# Patient Record
Sex: FEMALE | Race: WHITE | Employment: FULL TIME | URBAN - NONMETROPOLITAN AREA
[De-identification: names, ages, dates, MRNs, and addresses within clinical notes are randomized per-mention and may not be internally consistent; named-entity substitution may affect disease eponyms.]

---

## 2017-01-03 RX ORDER — ESCITALOPRAM OXALATE 10 MG/1
TABLET ORAL
Qty: 90 TABLET | Refills: 3 | Status: SHIPPED | OUTPATIENT
Start: 2017-01-03 | End: 2017-09-29 | Stop reason: ALTCHOICE

## 2017-01-09 ENCOUNTER — TELEPHONE (OUTPATIENT)
Dept: PRIMARY CARE CLINIC | Age: 37
End: 2017-01-09

## 2017-01-09 RX ORDER — LOSARTAN POTASSIUM AND HYDROCHLOROTHIAZIDE 12.5; 5 MG/1; MG/1
TABLET ORAL
Qty: 30 TABLET | Refills: 3 | Status: SHIPPED | OUTPATIENT
Start: 2017-01-09 | End: 2017-01-09 | Stop reason: SDUPTHER

## 2017-01-09 RX ORDER — LOSARTAN POTASSIUM AND HYDROCHLOROTHIAZIDE 12.5; 5 MG/1; MG/1
TABLET ORAL
Qty: 30 TABLET | Refills: 3 | Status: SHIPPED | OUTPATIENT
Start: 2017-01-09 | End: 2017-01-16 | Stop reason: ALTCHOICE

## 2017-01-10 ENCOUNTER — TELEPHONE (OUTPATIENT)
Dept: PRIMARY CARE CLINIC | Age: 37
End: 2017-01-10

## 2017-01-16 ENCOUNTER — TELEPHONE (OUTPATIENT)
Dept: PRIMARY CARE CLINIC | Age: 37
End: 2017-01-16

## 2017-01-16 RX ORDER — BISOPROLOL FUMARATE AND HYDROCHLOROTHIAZIDE 5; 6.25 MG/1; MG/1
TABLET ORAL
Qty: 30 TABLET | Refills: 3 | Status: SHIPPED | OUTPATIENT
Start: 2017-01-16 | End: 2017-05-22 | Stop reason: SDUPTHER

## 2017-01-20 ENCOUNTER — TELEPHONE (OUTPATIENT)
Dept: PRIMARY CARE CLINIC | Age: 37
End: 2017-01-20

## 2017-01-23 ENCOUNTER — TELEPHONE (OUTPATIENT)
Dept: PRIMARY CARE CLINIC | Age: 37
End: 2017-01-23

## 2017-01-23 RX ORDER — CIMETIDINE 800 MG
TABLET ORAL
Qty: 60 TABLET | Refills: 5 | Status: SHIPPED | OUTPATIENT
Start: 2017-01-23 | End: 2017-03-08 | Stop reason: ALTCHOICE

## 2017-01-23 RX ORDER — PANTOPRAZOLE SODIUM 40 MG/1
TABLET, DELAYED RELEASE ORAL
Qty: 30 TABLET | Refills: 0 | Status: SHIPPED | OUTPATIENT
Start: 2017-01-23 | End: 2018-04-20 | Stop reason: SDUPTHER

## 2017-01-27 ENCOUNTER — TELEPHONE (OUTPATIENT)
Dept: PRIMARY CARE CLINIC | Age: 37
End: 2017-01-27

## 2017-01-27 RX ORDER — OMEPRAZOLE 40 MG/1
CAPSULE, DELAYED RELEASE ORAL
Qty: 30 CAPSULE | Refills: 3 | Status: SHIPPED | OUTPATIENT
Start: 2017-01-27 | End: 2017-09-29 | Stop reason: CLARIF

## 2017-01-30 ENCOUNTER — TELEPHONE (OUTPATIENT)
Dept: PRIMARY CARE CLINIC | Age: 37
End: 2017-01-30

## 2017-01-30 DIAGNOSIS — K21.9 GASTROESOPHAGEAL REFLUX DISEASE, ESOPHAGITIS PRESENCE NOT SPECIFIED: Primary | ICD-10-CM

## 2017-01-30 DIAGNOSIS — K22.89 ESOPHAGEAL PAIN: ICD-10-CM

## 2017-01-31 ENCOUNTER — TELEPHONE (OUTPATIENT)
Dept: PRIMARY CARE CLINIC | Age: 37
End: 2017-01-31

## 2017-03-08 ENCOUNTER — TELEPHONE (OUTPATIENT)
Dept: PRIMARY CARE CLINIC | Age: 37
End: 2017-03-08

## 2017-03-08 ENCOUNTER — OFFICE VISIT (OUTPATIENT)
Dept: PRIMARY CARE CLINIC | Age: 37
End: 2017-03-08
Payer: COMMERCIAL

## 2017-03-08 VITALS
TEMPERATURE: 98.3 F | RESPIRATION RATE: 18 BRPM | DIASTOLIC BLOOD PRESSURE: 78 MMHG | SYSTOLIC BLOOD PRESSURE: 110 MMHG | HEIGHT: 66 IN | OXYGEN SATURATION: 95 % | WEIGHT: 179 LBS | HEART RATE: 72 BPM | BODY MASS INDEX: 28.77 KG/M2

## 2017-03-08 DIAGNOSIS — J06.9 URI, ACUTE: ICD-10-CM

## 2017-03-08 DIAGNOSIS — R05.9 COUGH: Primary | ICD-10-CM

## 2017-03-08 DIAGNOSIS — K21.9 GASTROESOPHAGEAL REFLUX DISEASE WITHOUT ESOPHAGITIS: ICD-10-CM

## 2017-03-08 PROCEDURE — 99214 OFFICE O/P EST MOD 30 MIN: CPT | Performed by: FAMILY MEDICINE

## 2017-03-08 RX ORDER — PANTOPRAZOLE SODIUM 40 MG/1
40 TABLET, DELAYED RELEASE ORAL DAILY
Qty: 30 TABLET | Refills: 3 | Status: SHIPPED | OUTPATIENT
Start: 2017-03-08 | End: 2017-06-13 | Stop reason: SDUPTHER

## 2017-03-08 RX ORDER — METHYLPREDNISOLONE 4 MG/1
TABLET ORAL
Qty: 1 KIT | Refills: 0 | Status: SHIPPED | OUTPATIENT
Start: 2017-03-08 | End: 2017-03-14

## 2017-03-08 RX ORDER — DEXTROMETHORPHAN HYDROBROMIDE AND PROMETHAZINE HYDROCHLORIDE 15; 6.25 MG/5ML; MG/5ML
5 SYRUP ORAL 4 TIMES DAILY PRN
Qty: 240 ML | Refills: 0 | Status: SHIPPED | OUTPATIENT
Start: 2017-03-08 | End: 2017-03-27 | Stop reason: SDUPTHER

## 2017-03-09 ASSESSMENT — ENCOUNTER SYMPTOMS
NAUSEA: 0
WHEEZING: 0
SINUS PRESSURE: 1
ABDOMINAL PAIN: 0
EYE ITCHING: 0
RHINORRHEA: 1
CHEST TIGHTNESS: 0
SORE THROAT: 1
EYE REDNESS: 0
COLOR CHANGE: 0
VOMITING: 0
DIARRHEA: 0
COUGH: 1
EYE DISCHARGE: 0

## 2017-03-13 ENCOUNTER — TELEPHONE (OUTPATIENT)
Dept: PRIMARY CARE CLINIC | Age: 37
End: 2017-03-13

## 2017-03-14 ENCOUNTER — TELEPHONE (OUTPATIENT)
Dept: PRIMARY CARE CLINIC | Age: 37
End: 2017-03-14

## 2017-03-15 ENCOUNTER — TELEPHONE (OUTPATIENT)
Dept: PRIMARY CARE CLINIC | Age: 37
End: 2017-03-15

## 2017-03-15 RX ORDER — BENZONATATE 200 MG/1
200 CAPSULE ORAL 3 TIMES DAILY PRN
Qty: 30 CAPSULE | Refills: 2 | Status: SHIPPED | OUTPATIENT
Start: 2017-03-15 | End: 2017-03-25

## 2017-03-23 ENCOUNTER — TELEPHONE (OUTPATIENT)
Dept: PRIMARY CARE CLINIC | Age: 37
End: 2017-03-23

## 2017-03-27 ENCOUNTER — TELEPHONE (OUTPATIENT)
Dept: PRIMARY CARE CLINIC | Age: 37
End: 2017-03-27

## 2017-03-27 RX ORDER — DEXTROMETHORPHAN HYDROBROMIDE AND PROMETHAZINE HYDROCHLORIDE 15; 6.25 MG/5ML; MG/5ML
5 SYRUP ORAL 4 TIMES DAILY PRN
Qty: 240 ML | Refills: 0 | Status: SHIPPED | OUTPATIENT
Start: 2017-03-27 | End: 2017-04-03

## 2017-05-22 RX ORDER — NORGESTIMATE AND ETHINYL ESTRADIOL 7DAYSX3 28
KIT ORAL
Qty: 28 TABLET | Refills: 3 | Status: SHIPPED | OUTPATIENT
Start: 2017-05-22 | End: 2017-09-29 | Stop reason: CLARIF

## 2017-05-22 RX ORDER — BISOPROLOL FUMARATE AND HYDROCHLOROTHIAZIDE 5; 6.25 MG/1; MG/1
TABLET ORAL
Qty: 30 TABLET | Refills: 3 | Status: SHIPPED | OUTPATIENT
Start: 2017-05-22 | End: 2017-10-02 | Stop reason: SDUPTHER

## 2017-05-30 RX ORDER — AMITRIPTYLINE HYDROCHLORIDE 25 MG/1
TABLET, FILM COATED ORAL
Qty: 30 TABLET | Refills: 3 | Status: SHIPPED | OUTPATIENT
Start: 2017-05-30 | End: 2017-09-29 | Stop reason: ALTCHOICE

## 2017-06-13 DIAGNOSIS — R05.9 COUGH: ICD-10-CM

## 2017-06-13 DIAGNOSIS — K21.9 GASTROESOPHAGEAL REFLUX DISEASE WITHOUT ESOPHAGITIS: ICD-10-CM

## 2017-06-13 RX ORDER — PANTOPRAZOLE SODIUM 40 MG/1
40 TABLET, DELAYED RELEASE ORAL DAILY
Qty: 30 TABLET | Refills: 3 | Status: SHIPPED | OUTPATIENT
Start: 2017-06-13 | End: 2017-09-29 | Stop reason: CLARIF

## 2017-06-15 ENCOUNTER — TELEPHONE (OUTPATIENT)
Dept: PRIMARY CARE CLINIC | Age: 37
End: 2017-06-15

## 2017-09-27 RX ORDER — NORELGESTROMIN AND ETHINYL ESTRADIOL 150; 35 UG/D; UG/D
PATCH TRANSDERMAL
Qty: 3 PATCH | Refills: 3 | Status: SHIPPED | OUTPATIENT
Start: 2017-09-27 | End: 2017-09-29 | Stop reason: ALTCHOICE

## 2017-09-29 ENCOUNTER — OFFICE VISIT (OUTPATIENT)
Dept: PRIMARY CARE CLINIC | Age: 37
End: 2017-09-29
Payer: COMMERCIAL

## 2017-09-29 VITALS
HEART RATE: 64 BPM | SYSTOLIC BLOOD PRESSURE: 98 MMHG | RESPIRATION RATE: 20 BRPM | HEIGHT: 66 IN | DIASTOLIC BLOOD PRESSURE: 62 MMHG | WEIGHT: 186 LBS | TEMPERATURE: 97.5 F | BODY MASS INDEX: 29.89 KG/M2

## 2017-09-29 DIAGNOSIS — Z01.419 WELL FEMALE EXAM WITH ROUTINE GYNECOLOGICAL EXAM: Primary | ICD-10-CM

## 2017-09-29 DIAGNOSIS — R10.31 RIGHT LOWER QUADRANT ABDOMINAL PAIN: ICD-10-CM

## 2017-09-29 DIAGNOSIS — M25.50 ARTHRALGIA, UNSPECIFIED JOINT: ICD-10-CM

## 2017-09-29 DIAGNOSIS — E03.9 ACQUIRED HYPOTHYROIDISM: ICD-10-CM

## 2017-09-29 DIAGNOSIS — Z12.4 PAP SMEAR FOR CERVICAL CANCER SCREENING: ICD-10-CM

## 2017-09-29 PROCEDURE — 99395 PREV VISIT EST AGE 18-39: CPT | Performed by: FAMILY MEDICINE

## 2017-09-29 RX ORDER — ACETAMINOPHEN AND CODEINE PHOSPHATE 120; 12 MG/5ML; MG/5ML
SOLUTION ORAL
Qty: 84 TABLET | Refills: 5 | Status: SHIPPED | OUTPATIENT
Start: 2017-09-29 | End: 2018-10-24 | Stop reason: SDUPTHER

## 2017-09-29 RX ORDER — TRAZODONE HYDROCHLORIDE 50 MG/1
TABLET ORAL
Qty: 30 TABLET | Refills: 5 | Status: SHIPPED | OUTPATIENT
Start: 2017-09-29 | End: 2018-11-07

## 2017-09-29 RX ORDER — VENLAFAXINE HYDROCHLORIDE 75 MG/1
CAPSULE, EXTENDED RELEASE ORAL
Qty: 60 CAPSULE | Refills: 5 | Status: SHIPPED | OUTPATIENT
Start: 2017-09-29 | End: 2017-10-18 | Stop reason: ALTCHOICE

## 2017-10-04 ENCOUNTER — PATIENT MESSAGE (OUTPATIENT)
Dept: PRIMARY CARE CLINIC | Age: 37
End: 2017-10-04

## 2017-10-04 RX ORDER — METRONIDAZOLE 500 MG/1
TABLET ORAL
Qty: 20 TABLET | Refills: 0 | Status: SHIPPED | OUTPATIENT
Start: 2017-10-04 | End: 2017-12-13 | Stop reason: CLARIF

## 2017-10-16 ENCOUNTER — TELEPHONE (OUTPATIENT)
Dept: PRIMARY CARE CLINIC | Age: 37
End: 2017-10-16

## 2017-10-16 NOTE — TELEPHONE ENCOUNTER
Patient called, stated that she had a physical 2 weeks ago. She started bleeding dark red and it's not time for her cycle. Should she be concerned.   Please advise

## 2017-10-16 NOTE — TELEPHONE ENCOUNTER
Has she started the low-dose progesterone only birth control pill yet? It is so low dose that that could be part of the problem. If she wants to continue that she needs to continue taking the pack but definitely use a backup secondary birth control method such as condoms until we get her periods regular.

## 2017-10-17 ENCOUNTER — TELEPHONE (OUTPATIENT)
Dept: PRIMARY CARE CLINIC | Age: 37
End: 2017-10-17

## 2017-10-17 ENCOUNTER — HOSPITAL ENCOUNTER (EMERGENCY)
Age: 37
Discharge: HOME | End: 2017-10-17
Payer: COMMERCIAL

## 2017-10-17 VITALS
DIASTOLIC BLOOD PRESSURE: 77 MMHG | RESPIRATION RATE: 18 BRPM | TEMPERATURE: 98.5 F | OXYGEN SATURATION: 98 % | HEART RATE: 62 BPM | SYSTOLIC BLOOD PRESSURE: 127 MMHG

## 2017-10-17 DIAGNOSIS — R00.2 PALPITATIONS: Primary | ICD-10-CM

## 2017-10-17 LAB
ALBUMIN SERPL-MCNC: 4 G/DL (ref 3.5–5.2)
ALP BLD-CCNC: 74 U/L (ref 35–104)
ALT SERPL-CCNC: 36 U/L (ref 5–33)
ANION GAP SERPL CALCULATED.3IONS-SCNC: 11 MMOL/L (ref 7–19)
AST SERPL-CCNC: 28 U/L (ref 5–32)
BACTERIA: NORMAL /HPF
BASOPHILS ABSOLUTE: 0.1 K/UL (ref 0–0.2)
BASOPHILS RELATIVE PERCENT: 0.8 % (ref 0–1)
BILIRUB SERPL-MCNC: 0.3 MG/DL (ref 0.2–1.2)
BILIRUBIN URINE: NEGATIVE
BLOOD, URINE: ABNORMAL
BUN BLDV-MCNC: 9 MG/DL (ref 6–20)
CALCIUM SERPL-MCNC: 9.3 MG/DL (ref 8.6–10)
CHLORIDE BLD-SCNC: 100 MMOL/L (ref 98–111)
CLARITY: ABNORMAL
CO2: 30 MMOL/L (ref 22–29)
COLOR: YELLOW
CREAT SERPL-MCNC: 0.8 MG/DL (ref 0.5–0.9)
EOSINOPHILS ABSOLUTE: 0.7 K/UL (ref 0–0.6)
EOSINOPHILS RELATIVE PERCENT: 6.3 % (ref 0–5)
EPITHELIAL CELLS, UA: 8 /HPF (ref 0–5)
GFR NON-AFRICAN AMERICAN: >60
GLUCOSE BLD-MCNC: 102 MG/DL (ref 74–109)
GLUCOSE URINE: NEGATIVE MG/DL
HCG(URINE) PREGNANCY TEST: NEGATIVE
HCT VFR BLD CALC: 47.4 % (ref 37–47)
HEMOGLOBIN: 15.8 G/DL (ref 12–16)
HYALINE CASTS: 2 /HPF (ref 0–8)
KETONES, URINE: NEGATIVE MG/DL
LEUKOCYTE ESTERASE, URINE: NEGATIVE
LYMPHOCYTES ABSOLUTE: 3.1 K/UL (ref 1.1–4.5)
LYMPHOCYTES RELATIVE PERCENT: 28.1 % (ref 20–40)
MCH RBC QN AUTO: 30.5 PG (ref 27–31)
MCHC RBC AUTO-ENTMCNC: 33.3 G/DL (ref 33–37)
MCV RBC AUTO: 91.5 FL (ref 81–99)
MONOCYTES ABSOLUTE: 0.7 K/UL (ref 0–0.9)
MONOCYTES RELATIVE PERCENT: 6.3 % (ref 0–10)
NEUTROPHILS ABSOLUTE: 6.5 K/UL (ref 1.5–7.5)
NEUTROPHILS RELATIVE PERCENT: 58.3 % (ref 50–65)
NITRITE, URINE: NEGATIVE
PDW BLD-RTO: 13.2 % (ref 11.5–14.5)
PH UA: 6.5
PLATELET # BLD: 186 K/UL (ref 130–400)
PMV BLD AUTO: 12.8 FL (ref 9.4–12.3)
POTASSIUM SERPL-SCNC: 3.7 MMOL/L (ref 3.5–5)
PROTEIN UA: NEGATIVE MG/DL
RBC # BLD: 5.18 M/UL (ref 4.2–5.4)
RBC UA: 3 /HPF (ref 0–4)
SODIUM BLD-SCNC: 141 MMOL/L (ref 136–145)
SPECIFIC GRAVITY UA: 1.01
TOTAL PROTEIN: 7 G/DL (ref 6.6–8.7)
TROPONIN: <0.01 NG/ML (ref 0–0.03)
TSH SERPL DL<=0.05 MIU/L-ACNC: 0.74 UIU/ML (ref 0.27–4.2)
UROBILINOGEN, URINE: 0.2 E.U./DL
WBC # BLD: 11.2 K/UL (ref 4.8–10.8)
WBC UA: 1 /HPF (ref 0–5)

## 2017-10-17 PROCEDURE — 85025 COMPLETE CBC W/AUTO DIFF WBC: CPT

## 2017-10-17 PROCEDURE — 2580000003 HC RX 258: Performed by: NURSE PRACTITIONER

## 2017-10-17 PROCEDURE — 36415 COLL VENOUS BLD VENIPUNCTURE: CPT

## 2017-10-17 PROCEDURE — 93005 ELECTROCARDIOGRAM TRACING: CPT

## 2017-10-17 PROCEDURE — 99284 EMERGENCY DEPT VISIT MOD MDM: CPT

## 2017-10-17 PROCEDURE — 80053 COMPREHEN METABOLIC PANEL: CPT

## 2017-10-17 PROCEDURE — 99282 EMERGENCY DEPT VISIT SF MDM: CPT | Performed by: NURSE PRACTITIONER

## 2017-10-17 PROCEDURE — 81001 URINALYSIS AUTO W/SCOPE: CPT

## 2017-10-17 PROCEDURE — 84484 ASSAY OF TROPONIN QUANT: CPT

## 2017-10-17 PROCEDURE — 84443 ASSAY THYROID STIM HORMONE: CPT

## 2017-10-17 PROCEDURE — 81025 URINE PREGNANCY TEST: CPT

## 2017-10-17 RX ORDER — 0.9 % SODIUM CHLORIDE 0.9 %
1000 INTRAVENOUS SOLUTION INTRAVENOUS ONCE
Status: COMPLETED | OUTPATIENT
Start: 2017-10-17 | End: 2017-10-17

## 2017-10-17 RX ADMIN — SODIUM CHLORIDE 1000 ML: 9 INJECTION, SOLUTION INTRAVENOUS at 16:09

## 2017-10-17 NOTE — ED NOTES
Was waiting on holtor monitor informed they are currently all in use pt provided with number follow up     Red Pain, RN  10/17/17 [id number]

## 2017-10-17 NOTE — ED PROVIDER NOTES
[facility]  eMERGENCY dEPARTMENT eNCOUnter      Pt Name: Uche Dunn  MRN: [id number]  [place] 1980  Date of evaluation: 10/17/2017  Provider: ATILIO Ortega    CHIEF COMPLAINT       Chief Complaint   Patient presents with    Palpitations     reports recent med change effexor 75mg XR started last week, co of palpitations and neck issues \"feeling odd\"         HISTORY OF PRESENT ILLNESS  (Location/Symptom, Timing/Onset, Context/Setting, Quality, Duration, Modifying Factors, Severity.)   Uche Dunn is a 39 y.o. female who presents to the emergency department With chief complaint of palpitations that began about an hour prior to presenting to the emergency room. Patient states she was sitting at work and began experiencing a fluttering feeling in her chest and tightness in her throat. Patient states that she has arrived to the emergency room the palpitations has resolved. Patient states she is concerned that she may be having an allergic reaction or an adverse reaction to a recently changed medication as her Effexor was started about a month ago and her amitriptyline was discontinued. The patient denies any rash. The history is provided by the patient. Nursing Notes were reviewed and I agree. REVIEW OF SYSTEMS    (2-9 systems for level 4, 10 or more for level 5)     Review of Systems   Cardiovascular: Positive for palpitations. All other systems reviewed and are negative. Except as noted above the remainder of the review of systems was reviewed and negative.        PAST MEDICAL HISTORY     Past Medical History:   Diagnosis Date    Hypothyroidism     Irritable bowel syndrome          SURGICAL HISTORY       Past Surgical History:   Procedure Laterality Date    CHOLECYSTECTOMY  5-12    DILATION AND CURETTAGE OF UTERUS      GALLBLADDER SURGERY      May 2012    LAPAROSCOPY           CURRENT MEDICATIONS       Discharge Medication List as of 10/17/2017  4:57 PM CONTINUE these medications which have NOT CHANGED    Details   metroNIDAZOLE (FLAGYL) 500 MG tablet 1 tablet by mouth twice a day for bacterial vaginosis, Disp-20 tablet, R-0Normal      bisoprolol-hydrochlorothiazide (ZIAC) 5-6.25 MG per tablet 1 tablet by mouth once a day for blood pressure, Disp-90 tablet, R-3Normal      norethindrone (ORTHO MICRONOR) 0.35 MG tablet 1 tablet by mouth once a day for birth control, Disp-84 tablet, R-53 month supply stop other birth control pillsNormal      traZODone (DESYREL) 50 MG tablet 1 tablet by mouth at bedtime for sleep, Disp-30 tablet, R-5Stop ElavilNormal      venlafaxine (EFFEXOR XR) 75 MG extended release capsule 1 capsule by mouth once a day for one week then 2 capsules once a day for mood, Disp-60 capsule, R-5Stop LexaproNormal      pantoprazole (PROTONIX) 40 MG tablet 1 tablet by mouth once a day only if needed for severe flareup and Tagamet is not working, Disp-30 tablet, R-0Normal      !! levothyroxine (SYNTHROID) 112 MCG tablet 1 tablet by mouth once a day for low thyroid. Pt is alternating dose 112mcg with the 100mcg ., Disp-45 tablet, R-3Note change in dose      !! levothyroxine (LEVOTHROID) 100 MCG tablet Take 1 tablet by mouth daily Pt is alternating the 100mcg  and 112mcg during the month. Lab work on this dose was normal, Disp-45 tablet, R-3       !! - Potential duplicate medications found. Please discuss with provider.           ALLERGIES     Dilaudid [hydromorphone hcl]    FAMILY HISTORY       Family History   Problem Relation Age of Onset    Diabetes Father     Cataracts Father           SOCIAL HISTORY       Social History     Social History    Marital status:      Spouse name: N/A    Number of children: 1    Years of education: N/A     Occupational History          Social History Main Topics    Smoking status: Current Every Day Smoker     Packs/day: 0.50     Years: 17.00    Smokeless tobacco: Never Used      Comment: \"some\" desire to quit    Alcohol use No    Drug use: No    Sexual activity: Not Asked     Other Topics Concern    None     Social History Narrative    None       SCREENINGS    [place] Coma Scale  Eye Opening: Spontaneous  Best Verbal Response: Oriented  Best Motor Response: Obeys commands  [place] Coma Scale Score: 15      PHYSICAL EXAM    (up to 7 for level 4, 8 or more for level 5)     ED Triage Vitals [10/17/17 1433]   BP Temp Temp src Pulse Resp SpO2 Height Weight   (!) 140/88 98.5 °F (36.9 °C) -- 64 16 96 % -- --       Physical Exam   Constitutional: She is oriented to person, place, and time. She appears well-developed and well-nourished. No distress. HENT:   Head: Normocephalic. Mouth/Throat: No oropharyngeal exudate. Eyes: EOM are normal. Pupils are equal, round, and reactive to light. Right eye exhibits no discharge. Left eye exhibits no discharge. No scleral icterus. Neck: Normal range of motion. No tracheal deviation present. Cardiovascular: Normal rate, regular rhythm and normal heart sounds. No murmur heard. Pulmonary/Chest: Effort normal and breath sounds normal. No stridor. No respiratory distress. She has no wheezes. Abdominal: Soft. Bowel sounds are normal. She exhibits no distension. There is no tenderness. Musculoskeletal: Normal range of motion. Lymphadenopathy:     She has no cervical adenopathy. Neurological: She is alert and oriented to person, place, and time. Skin: Skin is warm and dry. No rash noted. She is not diaphoretic. No erythema. No pallor. Psychiatric: She has a normal mood and affect. Her behavior is normal. Judgment and thought content normal.   Nursing note and vitals reviewed.         DIAGNOSTIC RESULTS     RADIOLOGY:   Non-plain film images such as CT, Ultrasound and MRI are read by the radiologist. Plain radiographic images are visualized and preliminarily interpreted by No att. providers found with the below findings:        Interpretation per the Radiologist below, if available at the time of this note:    No orders to display       LABS:  Labs Reviewed   CBC WITH AUTO DIFFERENTIAL - Abnormal; Notable for the following:        Result Value    WBC 11.2 (*)     Hematocrit 47.4 (*)     MPV 12.8 (*)     Eosinophils % 6.3 (*)     Eosinophils # 0.70 (*)     All other components within normal limits   COMPREHENSIVE METABOLIC PANEL - Abnormal; Notable for the following:     CO2 30 (*)     ALT 36 (*)     All other components within normal limits   URINALYSIS - Abnormal; Notable for the following:     Clarity, UA CLOUDY (*)     Blood, Urine MODERATE (*)     All other components within normal limits   PREGNANCY, URINE   TSH WITHOUT REFLEX   TROPONIN   MICROSCOPIC URINALYSIS       All other labs were within normal range or not returned as of this dictation. RE-ASSESSMENT          EMERGENCY DEPARTMENT COURSE and DIFFERENTIAL DIAGNOSIS/MDM:   Vitals:    Vitals:    10/17/17 1603 10/17/17 1616 10/17/17 1634 10/17/17 1702   BP: 112/74  126/83 127/77   Pulse: 66  63 62   Resp: 17 16 16 18   Temp:       SpO2: 98% 97% 98% 98%           MDM  Number of Diagnoses or Management Options  Diagnosis management comments: EKG shows a normal sinus rhythm with heart rate of 61 with no ST elevation or depression or T-wave abnormality. Her workup today is unremarkable. We discussed other etiologies such as an arrhythmia, normal plan to send her home on a Holter monitor. She will follow-up with her primary care after she completes the Holter monitor or she will return back to the ED if she is a new or worsening symptoms. The patient does not have any further palpitations nor has she had any since this ED admission. She is hemodynamically stable. After we can safely discharge the patient home. PROCEDURES:    Procedures      FINAL IMPRESSION      1.  Palpitations          DISPOSITION/PLAN   DISPOSITION Decision to Discharge    PATIENT REFERRED TO:  Walker Sanchez MD  [address]

## 2017-10-18 ENCOUNTER — PATIENT MESSAGE (OUTPATIENT)
Dept: PRIMARY CARE CLINIC | Age: 37
End: 2017-10-18

## 2017-10-18 RX ORDER — FLUOXETINE HYDROCHLORIDE 20 MG/1
CAPSULE ORAL
Qty: 30 CAPSULE | Refills: 5 | Status: SHIPPED | OUTPATIENT
Start: 2017-10-18 | End: 2017-12-28 | Stop reason: SDUPTHER

## 2017-10-18 NOTE — TELEPHONE ENCOUNTER
From: Ulises Ariza  To: Latonia Lyons MD  Sent: 10/18/2017 10:07 AM CDT  Subject: Prescription Question    cont from first message sent today; I have been reading up on other meds compared/given for anxiety/depression. it seems Prozac may be the best fit for me. Could I try this out and see if this is better for my body. today, my neck is crawling again.

## 2017-10-19 ENCOUNTER — NURSE ONLY (OUTPATIENT)
Dept: PRIMARY CARE CLINIC | Age: 37
End: 2017-10-19
Payer: COMMERCIAL

## 2017-10-19 ENCOUNTER — HOSPITAL ENCOUNTER (OUTPATIENT)
Dept: ULTRASOUND IMAGING | Age: 37
Discharge: HOME | End: 2017-10-19
Payer: COMMERCIAL

## 2017-10-19 DIAGNOSIS — M25.50 ARTHRALGIA, UNSPECIFIED JOINT: ICD-10-CM

## 2017-10-19 DIAGNOSIS — R10.31 RIGHT LOWER QUADRANT ABDOMINAL PAIN: ICD-10-CM

## 2017-10-19 DIAGNOSIS — E03.9 ACQUIRED HYPOTHYROIDISM: ICD-10-CM

## 2017-10-19 DIAGNOSIS — Z00.00 LABORATORY EXAMINATION ORDERED AS PART OF A ROUTINE GENERAL MEDICAL EXAMINATION: Primary | ICD-10-CM

## 2017-10-19 LAB
ALBUMIN SERPL-MCNC: 3.9 G/DL (ref 3.5–5.2)
ALP BLD-CCNC: 69 U/L (ref 35–104)
ALT SERPL-CCNC: 36 U/L (ref 5–33)
ANION GAP SERPL CALCULATED.3IONS-SCNC: 10 MMOL/L (ref 7–19)
AST SERPL-CCNC: 32 U/L (ref 5–32)
BILIRUB SERPL-MCNC: 0.4 MG/DL (ref 0.2–1.2)
BUN BLDV-MCNC: 12 MG/DL (ref 6–20)
CALCIUM SERPL-MCNC: 9.3 MG/DL (ref 8.6–10)
CHLORIDE BLD-SCNC: 101 MMOL/L (ref 98–111)
CHOLESTEROL, TOTAL: 160 MG/DL (ref 160–199)
CO2: 29 MMOL/L (ref 22–29)
CREAT SERPL-MCNC: 0.8 MG/DL (ref 0.5–0.9)
GFR NON-AFRICAN AMERICAN: >60
GLUCOSE BLD-MCNC: 114 MG/DL (ref 74–109)
HCT VFR BLD CALC: 48.2 % (ref 37–47)
HDLC SERPL-MCNC: 39 MG/DL (ref 65–121)
HEMOGLOBIN: 15.3 G/DL (ref 12–16)
LDL CHOLESTEROL CALCULATED: 95 MG/DL
MCH RBC QN AUTO: 30.3 PG (ref 27–31)
MCHC RBC AUTO-ENTMCNC: 31.7 G/DL (ref 33–37)
MCV RBC AUTO: 95.4 FL (ref 81–99)
PDW BLD-RTO: 13.6 % (ref 11.5–14.5)
PLATELET # BLD: 178 K/UL (ref 130–400)
PMV BLD AUTO: 13.6 FL (ref 9.4–12.3)
POTASSIUM SERPL-SCNC: 4.2 MMOL/L (ref 3.5–5)
RBC # BLD: 5.05 M/UL (ref 4.2–5.4)
RHEUMATOID FACTOR: <10 IU/ML
SODIUM BLD-SCNC: 140 MMOL/L (ref 136–145)
T4 TOTAL: 11.8 UG/DL (ref 4.5–11.7)
TOTAL PROTEIN: 6.9 G/DL (ref 6.6–8.7)
TRIGL SERPL-MCNC: 128 MG/DL (ref 0–149)
TSH SERPL DL<=0.05 MIU/L-ACNC: 1.18 UIU/ML (ref 0.27–4.2)
WBC # BLD: 9.7 K/UL (ref 4.8–10.8)

## 2017-10-19 PROCEDURE — 76830 TRANSVAGINAL US NON-OB: CPT

## 2017-10-19 PROCEDURE — 36415 COLL VENOUS BLD VENIPUNCTURE: CPT | Performed by: FAMILY MEDICINE

## 2017-10-20 DIAGNOSIS — N83.202 CYST OF OVARY, LEFT: Primary | ICD-10-CM

## 2017-10-20 RX ORDER — AMOXICILLIN 500 MG/1
500 CAPSULE ORAL 3 TIMES DAILY
Qty: 30 CAPSULE | Refills: 0 | Status: SHIPPED | OUTPATIENT
Start: 2017-10-20 | End: 2017-10-30

## 2017-10-22 ENCOUNTER — PATIENT MESSAGE (OUTPATIENT)
Dept: PRIMARY CARE CLINIC | Age: 37
End: 2017-10-22

## 2017-10-26 ENCOUNTER — TELEPHONE (OUTPATIENT)
Dept: PRIMARY CARE CLINIC | Age: 37
End: 2017-10-26

## 2017-10-26 DIAGNOSIS — N83.202 CYST OF LEFT OVARY: Primary | ICD-10-CM

## 2017-10-26 NOTE — TELEPHONE ENCOUNTER
Patient called stating she had a US and it showed a large cyst on her left ovary and told her not to worry about it and she is now having pain when she strains to pee on that left side. She wants to know is there something she needs to do. Also there was an antibiotic sent in by [facility] on 10/20 that patient is unsure why this was sent in I have looked through the notes and do not see why either.

## 2017-11-02 RX ORDER — ESCITALOPRAM OXALATE 10 MG/1
10 TABLET ORAL DAILY
Qty: 30 TABLET | Refills: 3 | Status: SHIPPED | OUTPATIENT
Start: 2017-11-02 | End: 2017-12-13 | Stop reason: CLARIF

## 2017-11-02 NOTE — TELEPHONE ENCOUNTER
We can try some Lexapro to see if that will help. Please let her know that Dr. Shahram Cortez is out of the office today.

## 2017-11-02 NOTE — TELEPHONE ENCOUNTER
[id number] [address] Acharya so I have tried to keep with it but [employer] isn't [place] [employer] work.  I'm still getting itchy and everything I have read says it's a side effect. I love how it makes me feel energetic and not tired and also helps with curbing my appetite, anything more similar to this without the itchy side effect?

## 2017-11-15 ENCOUNTER — OFFICE VISIT (OUTPATIENT)
Dept: GASTROENTEROLOGY | Facility: CLINIC | Age: 37
End: 2017-11-15

## 2017-11-15 VITALS
WEIGHT: 168 LBS | HEIGHT: 66 IN | DIASTOLIC BLOOD PRESSURE: 72 MMHG | HEART RATE: 64 BPM | SYSTOLIC BLOOD PRESSURE: 122 MMHG | OXYGEN SATURATION: 99 % | BODY MASS INDEX: 27 KG/M2

## 2017-11-15 DIAGNOSIS — R19.4 CHANGE IN BOWEL HABITS: ICD-10-CM

## 2017-11-15 DIAGNOSIS — Z71.6 TOBACCO ABUSE COUNSELING: ICD-10-CM

## 2017-11-15 DIAGNOSIS — R14.0 ABDOMINAL BLOATING: ICD-10-CM

## 2017-11-15 DIAGNOSIS — R68.81 EARLY SATIETY: Primary | ICD-10-CM

## 2017-11-15 PROCEDURE — 99204 OFFICE O/P NEW MOD 45 MIN: CPT | Performed by: CLINICAL NURSE SPECIALIST

## 2017-11-15 RX ORDER — DICYCLOMINE HCL 20 MG
10 TABLET ORAL 3 TIMES DAILY PRN
Qty: 45 TABLET | Refills: 10 | Status: SHIPPED | OUTPATIENT
Start: 2017-11-15 | End: 2020-09-04

## 2017-11-15 RX ORDER — SODIUM, POTASSIUM,MAG SULFATES 17.5-3.13G
SOLUTION, RECONSTITUTED, ORAL ORAL
Qty: 2 BOTTLE | Refills: 0 | Status: SHIPPED | OUTPATIENT
Start: 2017-11-15 | End: 2020-09-04

## 2017-11-15 NOTE — PATIENT INSTRUCTIONS
Steps to Quit Smoking   Smoking tobacco can be harmful to your health and can affect almost every organ in your body. Smoking puts you, and those around you, at risk for developing many serious chronic diseases. Quitting smoking is difficult, but it is one of the best things that you can do for your health. It is never too late to quit.  WHAT ARE THE BENEFITS OF QUITTING SMOKING?  When you quit smoking, you lower your risk of developing serious diseases and conditions, such as:  · Lung cancer or lung disease, such as COPD.  · Heart disease.  · Stroke.  · Heart attack.  · Infertility.  · Osteoporosis and bone fractures.  Additionally, symptoms such as coughing, wheezing, and shortness of breath may get better when you quit. You may also find that you get sick less often because your body is stronger at fighting off colds and infections. If you are pregnant, quitting smoking can help to reduce your chances of having a baby of low birth weight.  HOW DO I GET READY TO QUIT?  When you decide to quit smoking, create a plan to make sure that you are successful. Before you quit:  · Pick a date to quit. Set a date within the next two weeks to give you time to prepare.  · Write down the reasons why you are quitting. Keep this list in places where you will see it often, such as on your bathroom mirror or in your car or wallet.  · Identify the people, places, things, and activities that make you want to smoke (triggers) and avoid them. Make sure to take these actions:    Throw away all cigarettes at home, at work, and in your car.    Throw away smoking accessories, such as ashtrays and lighters.    Clean your car and make sure to empty the ashtray.    Clean your home, including curtains and carpets.  · Tell your family, friends, and coworkers that you are quitting. Support from your loved ones can make quitting easier.  · Talk with your health care provider about your options for quitting smoking.  · Find out what treatment  "options are covered by your health insurance.  WHAT STRATEGIES CAN I USE TO QUIT SMOKING?   Talk with your healthcare provider about different strategies to quit smoking. Some strategies include:  · Quitting smoking altogether instead of gradually lessening how much you smoke over a period of time. Research shows that quitting \"cold turkey\" is more successful than gradually quitting.  · Attending in-person counseling to help you build problem-solving skills. You are more likely to have success in quitting if you attend several counseling sessions. Even short sessions of 10 minutes can be effective.  · Finding resources and support systems that can help you to quit smoking and remain smoke-free after you quit. These resources are most helpful when you use them often. They can include:    Online chats with a counselor.    Telephone quitlines.    Printed self-help materials.    Support groups or group counseling.    Text messaging programs.    Mobile phone applications.  · Taking medicines to help you quit smoking. (If you are pregnant or breastfeeding, talk with your health care provider first.) Some medicines contain nicotine and some do not. Both types of medicines help with cravings, but the medicines that include nicotine help to relieve withdrawal symptoms. Your health care provider may recommend:    Nicotine patches, gum, or lozenges.    Nicotine inhalers or sprays.    Non-nicotine medicine that is taken by mouth.  Talk with your health care provider about combining strategies, such as taking medicines while you are also receiving in-person counseling. Using these two strategies together makes you more likely to succeed in quitting than if you used either strategy on its own.  If you are pregnant or breastfeeding, talk with your health care provider about finding counseling or other support strategies to quit smoking. Do not take medicine to help you quit smoking unless told to do so by your health care " provider.  WHAT THINGS CAN I DO TO MAKE IT EASIER TO QUIT?  Quitting smoking might feel overwhelming at first, but there is a lot that you can do to make it easier. Take these important actions:  · Reach out to your family and friends and ask that they support and encourage you during this time. Call telephone quitlines, reach out to support groups, or work with a counselor for support.  · Ask people who smoke to avoid smoking around you.  · Avoid places that trigger you to smoke, such as bars, parties, or smoke-break areas at work.  · Spend time around people who do not smoke.  · Lessen stress in your life, because stress can be a smoking trigger for some people. To lessen stress, try:    Exercising regularly.    Deep-breathing exercises.    Yoga.    Meditating.    Performing a body scan. This involves closing your eyes, scanning your body from head to toe, and noticing which parts of your body are particularly tense. Purposefully relax the muscles in those areas.  · Download or purchase mobile phone or tablet apps (applications) that can help you stick to your quit plan by providing reminders, tips, and encouragement. There are many free apps, such as QuitGuide from the CDC (Centers for Disease Control and Prevention). You can find other support for quitting smoking (smoking cessation) through smokefree.gov and other websites.  HOW WILL I FEEL WHEN I QUIT SMOKING?  Within the first 24 hours of quitting smoking, you may start to feel some withdrawal symptoms. These symptoms are usually most noticeable 2-3 days after quitting, but they usually do not last beyond 2-3 weeks. Changes or symptoms that you might experience include:  · Mood swings.  · Restlessness, anxiety, or irritation.  · Difficulty concentrating.  · Dizziness.  · Strong cravings for sugary foods in addition to nicotine.  · Mild weight gain.  · Constipation.  · Nausea.  · Coughing or a sore throat.  · Changes in how your medicines work in your  body.  · A depressed mood.  · Difficulty sleeping (insomnia).  After the first 2-3 weeks of quitting, you may start to notice more positive results, such as:  · Improved sense of smell and taste.  · Decreased coughing and sore throat.  · Slower heart rate.  · Lower blood pressure.  · Clearer skin.  · The ability to breathe more easily.  · Fewer sick days.  Quitting smoking is very challenging for most people. Do not get discouraged if you are not successful the first time. Some people need to make many attempts to quit before they achieve long-term success. Do your best to stick to your quit plan, and talk with your health care provider if you have any questions or concerns.     This information is not intended to replace advice given to you by your health care provider. Make sure you discuss any questions you have with your health care provider.     Document Released: 12/12/2002 Document Revised: 05/03/2016 Document Reviewed: 05/03/2016  [employer] Interactive Patient Education ©2017 [employer]

## 2017-11-15 NOTE — PROGRESS NOTES
Uyen José  1980    11/15/2017  Chief Complaint   Patient presents with   • GI Problem     Abdominal bloating     Subjective   HPI  Uyen José is a 36 y.o. female who presents with a complaint of alternating bowels with associated abdominal bloating that comes and goes generalized throughout moderate, no triggers other than maybe red sauces spicy foods. She says that she has early satiety feeling very full even after a few bites. She says that this is progressive ongoing for years worsening over the past few months. Her bowels alternate from loose to more constipation ongoing for several years. She feels like it is predominant constipation. No BRBPR. No melena. No abdominal pain just discomfort from the bloating. She has never taken anything for IBS. She has had a colonoscopy in 5/1/2003 and this was normal. She was placed on Librax at that time.  She is up to date with her GYN evaluations. Recent ultrasound of ovaries were normal per patient.   Past Medical History:   Diagnosis Date   • Acid reflux    • Allergic    • Anxiety    • Bronchitis    • Disease of thyroid gland    • Gallbladder abscess    • Heart murmur      Past Surgical History:   Procedure Laterality Date   • CHOLECYSTECTOMY     • COLONOSCOPY  05/01/2003    Normal exam     Outpatient Prescriptions Marked as Taking for the 11/15/17 encounter (Office Visit) with BLAS Portillo   Medication Sig Dispense Refill   • amitriptyline (ELAVIL) 25 MG tablet Take 25 mg by mouth Every Night.     • escitalopram (LEXAPRO) 10 MG tablet Take 10 mg by mouth Daily.     • fluticasone (FLONASE) 50 MCG/ACT nasal spray 1 spray each nostril twice a day for three days, then daily 1 bottle 0   • levothyroxine (SYNTHROID, LEVOTHROID) 100 MCG tablet Take 100 mcg by mouth Daily.     • norgestimate-ethinyl estradiol (TRI-LINYAH) 0.18/0.215/0.25 MG-35 MCG per tablet Take 1 tablet by mouth Daily.     • pantoprazole (PROTONIX) 40 MG EC tablet Take 40  mg by mouth Daily.       No Known Allergies  Social History     Social History   • Marital status:      Spouse name: N/A   • Number of children: N/A   • Years of education: N/A     Occupational History   • Not on file.     Social History Main Topics   • Smoking status: Current Every Day Smoker     Packs/day: 0.50     Years: 20.00     Types: Cigarettes   • Smokeless tobacco: Never Used   • Alcohol use Yes      Comment: Occasional   • Drug use: Not on file   • Sexual activity: Not on file     Other Topics Concern   • Not on file     Social History Narrative     Family History   Problem Relation Age of Onset   • Diabetes Mother    • Diabetes Father    • Cancer Paternal Grandfather    • Colon cancer Neg Hx    • Colon polyps Neg Hx      Health Maintenance   Topic Date Due   • PNEUMOCOCCAL VACCINE (19-64 MEDIUM RISK) (1 of 1 - PPSV23) 12/10/1998   • TDAP/TD VACCINES (1 - Tdap) 12/10/1998   • INFLUENZA VACCINE  08/01/2017   • PAP SMEAR  11/13/2017     Review of Systems   Constitutional: Negative for activity change, appetite change, chills, diaphoresis, fatigue, fever and unexpected weight change.   HENT: Negative for ear pain, hearing loss, mouth sores, sore throat, trouble swallowing and voice change.    Eyes: Negative.    Respiratory: Negative for cough, choking, shortness of breath and wheezing.    Cardiovascular: Negative for chest pain and palpitations.   Gastrointestinal: Positive for abdominal distention, constipation and diarrhea. Negative for abdominal pain, blood in stool, nausea and vomiting.   Endocrine: Negative for cold intolerance and heat intolerance.   Genitourinary: Negative for decreased urine volume, dysuria, frequency, hematuria and urgency.   Musculoskeletal: Negative for back pain, gait problem and myalgias.   Skin: Negative for color change, pallor and rash.   Allergic/Immunologic: Negative for food allergies and immunocompromised state.   Neurological: Negative for dizziness, tremors,  "seizures, syncope, weakness, light-headedness, numbness and headaches.   Hematological: Negative for adenopathy. Does not bruise/bleed easily.   Psychiatric/Behavioral: Negative for agitation and confusion. The patient is not nervous/anxious.    All other systems reviewed and are negative.    Objective   Vitals:    11/15/17 0839   BP: 122/72   Pulse: 64   SpO2: 99%   Weight: 168 lb (76.2 kg)   Height: 66\" (167.6 cm)     Body mass index is 27.12 kg/(m^2).  Physical Exam   Constitutional: She is oriented to person, place, and time. She appears well-developed and well-nourished.   HENT:   Head: Normocephalic and atraumatic.   Eyes: Pupils are equal, round, and reactive to light.   Neck: Normal range of motion. Neck supple. No tracheal deviation present.   Cardiovascular: Normal rate, regular rhythm and normal heart sounds.  Exam reveals no gallop and no friction rub.    No murmur heard.  Pulmonary/Chest: Effort normal and breath sounds normal. No respiratory distress. She has no wheezes. She has no rales. She exhibits no tenderness.   Abdominal: Soft. Bowel sounds are normal. She exhibits no distension. There is no hepatosplenomegaly. There is no tenderness. There is no rigidity, no rebound and no guarding.   Musculoskeletal: Normal range of motion. She exhibits no edema, tenderness or deformity.   Neurological: She is alert and oriented to person, place, and time. She has normal reflexes.   Skin: Skin is warm and dry. No rash noted. No pallor.   Psychiatric: She has a normal mood and affect. Her behavior is normal. Judgment and thought content normal.     Assessment/Plan   Uyen was seen today for gi problem.    Diagnoses and all orders for this visit:    Early satiety    Abdominal bloating  -     dicyclomine (BENTYL) 20 MG tablet; Take 0.5 tablets by mouth 3 (Three) Times a Day As Needed (IBS predominant diarrhea).    Change in bowel habits  -     SUPREP BOWEL PREP KIT 17.5-3.13-1.6 GM/180ML solution oral solution; " Take as directed by office instructions provided  -     Case Request; Standing  -     Implement Anesthesia Orders Day of Procedure; Standing  -     Obtain Informed Consent; Standing  -     Verify bowel prep was successful; Standing  -     Case Request    Tobacco abuse counseling    4 minutes counseling/reading provided.I do feel that she needs to treat the constipation with Miralax 17 grams per day up to twice per day as needed. She agrees. This may help her with the overall feeling of fullness and early satiety as well as the bloating.  20 minutes face to face contact  COLONOSCOPY WITH ANESTHESIA (N/A)  EMR Dragon/transcription disclaimer: Much of this encounter note is electronic transcription/translation of spoken language to printed text. The electronic translation of spoken language may be erroneous, or at times, nonsensical words or phrases may be inadvertently transcribed. Although I have reviewed the note for such errors, some may still exist.  Body mass index is 27.12 kg/(m^2).  No Follow-up on file.      All risks, benefits, alternatives, and indications of colonoscopy and/or Endoscopy procedure have been discussed with the patient. Risks to include perforation of the colon requiring possible surgery or colostomy, risk of bleeding from biopsies or removal of colon tissue, possibility of missing a colon polyp or cancer, or adverse drug reaction.  Benefits to include the diagnosis and management of disease of the colon and rectum. Alternatives to include barium enema, radiographic evaluation, lab testing or no intervention. Pt verbalizes understanding and agrees.

## 2017-11-28 DIAGNOSIS — R10.84 GENERALIZED ABDOMINAL PAIN: ICD-10-CM

## 2017-11-28 DIAGNOSIS — R14.0 BLOATING: Primary | ICD-10-CM

## 2017-12-01 ENCOUNTER — TELEPHONE (OUTPATIENT)
Dept: PRIMARY CARE CLINIC | Age: 37
End: 2017-12-01

## 2017-12-01 DIAGNOSIS — R10.84 GENERALIZED ABDOMINAL PAIN: Primary | ICD-10-CM

## 2017-12-13 ENCOUNTER — OFFICE VISIT (OUTPATIENT)
Dept: PRIMARY CARE CLINIC | Age: 37
End: 2017-12-13
Payer: COMMERCIAL

## 2017-12-13 VITALS
BODY MASS INDEX: 29.41 KG/M2 | RESPIRATION RATE: 18 BRPM | HEIGHT: 66 IN | WEIGHT: 183 LBS | OXYGEN SATURATION: 98 % | TEMPERATURE: 97.8 F | SYSTOLIC BLOOD PRESSURE: 114 MMHG | HEART RATE: 66 BPM | DIASTOLIC BLOOD PRESSURE: 80 MMHG

## 2017-12-13 DIAGNOSIS — J06.9 URI WITH COUGH AND CONGESTION: Primary | ICD-10-CM

## 2017-12-13 DIAGNOSIS — R10.30 LOWER ABDOMINAL PAIN: ICD-10-CM

## 2017-12-13 LAB
BILIRUBIN, POC: NORMAL
BLOOD URINE, POC: NORMAL
CLARITY, POC: CLEAR
COLOR, POC: YELLOW
GLUCOSE URINE, POC: NORMAL
KETONES, POC: NORMAL
LEUKOCYTE EST, POC: NORMAL
NITRITE, POC: NORMAL
PH, POC: 5
PROTEIN, POC: NORMAL
SPECIFIC GRAVITY, POC: 1
UROBILINOGEN, POC: NORMAL

## 2017-12-13 PROCEDURE — 99213 OFFICE O/P EST LOW 20 MIN: CPT | Performed by: FAMILY MEDICINE

## 2017-12-13 PROCEDURE — 81002 URINALYSIS NONAUTO W/O SCOPE: CPT | Performed by: FAMILY MEDICINE

## 2017-12-13 RX ORDER — PREDNISONE 10 MG/1
10 TABLET ORAL DAILY
Qty: 10 TABLET | Refills: 0 | Status: SHIPPED | OUTPATIENT
Start: 2017-12-13 | End: 2017-12-23

## 2017-12-13 RX ORDER — AMOXICILLIN 875 MG/1
TABLET, COATED ORAL
Qty: 20 TABLET | Refills: 0 | Status: SHIPPED | OUTPATIENT
Start: 2017-12-13 | End: 2018-11-07

## 2017-12-13 ASSESSMENT — ENCOUNTER SYMPTOMS
RHINORRHEA: 1
ABDOMINAL DISTENTION: 1
COUGH: 1
ABDOMINAL PAIN: 1
SORE THROAT: 1
SHORTNESS OF BREATH: 1
VOMITING: 0
NAUSEA: 0
DIARRHEA: 0

## 2017-12-13 ASSESSMENT — PATIENT HEALTH QUESTIONNAIRE - PHQ9
SUM OF ALL RESPONSES TO PHQ9 QUESTIONS 1 & 2: 0
1. LITTLE INTEREST OR PLEASURE IN DOING THINGS: 0
2. FEELING DOWN, DEPRESSED OR HOPELESS: 0
SUM OF ALL RESPONSES TO PHQ QUESTIONS 1-9: 0

## 2017-12-13 NOTE — PROGRESS NOTES
Subjective:      Patient ID: Vero Franks is a 44 y.o. female comes in today saying she has been sick for 3-4 weeks. HPI. It started like a cold. She 1st had head congestion and then it moved down into her chest. She is complaining of a sore throat and clear runny nose. She has trouble falling asleep because of the cough but it does not awaken her from sleep. Initially she took [place] plus which did seem to help and she was getting better so she stopped it. She then relapsed and now things are much worse. She has no history of asthma. She does smoke. She still having some lower abdominal pain and cramping. Her periods are much lighter on the Micronor. She did see the GYN at [facility] who told her there wasn't any problems with her female parts. They could not get her in before the end of the year to do a tubal.    She did call Dr. Jesse Lee office but they did not call her back. She is going to try again. She's worried she might have H. pylori. Review of Systems   Constitutional: Positive for activity change and fatigue. Negative for chills, diaphoresis and fever. HENT: Positive for congestion, postnasal drip, rhinorrhea and sore throat. Respiratory: Positive for cough and shortness of breath. Gastrointestinal: Positive for abdominal distention (occasionally) and abdominal pain. Negative for diarrhea, nausea and vomiting. Neurological: Negative. Objective:   Physical Exam   Constitutional: She is oriented to person, place, and time. She appears well-developed and well-nourished. No distress. HENT:   Head: Normocephalic. Right Ear: Tympanic membrane, external ear and ear canal normal.   Left Ear: Tympanic membrane, external ear and ear canal normal.   Mouth/Throat: Oropharynx is clear and moist.   Eyes: Conjunctivae are normal. Pupils are equal, round, and reactive to light. Neck: Normal range of motion. Neck supple. Carotid bruit is not present.    Cardiovascular: Normal rate, fail to improve. PATIENT INSTRUCTIONS:  Patient Instructions   Please call Dr. Christopher Flynn office and see if they can get you in before the end of the year. I do think that a tubal would make the most sense for you since you continue to smoke. If the abdominal pain persists after you see the gastroenterologist, please call us. [employer]uit [employer] [place] is a FREE online service available to [place] residents 13years of age and over. When you become a member,it offers a free telephone service, so you can speak to a  in person, if you would prefer. Call the [employer] [facility] or [phone].  special tools, a support team of coaches, research-based information, and a community of others trying to become tobacco free. Our expert coaches can talk to you about overcoming common barriers, such as dealing with stress, fighting cravings, coping with irritability, and controlling weight gain. https://www.[employer]/    Https://www.[employer].org/    Nicotine is an addictive drug found in tobacco that causes changes in the brain - making people crave it more and more. When prolonged tobacco use is stopped, it can cause unpleasant withdrawal symptoms, including irritability and anxiety      BENEFITS OF STOPPING SMOKIN minutes after quitting  Your heart rate and blood pressure drop  12 hours after quitting  The carbon monoxide level in your blood drops to normal  2 weeks to 3 months after quitting  Your circulation improves and your lung function increases  1 to 9 months after quitting  Coughing and shortness of breath decrease; cilia (tiny hair-like structures that move mucus out of the lungs) start to regain normal function in the lungs, increasing the ability to handle mucus, clean the lungs, and reduce the risk of infection.   1 year after quitting  The excess risk of coronary heart disease is half that of a continuing smokers  5 years after quitting  Risk of cancer of the mouth, throat, esophagus, and bladder are cut in half. Cervical cancer risk falls to that of a non-smoker. Stroke risk can fall to that of a non-smoker after 2-5 years  10 years after quitting  The risk of dying from lung cancer is about half that of a person who is still smoking.  The risk of cancer of the larynx (voice box) and pancreas decreases

## 2017-12-13 NOTE — PATIENT INSTRUCTIONS
Please call Dr. Magdalena Thurman office and see if they can get you in before the end of the year. I do think that a tubal would make the most sense for you since you continue to smoke. If the abdominal pain persists after you see the gastroenterologist, please call us. Quit [employer] [place] is a FREE online service available to [place] residents 13years of age and over. When you become a member,it offers a free telephone service, so you can speak to a  in person, if you would prefer. Call the [facility] or [phone].  special tools, a support team of coaches, research-based information, and a community of others trying to become tobacco free. Our expert coaches can talk to you about overcoming common barriers, such as dealing with stress, fighting cravings, coping with irritability, and controlling weight gain. https://www.[employer]/    Https://www.[employer].org/    Nicotine is an addictive drug found in tobacco that causes changes in the brain - making people crave it more and more. When prolonged tobacco use is stopped, it can cause unpleasant withdrawal symptoms, including irritability and anxiety      BENEFITS OF STOPPING SMOKIN minutes after quitting  Your heart rate and blood pressure drop  12 hours after quitting  The carbon monoxide level in your blood drops to normal  2 weeks to 3 months after quitting  Your circulation improves and your lung function increases  1 to 9 months after quitting  Coughing and shortness of breath decrease; cilia (tiny hair-like structures that move mucus out of the lungs) start to regain normal function in the lungs, increasing the ability to handle mucus, clean the lungs, and reduce the risk of infection.   1 year after quitting  The excess risk of coronary heart disease is half that of a continuing smokers  5 years after quitting  Risk of cancer of the mouth, throat, esophagus, and bladder are cut in

## 2017-12-14 ENCOUNTER — OFFICE VISIT (OUTPATIENT)
Dept: GASTROENTEROLOGY | Age: 37
End: 2017-12-14
Payer: COMMERCIAL

## 2017-12-14 VITALS
WEIGHT: 184.2 LBS | OXYGEN SATURATION: 97 % | SYSTOLIC BLOOD PRESSURE: 110 MMHG | HEART RATE: 69 BPM | DIASTOLIC BLOOD PRESSURE: 68 MMHG | BODY MASS INDEX: 29.6 KG/M2 | HEIGHT: 66 IN

## 2017-12-14 DIAGNOSIS — R14.0 ABDOMINAL BLOATING: Primary | ICD-10-CM

## 2017-12-14 DIAGNOSIS — R12 HEARTBURN: ICD-10-CM

## 2017-12-14 DIAGNOSIS — R11.0 NAUSEA: ICD-10-CM

## 2017-12-14 PROCEDURE — 99204 OFFICE O/P NEW MOD 45 MIN: CPT | Performed by: NURSE PRACTITIONER

## 2017-12-14 ASSESSMENT — ENCOUNTER SYMPTOMS
ABDOMINAL PAIN: 1
CHEST TIGHTNESS: 0
COUGH: 1
BLOOD IN STOOL: 0
SORE THROAT: 0
SHORTNESS OF BREATH: 0
RECTAL PAIN: 0
VOMITING: 0
CONSTIPATION: 1
ABDOMINAL DISTENTION: 0
DIARRHEA: 0
VOICE CHANGE: 0
NAUSEA: 1
BACK PAIN: 1

## 2017-12-14 NOTE — PROGRESS NOTES
Payton Rodney is a 44 y.o. female  Primary Care Provider Alexandr Hagen MD  Referral Source Hitesh Lambert MD    Chief Complaint:  Chief Complaint   Patient presents with    Abdominal Pain     referred by Dr. Chary Franco           HPI     Abdominal bloating/heartburn/nausea  C/O abdominal bloating for approximately 2 years. States that it keeps \"creeping up\" and worsening over the past 3-4 months. Worsened by: Eating, Red sauces, and sweets in coffee. Associated symptoms include: nausea without vomiting and abdominal distention. Bloating improved with lying down. She states that she can try to eat an entire meal, but feels full after 2-3 bites. States that she belches constantly. Heartburn is well controlled with Protonix, however, states that she cannot go more than 2 days without it or she will have heartburn. Denies melena. Denies NSAID use on a regular basis. She does smoke 1/2 PPD of cigarettes. Pertinent hx and tests:  Cholecystectomy  Recently noted hematuria  Normal vaginal US   EGD/Colon per Dr. Laila Kuhn in the early 2000's  IBS-mixed  Hypothyroidism-well controlled on current dose.      Past Medical History:   Diagnosis Date    Hypothyroidism     Irritable bowel syndrome       Past Surgical History:   Procedure Laterality Date    CHOLECYSTECTOMY  5-12    DILATION AND CURETTAGE OF UTERUS      GALLBLADDER SURGERY      May 2012    LAPAROSCOPY        Family History   Problem Relation Age of Onset    Diabetes Father     Cataracts Father     Colon Cancer Neg Hx     Colon Polyps Neg Hx     Liver Cancer Neg Hx     Liver Disease Neg Hx     Esophageal Cancer Neg Hx     Rectal Cancer Neg Hx     Stomach Cancer Neg Hx       Current Outpatient Prescriptions   Medication Sig Dispense Refill    predniSONE (DELTASONE) 10 MG tablet Take 1 tablet by mouth daily for 10 days 10 tablet 0    amoxicillin (AMOXIL) 875 MG tablet 1 tablet by mouth twice a day for infection 20 tablet 0    FLUoxetine (PROZAC) 20 MG capsule 1 capsule by mouth once a day for mood 30 capsule 5    bisoprolol-hydrochlorothiazide (ZIAC) 5-6.25 MG per tablet 1 tablet by mouth once a day for blood pressure 90 tablet 3    norethindrone (ORTHO MICRONOR) 0.35 MG tablet 1 tablet by mouth once a day for birth control 84 tablet 5    traZODone (DESYREL) 50 MG tablet 1 tablet by mouth at bedtime for sleep 30 tablet 5    pantoprazole (PROTONIX) 40 MG tablet 1 tablet by mouth once a day only if needed for severe flareup and Tagamet is not working 30 tablet 0    levothyroxine (SYNTHROID) 112 MCG tablet 1 tablet by mouth once a day for low thyroid. Pt is alternating dose 112mcg with the 100mcg . 45 tablet 3    levothyroxine (LEVOTHROID) 100 MCG tablet Take 1 tablet by mouth daily Pt is alternating the 100mcg  and 112mcg during the month. Lab work on this dose was normal 45 tablet 3     No current facility-administered medications for this visit. Allergies   Allergen Reactions    Dilaudid [Hydromorphone Hcl]       Social History   Substance Use Topics    Smoking status: Current Every Day Smoker     Packs/day: 0.50     Years: 17.00     Types: Cigarettes    Smokeless tobacco: Never Used      Comment: \"some\" desire to quit    Alcohol use 0.0 oz/week      Comment: occ         Review of Systems   Constitutional: Positive for unexpected weight change (Weight gain-concentrated to central adominal area). Negative for appetite change and fever. HENT: Negative for sore throat and voice change. Respiratory: Positive for cough (Currently being treated for bronchitis). Negative for chest tightness and shortness of breath. Cardiovascular: Negative for chest pain, palpitations and leg swelling. Gastrointestinal: Positive for abdominal pain, constipation and nausea. Negative for abdominal distention, blood in stool, diarrhea, rectal pain and vomiting.         Heartburn-Well controlled on Protonix   Musculoskeletal: Positive for back pain (Lower back). Negative for arthralgias and gait problem. Skin: Negative for pallor, rash and wound. Neurological: Negative for dizziness, weakness and light-headedness. Hematological: Negative for adenopathy. Does not bruise/bleed easily. Psychiatric/Behavioral: The patient is nervous/anxious. All other systems reviewed and are negative. Physical Exam   Constitutional: She is oriented to person, place, and time. She appears well-developed and well-nourished. No distress. /68   Pulse 69   Ht 5' 6\" (1.676 m)   Wt 184 lb 3.2 oz (83.6 kg)   SpO2 97%   BMI 29.73 kg/m²      Eyes: Conjunctivae are normal. No scleral icterus. Neck: No tracheal deviation present. Cardiovascular: Normal rate and regular rhythm. Exam reveals no gallop and no friction rub. No murmur heard. Pulmonary/Chest: Effort normal and breath sounds normal. No respiratory distress. She has no wheezes. She has no rhonchi. She has no rales. Abdominal: Soft. Normal appearance and bowel sounds are normal. She exhibits distension. She exhibits no mass. There is no hepatomegaly. There is no tenderness. There is no rebound and no guarding. Genitourinary:   Genitourinary Comments: Deferred   Musculoskeletal: She exhibits no edema. Neurological: She is alert and oriented to person, place, and time. She has normal strength. Skin: Skin is warm, dry and intact. No cyanosis. No pallor. Psychiatric: She has a normal mood and affect. Her behavior is normal. Thought content normal. Cognition and memory are normal.       Assessment   1. Abdominal bloating  ESOPHAGOSCOPY / EGD   2. Nausea  ESOPHAGOSCOPY / EGD   3. Heartburn  ESOPHAGOSCOPY / EGD     DD  SIBO    Plan  Orders Placed This Encounter   Procedures    ESOPHAGOSCOPY / EGD     -Request previous records from Dr. Licia Kocher  -Risks and benefits of the procedure discussed with the patient.  She vebalizes understanding and wishes to proceed with an EGD.  -NPO after midnight the day of each procedure. May take am meds with a small sip of water  -May go to [place]  -Will need a  the day of the procedure. -FU based on physician's findings and recommendations.  -Continue Protonix daily.

## 2017-12-29 RX ORDER — LEVOTHYROXINE SODIUM 0.1 MG/1
TABLET ORAL
Qty: 15 TABLET | Refills: 9 | Status: SHIPPED | OUTPATIENT
Start: 2017-12-29 | End: 2018-11-28 | Stop reason: SDUPTHER

## 2017-12-29 RX ORDER — LEVOTHYROXINE SODIUM 112 UG/1
TABLET ORAL
Qty: 15 TABLET | Refills: 9 | Status: SHIPPED | OUTPATIENT
Start: 2017-12-29 | End: 2018-11-28 | Stop reason: SDUPTHER

## 2018-01-08 ENCOUNTER — TELEPHONE (OUTPATIENT)
Dept: GASTROENTEROLOGY | Age: 38
End: 2018-01-08

## 2018-04-20 RX ORDER — PANTOPRAZOLE SODIUM 40 MG/1
TABLET, DELAYED RELEASE ORAL
Qty: 30 TABLET | Refills: 0 | Status: SHIPPED | OUTPATIENT
Start: 2018-04-20 | End: 2018-08-20 | Stop reason: SDUPTHER

## 2018-08-14 RX ORDER — FLUOXETINE HYDROCHLORIDE 20 MG/1
CAPSULE ORAL
Qty: 60 CAPSULE | Refills: 5 | Status: SHIPPED | OUTPATIENT
Start: 2018-08-14 | End: 2019-04-06 | Stop reason: SDUPTHER

## 2018-08-20 RX ORDER — PANTOPRAZOLE SODIUM 40 MG/1
TABLET, DELAYED RELEASE ORAL
Qty: 30 TABLET | Refills: 3 | Status: SHIPPED | OUTPATIENT
Start: 2018-08-20 | End: 2019-02-28 | Stop reason: SDUPTHER

## 2018-10-03 RX ORDER — BISOPROLOL FUMARATE AND HYDROCHLOROTHIAZIDE 5; 6.25 MG/1; MG/1
TABLET ORAL
Qty: 90 TABLET | Refills: 3 | Status: SHIPPED | OUTPATIENT
Start: 2018-10-03 | End: 2019-09-12 | Stop reason: SDUPTHER

## 2018-10-24 RX ORDER — ACETAMINOPHEN AND CODEINE PHOSPHATE 120; 12 MG/5ML; MG/5ML
SOLUTION ORAL
Qty: 84 TABLET | Refills: 5 | Status: SHIPPED | OUTPATIENT
Start: 2018-10-24 | End: 2019-09-25 | Stop reason: SDUPTHER

## 2018-11-07 ENCOUNTER — OFFICE VISIT (OUTPATIENT)
Dept: PRIMARY CARE CLINIC | Age: 38
End: 2018-11-07
Payer: COMMERCIAL

## 2018-11-07 VITALS
HEART RATE: 68 BPM | SYSTOLIC BLOOD PRESSURE: 118 MMHG | RESPIRATION RATE: 18 BRPM | OXYGEN SATURATION: 98 % | TEMPERATURE: 97 F | WEIGHT: 163 LBS | BODY MASS INDEX: 26.2 KG/M2 | HEIGHT: 66 IN | DIASTOLIC BLOOD PRESSURE: 76 MMHG

## 2018-11-07 DIAGNOSIS — N93.0 POSTCOITAL BLEEDING: ICD-10-CM

## 2018-11-07 DIAGNOSIS — E03.9 ACQUIRED HYPOTHYROIDISM: ICD-10-CM

## 2018-11-07 DIAGNOSIS — F41.8 MIXED ANXIETY AND DEPRESSIVE DISORDER: ICD-10-CM

## 2018-11-07 DIAGNOSIS — Z01.419 WELL FEMALE EXAM WITH ROUTINE GYNECOLOGICAL EXAM: Primary | ICD-10-CM

## 2018-11-07 DIAGNOSIS — L98.9 SKIN LESION OF CHEEK: ICD-10-CM

## 2018-11-07 DIAGNOSIS — I10 ESSENTIAL HYPERTENSION: ICD-10-CM

## 2018-11-07 PROBLEM — F90.9 ATTENTION DEFICIT HYPERACTIVITY DISORDER: Status: ACTIVE | Noted: 2018-01-31

## 2018-11-07 LAB
ALBUMIN SERPL-MCNC: 4.4 G/DL (ref 3.5–5.2)
ALP BLD-CCNC: 87 U/L (ref 35–104)
ALT SERPL-CCNC: 15 U/L (ref 5–33)
ANION GAP SERPL CALCULATED.3IONS-SCNC: 16 MMOL/L (ref 7–19)
AST SERPL-CCNC: 19 U/L (ref 5–32)
BILIRUB SERPL-MCNC: 0.9 MG/DL (ref 0.2–1.2)
BUN BLDV-MCNC: 11 MG/DL (ref 6–20)
CALCIUM SERPL-MCNC: 9.7 MG/DL (ref 8.6–10)
CHLORIDE BLD-SCNC: 99 MMOL/L (ref 98–111)
CHOLESTEROL, TOTAL: 161 MG/DL (ref 160–199)
CO2: 27 MMOL/L (ref 22–29)
CREAT SERPL-MCNC: 0.9 MG/DL (ref 0.5–0.9)
GFR NON-AFRICAN AMERICAN: >60
GLUCOSE BLD-MCNC: 75 MG/DL (ref 74–109)
HCT VFR BLD CALC: 48.8 % (ref 37–47)
HDLC SERPL-MCNC: 43 MG/DL (ref 65–121)
HEMOGLOBIN: 15.9 G/DL (ref 12–16)
LDL CHOLESTEROL CALCULATED: 98 MG/DL
MCH RBC QN AUTO: 30.1 PG (ref 27–31)
MCHC RBC AUTO-ENTMCNC: 32.6 G/DL (ref 33–37)
MCV RBC AUTO: 92.2 FL (ref 81–99)
PDW BLD-RTO: 13.6 % (ref 11.5–14.5)
PLATELET # BLD: 204 K/UL (ref 130–400)
PMV BLD AUTO: 13.6 FL (ref 9.4–12.3)
POTASSIUM SERPL-SCNC: 3.9 MMOL/L (ref 3.5–5)
RBC # BLD: 5.29 M/UL (ref 4.2–5.4)
SODIUM BLD-SCNC: 142 MMOL/L (ref 136–145)
T4 TOTAL: 9.5 UG/DL (ref 4.5–11.7)
TOTAL PROTEIN: 7.6 G/DL (ref 6.6–8.7)
TRIGL SERPL-MCNC: 101 MG/DL (ref 0–149)
TSH SERPL DL<=0.05 MIU/L-ACNC: 1.84 UIU/ML (ref 0.27–4.2)
WBC # BLD: 12.5 K/UL (ref 4.8–10.8)

## 2018-11-07 PROCEDURE — 99395 PREV VISIT EST AGE 18-39: CPT | Performed by: FAMILY MEDICINE

## 2018-11-07 PROCEDURE — 36415 COLL VENOUS BLD VENIPUNCTURE: CPT | Performed by: FAMILY MEDICINE

## 2018-11-07 RX ORDER — CLONIDINE HYDROCHLORIDE 0.1 MG/1
TABLET ORAL
COMMUNITY
End: 2020-04-06 | Stop reason: SDUPTHER

## 2018-11-07 ASSESSMENT — PATIENT HEALTH QUESTIONNAIRE - PHQ9
SUM OF ALL RESPONSES TO PHQ9 QUESTIONS 1 & 2: 0
2. FEELING DOWN, DEPRESSED OR HOPELESS: 0
SUM OF ALL RESPONSES TO PHQ QUESTIONS 1-9: 0
1. LITTLE INTEREST OR PLEASURE IN DOING THINGS: 0
SUM OF ALL RESPONSES TO PHQ QUESTIONS 1-9: 0

## 2018-11-07 NOTE — PROGRESS NOTES
SUBJECTIVE:   44 y.o. female for annual routine Pap and checkup. She is complaining of a vaginal odor during intercourse and sometimes at other times. There may be a little bit of clear discharge. She's also concerned because sometimes she has bleeding after intercourse. She is currently on Micronor because she is over 27 and is a smoker and does not need to take estrogen. She doesn't have pain just bleeding. Her menstrual bleeding is irregular even though there is a week of placebo pills in each Micronor pack. Dr. Supriya Vides put her on a long-acting stimulant and clonidine at night for sleep and she has lost 20 pounds. She is also doing something called hydro-slim which is supposedly natural.    She decreased her fluoxetine to 20 mg a day. She is complaining of stress at work. 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 She refuses to tetanus and pneumonia shot. Patient Active Problem List    Diagnosis Date Noted    Attention deficit hyperactivity disorder 01/31/2018    Essential hypertension 01/31/2018    Mixed anxiety and depressive disorder 01/31/2018    Acquired hypothyroidism 02/08/2013    Irritable bowel syndrome      Current Outpatient Prescriptions   Medication Sig Dispense Refill    cloNIDine (CATAPRES) 0.1 MG tablet clonidine HCl 0.1 mg tablet   Take 1 tablet every day by oral route at bedtime.  Amphet-Dextroamphet 3-Bead ER (MYDAYIS) 12.5 MG CP24 Mydayis 12.5 mg capsule extended release 24 hr   Take 1 capsule every day by oral route for 30 days.       norethindrone (MICRONOR) 0.35 MG tablet TAKE 1 TABLET BY MOUTH ONCE A DAY FOR BIRTH CONTROL **STOP OTHER BIRTH CONTROLS** 84 tablet 5    bisoprolol-hydrochlorothiazide (ZIAC) 5-6.25 MG per tablet TAKE 1 TABLET BY MOUTH ONCE A DAY FOR BLOOD PRESSURE 90 tablet 3    pantoprazole (PROTONIX) 40 MG tablet 1 TABLET BY MOUTH ONCE A DAY ONLY IF NEEDED FOR SEVERE FLAREUP AND TAGAMET IS NOT WORKING 30 tablet 3  FLUoxetine (PROZAC) 20 MG capsule TAKE 2 CAPSULES DAILY FOR MOOD 60 capsule 5    levothyroxine (SYNTHROID) 112 MCG tablet TAKE 1 TABLET BY MOUTH ONCE DAILY; ALTERNATING WITH 100MCG 15 tablet 9    levothyroxine (SYNTHROID) 100 MCG tablet TAKE 1 TABLET BY MOUTH ONCE DAILY; ALTERNATING WITH 112 MCG 15 tablet 9     No current facility-administered medications for this visit. Past Medical History:   Diagnosis Date    Hypothyroidism     Irritable bowel syndrome      Past Surgical History:   Procedure Laterality Date    CHOLECYSTECTOMY  5-12    DILATION AND CURETTAGE OF UTERUS      GALLBLADDER SURGERY      May 2012    LAPAROSCOPY       Family History   Problem Relation Age of Onset    Diabetes Father     Cataracts Father     Colon Cancer Neg Hx     Colon Polyps Neg Hx     Liver Cancer Neg Hx     Liver Disease Neg Hx     Esophageal Cancer Neg Hx     Rectal Cancer Neg Hx     Stomach Cancer Neg Hx      Social History   Substance Use Topics    Smoking status: Current Every Day Smoker     Packs/day: 0.50     Years: 17.00     Types: Cigarettes    Smokeless tobacco: Never Used      Comment: \"some\" desire to quit    Alcohol use 0.0 oz/week      Comment: occ       Allergies: Latex; Dilaudid [hydromorphone hcl]; and Oxycodone-acetaminophen  No LMP recorded. ROS:  Feeling well. No dyspnea or chest pain on exertion. No abdominal pain, change in bowel habits, black or bloody stools. No urinary tract symptoms. GYN ROS: see HPI   No neurological complaints. OBJECTIVE:   The patient appears well, alert, oriented x 3, in no distress. /76 (Site: Left Upper Arm, Position: Sitting, Cuff Size: Medium Adult)   Pulse 68   Temp 97 °F (36.1 °C) (Temporal)   Resp 18   Ht 5' 6\" (1.676 m)   Wt 163 lb (73.9 kg)   SpO2 98%   BMI 26.31 kg/m²   Skin normal, no suspicious skin lesions. ENT normal.  Neck supple. No adenopathy or thyromegaly. GOPAL.  Lungs are clear, good air entry, no wheezes, rhonchi or rales. S1 and S2 normal, no murmurs, regular rate and rhythm. Abdomen soft without tenderness, guarding, mass or organomegaly. Extremities show no edema, normal peripheral pulses. Neurological is normal, no focal findings. Psychiatric exam, no signs of depression. BREAST EXAM: Breasts symmetrical. No skin lesions. Nipples appear normal without discharge. No masses or axillary lymphadenopathy. No tenderness. PELVIC EXAM: External genitalia appear normal. Vaginal vault appears normal. There really isn't a lot of vaginal discharge today. Cervical os is slightly friable. There was a scant amount of bleeding after I did the Pap smear. No cervical motion tenderness. Uterus was not enlarged. I could not palpate ovaries. ASSESSMENT:   1. Well female exam with routine gynecological exam    2. Acquired hypothyroidism    3. Essential hypertension    4. Mixed anxiety and depressive disorder    5. Postcoital bleeding        PLAN:   MEDICATIONS:  No orders of the defined types were placed in this encounter. Continue current medications. We will refill when needed. ORDERS:  Orders Placed This Encounter   Procedures    US Non OB Transvaginal    Comprehensive Metabolic Panel    Lipid Panel    CBC    TSH without Reflex    T4      we will check labs above. I'm going to get an ultrasound to make sure there is nothing going on in the cervix or ovaries because of her postcoital bleeding. We talked about the fact that she can't take estrogen because she is over 27 and she smokes. We also talked about the fact that her bleeding is somewhat irregular on Micronor might mean that the pill is not as effective for birth control as something else would be. I strongly encouraged her to quit smoking. We will contact her when we get results of her blood work. I will check for bacterial vaginosis in the Pap smear. Follow-up:  Return in about 1 year (around 11/7/2019) for PE and fastng labs.     PATIENT

## 2018-11-07 NOTE — PATIENT INSTRUCTIONS
Patient Education        Bacterial Vaginosis: Care Instructions  Your Care Instructions    Bacterial vaginosis is a type of vaginal infection. It is caused by excess growth of certain bacteria that are normally found in the vagina. Symptoms can include itching, swelling, pain when you urinate or have sex, and a gray or yellow discharge with a \"fishy\" odor. It is not considered an infection that is spread through sexual contact. Although symptoms can be annoying and uncomfortable, bacterial vaginosis does not usually cause other health problems. However, if you have it while you are pregnant, it can cause complications. While the infection may go away on its own, most doctors use antibiotics to treat it. You may have been prescribed pills or vaginal cream. With treatment, bacterial vaginosis usually clears up in 5 to 7 days. Follow-up care is a key part of your treatment and safety. Be sure to make and go to all appointments, and call your doctor if you are having problems. It's also a good idea to know your test results and keep a list of the medicines you take. How can you care for yourself at home? · Take your antibiotics as directed. Do not stop taking them just because you feel better. You need to take the full course of antibiotics. · Do not eat or drink anything that contains alcohol if you are taking metronidazole (Flagyl). · Keep using your medicine if you start your period. Use pads instead of tampons while using a vaginal cream or suppository. Tampons can absorb the medicine. · Wear loose cotton clothing. Do not wear nylon and other materials that hold body heat and moisture close to the skin. · Do not scratch. Relieve itching with a cold pack or a cool bath. · Do not wash your vaginal area more than once a day. Use plain water or a mild, unscented soap. Do not douche. When should you call for help?   Watch closely for changes in your health, and be sure to contact your doctor if:    · You have unexpected vaginal bleeding.     · You have a fever.     · You have new or increased pain in your vagina or pelvis.     · You are not getting better after 1 week.     · Your symptoms return after you finish the course of your medicine. Where can you learn more? Go to https://chdelmieb.[facility]the[facility] [employer]. org and sign in to your [employer] account. Enter H014 in the [facility] box to learn more about \"Bacterial Vaginosis: Care Instructions. \"     If you do not have an account, please click on the \"Sign Up Now\" link. Current as of: May 15, 2018  Content Version: 11.8  © 4305-6084 Healthwise, [employer]. Care instructions adapted under license by [facility] ([facility]). If you have questions about a medical condition or this instruction, always ask your healthcare professional. [address] any warranty or liability for your use of this information. Quit Now [place] is a FREE online service available to [place] residents 13years of age and over. When you become a member,it offers a free telephone service, so you can speak to a  in person, if you would prefer. Call the Javid at [facility] or [phone].  special tools, a support team of coaches, research-based information, and a community of others trying to become tobacco free. Our expert coaches can talk to you about overcoming common barriers, such as dealing with stress, fighting cravings, coping with irritability, and controlling weight gain. https://www.Prixtel.Artabase/    Https://www.quitnowZebra Digital Assetsy.org/    Nicotine is an addictive drug found in tobacco that causes changes in the brain - making people crave it more and more.  When prolonged tobacco use is stopped, it can cause unpleasant withdrawal symptoms, including irritability and anxiety      BENEFITS OF STOPPING SMOKIN minutes after quitting  Your heart rate and blood pressure drop  12 hours after quitting  The carbon monoxide

## 2018-11-28 RX ORDER — LEVOTHYROXINE SODIUM 112 UG/1
TABLET ORAL
Qty: 15 TABLET | Refills: 9 | Status: SHIPPED | OUTPATIENT
Start: 2018-11-28 | End: 2019-09-25 | Stop reason: SDUPTHER

## 2018-11-28 RX ORDER — LEVOTHYROXINE SODIUM 0.1 MG/1
TABLET ORAL
Qty: 15 TABLET | Refills: 9 | Status: SHIPPED | OUTPATIENT
Start: 2018-11-28 | End: 2019-09-25 | Stop reason: SDUPTHER

## 2019-02-28 RX ORDER — PANTOPRAZOLE SODIUM 40 MG/1
TABLET, DELAYED RELEASE ORAL
Qty: 30 TABLET | Refills: 3 | Status: SHIPPED | OUTPATIENT
Start: 2019-02-28 | End: 2019-08-21 | Stop reason: SDUPTHER

## 2019-03-22 ENCOUNTER — LAB (OUTPATIENT)
Dept: LAB | Facility: HOSPITAL | Age: 39
End: 2019-03-22

## 2019-03-22 ENCOUNTER — TRANSCRIBE ORDERS (OUTPATIENT)
Dept: ADMINISTRATIVE | Facility: HOSPITAL | Age: 39
End: 2019-03-22

## 2019-03-22 DIAGNOSIS — R50.9 FEVER, UNSPECIFIED: Primary | ICD-10-CM

## 2019-03-22 DIAGNOSIS — R50.9 FEVER, UNSPECIFIED: ICD-10-CM

## 2019-03-22 LAB
FLUAV AG NPH QL: NEGATIVE
FLUBV AG NPH QL IA: NEGATIVE

## 2019-03-22 PROCEDURE — 87804 INFLUENZA ASSAY W/OPTIC: CPT

## 2019-04-08 RX ORDER — FLUOXETINE HYDROCHLORIDE 20 MG/1
CAPSULE ORAL
Qty: 60 CAPSULE | Refills: 5 | Status: SHIPPED | OUTPATIENT
Start: 2019-04-08 | End: 2019-09-25 | Stop reason: SDUPTHER

## 2019-08-21 RX ORDER — PANTOPRAZOLE SODIUM 40 MG/1
TABLET, DELAYED RELEASE ORAL
Qty: 30 TABLET | Refills: 3 | Status: SHIPPED | OUTPATIENT
Start: 2019-08-21 | End: 2019-09-25 | Stop reason: SDUPTHER

## 2019-09-12 RX ORDER — BISOPROLOL FUMARATE AND HYDROCHLOROTHIAZIDE 5; 6.25 MG/1; MG/1
TABLET ORAL
Qty: 90 TABLET | Refills: 3 | Status: SHIPPED | OUTPATIENT
Start: 2019-09-12 | End: 2019-09-25 | Stop reason: SDUPTHER

## 2019-09-25 RX ORDER — PANTOPRAZOLE SODIUM 40 MG/1
40 TABLET, DELAYED RELEASE ORAL DAILY
Qty: 90 TABLET | Refills: 3 | Status: SHIPPED | OUTPATIENT
Start: 2019-09-25 | End: 2020-01-21

## 2019-09-25 RX ORDER — ACETAMINOPHEN AND CODEINE PHOSPHATE 120; 12 MG/5ML; MG/5ML
1 SOLUTION ORAL DAILY
Qty: 84 TABLET | Refills: 3 | Status: SHIPPED | OUTPATIENT
Start: 2019-09-25 | End: 2019-11-12 | Stop reason: SDUPTHER

## 2019-09-25 RX ORDER — FLUOXETINE HYDROCHLORIDE 20 MG/1
20 CAPSULE ORAL DAILY
Qty: 180 CAPSULE | Refills: 3 | Status: SHIPPED | OUTPATIENT
Start: 2019-09-25 | End: 2020-04-13

## 2019-09-25 RX ORDER — LEVOTHYROXINE SODIUM 112 UG/1
112 TABLET ORAL DAILY
Qty: 45 TABLET | Refills: 3 | Status: SHIPPED | OUTPATIENT
Start: 2019-09-25 | End: 2019-11-12 | Stop reason: SDUPTHER

## 2019-09-25 RX ORDER — BISOPROLOL FUMARATE AND HYDROCHLOROTHIAZIDE 5; 6.25 MG/1; MG/1
1 TABLET ORAL DAILY
Qty: 90 TABLET | Refills: 3 | Status: SHIPPED | OUTPATIENT
Start: 2019-09-25 | End: 2020-09-21

## 2019-09-25 RX ORDER — LEVOTHYROXINE SODIUM 0.1 MG/1
100 TABLET ORAL DAILY
Qty: 45 TABLET | Refills: 3 | Status: SHIPPED | OUTPATIENT
Start: 2019-09-25 | End: 2019-11-12 | Stop reason: SDUPTHER

## 2019-11-12 RX ORDER — ACETAMINOPHEN AND CODEINE PHOSPHATE 120; 12 MG/5ML; MG/5ML
SOLUTION ORAL
Qty: 84 TABLET | Refills: 5 | Status: SHIPPED | OUTPATIENT
Start: 2019-11-12 | End: 2021-01-07

## 2019-11-12 RX ORDER — LEVOTHYROXINE SODIUM 0.1 MG/1
TABLET ORAL
Qty: 15 TABLET | Refills: 9 | Status: SHIPPED | OUTPATIENT
Start: 2019-11-12 | End: 2020-10-16

## 2019-11-12 RX ORDER — LEVOTHYROXINE SODIUM 112 UG/1
TABLET ORAL
Qty: 15 TABLET | Refills: 9 | Status: SHIPPED | OUTPATIENT
Start: 2019-11-12 | End: 2020-10-16

## 2019-11-19 DIAGNOSIS — N93.8 DYSFUNCTIONAL UTERINE BLEEDING: ICD-10-CM

## 2019-11-19 DIAGNOSIS — M54.50 ACUTE BILATERAL LOW BACK PAIN WITHOUT SCIATICA: ICD-10-CM

## 2019-11-19 DIAGNOSIS — N93.0 POSTCOITAL BLEEDING: Primary | ICD-10-CM

## 2020-01-10 ENCOUNTER — OFFICE VISIT (OUTPATIENT)
Dept: PRIMARY CARE CLINIC | Age: 40
End: 2020-01-10
Payer: COMMERCIAL

## 2020-01-10 VITALS
HEIGHT: 66 IN | SYSTOLIC BLOOD PRESSURE: 118 MMHG | WEIGHT: 166 LBS | RESPIRATION RATE: 18 BRPM | TEMPERATURE: 97.7 F | BODY MASS INDEX: 26.68 KG/M2 | DIASTOLIC BLOOD PRESSURE: 78 MMHG | OXYGEN SATURATION: 98 % | HEART RATE: 74 BPM

## 2020-01-10 LAB
ALBUMIN SERPL-MCNC: 4.6 G/DL (ref 3.5–5.2)
ALP BLD-CCNC: 95 U/L (ref 35–104)
ALT SERPL-CCNC: 23 U/L (ref 5–33)
ANION GAP SERPL CALCULATED.3IONS-SCNC: 13 MMOL/L (ref 7–19)
AST SERPL-CCNC: 23 U/L (ref 5–32)
BILIRUB SERPL-MCNC: 0.3 MG/DL (ref 0.2–1.2)
BUN BLDV-MCNC: 10 MG/DL (ref 6–20)
CALCIUM SERPL-MCNC: 9.5 MG/DL (ref 8.6–10)
CHLORIDE BLD-SCNC: 99 MMOL/L (ref 98–111)
CHOLESTEROL, TOTAL: 155 MG/DL (ref 160–199)
CO2: 27 MMOL/L (ref 22–29)
CREAT SERPL-MCNC: 0.8 MG/DL (ref 0.5–0.9)
GFR NON-AFRICAN AMERICAN: >60
GLUCOSE BLD-MCNC: 106 MG/DL (ref 74–109)
HCT VFR BLD CALC: 47.8 % (ref 37–47)
HDLC SERPL-MCNC: 46 MG/DL (ref 65–121)
HEMOGLOBIN: 16 G/DL (ref 12–16)
LDL CHOLESTEROL CALCULATED: 92 MG/DL
MCH RBC QN AUTO: 30.2 PG (ref 27–31)
MCHC RBC AUTO-ENTMCNC: 33.5 G/DL (ref 33–37)
MCV RBC AUTO: 90.4 FL (ref 81–99)
PDW BLD-RTO: 13.1 % (ref 11.5–14.5)
PLATELET # BLD: 209 K/UL (ref 130–400)
PMV BLD AUTO: 13.6 FL (ref 9.4–12.3)
POTASSIUM SERPL-SCNC: 3.6 MMOL/L (ref 3.5–5)
RBC # BLD: 5.29 M/UL (ref 4.2–5.4)
SODIUM BLD-SCNC: 139 MMOL/L (ref 136–145)
TOTAL PROTEIN: 7.5 G/DL (ref 6.6–8.7)
TRIGL SERPL-MCNC: 87 MG/DL (ref 0–149)
TSH SERPL DL<=0.05 MIU/L-ACNC: 1.24 UIU/ML (ref 0.27–4.2)
WBC # BLD: 13.4 K/UL (ref 4.8–10.8)

## 2020-01-10 PROCEDURE — 36415 COLL VENOUS BLD VENIPUNCTURE: CPT | Performed by: FAMILY MEDICINE

## 2020-01-10 PROCEDURE — 99396 PREV VISIT EST AGE 40-64: CPT | Performed by: FAMILY MEDICINE

## 2020-01-10 NOTE — PATIENT INSTRUCTIONS
We are committed to providing you with the best care possible. In order to help us achieve these goals please remember to bring all medications, herbal products, and over the counter supplements with you to each visit. If your provider has ordered testing for you, please be sure to follow up with our office if you have not received results within 7 days after the testing took place. *If you receive a survey after visiting one of our offices, please take time to share your experience concerning your physician office visit. These surveys are confidential and no health information about you is shared. We are eager to improve for you and we are counting on your feedback to help make that happen. Quit Now [place] is a FREE online service available to [place] residents 13years of age and over. When you become a member,it offers a free telephone service, so you can speak to a  in person, if you would prefer. Call the Javid at [facility] or [phone].  special tools, a support team of coaches, research-based information, and a community of others trying to become tobacco free. Our expert coaches can talk to you about overcoming common barriers, such as dealing with stress, fighting cravings, coping with irritability, and controlling weight gain. https://[email or web address].com.[employer]/    Https://www.quitnowkentBioCeey.org/    Nicotine is an addictive drug found in tobacco that causes changes in the brain - making people crave it more and more.  When prolonged tobacco use is stopped, it can cause unpleasant withdrawal symptoms, including irritability and anxiety      BENEFITS OF STOPPING SMOKIN minutes after quitting  Your heart rate and blood pressure drop  12 hours after quitting  The carbon monoxide level in your blood drops to normal  2 weeks to 3 months after quitting  Your circulation improves and your lung function increases  1 to 9 months after quitting  Coughing and shortness of breath decrease; cilia (tiny hair-like structures that move mucus out of the lungs) start to regain normal function in the lungs, increasing the ability to handle mucus, clean the lungs, and reduce the risk of infection. 1 year after quitting  The excess risk of coronary heart disease is half that of a continuing smokers  5 years after quitting  Risk of cancer of the mouth, throat, esophagus, and bladder are cut in half. Cervical cancer risk falls to that of a non-smoker. Stroke risk can fall to that of a non-smoker after 2-5 years  10 years after quitting  The risk of dying from lung cancer is about half that of a person who is still smoking.  The risk of cancer of the larynx (voice box) and pancreas decreases

## 2020-01-11 NOTE — PROGRESS NOTES
Take 1 tablet every day by oral route at bedtime.  Amphet-Dextroamphet 3-Bead ER (MYDAYIS) 12.5 MG CP24 Mydayis 12.5 mg capsule extended release 24 hr   Take 1 capsule every day by oral route for 30 days. No current facility-administered medications for this visit. Past Medical History:   Diagnosis Date    Hypothyroidism     Irritable bowel syndrome      Past Surgical History:   Procedure Laterality Date    CHOLECYSTECTOMY  5-12    DILATION AND CURETTAGE OF UTERUS      GALLBLADDER SURGERY      May 2012    LAPAROSCOPY       Family History   Problem Relation Age of Onset    Diabetes Father     Cataracts Father     Colon Cancer Neg Hx     Colon Polyps Neg Hx     Liver Cancer Neg Hx     Liver Disease Neg Hx     Esophageal Cancer Neg Hx     Rectal Cancer Neg Hx     Stomach Cancer Neg Hx      Social History     Tobacco Use    Smoking status: Current Every Day Smoker     Packs/day: 0.50     Years: 17.00     Pack years: 8.50     Types: Cigarettes    Smokeless tobacco: Never Used    Tobacco comment: \"some\" desire to quit   Substance Use Topics    Alcohol use: Yes     Alcohol/week: 0.0 standard drinks     Comment: occ       Allergies: Latex; Dilaudid [hydromorphone hcl]; and Oxycodone-acetaminophen  No LMP recorded. ROS:  Feeling well. No dyspnea or chest pain on exertion. No abdominal pain, change in bowel habits, black or bloody stools. No urinary tract symptoms. GYN ROS: see HPI   No neurological complaints. OBJECTIVE:   The patient appears well, alert, oriented x 3, in no distress. /78 (Site: Left Upper Arm, Position: Sitting, Cuff Size: Medium Adult)   Pulse 74   Temp 97.7 °F (36.5 °C) (Temporal)   Resp 18   Ht 5' 6\" (1.676 m)   Wt 166 lb (75.3 kg)   SpO2 98%   BMI 26.79 kg/m²   Skin normal, no suspicious skin lesions. ENT normal.  Neck supple. No adenopathy or thyromegaly. GOPAL. Lungs are clear, good air entry, no wheezes, rhonchi or rales.  S1 and S2 normal, no and over the counter supplements with you to each visit. If your provider has ordered testing for you, please be sure to follow up with our office if you have not received results within 7 days after the testing took place. *If you receive a survey after visiting one of our offices, please take time to share your experience concerning your physician office visit. These surveys are confidential and no health information about you is shared. We are eager to improve for you and we are counting on your feedback to help make that happen. Quit Now [place] is a FREE online service available to [place] residents 13years of age and over. When you become a member,it offers a free telephone service, so you can speak to a  in person, if you would prefer. Call the Javid at [facility] or [phone].  special tools, a support team of coaches, research-based information, and a community of others trying to become tobacco free. Our expert coaches can talk to you about overcoming common barriers, such as dealing with stress, fighting cravings, coping with irritability, and controlling weight gain. https://www.Looking for Gamers/    Https://www.quitnowkentPrestoSportsy.org/    Nicotine is an addictive drug found in tobacco that causes changes in the brain - making people crave it more and more.  When prolonged tobacco use is stopped, it can cause unpleasant withdrawal symptoms, including irritability and anxiety      BENEFITS OF STOPPING SMOKIN minutes after quitting  Your heart rate and blood pressure drop  12 hours after quitting  The carbon monoxide level in your blood drops to normal  2 weeks to 3 months after quitting  Your circulation improves and your lung function increases  1 to 9 months after quitting  Coughing and shortness of breath decrease; cilia (tiny hair-like structures that move mucus out of the lungs) start to regain normal function in the lungs, increasing the ability

## 2020-01-21 RX ORDER — PANTOPRAZOLE SODIUM 40 MG/1
TABLET, DELAYED RELEASE ORAL
Qty: 30 TABLET | Refills: 3 | Status: SHIPPED | OUTPATIENT
Start: 2020-01-21 | End: 2020-05-18

## 2020-02-18 PROBLEM — N89.8 VAGINAL DISCHARGE: Status: ACTIVE | Noted: 2020-02-18

## 2020-03-30 RX ORDER — AMOXICILLIN 500 MG/1
500 CAPSULE ORAL 3 TIMES DAILY
Qty: 21 CAPSULE | Refills: 0 | Status: SHIPPED | OUTPATIENT
Start: 2020-03-30 | End: 2020-04-06

## 2020-04-06 ENCOUNTER — PATIENT MESSAGE (OUTPATIENT)
Dept: PRIMARY CARE CLINIC | Age: 40
End: 2020-04-06

## 2020-04-06 RX ORDER — CLONIDINE HYDROCHLORIDE 0.1 MG/1
TABLET ORAL
Qty: 60 TABLET | Refills: 0 | Status: SHIPPED | OUTPATIENT
Start: 2020-04-06 | End: 2022-04-06

## 2020-04-06 RX ORDER — CLONIDINE HYDROCHLORIDE 0.1 MG/1
TABLET ORAL
Qty: 60 TABLET | Refills: 3 | Status: SHIPPED | OUTPATIENT
Start: 2020-04-06 | End: 2020-04-06 | Stop reason: SDUPTHER

## 2020-04-13 RX ORDER — FLUOXETINE HYDROCHLORIDE 20 MG/1
CAPSULE ORAL
Qty: 60 CAPSULE | Refills: 5 | Status: SHIPPED | OUTPATIENT
Start: 2020-04-13 | End: 2021-01-07

## 2020-05-18 RX ORDER — PANTOPRAZOLE SODIUM 40 MG/1
TABLET, DELAYED RELEASE ORAL
Qty: 30 TABLET | Refills: 3 | Status: SHIPPED | OUTPATIENT
Start: 2020-05-18 | End: 2020-09-15

## 2020-07-16 ENCOUNTER — HOSPITAL ENCOUNTER (OUTPATIENT)
Dept: NEUROLOGY | Age: 40
Discharge: HOME | End: 2020-07-16
Payer: COMMERCIAL

## 2020-07-16 PROCEDURE — 95911 NRV CNDJ TEST 9-10 STUDIES: CPT

## 2020-07-16 PROCEDURE — 95911 NRV CNDJ TEST 9-10 STUDIES: CPT | Performed by: PSYCHIATRY & NEUROLOGY

## 2020-07-16 PROCEDURE — 95886 MUSC TEST DONE W/N TEST COMP: CPT

## 2020-07-16 PROCEDURE — 95886 MUSC TEST DONE W/N TEST COMP: CPT | Performed by: PSYCHIATRY & NEUROLOGY

## 2020-09-04 ENCOUNTER — OFFICE VISIT (OUTPATIENT)
Dept: OBSTETRICS AND GYNECOLOGY | Facility: CLINIC | Age: 40
End: 2020-09-04

## 2020-09-04 VITALS
BODY MASS INDEX: 26.68 KG/M2 | WEIGHT: 166 LBS | SYSTOLIC BLOOD PRESSURE: 110 MMHG | HEIGHT: 66 IN | DIASTOLIC BLOOD PRESSURE: 70 MMHG

## 2020-09-04 DIAGNOSIS — Z30.02 ENCOUNTER FOR COUNSELING AND INSTRUCTION IN NATURAL FAMILY PLANNING TO AVOID PREGNANCY: ICD-10-CM

## 2020-09-04 DIAGNOSIS — N94.10 DYSPAREUNIA, FEMALE: ICD-10-CM

## 2020-09-04 DIAGNOSIS — R10.2 PELVIC PAIN: ICD-10-CM

## 2020-09-04 DIAGNOSIS — F17.200 SMOKER: Primary | ICD-10-CM

## 2020-09-04 PROCEDURE — 99203 OFFICE O/P NEW LOW 30 MIN: CPT | Performed by: OBSTETRICS & GYNECOLOGY

## 2020-09-04 RX ORDER — LEVOTHYROXINE SODIUM 0.1 MG/1
TABLET ORAL
COMMUNITY
Start: 2019-11-12 | End: 2020-11-23

## 2020-09-04 RX ORDER — ACETAMINOPHEN AND CODEINE PHOSPHATE 120; 12 MG/5ML; MG/5ML
SOLUTION ORAL
COMMUNITY
Start: 2020-07-30 | End: 2020-10-16 | Stop reason: HOSPADM

## 2020-09-04 RX ORDER — CLONIDINE HYDROCHLORIDE 0.1 MG/1
TABLET ORAL
COMMUNITY
Start: 2020-04-06 | End: 2020-11-23

## 2020-09-04 RX ORDER — LEVOTHYROXINE SODIUM 112 UG/1
TABLET ORAL
COMMUNITY
Start: 2019-11-12 | End: 2020-11-23

## 2020-09-04 RX ORDER — BISOPROLOL FUMARATE AND HYDROCHLOROTHIAZIDE 5; 6.25 MG/1; MG/1
TABLET ORAL
COMMUNITY
Start: 2019-09-25 | End: 2020-11-23

## 2020-09-04 RX ORDER — METRONIDAZOLE 500 MG/1
500 TABLET ORAL 2 TIMES DAILY
Qty: 14 TABLET | Refills: 0 | Status: SHIPPED | OUTPATIENT
Start: 2020-09-04 | End: 2020-09-11

## 2020-09-04 RX ORDER — PANTOPRAZOLE SODIUM 40 MG/1
TABLET, DELAYED RELEASE ORAL
COMMUNITY
Start: 2020-05-18 | End: 2020-09-04 | Stop reason: SDUPTHER

## 2020-09-04 NOTE — PROGRESS NOTES
Subjective   Uyen José is a 39 y.o. female is here today as a self referral.    Patient presents as a new patient, self-referred    History of Present Illness     She presents for 2 issues  She has a vaginal discharge with odor.  She has had this before.  She has been diagnosed with bacterial vaginitis and has taken medicine for this before.    She also desires permanent sterilization.  She is a smoker.  We discussed IUD, Nexplanon, as well as permanent sterilization.  Pamphlets were given.  We also discussed vasectomy.  She is in a monogamous relationship.    The following portions of the patient's history were reviewed and updated as appropriate: allergies, current medications, past family history, past medical history, past social history, past surgical history and problem list.    Review of Systems   Genitourinary: Positive for vaginal discharge.   All other systems reviewed and are negative.      Objective   Physical Exam   Constitutional: She is oriented to person, place, and time. She appears well-developed and well-nourished.   HENT:   Head: Normocephalic and atraumatic.   Neck: Normal range of motion. Neck supple.   Cardiovascular: Normal rate and regular rhythm.   Pulmonary/Chest: Effort normal and breath sounds normal.   Abdominal: Soft. Bowel sounds are normal.   Musculoskeletal: Normal range of motion.   Neurological: She is alert and oriented to person, place, and time.   Skin: Skin is warm and dry.   Psychiatric: She has a normal mood and affect. Her behavior is normal. Judgment and thought content normal.   Nursing note and vitals reviewed.        Assessment/Plan   Uyen was seen today for contraception.    Diagnoses and all orders for this visit:    Smoker    Pelvic pain    Dyspareunia, female    Encounter for counseling and instruction in natural family planning to avoid pregnancy    Other orders  -     metroNIDAZOLE (FLAGYL) 500 MG tablet; Take 1 tablet by mouth 2 (Two) Times a Day for  7 days.      The pelvic pain she reports happened just 1 time.  The timing was midcycle.  That has resolved.    The dyspareunia she experiences is with penetration.  She has no pain with entry.  It is positional.    She showed me her [employer] car where her recent Pap smear was reported as normal.  Return office in 2 weeks for pelvic ultrasound.  If ultrasound unremarkable, consider laparoscopic bilateral salpingectomy.  Call for concerns or questions.    Stevan Espinal MD

## 2020-09-15 RX ORDER — PANTOPRAZOLE SODIUM 40 MG/1
TABLET, DELAYED RELEASE ORAL
Qty: 30 TABLET | Refills: 3 | Status: SHIPPED | OUTPATIENT
Start: 2020-09-15 | End: 2021-02-15

## 2020-09-18 ENCOUNTER — OFFICE VISIT (OUTPATIENT)
Dept: OBSTETRICS AND GYNECOLOGY | Facility: CLINIC | Age: 40
End: 2020-09-18

## 2020-09-18 VITALS
BODY MASS INDEX: 26.68 KG/M2 | SYSTOLIC BLOOD PRESSURE: 138 MMHG | WEIGHT: 166 LBS | DIASTOLIC BLOOD PRESSURE: 78 MMHG | HEIGHT: 66 IN

## 2020-09-18 DIAGNOSIS — F17.200 SMOKER: ICD-10-CM

## 2020-09-18 DIAGNOSIS — N94.10 DYSPAREUNIA, FEMALE: Primary | ICD-10-CM

## 2020-09-18 DIAGNOSIS — Z30.09 STERILIZATION CONSULT: ICD-10-CM

## 2020-09-18 PROCEDURE — 99214 OFFICE O/P EST MOD 30 MIN: CPT | Performed by: OBSTETRICS & GYNECOLOGY

## 2020-09-18 RX ORDER — SODIUM CHLORIDE 9 MG/ML
100 INJECTION, SOLUTION INTRAVENOUS CONTINUOUS
Status: CANCELLED | OUTPATIENT
Start: 2020-09-18

## 2020-09-18 NOTE — PROGRESS NOTES
Stevan Espinal MD  [facility]  [address]  [place] [address]  Office [phone]  Fax [phone]      [facility]  Uyen José  1980  [id number]  [id number]  2020    Subjective   Uyen José is a 39 y.o. year old female  who presents for surgery due to Desire for Permanent Sterilization.  Failed conservative measures include N/A.    COEMIG Procedure no  Rating of Pain 4  Effect on daily activities none    HPI    Risks, benefits, and alternatives of permanent sterilization were discussed with the patient in detail. Intraoperative risks of bleeding and damage to surrounding organs, including but not limited to intestine, bladder and ureter, were explained. Management of these were also explained. Postoperative complications such as infection, pneumonia, DVT, and bleeding were explained. The importance of compliance with postoperative restrictions was discussed. Laparoscopic versus hysteroscopic options were discussed. In regards to Essure, adequate contraception until hysterosalpingogram confirms tubal blockage was explained. Success and failure rates were discussed. Increased risk of ectopic pregnancy was explained. In addition, reversible forms of contraception were reviewed such as the pill, the patch, the shot, and the IUD.     Specifically, bilateral salpingectomy was discussed.  Reduction in fallopian tube cancer was discussed as well as potential decrease in ovarian cancer risk discussed.  Irreversible nature of this approach as well as the need for at least one additional laparoscopic incision was discussed.    Recent concerns regarding the Essure procedure were reviewed as well as the management of those issues, if they were to occur, were explained in detail.    All of the patient's questions were answered to her satisfaction. She was encouraged to return for an additional appointment if she had further questions. She verbalized  understanding of the above and wished to proceed with the outlined plan.    Patient desires bilateral salpingectomy.    Past Medical History:   Diagnosis Date   • Acid reflux    • Allergic    • Anxiety    • Bronchitis    • Disease of thyroid gland    • Gallbladder abscess    • Heart murmur        Past Surgical History:   Procedure Laterality Date   • COLONOSCOPY  05/01/2003    Normal exam   • DIAGNOSTIC LAPAROSCOPY  2000    [facility]; normal findings; [facility]   • ENDOSCOPY     • LAPAROSCOPIC CHOLECYSTECTOMY  2011    Dr Duncan; [place]   • WISDOM TOOTH EXTRACTION           Current Outpatient Medications:   •  amitriptyline (ELAVIL) 25 MG tablet, Take 25 mg by mouth Every Night., Disp: , Rfl:   •  Amphet-Dextroamphet 3-Bead ER (Mydayis) 37.5 MG capsule sustained-release 24 hr, Mydayis 37.5 mg capsule extended release 24 hr  TAKE ONE CAPSULE BY MOUTH IN THE MORNING., Disp: , Rfl:   •  bisoprolol-hydrochlorothiazide (ZIAC) 5-6.25 MG per tablet, bisoprolol 5 mg-hydrochlorothiazide 6.25 mg tablet  Take 1 tablet every day by oral route., Disp: , Rfl:   •  Cariprazine HCl (Vraylar) 1.5 MG capsule capsule, Vraylar 1.5 mg capsule, Disp: , Rfl:   •  cloNIDine (CATAPRES) 0.1 MG tablet, clonidine HCl 0.1 mg tablet  TAKE ONE TABLET BY MOUTH AT BEDTIME., Disp: , Rfl:   •  levothyroxine (SYNTHROID, LEVOTHROID) 100 MCG tablet, levothyroxine 100 mcg tablet  Take 1 tablet every day by oral route., Disp: , Rfl:   •  levothyroxine (SYNTHROID, LEVOTHROID) 112 MCG tablet, levothyroxine 112 mcg tablet  Take 1 tablet every day by oral route., Disp: , Rfl:   •  norethindrone (MICRONOR) 0.35 MG tablet, , Disp: , Rfl:   •  pantoprazole (PROTONIX) 40 MG EC tablet, Take 40 mg by mouth Daily., Disp: , Rfl:     Allergies   Allergen Reactions   • Oxycodone-Acetaminophen Hives   • Latex Rash       Family History   Problem Relation Age of Onset   • Diabetes Mother    • Diabetes Father    • Cancer Paternal Grandfather    • Colon cancer Neg Hx    • Colon  polyps Neg Hx        Social History     Socioeconomic History   • Marital status:      Spouse name: Not on file   • Number of children: Not on file   • Years of education: Not on file   • Highest education level: Not on file   Tobacco Use   • Smoking status: Current Every Day Smoker     Packs/day: 0.50     Years: 20.00     Pack years: 10.00     Types: Cigarettes   • Smokeless tobacco: Never Used   Substance and Sexual Activity   • Alcohol use: Yes     Comment: Occasional   • Drug use: Never   • Sexual activity: Yes     Partners: Male     Birth control/protection: OCP       OB History    Para Term  AB Living   1 1 1 0 0 1   SAB TAB Ectopic Molar Multiple Live Births   0 0 0 0 0 1      # Outcome Date GA Lbr Shon/2nd Weight Sex Delivery Anes PTL Lv   1 Term 07/15/07 38w0d  3374 g (7 lb 7 oz) M Vag-Spont EPI N RAGINI       Review of Systems   Genitourinary: Positive for dyspareunia.   All other systems reviewed and are negative.         Objective   Vitals:    20 1522   BP: 138/78       Physical Exam  Vitals signs and nursing note reviewed.   Constitutional:       Appearance: Normal appearance.   HENT:      Head: Normocephalic and atraumatic.   Neck:      Musculoskeletal: Normal range of motion and neck supple.   Cardiovascular:      Rate and Rhythm: Normal rate and regular rhythm.      Pulses: Normal pulses.      Heart sounds: Normal heart sounds.   Pulmonary:      Effort: Pulmonary effort is normal.      Breath sounds: Normal breath sounds.   Abdominal:      General: Abdomen is flat.      Palpations: Abdomen is soft.   Musculoskeletal: Normal range of motion.   Skin:     General: Skin is warm and dry.   Neurological:      General: No focal deficit present.      Mental Status: She is alert and oriented to person, place, and time.   Psychiatric:         Mood and Affect: Mood normal.         Behavior: Behavior normal.         Thought Content: Thought content normal.         Judgment: Judgment  normal.            Assessment/Plan   Assessment/Plan:  Uyen was seen today for dyspareunia.    Diagnoses and all orders for this visit:    Dyspareunia, female    Sterilization consult  -     Case Request    Smoker    Other orders  -     Follow Anesthesia Guidelines / Standing Orders; Future  -     Obtain informed consent  -     Provide NPO Instructions to Patient; Future  -     Chlorhexidine Skin Prep; Future      Plan SALPINGECTOMY LAPAROSCOPIC for sterilization  The risks, benefits, and alternatives of the procedure; along with the risks of anesthesia was discussed in full with the patient and all questions were answered.    Stevan Espinal MD

## 2020-09-18 NOTE — H&P
Stevan Espinal MD  [facility]  [address]  [place] [address]  Office [phone]  Fax [phone]      [facility]  Uyen José  1980  [id number]  [id number]  2020    Subjective   Uyen José is a 39 y.o. year old female  who presents for surgery due to Desire for Permanent Sterilization.  Failed conservative measures include N/A.    COEMIG Procedure no  Rating of Pain 4  Effect on daily activities none    HPI    Risks, benefits, and alternatives of permanent sterilization were discussed with the patient in detail. Intraoperative risks of bleeding and damage to surrounding organs, including but not limited to intestine, bladder and ureter, were explained. Management of these were also explained. Postoperative complications such as infection, pneumonia, DVT, and bleeding were explained. The importance of compliance with postoperative restrictions was discussed. Laparoscopic versus hysteroscopic options were discussed. In regards to Essure, adequate contraception until hysterosalpingogram confirms tubal blockage was explained. Success and failure rates were discussed. Increased risk of ectopic pregnancy was explained. In addition, reversible forms of contraception were reviewed such as the pill, the patch, the shot, and the IUD.     Specifically, bilateral salpingectomy was discussed.  Reduction in fallopian tube cancer was discussed as well as potential decrease in ovarian cancer risk discussed.  Irreversible nature of this approach as well as the need for at least one additional laparoscopic incision was discussed.    Recent concerns regarding the Essure procedure were reviewed as well as the management of those issues, if they were to occur, were explained in detail.    All of the patient's questions were answered to her satisfaction. She was encouraged to return for an additional appointment if she had further questions. She verbalized  understanding of the above and wished to proceed with the outlined plan.    Patient desires bilateral salpingectomy.    Past Medical History:   Diagnosis Date   • Acid reflux    • Allergic    • Anxiety    • Bronchitis    • Disease of thyroid gland    • Gallbladder abscess    • Heart murmur        Past Surgical History:   Procedure Laterality Date   • COLONOSCOPY  05/01/2003    Normal exam   • DIAGNOSTIC LAPAROSCOPY  2000    [facility]; normal findings; [facility]   • ENDOSCOPY     • LAPAROSCOPIC CHOLECYSTECTOMY  2011    Dr Duncan; [facility]   • WISDOM TOOTH EXTRACTION           Current Outpatient Medications:   •  amitriptyline (ELAVIL) 25 MG tablet, Take 25 mg by mouth Every Night., Disp: , Rfl:   •  Amphet-Dextroamphet 3-Bead ER (Mydayis) 37.5 MG capsule sustained-release 24 hr, Mydayis 37.5 mg capsule extended release 24 hr  TAKE ONE CAPSULE BY MOUTH IN THE MORNING., Disp: , Rfl:   •  bisoprolol-hydrochlorothiazide (ZIAC) 5-6.25 MG per tablet, bisoprolol 5 mg-hydrochlorothiazide 6.25 mg tablet  Take 1 tablet every day by oral route., Disp: , Rfl:   •  Cariprazine HCl (Vraylar) 1.5 MG capsule capsule, Vraylar 1.5 mg capsule, Disp: , Rfl:   •  cloNIDine (CATAPRES) 0.1 MG tablet, clonidine HCl 0.1 mg tablet  TAKE ONE TABLET BY MOUTH AT BEDTIME., Disp: , Rfl:   •  levothyroxine (SYNTHROID, LEVOTHROID) 100 MCG tablet, levothyroxine 100 mcg tablet  Take 1 tablet every day by oral route., Disp: , Rfl:   •  levothyroxine (SYNTHROID, LEVOTHROID) 112 MCG tablet, levothyroxine 112 mcg tablet  Take 1 tablet every day by oral route., Disp: , Rfl:   •  norethindrone (MICRONOR) 0.35 MG tablet, , Disp: , Rfl:   •  pantoprazole (PROTONIX) 40 MG EC tablet, Take 40 mg by mouth Daily., Disp: , Rfl:     Allergies   Allergen Reactions   • Oxycodone-Acetaminophen Hives   • Latex Rash       Family History   Problem Relation Age of Onset   • Diabetes Mother    • Diabetes Father    • Cancer Paternal Grandfather    • Colon cancer Neg Hx    • Colon  polyps Neg Hx        Social History     Socioeconomic History   • Marital status:      Spouse name: Not on file   • Number of children: Not on file   • Years of education: Not on file   • Highest education level: Not on file   Tobacco Use   • Smoking status: Current Every Day Smoker     Packs/day: 0.50     Years: 20.00     Pack years: 10.00     Types: Cigarettes   • Smokeless tobacco: Never Used   Substance and Sexual Activity   • Alcohol use: Yes     Comment: Occasional   • Drug use: Never   • Sexual activity: Yes     Partners: Male     Birth control/protection: OCP       OB History    Para Term  AB Living   1 1 1 0 0 1   SAB TAB Ectopic Molar Multiple Live Births   0 0 0 0 0 1      # Outcome Date GA Lbr Shon/2nd Weight Sex Delivery Anes PTL Lv   1 Term 07/15/07 38w0d  3374 g (7 lb 7 oz) M Vag-Spont EPI N RAGINI       Review of Systems   Genitourinary: Positive for dyspareunia.   All other systems reviewed and are negative.         Objective   Vitals:    20 1522   BP: 138/78       Physical Exam  Vitals signs and nursing note reviewed.   Constitutional:       Appearance: Normal appearance.   HENT:      Head: Normocephalic and atraumatic.   Neck:      Musculoskeletal: Normal range of motion and neck supple.   Cardiovascular:      Rate and Rhythm: Normal rate and regular rhythm.      Pulses: Normal pulses.      Heart sounds: Normal heart sounds.   Pulmonary:      Effort: Pulmonary effort is normal.      Breath sounds: Normal breath sounds.   Abdominal:      General: Abdomen is flat.      Palpations: Abdomen is soft.   Musculoskeletal: Normal range of motion.   Skin:     General: Skin is warm and dry.   Neurological:      General: No focal deficit present.      Mental Status: She is alert and oriented to person, place, and time.   Psychiatric:         Mood and Affect: Mood normal.         Behavior: Behavior normal.         Thought Content: Thought content normal.         Judgment: Judgment  normal.            Assessment/Plan   Assessment/Plan:  Uyen was seen today for dyspareunia.    Diagnoses and all orders for this visit:    Dyspareunia, female    Sterilization consult  -     Case Request    Smoker    Other orders  -     Follow Anesthesia Guidelines / Standing Orders; Future  -     Obtain informed consent  -     Provide NPO Instructions to Patient; Future  -     Chlorhexidine Skin Prep; Future      Plan SALPINGECTOMY LAPAROSCOPIC for sterilization  The risks, benefits, and alternatives of the procedure; along with the risks of anesthesia was discussed in full with the patient and all questions were answered.    Stevan Espinal MD

## 2020-09-21 ENCOUNTER — TELEPHONE (OUTPATIENT)
Dept: OBSTETRICS AND GYNECOLOGY | Facility: CLINIC | Age: 40
End: 2020-09-21

## 2020-09-21 RX ORDER — BISOPROLOL FUMARATE AND HYDROCHLOROTHIAZIDE 5; 6.25 MG/1; MG/1
TABLET ORAL
Qty: 90 TABLET | Refills: 3 | Status: SHIPPED | OUTPATIENT
Start: 2020-09-21 | End: 2022-04-04 | Stop reason: SDUPTHER

## 2020-09-21 NOTE — TELEPHONE ENCOUNTER
Called to inform pt to arrive at [facility] on 10/16/2020 at 0900. Will attempt to call at a later time.

## 2020-09-23 ENCOUNTER — TELEPHONE (OUTPATIENT)
Dept: OBSTETRICS AND GYNECOLOGY | Facility: CLINIC | Age: 40
End: 2020-09-23

## 2020-09-23 NOTE — TELEPHONE ENCOUNTER
Called and informed pt to arrive at [facility] on 10/16 at 1030 as her sx time had been pushed back, still NPO after midnight, and preop would be the same and she would receive a phone call to schedule COVID testing, pt voiced understanding.

## 2020-09-30 ENCOUNTER — TRANSCRIBE ORDERS (OUTPATIENT)
Dept: ADMINISTRATIVE | Facility: HOSPITAL | Age: 40
End: 2020-09-30

## 2020-09-30 DIAGNOSIS — Z01.818 PRE-OP TESTING: Primary | ICD-10-CM

## 2020-10-02 ENCOUNTER — APPOINTMENT (OUTPATIENT)
Dept: PREADMISSION TESTING | Facility: HOSPITAL | Age: 40
End: 2020-10-02

## 2020-10-02 VITALS
RESPIRATION RATE: 16 BRPM | WEIGHT: 169.97 LBS | SYSTOLIC BLOOD PRESSURE: 129 MMHG | OXYGEN SATURATION: 97 % | DIASTOLIC BLOOD PRESSURE: 79 MMHG | HEIGHT: 66 IN | BODY MASS INDEX: 27.32 KG/M2 | HEART RATE: 68 BPM

## 2020-10-02 LAB
ANION GAP SERPL CALCULATED.3IONS-SCNC: 9 MMOL/L (ref 5–15)
BUN SERPL-MCNC: 11 MG/DL (ref 6–20)
BUN/CREAT SERPL: 14.9 (ref 7–25)
CALCIUM SPEC-SCNC: 9.8 MG/DL (ref 8.6–10.5)
CHLORIDE SERPL-SCNC: 104 MMOL/L (ref 98–107)
CO2 SERPL-SCNC: 28 MMOL/L (ref 22–29)
CREAT SERPL-MCNC: 0.74 MG/DL (ref 0.57–1)
DEPRECATED RDW RBC AUTO: 44.4 FL (ref 37–54)
ERYTHROCYTE [DISTWIDTH] IN BLOOD BY AUTOMATED COUNT: 13.3 % (ref 12.3–15.4)
GFR SERPL CREATININE-BSD FRML MDRD: 87 ML/MIN/1.73
GLUCOSE SERPL-MCNC: 116 MG/DL (ref 65–99)
HCT VFR BLD AUTO: 46.4 % (ref 34–46.6)
HGB BLD-MCNC: 15.4 G/DL (ref 12–15.9)
MCH RBC QN AUTO: 30.1 PG (ref 26.6–33)
MCHC RBC AUTO-ENTMCNC: 33.2 G/DL (ref 31.5–35.7)
MCV RBC AUTO: 90.8 FL (ref 79–97)
PLATELET # BLD AUTO: 184 10*3/MM3 (ref 140–450)
PMV BLD AUTO: 13.1 FL (ref 6–12)
POTASSIUM SERPL-SCNC: 4.3 MMOL/L (ref 3.5–5.2)
RBC # BLD AUTO: 5.11 10*6/MM3 (ref 3.77–5.28)
SODIUM SERPL-SCNC: 141 MMOL/L (ref 136–145)
WBC # BLD AUTO: 11.61 10*3/MM3 (ref 3.4–10.8)

## 2020-10-02 PROCEDURE — 85027 COMPLETE CBC AUTOMATED: CPT | Performed by: OBSTETRICS & GYNECOLOGY

## 2020-10-02 PROCEDURE — 36415 COLL VENOUS BLD VENIPUNCTURE: CPT

## 2020-10-02 PROCEDURE — 93010 ELECTROCARDIOGRAM REPORT: CPT | Performed by: INTERNAL MEDICINE

## 2020-10-02 PROCEDURE — 80048 BASIC METABOLIC PNL TOTAL CA: CPT | Performed by: OBSTETRICS & GYNECOLOGY

## 2020-10-02 PROCEDURE — 93005 ELECTROCARDIOGRAM TRACING: CPT

## 2020-10-02 NOTE — DISCHARGE INSTRUCTIONS
DAY OF SURGERY INSTRUCTIONS        YOUR SURGEON: ***Dr. Espinal    PROCEDURE: ***laparoscopic salpingectomy    DATE OF SURGERY: ***10/16/20    ARRIVAL TIME: AS DIRECTED BY OFFICE    YOU MAY TAKE THE FOLLOWING MEDICATION(S) THE MORNING OF SURGERY WITH A SIP OF WATER: ***bisoprolol (ziac)      ALL OTHER HOME MEDICATION CHECK WITH YOUR PHYSICIAN      DO NOT TAKE ANY ERECTILE DYSFUNCTION MEDICATIONS (EX: CIALIS, VIAGRA) 24 HOURS PRIOR TO SURGERY                      MANAGING PAIN AFTER SURGERY    We know you are probably wondering what your pain will be like after surgery.  Following surgery it is unrealistic to expect you will not have pain.   Pain is how our bodies let us know that something is wrong or cautions us to be careful.  That said, our goal is to make your pain tolerable.    Methods we may use to treat your pain include (oral or IV medications, PCAs, epidurals, nerve blocks, etc.)   While some procedures require IV pain medications for a short time after surgery, transitioning to pain medications by mouth allows for better management of pain.   Your nurse will encourage you to take oral pain medications whenever possible.  IV medications work almost immediately, but only last a short while.  Taking medications by mouth allows for a more constant level of medication in your blood stream for a longer period of time.      Once your pain is out of control it is harder to get back under control.  It is important you are aware when your next dose of pain medication is due.  If you are admitted, your nurse may write the time of your next dose on the white board in your room to help you remember.      We are interested in your pain and encourage you to inform us about aggravating factors during your visit.   Many times a simple repositioning every few hours can make a big difference.    If your physician says it is okay, do not let your pain prevent you from getting out of bed. Be sure to call your nurse for  assistance prior to getting up so you do not fall.      Before surgery, please decide your tolerable pain goal.  These faces help describe the pain ratings we use on a 0-10 scale.   Be prepared to tell us your goal and whether or not you take pain or anxiety medications at home.          BEFORE YOU COME TO THE HOSPITAL  (Pre-op instructions)  • Do not eat, drink, smoke or chew gum after midnight the night before surgery.  This also includes no mints.  • Morning of surgery take only the medicines you have been instructed with a sip of water unless otherwise instructed  by your physician.  • Do not shave, wear makeup or dark nail polish.  • Remove all jewelry including rings.  • Leave anything you consider valuable at home.  • Leave your suitcase in the car until after your surgery.  • Bring the following with you if applicable:  o Picture ID and insurance, Medicare or Medicaid cards  o Co-pay/deductible required by insurance (cash, check, credit card)  o Copy of advance directive, living will or power-of- documents if not brought to PAT  o CPAP or BIPAP mask and tubing  o Relaxation aids ( book, magazine), etc.  o Hearing aids                        ON THE DAY OF SURGERY  · On the day of surgery check in at registration located at the main entrance of the hospital.   ? You will be registered and given a beeper with instructions where to wait in the main lobby.  ? When your beeper lights up and vibrates a member of the Outpatient Surgery staff will meet you at the double doors under the stair steps and escort you to your preoperative room.   · You may have cloth compression devices placed on your legs. These help to prevent blood clots and reduce swelling in your legs.  · An IV may be inserted into one of your veins.  · In the operating room, you may be given one or more of the following:  ? A medicine to help you relax (sedative).  ? A medicine to numb the area (local anesthetic).  ? A medicine to make you  "fall asleep (general anesthetic).  ? A medicine that is injected into an area of your body to numb everything below the injection site (regional anesthetic).  · Your surgical site will be marked or identified.  · You may be given an antibiotic through your IV to help prevent infection.  Contact a health care provider if you:  · Develop a fever of more than 100.4°F (38°C) or other feelings of illness during the 48 hours before your surgery.  · Have symptoms that get worse.  Have questions or concerns about your surgery    General Anesthesia/Surgery, Adult  General anesthesia is the use of medicines to make a person \"go to sleep\" (unconscious) for a medical procedure. General anesthesia must be used for certain procedures, and is often recommended for procedures that:  · Last a long time.  · Require you to be still or in an unusual position.  · Are major and can cause blood loss.  The medicines used for general anesthesia are called general anesthetics. As well as making you unconscious for a certain amount of time, these medicines:  · Prevent pain.  · Control your blood pressure.  · Relax your muscles.  Tell a health care provider about:  · Any allergies you have.  · All medicines you are taking, including vitamins, herbs, eye drops, creams, and over-the-counter medicines.  · Any problems you or family members have had with anesthetic medicines.  · Types of anesthetics you have had in the past.  · Any blood disorders you have.  · Any surgeries you have had.  · Any medical conditions you have.  · Any recent upper respiratory, chest, or ear infections.  · Any history of:  ? Heart or lung conditions, such as heart failure, sleep apnea, asthma, or chronic obstructive pulmonary disease (COPD).  ?  service.  ? Depression or anxiety.  · Any tobacco or drug use, including marijuana or alcohol use.  · Whether you are pregnant or may be pregnant.  What are the risks?  Generally, this is a safe procedure. However, " problems may occur, including:  · Allergic reaction.  · Lung and heart problems.  · Inhaling food or liquid from the stomach into the lungs (aspiration).  · Nerve injury.  · Air in the bloodstream, which can lead to stroke.  · Extreme agitation or confusion (delirium) when you wake up from the anesthetic.  · Waking up during your procedure and being unable to move. This is rare.  These problems are more likely to develop if you are having a major surgery or if you have an advanced or serious medical condition. You can prevent some of these complications by answering all of your health care provider's questions thoroughly and by following all instructions before your procedure.  General anesthesia can cause side effects, including:  · Nausea or vomiting.  · A sore throat from the breathing tube.  · Hoarseness.  · Wheezing or coughing.  · Shaking chills.  · Tiredness.  · Body aches.  · Anxiety.  · Sleepiness or drowsiness.  · Confusion or agitation.  RISKS AND COMPLICATIONS OF SURGERY  Your health care provider will discuss possible risks and complications with you before surgery. Common risks and complications include:    · Problems due to the use of anesthetics.  · Blood loss and replacement (does not apply to minor surgical procedures).  · Temporary increase in pain due to surgery.  · Uncorrected pain or problems that the surgery was meant to correct.  · Infection.  · New damage.    What happens before the procedure?    Medicines  Ask your health care provider about:  · Changing or stopping your regular medicines. This is especially important if you are taking diabetes medicines or blood thinners.  · Taking medicines such as aspirin and ibuprofen. These medicines can thin your blood. Do not take these medicines unless your health care provider tells you to take them.  · Taking over-the-counter medicines, vitamins, herbs, and supplements. Do not take these during the week before your procedure unless your health  care provider approves them.  General instructions  · Starting 3-6 weeks before the procedure, do not use any products that contain nicotine or tobacco, such as cigarettes and e-cigarettes. If you need help quitting, ask your health care provider.  · If you brush your teeth on the morning of the procedure, make sure to spit out all of the toothpaste.  · Tell your health care provider if you become ill or develop a cold, cough, or fever.  · If instructed by your health care provider, bring your sleep apnea device with you on the day of your surgery (if applicable).  · Ask your health care provider if you will be going home the same day, the following day, or after a longer hospital stay.  ? Plan to have someone take you home from the hospital or clinic.  ? Plan to have a responsible adult care for you for at least 24 hours after you leave the hospital or clinic. This is important.  What happens during the procedure?  · You will be given anesthetics through both of the following:  ? A mask placed over your nose and mouth.  ? An IV in one of your veins.  · You may receive a medicine to help you relax (sedative).  · After you are unconscious, a breathing tube may be inserted down your throat to help you breathe. This will be removed before you wake up.  · An anesthesia specialist will stay with you throughout your procedure. He or she will:  ? Keep you comfortable and safe by continuing to give you medicines and adjusting the amount of medicine that you get.  ? Monitor your blood pressure, pulse, and oxygen levels to make sure that the anesthetics do not cause any problems.  The procedure may vary among health care providers and hospitals.  What happens after the procedure?  · Your blood pressure, temperature, heart rate, breathing rate, and blood oxygen level will be monitored until the medicines you were given have worn off.  · You will wake up in a recovery area. You may wake up slowly.  · If you feel anxious or  agitated, you may be given medicine to help you calm down.  · If you will be going home the same day, your health care provider may check to make sure you can walk, drink, and urinate.  · Your health care provider will treat any pain or side effects you have before you go home.  · Do not drive for 24 hours if you were given a sedative.  Summary  · General anesthesia is used to keep you still and prevent pain during a procedure.  · It is important to tell your healthcare provider about your medical history and any surgeries you have had, and previous experience with anesthesia.  · Follow your healthcare provider’s instructions about when to stop eating, drinking, or taking certain medicines before your procedure.  · Plan to have someone take you home from the hospital or clinic.  This information is not intended to replace advice given to you by your health care provider. Make sure you discuss any questions you have with your health care provider.  Document Released: 03/26/2009 Document Revised: 08/03/2018 Document Reviewed: 08/03/2018  [employer] Interactive Patient Education © 2019 [employer]       Fall Prevention in Hospitals, Adult  As a hospital patient, your condition and the treatments you receive can increase your risk for falls. Some additional risk factors for falls in a hospital include:  · Being in an unfamiliar environment.  · Being on bed rest.  · Your surgery.  · Taking certain medicines.  · Your tubing requirements, such as intravenous (IV) therapy or catheters.  It is important that you learn how to decrease fall risks while at the hospital. Below are important tips that can help prevent falls.  SAFETY TIPS FOR PREVENTING FALLS  Talk about your risk of falling.  · Ask your health care provider why you are at risk for falling. Is it your medicine, illness, tubing placement, or something else?  · Make a plan with your health care provider to keep you safe from falls.  · Ask your health care provider  or pharmacist about side effects of your medicines. Some medicines can make you dizzy or affect your coordination.  Ask for help.  · Ask for help before getting out of bed. You may need to press your call button.  · Ask for assistance in getting safely to the toilet.  · Ask for a walker or cane to be put at your bedside. Ask that most of the side rails on your bed be placed up before your health care provider leaves the room.  · Ask family or friends to sit with you.  · Ask for things that are out of your reach, such as your glasses, hearing aids, telephone, bedside table, or call button.  Follow these tips to avoid falling:  · Stay lying or seated, rather than standing, while waiting for help.  · Wear rubber-soled slippers or shoes whenever you walk in the hospital.  · Avoid quick, sudden movements.  ¨ Change positions slowly.  ¨ Sit on the side of your bed before standing.  ¨ Stand up slowly and wait before you start to walk.  · Let your health care provider know if there is a spill on the floor.  · Pay careful attention to the medical equipment, electrical cords, and tubes around you.  · When you need help, use your call button by your bed or in the bathroom. Wait for one of your health care providers to help you.  · If you feel dizzy or unsure of your footing, return to bed and wait for assistance.  · Avoid being distracted by the TV, telephone, or another person in your room.  · Do not lean or support yourself on rolling objects, such as IV poles or bedside tables.     This information is not intended to replace advice given to you by your health care provider. Make sure you discuss any questions you have with your health care provider.     Document Released: 12/15/2001 Document Revised: 01/08/2016 Document Reviewed: 08/25/2013  [employer] Interactive Patient Education ©2016 [employer]       [facility]  CHG 4% Patient Instruction Sheet    Chlorhexidine Before Surgery  Chlorhexidine gluconate (CHG)  is a germ-killing (antiseptic) solution that is used to clean the skin. It gets rid of the bacteria that normally live on the skin. Cleaning your skin with CHG before surgery helps lower the risk for infection after surgery.    How to use CHG solution  · You will take 2 showers, one shower the night before surgery, the second shower the morning of surgery before coming to the hospital.  · Use CHG only as told by your health care provider, and follow the instructions on the label.  · Use CHG solution while taking a shower. Follow these steps when using CHG solution (unless your health care provider gives you different instructions):  1. Start the shower.  2. Use your normal soap and shampoo to wash your face and hair.  3. Turn off the shower or move out of the shower stream.  4. Pour the CHG onto a clean washcloth. Do not use any type of brush or rough-edged sponge.  5. Starting at your neck, lather your body down to your toes. Make sure you:  6. Pay special attention to the part of your body where you will be having surgery. Scrub this area for at least 1 minute.  7. Use the full amount of CHG as directed. Usually, this is one half bottle for each shower.  8. Do not use CHG on your head or face. If the solution gets into your ears or eyes, rinse them well with water.  9. Avoid your genital area.  10. Avoid any areas of skin that have broken skin, cuts, or scrapes.  11. Scrub your back and under your arms. Make sure to wash skin folds.  12. Let the lather sit on your skin for 1-2 minutes or as long as told by your health care  provider.  13. Thoroughly rinse your entire body in the shower. Make sure that all body creases and crevices are rinsed well.  14. Dry off with a clean towel. Do not put any substances on your body afterward, such as powder, lotion, or perfume.  15. Put on clean clothes or pajamas.  16. If it is the night before your surgery, sleep in clean sheets.    What are the risks?  Risks of using CHG  include:  · A skin reaction.  · Hearing loss, if CHG gets in your ears.  · Eye injury, if CHG gets in your eyes and is not rinsed out.  · The CHG product catching fire.  Make sure that you avoid smoking and flames after applying CHG to your skin.  Do not use CHG:  · If you have a chlorhexidine allergy or have previously reacted to chlorhexidine.  · On babies younger than 2 months of age.      On the day of surgery, when you are taken to your room in Outpatient Surgery you will be given a CHG prepackaged cloth to wipe the site for your surgery.  How to use CHG prepackaged cloths  · Follow the instructions on the label.  · Use the CHG cloth on clean, dry skin. Follow these steps when using a CHG cloth (unless your health care provider gives you different instructions):  1. Using the CHG cloth, vigorously scrub the part of your body where you will be having surgery. Scrub using a back-and-forth motion for 3 minutes. The area on your body should be completely wet with CHG when you are finished scrubbing.  2. Do not rinse. Discard the cloth and let the area air-dry for 1 minute. Do not put any substances on your body afterward, such as powder, lotion, or perfume.  Contact a health care provider if:  · Your skin gets irritated after scrubbing.  · You have questions about using your solution or cloth.  Get help right away if:  · Your eyes become very red or swollen.  · Your eyes itch badly.  · Your skin itches badly and is red or swollen.  · Your hearing changes.  · You have trouble seeing.  · You have swelling or tingling in your mouth or throat.  · You have trouble breathing.  · You swallow any chlorhexidine.  Summary  · Chlorhexidine gluconate (CHG) is a germ-killing (antiseptic) solution that is used to clean the skin. Cleaning your skin with CHG before surgery helps lower the risk for infection after surgery.  · You may be given CHG to use at home. It may be in a bottle or in a prepackaged cloth to use on your skin.  Carefully follow your health care provider's instructions and the instructions on the product label.  · Do not use CHG if you have a chlorhexidine allergy.  · Contact your health care provider if your skin gets irritated after scrubbing.  This information is not intended to replace advice given to you by your health care provider. Make sure you discuss any questions you have with your health care provider.  Document Released: 09/11/2013 Document Revised: 11/15/2018 Document Reviewed: 11/15/2018  [employer] Interactive Patient Education © 2019 [employer]

## 2020-10-13 ENCOUNTER — LAB (OUTPATIENT)
Dept: LAB | Facility: HOSPITAL | Age: 40
End: 2020-10-13

## 2020-10-13 PROCEDURE — U0003 INFECTIOUS AGENT DETECTION BY NUCLEIC ACID (DNA OR RNA); SEVERE ACUTE RESPIRATORY SYNDROME CORONAVIRUS 2 (SARS-COV-2) (CORONAVIRUS DISEASE [COVID-19]), AMPLIFIED PROBE TECHNIQUE, MAKING USE OF HIGH THROUGHPUT TECHNOLOGIES AS DESCRIBED BY CMS-2020-01-R: HCPCS | Performed by: OBSTETRICS & GYNECOLOGY

## 2020-10-13 PROCEDURE — C9803 HOPD COVID-19 SPEC COLLECT: HCPCS | Performed by: OBSTETRICS & GYNECOLOGY

## 2020-10-14 LAB
COVID LABCORP PRIORITY: NORMAL
SARS-COV-2 RNA RESP QL NAA+PROBE: NOT DETECTED

## 2020-10-15 ENCOUNTER — TELEPHONE (OUTPATIENT)
Dept: OBSTETRICS AND GYNECOLOGY | Facility: CLINIC | Age: 40
End: 2020-10-15

## 2020-10-15 NOTE — TELEPHONE ENCOUNTER
Called and informed pt to arrive at [facility] @ 2546 tomorrow, pt voiced understanding.

## 2020-10-16 ENCOUNTER — ANESTHESIA EVENT (OUTPATIENT)
Dept: PERIOP | Facility: HOSPITAL | Age: 40
End: 2020-10-16

## 2020-10-16 ENCOUNTER — HOSPITAL ENCOUNTER (OUTPATIENT)
Facility: HOSPITAL | Age: 40
Setting detail: HOSPITAL OUTPATIENT SURGERY
Discharge: HOME | End: 2020-10-16
Attending: OBSTETRICS & GYNECOLOGY | Admitting: OBSTETRICS & GYNECOLOGY

## 2020-10-16 ENCOUNTER — ANESTHESIA (OUTPATIENT)
Dept: PERIOP | Facility: HOSPITAL | Age: 40
End: 2020-10-16

## 2020-10-16 VITALS
HEART RATE: 66 BPM | RESPIRATION RATE: 18 BRPM | TEMPERATURE: 97.4 F | OXYGEN SATURATION: 98 % | SYSTOLIC BLOOD PRESSURE: 120 MMHG | DIASTOLIC BLOOD PRESSURE: 73 MMHG

## 2020-10-16 DIAGNOSIS — Z30.09 STERILIZATION CONSULT: ICD-10-CM

## 2020-10-16 LAB — B-HCG UR QL: NEGATIVE

## 2020-10-16 PROCEDURE — 25010000002 SUCCINYLCHOLINE PER 20 MG: Performed by: NURSE ANESTHETIST, CERTIFIED REGISTERED

## 2020-10-16 PROCEDURE — 25010000002 MIDAZOLAM PER 1 MG: Performed by: ANESTHESIOLOGY

## 2020-10-16 PROCEDURE — 88302 TISSUE EXAM BY PATHOLOGIST: CPT | Performed by: OBSTETRICS & GYNECOLOGY

## 2020-10-16 PROCEDURE — 81025 URINE PREGNANCY TEST: CPT | Performed by: OBSTETRICS & GYNECOLOGY

## 2020-10-16 PROCEDURE — 25010000002 FENTANYL CITRATE (PF) 100 MCG/2ML SOLUTION: Performed by: NURSE ANESTHETIST, CERTIFIED REGISTERED

## 2020-10-16 PROCEDURE — 25010000002 PROPOFOL 10 MG/ML EMULSION: Performed by: NURSE ANESTHETIST, CERTIFIED REGISTERED

## 2020-10-16 PROCEDURE — 25010000002 KETOROLAC TROMETHAMINE PER 15 MG: Performed by: NURSE ANESTHETIST, CERTIFIED REGISTERED

## 2020-10-16 PROCEDURE — 58661 LAPAROSCOPY REMOVE ADNEXA: CPT | Performed by: OBSTETRICS & GYNECOLOGY

## 2020-10-16 PROCEDURE — 25010000002 DEXAMETHASONE PER 1 MG: Performed by: ANESTHESIOLOGY

## 2020-10-16 RX ORDER — LEVOTHYROXINE SODIUM 0.1 MG/1
TABLET ORAL
Qty: 15 TABLET | Refills: 5 | Status: SHIPPED | OUTPATIENT
Start: 2020-10-16 | End: 2021-04-30

## 2020-10-16 RX ORDER — PROPOFOL 10 MG/ML
VIAL (ML) INTRAVENOUS AS NEEDED
Status: DISCONTINUED | OUTPATIENT
Start: 2020-10-16 | End: 2020-10-16 | Stop reason: SURG

## 2020-10-16 RX ORDER — FENTANYL CITRATE 50 UG/ML
25 INJECTION, SOLUTION INTRAMUSCULAR; INTRAVENOUS
Status: DISCONTINUED | OUTPATIENT
Start: 2020-10-16 | End: 2020-10-16 | Stop reason: HOSPADM

## 2020-10-16 RX ORDER — IBUPROFEN 600 MG/1
600 TABLET ORAL EVERY 6 HOURS PRN
Status: DISCONTINUED | OUTPATIENT
Start: 2020-10-16 | End: 2020-10-16 | Stop reason: HOSPADM

## 2020-10-16 RX ORDER — MIDAZOLAM HYDROCHLORIDE 1 MG/ML
2 INJECTION INTRAMUSCULAR; INTRAVENOUS
Status: DISCONTINUED | OUTPATIENT
Start: 2020-10-16 | End: 2020-10-16 | Stop reason: HOSPADM

## 2020-10-16 RX ORDER — FLUMAZENIL 0.1 MG/ML
0.2 INJECTION INTRAVENOUS AS NEEDED
Status: DISCONTINUED | OUTPATIENT
Start: 2020-10-16 | End: 2020-10-16 | Stop reason: HOSPADM

## 2020-10-16 RX ORDER — SODIUM CHLORIDE 0.9 % (FLUSH) 0.9 %
3-10 SYRINGE (ML) INJECTION AS NEEDED
Status: DISCONTINUED | OUTPATIENT
Start: 2020-10-16 | End: 2020-10-16 | Stop reason: HOSPADM

## 2020-10-16 RX ORDER — PROMETHAZINE HYDROCHLORIDE 25 MG/1
12.5 TABLET ORAL ONCE AS NEEDED
Status: DISCONTINUED | OUTPATIENT
Start: 2020-10-16 | End: 2020-10-16 | Stop reason: HOSPADM

## 2020-10-16 RX ORDER — ACETAMINOPHEN 500 MG
1000 TABLET ORAL ONCE
Status: COMPLETED | OUTPATIENT
Start: 2020-10-16 | End: 2020-10-16

## 2020-10-16 RX ORDER — EPHEDRINE SULFATE 50 MG/ML
INJECTION, SOLUTION INTRAVENOUS AS NEEDED
Status: DISCONTINUED | OUTPATIENT
Start: 2020-10-16 | End: 2020-10-16 | Stop reason: SURG

## 2020-10-16 RX ORDER — KETOROLAC TROMETHAMINE 30 MG/ML
INJECTION, SOLUTION INTRAMUSCULAR; INTRAVENOUS AS NEEDED
Status: DISCONTINUED | OUTPATIENT
Start: 2020-10-16 | End: 2020-10-16 | Stop reason: SURG

## 2020-10-16 RX ORDER — FENTANYL CITRATE 50 UG/ML
INJECTION, SOLUTION INTRAMUSCULAR; INTRAVENOUS AS NEEDED
Status: DISCONTINUED | OUTPATIENT
Start: 2020-10-16 | End: 2020-10-16 | Stop reason: SURG

## 2020-10-16 RX ORDER — SODIUM CHLORIDE, SODIUM LACTATE, POTASSIUM CHLORIDE, CALCIUM CHLORIDE 600; 310; 30; 20 MG/100ML; MG/100ML; MG/100ML; MG/100ML
1000 INJECTION, SOLUTION INTRAVENOUS CONTINUOUS
Status: DISCONTINUED | OUTPATIENT
Start: 2020-10-16 | End: 2020-10-16 | Stop reason: HOSPADM

## 2020-10-16 RX ORDER — SODIUM CHLORIDE 9 MG/ML
100 INJECTION, SOLUTION INTRAVENOUS CONTINUOUS
Status: DISCONTINUED | OUTPATIENT
Start: 2020-10-16 | End: 2020-10-16 | Stop reason: HOSPADM

## 2020-10-16 RX ORDER — LABETALOL HYDROCHLORIDE 5 MG/ML
5 INJECTION, SOLUTION INTRAVENOUS
Status: DISCONTINUED | OUTPATIENT
Start: 2020-10-16 | End: 2020-10-16 | Stop reason: HOSPADM

## 2020-10-16 RX ORDER — LEVOTHYROXINE SODIUM 112 UG/1
TABLET ORAL
Qty: 15 TABLET | Refills: 5 | Status: SHIPPED | OUTPATIENT
Start: 2020-10-16 | End: 2021-04-30

## 2020-10-16 RX ORDER — BUPIVACAINE HCL/0.9 % NACL/PF 0.1 %
2 PLASTIC BAG, INJECTION (ML) EPIDURAL ONCE
Status: COMPLETED | OUTPATIENT
Start: 2020-10-16 | End: 2020-10-16

## 2020-10-16 RX ORDER — IBUPROFEN 600 MG/1
600 TABLET ORAL ONCE AS NEEDED
Status: DISCONTINUED | OUTPATIENT
Start: 2020-10-16 | End: 2020-10-16 | Stop reason: HOSPADM

## 2020-10-16 RX ORDER — MAGNESIUM HYDROXIDE 1200 MG/15ML
LIQUID ORAL AS NEEDED
Status: DISCONTINUED | OUTPATIENT
Start: 2020-10-16 | End: 2020-10-16 | Stop reason: HOSPADM

## 2020-10-16 RX ORDER — ROCURONIUM BROMIDE 10 MG/ML
INJECTION, SOLUTION INTRAVENOUS AS NEEDED
Status: DISCONTINUED | OUTPATIENT
Start: 2020-10-16 | End: 2020-10-16 | Stop reason: SURG

## 2020-10-16 RX ORDER — LIDOCAINE HYDROCHLORIDE 10 MG/ML
0.5 INJECTION, SOLUTION EPIDURAL; INFILTRATION; INTRACAUDAL; PERINEURAL ONCE AS NEEDED
Status: DISCONTINUED | OUTPATIENT
Start: 2020-10-16 | End: 2020-10-16 | Stop reason: HOSPADM

## 2020-10-16 RX ORDER — NALOXONE HCL 0.4 MG/ML
0.4 VIAL (ML) INJECTION AS NEEDED
Status: DISCONTINUED | OUTPATIENT
Start: 2020-10-16 | End: 2020-10-16 | Stop reason: HOSPADM

## 2020-10-16 RX ORDER — SUCCINYLCHOLINE CHLORIDE 20 MG/ML
INJECTION INTRAMUSCULAR; INTRAVENOUS AS NEEDED
Status: DISCONTINUED | OUTPATIENT
Start: 2020-10-16 | End: 2020-10-16 | Stop reason: SURG

## 2020-10-16 RX ORDER — HYDROCODONE BITARTRATE AND ACETAMINOPHEN 7.5; 325 MG/1; MG/1
1 TABLET ORAL EVERY 4 HOURS PRN
Status: DISCONTINUED | OUTPATIENT
Start: 2020-10-16 | End: 2020-10-16 | Stop reason: HOSPADM

## 2020-10-16 RX ORDER — SODIUM CHLORIDE, SODIUM LACTATE, POTASSIUM CHLORIDE, CALCIUM CHLORIDE 600; 310; 30; 20 MG/100ML; MG/100ML; MG/100ML; MG/100ML
100 INJECTION, SOLUTION INTRAVENOUS CONTINUOUS
Status: DISCONTINUED | OUTPATIENT
Start: 2020-10-16 | End: 2020-10-16 | Stop reason: HOSPADM

## 2020-10-16 RX ORDER — SODIUM CHLORIDE 0.9 % (FLUSH) 0.9 %
3 SYRINGE (ML) INJECTION EVERY 12 HOURS SCHEDULED
Status: DISCONTINUED | OUTPATIENT
Start: 2020-10-16 | End: 2020-10-16 | Stop reason: HOSPADM

## 2020-10-16 RX ORDER — HYDROCODONE BITARTRATE AND ACETAMINOPHEN 5; 325 MG/1; MG/1
1-2 TABLET ORAL EVERY 4 HOURS PRN
Qty: 10 TABLET | Refills: 0 | Status: SHIPPED | OUTPATIENT
Start: 2020-10-16 | End: 2020-11-23

## 2020-10-16 RX ORDER — SCOLOPAMINE TRANSDERMAL SYSTEM 1 MG/1
1 PATCH, EXTENDED RELEASE TRANSDERMAL CONTINUOUS
Status: DISCONTINUED | OUTPATIENT
Start: 2020-10-16 | End: 2020-10-16 | Stop reason: HOSPADM

## 2020-10-16 RX ORDER — SODIUM CHLORIDE 0.9 % (FLUSH) 0.9 %
3 SYRINGE (ML) INJECTION AS NEEDED
Status: DISCONTINUED | OUTPATIENT
Start: 2020-10-16 | End: 2020-10-16 | Stop reason: HOSPADM

## 2020-10-16 RX ORDER — DEXAMETHASONE SODIUM PHOSPHATE 4 MG/ML
4 INJECTION, SOLUTION INTRA-ARTICULAR; INTRALESIONAL; INTRAMUSCULAR; INTRAVENOUS; SOFT TISSUE ONCE AS NEEDED
Status: COMPLETED | OUTPATIENT
Start: 2020-10-16 | End: 2020-10-16

## 2020-10-16 RX ADMIN — IBUPROFEN 600 MG: 600 TABLET, FILM COATED ORAL at 14:04

## 2020-10-16 RX ADMIN — KETOROLAC TROMETHAMINE 30 MG: 30 INJECTION, SOLUTION INTRAMUSCULAR at 12:47

## 2020-10-16 RX ADMIN — MIDAZOLAM HYDROCHLORIDE 2 MG: 2 INJECTION, SOLUTION INTRAMUSCULAR; INTRAVENOUS at 11:04

## 2020-10-16 RX ADMIN — FENTANYL CITRATE 100 MCG: 50 INJECTION, SOLUTION INTRAMUSCULAR; INTRAVENOUS at 12:00

## 2020-10-16 RX ADMIN — DEXAMETHASONE SODIUM PHOSPHATE 4 MG: 4 INJECTION, SOLUTION INTRAMUSCULAR; INTRAVENOUS at 11:03

## 2020-10-16 RX ADMIN — ROCURONIUM BROMIDE 5 MG: 10 INJECTION INTRAVENOUS at 12:04

## 2020-10-16 RX ADMIN — KETOROLAC TROMETHAMINE 30 MG: 30 INJECTION, SOLUTION INTRAMUSCULAR at 12:48

## 2020-10-16 RX ADMIN — Medication 2 G: at 12:00

## 2020-10-16 RX ADMIN — GLYCOPYRROLATE 0.2 MG: 0.2 INJECTION, SOLUTION INTRAMUSCULAR; INTRAVENOUS at 12:09

## 2020-10-16 RX ADMIN — SUCCINYLCHOLINE CHLORIDE 100 MG: 20 INJECTION, SOLUTION INTRAMUSCULAR; INTRAVENOUS at 12:04

## 2020-10-16 RX ADMIN — PROPOFOL 200 MG: 10 INJECTION, EMULSION INTRAVENOUS at 12:04

## 2020-10-16 RX ADMIN — EPHEDRINE SULFATE 15 MG: 50 INJECTION INTRAVENOUS at 12:09

## 2020-10-16 RX ADMIN — SODIUM CHLORIDE, POTASSIUM CHLORIDE, SODIUM LACTATE AND CALCIUM CHLORIDE: 600; 310; 30; 20 INJECTION, SOLUTION INTRAVENOUS at 12:35

## 2020-10-16 RX ADMIN — ACETAMINOPHEN 1000 MG: 500 TABLET, FILM COATED ORAL at 11:03

## 2020-10-16 RX ADMIN — SODIUM CHLORIDE, POTASSIUM CHLORIDE, SODIUM LACTATE AND CALCIUM CHLORIDE 1000 ML: 600; 310; 30; 20 INJECTION, SOLUTION INTRAVENOUS at 09:42

## 2020-10-16 RX ADMIN — SCOPALAMINE 1 PATCH: 1 PATCH, EXTENDED RELEASE TRANSDERMAL at 11:03

## 2020-10-16 NOTE — DISCHARGE INSTRUCTIONS

## 2020-10-16 NOTE — ANESTHESIA PROCEDURE NOTES
Airway  Urgency: elective    Date/Time: 10/16/2020 12:04 PM  Airway not difficult    General Information and Staff    Patient location during procedure: OR  CRNA: Yohannes Verma CRNA    Indications and Patient Condition  Indications for airway management: airway protection    Preoxygenated: yes  Mask difficulty assessment: 1 - vent by mask    Final Airway Details  Final airway type: endotracheal airway      Successful airway: ETT    Successful intubation technique: direct laryngoscopy  Facilitating devices/methods: intubating stylet  Endotracheal tube insertion site: oral  Blade: Ken  Blade size: 2  ETT size (mm): 7.0  Cormack-Lehane Classification: grade I - full view of glottis  Placement verified by: chest auscultation and capnometry   Measured from: lips  ETT/EBT  to lips (cm): 22  Number of attempts at approach: 1  Assessment: lips, teeth, and gum same as pre-op and atraumatic intubation

## 2020-10-16 NOTE — OP NOTE
[facility]  Uyen José  : 1980  MRN: [id number]  [facility]: [id number]  Date: 10/16/2020    Operative Note    SALPINGECTOMY LAPAROSCOPIC      Pre-op Diagnosis:  Sterilization consult [Z30.09]   Post-op Diagnosis:  Post-Op Diagnosis Codes:     * Sterilization consult [Z30.09]   Procedure: Procedure(s):  SALPINGECTOMY LAPAROSCOPIC for sterilization   Surgeon: Surgeon(s):  Stevan Espinal MD       Anesthesia: General by CHELSI Lr CRNA     Estimated Blood Loss: None   mls   Fluids: 500   mls   UOP: 100   mls   Drains: None   ABx: Ancef 2     Specimens:  Bilateral fallopian tubes   Findings: Normal pelvis   Complications: None     INDICATION: Uyen José is a 39 y.o. female with undesired fertility.  She is aware of reversible alternatives and desires surgical sterilization.  PROCEDURE IN DETAIL: After informed consent was obtained, the patient was taken to the operating room, where general anesthesia was administered. She was placed in the lithotomy position in [facility], and her abdomen perineum and vagina were prepped and draped in normal sterile fashion. An open sided speculum was placed in the vagina and her bladder drained with a red rubber catheter. The anterior lip of the cervix was visualized and grasped with a single toothed tenaculum. An acorn tipped uterine manipulator was attached to the cervix and tenaculum.  The speculum was removed.  After changing gloves attention was turned to the abdomen.    A horizontal incision was made in the umbilicus with a scalpel.  The subcutaneous tissues were divided bluntly.  The abdominal wall was elevated and a Veress needle placed into the peritoneal cavity on the first attempt.  Correct placement was confirmed by measurement of gas pressures and flow as well as water drop test.  Opening pressure was 4 mmHg.  The abdomen was insufflated with carbon dioxide gas and the Veress needle removed.  A 5 mm blunt tissue  trocar was  placed into the abdominal cavity while elevating the abdominal wall. Correct placement was confirmed by visualization with the laparoscope.  The abdomen and pelvis were inspected and there was no sign of injury.  Additional 5 mm and 8 mm trochars were placed in the suprapubic and left lower quadrants under direct visualization without difficulty complication.  The patient was placed in Trendelenburg position and the bowel swept out of the pelvis with a blunt probe.  The uterus was elevated and both tubes identified all the way to their fimbriated end.  Starting at the fimbriated end, the harmonic scalpel was used to excise the fallopian tubes by dissecting through the mesosalpinx.  At the cornual region, they were truncated and removed.  Hemostasis was noted.  Hemostasis was noted with desufflation.  Trochars were removed after releasing insufflation and removing the instruments.  Skin incisions were closed subcuticularly.  A sterile dressing was applied.  The instruments were then removed from the vagina and the tenaculum site was noted to be hemostatic.   The patient was then awakened and taken to the recovery room in stable condition.  She tolerated the procedure well and without complications.  All sponge needle and instrument counts were correct times 3 per the OR staff.    Stevan Espinal MD   10/16/2020  12:48 CDT

## 2020-10-16 NOTE — ANESTHESIA POSTPROCEDURE EVALUATION
Patient: Uyen José    Procedure Summary     Date: 10/16/20 Room / Location: [facility] OR  /  [facility] OR    Anesthesia Start: 1200 Anesthesia Stop: 1309    Procedure: SALPINGECTOMY LAPAROSCOPIC for sterilization (Bilateral Abdomen) Diagnosis:       Sterilization consult      (Sterilization consult [Z30.09])    Surgeon: Stevan Espinal MD Provider: Yaa Lr CRNA    Anesthesia Type: general ASA Status: 2          Anesthesia Type: general    Vitals  Vitals Value Taken Time   /77 10/16/20 1335   Temp 97.4 °F (36.3 °C) 10/16/20 1335   Pulse 69 10/16/20 1338   Resp 16 10/16/20 1335   SpO2 97 % 10/16/20 1338   Vitals shown include unvalidated device data.        Post Anesthesia Care and Evaluation    Patient location during evaluation: PACU  Patient participation: complete - patient participated  Level of consciousness: awake and alert  Pain management: adequate  Airway patency: patent  Anesthetic complications: No anesthetic complications    Cardiovascular status: acceptable  Respiratory status: acceptable  Hydration status: acceptable    Comments: Blood pressure 136/80, pulse 70, temperature 97.4 °F (36.3 °C), temperature source Temporal, resp. rate 16, last menstrual period 10/05/2020, SpO2 98 %, not currently breastfeeding.    Pt discharged from PACU based on charlotte score >8

## 2020-10-16 NOTE — ANESTHESIA PREPROCEDURE EVALUATION
Anesthesia Evaluation     Patient summary reviewed   no history of anesthetic complications:  NPO Solid Status: > 8 hours  NPO Liquid Status: > 8 hours           Airway   Mallampati: I  TM distance: >3 FB  Neck ROM: full  No difficulty expected  Dental - normal exam     Pulmonary    (+) a smoker Current Smoked day of surgery,   Cardiovascular   Exercise tolerance: good (4-7 METS)    ECG reviewed    (+) hypertension,       Neuro/Psych  (+) psychiatric history Anxiety,     (-) seizures, TIA, CVA  GI/Hepatic/Renal/Endo    (+)  GERD,  thyroid problem hypothyroidism  (-) no renal disease, diabetes    Musculoskeletal     Abdominal    Substance History      OB/GYN          Other                        Anesthesia Plan    ASA 2     general     intravenous induction     Anesthetic plan, all risks, benefits, and alternatives have been provided, discussed and informed consent has been obtained with: patient.

## 2020-10-20 LAB
LAB AP CASE REPORT: NORMAL
PATH REPORT.FINAL DX SPEC: NORMAL
PATH REPORT.GROSS SPEC: NORMAL

## 2020-11-16 RX ORDER — METRONIDAZOLE 500 MG/1
500 TABLET ORAL 2 TIMES DAILY
Qty: 14 TABLET | Refills: 0 | Status: SHIPPED | OUTPATIENT
Start: 2020-11-16 | End: 2020-11-23

## 2020-11-23 ENCOUNTER — OFFICE VISIT (OUTPATIENT)
Dept: OBSTETRICS AND GYNECOLOGY | Facility: CLINIC | Age: 40
End: 2020-11-23

## 2020-11-23 VITALS
BODY MASS INDEX: 26.84 KG/M2 | SYSTOLIC BLOOD PRESSURE: 122 MMHG | WEIGHT: 167 LBS | DIASTOLIC BLOOD PRESSURE: 84 MMHG | HEIGHT: 66 IN

## 2020-11-23 DIAGNOSIS — Z09 POSTOP CHECK: Primary | ICD-10-CM

## 2020-11-23 DIAGNOSIS — Z78.9 NON-SMOKER: ICD-10-CM

## 2020-11-23 PROCEDURE — 99024 POSTOP FOLLOW-UP VISIT: CPT | Performed by: OBSTETRICS & GYNECOLOGY

## 2020-11-23 RX ORDER — BISOPROLOL FUMARATE AND HYDROCHLOROTHIAZIDE 5; 6.25 MG/1; MG/1
TABLET ORAL
COMMUNITY
Start: 2020-09-21 | End: 2021-05-24 | Stop reason: SDUPTHER

## 2020-11-23 RX ORDER — LEVOTHYROXINE SODIUM 0.1 MG/1
TABLET ORAL
COMMUNITY
Start: 2020-10-16 | End: 2022-05-26

## 2020-11-23 RX ORDER — QUETIAPINE FUMARATE 25 MG/1
TABLET, FILM COATED ORAL
COMMUNITY
Start: 2020-11-10 | End: 2021-02-09

## 2020-11-23 RX ORDER — DEXTROAMPHETAMINE SULFATE, DEXTROAMPHETAMINE SACCHARATE, AMPHETAMINE ASPARTATE MONOHYDRATE, AND AMPHETAMINE SULFATE 9.375; 9.375; 9.375; 9.375 MG/1; MG/1; MG/1; MG/1
CAPSULE, EXTENDED RELEASE ORAL
COMMUNITY
End: 2021-01-25

## 2020-11-23 RX ORDER — LEVOTHYROXINE SODIUM 112 UG/1
TABLET ORAL
COMMUNITY
Start: 2020-10-16

## 2020-11-23 NOTE — PROGRESS NOTES
"Subjective   Chief Complaint   Patient presents with   • Post-op     pt here for post-op bilateral salpingectomy 10/16/2020, denies any problems     Uyen José is a 39 y.o. female is s/p SALPINGECTOMY LAPAROSCOPIC for sterilization - Bilateral on 10/16/2020.  Currently she reports no problems with eating, bowel movements, voiding, or wound drainage and pain is well controlled.    Pathology  Final Diagnosis   FALLOPIAN TUBES, BILATERAL SALPINGECTOMY:  FALLOPIAN TUBES WITH BENIGN PARATUBAL CYSTS.         No Additional Complaints Reported    The following portions of the patient's history were reviewed and updated as appropriate:problem list, current medications, allergies, past family history, past medical history, past social history and past surgical history    Review of Systems      Objective   /84 (BP Location: Right arm, Patient Position: Sitting)   Ht 167.6 cm (66\")   Wt 75.8 kg (167 lb)   LMP 11/02/2020   Breastfeeding No   BMI 26.95 kg/m²     General:  well developed; well nourished  no acute distress   Abdomen: LSC sites healing well  soft, non-tender; bowel sounds normal; no masses, no organomegaly   Pelvis: Not performed.          Assessment   1. Post op follow up s/p SALPINGECTOMY LAPAROSCOPIC for sterilization - Bilateral performed 5 weeks ago for Desire for Permanent Sterilization     Plan   1. Resume normal activites.  2. Call for concerns or questions  3. RTO 3 months for WWE    No orders of the defined types were placed in this encounter.         This note was electronically signed.    Stevan Espinal MD  November 23, 2020  "

## 2020-11-24 LAB
C TRACH RRNA SPEC QL NAA+PROBE: NORMAL
N GONORRHOEA RRNA SPEC QL NAA+PROBE: NORMAL
REQUEST PROBLEM: NORMAL

## 2020-11-30 ENCOUNTER — TELEPHONE (OUTPATIENT)
Dept: OBSTETRICS AND GYNECOLOGY | Facility: CLINIC | Age: 40
End: 2020-11-30

## 2020-11-30 DIAGNOSIS — Z20.2 POSSIBLE EXPOSURE TO STD: Primary | ICD-10-CM

## 2020-11-30 NOTE — TELEPHONE ENCOUNTER
Called and informed pt that [facility] submitted the wrong tube for her GC/Chlamydia screening and there was an order placed for her to come in and have it done at her earliest convenience, pt voiced understanding.   INR is finally back up in goal range of 3-4 and appears to have stabilized.     Please have him continue with...    -- 5 mg on Tuesday Thursday Saturday     and      -- 6 mg on Mondays Wednesdays Fridays and Sundays     ...and call us with an INR next Tuesday morning.  He should always check an INR sooner with symptoms, concerns, health medication and/or dietary changes.     --vita

## 2021-01-07 ENCOUNTER — OFFICE VISIT (OUTPATIENT)
Dept: PRIMARY CARE CLINIC | Age: 41
End: 2021-01-07
Payer: COMMERCIAL

## 2021-01-07 VITALS
WEIGHT: 167.2 LBS | OXYGEN SATURATION: 96 % | TEMPERATURE: 96.4 F | HEART RATE: 76 BPM | DIASTOLIC BLOOD PRESSURE: 72 MMHG | SYSTOLIC BLOOD PRESSURE: 110 MMHG | BODY MASS INDEX: 26.99 KG/M2 | RESPIRATION RATE: 16 BRPM

## 2021-01-07 DIAGNOSIS — Z76.89 ENCOUNTER FOR ASSESSMENT OF STD EXPOSURE: Primary | ICD-10-CM

## 2021-01-07 LAB
HIV-1 P24 AG: NORMAL
RAPID HIV 1&2: NORMAL

## 2021-01-07 PROCEDURE — 99212 OFFICE O/P EST SF 10 MIN: CPT | Performed by: FAMILY MEDICINE

## 2021-01-07 RX ORDER — FLUOXETINE 20 MG/1
1 TABLET ORAL
COMMUNITY
End: 2021-01-07 | Stop reason: ALTCHOICE

## 2021-01-07 RX ORDER — QUETIAPINE FUMARATE 25 MG/1
TABLET, FILM COATED ORAL
COMMUNITY
Start: 2020-11-10 | End: 2021-01-07

## 2021-01-07 RX ORDER — LANOLIN ALCOHOL/MO/W.PET/CERES
500 CREAM (GRAM) TOPICAL NIGHTLY
COMMUNITY
End: 2022-09-29

## 2021-01-07 ASSESSMENT — ENCOUNTER SYMPTOMS
GASTROINTESTINAL NEGATIVE: 1
RESPIRATORY NEGATIVE: 1

## 2021-01-07 NOTE — PROGRESS NOTES
SUBJECTIVE:    Michael Wallace is 38 y. o.female who comes in complaining of Exposure to STD (pt just wants for peace of mind, no abnormal vag discharge or itching)   . HPI: She had a possible STD exposure 3 weeks ago. She is status post BTL. Periods are normal.  She denies any symptoms. No fever or chills. No abnormal vaginal discharge or itching. Allergies   Allergen Reactions    Latex     Acetaminophen     Dilaudid [Hydromorphone Hcl]     Oxycodone-Acetaminophen     Adhesive Tape Rash     ADHESIVE ON BANDAIDS       Social History     Socioeconomic History    Marital status:      Spouse name: None    Number of children: 1    Years of education: None    Highest education level: None   Occupational History    Occupation:    Social Needs    Financial resource strain: None    Food insecurity     Worry: None     Inability: None    Transportation needs     Medical: None     Non-medical: None   Tobacco Use    Smoking status: Current Every Day Smoker     Packs/day: 0.50     Years: 17.00     Pack years: 8.50     Types: Cigarettes    Smokeless tobacco: Never Used    Tobacco comment: \"some\" desire to quit   Substance and Sexual Activity    Alcohol use:  Yes     Alcohol/week: 0.0 standard drinks     Comment: occ    Drug use: No    Sexual activity: Yes     Partners: Male   Lifestyle    Physical activity     Days per week: None     Minutes per session: None    Stress: None   Relationships    Social connections     Talks on phone: None     Gets together: None     Attends [employer] service: None     Active member of club or organization: None     Attends meetings of clubs or organizations: None     Relationship status: None    Intimate partner violence     Fear of current or ex partner: None     Emotionally abused: None     Physically abused: None     Forced sexual activity: None   Other Topics Concern    None   Social History Narrative    None       Review of Systems Constitutional: Negative. Respiratory: Negative. Cardiovascular: Negative. Gastrointestinal: Negative. Musculoskeletal: Negative. Neurological: Negative. Hematological: Negative. Psychiatric/Behavioral: Negative. OBJECTIVE:    Wt Readings from Last 3 Encounters:   01/07/21 167 lb 3.2 oz (75.8 kg)   01/10/20 166 lb (75.3 kg)   11/07/18 163 lb (73.9 kg)       /72 (Site: Left Upper Arm, Position: Sitting, Cuff Size: Medium Adult)   Pulse 76   Temp 96.4 °F (35.8 °C) (Temporal)   Resp 16   Wt 167 lb 3.2 oz (75.8 kg)   SpO2 96%   Breastfeeding No   BMI 26.99 kg/m²     Physical Exam  Vitals signs and nursing note reviewed. Constitutional:       General: She is not in acute distress. Appearance: She is well-developed. Cardiovascular:      Rate and Rhythm: Normal rate and regular rhythm. Heart sounds: Normal heart sounds. Pulmonary:      Effort: Pulmonary effort is normal.      Breath sounds: Normal breath sounds. Abdominal:      General: Bowel sounds are normal.      Palpations: Abdomen is soft. Skin:     General: Skin is warm and dry. Neurological:      Mental Status: She is alert and oriented to person, place, and time. Psychiatric:         Behavior: Behavior normal.         Thought Content: Thought content normal.         Judgment: Judgment normal.         ASSESSMENT:    1. Encounter for assessment of STD exposure          PLAN:    MEDICATIONS:  No orders of the defined types were placed in this encounter. ORDERS:  Orders Placed This Encounter   Procedures    Chlamydia Trachomatis & Neisseria gonorrhoeae (GC) by amplified detection    HIV Rapid 1&2    RPR Reflex to Titer and TPPA     We discussed the risks and benefits of checking these labs. She desires to proceed. If she develops other symptoms, she is to call. Follow-up:  Return if symptoms worsen or fail to improve.     PATIENT INSTRUCTIONS:  There are no Patient Instructions on file for this visit. EMR Dragon/transcription disclaimer:  Much of this encounter note is electronic transcription/translation of spoken language to printed texts. The electronic translation of spoken language may be erroneous, or at times, nonsensical words or phrases may beinadvertently transcribed.   Although I have reviewed the note for such errors, some may still exist.

## 2021-01-11 LAB
CHLAMYDIA TRACHOMATIS AMPLIFIED DET: NEGATIVE
N GONORRHOEAE AMPLIFIED DET: NEGATIVE
RPR: NORMAL
SPECIMEN SOURCE: NORMAL

## 2021-01-20 ENCOUNTER — TELEPHONE (OUTPATIENT)
Dept: FAMILY MEDICINE CLINIC | Facility: CLINIC | Age: 41
End: 2021-01-20

## 2021-01-23 DIAGNOSIS — F90.9 ATTENTION DEFICIT HYPERACTIVITY DISORDER (ADHD), UNSPECIFIED ADHD TYPE: Primary | ICD-10-CM

## 2021-01-25 RX ORDER — DEXTROAMPHETAMINE SULFATE, DEXTROAMPHETAMINE SACCHARATE, AMPHETAMINE ASPARTATE MONOHYDRATE, AND AMPHETAMINE SULFATE 9.375; 9.375; 9.375; 9.375 MG/1; MG/1; MG/1; MG/1
CAPSULE, EXTENDED RELEASE ORAL
Qty: 30 CAPSULE | Refills: 0 | Status: SHIPPED | OUTPATIENT
Start: 2021-01-25 | End: 2021-02-24

## 2021-02-09 ENCOUNTER — OFFICE VISIT (OUTPATIENT)
Dept: FAMILY MEDICINE CLINIC | Facility: CLINIC | Age: 41
End: 2021-02-09

## 2021-02-09 ENCOUNTER — LAB (OUTPATIENT)
Dept: LAB | Facility: HOSPITAL | Age: 41
End: 2021-02-09

## 2021-02-09 ENCOUNTER — TELEPHONE (OUTPATIENT)
Dept: FAMILY MEDICINE CLINIC | Facility: CLINIC | Age: 41
End: 2021-02-09

## 2021-02-09 VITALS
RESPIRATION RATE: 20 BRPM | TEMPERATURE: 96.8 F | DIASTOLIC BLOOD PRESSURE: 85 MMHG | HEART RATE: 70 BPM | BODY MASS INDEX: 27.8 KG/M2 | SYSTOLIC BLOOD PRESSURE: 124 MMHG | WEIGHT: 173 LBS | HEIGHT: 66 IN

## 2021-02-09 DIAGNOSIS — F90.2 ATTENTION DEFICIT HYPERACTIVITY DISORDER (ADHD), COMBINED TYPE: ICD-10-CM

## 2021-02-09 DIAGNOSIS — G47.09 OTHER INSOMNIA: ICD-10-CM

## 2021-02-09 DIAGNOSIS — F39 MOOD DISORDER (HCC): ICD-10-CM

## 2021-02-09 DIAGNOSIS — F90.2 ATTENTION DEFICIT HYPERACTIVITY DISORDER (ADHD), COMBINED TYPE: Primary | ICD-10-CM

## 2021-02-09 PROBLEM — Z30.09 STERILIZATION CONSULT: Status: RESOLVED | Noted: 2020-09-18 | Resolved: 2021-02-09

## 2021-02-09 PROBLEM — R68.81 EARLY SATIETY: Status: RESOLVED | Noted: 2017-11-15 | Resolved: 2021-02-09

## 2021-02-09 PROBLEM — R19.4 CHANGE IN BOWEL HABITS: Status: RESOLVED | Noted: 2017-11-15 | Resolved: 2021-02-09

## 2021-02-09 PROBLEM — I10 ESSENTIAL HYPERTENSION: Status: ACTIVE | Noted: 2018-01-31

## 2021-02-09 PROBLEM — K58.9 IRRITABLE BOWEL SYNDROME: Status: ACTIVE | Noted: 2021-02-09

## 2021-02-09 PROBLEM — F90.9 ATTENTION DEFICIT HYPERACTIVITY DISORDER: Status: ACTIVE | Noted: 2018-01-31

## 2021-02-09 PROBLEM — R14.0 ABDOMINAL BLOATING: Status: RESOLVED | Noted: 2017-11-15 | Resolved: 2021-02-09

## 2021-02-09 PROBLEM — F41.8 MIXED ANXIETY AND DEPRESSIVE DISORDER: Status: ACTIVE | Noted: 2018-01-31

## 2021-02-09 LAB
AMPHET+METHAMPHET UR QL: POSITIVE
AMPHETAMINES UR QL: NEGATIVE
BARBITURATES UR QL SCN: NEGATIVE
BENZODIAZ UR QL SCN: NEGATIVE
BUPRENORPHINE SERPL-MCNC: NEGATIVE NG/ML
CANNABINOIDS SERPL QL: NEGATIVE
COCAINE UR QL: NEGATIVE
METHADONE UR QL SCN: NEGATIVE
OPIATES UR QL: NEGATIVE
OXYCODONE UR QL SCN: NEGATIVE
PCP UR QL SCN: NEGATIVE
PROPOXYPH UR QL: NEGATIVE
TRICYCLICS UR QL SCN: NEGATIVE

## 2021-02-09 PROCEDURE — 80306 DRUG TEST PRSMV INSTRMNT: CPT

## 2021-02-09 PROCEDURE — 99213 OFFICE O/P EST LOW 20 MIN: CPT | Performed by: NURSE PRACTITIONER

## 2021-02-09 RX ORDER — DEXTROAMPHETAMINE SULFATE, DEXTROAMPHETAMINE SACCHARATE, AMPHETAMINE ASPARTATE MONOHYDRATE, AND AMPHETAMINE SULFATE 9.375; 9.375; 9.375; 9.375 MG/1; MG/1; MG/1; MG/1
1 CAPSULE, EXTENDED RELEASE ORAL EVERY MORNING
Qty: 30 CAPSULE | Refills: 0 | Status: SHIPPED | OUTPATIENT
Start: 2021-03-26 | End: 2021-04-25

## 2021-02-09 RX ORDER — DEXTROAMPHETAMINE SULFATE, DEXTROAMPHETAMINE SACCHARATE, AMPHETAMINE ASPARTATE MONOHYDRATE, AND AMPHETAMINE SULFATE 9.375; 9.375; 9.375; 9.375 MG/1; MG/1; MG/1; MG/1
1 CAPSULE, EXTENDED RELEASE ORAL EVERY MORNING
Qty: 30 CAPSULE | Refills: 0 | Status: SHIPPED | OUTPATIENT
Start: 2021-02-24 | End: 2021-03-26

## 2021-02-09 RX ORDER — CLONIDINE HYDROCHLORIDE 0.1 MG/1
0.1 TABLET ORAL NIGHTLY
COMMUNITY
Start: 2020-11-24 | End: 2021-02-09 | Stop reason: SDUPTHER

## 2021-02-09 RX ORDER — CLONIDINE HYDROCHLORIDE 0.1 MG/1
TABLET ORAL
Qty: 45 TABLET | Refills: 2 | Status: SHIPPED | OUTPATIENT
Start: 2021-02-09 | End: 2021-05-24

## 2021-02-09 NOTE — PROGRESS NOTES
"Chief Complaint  ADD (Pt is here for a 3 month flwp. Pt states that she is doing well on medication. ) and Insomnia (Pt states that she is no longer taking the Seroquel due to it making her feel like she was \"drugged\" Pt states that she began taking 1.5 tab of Clonidine and that works well for her. )    Subjective    History of Present Illness      Patient presents to [facility] for   ADHD/Mood HPI    Visit for:  follow-up. Most recent visit was 3 months ago.  Interim changes to follow up on today: no change in medication  Work/School Performance:  going well  Cognitive:  able to focus    Behavior  Hyperactivity: is not hyperactive  Impulsivity: no impulsivity  Tasking: able to initiate tasks and able to complete tasks    Social  ADHD social/impulsive symptoms:  not impatient    Behavioral health  Behavior: no concerns  Emotional coping: demonstrates feelings of no concerns        ADD  This is a chronic problem. The current episode started more than 1 month ago.   Insomnia  This is a chronic problem. The current episode started more than 1 month ago.        Review of Systems   Psychiatric/Behavioral: The patient has insomnia.    All other systems reviewed and are negative.      I have reviewed and agree with the HPI and ROS information as above.  Mariela Alvarez, APRN     Objective   Vital Signs:   /85   Pulse 70   Temp 96.8 °F (36 °C)   Resp 20   Ht 167.6 cm (66\")   Wt 78.5 kg (173 lb)   BMI 27.92 kg/m²       Physical Exam  Constitutional:       Appearance: Normal appearance. She is well-developed.   HENT:      Head: Normocephalic and atraumatic.      Right Ear: External ear normal.      Left Ear: External ear normal.      Nose: Nose normal. No nasal tenderness or congestion.      Mouth/Throat:      Lips: Pink. No lesions.      Mouth: Mucous membranes are moist. No oral lesions.      Dentition: Normal dentition.      Pharynx: Oropharynx is clear. No pharyngeal " swelling, oropharyngeal exudate or posterior oropharyngeal erythema.   Eyes:      General: Lids are normal. Vision grossly intact. No scleral icterus.        Right eye: No discharge.         Left eye: No discharge.      Extraocular Movements: Extraocular movements intact.      Conjunctiva/sclera: Conjunctivae normal.      Right eye: Right conjunctiva is not injected.      Left eye: Left conjunctiva is not injected.      Pupils: Pupils are equal, round, and reactive to light.   Neck:      Musculoskeletal: Full passive range of motion without pain, normal range of motion and neck supple.   Cardiovascular:      Rate and Rhythm: Normal rate and regular rhythm.      Heart sounds: Normal heart sounds. No murmur. No gallop.    Pulmonary:      Effort: Pulmonary effort is normal.      Breath sounds: Normal breath sounds and air entry. No wheezing, rhonchi or rales.   Musculoskeletal: Normal range of motion.         General: No tenderness or deformity.      Right lower leg: No edema.      Left lower leg: No edema.   Skin:     General: Skin is warm and dry.      Coloration: Skin is not jaundiced.      Findings: No rash.   Neurological:      Mental Status: She is alert and oriented to person, place, and time.      Cranial Nerves: Cranial nerves are intact.      Sensory: Sensation is intact.      Motor: Motor function is intact.      Coordination: Coordination is intact.      Gait: Gait is intact.   Psychiatric:         Attention and Perception: Attention normal.         Mood and Affect: Mood and affect normal.         Behavior: Behavior is not hyperactive. Behavior is cooperative.         Thought Content: Thought content normal.         Judgment: Judgment normal.          Result Review  Data Reviewed:                   Assessment and Plan        Problem List Items Addressed This Visit        Mental Health    Attention deficit hyperactivity disorder - Primary    Relevant Medications    Cariprazine HCl (Vraylar) 1.5 MG capsule  capsule    Other Relevant Orders    Urine Drug Screen - Urine, Clean Catch (Completed)      Other Visit Diagnoses     Mood disorder (CMS/HCC)        Relevant Medications    Cariprazine HCl (Vraylar) 1.5 MG capsule capsule    Other insomnia        Relevant Medications    cloNIDine (CATAPRES) 0.1 MG tablet      Patient doing well on medication.  She has been taking 1-1/2 of her clonidine.  She did not like the way the Seroquel made her feel hung over.  Chip is clean.  As long as drug screen is clean today we will send in Mydayis prescription.        Follow Up   No follow-ups on file.  Patient was given instructions and counseling regarding her condition or for health maintenance advice. Please see specific information pulled into the AVS if appropriate.

## 2021-02-09 NOTE — TELEPHONE ENCOUNTER
----- Message from BLAS Ayala sent at 2/9/2021  8:26 AM CST -----  Drug screen okay. I will send scripts to Dr. Chavez.

## 2021-02-15 RX ORDER — PANTOPRAZOLE SODIUM 40 MG/1
TABLET, DELAYED RELEASE ORAL
Qty: 30 TABLET | Refills: 3 | Status: SHIPPED | OUTPATIENT
Start: 2021-02-15 | End: 2021-11-14 | Stop reason: SDUPTHER

## 2021-02-16 ENCOUNTER — TELEPHONE (OUTPATIENT)
Dept: FAMILY MEDICINE CLINIC | Facility: CLINIC | Age: 41
End: 2021-02-16

## 2021-02-16 NOTE — TELEPHONE ENCOUNTER
PATIENT CALLED OVER A PAPER THAT WAS TO BE FILLED OUT AND FAXED BACK TO HER, SHE STATES SHE HAD FAXED THIS ON 02/10/21. PLEASE CALL HER BACK     GOOD CALL BACK   [phone]

## 2021-02-24 DIAGNOSIS — F90.9 ATTENTION DEFICIT HYPERACTIVITY DISORDER (ADHD), UNSPECIFIED ADHD TYPE: ICD-10-CM

## 2021-02-24 RX ORDER — DEXTROAMPHETAMINE SULFATE, DEXTROAMPHETAMINE SACCHARATE, AMPHETAMINE ASPARTATE MONOHYDRATE, AND AMPHETAMINE SULFATE 9.375; 9.375; 9.375; 9.375 MG/1; MG/1; MG/1; MG/1
CAPSULE, EXTENDED RELEASE ORAL
Qty: 30 CAPSULE | Refills: 0 | Status: SHIPPED | OUTPATIENT
Start: 2021-02-24 | End: 2021-03-25

## 2021-03-25 DIAGNOSIS — F90.9 ATTENTION DEFICIT HYPERACTIVITY DISORDER (ADHD), UNSPECIFIED ADHD TYPE: ICD-10-CM

## 2021-03-25 RX ORDER — DEXTROAMPHETAMINE SULFATE, DEXTROAMPHETAMINE SACCHARATE, AMPHETAMINE ASPARTATE MONOHYDRATE, AND AMPHETAMINE SULFATE 9.375; 9.375; 9.375; 9.375 MG/1; MG/1; MG/1; MG/1
CAPSULE, EXTENDED RELEASE ORAL
Qty: 30 CAPSULE | Refills: 0 | Status: SHIPPED | OUTPATIENT
Start: 2021-03-25 | End: 2021-03-30 | Stop reason: SDUPTHER

## 2021-03-29 DIAGNOSIS — F90.9 ATTENTION DEFICIT HYPERACTIVITY DISORDER (ADHD), UNSPECIFIED ADHD TYPE: ICD-10-CM

## 2021-03-29 RX ORDER — DEXTROAMPHETAMINE SULFATE, DEXTROAMPHETAMINE SACCHARATE, AMPHETAMINE ASPARTATE MONOHYDRATE, AND AMPHETAMINE SULFATE 9.375; 9.375; 9.375; 9.375 MG/1; MG/1; MG/1; MG/1
CAPSULE, EXTENDED RELEASE ORAL
Qty: 30 CAPSULE | Refills: 0 | OUTPATIENT
Start: 2021-03-29

## 2021-03-30 DIAGNOSIS — F90.9 ATTENTION DEFICIT HYPERACTIVITY DISORDER (ADHD), UNSPECIFIED ADHD TYPE: ICD-10-CM

## 2021-03-30 RX ORDER — DEXTROAMPHETAMINE SULFATE, DEXTROAMPHETAMINE SACCHARATE, AMPHETAMINE ASPARTATE MONOHYDRATE, AND AMPHETAMINE SULFATE 9.375; 9.375; 9.375; 9.375 MG/1; MG/1; MG/1; MG/1
1 CAPSULE, EXTENDED RELEASE ORAL EVERY MORNING
Qty: 30 CAPSULE | Refills: 0 | Status: SHIPPED | OUTPATIENT
Start: 2021-03-30 | End: 2021-04-30

## 2021-03-30 NOTE — TELEPHONE ENCOUNTER
Caller: Uyen José    Relationship: Self    Best call back number: [phone]    Medication needed:   Requested Prescriptions     Pending Prescriptions Disp Refills   • Amphet-Dextroamphet 3-Bead ER (Mydayis) 37.5 MG capsule sustained-release 24 hr 30 capsule 0     Sig: Take 1 capsule by mouth Every Morning.       When do you need the refill by: 03/30/21    What additional details did the patient provide when requesting the medication: PATIENT STATED SHE NEEDS IT ASAP.    Does the patient have less than a 3 day supply:  [x] Yes  [] No    What is the patient's preferred pharmacy: [facility] - [facility] [address] - [phone]  - [phone] FX

## 2021-04-14 ENCOUNTER — OFFICE VISIT (OUTPATIENT)
Dept: PRIMARY CARE CLINIC | Age: 41
End: 2021-04-14
Payer: COMMERCIAL

## 2021-04-14 VITALS
HEIGHT: 66 IN | HEART RATE: 78 BPM | BODY MASS INDEX: 27.06 KG/M2 | RESPIRATION RATE: 16 BRPM | WEIGHT: 168.4 LBS | TEMPERATURE: 97.2 F | DIASTOLIC BLOOD PRESSURE: 82 MMHG | OXYGEN SATURATION: 100 % | SYSTOLIC BLOOD PRESSURE: 120 MMHG

## 2021-04-14 DIAGNOSIS — R30.0 DYSURIA: ICD-10-CM

## 2021-04-14 DIAGNOSIS — N30.01 ACUTE CYSTITIS WITH HEMATURIA: ICD-10-CM

## 2021-04-14 DIAGNOSIS — R31.1 BENIGN ESSENTIAL MICROSCOPIC HEMATURIA: Primary | ICD-10-CM

## 2021-04-14 LAB
APPEARANCE FLUID: CLEAR
BILIRUBIN, POC: ABNORMAL
BLOOD URINE, POC: ABNORMAL
CLARITY, POC: CLEAR
COLOR, POC: YELLOW
GLUCOSE URINE, POC: ABNORMAL
KETONES, POC: ABNORMAL
LEUKOCYTE EST, POC: ABNORMAL
NITRITE, POC: ABNORMAL
PH, POC: 6.5
PROTEIN, POC: ABNORMAL
SPECIFIC GRAVITY, POC: 1.03
UROBILINOGEN, POC: 0.2

## 2021-04-14 PROCEDURE — 81002 URINALYSIS NONAUTO W/O SCOPE: CPT | Performed by: NURSE PRACTITIONER

## 2021-04-14 PROCEDURE — 99213 OFFICE O/P EST LOW 20 MIN: CPT | Performed by: NURSE PRACTITIONER

## 2021-04-14 RX ORDER — CIPROFLOXACIN 250 MG/1
250 TABLET, FILM COATED ORAL 2 TIMES DAILY
Qty: 14 TABLET | Refills: 0 | Status: SHIPPED | OUTPATIENT
Start: 2021-04-14 | End: 2021-04-21

## 2021-04-14 RX ORDER — PHENAZOPYRIDINE HYDROCHLORIDE 200 MG/1
200 TABLET, FILM COATED ORAL 3 TIMES DAILY PRN
Qty: 9 TABLET | Refills: 0 | Status: SHIPPED | OUTPATIENT
Start: 2021-04-14 | End: 2021-04-17

## 2021-04-14 ASSESSMENT — ENCOUNTER SYMPTOMS
GASTROINTESTINAL NEGATIVE: 1
EYES NEGATIVE: 1
RESPIRATORY NEGATIVE: 1

## 2021-04-14 NOTE — PROGRESS NOTES
[address] [facility]  [address] [address] [place] [address]  [address] [place] [address]  Dept: [phone]  Dept Fax: [phone]  Loc: [phone]    Deepthi Pascual is a 38 y.o. female who presents today for her medical conditions/complaints as noted below. Deepthi Pascual is c/o of Dysuria (Pt states it hurts to pee, she sometimes cannot make it to the restroom, and she has a trickle. SHe states she get the urge to push sometimes. She states she did notice blood in her urine yesterday. Pt states this has been going onsince thursday. )        HPI:     HPI   Chief Complaint   Patient presents with    Dysuria     Pt states it hurts to pee, she sometimes cannot make it to the restroom, and she has a trickle. SHe states she get the urge to push sometimes. She states she did notice blood in her urine yesterday. Pt states this has been going onsince thursday. she has never had a kidney stone. She is on her menses currently. She denies any back pain.   Past Medical History:   Diagnosis Date    Hypothyroidism     Irritable bowel syndrome       Past Surgical History:   Procedure Laterality Date    CHOLECYSTECTOMY  5-12    DILATION AND CURETTAGE OF UTERUS      GALLBLADDER SURGERY      May 2012    LAPAROSCOPY      SALPINGECTOMY Bilateral        Vitals 4/14/2021 1/7/2021 1/10/2020 11/7/2018 12/14/2017 78/24/9250   SYSTOLIC 124 [phone]   DIASTOLIC 82 72 78 76 68 80   Site Left Upper Arm Left Upper Arm Left Upper Arm Left Upper Arm - -   Position Sitting Sitting Sitting Sitting - -   Cuff Size Large Adult Medium Adult Medium Adult Medium Adult - -   Pulse 78 76 74 68 69 66   Temp 97.2 96.4 97.7 97 - 97.8   Resp 16 16 18 18 - 18   SpO2 100 96 98 98 97 98   Weight 168 lb 6.4 oz 167 lb 3.2 oz 166 lb 163 lb 184 lb 3.2 oz 183 lb   Height 5' 6\" - 5' 6\" 5' 6\" 5' 6\" 5' 6\"   Body mass index 27.18 kg/m2 - 26.79 kg/m2 26.31 kg/m2 29.73 kg/m2 29.53 kg/m2   Some recent data might be hidden       Family History Problem Relation Age of Onset    Diabetes Father     Cataracts Father     Colon Cancer Neg Hx     Colon Polyps Neg Hx     Liver Cancer Neg Hx     Liver Disease Neg Hx     Esophageal Cancer Neg Hx     Rectal Cancer Neg Hx     Stomach Cancer Neg Hx        Social History     Tobacco Use    Smoking status: Current Every Day Smoker     Packs/day: 0.50     Years: 17.00     Pack years: 8.50     Types: Cigarettes    Smokeless tobacco: Never Used    Tobacco comment: \"some\" desire to quit   Substance Use Topics    Alcohol use: Yes     Alcohol/week: 0.0 standard drinks     Comment: occ      Current Outpatient Medications   Medication Sig Dispense Refill    phenazopyridine (PYRIDIUM) 200 MG tablet Take 1 tablet by mouth 3 times daily as needed for Pain 9 tablet 0    ciprofloxacin (CIPRO) 250 MG tablet Take 1 tablet by mouth 2 times daily for 7 days 14 tablet 0    pantoprazole (PROTONIX) 40 MG tablet TAKE ONE TABLET EVERY DAY IF NEEDED FOR SEVERE FLAREUP AND TAGAMET IS NOT WORKING 30 tablet 3    cariprazine hcl (VRAYLAR) 1.5 MG capsule Vraylar 1.5 mg capsule      niacin 500 MG extended release capsule Take 500 mg by mouth nightly      levothyroxine (SYNTHROID) 112 MCG tablet TAKE 1 TABLET BY MOUTH ONCE DAILY; ALTERNATING WITH 100MCG 15 tablet 5    levothyroxine (SYNTHROID) 100 MCG tablet TAKE 1 TABLET BY MOUTH ONCE DAILY; ALTERNATING WITH 112 MCG 15 tablet 5    bisoprolol-hydrochlorothiazide (ZIAC) 5-6.25 MG per tablet TAKE 1 TABLET BY MOUTH ONCE A DAY FOR BLOOD PRESSURE 90 tablet 3    Amphet-Dextroamphet 3-Bead ER (MYDAYIS) 12.5 MG CP24 Mydayis 12.5 mg capsule extended release 24 hr   Take 1 capsule every day by oral route for 30 days.  cloNIDine (CATAPRES) 0.1 MG tablet clonidine HCl 0.1 mg tablet  Take 1 tablet every day by oral route at bedtime. (Patient not taking: Reported on 4/14/2021) 60 tablet 0     No current facility-administered medications for this visit.       Allergies   Allergen Reactions    Latex Rash    Acetaminophen     Dilaudid [Hydromorphone Hcl]     Oxycodone-Acetaminophen     Adhesive Tape Rash     ADHESIVE ON BANDAIDS       Health Maintenance   Topic Date Due    Hepatitis C screen  Never done    COVID-19 Vaccine (1) Never done    TSH testing  01/10/2021    Potassium monitoring  01/10/2021    Creatinine monitoring  01/10/2021    DTaP/Tdap/Td vaccine (2 - Tdap) 01/07/2022 (Originally 7/7/2014)    Flu vaccine (Season Ended) 01/07/2022 (Originally 9/1/2021)    Pneumococcal 0-64 years Vaccine (1 of 1 - PPSV23) 01/06/2025 (Originally 12/10/1985)    Cervical cancer screen  01/13/2023    Lipid screen  01/10/2025    HIV screen  Completed    Hepatitis A vaccine  Aged Out    Hepatitis B vaccine  Aged Out    Hib vaccine  Aged Out    Meningococcal (ACWY) vaccine  Aged Out       Subjective:      Review of Systems   Constitutional: Negative. HENT: Negative. Eyes: Negative. Respiratory: Negative. Cardiovascular: Negative. Gastrointestinal: Negative. Genitourinary: Positive for dysuria and hematuria. Musculoskeletal: Negative. Skin: Negative. Neurological: Negative. Psychiatric/Behavioral: Negative. Objective:     Physical Exam  Vitals signs and nursing note reviewed. Constitutional:       Appearance: She is well-developed. HENT:      Head: Normocephalic. Right Ear: External ear normal.      Left Ear: External ear normal.   Eyes:      Pupils: Pupils are equal, round, and reactive to light. Neck:      Musculoskeletal: Normal range of motion. Cardiovascular:      Rate and Rhythm: Normal rate and regular rhythm. Heart sounds: Normal heart sounds. Pulmonary:      Effort: Pulmonary effort is normal.      Breath sounds: Normal breath sounds. Abdominal:      General: Abdomen is flat. Bowel sounds are normal.      Palpations: Abdomen is soft. Tenderness: There is abdominal tenderness in the suprapubic area.  There is no right

## 2021-04-16 LAB
ORGANISM: ABNORMAL
URINE CULTURE, ROUTINE: ABNORMAL
URINE CULTURE, ROUTINE: ABNORMAL

## 2021-04-29 DIAGNOSIS — F90.9 ATTENTION DEFICIT HYPERACTIVITY DISORDER (ADHD), UNSPECIFIED ADHD TYPE: ICD-10-CM

## 2021-04-30 RX ORDER — DEXTROAMPHETAMINE SULFATE, DEXTROAMPHETAMINE SACCHARATE, AMPHETAMINE ASPARTATE MONOHYDRATE, AND AMPHETAMINE SULFATE 9.375; 9.375; 9.375; 9.375 MG/1; MG/1; MG/1; MG/1
1 CAPSULE, EXTENDED RELEASE ORAL EVERY MORNING
Qty: 30 CAPSULE | Refills: 0 | Status: SHIPPED | OUTPATIENT
Start: 2021-04-30 | End: 2021-05-24

## 2021-05-24 ENCOUNTER — OFFICE VISIT (OUTPATIENT)
Dept: FAMILY MEDICINE CLINIC | Facility: CLINIC | Age: 41
End: 2021-05-24

## 2021-05-24 VITALS
SYSTOLIC BLOOD PRESSURE: 111 MMHG | TEMPERATURE: 97.3 F | BODY MASS INDEX: 27.8 KG/M2 | WEIGHT: 173 LBS | HEART RATE: 79 BPM | DIASTOLIC BLOOD PRESSURE: 79 MMHG | HEIGHT: 66 IN | OXYGEN SATURATION: 99 %

## 2021-05-24 DIAGNOSIS — I10 ESSENTIAL HYPERTENSION: ICD-10-CM

## 2021-05-24 DIAGNOSIS — F17.200 TOBACCO DEPENDENCE: ICD-10-CM

## 2021-05-24 DIAGNOSIS — F90.9 ATTENTION DEFICIT HYPERACTIVITY DISORDER (ADHD), UNSPECIFIED ADHD TYPE: Primary | ICD-10-CM

## 2021-05-24 DIAGNOSIS — K21.9 GASTROESOPHAGEAL REFLUX DISEASE WITHOUT ESOPHAGITIS: ICD-10-CM

## 2021-05-24 DIAGNOSIS — R60.9 EDEMA, UNSPECIFIED TYPE: ICD-10-CM

## 2021-05-24 DIAGNOSIS — F39 MOOD DISORDER (HCC): ICD-10-CM

## 2021-05-24 PROCEDURE — 99214 OFFICE O/P EST MOD 30 MIN: CPT | Performed by: NURSE PRACTITIONER

## 2021-05-24 RX ORDER — BISOPROLOL FUMARATE AND HYDROCHLOROTHIAZIDE 5; 6.25 MG/1; MG/1
1 TABLET ORAL DAILY
Qty: 30 TABLET | Refills: 2 | Status: SHIPPED | OUTPATIENT
Start: 2021-05-24 | End: 2022-03-02

## 2021-05-24 RX ORDER — DEXTROAMPHETAMINE SULFATE, DEXTROAMPHETAMINE SACCHARATE, AMPHETAMINE ASPARTATE MONOHYDRATE, AND AMPHETAMINE SULFATE 9.375; 9.375; 9.375; 9.375 MG/1; MG/1; MG/1; MG/1
37.5 CAPSULE, EXTENDED RELEASE ORAL EVERY MORNING
Qty: 30 CAPSULE | Refills: 0 | Status: SHIPPED | OUTPATIENT
Start: 2021-06-23 | End: 2021-07-28

## 2021-05-24 RX ORDER — PANTOPRAZOLE SODIUM 40 MG/1
40 TABLET, DELAYED RELEASE ORAL DAILY
Qty: 30 TABLET | Refills: 2 | Status: SHIPPED | OUTPATIENT
Start: 2021-05-24 | End: 2021-09-17

## 2021-05-24 RX ORDER — HYDROCHLOROTHIAZIDE 12.5 MG/1
12.5 CAPSULE, GELATIN COATED ORAL EVERY MORNING
Qty: 30 CAPSULE | Refills: 2 | Status: SHIPPED | OUTPATIENT
Start: 2021-05-24 | End: 2022-10-19

## 2021-05-24 RX ORDER — DEXTROAMPHETAMINE SULFATE, DEXTROAMPHETAMINE SACCHARATE, AMPHETAMINE ASPARTATE MONOHYDRATE, AND AMPHETAMINE SULFATE 9.375; 9.375; 9.375; 9.375 MG/1; MG/1; MG/1; MG/1
1 CAPSULE, EXTENDED RELEASE ORAL EVERY MORNING
Qty: 30 CAPSULE | Refills: 0 | Status: SHIPPED | OUTPATIENT
Start: 2021-05-24 | End: 2021-06-23

## 2021-05-24 NOTE — PATIENT INSTRUCTIONS
Discharge Instructions - ADHD Follow Up    - You will need to return to the office as instructed for follow up, or sooner if you develop symptoms.  - Medication should be taken first thing in the morning.  - It is your responsibility to safe guard your medication. Any medication that is lost or stolen may not be renewed until your next scheduled appointment.  - If you develop any symptoms such as headache, weight loss, loss of appetite, chest pain, palpitations, or change in behavior, please contact our office immediately or go to your local emergency rooms for any emergent needs.  - Your next appointment will be a walk in visit. Dr Chavez is here Monday-Thursdays, and you will need to be signed in by 3 pm in order to guarantee your prescription is filled that day. You may be seen on Fridays or Saturdays for medication follow up as well, but your prescription will not be ready for  until the following Monday.    Discharge Instructions - Mood Disorder Follow Up     - You will need to return to the office as instructed for follow up, or sooner if you develop symptoms  - Medication should be taken first thing in the morning  - It is your responsibility to safe guard your medication  - If you develop any symptoms such as headache, changes in weight, loss of appetite, chest pain, palpitations, or change in behavior, please contact our office immediately or go to your local emergency rooms for any emergent needs.  - Your next appointment will be walk in visit.  Dr. Chavez is here Monday-Thursdays, and you will need to be signed in by 3 pm in order to guarantee your prescription is filled that day.  You may be seen on Fridays or Saturdays for medication follow up as well.

## 2021-05-24 NOTE — PROGRESS NOTES
Chief Complaint  ADHD (3 mo f/u med management)    Subjective    History of Present Illness      Patient presents to [facility] for   Mood HPI  Patient has been without mood medication for 2 months as her pharmacy quit covering it for her.  Seeking alternative medication.     ADHD/Mood HPI  Patient states all is good with her med and no complaints.    Visit for:  follow-up. Most recent visit was 3 months ago.  Interim changes to follow up on today: no change in medication  Work/School Performance:  going well  Cognitive:  able to focus    Behavior  Hyperactivity: is not hyperactive  Impulsivity: no impulsivity  Tasking: able to initiate tasks and able to complete tasks    Social  ADHD social/impulsive symptoms:  not impatient    Behavioral health  Behavior: no concerns  Emotional coping: demonstrates feelings of no concerns         Review of Systems   Constitutional: Negative for appetite change, diaphoresis, fatigue and fever.   HENT: Negative for ear pain, hearing loss, mouth sores, sinus pressure, sneezing, sore throat and voice change.    Eyes: Negative for discharge, itching and visual disturbance.   Respiratory: Negative for cough, shortness of breath and wheezing.    Cardiovascular: Negative for chest pain and palpitations.   Gastrointestinal: Negative for abdominal pain, diarrhea and vomiting.   Endocrine: Negative.    Genitourinary: Negative.    Musculoskeletal: Positive for joint swelling. Negative for arthralgias, back pain and myalgias.   Skin: Negative for rash.   Allergic/Immunologic: Negative.    Neurological: Negative for dizziness, seizures, weakness, numbness and headache.   Hematological: Negative for adenopathy. Does not bruise/bleed easily.   Psychiatric/Behavioral: Negative for agitation, dysphoric mood, sleep disturbance and depressed mood. The patient is not nervous/anxious.        I have reviewed and agree with the HPI and ROS information as above.  Rocio Manley  "Lock, APRN     Objective   Vital Signs:   /79   Pulse 79   Temp 97.3 °F (36.3 °C)   Ht 167.6 cm (66\")   Wt 78.5 kg (173 lb)   SpO2 99%   BMI 27.92 kg/m²       Physical Exam  Constitutional:       Appearance: She is well-developed and overweight.   HENT:      Head: Normocephalic and atraumatic.      Right Ear: Tympanic membrane, ear canal and external ear normal.      Left Ear: Tympanic membrane, ear canal and external ear normal.      Nose: Nose normal. No septal deviation, nasal tenderness or congestion.      Mouth/Throat:      Lips: Pink. No lesions.      Mouth: Mucous membranes are moist. No oral lesions.      Dentition: Normal dentition.      Pharynx: Oropharynx is clear. No pharyngeal swelling, oropharyngeal exudate or posterior oropharyngeal erythema.   Eyes:      General: Lids are normal. Vision grossly intact. No scleral icterus.        Right eye: No discharge.         Left eye: No discharge.      Extraocular Movements: Extraocular movements intact.      Conjunctiva/sclera: Conjunctivae normal.      Right eye: Right conjunctiva is not injected.      Left eye: Left conjunctiva is not injected.      Pupils: Pupils are equal, round, and reactive to light.   Neck:      Thyroid: No thyroid mass.      Trachea: Trachea normal.   Cardiovascular:      Rate and Rhythm: Normal rate and regular rhythm.      Heart sounds: Normal heart sounds. No murmur heard.   No gallop.    Pulmonary:      Effort: Pulmonary effort is normal.      Breath sounds: Normal breath sounds and air entry. No wheezing, rhonchi or rales.   Abdominal:      General: There is no distension.      Palpations: Abdomen is soft. There is no mass.      Tenderness: There is no abdominal tenderness. There is no right CVA tenderness, left CVA tenderness, guarding or rebound.   Musculoskeletal:         General: No tenderness or deformity. Normal range of motion.      Cervical back: Full passive range of motion without pain, normal range of motion " and neck supple.      Thoracic back: Normal.      Right lower leg: No edema.      Left lower leg: No edema.   Skin:     General: Skin is warm and dry.      Coloration: Skin is not jaundiced.      Findings: No rash.   Neurological:      Mental Status: She is alert and oriented to person, place, and time.      Cranial Nerves: Cranial nerves are intact.      Sensory: Sensation is intact.      Motor: Motor function is intact.      Coordination: Coordination is intact.      Gait: Gait is intact.      Deep Tendon Reflexes: Reflexes are normal and symmetric.   Psychiatric:         Mood and Affect: Mood and affect normal.         Judgment: Judgment normal.          Result Review  Data Reviewed:          Urine Drug Screen - Urine, Clean Catch (02/09/2021 07:46)           Assessment and Plan      Problem List Items Addressed This Visit        Cardiac and Vasculature    Essential hypertension    Relevant Medications    bisoprolol-hydrochlorothiazide (ZIAC) 5-6.25 MG per tablet    hydroCHLOROthiazide (MICROZIDE) 12.5 MG capsule       Mental Health    Attention deficit hyperactivity disorder - Primary    Relevant Medications    Cariprazine HCl (Vraylar) 1.5 MG capsule capsule    Mood disorder (CMS/HCC)    Relevant Medications    Cariprazine HCl (Vraylar) 1.5 MG capsule capsule      Other Visit Diagnoses     Edema, unspecified type        Relevant Medications    hydroCHLOROthiazide (MICROZIDE) 12.5 MG capsule    Gastroesophageal reflux disease without esophagitis        Relevant Medications    pantoprazole (PROTONIX) 40 MG EC tablet    Tobacco dependence          Patient presents for ADHD, hypertensive and mood disorder follow-up today.  She additionally complains of swelling to her lower extremities at the end of the day.  She feels her sleep has improved and she has discontinued clonidine.  She was doing well on Vraylar but is having issues with insurance coverage and wishes to discuss alternatives.  Plan:  1.  Continue Mydayis  at current dose, UDS is up-to-date and satisfactory.  2.  We will try discount card with new pharmacy for Vraylar coverage as she was doing well on this.  If continues to have issues we will change to Abilify 5 mg and she may call to request this.  Medication counseling was done.  3.  Blood pressure stable on Ziac but she admits she has been using a family members additional diuretic to help with dependent edema per her description to bilateral LE's.  Plan is to add another dose of HCTZ daily.  Will check BMP for potassium level at follow-up visit.  Encourage smoking cessation, increase water compression hose etc.  4.  Reflux is stable.  5.  She is not ready to quit smoking at this time.         Follow Up   Return in about 3 months (around 8/24/2021).  Patient was given instructions and counseling regarding her condition or for health maintenance advice. Please see specific information pulled into the AVS if appropriate.

## 2021-06-07 DIAGNOSIS — R10.2 PELVIC PAIN: Primary | ICD-10-CM

## 2021-06-07 DIAGNOSIS — N92.0 MENORRHAGIA WITH REGULAR CYCLE: ICD-10-CM

## 2021-06-10 DIAGNOSIS — R10.2 PELVIC PAIN: ICD-10-CM

## 2021-06-10 DIAGNOSIS — N92.0 MENORRHAGIA WITH REGULAR CYCLE: ICD-10-CM

## 2021-06-14 RX ORDER — ACETAMINOPHEN AND CODEINE PHOSPHATE 120; 12 MG/5ML; MG/5ML
1 SOLUTION ORAL DAILY
Qty: 21 TABLET | Refills: 5 | Status: SHIPPED | OUTPATIENT
Start: 2021-06-14 | End: 2022-04-06

## 2021-06-30 RX ORDER — SUCRALFATE 1 G/1
1 TABLET ORAL 4 TIMES DAILY
Qty: 120 TABLET | Refills: 3 | Status: SHIPPED | OUTPATIENT
Start: 2021-06-30 | End: 2022-04-06

## 2021-07-28 DIAGNOSIS — F90.9 ATTENTION DEFICIT HYPERACTIVITY DISORDER (ADHD), UNSPECIFIED ADHD TYPE: ICD-10-CM

## 2021-07-28 RX ORDER — DEXTROAMPHETAMINE SULFATE, DEXTROAMPHETAMINE SACCHARATE, AMPHETAMINE ASPARTATE MONOHYDRATE, AND AMPHETAMINE SULFATE 9.375; 9.375; 9.375; 9.375 MG/1; MG/1; MG/1; MG/1
37.5 CAPSULE, EXTENDED RELEASE ORAL EVERY MORNING
Qty: 30 CAPSULE | Refills: 0 | Status: SHIPPED | OUTPATIENT
Start: 2021-07-28 | End: 2021-08-23

## 2021-08-23 ENCOUNTER — OFFICE VISIT (OUTPATIENT)
Dept: FAMILY MEDICINE CLINIC | Facility: CLINIC | Age: 41
End: 2021-08-23

## 2021-08-23 DIAGNOSIS — F90.2 ATTENTION DEFICIT HYPERACTIVITY DISORDER (ADHD), COMBINED TYPE: Primary | ICD-10-CM

## 2021-08-23 DIAGNOSIS — F39 MOOD DISORDER (HCC): ICD-10-CM

## 2021-08-23 PROCEDURE — 99213 OFFICE O/P EST LOW 20 MIN: CPT | Performed by: NURSE PRACTITIONER

## 2021-08-23 RX ORDER — DEXTROAMPHETAMINE SULFATE, DEXTROAMPHETAMINE SACCHARATE, AMPHETAMINE ASPARTATE MONOHYDRATE, AND AMPHETAMINE SULFATE 9.375; 9.375; 9.375; 9.375 MG/1; MG/1; MG/1; MG/1
1 CAPSULE, EXTENDED RELEASE ORAL EVERY MORNING
Qty: 30 CAPSULE | Refills: 0 | Status: SHIPPED | OUTPATIENT
Start: 2021-09-22 | End: 2021-10-28

## 2021-08-23 RX ORDER — DEXTROAMPHETAMINE SULFATE, DEXTROAMPHETAMINE SACCHARATE, AMPHETAMINE ASPARTATE MONOHYDRATE, AND AMPHETAMINE SULFATE 9.375; 9.375; 9.375; 9.375 MG/1; MG/1; MG/1; MG/1
1 CAPSULE, EXTENDED RELEASE ORAL EVERY MORNING
Qty: 30 CAPSULE | Refills: 0 | Status: SHIPPED | OUTPATIENT
Start: 2021-08-23 | End: 2021-09-22

## 2021-08-23 NOTE — PROGRESS NOTES
Chief Complaint  ADD (Patient is here for follow up on adhd, she states she is doing well on her medcation. She states she sleeps well and eats well.)    Subjective    History of Present Illness      Patient presents to [facility] for   Patient is here for follow up on adhd, she states she is doing well on her medcation. She states she sleeps well and eats well.    ADD  This is a recurrent problem. The current episode started today. The problem has been unchanged.        Review of Systems    I have reviewed and agree with the HPI and ROS information as above.  BLAS Ayala     Objective   Vital Signs:   There were no vitals taken for this visit.      Physical Exam  Constitutional:       Appearance: Normal appearance. She is well-developed.   HENT:      Head: Normocephalic and atraumatic.      Right Ear: External ear normal.      Left Ear: External ear normal.      Nose: Nose normal. No nasal tenderness or congestion.      Mouth/Throat:      Lips: Pink. No lesions.      Mouth: Mucous membranes are moist. No oral lesions.      Dentition: Normal dentition.      Pharynx: Oropharynx is clear. No pharyngeal swelling, oropharyngeal exudate or posterior oropharyngeal erythema.   Eyes:      General: Lids are normal. Vision grossly intact. No scleral icterus.        Right eye: No discharge.         Left eye: No discharge.      Extraocular Movements: Extraocular movements intact.      Conjunctiva/sclera: Conjunctivae normal.      Right eye: Right conjunctiva is not injected.      Left eye: Left conjunctiva is not injected.      Pupils: Pupils are equal, round, and reactive to light.   Cardiovascular:      Rate and Rhythm: Normal rate and regular rhythm.      Heart sounds: Normal heart sounds. No murmur heard.   No gallop.    Pulmonary:      Effort: Pulmonary effort is normal.      Breath sounds: Normal breath sounds and air entry. No wheezing, rhonchi or rales.    Musculoskeletal:         General: No tenderness or deformity. Normal range of motion.      Cervical back: Full passive range of motion without pain, normal range of motion and neck supple.      Right lower leg: No edema.      Left lower leg: No edema.   Skin:     General: Skin is warm and dry.      Coloration: Skin is not jaundiced.      Findings: No rash.   Neurological:      Mental Status: She is alert and oriented to person, place, and time.      Cranial Nerves: Cranial nerves are intact.      Sensory: Sensation is intact.      Motor: Motor function is intact.      Coordination: Coordination is intact.      Gait: Gait is intact.   Psychiatric:         Attention and Perception: Attention normal.         Mood and Affect: Mood and affect normal.         Behavior: Behavior is not hyperactive. Behavior is cooperative.         Thought Content: Thought content normal.         Judgment: Judgment normal.          Result Review  Data Reviewed:                   Assessment and Plan      Problem List Items Addressed This Visit        Mental Health    Attention deficit hyperactivity disorder - Primary    Mood disorder (CMS/HCC)      Patient is here today to follow-up on ADHD and mood disorder.  She is no longer taking the Vraylar.  She states that even her  thought that she was more mark when taking it.  She feels like she is very stable without anything currently and denies wanting to get on anything at this time.  I did encourage her to watch symptoms very closely due to the fact that she is on a stimulant.  She voices understanding.  Chip is pending.  Drug screen is up-to-date.        Follow Up   Return in about 3 months (around 11/23/2021).  Patient was given instructions and counseling regarding her condition or for health maintenance advice. Please see specific information pulled into the AVS if appropriate.

## 2021-09-17 DIAGNOSIS — K21.9 GASTROESOPHAGEAL REFLUX DISEASE WITHOUT ESOPHAGITIS: ICD-10-CM

## 2021-09-17 RX ORDER — PANTOPRAZOLE SODIUM 40 MG/1
40 TABLET, DELAYED RELEASE ORAL DAILY
Qty: 30 TABLET | Refills: 1 | Status: SHIPPED | OUTPATIENT
Start: 2021-09-17

## 2021-10-27 DIAGNOSIS — F90.2 ATTENTION DEFICIT HYPERACTIVITY DISORDER (ADHD), COMBINED TYPE: ICD-10-CM

## 2021-10-28 RX ORDER — DEXTROAMPHETAMINE SULFATE, DEXTROAMPHETAMINE SACCHARATE, AMPHETAMINE ASPARTATE MONOHYDRATE, AND AMPHETAMINE SULFATE 9.375; 9.375; 9.375; 9.375 MG/1; MG/1; MG/1; MG/1
CAPSULE, EXTENDED RELEASE ORAL
Qty: 30 CAPSULE | Refills: 0 | Status: SHIPPED | OUTPATIENT
Start: 2021-10-28 | End: 2021-11-30 | Stop reason: SDUPTHER

## 2021-11-11 DIAGNOSIS — K21.9 GASTROESOPHAGEAL REFLUX DISEASE WITHOUT ESOPHAGITIS: ICD-10-CM

## 2021-11-11 RX ORDER — LEVOTHYROXINE SODIUM 0.1 MG/1
TABLET ORAL
Qty: 15 TABLET | Refills: 5 | Status: SHIPPED | OUTPATIENT
Start: 2021-11-11 | End: 2022-04-29

## 2021-11-11 RX ORDER — LEVOTHYROXINE SODIUM 112 UG/1
TABLET ORAL
Qty: 15 TABLET | Refills: 5 | Status: SHIPPED | OUTPATIENT
Start: 2021-11-11 | End: 2022-04-29 | Stop reason: SDUPTHER

## 2021-11-12 RX ORDER — PANTOPRAZOLE SODIUM 40 MG/1
40 TABLET, DELAYED RELEASE ORAL DAILY
Qty: 30 TABLET | Refills: 1 | OUTPATIENT
Start: 2021-11-12

## 2021-11-14 RX ORDER — PANTOPRAZOLE SODIUM 40 MG/1
TABLET, DELAYED RELEASE ORAL
Qty: 30 TABLET | Refills: 3 | Status: SHIPPED | OUTPATIENT
Start: 2021-11-14 | End: 2022-05-02 | Stop reason: SDUPTHER

## 2021-11-29 ENCOUNTER — TELEPHONE (OUTPATIENT)
Dept: FAMILY MEDICINE CLINIC | Facility: CLINIC | Age: 41
End: 2021-11-29

## 2021-11-29 NOTE — TELEPHONE ENCOUNTER
----- Message from Uyen José sent at 11/29/2021  9:34 AM CST -----  Regarding: REFILL  i cant remember if i have any refills for my Mydayis or when the last time i came in to get my refills.  could you please let me know if i have any refills left?  thanks

## 2021-11-30 ENCOUNTER — OFFICE VISIT (OUTPATIENT)
Dept: FAMILY MEDICINE CLINIC | Facility: CLINIC | Age: 41
End: 2021-11-30

## 2021-11-30 VITALS
SYSTOLIC BLOOD PRESSURE: 116 MMHG | DIASTOLIC BLOOD PRESSURE: 70 MMHG | WEIGHT: 167 LBS | BODY MASS INDEX: 26.84 KG/M2 | HEIGHT: 66 IN | HEART RATE: 80 BPM

## 2021-11-30 DIAGNOSIS — E66.3 OVERWEIGHT (BMI 25.0-29.9): ICD-10-CM

## 2021-11-30 DIAGNOSIS — F90.2 ATTENTION DEFICIT HYPERACTIVITY DISORDER (ADHD), COMBINED TYPE: Primary | ICD-10-CM

## 2021-11-30 PROCEDURE — 99214 OFFICE O/P EST MOD 30 MIN: CPT | Performed by: NURSE PRACTITIONER

## 2021-11-30 RX ORDER — DEXTROAMPHETAMINE SULFATE, DEXTROAMPHETAMINE SACCHARATE, AMPHETAMINE ASPARTATE MONOHYDRATE, AND AMPHETAMINE SULFATE 12.5; 12.5; 12.5; 12.5 MG/1; MG/1; MG/1; MG/1
1 CAPSULE, EXTENDED RELEASE ORAL EVERY MORNING
Qty: 30 CAPSULE | Refills: 0 | Status: SHIPPED | OUTPATIENT
Start: 2021-11-30 | End: 2021-12-30

## 2021-11-30 NOTE — PROGRESS NOTES
"Chief Complaint  ADHD (3 month follow up. Does not feel like the Mydayis is lasting long enough.)    Subjective    History of Present Illness      Patient presents to [facility] for   Pt here for a 3 month adhd follow up. She does not feel the medication is lasting long enough.        Review of Systems   Constitutional: Negative.    HENT: Negative.    Eyes: Negative.    Respiratory: Negative.    Cardiovascular: Negative.    Gastrointestinal: Negative.    Endocrine: Negative.    Genitourinary: Negative.    Musculoskeletal: Negative.    Skin: Negative.    Allergic/Immunologic: Negative.    Neurological: Negative.    Hematological: Negative.    Psychiatric/Behavioral: Negative.        I have reviewed and agree with the HPI and ROS information as above.  Josi Aguilera, APRN     Objective   Vital Signs:   /70   Pulse 80   Ht 167.6 cm (66\")   Wt 75.8 kg (167 lb)   BMI 26.95 kg/m²       Physical Exam  Constitutional:       Appearance: Normal appearance. She is well-developed.      Comments: overweight   HENT:      Head: Normocephalic and atraumatic.      Right Ear: External ear normal.      Left Ear: External ear normal.      Nose: Nose normal. No nasal tenderness or congestion.      Mouth/Throat:      Lips: Pink. No lesions.      Mouth: Mucous membranes are moist. No oral lesions.      Dentition: Normal dentition.      Pharynx: Oropharynx is clear. No pharyngeal swelling, oropharyngeal exudate or posterior oropharyngeal erythema.   Eyes:      General: Lids are normal. Vision grossly intact. No scleral icterus.        Right eye: No discharge.         Left eye: No discharge.      Extraocular Movements: Extraocular movements intact.      Conjunctiva/sclera: Conjunctivae normal.      Right eye: Right conjunctiva is not injected.      Left eye: Left conjunctiva is not injected.      Pupils: Pupils are equal, round, and reactive to light.   Cardiovascular:      Rate and Rhythm: " Normal rate and regular rhythm.      Heart sounds: Normal heart sounds. No murmur heard.  No gallop.    Pulmonary:      Effort: Pulmonary effort is normal.      Breath sounds: Normal breath sounds and air entry. No wheezing, rhonchi or rales.   Musculoskeletal:         General: No tenderness or deformity. Normal range of motion.      Cervical back: Full passive range of motion without pain, normal range of motion and neck supple.      Right lower leg: No edema.      Left lower leg: No edema.   Skin:     General: Skin is warm and dry.      Coloration: Skin is not jaundiced.      Findings: No rash.   Neurological:      Mental Status: She is alert and oriented to person, place, and time.      Cranial Nerves: Cranial nerves are intact.      Sensory: Sensation is intact.      Motor: Motor function is intact.      Coordination: Coordination is intact.      Gait: Gait is intact.   Psychiatric:         Attention and Perception: Attention normal.         Mood and Affect: Mood and affect normal.         Behavior: Behavior is not hyperactive. Behavior is cooperative.         Thought Content: Thought content normal.         Judgment: Judgment normal.          Result Review  Data Reviewed:   The following data was reviewed by: BLAS Ren on 11/30/2021:    Urine Drug Screen - Urine, Clean Catch (02/09/2021 07:46)           Assessment and Plan      Problem List Items Addressed This Visit        Endocrine and Metabolic    Overweight (BMI 25.0-29.9)       Mental Health    Attention deficit hyperactivity disorder - Primary        Patient here today for 3-month ADHD follow-up.  She feels as though the Mydayis is not lasting long enough throughout the day.  She feels that it works well, but wears off around noon.  She is requesting to increase the dose at this time.      Plan:    1.  Will increase Mydayis to 50 mg.  Chip is clean.  UDS is up-to-date and appropriate.  Follow-up 1 month.    Follow Up {Instructions Charge  Capture  Follow-up Communications :23}  Return in about 1 month (around 12/30/2021) for Recheck.  Patient was given instructions and counseling regarding her condition or for health maintenance advice. Please see specific information pulled into the AVS if appropriate.     ADHD Follow up:    Chip report initiated in office and is clean. ADRS (Adult ADHD Assessment Form) reviewed in detail, scanned in chart, and has been reviewed at time of appointment.  All questions, including medication and side effects, were discussed in detail at time of patient's visit. Patient will change medication dose which was discussed at today's visit. Patient is to return in 1 month(s).

## 2022-01-03 ENCOUNTER — OFFICE VISIT (OUTPATIENT)
Dept: FAMILY MEDICINE CLINIC | Facility: CLINIC | Age: 42
End: 2022-01-03

## 2022-01-03 VITALS
WEIGHT: 165 LBS | HEART RATE: 83 BPM | DIASTOLIC BLOOD PRESSURE: 86 MMHG | BODY MASS INDEX: 26.52 KG/M2 | SYSTOLIC BLOOD PRESSURE: 122 MMHG | HEIGHT: 66 IN

## 2022-01-03 DIAGNOSIS — F90.2 ATTENTION DEFICIT HYPERACTIVITY DISORDER (ADHD), COMBINED TYPE: Primary | ICD-10-CM

## 2022-01-03 PROCEDURE — 99213 OFFICE O/P EST LOW 20 MIN: CPT | Performed by: NURSE PRACTITIONER

## 2022-01-03 RX ORDER — DEXTROAMPHETAMINE SULFATE, DEXTROAMPHETAMINE SACCHARATE, AMPHETAMINE ASPARTATE MONOHYDRATE, AND AMPHETAMINE SULFATE 12.5; 12.5; 12.5; 12.5 MG/1; MG/1; MG/1; MG/1
1 CAPSULE, EXTENDED RELEASE ORAL EVERY MORNING
Qty: 30 CAPSULE | Refills: 0 | Status: SHIPPED | OUTPATIENT
Start: 2022-01-03 | End: 2022-02-02

## 2022-01-03 RX ORDER — DEXTROAMPHETAMINE SULFATE, DEXTROAMPHETAMINE SACCHARATE, AMPHETAMINE ASPARTATE MONOHYDRATE, AND AMPHETAMINE SULFATE 12.5; 12.5; 12.5; 12.5 MG/1; MG/1; MG/1; MG/1
1 CAPSULE, EXTENDED RELEASE ORAL EVERY MORNING
Qty: 30 CAPSULE | Refills: 0 | Status: SHIPPED | OUTPATIENT
Start: 2022-02-02 | End: 2022-03-10

## 2022-01-03 NOTE — PROGRESS NOTES
"Chief Complaint  ADHD (1 month follow up. Doing well on the higher dose of Mydayis.)    Subjective    History of Present Illness      Patient presents to [facility] for   Pt following up on ADD.        Review of Systems    I have reviewed and agree with the HPI and ROS information as above.  Mariela Muhammad Kim, BLAS     Objective   Vital Signs:   /86   Pulse 83   Ht 167.6 cm (66\")   Wt 74.8 kg (165 lb)   BMI 26.63 kg/m²       Physical Exam  Constitutional:       Appearance: Normal appearance. She is well-developed.   HENT:      Head: Normocephalic and atraumatic.      Right Ear: External ear normal.      Left Ear: External ear normal.      Nose: Nose normal. No nasal tenderness or congestion.      Mouth/Throat:      Lips: Pink. No lesions.      Mouth: Mucous membranes are moist. No oral lesions.      Dentition: Normal dentition.      Pharynx: Oropharynx is clear. No pharyngeal swelling, oropharyngeal exudate or posterior oropharyngeal erythema.   Eyes:      General: Lids are normal. Vision grossly intact. No scleral icterus.        Right eye: No discharge.         Left eye: No discharge.      Extraocular Movements: Extraocular movements intact.      Conjunctiva/sclera: Conjunctivae normal.      Right eye: Right conjunctiva is not injected.      Left eye: Left conjunctiva is not injected.      Pupils: Pupils are equal, round, and reactive to light.   Cardiovascular:      Rate and Rhythm: Normal rate and regular rhythm.      Heart sounds: Normal heart sounds. No murmur heard.  No gallop.    Pulmonary:      Effort: Pulmonary effort is normal.      Breath sounds: Normal breath sounds and air entry. No wheezing, rhonchi or rales.   Musculoskeletal:         General: No tenderness or deformity. Normal range of motion.      Cervical back: Full passive range of motion without pain, normal range of motion and neck supple.      Right lower leg: No edema.      Left lower leg: No " edema.   Skin:     General: Skin is warm and dry.      Coloration: Skin is not jaundiced.      Findings: No rash.   Neurological:      Mental Status: She is alert and oriented to person, place, and time.      Cranial Nerves: Cranial nerves are intact.      Sensory: Sensation is intact.      Motor: Motor function is intact.      Coordination: Coordination is intact.      Gait: Gait is intact.   Psychiatric:         Attention and Perception: Attention normal.         Mood and Affect: Mood and affect normal.         Behavior: Behavior is not hyperactive. Behavior is cooperative.         Thought Content: Thought content normal.         Judgment: Judgment normal.     Answers for HPI/ROS submitted by the patient on 12/30/2021  Please describe your symptoms.: Refill medication  Have you had these symptoms before?: No  How long have you been having these symptoms?: 1-4 days  Please list any medications you are currently taking for this condition.: All listed same  What is the primary reason for your visit?: Other         Result Review  Data Reviewed:                   Assessment and Plan      Problem List Items Addressed This Visit        Mental Health    Attention deficit hyperactivity disorder - Primary      Patient comes in today to follow-up on ADHD.  Mydayis was increased to 50 mg.  Doing well.  Wishes to continue same.  We will get an up-to-date drug screen at next visit.  Chip is clean.        Follow Up   No follow-ups on file.  Patient was given instructions and counseling regarding her condition or for health maintenance advice. Please see specific information pulled into the AVS if appropriate.

## 2022-02-09 ENCOUNTER — TELEPHONE (OUTPATIENT)
Dept: FAMILY MEDICINE CLINIC | Facility: CLINIC | Age: 42
End: 2022-02-09

## 2022-02-09 NOTE — TELEPHONE ENCOUNTER
Spoke with stephen and they state that patient Mydayis is approved and Vyvanse is not on her profile.

## 2022-02-09 NOTE — TELEPHONE ENCOUNTER
Caller: [facility] [employer]    Relationship to patient:     Best call back number: [phone]    They are needing clarification as to which medication patient is taking to receive assistance. They received mydayis and vyvanse checked on applications.     FAX: [phone] or [phone]

## 2022-02-09 NOTE — TELEPHONE ENCOUNTER
Caller: [employer]     Relationship:     Best call back number: [phone]    What medication are you requesting: VYVANSE/   Amphet-Dextroamphet 3-Bead ER (Mydayis) 50 MG capsule sustained-release 24 hr             Additional notes: [employer] [employer] CALLED STATING THEY CAN ONLY APPROVE ONE MEDICATION FROM THE ONES LISTED ABOVE BECAUSE THEY ARE BOTH A CONTROLLED SUBSTANCE. THEY STATE THEY NEED DR. CHAN TO WRITE A BRIEF LETTER OF NECESSITY IN ORDER FOR THESE MEDICATIONS TO BE APPROVED.    FAX #: [phone]  CASE ID#: [id number]

## 2022-03-01 DIAGNOSIS — I10 ESSENTIAL HYPERTENSION: ICD-10-CM

## 2022-03-02 RX ORDER — BISOPROLOL FUMARATE AND HYDROCHLOROTHIAZIDE 5; 6.25 MG/1; MG/1
TABLET ORAL
Qty: 30 TABLET | Refills: 0 | Status: SHIPPED | OUTPATIENT
Start: 2022-03-02

## 2022-03-10 DIAGNOSIS — F90.2 ATTENTION DEFICIT HYPERACTIVITY DISORDER (ADHD), COMBINED TYPE: ICD-10-CM

## 2022-03-10 RX ORDER — DEXTROAMPHETAMINE SULFATE, DEXTROAMPHETAMINE SACCHARATE, AMPHETAMINE ASPARTATE MONOHYDRATE, AND AMPHETAMINE SULFATE 12.5; 12.5; 12.5; 12.5 MG/1; MG/1; MG/1; MG/1
CAPSULE, EXTENDED RELEASE ORAL
Qty: 30 CAPSULE | Refills: 0 | Status: SHIPPED | OUTPATIENT
Start: 2022-03-10 | End: 2022-03-25

## 2022-03-14 ENCOUNTER — TELEPHONE (OUTPATIENT)
Dept: FAMILY MEDICINE CLINIC | Facility: CLINIC | Age: 42
End: 2022-03-14

## 2022-03-14 NOTE — TELEPHONE ENCOUNTER
Submitted pa via covermymeds for pt's mydayis. Received approval for 22-3/14/23. Contacted pt, verified name and . Informed of approval. Pt verna.

## 2022-03-25 DIAGNOSIS — F90.2 ATTENTION DEFICIT HYPERACTIVITY DISORDER (ADHD), COMBINED TYPE: Primary | ICD-10-CM

## 2022-03-25 NOTE — TELEPHONE ENCOUNTER
Patient called about her medication substitute. I informed her per provider we could switch her to either Adderall or Vyvanse. Patient is enrolled in [employer]. I informed her the Vyvanse is also through this and could get a new prescription and medical necessity faxed to [employer] today. She agrees and voiced understanding.

## 2022-03-25 NOTE — TELEPHONE ENCOUNTER
----- Message from Uyen José sent at 3/25/2022 11:44 AM CDT -----  Regarding: Med substitute   My pharmacy can not get Mydayis due to the national shortage from the .  I only have 1 pill left and need something called in for a substitution today please.

## 2022-04-03 DIAGNOSIS — I10 ESSENTIAL HYPERTENSION: ICD-10-CM

## 2022-04-04 RX ORDER — BISOPROLOL FUMARATE AND HYDROCHLOROTHIAZIDE 5; 6.25 MG/1; MG/1
TABLET ORAL
Qty: 30 TABLET | Refills: 0 | OUTPATIENT
Start: 2022-04-04

## 2022-04-04 RX ORDER — BISOPROLOL FUMARATE AND HYDROCHLOROTHIAZIDE 5; 6.25 MG/1; MG/1
1 TABLET ORAL DAILY
Qty: 30 TABLET | Refills: 0 | Status: SHIPPED | OUTPATIENT
Start: 2022-04-04 | End: 2022-04-05 | Stop reason: SDUPTHER

## 2022-04-05 RX ORDER — BISOPROLOL FUMARATE AND HYDROCHLOROTHIAZIDE 5; 6.25 MG/1; MG/1
1 TABLET ORAL DAILY
Qty: 30 TABLET | Refills: 0 | Status: SHIPPED | OUTPATIENT
Start: 2022-04-05 | End: 2022-04-07

## 2022-04-06 ENCOUNTER — OFFICE VISIT (OUTPATIENT)
Dept: PRIMARY CARE CLINIC | Age: 42
End: 2022-04-06
Payer: COMMERCIAL

## 2022-04-06 VITALS
HEIGHT: 66 IN | DIASTOLIC BLOOD PRESSURE: 75 MMHG | SYSTOLIC BLOOD PRESSURE: 122 MMHG | BODY MASS INDEX: 27.35 KG/M2 | RESPIRATION RATE: 18 BRPM | WEIGHT: 170.2 LBS | OXYGEN SATURATION: 99 % | HEART RATE: 103 BPM | TEMPERATURE: 96.9 F

## 2022-04-06 DIAGNOSIS — R20.2 NUMBNESS AND TINGLING: ICD-10-CM

## 2022-04-06 DIAGNOSIS — M25.541 ARTHRALGIA OF BOTH HANDS: ICD-10-CM

## 2022-04-06 DIAGNOSIS — R20.0 NUMBNESS AND TINGLING OF UPPER EXTREMITY: ICD-10-CM

## 2022-04-06 DIAGNOSIS — M25.542 ARTHRALGIA OF BOTH HANDS: ICD-10-CM

## 2022-04-06 DIAGNOSIS — R20.2 NUMBNESS AND TINGLING OF UPPER EXTREMITY: ICD-10-CM

## 2022-04-06 DIAGNOSIS — R41.89 BRAIN FOG: ICD-10-CM

## 2022-04-06 DIAGNOSIS — Z01.419 WELL FEMALE EXAM WITH ROUTINE GYNECOLOGICAL EXAM: Primary | ICD-10-CM

## 2022-04-06 DIAGNOSIS — R20.0 NUMBNESS AND TINGLING: ICD-10-CM

## 2022-04-06 DIAGNOSIS — Z12.4 ENCOUNTER FOR PAPANICOLAOU SMEAR FOR CERVICAL CANCER SCREENING: ICD-10-CM

## 2022-04-06 DIAGNOSIS — M79.10 MYALGIA: ICD-10-CM

## 2022-04-06 DIAGNOSIS — Z12.31 ENCOUNTER FOR SCREENING MAMMOGRAM FOR BREAST CANCER: ICD-10-CM

## 2022-04-06 DIAGNOSIS — E03.9 ACQUIRED HYPOTHYROIDISM: ICD-10-CM

## 2022-04-06 PROBLEM — K58.9 IRRITABLE BOWEL SYNDROME: Status: ACTIVE | Noted: 2021-02-09

## 2022-04-06 PROBLEM — F39 MOOD DISORDER (HCC): Status: ACTIVE | Noted: 2021-05-24

## 2022-04-06 PROBLEM — E66.3 OVERWEIGHT (BMI 25.0-29.9): Status: ACTIVE | Noted: 2021-11-30

## 2022-04-06 PROBLEM — G56.00 CARPAL TUNNEL SYNDROME: Status: ACTIVE | Noted: 2022-04-06

## 2022-04-06 LAB
ALBUMIN SERPL-MCNC: 4.3 G/DL (ref 3.5–5.2)
ALP BLD-CCNC: 100 U/L (ref 35–104)
ALT SERPL-CCNC: 22 U/L (ref 5–33)
ANION GAP SERPL CALCULATED.3IONS-SCNC: 11 MMOL/L (ref 7–19)
AST SERPL-CCNC: 20 U/L (ref 5–32)
BILIRUB SERPL-MCNC: 0.4 MG/DL (ref 0.2–1.2)
BUN BLDV-MCNC: 10 MG/DL (ref 6–20)
C-REACTIVE PROTEIN: 0.58 MG/DL (ref 0–0.5)
CALCIUM SERPL-MCNC: 9.2 MG/DL (ref 8.6–10)
CHLORIDE BLD-SCNC: 106 MMOL/L (ref 98–111)
CO2: 26 MMOL/L (ref 22–29)
CREAT SERPL-MCNC: 0.7 MG/DL (ref 0.5–0.9)
GFR AFRICAN AMERICAN: >59
GFR NON-AFRICAN AMERICAN: >60
GLUCOSE BLD-MCNC: 93 MG/DL (ref 74–109)
HCT VFR BLD CALC: 45.5 % (ref 37–47)
HEMOGLOBIN: 14.3 G/DL (ref 12–16)
MCH RBC QN AUTO: 28.9 PG (ref 27–31)
MCHC RBC AUTO-ENTMCNC: 31.4 G/DL (ref 33–37)
MCV RBC AUTO: 92.1 FL (ref 81–99)
PDW BLD-RTO: 13.8 % (ref 11.5–14.5)
PLATELET # BLD: 202 K/UL (ref 130–400)
PMV BLD AUTO: 13.4 FL (ref 9.4–12.3)
POTASSIUM SERPL-SCNC: 4.2 MMOL/L (ref 3.5–5)
RBC # BLD: 4.94 M/UL (ref 4.2–5.4)
RHEUMATOID FACTOR: 10 IU/ML
SEDIMENTATION RATE, ERYTHROCYTE: 8 MM/HR (ref 0–20)
SODIUM BLD-SCNC: 143 MMOL/L (ref 136–145)
TOTAL PROTEIN: 6.4 G/DL (ref 6.6–8.7)
TSH REFLEX FT4: 2.94 UIU/ML (ref 0.35–5.5)
WBC # BLD: 10.2 K/UL (ref 4.8–10.8)

## 2022-04-06 PROCEDURE — 99396 PREV VISIT EST AGE 40-64: CPT | Performed by: FAMILY MEDICINE

## 2022-04-06 PROCEDURE — 99213 OFFICE O/P EST LOW 20 MIN: CPT | Performed by: FAMILY MEDICINE

## 2022-04-06 RX ORDER — HYDROCHLOROTHIAZIDE 12.5 MG/1
12.5 CAPSULE, GELATIN COATED ORAL EVERY MORNING
COMMUNITY
Start: 2021-05-24 | End: 2022-09-29

## 2022-04-06 RX ORDER — DEXTROAMPHETAMINE SULFATE, DEXTROAMPHETAMINE SACCHARATE, AMPHETAMINE ASPARTATE MONOHYDRATE, AND AMPHETAMINE SULFATE 12.5; 12.5; 12.5; 12.5 MG/1; MG/1; MG/1; MG/1
CAPSULE, EXTENDED RELEASE ORAL
COMMUNITY
Start: 2022-03-12 | End: 2022-04-06 | Stop reason: ALTCHOICE

## 2022-04-06 RX ORDER — LISDEXAMFETAMINE DIMESYLATE 60 MG/1
CAPSULE ORAL
COMMUNITY
Start: 2022-03-25

## 2022-04-06 ASSESSMENT — PATIENT HEALTH QUESTIONNAIRE - PHQ9
5. POOR APPETITE OR OVEREATING: 0
SUM OF ALL RESPONSES TO PHQ QUESTIONS 1-9: 0
SUM OF ALL RESPONSES TO PHQ QUESTIONS 1-9: 0
3. TROUBLE FALLING OR STAYING ASLEEP: 0
9. THOUGHTS THAT YOU WOULD BE BETTER OFF DEAD, OR OF HURTING YOURSELF: 0
8. MOVING OR SPEAKING SO SLOWLY THAT OTHER PEOPLE COULD HAVE NOTICED. OR THE OPPOSITE, BEING SO FIGETY OR RESTLESS THAT YOU HAVE BEEN MOVING AROUND A LOT MORE THAN USUAL: 0
10. IF YOU CHECKED OFF ANY PROBLEMS, HOW DIFFICULT HAVE THESE PROBLEMS MADE IT FOR YOU TO DO YOUR WORK, TAKE CARE OF THINGS AT HOME, OR GET ALONG WITH OTHER PEOPLE: 0
7. TROUBLE CONCENTRATING ON THINGS, SUCH AS READING THE NEWSPAPER OR WATCHING TELEVISION: 0
SUM OF ALL RESPONSES TO PHQ QUESTIONS 1-9: 0
6. FEELING BAD ABOUT YOURSELF - OR THAT YOU ARE A FAILURE OR HAVE LET YOURSELF OR YOUR FAMILY DOWN: 0
SUM OF ALL RESPONSES TO PHQ QUESTIONS 1-9: 0
SUM OF ALL RESPONSES TO PHQ9 QUESTIONS 1 & 2: 0
1. LITTLE INTEREST OR PLEASURE IN DOING THINGS: 0
2. FEELING DOWN, DEPRESSED OR HOPELESS: 0
4. FEELING TIRED OR HAVING LITTLE ENERGY: 0

## 2022-04-06 NOTE — PROGRESS NOTES
SUBJECTIVE:   42 y.o. female for annual routine Pap and checkup. Jasmina Paula comes in today with multiple complaints. She is followed by Dr. Porsha Felder and he has her on Vyvanse 60 mg because they cannot get her regular ADD medication. She says in spite of this she feels like she has \"memory fog \"literally minutes after she says or does something she cannot remember what she did. She also says this affects her short-term and long-term memory. She says she is easily distracted and cannot stay on task. She is always tired even if she goes out shopping for any period of time she will feel very tired drained and will hurt all over. Her wrist hands and fingers will hurt and swell at times. She has some numbness in her thumb, first and second fingers of both hands. She will have cramps in her palms. She had a nerve conduction study in 2020 but they told her it was not bad enough for surgery. She feels like her symptoms have definitely worsened since then. Hands and feet are always cold. She says that she also has a dull burning like a tingle in her hands at times. Her feet will swell when she is sitting or standing for long periods of time and her legs and feet will go numb if she just sits on the toilet for 10 minutes or so. Lower back and neck are always hurting more in her back than her neck. Her legs \"jump \"at night when she is falling asleep and it feels like an electric shock. She feels like she never empties her bladder and she does dribble after wiping or when standing up. She also feels like she has dry mouth occasionally and the edges of her teeth are sensitive and she does not know why. Family history is positive for rheumatoid arthritis in both parents along with diabetic polyneuropathy and her mother also has fibromyalgia. She is a smoker half pack per day and does use alcohol occasionally. She denies any other drug use. She wonders if she could have MS.     LMP: Patient's last menstrual period was 03/22/2022. Patient Active Problem List    Diagnosis Date Noted    Carpal tunnel syndrome 04/06/2022    Overweight (BMI 25.0-29.9) 11/30/2021    Mood disorder ([address] 05/24/2021    Irritable bowel syndrome 02/09/2021    Numbness and tingling in both hands     Attention deficit hyperactivity disorder 01/31/2018    Essential hypertension 01/31/2018    Mixed anxiety and depressive disorder 01/31/2018    Enthesopathy of ankle and tarsus 05/01/2015    Acquired hypothyroidism 02/08/2013     Current Outpatient Medications   Medication Sig Dispense Refill    hydroCHLOROthiazide (MICROZIDE) 12.5 MG capsule Take 12.5 mg by mouth every morning      bisoprolol-hydroCHLOROthiazide (ZIAC) 5-6.25 MG per tablet Take 1 tablet by mouth daily 30 tablet 0    pantoprazole (PROTONIX) 40 MG tablet TAKE ONE TABLET EVERY DAY IF NEEDED FOR SEVERE FLAREUP AND TAGAMET IS NOT WORKING 30 tablet 3    levothyroxine (SYNTHROID) 100 MCG tablet TAKE 1 TABLET BY MOUTH ONCE DAILY; ALTERNATING WITH 112 MCG 15 tablet 5    levothyroxine (SYNTHROID) 112 MCG tablet TAKE 1 TABLET BY MOUTH ONCE DAILY; ALTERNATING WITH 100MCG 15 tablet 5    niacin 500 MG extended release capsule Take 500 mg by mouth nightly      VYVANSE 60 MG CAPS TAKE 1 CAPSULE BY MOUTH EVERY MORNING       No current facility-administered medications for this visit.      Past Medical History:   Diagnosis Date    Hypothyroidism     Irritable bowel syndrome      Past Surgical History:   Procedure Laterality Date    CHOLECYSTECTOMY  5-12    DILATION AND CURETTAGE OF UTERUS      GALLBLADDER SURGERY      May 2012    LAPAROSCOPY      SALPINGECTOMY Bilateral      Family History   Problem Relation Age of Onset    Diabetes Father     Cataracts Father     Colon Cancer Neg Hx     Colon Polyps Neg Hx     Liver Cancer Neg Hx     Liver Disease Neg Hx     Esophageal Cancer Neg Hx     Rectal Cancer Neg Hx     Stomach Cancer Neg Hx      Social History     Tobacco Use  Smoking status: Current Every Day Smoker     Packs/day: 0.50     Years: 17.00     Pack years: 8.50     Types: Cigarettes    Smokeless tobacco: Never Used    Tobacco comment: \"some\" desire to quit   Substance Use Topics    Alcohol use: Yes     Alcohol/week: 0.0 standard drinks     Comment: occ       Allergies: Oxycodone-acetaminophen    ROS:  Feeling well. No dyspnea or chest pain on exertion. No abdominal pain, change in bowel habits, black or bloody stools. No urinary tract symptoms. GYN ROS: none   No neurological complaints. All other systems negative. OBJECTIVE:   The patient appears well, alert, oriented x 3, in no distress. /75   Pulse 103   Temp 96.9 °F (36.1 °C)   Resp 18   Ht 5' 6\" (1.676 m)   Wt 170 lb 3.2 oz (77.2 kg)   LMP 03/22/2022   SpO2 99%   BMI 27.47 kg/m²   Skin normal, no suspicious skin lesions. ENT normal.  Neck supple. No adenopathy or thyromegaly. GOPAL. Lungs are clear, good air entry, no wheezes, rhonchi or rales. S1 and S2 normal, no murmurs, regular rate and rhythm. Abdomen soft without tenderness, guarding, mass or organomegaly. Extremities show no edema, normal peripheral pulses. Neurological is normal, no focal findings. Psychiatric exam, no signs of depression. BREAST EXAM: Breasts symmetrical. No skin lesions. Nipples appear normal without discharge. No masses or axillary lymphadenopathy. No tenderness. PELVIC EXAM: External genitalia appear normal.  Vaginal vault appears normal.  Pap smear was done. No cervical motion tenderness. I could not palpate ovaries. Uterus was not enlarged or tender. ASSESSMENT:  1. Well female exam with routine gynecological exam    2. Numbness and tingling    3. Brain fog    4. Myalgia    5. Arthralgia of both hands    6. Numbness and tingling of upper extremity    7. Acquired hypothyroidism    8.  Encounter for screening mammogram for breast cancer         PLAN:   Lifestyle advice: discussed diet and exercise    1. Well female exam with routine gynecological exam  -     Comprehensive Metabolic Panel  -     CBC  2. Numbness and tingling  -     Cyclic Citrul Peptide Antibody, IgG  -     Sedimentation Rate  -     C-Reactive Protein  -     MRI BRAIN WO CONTRAST; Future  3. Brain fog  -     MRI BRAIN WO CONTRAST; Future  4. Myalgia  -     Cyclic Citrul Peptide Antibody, IgG  -     Sedimentation Rate  -     C-Reactive Protein  -     MRI BRAIN WO CONTRAST; Future  5. Arthralgia of both hands  -     Cyclic Citrul Peptide Antibody, IgG  -     Sedimentation Rate  -     C-Reactive Protein  -     Rheumatoid Factor  -     HALEY Screen with Reflex  6. Numbness and tingling of upper extremity  -     EMG; Future  -     Nerve conduction test; Future  7. Acquired hypothyroidism  -     TSH with Reflex to FT4  8. Encounter for screening mammogram for breast cancer  -     DORA DIGITAL SCREEN W OR WO CAD BILATERAL; Future       We will do a CMP CBC with her well female exam.  She is not fasting so we will not do a lipid. Differential includes MS and we will do an MRI of her brain without contrast.  I am also going to repeat her nerve conduction study and EMG to see if her carpal tunnel syndrome has worsened. If so we will refer her to the orthopedist or neurosurgeon for further evaluation. I am going to check for lupus and rheumatoid arthritis with the labs above. We will also check a TSH since she has a history of acquired hypothyroidism. She is already on a high dose of stimulant and this is prescribed by Dr. Daria Che. She will follow up with him. Follow-up:  Return in about 1 year (around 4/6/2023) for Physical and fasting blood work. PATIENT INSTRUCTIONS:  Patient Instructions   We are committed to providing you with the best care possible. In order to help us achieve these goals please remember to bring all medications, herbal products, and over the counter supplements with you to each visit.      If your provider has ordered testing for you, please be sure to follow up with our office if you have not received results within 7 days after the testing took place. *If you receive a survey after visiting one of our offices, please take time to share your experience concerning your physician office visit. These surveys are confidential and no health information about you is shared. We are eager to improve for you and we are counting on your feedback to help make that happen. EMR Dragon/transcription disclaimer:  Much of this encounter note is electronic transcription/translation of spoken language to printed texts. The electronic translation of spoken language may be erroneous, or at times, nonsensical words or phrases may be inadvertently transcribed.   Although I have reviewed the note for such errors, some may still exist.

## 2022-04-07 ENCOUNTER — OFFICE VISIT (OUTPATIENT)
Dept: FAMILY MEDICINE CLINIC | Facility: CLINIC | Age: 42
End: 2022-04-07

## 2022-04-07 VITALS
BODY MASS INDEX: 27.45 KG/M2 | RESPIRATION RATE: 20 BRPM | HEIGHT: 66 IN | TEMPERATURE: 97.4 F | DIASTOLIC BLOOD PRESSURE: 72 MMHG | HEART RATE: 94 BPM | WEIGHT: 170.8 LBS | SYSTOLIC BLOOD PRESSURE: 102 MMHG

## 2022-04-07 DIAGNOSIS — Z51.81 MEDICATION MONITORING ENCOUNTER: ICD-10-CM

## 2022-04-07 DIAGNOSIS — F90.2 ATTENTION DEFICIT HYPERACTIVITY DISORDER (ADHD), COMBINED TYPE: Primary | ICD-10-CM

## 2022-04-07 PROCEDURE — 99213 OFFICE O/P EST LOW 20 MIN: CPT | Performed by: NURSE PRACTITIONER

## 2022-04-07 RX ORDER — BISOPROLOL FUMARATE AND HYDROCHLOROTHIAZIDE 5; 6.25 MG/1; MG/1
TABLET ORAL
Qty: 30 TABLET | Refills: 0 | Status: SHIPPED | OUTPATIENT
Start: 2022-04-07 | End: 2022-04-07 | Stop reason: SDUPTHER

## 2022-04-07 RX ORDER — BISOPROLOL FUMARATE AND HYDROCHLOROTHIAZIDE 5; 6.25 MG/1; MG/1
1 TABLET ORAL DAILY
Qty: 90 TABLET | Refills: 3 | Status: SHIPPED | OUTPATIENT
Start: 2022-04-07 | End: 2022-05-02 | Stop reason: SDUPTHER

## 2022-04-07 NOTE — PATIENT INSTRUCTIONS
Discharge Instructions - ADHD Follow Up    - You will need to return to the office as instructed for follow up, or sooner if you develop symptoms.  - Medication should be taken first thing in the morning.  - It is your responsibility to safe guard your medication. Any medication that is lost or stolen may not be renewed until your next scheduled appointment.  - If you develop any symptoms such as headache, weight loss, loss of appetite, chest pain, palpitations, or change in behavior, please contact our office immediately or go to your local emergency rooms for any emergent needs.  - Your next appointment will be a walk in visit. Dr Chavez is here Monday-Thursdays, and you will need to be signed in by 3 pm in order to guarantee your prescription is filled that day. You may be seen on Fridays or Saturdays for medication follow up as well, but your prescription will not be ready for  until the following Monday.

## 2022-04-07 NOTE — PROGRESS NOTES
"Chief Complaint  ADHD (Pt here for three month ADHD med check)    Subjective          Uyen José presents to [facility]  Pt here for three month ADHD follow up. Due to shortage on Mydayis pt is currently taking Vyvanse 60 mg daily and pt states its not working as well. Pt would like to get back on Mydayis if able.       Objective   Vital Signs:   /72   Pulse 94   Temp 97.4 °F (36.3 °C)   Resp 20   Ht 167.6 cm (66\")   Wt 77.5 kg (170 lb 12.8 oz)   BMI 27.57 kg/m²     Physical Exam  Constitutional:       Appearance: Normal appearance. She is well-developed.   HENT:      Head: Normocephalic and atraumatic.      Right Ear: Tympanic membrane, ear canal and external ear normal.      Left Ear: Tympanic membrane, ear canal and external ear normal.      Nose: Nose normal. No septal deviation, nasal tenderness or congestion.      Mouth/Throat:      Lips: Pink. No lesions.      Mouth: Mucous membranes are moist. No oral lesions.      Dentition: Normal dentition.      Pharynx: Oropharynx is clear. No pharyngeal swelling, oropharyngeal exudate or posterior oropharyngeal erythema.   Eyes:      General: Lids are normal. Vision grossly intact. No scleral icterus.        Right eye: No discharge.         Left eye: No discharge.      Extraocular Movements: Extraocular movements intact.      Conjunctiva/sclera: Conjunctivae normal.      Right eye: Right conjunctiva is not injected.      Left eye: Left conjunctiva is not injected.      Pupils: Pupils are equal, round, and reactive to light.   Neck:      Thyroid: No thyroid mass.      Trachea: Trachea normal.   Cardiovascular:      Rate and Rhythm: Normal rate and regular rhythm.      Heart sounds: Normal heart sounds. No murmur heard.    No gallop.   Pulmonary:      Effort: Pulmonary effort is normal.      Breath sounds: Normal breath sounds and air entry. No wheezing, rhonchi or rales.   Abdominal:      General: There is no " distension.      Palpations: Abdomen is soft. There is no mass.      Tenderness: There is no abdominal tenderness. There is no right CVA tenderness, left CVA tenderness, guarding or rebound.   Musculoskeletal:         General: No tenderness or deformity. Normal range of motion.      Cervical back: Full passive range of motion without pain, normal range of motion and neck supple.      Thoracic back: Normal.      Right lower leg: No edema.      Left lower leg: No edema.   Skin:     General: Skin is warm and dry.      Coloration: Skin is not jaundiced.      Findings: No rash.   Neurological:      Mental Status: She is alert and oriented to person, place, and time.      Cranial Nerves: Cranial nerves are intact.      Sensory: Sensation is intact.      Motor: Motor function is intact.      Coordination: Coordination is intact.      Gait: Gait is intact.      Deep Tendon Reflexes: Reflexes are normal and symmetric.   Psychiatric:         Mood and Affect: Mood and affect normal.         Judgment: Judgment normal.        Result Review :                Urine Drug Screen - Urine, Clean Catch (02/09/2021 07:46)    Assessment and Plan    Diagnoses and all orders for this visit:    1. Attention deficit hyperactivity disorder (ADHD), combined type (Primary)  -     Cancel: Urine Drug Screen - Urine, Clean Catch; Future  -     Urine Drug Screen - Urine, Clean Catch; Future    2. Medication monitoring encounter  -     Cancel: Urine Drug Screen - Urine, Clean Catch; Future  -     Urine Drug Screen - Urine, Clean Catch; Future    Here for adhd follow up. Reports she is not doing well since changing to vyvanse for ins purposes.   Plan:   1. Patient was temporarily changed to vyvanse 60mg with the mydayis shortage, we will change back to mydayis and f/u in 3 months when patient calls for refills the end of this month.   2. She is due for monitoring uds she may return for so that she is not late for her other apt this morning.     ADHD  Follow up:    Chip report initiated in office and pending. ADRS (Adult ADHD Assessment Form) reviewed in detail, scanned in chart, and has been reviewed at time of appointment.  All questions, including medication and side effects, were discussed in detail at time of patient's visit. Patient will restart previous medication which was discussed at today's visit. Patient is to return in 3 month(s).      Follow Up   Return in about 3 months (around 7/7/2022).  Patient was given instructions and counseling regarding her condition or for health maintenance advice. Please see specific information pulled into the AVS if appropriate.

## 2022-04-09 LAB
ANA IGG, ELISA: NORMAL
CCP IGG ANTIBODIES: 8 UNITS (ref 0–19)

## 2022-04-14 LAB
AGE GDLN ACOG TESTING: NORMAL
CLINICAL REPORT: ABNORMAL
CYTOLOGY REVIEW, GYN: ABNORMAL
DIAGNOSIS ICD CODE: ABNORMAL
DIAGNOSIS ICD CODE: ABNORMAL
HB: CYTOTECHNOLT: ABNORMAL
HB: PATHOLOGIST:: ABNORMAL
HPV 16+18+31+33+35+39+45+51+52+56+58+59+68 DNA,CERVIX,PROBE: POSITIVE
Lab: ABNORMAL
MICROSCOPIC OBSERVATION: ABNORMAL
RECOMMENDED FOLLOW UP: ABNORMAL
SPECIMEN ADEQUACY:: ABNORMAL

## 2022-04-15 ENCOUNTER — LAB (OUTPATIENT)
Dept: LAB | Facility: HOSPITAL | Age: 42
End: 2022-04-15

## 2022-04-15 DIAGNOSIS — Z51.81 MEDICATION MONITORING ENCOUNTER: ICD-10-CM

## 2022-04-15 DIAGNOSIS — F90.2 ATTENTION DEFICIT HYPERACTIVITY DISORDER (ADHD), COMBINED TYPE: ICD-10-CM

## 2022-04-15 PROCEDURE — 80306 DRUG TEST PRSMV INSTRMNT: CPT

## 2022-04-18 DIAGNOSIS — R87.612 LOW GRADE SQUAMOUS INTRAEPITHELIAL LESION ON CYTOLOGIC SMEAR OF CERVIX (LGSIL): Primary | ICD-10-CM

## 2022-04-18 DIAGNOSIS — B97.7 HIGH RISK HPV INFECTION: ICD-10-CM

## 2022-04-19 ENCOUNTER — HOSPITAL ENCOUNTER (OUTPATIENT)
Dept: MRI IMAGING | Age: 42
Discharge: HOME | End: 2022-04-19
Payer: COMMERCIAL

## 2022-04-19 ENCOUNTER — HOSPITAL ENCOUNTER (OUTPATIENT)
Dept: WOMENS IMAGING | Age: 42
Discharge: HOME | End: 2022-04-19
Payer: COMMERCIAL

## 2022-04-19 ENCOUNTER — TELEPHONE (OUTPATIENT)
Dept: FAMILY MEDICINE CLINIC | Facility: CLINIC | Age: 42
End: 2022-04-19

## 2022-04-19 ENCOUNTER — HOSPITAL ENCOUNTER (OUTPATIENT)
Dept: NEUROLOGY | Age: 42
End: 2022-04-19
Payer: COMMERCIAL

## 2022-04-19 DIAGNOSIS — R41.89 BRAIN FOG: ICD-10-CM

## 2022-04-19 DIAGNOSIS — Z12.31 ENCOUNTER FOR SCREENING MAMMOGRAM FOR BREAST CANCER: ICD-10-CM

## 2022-04-19 DIAGNOSIS — F90.9 ATTENTION DEFICIT HYPERACTIVITY DISORDER (ADHD), UNSPECIFIED ADHD TYPE: Primary | ICD-10-CM

## 2022-04-19 DIAGNOSIS — M79.10 MYALGIA: ICD-10-CM

## 2022-04-19 DIAGNOSIS — R87.612 LOW GRADE SQUAMOUS INTRAEPITHELIAL LESION ON CYTOLOGIC SMEAR OF CERVIX (LGSIL): Primary | ICD-10-CM

## 2022-04-19 DIAGNOSIS — R20.0 NUMBNESS AND TINGLING: ICD-10-CM

## 2022-04-19 DIAGNOSIS — R87.811 VAGINAL HIGH RISK HPV DNA TEST POSITIVE: ICD-10-CM

## 2022-04-19 DIAGNOSIS — F90.9 ATTENTION DEFICIT HYPERACTIVITY DISORDER (ADHD), UNSPECIFIED ADHD TYPE: ICD-10-CM

## 2022-04-19 DIAGNOSIS — R20.2 NUMBNESS AND TINGLING: ICD-10-CM

## 2022-04-19 PROCEDURE — 70551 MRI BRAIN STEM W/O DYE: CPT

## 2022-04-19 PROCEDURE — 77067 SCR MAMMO BI INCL CAD: CPT

## 2022-04-19 RX ORDER — DEXTROAMPHETAMINE SULFATE, DEXTROAMPHETAMINE SACCHARATE, AMPHETAMINE ASPARTATE MONOHYDRATE, AND AMPHETAMINE SULFATE 9.375; 9.375; 9.375; 9.375 MG/1; MG/1; MG/1; MG/1
37.5 CAPSULE, EXTENDED RELEASE ORAL DAILY
Qty: 30 CAPSULE | Refills: 0 | Status: CANCELLED | OUTPATIENT
Start: 2022-04-19

## 2022-04-19 RX ORDER — DEXTROAMPHETAMINE SULFATE, DEXTROAMPHETAMINE SACCHARATE, AMPHETAMINE ASPARTATE MONOHYDRATE, AND AMPHETAMINE SULFATE 9.375; 9.375; 9.375; 9.375 MG/1; MG/1; MG/1; MG/1
37.5 CAPSULE, EXTENDED RELEASE ORAL DAILY
Qty: 30 CAPSULE | Refills: 0 | Status: SHIPPED | OUTPATIENT
Start: 2022-05-19 | End: 2022-05-24 | Stop reason: SDUPTHER

## 2022-04-19 RX ORDER — AMOXICILLIN 875 MG/1
875 TABLET, COATED ORAL 2 TIMES DAILY
Qty: 20 TABLET | Refills: 0 | Status: SHIPPED | OUTPATIENT
Start: 2022-04-19 | End: 2022-04-29

## 2022-04-19 RX ORDER — DEXTROAMPHETAMINE SULFATE, DEXTROAMPHETAMINE SACCHARATE, AMPHETAMINE ASPARTATE MONOHYDRATE, AND AMPHETAMINE SULFATE 9.375; 9.375; 9.375; 9.375 MG/1; MG/1; MG/1; MG/1
37.5 CAPSULE, EXTENDED RELEASE ORAL DAILY
Qty: 30 CAPSULE | Refills: 0 | Status: SHIPPED | OUTPATIENT
Start: 2022-04-19 | End: 2022-04-20 | Stop reason: SDUPTHER

## 2022-04-19 NOTE — PROGRESS NOTES
Please let pt know results: UDS appropriate. Can route her mydayis refills to dr leong to approve. Please route 2 scripts for mydayis 37.5mg to dr leong, call for her 3rd.

## 2022-04-19 NOTE — TELEPHONE ENCOUNTER
Called pt, verified , Lab results reviewed. Informed pt that prescription sent to pharmacy. NATALIE

## 2022-04-19 NOTE — TELEPHONE ENCOUNTER
----- Message from BLAS Wise sent at 4/19/2022  9:47 AM CDT -----  Please let pt know results: UDS appropriate. Can route her mydayis refills to dr leong to approve. Please route 2 scripts for mydayis 37.5mg to dr leong, call for her 3rd.

## 2022-04-20 ENCOUNTER — TELEPHONE (OUTPATIENT)
Dept: FAMILY MEDICINE CLINIC | Facility: CLINIC | Age: 42
End: 2022-04-20

## 2022-04-20 DIAGNOSIS — M25.541 ARTHRALGIA OF BOTH HANDS: Primary | ICD-10-CM

## 2022-04-20 DIAGNOSIS — M25.542 ARTHRALGIA OF BOTH HANDS: Primary | ICD-10-CM

## 2022-04-20 DIAGNOSIS — F90.9 ATTENTION DEFICIT HYPERACTIVITY DISORDER (ADHD), UNSPECIFIED ADHD TYPE: ICD-10-CM

## 2022-04-20 DIAGNOSIS — R20.0 NUMBNESS AND TINGLING IN BOTH HANDS: ICD-10-CM

## 2022-04-20 DIAGNOSIS — M79.10 MYALGIA: ICD-10-CM

## 2022-04-20 DIAGNOSIS — R20.2 NUMBNESS AND TINGLING IN BOTH HANDS: ICD-10-CM

## 2022-04-20 RX ORDER — DEXTROAMPHETAMINE SULFATE, DEXTROAMPHETAMINE SACCHARATE, AMPHETAMINE ASPARTATE MONOHYDRATE, AND AMPHETAMINE SULFATE 9.375; 9.375; 9.375; 9.375 MG/1; MG/1; MG/1; MG/1
37.5 CAPSULE, EXTENDED RELEASE ORAL DAILY
Qty: 30 CAPSULE | Refills: 0 | Status: SHIPPED | OUTPATIENT
Start: 2022-04-20 | End: 2022-05-26

## 2022-04-20 NOTE — TELEPHONE ENCOUNTER
Seen 04/07/2022.  Patient requested pharmacy change.  I have called and canceled original prescription.

## 2022-04-20 NOTE — TELEPHONE ENCOUNTER
Pt. Requested that medication be sent to [facility]. Called [facility] to cancel prescriptions. Presciption sent to Dr. Chavez for approval.

## 2022-04-29 RX ORDER — LEVOTHYROXINE SODIUM 112 UG/1
TABLET ORAL
Qty: 30 TABLET | Refills: 5 | Status: SHIPPED | OUTPATIENT
Start: 2022-04-29 | End: 2022-05-02 | Stop reason: SDUPTHER

## 2022-04-29 NOTE — PROGRESS NOTES
[address] [facility]  [address] [place] [address]  [address] [place] [address]  Dept: [phone]  Dept Fax: [phone]  Loc: [phone]    Karlene De La Paz is a 42 y.o. female who presents today for her medical conditions/complaints as noted below. Karlene De La Paz is c/o of No chief complaint on file. HPI:     HPI No chief complaint on file. Past Medical History:   Diagnosis Date    Hypothyroidism     Irritable bowel syndrome       Past Surgical History:   Procedure Laterality Date    CHOLECYSTECTOMY  5-12    DILATION AND CURETTAGE OF UTERUS      GALLBLADDER SURGERY      May 2012    LAPAROSCOPY      SALPINGECTOMY Bilateral        Vitals 4/6/2022 4/14/2021 1/7/2021 1/10/2020 11/7/2018 60/82/3335   SYSTOLIC 137 436 321 [phone]   DIASTOLIC 75 82 72 78 76 68   Site - Left Upper Arm Left Upper Arm Left Upper Arm Left Upper Arm -   Position - Sitting Sitting Sitting Sitting -   Cuff Size - Large Adult Medium Adult Medium Adult Medium Adult -   Pulse 103 78 76 74 68 69   Temp 96.9 97.2 96.4 97.7 97 -   Resp 18 16 16 18 18 -   SpO2 99 100 96 98 98 97   Weight 170 lb 3.2 oz 168 lb 6.4 oz 167 lb 3.2 oz 166 lb 163 lb 184 lb 3.2 oz   Height 5' 6\" 5' 6\" - 5' 6\" 5' 6\" 5' 6\"   Body mass index 27.47 kg/m2 27.18 kg/m2 - 26.79 kg/m2 26.31 kg/m2 29.73 kg/m2   Some recent data might be hidden       Family History   Problem Relation Age of Onset    Diabetes Father     Cataracts Father     Colon Cancer Neg Hx     Colon Polyps Neg Hx     Liver Cancer Neg Hx     Liver Disease Neg Hx     Esophageal Cancer Neg Hx     Rectal Cancer Neg Hx     Stomach Cancer Neg Hx        Social History     Tobacco Use    Smoking status: Current Every Day Smoker     Packs/day: 0.50     Years: 17.00     Pack years: 8.50     Types: Cigarettes    Smokeless tobacco: Never Used    Tobacco comment: \"some\" desire to quit   Substance Use Topics    Alcohol use:  Yes     Alcohol/week: 0.0 standard drinks     Comment: occ Current Outpatient Medications on File Prior to Visit   Medication Sig Dispense Refill    amoxicillin (AMOXIL) 875 MG tablet Take 1 tablet by mouth 2 times daily for 10 days For acute on chronic sinus infection 20 tablet 0    bisoprolol-hydroCHLOROthiazide (ZIAC) 5-6.25 MG per tablet Take 1 tablet by mouth daily 90 tablet 3    VYVANSE 60 MG CAPS TAKE 1 CAPSULE BY MOUTH EVERY MORNING      hydroCHLOROthiazide (MICROZIDE) 12.5 MG capsule Take 12.5 mg by mouth every morning      pantoprazole (PROTONIX) 40 MG tablet TAKE ONE TABLET EVERY DAY IF NEEDED FOR SEVERE FLAREUP AND TAGAMET IS NOT WORKING 30 tablet 3    niacin 500 MG extended release capsule Take 500 mg by mouth nightly       No current facility-administered medications on file prior to visit. Allergies   Allergen Reactions    Oxycodone-Acetaminophen        Health Maintenance   Topic Date Due    COVID-19 Vaccine (1) 12/10/1984    Hepatitis C screen  Never done    DTaP/Tdap/Td vaccine (2 - Tdap) 07/07/2014    Pneumococcal 0-64 years Vaccine (1 - PCV) 01/06/2025 (Originally 12/10/1985)    Depression Monitoring  04/06/2023    TSH  04/06/2023    Potassium  04/06/2023    Creatinine  04/06/2023    Lipids  01/10/2025    Cervical cancer screen  04/14/2025    Flu vaccine  Completed    HIV screen  Completed    Hepatitis A vaccine  Aged Out    Hepatitis B vaccine  Aged Out    Hib vaccine  Aged Out    Meningococcal (ACWY) vaccine  Aged Out       Subjective:      Review of Systems    Objective:     Physical Exam  There were no vitals taken for this visit. Assessment:      {No diagnosis found. (Refresh or delete this SmartLink)}      Plan:   More than 50% of the time was spent counseling and coordinating care for a total time of ***min face to face. Patient given educational materials -see patient instructions. Discussed use, benefit, and side effects of prescribed medications. All patient questions answered. Pt voiced understanding. Reviewed health maintenance. Instructed to continue currentmedications, diet and exercise. Patient agreed with treatment plan. Follow up as directed. MEDICATIONS:  Orders Placed This Encounter   Medications    levothyroxine (SYNTHROID) 112 MCG tablet     Sig: TAKE 1 TABLET BY MOUTH ONCE DAILY first thing in the morning on empty stomach. Dispense:  30 tablet     Refill:  5         ORDERS:  No orders of the defined types were placed in this encounter. Follow-up:  No follow-ups on file. PATIENT INSTRUCTIONS:  There are no Patient Instructions on file for this visit. Electronically signed by Lazarus Lieu, APRN - NP on 4/29/2022 at 2:03 PM    EMR Dragon/transcription disclaimer:  Much of thisencounter note is electronic transcription/translation of spoken language to printed texts. The electronic translation of spoken language may be erroneous, or at times, nonsensical words or phrases may be inadvertentlytranscribed.   Although I have reviewed the note for such errors, some may still exist.

## 2022-05-02 RX ORDER — BISOPROLOL FUMARATE AND HYDROCHLOROTHIAZIDE 5; 6.25 MG/1; MG/1
1 TABLET ORAL DAILY
Qty: 90 TABLET | Refills: 3 | Status: SHIPPED | OUTPATIENT
Start: 2022-05-02

## 2022-05-02 RX ORDER — PANTOPRAZOLE SODIUM 40 MG/1
TABLET, DELAYED RELEASE ORAL
Qty: 90 TABLET | Refills: 3 | Status: SHIPPED | OUTPATIENT
Start: 2022-05-02

## 2022-05-02 RX ORDER — LEVOTHYROXINE SODIUM 112 UG/1
TABLET ORAL
Qty: 90 TABLET | Refills: 3 | Status: SHIPPED | OUTPATIENT
Start: 2022-05-02

## 2022-05-03 ENCOUNTER — HOSPITAL ENCOUNTER (OUTPATIENT)
Dept: NEUROLOGY | Age: 42
Discharge: HOME | End: 2022-05-03
Payer: COMMERCIAL

## 2022-05-03 DIAGNOSIS — R20.0 NUMBNESS AND TINGLING OF UPPER EXTREMITY: ICD-10-CM

## 2022-05-03 DIAGNOSIS — R20.2 NUMBNESS AND TINGLING OF UPPER EXTREMITY: ICD-10-CM

## 2022-05-03 PROCEDURE — 95911 NRV CNDJ TEST 9-10 STUDIES: CPT

## 2022-05-03 PROCEDURE — 95886 MUSC TEST DONE W/N TEST COMP: CPT

## 2022-05-03 PROCEDURE — 95911 NRV CNDJ TEST 9-10 STUDIES: CPT | Performed by: PSYCHIATRY & NEUROLOGY

## 2022-05-03 PROCEDURE — 95886 MUSC TEST DONE W/N TEST COMP: CPT | Performed by: PSYCHIATRY & NEUROLOGY

## 2022-05-03 NOTE — PROCEDURES
[facility] [address] [facility]                             ELECTROMYOGRAM REPORT    PATIENT NAME: Luiz Davis                     :        1980  MED REC NO:   [id number]                              ROOM:  ACCOUNT NO:   [de-identified]                           ADMIT DATE: 2022  PROVIDER:     Kilo Valdez MD    DATE OF EM2022    EMG/NERVE STUDY BILATERAL UPPER EXTREMITIES    INDICATION FOR TEST:  Bilateral hand numbness and pain. SUMMARY:  Nerve conduction studies of the bilateral upper extremities  showed a low-amplitude right median SNAP and slowing of the bilateral  median SNAPs, worse on the right. The remainder of the sensory and  motor studies are relatively unremarkable. Needle exam of the bilateral upper extremities is unremarkable. IMPRESSION:  Bilateral carpal tunnel syndrome, mild on the left,  moderate on the right. Correlate clinically.         Jeffery Huerta MD    D: 2022 11:18:15      T: 2022 11:22:52     [id number]  Job#: [id number]     Doc#: [id number]    CC:

## 2022-05-04 DIAGNOSIS — G56.03 BILATERAL CARPAL TUNNEL SYNDROME: Primary | ICD-10-CM

## 2022-05-11 ENCOUNTER — OFFICE VISIT (OUTPATIENT)
Dept: NEUROSURGERY | Age: 42
End: 2022-05-11
Payer: COMMERCIAL

## 2022-05-11 VITALS
OXYGEN SATURATION: 98 % | SYSTOLIC BLOOD PRESSURE: 135 MMHG | HEART RATE: 71 BPM | HEIGHT: 66 IN | WEIGHT: 171 LBS | BODY MASS INDEX: 27.48 KG/M2 | DIASTOLIC BLOOD PRESSURE: 91 MMHG

## 2022-05-11 DIAGNOSIS — G56.01 RIGHT CARPAL TUNNEL SYNDROME: Primary | ICD-10-CM

## 2022-05-11 DIAGNOSIS — G56.02 LEFT CARPAL TUNNEL SYNDROME: ICD-10-CM

## 2022-05-11 PROCEDURE — 99204 OFFICE O/P NEW MOD 45 MIN: CPT | Performed by: NEUROLOGICAL SURGERY

## 2022-05-11 RX ORDER — DEXTROAMPHETAMINE SULFATE, DEXTROAMPHETAMINE SACCHARATE, AMPHETAMINE ASPARTATE MONOHYDRATE, AND AMPHETAMINE SULFATE 9.375; 9.375; 9.375; 9.375 MG/1; MG/1; MG/1; MG/1
50 CAPSULE, EXTENDED RELEASE ORAL DAILY
COMMUNITY
Start: 2022-04-26

## 2022-05-11 NOTE — H&P
[facility] Neurosurgery  History and Physical        Chief Complaint   Patient presents with    New Patient     imaging [facility]     Carpal Tunnel       HISTORY OF PRESENT ILLNESS:      The patient is a 42 y.o. right-handed female who presents for neurosurgical evaluation of carpal tunnel syndrome. She describes pain and numbness, mostly in the right but also in the left. The numbness primarily involved the index finger and thumb but she notes that the middle and ring fingers are also involved. She states that she will frequently awaken at night with pain and numbness in her hands. Activities such as craft work and driving also worsen her symptoms. She notes these symptoms have been progressive over several years. She has tried wearing wrist splints for the last year and the symptoms have not improved. She recently underwent an EMG/NCS with Dr. Raul Andujar that revealed bilateral median nerve entrapment at the wrist, moderate severity on the right and mild on the left. MEDICAL HISTORY:             Past Medical History:   Diagnosis Date    Hypothyroidism     Irritable bowel syndrome        Past Surgical History:   Procedure Laterality Date    CHOLECYSTECTOMY  5-12    DILATION AND CURETTAGE OF UTERUS      GALLBLADDER SURGERY      May 2012    LAPAROSCOPY      SALPINGECTOMY Bilateral          Current Outpatient Medications:     MYDAYIS 37.5 MG CP24, Take 50 mg by mouth daily. , Disp: , Rfl:     pantoprazole (PROTONIX) 40 MG tablet, TAKE ONE TABLET EVERY DAY IF NEEDED FOR SEVERE FLAREUP AND TAGAMET IS NOT WORKING, Disp: 90 tablet, Rfl: 3    bisoprolol-hydroCHLOROthiazide (ZIAC) 5-6.25 MG per tablet, Take 1 tablet by mouth daily, Disp: 90 tablet, Rfl: 3    levothyroxine (SYNTHROID) 112 MCG tablet, TAKE 1 TABLET BY MOUTH ONCE DAILY first thing in the morning on empty stomach., Disp: 90 tablet, Rfl: 3    niacin 500 MG extended release capsule, Take 500 mg by mouth nightly, Disp: , Rfl:     VYVANSE 60 MG CAPS, TAKE 1 CAPSULE BY MOUTH EVERY MORNING (Patient not taking: Reported on 5/11/2022), Disp: , Rfl:     hydroCHLOROthiazide (MICROZIDE) 12.5 MG capsule, Take 12.5 mg by mouth every morning (Patient not taking: Reported on 5/11/2022), Disp: , Rfl:     Allergies:  Oxycodone-acetaminophen    Social History:   Social History     Tobacco Use   Smoking Status Current Every Day Smoker    Packs/day: 0.50    Years: 17.00    Pack years: 8.50    Types: Cigarettes   Smokeless Tobacco Never Used   Tobacco Comment    \"some\" desire to quit     Social History     Substance and Sexual Activity   Alcohol Use Yes    Alcohol/week: 0.0 standard drinks    Comment: occ         Family History:   Family History   Problem Relation Age of Onset    Diabetes Father     Cataracts Father     Colon Cancer Neg Hx     Colon Polyps Neg Hx     Liver Cancer Neg Hx     Liver Disease Neg Hx     Esophageal Cancer Neg Hx     Rectal Cancer Neg Hx     Stomach Cancer Neg Hx        REVIEW OF SYSTEMS:    Constitutional: Negative. HENT: Negative. Eyes: Negative. Respiratory: Negative. Cardiovascular: Negative. Gastrointestinal: Negative. Genitourinary: Negative. Musculoskeletal: Positive for back pain, joint pain and neck pain. Skin: Negative. Neurological: Positive for tingling. Endo/Heme/Allergies: Bruises/bleeds easily. Psychiatric/Behavioral: Negative. Review of Systems was obtained by the medical assistant and reviewed by myself. PHYSICAL EXAM:    Vitals:    05/11/22 1504   BP: (!) 135/91   Pulse: 71   SpO2: 98%       Constitutional: Appears well-developed and well-nourished.    Eyes  conjunctiva normal.  Pupils react to light  Ear, nose,throat -Normal pinna and tragus, No scars, or lesions over external nose or ears, no obvious atrophy of tongue  Neck- symmetric, trachea midline, no jugular vein distension  Respiration- chest wall symmetric, normal effort without use of accessory muscles  Musculoskeletal  no significant wasting of muscles noted, no bony deformities, symmetric bulk  Extremities- no clubbing, cyanosis or edema  Skin  warm, dry, and intact. No rash,erythema, or pallor. Psychiatric  mood, affect, and behavior appear normal.       NEUROLOGIC EXAM:    MENTAL STATUS: Alert, oriented, thought content appropriate    CRANIAL NERVES: PERRL, EOMI, symmetric facies, tongue midline    MOTOR:     Right Upper Extremity:    Deltoid: 5/5  Biceps: 5/5  Triceps: 5/5  Wrist Extension: 5/5  Finger Abduction: 5/5  APB: 5/5    Left Upper Extremity:    Deltoid: 5/5  Biceps: 5/5  Triceps: 5/5  Wrist Extension: 5/5  Finger Abduction: 5/5  APB: 5/5    Right Lower Extremity:    Hip Flexion: 5/5  Knee Extension: 5/5  Ankle Plantarflexion: 5/5  Ankle Dorsiflexion: 5/5  EHL: 5/5      Left Lower Extremity:    Hip Flexion: 5/5  Knee Extension: 5/5  Ankle Plantarflexion: 5/5  Ankle Dorsiflexion: 5/5  EHL: 5/5      SOMATOSENSORY:     Right Upper Extremity: normal light touch sensation, decreased to pinprick in the thumb, index, middle and radial 1/2 of the ring finger. Left Upper Extremity: normal pin prick sensation and normal light touch sensation  Right Lower Extremity: normal light touch sensation  Left Lower Extremity: normal light touch sensation      REFLEXES:    Biceps: 2+  Patella: 2+  Ankle Jerk: 2+    Camargo's: Negative      PROVOCATIVE TESTS:    Tinel's:  Positive at the wrist bilaterally, negative at the elbow bilaterally  Phalen's: Forced wrist flexion produces numbness in the right median distribution after ~30 seconds.       DATA:    Lab Results   Component Value Date    WBC 10.2 04/06/2022    HGB 14.3 04/06/2022    HCT 45.5 04/06/2022    MCV 92.1 04/06/2022     04/06/2022     Lab Results   Component Value Date     04/06/2022    K 4.2 04/06/2022     04/06/2022    CO2 26 04/06/2022    BUN 10 04/06/2022    CREATININE 0.7 04/06/2022    GLUCOSE 93 04/06/2022    CALCIUM 9.2 04/06/2022    PROT 6.4 (L) 04/06/2022    LABALBU 4.3 04/06/2022    BILITOT 0.4 04/06/2022    ALKPHOS 100 04/06/2022    AST 20 04/06/2022    ALT 22 04/06/2022    LABGLOM >60 04/06/2022    GFRAA >59 04/06/2022    AGRATIO 1.3 09/21/2016    GLOB 3.1 09/21/2016         ASSESSMENT AND PLAN:  This is a 42 y.o. female who presents for neurosurgical evaluation of bilateral carpal tunnel syndrome, R>L. Her symptoms are fairly classic with pain and numbness that is worsened at night and with activity. Her symptoms match the distribution of the median nerve. She has undergone an EMG/NCS that is consistent with moderate right and mild left carpal tunnel syndrome. Her symptoms are quite bothersome to her, she has failed conservative treatment and she is requesting surgery. The carpal tunnel release procedure was explained to the patient in detail, including alternatives to surgery and risks of nerve injury, bleeding, infection and poor wound healing. All questions were answered to the patient's stated satisfaction and they requested that we proceed with surgery.               Maylin Mcduffie MD

## 2022-05-11 NOTE — PROGRESS NOTES
[facility] Neurosurgery  Office Visit        Chief Complaint   Patient presents with    New Patient     imaging [facility]     Carpal Tunnel       HISTORY OF PRESENT ILLNESS:      The patient is a 42 y.o. right-handed female who presents for neurosurgical evaluation of carpal tunnel syndrome. She describes pain and numbness, mostly in the right but also in the left. The numbness primarily involved the index finger and thumb but she notes that the middle and ring fingers are also involved. She states that she will frequently awaken at night with pain and numbness in her hands. Activities such as craft work and driving also worsen her symptoms. She notes these symptoms have been progressive over several years. She has tried wearing wrist splints for the last year and the symptoms have not improved. She recently underwent an EMG/NCS with Dr. Patience Smith that revealed bilateral median nerve entrapment at the wrist, moderate severity on the right and mild on the left. MEDICAL HISTORY:             Past Medical History:   Diagnosis Date    Hypothyroidism     Irritable bowel syndrome        Past Surgical History:   Procedure Laterality Date    CHOLECYSTECTOMY  5-12    DILATION AND CURETTAGE OF UTERUS      GALLBLADDER SURGERY      May 2012    LAPAROSCOPY      SALPINGECTOMY Bilateral          Current Outpatient Medications:     MYDAYIS 37.5 MG CP24, Take 50 mg by mouth daily. , Disp: , Rfl:     pantoprazole (PROTONIX) 40 MG tablet, TAKE ONE TABLET EVERY DAY IF NEEDED FOR SEVERE FLAREUP AND TAGAMET IS NOT WORKING, Disp: 90 tablet, Rfl: 3    bisoprolol-hydroCHLOROthiazide (ZIAC) 5-6.25 MG per tablet, Take 1 tablet by mouth daily, Disp: 90 tablet, Rfl: 3    levothyroxine (SYNTHROID) 112 MCG tablet, TAKE 1 TABLET BY MOUTH ONCE DAILY first thing in the morning on empty stomach., Disp: 90 tablet, Rfl: 3    niacin 500 MG extended release capsule, Take 500 mg by mouth nightly, Disp: , Rfl:     VYVANSE 60 MG CAPS, TAKE 1 CAPSULE BY MOUTH EVERY MORNING (Patient not taking: Reported on 5/11/2022), Disp: , Rfl:     hydroCHLOROthiazide (MICROZIDE) 12.5 MG capsule, Take 12.5 mg by mouth every morning (Patient not taking: Reported on 5/11/2022), Disp: , Rfl:     Allergies:  Oxycodone-acetaminophen    Social History:   Social History     Tobacco Use   Smoking Status Current Every Day Smoker    Packs/day: 0.50    Years: 17.00    Pack years: 8.50    Types: Cigarettes   Smokeless Tobacco Never Used   Tobacco Comment    \"some\" desire to quit     Social History     Substance and Sexual Activity   Alcohol Use Yes    Alcohol/week: 0.0 standard drinks    Comment: occ         Family History:   Family History   Problem Relation Age of Onset    Diabetes Father     Cataracts Father     Colon Cancer Neg Hx     Colon Polyps Neg Hx     Liver Cancer Neg Hx     Liver Disease Neg Hx     Esophageal Cancer Neg Hx     Rectal Cancer Neg Hx     Stomach Cancer Neg Hx        REVIEW OF SYSTEMS:    Constitutional: Negative. HENT: Negative. Eyes: Negative. Respiratory: Negative. Cardiovascular: Negative. Gastrointestinal: Negative. Genitourinary: Negative. Musculoskeletal: Positive for back pain, joint pain and neck pain. Skin: Negative. Neurological: Positive for tingling. Endo/Heme/Allergies: Bruises/bleeds easily. Psychiatric/Behavioral: Negative. Review of Systems was obtained by the medical assistant and reviewed by myself. PHYSICAL EXAM:    Vitals:    05/11/22 1504   BP: (!) 135/91   Pulse: 71   SpO2: 98%       Constitutional: Appears well-developed and well-nourished.    Eyes  conjunctiva normal.  Pupils react to light  Ear, nose,throat -Normal pinna and tragus, No scars, or lesions over external nose or ears, no obvious atrophy of tongue  Neck- symmetric, trachea midline, no jugular vein distension  Respiration- chest wall symmetric, normal effort without use of accessory muscles  Musculoskeletal  no significant wasting of muscles noted, no bony deformities, symmetric bulk  Extremities- no clubbing, cyanosis or edema  Skin  warm, dry, and intact. No rash,erythema, or pallor. Psychiatric  mood, affect, and behavior appear normal.       NEUROLOGIC EXAM:    MENTAL STATUS: Alert, oriented, thought content appropriate    CRANIAL NERVES: PERRL, EOMI, symmetric facies, tongue midline    MOTOR:     Right Upper Extremity:    Deltoid: 5/5  Biceps: 5/5  Triceps: 5/5  Wrist Extension: 5/5  Finger Abduction: 5/5  APB: 5/5    Left Upper Extremity:    Deltoid: 5/5  Biceps: 5/5  Triceps: 5/5  Wrist Extension: 5/5  Finger Abduction: 5/5  APB: 5/5    Right Lower Extremity:    Hip Flexion: 5/5  Knee Extension: 5/5  Ankle Plantarflexion: 5/5  Ankle Dorsiflexion: 5/5  EHL: 5/5      Left Lower Extremity:    Hip Flexion: 5/5  Knee Extension: 5/5  Ankle Plantarflexion: 5/5  Ankle Dorsiflexion: 5/5  EHL: 5/5      SOMATOSENSORY:     Right Upper Extremity: normal light touch sensation, decreased to pinprick in the thumb, index, middle and radial 1/2 of the ring finger. Left Upper Extremity: normal pin prick sensation and normal light touch sensation  Right Lower Extremity: normal light touch sensation  Left Lower Extremity: normal light touch sensation      REFLEXES:    Biceps: 2+  Patella: 2+  Ankle Jerk: 2+    Camargo's: Negative      PROVOCATIVE TESTS:    Tinel's:  Positive at the wrist bilaterally, negative at the elbow bilaterally  Phalen's: Forced wrist flexion produces numbness in the right median distribution after ~30 seconds.       DATA:    Lab Results   Component Value Date    WBC 10.2 04/06/2022    HGB 14.3 04/06/2022    HCT 45.5 04/06/2022    MCV 92.1 04/06/2022     04/06/2022     Lab Results   Component Value Date     04/06/2022    K 4.2 04/06/2022     04/06/2022    CO2 26 04/06/2022    BUN 10 04/06/2022    CREATININE 0.7 04/06/2022    GLUCOSE 93 04/06/2022    CALCIUM 9.2 04/06/2022    PROT 6.4 (L) 04/06/2022    LABALBU 4.3 04/06/2022    BILITOT 0.4 04/06/2022    ALKPHOS 100 04/06/2022    AST 20 04/06/2022    ALT 22 04/06/2022    LABGLOM >60 04/06/2022    GFRAA >59 04/06/2022    AGRATIO 1.3 09/21/2016    GLOB 3.1 09/21/2016         ASSESSMENT AND PLAN:  This is a 42 y.o. female who presents for neurosurgical evaluation of bilateral carpal tunnel syndrome, R>L. Her symptoms are fairly classic with pain and numbness that is worsened at night and with activity. Her symptoms match the distribution of the median nerve. She has undergone an EMG/NCS that is consistent with moderate right and mild left carpal tunnel syndrome. Her symptoms are quite bothersome to her, she has failed conservative treatment and she is requesting surgery. The carpal tunnel release procedure was explained to the patient in detail, including alternatives to surgery and risks of nerve injury, bleeding, infection and poor wound healing. All questions were answered to the patient's stated satisfaction and they requested that we proceed with surgery.               Alyse Linn MD

## 2022-05-24 DIAGNOSIS — F90.2 ATTENTION DEFICIT HYPERACTIVITY DISORDER (ADHD), COMBINED TYPE: ICD-10-CM

## 2022-05-24 DIAGNOSIS — F90.9 ATTENTION DEFICIT HYPERACTIVITY DISORDER (ADHD), UNSPECIFIED ADHD TYPE: ICD-10-CM

## 2022-05-24 RX ORDER — DEXTROAMPHETAMINE SULFATE, DEXTROAMPHETAMINE SACCHARATE, AMPHETAMINE ASPARTATE MONOHYDRATE, AND AMPHETAMINE SULFATE 9.375; 9.375; 9.375; 9.375 MG/1; MG/1; MG/1; MG/1
37.5 CAPSULE, EXTENDED RELEASE ORAL DAILY
Qty: 30 CAPSULE | Refills: 0 | Status: SHIPPED | OUTPATIENT
Start: 2022-05-24 | End: 2022-06-01

## 2022-05-24 NOTE — TELEPHONE ENCOUNTER
Canceled Mydayis script at [facility] and resent to Dr. Chavez to approve to go to [facility] pharmacy as pt requested.

## 2022-05-24 NOTE — TELEPHONE ENCOUNTER
----- Message from Uyen José sent at 5/24/2022  9:16 AM CDT -----  Regarding: [facility] can get Mydayis 50mg in stock now. Can you send over my refill please

## 2022-05-26 ENCOUNTER — OFFICE VISIT (OUTPATIENT)
Dept: OBSTETRICS AND GYNECOLOGY | Facility: CLINIC | Age: 42
End: 2022-05-26

## 2022-05-26 VITALS
DIASTOLIC BLOOD PRESSURE: 78 MMHG | HEIGHT: 66 IN | WEIGHT: 167 LBS | BODY MASS INDEX: 26.84 KG/M2 | SYSTOLIC BLOOD PRESSURE: 104 MMHG

## 2022-05-26 DIAGNOSIS — R87.612 LGSIL ON PAP SMEAR OF CERVIX: Primary | ICD-10-CM

## 2022-05-26 LAB
B-HCG UR QL: NEGATIVE
EXPIRATION DATE: NORMAL
INTERNAL NEGATIVE CONTROL: NORMAL
INTERNAL POSITIVE CONTROL: NORMAL
Lab: NORMAL

## 2022-05-26 PROCEDURE — 81025 URINE PREGNANCY TEST: CPT | Performed by: OBSTETRICS & GYNECOLOGY

## 2022-05-26 PROCEDURE — 57456 ENDOCERV CURETTAGE W/SCOPE: CPT | Performed by: OBSTETRICS & GYNECOLOGY

## 2022-05-26 PROCEDURE — 88305 TISSUE EXAM BY PATHOLOGIST: CPT | Performed by: OBSTETRICS & GYNECOLOGY

## 2022-05-26 NOTE — PROGRESS NOTES
"Uyen José is a 41 y.o. female  here today for colposcopy.  Her most recent Pap smear was read as LGSIL + HR HPV.  She has had a tubal libation for contraception     /78   Ht 167.6 cm (66\")   Wt 75.8 kg (167 lb)   Breastfeeding No   BMI 26.95 kg/m²      A urine pregnancy test in the office today was negative.    Colposcopy was performed in the office today.  She was placed in the lithotomy position on the exam table.  A speculum was inserted and the cervix well visualized.  The cervix was cleansed with acetic acid swabs.  Inspection with the colposcope showed the transformation zone on the ectocervix.  It was seen in its entirety.  There were no acetowhite lesions noted.  Colposcopy was satisfactory. The cervix was anesthetized with Hurricaine spray. An endocervical curettage was performed.    Colposcopic impression: benign    We will notify her when the pathology report is available to discuss further care.  We have discussed post colposcopy instructions. She will return for a TVUS to discuss management for her periods     "

## 2022-05-27 LAB
CYTO UR: NORMAL
LAB AP CASE REPORT: NORMAL
LAB AP CLINICAL INFORMATION: NORMAL
Lab: NORMAL
PATH REPORT.FINAL DX SPEC: NORMAL
PATH REPORT.GROSS SPEC: NORMAL

## 2022-05-31 ENCOUNTER — PATIENT MESSAGE (OUTPATIENT)
Dept: NEUROSURGERY | Age: 42
End: 2022-05-31

## 2022-05-31 NOTE — TELEPHONE ENCOUNTER
From: Ede Rebolledo  To: Dr. 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 Well now I have ran into a snag that my employer, [employer] is requiring me to be cleared thru [employer] b4 returning. They are requiring an Operative Report, Follow up Visit notes and a Return to Work note. My problem is that I don't come to you for my follow up until 2 week after surgery. Would you be able to release me after 1 week even if it is restricted? And be able to provide those 3 things?

## 2022-06-01 ENCOUNTER — ANESTHESIA EVENT (OUTPATIENT)
Dept: OPERATING ROOM | Age: 42
End: 2022-06-01

## 2022-06-01 ENCOUNTER — TELEPHONE (OUTPATIENT)
Dept: FAMILY MEDICINE CLINIC | Facility: CLINIC | Age: 42
End: 2022-06-01

## 2022-06-01 NOTE — TELEPHONE ENCOUNTER
Mychart message received from patient stating that her Mydayis is 50 mg not 37.5 mg. Patient was seen 4/7/22. Corrected medication list per patient report. Mychart message replied to patient informing it has been corrected.

## 2022-06-02 NOTE — TELEPHONE ENCOUNTER
Called patient and scheduled her for 6/16/2022 @ 1pm to complete necessary paperwork. Patient thanked me and voiced understanding.

## 2022-06-10 ENCOUNTER — ANESTHESIA (OUTPATIENT)
Dept: OPERATING ROOM | Age: 42
End: 2022-06-10

## 2022-06-10 ENCOUNTER — TELEPHONE (OUTPATIENT)
Dept: NEUROSURGERY | Age: 42
End: 2022-06-10

## 2022-06-10 NOTE — TELEPHONE ENCOUNTER
Per Dr Fadia Portillo, this patient's surgery that was originally scheduled for 6/10/22 needs to be moved due to an unforeseen emergency. I called and s/w the patient, I advised that due to an emergency that has come up Dr Fadia Portillo would not be able to perform surgery on the originally scheduled date of 6/10/22. I stated that I would reach out to the [facility] and attempt to get her procedure rescheduled for next week but stated that is not his normal surgery day/time at the [facility] and I would have to see what they have available and then give a call back. Patient voiced understanding. I called the [facility] and s/w Lisandra Coffman, advised that I needed to see about getting this patient re-scheduled for surgery and asked if it would be possible for Dr Fadia Portillo to get an OR spot next week. Lavinia Ocasio stated she could add his case to next Thursday 6/16/22 @ 7am if Dr Fadia Portillo could be available. Dr Fadia Portillo agreed to that date and time and stated it was ok to go ahead and schedule the patient. I advised the same to Lavinia Ocasio and she voiced understanding. I notified Amy Leon in surgery scheduling that Lisandra Coffman ok'd for this patient to be added on for next Thursday 6/16/22 @ 7am with a 6am arrival time. Sandra Wick voiced understanding and scheduled the procedure. I then called and s/w the patient and gave her the details of being able to get her surgery scheduled for Thursday 6/16/22 @ 7am with a 6am arrival time if she was able to make that work with her own schedule. The patient voiced understanding of the new surgery date/time/location and is agreeable to this change. I stated she is welcome to call me back if she has any questions prior to scheduled procedure. Patient thanked me and voiced understanding.

## 2022-06-16 ENCOUNTER — HOSPITAL ENCOUNTER (OUTPATIENT)
Age: 42
Setting detail: OUTPATIENT SURGERY
Discharge: HOME | End: 2022-06-16
Attending: NEUROLOGICAL SURGERY | Admitting: NEUROLOGICAL SURGERY
Payer: COMMERCIAL

## 2022-06-16 VITALS
BODY MASS INDEX: 27.32 KG/M2 | SYSTOLIC BLOOD PRESSURE: 107 MMHG | WEIGHT: 170 LBS | HEART RATE: 62 BPM | OXYGEN SATURATION: 100 % | HEIGHT: 66 IN | RESPIRATION RATE: 18 BRPM | DIASTOLIC BLOOD PRESSURE: 74 MMHG | TEMPERATURE: 96.5 F

## 2022-06-16 DIAGNOSIS — G56.01 RIGHT CARPAL TUNNEL SYNDROME: Primary | ICD-10-CM

## 2022-06-16 DIAGNOSIS — G56.02 LEFT CARPAL TUNNEL SYNDROME: ICD-10-CM

## 2022-06-16 PROCEDURE — 64721 CARPAL TUNNEL SURGERY: CPT | Performed by: NEUROLOGICAL SURGERY

## 2022-06-16 PROCEDURE — 64721 CARPAL TUNNEL SURGERY: CPT

## 2022-06-16 RX ORDER — MIDAZOLAM HYDROCHLORIDE 1 MG/ML
INJECTION INTRAMUSCULAR; INTRAVENOUS PRN
Status: DISCONTINUED | OUTPATIENT
Start: 2022-06-16 | End: 2022-06-16 | Stop reason: SDUPTHER

## 2022-06-16 RX ORDER — SODIUM CHLORIDE 0.9 % (FLUSH) 0.9 %
5-40 SYRINGE (ML) INJECTION PRN
Status: CANCELLED | OUTPATIENT
Start: 2022-06-16

## 2022-06-16 RX ORDER — LIDOCAINE HYDROCHLORIDE 10 MG/ML
INJECTION, SOLUTION EPIDURAL; INFILTRATION; INTRACAUDAL; PERINEURAL PRN
Status: DISCONTINUED | OUTPATIENT
Start: 2022-06-16 | End: 2022-06-16 | Stop reason: SDUPTHER

## 2022-06-16 RX ORDER — MEPERIDINE HYDROCHLORIDE 25 MG/ML
12.5 INJECTION INTRAMUSCULAR; INTRAVENOUS; SUBCUTANEOUS EVERY 5 MIN PRN
Status: CANCELLED | OUTPATIENT
Start: 2022-06-16

## 2022-06-16 RX ORDER — HYDROCODONE BITARTRATE AND ACETAMINOPHEN 5; 325 MG/1; MG/1
1-2 TABLET ORAL EVERY 6 HOURS PRN
Qty: 24 TABLET | Refills: 0 | Status: SHIPPED | OUTPATIENT
Start: 2022-06-16 | End: 2022-06-16 | Stop reason: HOSPADM

## 2022-06-16 RX ORDER — DEXAMETHASONE SODIUM PHOSPHATE 4 MG/ML
INJECTION, SOLUTION INTRA-ARTICULAR; INTRALESIONAL; INTRAMUSCULAR; INTRAVENOUS; SOFT TISSUE PRN
Status: DISCONTINUED | OUTPATIENT
Start: 2022-06-16 | End: 2022-06-16 | Stop reason: SDUPTHER

## 2022-06-16 RX ORDER — BUPIVACAINE HYDROCHLORIDE AND EPINEPHRINE 2.5; 5 MG/ML; UG/ML
INJECTION, SOLUTION INFILTRATION; PERINEURAL PRN
Status: DISCONTINUED | OUTPATIENT
Start: 2022-06-16 | End: 2022-06-16 | Stop reason: ALTCHOICE

## 2022-06-16 RX ORDER — SODIUM CHLORIDE 0.9 % (FLUSH) 0.9 %
5-40 SYRINGE (ML) INJECTION EVERY 12 HOURS SCHEDULED
Status: CANCELLED | OUTPATIENT
Start: 2022-06-16

## 2022-06-16 RX ORDER — EPHEDRINE SULFATE 50 MG/ML
INJECTION, SOLUTION INTRAVENOUS PRN
Status: DISCONTINUED | OUTPATIENT
Start: 2022-06-16 | End: 2022-06-16 | Stop reason: SDUPTHER

## 2022-06-16 RX ORDER — SODIUM CHLORIDE, SODIUM LACTATE, POTASSIUM CHLORIDE, CALCIUM CHLORIDE 600; 310; 30; 20 MG/100ML; MG/100ML; MG/100ML; MG/100ML
INJECTION, SOLUTION INTRAVENOUS CONTINUOUS
Status: DISCONTINUED | OUTPATIENT
Start: 2022-06-16 | End: 2022-06-16 | Stop reason: HOSPADM

## 2022-06-16 RX ORDER — DIPHENHYDRAMINE HYDROCHLORIDE 50 MG/ML
12.5 INJECTION INTRAMUSCULAR; INTRAVENOUS
Status: CANCELLED | OUTPATIENT
Start: 2022-06-16 | End: 2022-06-16

## 2022-06-16 RX ORDER — ONDANSETRON 2 MG/ML
INJECTION INTRAMUSCULAR; INTRAVENOUS PRN
Status: DISCONTINUED | OUTPATIENT
Start: 2022-06-16 | End: 2022-06-16 | Stop reason: SDUPTHER

## 2022-06-16 RX ORDER — FENTANYL CITRATE 50 UG/ML
25 INJECTION, SOLUTION INTRAMUSCULAR; INTRAVENOUS EVERY 5 MIN PRN
Status: CANCELLED | OUTPATIENT
Start: 2022-06-16

## 2022-06-16 RX ORDER — PROPOFOL 10 MG/ML
INJECTION, EMULSION INTRAVENOUS PRN
Status: DISCONTINUED | OUTPATIENT
Start: 2022-06-16 | End: 2022-06-16 | Stop reason: SDUPTHER

## 2022-06-16 RX ORDER — FENTANYL CITRATE 50 UG/ML
INJECTION, SOLUTION INTRAMUSCULAR; INTRAVENOUS PRN
Status: DISCONTINUED | OUTPATIENT
Start: 2022-06-16 | End: 2022-06-16 | Stop reason: SDUPTHER

## 2022-06-16 RX ORDER — TRAMADOL HYDROCHLORIDE 50 MG/1
50 TABLET ORAL EVERY 6 HOURS PRN
Qty: 12 TABLET | Refills: 0 | Status: SHIPPED | OUTPATIENT
Start: 2022-06-16 | End: 2022-06-19

## 2022-06-16 RX ORDER — SODIUM CHLORIDE 9 MG/ML
INJECTION, SOLUTION INTRAVENOUS PRN
Status: CANCELLED | OUTPATIENT
Start: 2022-06-16

## 2022-06-16 RX ORDER — LIDOCAINE HYDROCHLORIDE 10 MG/ML
1 INJECTION, SOLUTION EPIDURAL; INFILTRATION; INTRACAUDAL; PERINEURAL
Status: DISCONTINUED | OUTPATIENT
Start: 2022-06-16 | End: 2022-06-16 | Stop reason: HOSPADM

## 2022-06-16 RX ORDER — ONDANSETRON 2 MG/ML
4 INJECTION INTRAMUSCULAR; INTRAVENOUS
Status: CANCELLED | OUTPATIENT
Start: 2022-06-16 | End: 2022-06-16

## 2022-06-16 RX ADMIN — ONDANSETRON 4 MG: 2 INJECTION INTRAMUSCULAR; INTRAVENOUS at 07:25

## 2022-06-16 RX ADMIN — EPHEDRINE SULFATE 10 MG: 50 INJECTION, SOLUTION INTRAVENOUS at 07:38

## 2022-06-16 RX ADMIN — FENTANYL CITRATE 50 MCG: 50 INJECTION, SOLUTION INTRAMUSCULAR; INTRAVENOUS at 07:32

## 2022-06-16 RX ADMIN — MIDAZOLAM HYDROCHLORIDE 2 MG: 1 INJECTION INTRAMUSCULAR; INTRAVENOUS at 07:14

## 2022-06-16 RX ADMIN — SODIUM CHLORIDE, SODIUM LACTATE, POTASSIUM CHLORIDE, CALCIUM CHLORIDE: 600; 310; 30; 20 INJECTION, SOLUTION INTRAVENOUS at 06:51

## 2022-06-16 RX ADMIN — PROPOFOL 200 MG: 10 INJECTION, EMULSION INTRAVENOUS at 07:16

## 2022-06-16 RX ADMIN — DEXAMETHASONE SODIUM PHOSPHATE 4 MG: 4 INJECTION, SOLUTION INTRA-ARTICULAR; INTRALESIONAL; INTRAMUSCULAR; INTRAVENOUS; SOFT TISSUE at 07:25

## 2022-06-16 RX ADMIN — LIDOCAINE HYDROCHLORIDE 30 MG: 10 INJECTION, SOLUTION EPIDURAL; INFILTRATION; INTRACAUDAL; PERINEURAL at 07:16

## 2022-06-16 ASSESSMENT — PAIN SCALES - WONG BAKER
WONGBAKER_NUMERICALRESPONSE: 0

## 2022-06-16 ASSESSMENT — PAIN DESCRIPTION - PAIN TYPE: TYPE: SURGICAL PAIN

## 2022-06-16 ASSESSMENT — PAIN - FUNCTIONAL ASSESSMENT: PAIN_FUNCTIONAL_ASSESSMENT: NONE - DENIES PAIN

## 2022-06-16 ASSESSMENT — LIFESTYLE VARIABLES: SMOKING_STATUS: 1

## 2022-06-16 ASSESSMENT — PAIN DESCRIPTION - DESCRIPTORS
DESCRIPTORS: NUMBNESS
DESCRIPTORS: NUMBNESS

## 2022-06-16 ASSESSMENT — PAIN DESCRIPTION - LOCATION
LOCATION: HAND;FINGER (COMMENT WHICH ONE)
LOCATION: FINGER (COMMENT WHICH ONE);HAND

## 2022-06-16 ASSESSMENT — PAIN DESCRIPTION - ORIENTATION: ORIENTATION: RIGHT

## 2022-06-16 NOTE — OP NOTE
NEUROSURGICAL OPERATIVE REPORT    DATE OF PROCEDURE:  6/16/2022    ATTENDING SURGEON:  Angel Abarca MD, PhD    PREOPERATIVE DIAGNOSIS:  Right carpal tunnel syndrome. POSTOPERATIVE DIAGNOSIS:  Right carpal tunnel syndrome. PROCEDURE PERFORMED:  Right open carpal tunnel release. INDICATIONS:  Symptomatic right carpal tunnel syndrome. ESTIMATED BLOOD LOSS:   <10 mL    PROCEDURE IN DETAIL:  The patient was identified in the preoperative area, consent for surgery was confirmed, and the site for surgery was marked. The patient was transferred to the  operating room by the Anesthesia team where general anesthesia was begun through an LMA. The right hand and arm was then prepped and draped in the usual aseptic fashion. A team pause was held according to the preformatted script, all were in agreement and the decision was made to proceed with surgery. The incision was planned from the distal palmar crease to the top of the thenar eminence in line with the median border of the ring finger on the right hand. This incision was infiltrated with local anesthetic. The incision was opened sharply with a skin knife and carried down into the palmar fat, self-retaining retractors were placed and the palmaris brevis was divided sharply until we were able to identify the transverse carpal ligament. A 15-blade was then used to divide the transverse carpal ligament in the distal portion. A Penfield dissector was then placed below the ligament to protect the median nerve and the remainder of the ligament was opened proximally with Metzenbaum scissors. Once the ligament had been completely divided, we inspected the nerve and noted that it was well-decompressed and there were no palpable compression proximally or distally. The nerve was noted to be intact. The  self-retaining retractors were removed and bleeding from the palmaris brevis was controlled with bipolar cautery.   Once we had hemostasis, we irrigated the wound, confirmed hemostasis, turned attention to closure. The palmaris was reapproximated with several inverted interrupted 3-0 Vicryl sutures and the skin was then closed with interrupted horizontal mattress 3-0 Nylon. The wound was then cleaned and dried. A xeroform was then used to cover the sutures and the had was dressed with fluffs, Kerlex and an Ace wrap. Once the site was dressed, the patient was allowed to emerge from anesthesia and was transferred to recovery. All sponge, needle and instrument counts were correct at the end of the procedure and there were no apparent complications.

## 2022-06-16 NOTE — PROGRESS NOTES
Outer Ace wrap loosened slightly, due to pt complaints of it being tight around her fingers and thumb. Inner dressings untouched. Pt states it helped with the numbness also.

## 2022-06-16 NOTE — ANESTHESIA POSTPROCEDURE EVALUATION
Department of Anesthesiology  Postprocedure Note    Patient: Vianney Galeano  MRN: [id number]  YOB: 1980  Date of evaluation: 6/16/2022  Time:  8:06 AM     Procedure Summary     Date: 06/16/22 Room / Location: [address]    Anesthesia Start: 7281 Anesthesia Stop: 0802    Procedure: RIGHT CARPAL TUNNEL RELEASE (Right ) Diagnosis:       Wrist joint inflamed      (RIGHT WRIST PAIN)    Surgeons: Gabriel Ocasio MD Responsible Provider: ATILIO Quinonez CRNA    Anesthesia Type: general ASA Status: 2          Anesthesia Type: No value filed. Roland Phase I: Roland Score: 7    Roland Phase II:      Last vitals: Reviewed and per EMR flowsheets.        Anesthesia Post Evaluation    Patient location during evaluation: PACU  Patient participation: complete - patient participated  Level of consciousness: awake  Pain score: 0  Airway patency: patent  Nausea & Vomiting: no nausea and no vomiting  Complications: no  Cardiovascular status: hemodynamically stable  Respiratory status: acceptable, room air and spontaneous ventilation  Hydration status: euvolemic  Comments: Temp 97.6F

## 2022-06-16 NOTE — H&P
[facility] Neurosurgery  History and Physical        Chief Complaint   Patient presents with    New Patient     [facility]     Carpal Tunnel       HISTORY OF PRESENT ILLNESS:      The patient is a 42 y.o. right-handed female who presents for neurosurgical evaluation of carpal tunnel syndrome. She describes pain and numbness, mostly in the right but also in the left. The numbness primarily involved the index finger and thumb but she notes that the middle and ring fingers are also involved. She states that she will frequently awaken at night with pain and numbness in her hands. Activities such as craft work and driving also worsen her symptoms. She notes these symptoms have been progressive over several years. She has tried wearing wrist splints for the last year and the symptoms have not improved. She recently underwent an EMG/NCS with Dr. Rj Rao that revealed bilateral median nerve entrapment at the wrist, moderate severity on the right and mild on the left. MEDICAL HISTORY:             Past Medical History:   Diagnosis Date    Hypothyroidism     Irritable bowel syndrome        Past Surgical History:   Procedure Laterality Date    CHOLECYSTECTOMY  5-12    DILATION AND CURETTAGE OF UTERUS      GALLBLADDER SURGERY      May 2012    LAPAROSCOPY      SALPINGECTOMY Bilateral          Current Outpatient Medications:     MYDAYIS 37.5 MG CP24, Take 50 mg by mouth daily. , Disp: , Rfl:     pantoprazole (PROTONIX) 40 MG tablet, TAKE ONE TABLET EVERY DAY IF NEEDED FOR SEVERE FLAREUP AND TAGAMET IS NOT WORKING, Disp: 90 tablet, Rfl: 3    bisoprolol-hydroCHLOROthiazide (ZIAC) 5-6.25 MG per tablet, Take 1 tablet by mouth daily, Disp: 90 tablet, Rfl: 3    levothyroxine (SYNTHROID) 112 MCG tablet, TAKE 1 TABLET BY MOUTH ONCE DAILY first thing in the morning on empty stomach., Disp: 90 tablet, Rfl: 3    niacin 500 MG extended release capsule, Take 500 mg by mouth nightly, Disp: , Rfl:     VYVANSE 60 MG CAPS, TAKE 1 CAPSULE BY MOUTH EVERY MORNING (Patient not taking: Reported on 5/11/2022), Disp: , Rfl:     hydroCHLOROthiazide (MICROZIDE) 12.5 MG capsule, Take 12.5 mg by mouth every morning (Patient not taking: Reported on 5/11/2022), Disp: , Rfl:     Allergies:  Oxycodone-acetaminophen    Social History:   Social History     Tobacco Use   Smoking Status Current Every Day Smoker    Packs/day: 0.50    Years: 17.00    Pack years: 8.50    Types: Cigarettes   Smokeless Tobacco Never Used   Tobacco Comment    \"some\" desire to quit     Social History     Substance and Sexual Activity   Alcohol Use Yes    Alcohol/week: 0.0 standard drinks    Comment: occ         Family History:   Family History   Problem Relation Age of Onset    Diabetes Father     Cataracts Father     Colon Cancer Neg Hx     Colon Polyps Neg Hx     Liver Cancer Neg Hx     Liver Disease Neg Hx     Esophageal Cancer Neg Hx     Rectal Cancer Neg Hx     Stomach Cancer Neg Hx        REVIEW OF SYSTEMS:    Constitutional: Negative. HENT: Negative. Eyes: Negative. Respiratory: Negative. Cardiovascular: Negative. Gastrointestinal: Negative. Genitourinary: Negative. Musculoskeletal: Positive for back pain, joint pain and neck pain. Skin: Negative. Neurological: Positive for tingling. Endo/Heme/Allergies: Bruises/bleeds easily. Psychiatric/Behavioral: Negative. Review of Systems was obtained by the medical assistant and reviewed by myself. PHYSICAL EXAM:    Vitals:    05/11/22 1504   BP: (!) 135/91   Pulse: 71   SpO2: 98%       Constitutional: Appears well-developed and well-nourished.    Eyes  conjunctiva normal.  Pupils react to light  Ear, nose,throat -Normal pinna and tragus, No scars, or lesions over external nose or ears, no obvious atrophy of tongue  Neck- symmetric, trachea midline, no jugular vein distension  Respiration- chest wall symmetric, normal effort without use of accessory muscles  Musculoskeletal  no significant wasting of muscles noted, no bony deformities, symmetric bulk  Extremities- no clubbing, cyanosis or edema  Skin  warm, dry, and intact. No rash,erythema, or pallor. Psychiatric  mood, affect, and behavior appear normal.       NEUROLOGIC EXAM:    MENTAL STATUS: Alert, oriented, thought content appropriate    CRANIAL NERVES: PERRL, EOMI, symmetric facies, tongue midline    MOTOR:     Right Upper Extremity:    Deltoid: 5/5  Biceps: 5/5  Triceps: 5/5  Wrist Extension: 5/5  Finger Abduction: 5/5  APB: 5/5    Left Upper Extremity:    Deltoid: 5/5  Biceps: 5/5  Triceps: 5/5  Wrist Extension: 5/5  Finger Abduction: 5/5  APB: 5/5    Right Lower Extremity:    Hip Flexion: 5/5  Knee Extension: 5/5  Ankle Plantarflexion: 5/5  Ankle Dorsiflexion: 5/5  EHL: 5/5      Left Lower Extremity:    Hip Flexion: 5/5  Knee Extension: 5/5  Ankle Plantarflexion: 5/5  Ankle Dorsiflexion: 5/5  EHL: 5/5      SOMATOSENSORY:     Right Upper Extremity: normal light touch sensation, decreased to pinprick in the thumb, index, middle and radial 1/2 of the ring finger. Left Upper Extremity: normal pin prick sensation and normal light touch sensation  Right Lower Extremity: normal light touch sensation  Left Lower Extremity: normal light touch sensation      REFLEXES:    Biceps: 2+  Patella: 2+  Ankle Jerk: 2+    Camargo's: Negative      PROVOCATIVE TESTS:    Tinel's:  Positive at the wrist bilaterally, negative at the elbow bilaterally  Phalen's: Forced wrist flexion produces numbness in the right median distribution after ~30 seconds.       DATA:    Lab Results   Component Value Date    WBC 10.2 04/06/2022    HGB 14.3 04/06/2022    HCT 45.5 04/06/2022    MCV 92.1 04/06/2022     04/06/2022     Lab Results   Component Value Date     04/06/2022    K 4.2 04/06/2022     04/06/2022    CO2 26 04/06/2022    BUN 10 04/06/2022    CREATININE 0.7 04/06/2022    GLUCOSE 93 04/06/2022    CALCIUM 9.2 04/06/2022    PROT 6.4 (L) 04/06/2022    LABALBU 4.3 04/06/2022    BILITOT 0.4 04/06/2022    ALKPHOS 100 04/06/2022    AST 20 04/06/2022    ALT 22 04/06/2022    LABGLOM >60 04/06/2022    GFRAA >59 04/06/2022    AGRATIO 1.3 09/21/2016    GLOB 3.1 09/21/2016         ASSESSMENT AND PLAN:  This is a 42 y.o. female who presents for neurosurgical evaluation of bilateral carpal tunnel syndrome, R>L. Her symptoms are fairly classic with pain and numbness that is worsened at night and with activity. Her symptoms match the distribution of the median nerve. She has undergone an EMG/NCS that is consistent with moderate right and mild left carpal tunnel syndrome. Her symptoms are quite bothersome to her, she has failed conservative treatment and she is requesting surgery. The carpal tunnel release procedure was explained to the patient in detail, including alternatives to surgery and risks of nerve injury, bleeding, infection and poor wound healing. All questions were answered to the patient's stated satisfaction and they requested that we proceed with surgery.               Kandi Marsh MD

## 2022-06-16 NOTE — ANESTHESIA PRE PROCEDURE
Department of Anesthesiology  Preprocedure Note       Name:  Tommie Cohen   Age:  42 y.o.  :  1980                                          MRN:  [id number]         Date:  2022      Surgeon: Kermit Joiner):  Nadira Hernadez MD    Procedure: Procedure(s):  RIGHT CARPAL TUNNEL RELEASE    Medications prior to admission:   Prior to Admission medications    Medication Sig Start Date End Date Taking? Authorizing Provider   MYDAYIS 37.5 MG CP24 Take 50 mg by mouth daily. 22   Historical Provider, MD   pantoprazole (PROTONIX) 40 MG tablet TAKE ONE TABLET EVERY DAY IF NEEDED FOR SEVERE FLAREUP AND TAGAMET IS NOT WORKING 22   Clemente Lam MD   bisoprolol-hydroCHLOROthiazide [facility]) 5-6.25 MG per tablet Take 1 tablet by mouth daily 22   Clemente Lam MD   levothyroxine (SYNTHROID) 112 MCG tablet TAKE 1 TABLET BY MOUTH ONCE DAILY first thing in the morning on empty stomach. 22   Clemente Lam MD   VYVANSE 60 MG CAPS TAKE 1 CAPSULE BY MOUTH EVERY MORNING  Patient not taking: Reported on 2022 3/25/22   Historical Provider, MD   hydroCHLOROthiazide (MICROZIDE) 12.5 MG capsule Take 12.5 mg by mouth every morning  Patient not taking: Reported on 2022   Historical Provider, MD   niacin 500 MG extended release capsule Take 500 mg by mouth nightly    Historical Provider, MD       Current medications:    No current facility-administered medications for this encounter. 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 9) E66.3    Left carpal tunnel syndrome G56.02       Past Medical History:        Diagnosis Date    Hypothyroidism     Irritable bowel syndrome        Past Surgical History:        Procedure Laterality Date    CHOLECYSTECTOMY  5-12    DILATION AND CURETTAGE OF UTERUS      GALLBLADDER SURGERY      May 2012    LAPAROSCOPY      SALPINGECTOMY Bilateral     denies       Social History:    Social History     Tobacco Use    Smoking status: Current Every Day Smoker     Packs/day: 0.50     Years: 17.00     Pack years: 8.50     Types: Cigarettes    Smokeless tobacco: Never Used    Tobacco comment: \"some\" desire to quit   Substance Use Topics    Alcohol use: Yes     Alcohol/week: 0.0 standard drinks     Comment: occ                                Ready to quit: Not Answered  Counseling given: Not Answered  Comment: \"some\" desire to quit      Vital Signs (Current):   Vitals:    06/06/22 1412   Weight: 170 lb (77.1 kg)   Height: 5' 6\" (1.676 m)                                              BP Readings from Last 3 Encounters:   05/11/22 (!) 135/91   04/06/22 122/75   04/14/21 120/82       NPO Status: Time of last liquid consumption: 0000                        Time of last solid consumption: 2330                        Date of last liquid consumption: 06/15/22                        Date of last solid food consumption: 06/15/22    BMI:   Wt Readings from Last 3 Encounters:   06/06/22 170 lb (77.1 kg)   05/11/22 171 lb (77.6 kg)   04/19/22 170 lb (77.1 kg)     Body mass index is 27.44 kg/m².     CBC:   Lab Results   Component Value Date    WBC 10.2 04/06/2022    RBC 4.94 04/06/2022    HGB 14.3 04/06/2022    HCT 45.5 04/06/2022    MCV 92.1 04/06/2022    RDW 13.8 04/06/2022     04/06/2022       CMP:   Lab Results   Component Value Date     04/06/2022    K 4.2 04/06/2022     04/06/2022    CO2 26 04/06/2022    BUN 10 04/06/2022    CREATININE 0.7 04/06/2022    GFRAA >59 04/06/2022    AGRATIO 1.3 09/21/2016    LABGLOM >60 04/06/2022    GLUCOSE 93 04/06/2022    PROT 6.4 04/06/2022    CALCIUM 9.2 04/06/2022    BILITOT 0.4 04/06/2022    ALKPHOS 100 04/06/2022    AST 20 04/06/2022    ALT 22 04/06/2022       POC Tests: No results for input(s): POCGLU, POCNA, POCK, POCCL, POCBUN, POCHEMO, POCHCT in the last 72 hours. Coags: No results found for: PROTIME, INR, APTT    HCG (If Applicable):   Lab Results   Component Value Date    PREGTESTUR Negative 10/17/2017        ABGs: No results found for: PHART, PO2ART, EBW4OCI, BOA0EYZ, BEART, R5QXRFBF     Type & Screen (If Applicable):  No results found for: LABABO, LABRH    Drug/Infectious Status (If Applicable):  No results found for: HIV, HEPCAB    COVID-19 Screening (If Applicable): No results found for: COVID19        Anesthesia Evaluation  Patient summary reviewed and Nursing notes reviewed  Airway: Mallampati: I  TM distance: >3 FB   Neck ROM: full  Mouth opening: > = 3 FB   Dental: normal exam         Pulmonary:Negative Pulmonary ROS and normal exam  breath sounds clear to auscultation  (+) current smoker          Patient smoked on day of surgery. Cardiovascular:Negative CV ROS  Exercise tolerance: good (>4 METS),         ECG reviewed  Rhythm: regular  Rate: normal           Beta Blocker:  Not on Beta Blocker         Neuro/Psych:   Negative Neuro/Psych ROS               ROS comment: Attention deficit disorder  Mixed depressive/anxiety disorder  Mood disorder GI/Hepatic/Renal: Neg GI/Hepatic/Renal ROS  (+) GERD: well controlled,           Endo/Other: Negative Endo/Other ROS                    Abdominal:             Vascular: negative vascular ROS. Other Findings:           Anesthesia Plan      general     ASA 2       Induction: intravenous. MIPS: Postoperative opioids intended and Prophylactic antiemetics administered. Anesthetic plan and risks discussed with patient.                         ATILIO Nova - CRNA 6/16/2022

## 2022-06-23 ENCOUNTER — OFFICE VISIT (OUTPATIENT)
Dept: NEUROSURGERY | Age: 42
End: 2022-06-23

## 2022-06-23 VITALS
HEIGHT: 66 IN | BODY MASS INDEX: 27.32 KG/M2 | SYSTOLIC BLOOD PRESSURE: 132 MMHG | WEIGHT: 170 LBS | HEART RATE: 78 BPM | DIASTOLIC BLOOD PRESSURE: 72 MMHG | RESPIRATION RATE: 18 BRPM

## 2022-06-23 DIAGNOSIS — G56.01 RIGHT CARPAL TUNNEL SYNDROME: Primary | ICD-10-CM

## 2022-06-23 PROCEDURE — 99024 POSTOP FOLLOW-UP VISIT: CPT | Performed by: NEUROLOGICAL SURGERY

## 2022-06-23 ASSESSMENT — ENCOUNTER SYMPTOMS
EYES NEGATIVE: 1
RESPIRATORY NEGATIVE: 1
GASTROINTESTINAL NEGATIVE: 1

## 2022-06-23 NOTE — PROGRESS NOTES
Review of Systems   Constitutional: Negative. HENT: Negative. Eyes: Negative. Respiratory: Negative. Cardiovascular: Negative. Gastrointestinal: Negative. Genitourinary: Negative. Musculoskeletal: Negative. Skin: Negative. Neurological: Positive for tingling (RT thumb) and weakness (). Endo/Heme/Allergies: Negative. Psychiatric/Behavioral: Negative.

## 2022-06-23 NOTE — PROGRESS NOTES
NEUROSURGERY POSTOPERATIVE FOLLOW UP NOTE      Chief Complaint:   Chief Complaint   Patient presents with    Wound Check     Patient states that there is no real pain, just after surgery soreness. She states that the incision has been bleeding a little but has not been bothering her. Date of Surgery: 6/16/2022    Procedure Performed: Right carpal tunnel release      Interval Update:  First postoperative visit since surgery. She has scheduled early follow-up because she needs paperwork allowing her to return to work. She reports that she is doing well. She has some incisional soreness and numbness in her palm surrounding her incision. The numbness in the fingers of her right hand has improved significantly since surgery. HPI: The patient is a 42 y.o. right-handed female who presents for neurosurgical evaluation of carpal tunnel syndrome. She describes pain and numbness, mostly in the right but also in the left. The numbness primarily involved the index finger and thumb but she notes that the middle and ring fingers are also involved. She states that she will frequently awaken at night with pain and numbness in her hands. Activities such as craft work and driving also worsen her symptoms. She notes these symptoms have been progressive over several years. She has tried wearing wrist splints for the last year and the symptoms have not improved. She recently underwent an EMG/NCS with Dr. Lisy Cohen that revealed bilateral median nerve entrapment at the wrist, moderate severity on the right and mild on the left. Objective:    /72   Pulse 78   Resp 18   Ht 5' 6\" (1.676 m)   Wt 170 lb (77.1 kg)   BMI 27.44 kg/m²         Physical Exam:    General: alert, cooperative, no distress  Cardiorespiratory: unlabored breathing  Wound: C/D/I with nylon sutures. No erythema or fluctuance.       Neurologic Exam:    Mental Status: Alert, oriented, thought content appropriate  Cranial Nerves: PERRL, EOMI, symmetric facies, tongue midline  Motor: Motor exam is symmetrical 5 out of 5 all extremities bilaterally  Somatosensory: normal light touch sensation            Assessment and Plan:  42 y.o. F returns for her first follow-up visit following right carpal tunnel release on 6/16/2022. She is doing well. Her preoperative symptoms have improved and her incision is healing appropriately. 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 I advised the patient to apply neosporin ointment to the incision twice daily and to cover the wound with a large BandAid dressing.   I will plan to follow up with her in two weeks for another wound check and suture removal.        Electronically signed by Faisal Ray MD on 6/23/2022 at 8:57 AM

## 2022-06-27 RX ORDER — DEXTROAMPHETAMINE SULFATE, DEXTROAMPHETAMINE SACCHARATE, AMPHETAMINE ASPARTATE MONOHYDRATE, AND AMPHETAMINE SULFATE 3.125; 3.125; 3.125; 3.125 MG/1; MG/1; MG/1; MG/1
CAPSULE, EXTENDED RELEASE ORAL
Qty: 30 CAPSULE | Refills: 0 | OUTPATIENT
Start: 2022-06-27

## 2022-06-28 ENCOUNTER — TELEPHONE (OUTPATIENT)
Dept: NEUROSURGERY | Age: 42
End: 2022-06-28

## 2022-07-06 ENCOUNTER — OFFICE VISIT (OUTPATIENT)
Dept: NEUROSURGERY | Age: 42
End: 2022-07-06

## 2022-07-06 VITALS
HEART RATE: 78 BPM | RESPIRATION RATE: 18 BRPM | WEIGHT: 170 LBS | DIASTOLIC BLOOD PRESSURE: 75 MMHG | BODY MASS INDEX: 27.32 KG/M2 | SYSTOLIC BLOOD PRESSURE: 110 MMHG | HEIGHT: 66 IN

## 2022-07-06 DIAGNOSIS — G56.01 RIGHT CARPAL TUNNEL SYNDROME: Primary | ICD-10-CM

## 2022-07-06 DIAGNOSIS — Z98.890 S/P CARPAL TUNNEL RELEASE: ICD-10-CM

## 2022-07-06 PROCEDURE — 99024 POSTOP FOLLOW-UP VISIT: CPT | Performed by: NEUROLOGICAL SURGERY

## 2022-07-06 RX ORDER — TRAMADOL HYDROCHLORIDE 50 MG/1
50 TABLET ORAL EVERY 6 HOURS PRN
Qty: 28 TABLET | Refills: 0 | Status: SHIPPED | OUTPATIENT
Start: 2022-07-06 | End: 2022-07-13

## 2022-07-06 NOTE — PROGRESS NOTES
NEUROSURGERY POSTOPERATIVE FOLLOW UP NOTE      Chief Complaint:   Chief Complaint   Patient presents with    Wound Check     Patient is here for 2nd wound check due to incision issues. Patient did remove suctures herself last week. She states that she is having pain at her wrist and at the incision, the pain is worse at night and would like something to help her sleep/pain at night. Date of Surgery: 6/16/2022    Procedure Performed: Right carpal tunnel release      Interval Update:  Second postoperative visit since surgery. She developed significant pain at her incision site since our last visit and she has removed her sutures. She endorses numbness in her palm around her incision and tenderness at the incision site. She denies any fevers, chills or wound redness/drainage. The numbness in her fingers and thumb have resolved since surgery. She reports that sleep is quite difficult due to incisional pain. HPI: The patient is a 42 y.o. right-handed female who presents for neurosurgical evaluation of carpal tunnel syndrome. She describes pain and numbness, mostly in the right but also in the left. The numbness primarily involved the index finger and thumb but she notes that the middle and ring fingers are also involved. She states that she will frequently awaken at night with pain and numbness in her hands. Activities such as craft work and driving also worsen her symptoms. She notes these symptoms have been progressive over several years. She has tried wearing wrist splints for the last year and the symptoms have not improved. She recently underwent an EMG/NCS with Dr. Melanie Austin that revealed bilateral median nerve entrapment at the wrist, moderate severity on the right and mild on the left.       Objective:    /75   Pulse 78   Resp 18   Ht 5' 6\" (1.676 m)   Wt 170 lb (77.1 kg)   BMI 27.44 kg/m²         Physical Exam:    General: alert, cooperative, no distress  Cardiorespiratory: unlabored breathing  Wound: Small area of superficial separation of the epidermis over the palm. No significant erythema or drainage. Neurologic Exam:    Mental Status: Alert, oriented, thought content appropriate  Cranial Nerves: PERRL, EOMI, symmetric facies, tongue midline  Motor: Motor exam is symmetrical 5 out of 5 all extremities bilaterally  Somatosensory: normal light touch sensation            Assessment and Plan:  42 y.o. F returns for her second follow-up visit following right carpal tunnel release on 6/16/2022. Her wound is healing somewhat slowly, partially because she has removed her own sutures and there has been a small separation of the superficial wound edge. There is no evidence of infection. She is applying a butter-fly band aid to the incision and applying neosporin ointment and I recommended that she continue this. I have provided her a short prescription for Tramadol to help her sleep. I will plan to see her again in 1 month for another wound check.       Electronically signed by Buster Infante MD on 7/6/2022 at 1:53 PM

## 2022-07-19 ENCOUNTER — OFFICE VISIT (OUTPATIENT)
Dept: FAMILY MEDICINE CLINIC | Facility: CLINIC | Age: 42
End: 2022-07-19

## 2022-07-19 VITALS
TEMPERATURE: 97.6 F | DIASTOLIC BLOOD PRESSURE: 81 MMHG | BODY MASS INDEX: 26.84 KG/M2 | OXYGEN SATURATION: 97 % | SYSTOLIC BLOOD PRESSURE: 123 MMHG | WEIGHT: 167 LBS | RESPIRATION RATE: 16 BRPM | HEART RATE: 67 BPM | HEIGHT: 66 IN

## 2022-07-19 DIAGNOSIS — F90.2 ATTENTION DEFICIT HYPERACTIVITY DISORDER (ADHD), COMBINED TYPE: Primary | ICD-10-CM

## 2022-07-19 DIAGNOSIS — E66.3 OVERWEIGHT (BMI 25.0-29.9): ICD-10-CM

## 2022-07-19 PROCEDURE — 99213 OFFICE O/P EST LOW 20 MIN: CPT | Performed by: NURSE PRACTITIONER

## 2022-07-19 RX ORDER — DEXTROAMPHETAMINE SULFATE, DEXTROAMPHETAMINE SACCHARATE, AMPHETAMINE ASPARTATE MONOHYDRATE, AND AMPHETAMINE SULFATE 12.5; 12.5; 12.5; 12.5 MG/1; MG/1; MG/1; MG/1
1 CAPSULE, EXTENDED RELEASE ORAL EVERY MORNING
Qty: 30 CAPSULE | Refills: 0 | Status: SHIPPED | OUTPATIENT
Start: 2022-07-19 | End: 2022-08-18

## 2022-07-19 RX ORDER — DEXTROAMPHETAMINE SULFATE, DEXTROAMPHETAMINE SACCHARATE, AMPHETAMINE ASPARTATE MONOHYDRATE, AND AMPHETAMINE SULFATE 12.5; 12.5; 12.5; 12.5 MG/1; MG/1; MG/1; MG/1
1 CAPSULE, EXTENDED RELEASE ORAL EVERY MORNING
Qty: 30 CAPSULE | Refills: 0 | Status: SHIPPED | OUTPATIENT
Start: 2022-08-18 | End: 2022-09-26 | Stop reason: SDUPTHER

## 2022-07-19 NOTE — PROGRESS NOTES
Chief Complaint  ADHD (3 month med check   No concerns or complaints)    Subjective    History of Present Illness      Patient presents to [facility] for   Pt presents to office today for 3 mo adhd follow up.  Pt was temporarily switched to Vvyanse due to Mydayis 50mg being out of stock. Pt was taking the mydayis 37.5mg. Pt was stiched back to mydayis 50mg due to now in stock as of 6/27/22. Completed PHQ-2 and JOAQUIN-7 with pt today, pt denies c/o either. Pt states since being able to switch back to Mydayis 50mg, medication regimen working well, denies any complaints or concerns at this time. Pt's last UDS 4/15/22 appropriate per BLAS [place].     ADHD/Mood HPI    Visit for:  follow-up. Most recent visit was 3 months ago.  Interim changes to follow up on today: new medication: Pt was temporarily switched to Vvyanse due to Mydayis 50mg being out of stock. Pt was taking the mydayis 37.5mg. Pt was stiched back to mydayis 50mg due to now in stock as of 6/27/22.   Work/School Performance:  going well  Cognitive:  able to focus    Behavior  Hyperactivity: is not hyperactive  Impulsivity: no impulsivity and no unsafe behavior  Tasking: able to initiate tasks, able to complete tasks and able to mult-task    Social  ADHD social/impulsive symptoms:  not impatient, does not blurt out inappropriate comments and no excessive talking    Behavioral health  Behavior: no concerns  Emotional coping: demonstrates feelings of no concerns       Review of Systems   Constitutional: Negative.    HENT: Negative.    Eyes: Negative.    Respiratory: Negative.    Cardiovascular: Negative.    Gastrointestinal: Negative.    Endocrine: Negative.    Genitourinary: Negative.    Musculoskeletal: Negative.    Skin: Negative.    Allergic/Immunologic: Negative.    Neurological: Negative.    Hematological: Negative.    Psychiatric/Behavioral: Negative.        I have reviewed and agree with the HPI and ROS information as  "above.  Josi GARCIA Willy, APRN     Objective   Vital Signs:   /81   Pulse 67   Temp 97.6 °F (36.4 °C)   Resp 16   Ht 167.6 cm (66\")   Wt 75.8 kg (167 lb)   SpO2 97%   BMI 26.95 kg/m²     BMI is >= 25 and <30. (Overweight) The following options were offered after discussion;: weight loss educational material (shared in after visit summary)      Physical Exam  Constitutional:       Appearance: Normal appearance. She is well-developed.      Comments: overweight   HENT:      Head: Normocephalic and atraumatic.      Right Ear: External ear normal.      Left Ear: External ear normal.      Nose: Nose normal. No nasal tenderness or congestion.      Mouth/Throat:      Lips: Pink. No lesions.      Mouth: Mucous membranes are moist. No oral lesions.      Dentition: Normal dentition.      Pharynx: Oropharynx is clear. No pharyngeal swelling, oropharyngeal exudate or posterior oropharyngeal erythema.   Eyes:      General: Lids are normal. Vision grossly intact. No scleral icterus.        Right eye: No discharge.         Left eye: No discharge.      Extraocular Movements: Extraocular movements intact.      Conjunctiva/sclera: Conjunctivae normal.      Right eye: Right conjunctiva is not injected.      Left eye: Left conjunctiva is not injected.      Pupils: Pupils are equal, round, and reactive to light.   Cardiovascular:      Rate and Rhythm: Normal rate and regular rhythm.      Heart sounds: Normal heart sounds. No murmur heard.    No gallop.   Pulmonary:      Effort: Pulmonary effort is normal.      Breath sounds: Normal breath sounds and air entry. No wheezing, rhonchi or rales.   Musculoskeletal:         General: No tenderness or deformity. Normal range of motion.      Cervical back: Full passive range of motion without pain, normal range of motion and neck supple.      Right lower leg: No edema.      Left lower leg: No edema.   Skin:     General: Skin is warm and dry.      Coloration: Skin is not jaundiced.    "   Findings: No rash.   Neurological:      Mental Status: She is alert and oriented to person, place, and time.      Cranial Nerves: Cranial nerves are intact.      Sensory: Sensation is intact.      Motor: Motor function is intact.      Coordination: Coordination is intact.      Gait: Gait is intact.   Psychiatric:         Attention and Perception: Attention normal.         Mood and Affect: Mood and affect normal.         Behavior: Behavior is not hyperactive. Behavior is cooperative.         Thought Content: Thought content normal.         Judgment: Judgment normal.          JOAQUIN-7: Over the last two weeks, how often have you been bothered by the following problems?  Feeling nervous, anxious or on edge: Not at all  Not being able to stop or control worrying: Not at all  Worrying too much about different things: Not at all  Trouble Relaxing: Not at all  Being so restless that it is hard to sit still: Not at all  Becoming easily annoyed or irritable: Not at all  Feeling afraid as if something awful might happen: Not at all  JOAQUIN 7 Total Score: 0  If you checked any problems, how difficult have these problems made it for you to do your work, take care of things at home, or get along with other people: Not difficult at all    PHQ-2 Depression Screening  Little interest or pleasure in doing things? 0-->not at all   Feeling down, depressed, or hopeless? 0-->not at all   PHQ-2 Total Score 0     PHQ-9 Depression Screening  Little interest or pleasure in doing things? 0-->not at all   Feeling down, depressed, or hopeless? 0-->not at all   Trouble falling or staying asleep, or sleeping too much?     Feeling tired or having little energy?     Poor appetite or overeating?     Feeling bad about yourself - or that you are a failure or have let yourself or your family down?     Trouble concentrating on things, such as reading the newspaper or watching television?     Moving or speaking so slowly that other people could have  noticed? Or the opposite - being so fidgety or restless that you have been moving around a lot more than usual?     Thoughts that you would be better off dead, or of hurting yourself in some way?     PHQ-9 Total Score 0   If you checked off any problems, how difficult have these problems made it for you to do your work, take care of things at home, or get along with other people?        Result Review  Data Reviewed:   The following data was reviewed by: BLAS Ren on 07/19/2022:  Urine Drug Screen - Urine, Clean Catch (04/15/2022 13:18)             Assessment and Plan      Problem List Items Addressed This Visit        Endocrine and Metabolic    Overweight (BMI 25.0-29.9)       Mental Health    Attention deficit hyperactivity disorder - Primary        Patient here today for 3-month ADHD follow-up.  She is doing well with her current dose of Mydayis 50 mg.  She feels her focus is stable and wishes to continue the same.  Chip is clean.  UDS is up-to-date and appropriate.  Continue Mydayis 50 mg.  Follow-up 3 months.      Follow Up   Return in about 3 months (around 10/19/2022) for Recheck.  Patient was given instructions and counseling regarding her condition or for health maintenance advice. Please see specific information pulled into the AVS if appropriate.     ADHD Follow up:    Chip report initiated in office and is clean. ADRS (Adult ADHD Assessment Form) reviewed in detail, scanned in chart, and has been reviewed at time of appointment.  All questions, including medication and side effects, were discussed in detail at time of patient's visit. Patient will continue same medication which was discussed at today's visit. Patient is to return in 3 month(s).

## 2022-07-19 NOTE — PROGRESS NOTES
"Chief Complaint  ADHD (3 month med check   No concerns or complaints)    Subjective    History of Present Illness {CC  Problem List  Visit Diagnosis   Encounters  Notes  Medications  Labs  Result Review Imaging  Media :23}     Patient presents to [facility] for   History of Present Illness     Review of Systems    I have reviewed and agree with the {HPI ROS Review:83858} information as above.  Rafaela Prieto MA     Objective   Vital Signs:   /81   Pulse 67   Temp 97.6 °F (36.4 °C)   Resp 16   Ht 167.6 cm (66\")   Wt 75.8 kg (167 lb)   SpO2 97%   BMI 26.95 kg/m²     {BMI is >= 25 and <30. (Overweight) The following options were offered after discussion;:[id number]}      Physical Exam     JOAQUIN-7:      PHQ-2 Depression Screening  Little interest or pleasure in doing things?     Feeling down, depressed, or hopeless?     PHQ-2 Total Score       PHQ-9 Depression Screening  Little interest or pleasure in doing things?     Feeling down, depressed, or hopeless?     Trouble falling or staying asleep, or sleeping too much?     Feeling tired or having little energy?     Poor appetite or overeating?     Feeling bad about yourself - or that you are a failure or have let yourself or your family down?     Trouble concentrating on things, such as reading the newspaper or watching television?     Moving or speaking so slowly that other people could have noticed? Or the opposite - being so fidgety or restless that you have been moving around a lot more than usual?     Thoughts that you would be better off dead, or of hurting yourself in some way?     PHQ-9 Total Score     If you checked off any problems, how difficult have these problems made it for you to do your work, take care of things at home, or get along with other people?        Result Review  Data Reviewed:{ Labs  Result Review  Imaging  Med Tab  Media :23}   {The following data was reviewed by " (Optional):93484}  {Ambulatory Labs (Optional):31518}  {Data reviewed (Optional):62957:::1}            Assessment and Plan {CC Problem List  Visit Diagnosis  ROS  Review (Popup)  Health Maintenance  Quality  BestPractice  Medications  SmartSets  SnapShot Encounters  Media :23}     Problem List Items Addressed This Visit    None         {Time Spent (Optional):58098}    Follow Up {Instructions Charge Capture  Follow-up Communications :23}  No follow-ups on file.  Patient was given instructions and counseling regarding her condition or for health maintenance advice. Please see specific information pulled into the AVS if appropriate.

## 2022-08-10 ENCOUNTER — OFFICE VISIT (OUTPATIENT)
Dept: NEUROSURGERY | Age: 42
End: 2022-08-10

## 2022-08-10 VITALS
HEART RATE: 77 BPM | DIASTOLIC BLOOD PRESSURE: 70 MMHG | SYSTOLIC BLOOD PRESSURE: 115 MMHG | RESPIRATION RATE: 18 BRPM | BODY MASS INDEX: 27.32 KG/M2 | WEIGHT: 170 LBS | HEIGHT: 66 IN

## 2022-08-10 DIAGNOSIS — Z98.890 S/P CARPAL TUNNEL RELEASE: Primary | ICD-10-CM

## 2022-08-10 PROCEDURE — 99024 POSTOP FOLLOW-UP VISIT: CPT | Performed by: NEUROLOGICAL SURGERY

## 2022-08-10 ASSESSMENT — ENCOUNTER SYMPTOMS
GASTROINTESTINAL NEGATIVE: 1
EYES NEGATIVE: 1
RESPIRATORY NEGATIVE: 1

## 2022-08-10 NOTE — PROGRESS NOTES
NEUROSURGERY POSTOPERATIVE FOLLOW UP NOTE      Chief Complaint:   Chief Complaint   Patient presents with    Follow-up     Patient is here for follow up after RT carpal tunnel release and states her incision is tight. She complains of shooting pains that intermittently happen throoughout the day. She states that the area is really sensitive. Date of Surgery: 6/16/2022    Procedure Performed: Right carpal tunnel release      Interval Update:  Planned postoperative visit after surgery. She continues to endorse incisional pain and stiffness in her hand. She reports that her carpal tunnel symptoms have resolved. HPI: The patient is a 42 y.o. right-handed female who presents for neurosurgical evaluation of carpal tunnel syndrome. She describes pain and numbness, mostly in the right but also in the left. The numbness primarily involved the index finger and thumb but she notes that the middle and ring fingers are also involved. She states that she will frequently awaken at night with pain and numbness in her hands. Activities such as craft work and driving also worsen her symptoms. She notes these symptoms have been progressive over several years. She has tried wearing wrist splints for the last year and the symptoms have not improved. She recently underwent an EMG/NCS with Dr. Miguel Royal that revealed bilateral median nerve entrapment at the wrist, moderate severity on the right and mild on the left. Objective:    /70   Pulse 77   Resp 18   Ht 5' 6\" (1.676 m)   Wt 170 lb (77.1 kg)   BMI 27.44 kg/m²         Physical Exam:    General: alert, cooperative, no distress  Cardiorespiratory: unlabored breathing  Wound: C/D/I, fully closed. There is firm and tender scar tissue surrounding the incision.       Neurologic Exam:    Mental Status: Alert, oriented, thought content appropriate  Cranial Nerves: PERRL, EOMI, symmetric facies, tongue midline  Motor: Motor exam is symmetrical 5 out of 5 all extremities bilaterally  Somatosensory: normal light touch sensation            Assessment and Plan:  42 y.o. F returns for a planned follow-up visit following right carpal tunnel release on 6/16/2022. Her wound is now fully closed but has developed firm and tender scar tissue in the area. I offered her a referral to occupational therapy for ultrasound treatments and to improve her ROM but she declined. I have provided her a stress-ball to use to help improve the strength and mobility in her hand. I have also released her to return to full activity and to return to work without restrictions. I will plan to see her again in  to reassess her wound.       Electronically signed by Riya Lam MD on 8/10/2022 at 1:50 PM

## 2022-08-10 NOTE — PROGRESS NOTES
Review of Systems   Constitutional: Negative. HENT: Negative. Eyes: Negative. Respiratory: Negative. Cardiovascular: Negative. Gastrointestinal: Negative. Genitourinary: Negative. Musculoskeletal: Negative. Skin: Negative. Neurological:  Positive for tingling (around incision). Endo/Heme/Allergies: Negative. Psychiatric/Behavioral: Negative.

## 2022-09-26 DIAGNOSIS — F90.2 ATTENTION DEFICIT HYPERACTIVITY DISORDER (ADHD), COMBINED TYPE: ICD-10-CM

## 2022-09-26 RX ORDER — DEXTROAMPHETAMINE SULFATE, DEXTROAMPHETAMINE SACCHARATE, AMPHETAMINE ASPARTATE MONOHYDRATE, AND AMPHETAMINE SULFATE 12.5; 12.5; 12.5; 12.5 MG/1; MG/1; MG/1; MG/1
1 CAPSULE, EXTENDED RELEASE ORAL EVERY MORNING
Qty: 30 CAPSULE | Refills: 0 | Status: SHIPPED | OUTPATIENT
Start: 2022-09-26 | End: 2022-10-19 | Stop reason: SDUPTHER

## 2022-09-29 ENCOUNTER — OFFICE VISIT (OUTPATIENT)
Dept: PRIMARY CARE CLINIC | Age: 42
End: 2022-09-29
Payer: COMMERCIAL

## 2022-09-29 VITALS
RESPIRATION RATE: 18 BRPM | TEMPERATURE: 97 F | HEART RATE: 65 BPM | OXYGEN SATURATION: 99 % | BODY MASS INDEX: 27.51 KG/M2 | WEIGHT: 171.2 LBS | HEIGHT: 66 IN | SYSTOLIC BLOOD PRESSURE: 122 MMHG | DIASTOLIC BLOOD PRESSURE: 76 MMHG

## 2022-09-29 DIAGNOSIS — H69.83 DYSFUNCTION OF BOTH EUSTACHIAN TUBES: Primary | ICD-10-CM

## 2022-09-29 PROCEDURE — 99213 OFFICE O/P EST LOW 20 MIN: CPT | Performed by: NURSE PRACTITIONER

## 2022-09-29 RX ORDER — AZELASTINE HYDROCHLORIDE, FLUTICASONE PROPIONATE 137; 50 UG/1; UG/1
SPRAY, METERED NASAL
Qty: 1 EACH | Refills: 1 | Status: SHIPPED | OUTPATIENT
Start: 2022-09-29

## 2022-09-29 SDOH — ECONOMIC STABILITY: FOOD INSECURITY: WITHIN THE PAST 12 MONTHS, THE FOOD YOU BOUGHT JUST DIDN'T LAST AND YOU DIDN'T HAVE MONEY TO GET MORE.: SOMETIMES TRUE

## 2022-09-29 SDOH — ECONOMIC STABILITY: FOOD INSECURITY: WITHIN THE PAST 12 MONTHS, YOU WORRIED THAT YOUR FOOD WOULD RUN OUT BEFORE YOU GOT MONEY TO BUY MORE.: SOMETIMES TRUE

## 2022-09-29 ASSESSMENT — SOCIAL DETERMINANTS OF HEALTH (SDOH): HOW HARD IS IT FOR YOU TO PAY FOR THE VERY BASICS LIKE FOOD, HOUSING, MEDICAL CARE, AND HEATING?: SOMEWHAT HARD

## 2022-09-29 NOTE — PATIENT INSTRUCTIONS
Use nasal spray faithful x 5 days, can do daily  If not better refer to [facility]

## 2022-09-29 NOTE — PROGRESS NOTES
[address] [facility]  [address] [place] [address]  [address] [place] [address]  Dept: [phone]  Dept Fax: [phone]  Loc: [phone]    Andie Mota is a 42 y.o. female who presents today for her medical conditions/complaints as noted below. Andie Mota is c/o of Otalgia (Pt is here for pain in her right ear. Pt states it started on Monday. No other concerns. )        HPI:     HPI   Chief Complaint   Patient presents with    Otalgia     Pt is here for pain in her right ear. Pt states it started on Monday. No other concerns. She is not had any fever or congestion.   She does not use a nasal spray  Past Medical History:   Diagnosis Date    Hypothyroidism     Irritable bowel syndrome       Past Surgical History:   Procedure Laterality Date    CARPAL TUNNEL RELEASE Right 6/16/2022    RIGHT CARPAL TUNNEL RELEASE performed by Parrish Rodriguez MD at [address] [place] [address]  5-12    [address]      May 2012    LAPAROSCOPY      SALPINGECTOMY Bilateral     denies       Vitals 9/29/2022 8/10/2022 7/6/2022 6/23/2022 6/16/2022 2/16/6696   SYSTOLIC [phone] [phone]   DIASTOLIC 76 70 75 72 74 69   Site Left Upper Arm - - - - -   Position Sitting - - - - -   Cuff Size Medium Adult - - - - -   Pulse 65 77 78 78 62 66   Temp 97 - - - - 96.5   Resp 18 18 18 18 18 18   SpO2 99 - - - 100 100   Weight 171 lb 3.2 oz 170 lb 170 lb 170 lb - -   Height 5' 6\" 5' 6\" 5' 6\" 5' 6\" - -   Body mass index 27.63 kg/m2 27.44 kg/m2 27.44 kg/m2 27.44 kg/m2 - -   Some recent data might be hidden       Family History   Problem Relation Age of Onset    Diabetes Father     Cataracts Father     Colon Cancer Neg Hx     Colon Polyps Neg Hx     Liver Cancer Neg Hx     Liver Disease Neg Hx     Esophageal Cancer Neg Hx     Rectal Cancer Neg Hx     Stomach Cancer Neg Hx        Social History     Tobacco Use    Smoking status: Every Day     Packs/day: 0.50     Years: 17.00 Pack years: 8.50     Types: Cigarettes    Smokeless tobacco: Never    Tobacco comments:     \"some\" desire to quit   Substance Use Topics    Alcohol use: Yes     Alcohol/week: 0.0 standard drinks     Comment: occ      Current Outpatient Medications on File Prior to Visit   Medication Sig Dispense Refill    MYDAYIS 37.5 MG CP24 Take 50 mg by mouth daily. pantoprazole (PROTONIX) 40 MG tablet TAKE ONE TABLET EVERY DAY IF NEEDED FOR SEVERE FLAREUP AND TAGAMET IS NOT WORKING 90 tablet 3    bisoprolol-hydroCHLOROthiazide (ZIAC) 5-6.25 MG per tablet Take 1 tablet by mouth daily 90 tablet 3    levothyroxine (SYNTHROID) 112 MCG tablet TAKE 1 TABLET BY MOUTH ONCE DAILY first thing in the morning on empty stomach. 90 tablet 3    VYVANSE 60 MG CAPS TAKE 1 CAPSULE BY MOUTH EVERY MORNING (Patient not taking: No sig reported)       No current facility-administered medications on file prior to visit. Allergies   Allergen Reactions    Oxycodone-Acetaminophen        Health Maintenance   Topic Date Due    Hepatitis C screen  Never done    COVID-19 Vaccine (3 - Booster for Moderna series) 04/26/2022    DTaP/Tdap/Td vaccine (2 - Tdap) 09/29/2023 (Originally 7/7/2014)    Flu vaccine (1) 09/29/2023 (Originally 8/1/2022)    Pneumococcal 0-64 years Vaccine (1 - PCV) 01/06/2025 (Originally 12/10/1985)    Depression Monitoring  04/06/2023    Lipids  01/10/2025    Cervical cancer screen  04/07/2027    HIV screen  Completed    Hepatitis A vaccine  Aged Out    Hepatitis B vaccine  Aged Out    Hib vaccine  Aged Out    Meningococcal (ACWY) vaccine  Aged Out       Subjective:      Review of Systems    Objective:     Physical Exam  Vitals and nursing note reviewed. Constitutional:       Appearance: Normal appearance. She is well-developed. HENT:      Head: Normocephalic. Right Ear: External ear normal. A middle ear effusion is present. Tympanic membrane is not perforated or erythematous.       Left Ear: External ear normal. A middle ear effusion is present. Tympanic membrane is not perforated or erythematous. Eyes:      Pupils: Pupils are equal, round, and reactive to light. Cardiovascular:      Rate and Rhythm: Normal rate and regular rhythm. Heart sounds: Normal heart sounds. Pulmonary:      Effort: Pulmonary effort is normal.      Breath sounds: Normal breath sounds. Musculoskeletal:      Cervical back: Normal range of motion. Skin:     General: Skin is warm and dry. Capillary Refill: Capillary refill takes less than 2 seconds. Neurological:      General: No focal deficit present. Mental Status: She is alert and oriented to person, place, and time. Mental status is at baseline. Psychiatric:         Mood and Affect: Mood normal.         Behavior: Behavior normal.         Thought Content: Thought content normal.         Judgment: Judgment normal.     /76 (Site: Left Upper Arm, Position: Sitting, Cuff Size: Medium Adult)   Pulse 65   Temp 97 °F (36.1 °C) (Temporal)   Resp 18   Ht 5' 6\" (1.676 m)   Wt 171 lb 3.2 oz (77.7 kg)   LMP 09/26/2022 (Exact Date)   SpO2 99%   BMI 27.63 kg/m²     Assessment:       Diagnosis Orders   1. Dysfunction of both eustachian tubes              Plan:   More than 50% of the time was spent counseling and coordinating care for a total time of 20min face to face. PDMP Monitoring:    Last PDMP [place] as Reviewed:  Review User Review Instant Review Result            Urine Drug Screenings (1 yr)    No resulted procedures found. Medication Contract and Consent for Opioid Use Documents Filed        No documents found                     Patient given educational materials -see patient instructions. Discussed use, benefit, and side effects of prescribed medications. All patient questions answered. Pt voiced understanding. Reviewed health maintenance. Instructed to continue currentmedications, diet and exercise. Patient agreed with treatment plan. Follow up as directed.

## 2022-10-19 ENCOUNTER — OFFICE VISIT (OUTPATIENT)
Dept: FAMILY MEDICINE CLINIC | Facility: CLINIC | Age: 42
End: 2022-10-19

## 2022-10-19 VITALS
HEIGHT: 66 IN | RESPIRATION RATE: 20 BRPM | HEART RATE: 77 BPM | WEIGHT: 169 LBS | DIASTOLIC BLOOD PRESSURE: 75 MMHG | BODY MASS INDEX: 27.16 KG/M2 | SYSTOLIC BLOOD PRESSURE: 113 MMHG

## 2022-10-19 DIAGNOSIS — F90.2 ATTENTION DEFICIT HYPERACTIVITY DISORDER (ADHD), COMBINED TYPE: ICD-10-CM

## 2022-10-19 DIAGNOSIS — F90.2 ATTENTION DEFICIT HYPERACTIVITY DISORDER (ADHD), COMBINED TYPE: Primary | ICD-10-CM

## 2022-10-19 PROCEDURE — 99213 OFFICE O/P EST LOW 20 MIN: CPT | Performed by: NURSE PRACTITIONER

## 2022-10-19 RX ORDER — DEXTROAMPHETAMINE SULFATE, DEXTROAMPHETAMINE SACCHARATE, AMPHETAMINE ASPARTATE MONOHYDRATE, AND AMPHETAMINE SULFATE 12.5; 12.5; 12.5; 12.5 MG/1; MG/1; MG/1; MG/1
1 CAPSULE, EXTENDED RELEASE ORAL EVERY MORNING
Qty: 30 CAPSULE | Refills: 0 | Status: SHIPPED | OUTPATIENT
Start: 2022-10-25 | End: 2022-11-28

## 2022-10-19 NOTE — PROGRESS NOTES
"Chief Complaint  ADHD    Subjective    History of Present Illness      Patient presents to [facility] for   History of Present Illness  Pt is here for 3 month f/u for ADHD and is prescribed Mydayis.  Pt reports that everything is going well with medications.    ADHD/Mood HPI    Visit for:  follow-up. Most recent visit was 3 months ago.  Interim changes to follow up on today: no change in medication  Work/School Performance:  going well  Cognitive:  able to focus    Behavior  Hyperactivity: is not hyperactive  Impulsivity: no impulsivity and no unsafe behavior  Tasking: able to initiate tasks, able to complete tasks and able to mult-task    Social  ADHD social/impulsive symptoms:  not impatient, does not blurt out inappropriate comments and no excessive talking    Behavioral health  Behavior: no concerns  Emotional coping: demonstrates feelings of no concerns       Review of Systems    I have reviewed and agree with the HPI and ROS information as above.  Monika Forbes, APRN     Objective   Vital Signs:   /75   Pulse 77   Resp 20   Ht 167.6 cm (66\")   Wt 76.7 kg (169 lb)   BMI 27.28 kg/m²     BMI is >= 25 and <30. (Overweight) The following options were offered after discussion;: exercise counseling/recommendations and nutrition counseling/recommendations      Physical Exam     JOAQUIN-7:      PHQ-2 Depression Screening  Little interest or pleasure in doing things?     Feeling down, depressed, or hopeless?     PHQ-2 Total Score       PHQ-9 Depression Screening  Little interest or pleasure in doing things?     Feeling down, depressed, or hopeless?     Trouble falling or staying asleep, or sleeping too much?     Feeling tired or having little energy?     Poor appetite or overeating?     Feeling bad about yourself - or that you are a failure or have let yourself or your family down?     Trouble concentrating on things, such as reading the newspaper or watching television?   "   Moving or speaking so slowly that other people could have noticed? Or the opposite - being so fidgety or restless that you have been moving around a lot more than usual?     Thoughts that you would be better off dead, or of hurting yourself in some way?     PHQ-9 Total Score     If you checked off any problems, how difficult have these problems made it for you to do your work, take care of things at home, or get along with other people?        Result Review  Data Reviewed:          Urine Drug Screen - Urine, Clean Catch (04/15/2022 13:18)           Assessment and Plan      Problem List Items Addressed This Visit        Mental Health    Attention deficit hyperactivity disorder - Primary   Pt is here for her 3 month follow-up of ADHD. She is doing well on her current medications. Refill Mydayis 50mg and follow-up in 3 months. NUVIA pending. UDS up to date.         Follow Up   Return in about 3 months (around 1/19/2023) for Recheck.  Patient was given instructions and counseling regarding her condition or for health maintenance advice. Please see specific information pulled into the AVS if appropriate.

## 2022-11-25 DIAGNOSIS — F90.2 ATTENTION DEFICIT HYPERACTIVITY DISORDER (ADHD), COMBINED TYPE: ICD-10-CM

## 2022-11-28 RX ORDER — DEXTROAMPHETAMINE SULFATE, DEXTROAMPHETAMINE SACCHARATE, AMPHETAMINE ASPARTATE MONOHYDRATE, AND AMPHETAMINE SULFATE 12.5; 12.5; 12.5; 12.5 MG/1; MG/1; MG/1; MG/1
1 CAPSULE, EXTENDED RELEASE ORAL EVERY MORNING
Qty: 30 CAPSULE | Refills: 0 | Status: SHIPPED | OUTPATIENT
Start: 2022-12-27 | End: 2023-01-26

## 2022-12-04 DIAGNOSIS — M25.541 ARTHRALGIA OF BOTH HANDS: Primary | ICD-10-CM

## 2022-12-04 DIAGNOSIS — M25.542 ARTHRALGIA OF BOTH HANDS: Primary | ICD-10-CM

## 2022-12-14 ENCOUNTER — TRANSCRIBE ORDERS (OUTPATIENT)
Dept: ADMINISTRATIVE | Facility: HOSPITAL | Age: 42
End: 2022-12-14

## 2022-12-14 ENCOUNTER — HOSPITAL ENCOUNTER (OUTPATIENT)
Dept: GENERAL RADIOLOGY | Facility: HOSPITAL | Age: 42
Discharge: HOME | End: 2022-12-14
Admitting: INTERNAL MEDICINE

## 2022-12-14 DIAGNOSIS — G93.32 CHRONIC FATIGUE SYNDROME: Primary | ICD-10-CM

## 2022-12-14 PROCEDURE — 73560 X-RAY EXAM OF KNEE 1 OR 2: CPT

## 2022-12-14 PROCEDURE — 72100 X-RAY EXAM L-S SPINE 2/3 VWS: CPT

## 2023-01-19 ENCOUNTER — OFFICE VISIT (OUTPATIENT)
Dept: FAMILY MEDICINE CLINIC | Facility: CLINIC | Age: 43
End: 2023-01-19
Payer: MEDICAID

## 2023-01-19 VITALS
DIASTOLIC BLOOD PRESSURE: 83 MMHG | TEMPERATURE: 97.6 F | SYSTOLIC BLOOD PRESSURE: 136 MMHG | OXYGEN SATURATION: 100 % | HEIGHT: 66 IN | BODY MASS INDEX: 28.64 KG/M2 | WEIGHT: 178.2 LBS | RESPIRATION RATE: 18 BRPM | HEART RATE: 76 BPM

## 2023-01-19 DIAGNOSIS — F90.2 ATTENTION DEFICIT HYPERACTIVITY DISORDER (ADHD), COMBINED TYPE: Primary | ICD-10-CM

## 2023-01-19 DIAGNOSIS — E66.3 OVERWEIGHT (BMI 25.0-29.9): ICD-10-CM

## 2023-01-19 DIAGNOSIS — I10 ESSENTIAL HYPERTENSION: ICD-10-CM

## 2023-01-19 DIAGNOSIS — K21.9 GASTROESOPHAGEAL REFLUX DISEASE WITHOUT ESOPHAGITIS: ICD-10-CM

## 2023-01-19 PROCEDURE — 99214 OFFICE O/P EST MOD 30 MIN: CPT | Performed by: NURSE PRACTITIONER

## 2023-01-19 RX ORDER — DEXTROAMPHETAMINE SULFATE, DEXTROAMPHETAMINE SACCHARATE, AMPHETAMINE ASPARTATE MONOHYDRATE, AND AMPHETAMINE SULFATE 12.5; 12.5; 12.5; 12.5 MG/1; MG/1; MG/1; MG/1
1 CAPSULE, EXTENDED RELEASE ORAL EVERY MORNING
Qty: 30 CAPSULE | Refills: 0 | Status: SHIPPED | OUTPATIENT
Start: 2023-01-19 | End: 2023-02-18

## 2023-01-19 RX ORDER — DEXTROAMPHETAMINE SULFATE, DEXTROAMPHETAMINE SACCHARATE, AMPHETAMINE ASPARTATE MONOHYDRATE, AND AMPHETAMINE SULFATE 12.5; 12.5; 12.5; 12.5 MG/1; MG/1; MG/1; MG/1
1 CAPSULE, EXTENDED RELEASE ORAL EVERY MORNING
Qty: 30 CAPSULE | Refills: 0 | Status: SHIPPED | OUTPATIENT
Start: 2023-02-18 | End: 2023-03-29

## 2023-01-19 RX ORDER — AMITRIPTYLINE HYDROCHLORIDE 10 MG/1
TABLET, FILM COATED ORAL
COMMUNITY
Start: 2023-01-10

## 2023-01-19 RX ORDER — DICLOFENAC SODIUM 75 MG/1
TABLET, DELAYED RELEASE ORAL
COMMUNITY
Start: 2023-01-19

## 2023-01-19 NOTE — PROGRESS NOTES
"Chief Complaint  ADHD (Patient states that she is here for med check and refill )    Subjective        Uyen José presents to [facility]  History of Present Illness  ADHD Patient states that she is here for med check and refill           Objective   Vital Signs:  /83 (BP Location: Right arm, Patient Position: Sitting, Cuff Size: Adult)   Pulse 76   Temp 97.6 °F (36.4 °C) (Temporal)   Resp 18   Ht 167.6 cm (66\")   Wt 80.8 kg (178 lb 3.2 oz)   SpO2 100%   BMI 28.76 kg/m²   Estimated body mass index is 28.76 kg/m² as calculated from the following:    Height as of this encounter: 167.6 cm (66\").    Weight as of this encounter: 80.8 kg (178 lb 3.2 oz).       BMI is >= 25 and <30. (Overweight) The following options were offered after discussion;: weight loss educational material (shared in after visit summary)      Physical Exam  Constitutional:       Appearance: Normal appearance. She is well-developed.      Comments: overweight   HENT:      Head: Normocephalic and atraumatic.      Right Ear: External ear normal.      Left Ear: External ear normal.      Nose: Nose normal. No nasal tenderness or congestion.      Mouth/Throat:      Lips: Pink. No lesions.      Mouth: Mucous membranes are moist. No oral lesions.      Dentition: Normal dentition.      Pharynx: Oropharynx is clear. No pharyngeal swelling, oropharyngeal exudate or posterior oropharyngeal erythema.   Eyes:      General: Lids are normal. Vision grossly intact. No scleral icterus.        Right eye: No discharge.         Left eye: No discharge.      Extraocular Movements: Extraocular movements intact.      Conjunctiva/sclera: Conjunctivae normal.      Right eye: Right conjunctiva is not injected.      Left eye: Left conjunctiva is not injected.      Pupils: Pupils are equal, round, and reactive to light.   Cardiovascular:      Rate and Rhythm: Normal rate and regular rhythm.      Heart sounds: Normal heart " sounds. No murmur heard.    No gallop.   Pulmonary:      Effort: Pulmonary effort is normal.      Breath sounds: Normal breath sounds and air entry. No wheezing, rhonchi or rales.   Musculoskeletal:         General: No tenderness or deformity. Normal range of motion.      Cervical back: Full passive range of motion without pain, normal range of motion and neck supple.      Right lower leg: No edema.      Left lower leg: No edema.   Skin:     General: Skin is warm and dry.      Coloration: Skin is not jaundiced.      Findings: No rash.   Neurological:      Mental Status: She is alert and oriented to person, place, and time.      Sensory: Sensation is intact.      Motor: Motor function is intact.      Coordination: Coordination is intact.      Gait: Gait is intact.   Psychiatric:         Attention and Perception: Attention normal.         Mood and Affect: Mood and affect normal.         Behavior: Behavior is not hyperactive. Behavior is cooperative.         Thought Content: Thought content normal.         Judgment: Judgment normal.        Result Review :  The following data was reviewed by: BLAS Ren on 01/19/2023:    Urine Drug Screen - Urine, Clean Catch (04/15/2022 13:18)             Assessment and Plan   Diagnoses and all orders for this visit:    1. Attention deficit hyperactivity disorder (ADHD), combined type (Primary)    2. Essential hypertension    3. Gastroesophageal reflux disease without esophagitis    4. Overweight (BMI 25.0-29.9)      Patient here today for an ADHD follow-up.  She states she is doing well with her current dose of Mydayis.  She feels her focus is stable.  Wishes to continue the same.  Blood pressure, thyroid, and reflux medications are managed per PCP.  Continue Mydayis 50 mg daily.  Chip is clean.  UDS is up-to-date and appropriate.  Follow-up 3 months.        ADHD Follow up:    PDMP reviewed and is clean. ADRS (Adult ADHD Assessment Form) reviewed in detail, scanned in  chart, and has been reviewed at time of appointment.  All questions, including medication and side effects, were discussed in detail at time of patient's visit. Patient will continue same medication which was discussed at today's visit. Patient is to return in 3 month(s).    Last Urine Toxicity     LAST URINE TOXICITY RESULTS Latest Ref Rng & Units 4/15/2022 2/9/2021    AMPHETAMINES SCREEN, URINE Negative Positive(A) Positive(A)    BARBITURATES SCREEN Negative Negative Negative    BENZODIAZEPINE SCREEN, URINE Negative Negative Negative    BUPRENORPHINEUR Negative Negative Negative    COCAINE SCREEN, URINE Negative Negative Negative    METHADONE SCREEN, URINE Negative Negative Negative    METHAMPHETAMINEUR Negative Negative Negative           Urine Drug Screen Results: UDS RESULTS: Current results appropriate      Follow Up   Return in about 3 months (around 4/19/2023) for Recheck.  Patient was given instructions and counseling regarding her condition or for health maintenance advice. Please see specific information pulled into the AVS if appropriate.

## 2023-03-02 ENCOUNTER — TELEPHONE (OUTPATIENT)
Dept: FAMILY MEDICINE CLINIC | Facility: CLINIC | Age: 43
End: 2023-03-02
Payer: MEDICAID

## 2023-03-02 NOTE — TELEPHONE ENCOUNTER
Patient called checking on status of PA for her Mydayis. States she put this request in on her mychart. Routing to Rita.

## 2023-03-03 NOTE — TELEPHONE ENCOUNTER
Submitted pa via covermymeds for pt's mydayis. Awaiting determination. Sent pt Turbulenzt message.

## 2023-03-29 DIAGNOSIS — F90.2 ATTENTION DEFICIT HYPERACTIVITY DISORDER (ADHD), COMBINED TYPE: ICD-10-CM

## 2023-03-29 RX ORDER — DEXTROAMPHETAMINE SULFATE, DEXTROAMPHETAMINE SACCHARATE, AMPHETAMINE ASPARTATE MONOHYDRATE, AND AMPHETAMINE SULFATE 12.5; 12.5; 12.5; 12.5 MG/1; MG/1; MG/1; MG/1
1 CAPSULE, EXTENDED RELEASE ORAL EVERY MORNING
Qty: 30 CAPSULE | Refills: 0 | Status: SHIPPED | OUTPATIENT
Start: 2023-03-29 | End: 2023-04-28

## 2023-04-03 RX ORDER — PANTOPRAZOLE SODIUM 40 MG/1
TABLET, DELAYED RELEASE ORAL
Qty: 90 TABLET | Refills: 3 | Status: SHIPPED | OUTPATIENT
Start: 2023-04-03

## 2023-04-03 RX ORDER — LEVOTHYROXINE SODIUM 112 UG/1
TABLET ORAL
Qty: 90 TABLET | Refills: 3 | Status: SHIPPED | OUTPATIENT
Start: 2023-04-03

## 2023-04-03 RX ORDER — BISOPROLOL FUMARATE AND HYDROCHLOROTHIAZIDE 5; 6.25 MG/1; MG/1
1 TABLET ORAL DAILY
Qty: 90 TABLET | Refills: 3 | Status: SHIPPED | OUTPATIENT
Start: 2023-04-03

## 2023-04-03 RX ORDER — AZELASTINE HYDROCHLORIDE, FLUTICASONE PROPIONATE 137; 50 UG/1; UG/1
SPRAY, METERED NASAL
Qty: 1 EACH | Refills: 1 | Status: SHIPPED | OUTPATIENT
Start: 2023-04-03

## 2023-04-04 RX ORDER — BISOPROLOL FUMARATE AND HYDROCHLOROTHIAZIDE 5; 6.25 MG/1; MG/1
TABLET ORAL
Qty: 90 TABLET | Refills: 3 | OUTPATIENT
Start: 2023-04-04

## 2023-04-04 RX ORDER — LEVOTHYROXINE SODIUM 112 UG/1
TABLET ORAL
Qty: 90 TABLET | Refills: 3 | OUTPATIENT
Start: 2023-04-04

## 2023-04-04 RX ORDER — PANTOPRAZOLE SODIUM 40 MG/1
TABLET, DELAYED RELEASE ORAL
Qty: 90 TABLET | Refills: 3 | OUTPATIENT
Start: 2023-04-04

## 2023-04-19 RX ORDER — BISOPROLOL FUMARATE AND HYDROCHLOROTHIAZIDE 5; 6.25 MG/1; MG/1
1 TABLET ORAL DAILY
Qty: 90 TABLET | Refills: 3 | Status: SHIPPED | OUTPATIENT
Start: 2023-04-19

## 2023-04-19 RX ORDER — PANTOPRAZOLE SODIUM 40 MG/1
TABLET, DELAYED RELEASE ORAL
Qty: 90 TABLET | Refills: 3 | Status: SHIPPED | OUTPATIENT
Start: 2023-04-19

## 2023-04-19 RX ORDER — LEVOTHYROXINE SODIUM 112 UG/1
TABLET ORAL
Qty: 90 TABLET | Refills: 3 | Status: SHIPPED | OUTPATIENT
Start: 2023-04-19

## 2023-04-21 DIAGNOSIS — N93.8 DYSFUNCTIONAL UTERINE BLEEDING: Primary | ICD-10-CM

## 2023-04-21 LAB
CLINICAL REPORT: NORMAL
CYTOLOGY REVIEW, GYN: NORMAL
DIAGNOSIS ICD CODE: NORMAL
HB: CYTOTECHNOLT: NORMAL
Lab: NORMAL
MICROSCOPIC OBSERVATION: NORMAL
SPECIMEN ADEQUACY:: NORMAL

## 2023-05-02 DIAGNOSIS — F90.2 ATTENTION DEFICIT HYPERACTIVITY DISORDER (ADHD), COMBINED TYPE: ICD-10-CM

## 2023-05-02 RX ORDER — DEXTROAMPHETAMINE SULFATE, DEXTROAMPHETAMINE SACCHARATE, AMPHETAMINE ASPARTATE MONOHYDRATE, AND AMPHETAMINE SULFATE 12.5; 12.5; 12.5; 12.5 MG/1; MG/1; MG/1; MG/1
1 CAPSULE, EXTENDED RELEASE ORAL EVERY MORNING
Qty: 30 CAPSULE | Refills: 0 | OUTPATIENT
Start: 2023-05-02 | End: 2023-06-01

## 2023-05-08 ENCOUNTER — OFFICE VISIT (OUTPATIENT)
Dept: FAMILY MEDICINE CLINIC | Facility: CLINIC | Age: 43
End: 2023-05-08
Payer: MEDICAID

## 2023-05-08 VITALS
DIASTOLIC BLOOD PRESSURE: 73 MMHG | RESPIRATION RATE: 20 BRPM | BODY MASS INDEX: 28.45 KG/M2 | SYSTOLIC BLOOD PRESSURE: 119 MMHG | HEART RATE: 74 BPM | WEIGHT: 177 LBS | TEMPERATURE: 98 F | HEIGHT: 66 IN

## 2023-05-08 DIAGNOSIS — Z51.81 MEDICATION MONITORING ENCOUNTER: ICD-10-CM

## 2023-05-08 DIAGNOSIS — F90.2 ATTENTION DEFICIT HYPERACTIVITY DISORDER (ADHD), COMBINED TYPE: Primary | ICD-10-CM

## 2023-05-08 RX ORDER — HYDROXYCHLOROQUINE SULFATE 200 MG/1
TABLET, FILM COATED ORAL
COMMUNITY
Start: 2023-05-02

## 2023-05-08 RX ORDER — DEXTROAMPHETAMINE SULFATE, DEXTROAMPHETAMINE SACCHARATE, AMPHETAMINE ASPARTATE MONOHYDRATE, AND AMPHETAMINE SULFATE 12.5; 12.5; 12.5; 12.5 MG/1; MG/1; MG/1; MG/1
1 CAPSULE, EXTENDED RELEASE ORAL EVERY MORNING
Qty: 30 CAPSULE | Refills: 0 | Status: SHIPPED | OUTPATIENT
Start: 2023-05-08 | End: 2023-06-07

## 2023-05-08 RX ORDER — DEXTROAMPHETAMINE SULFATE, DEXTROAMPHETAMINE SACCHARATE, AMPHETAMINE ASPARTATE MONOHYDRATE, AND AMPHETAMINE SULFATE 12.5; 12.5; 12.5; 12.5 MG/1; MG/1; MG/1; MG/1
1 CAPSULE, EXTENDED RELEASE ORAL EVERY MORNING
Qty: 30 CAPSULE | Refills: 0 | Status: SHIPPED | OUTPATIENT
Start: 2023-06-07 | End: 2023-07-07

## 2023-05-08 NOTE — PROGRESS NOTES
"Chief Complaint  ADHD    Subjective    History of Present Illness      Patient presents to [facility] for   History of Present Illness  ADHD/Mood HPI    Visit for:  follow-up. Most recent visit was 4 months ago.  Interim changes to follow up on today: no change in medication  Work/School Performance:  going well  Cognitive:  able to focus    Behavior  Hyperactivity: is not hyperactive  Impulsivity: no impulsivity and no unsafe behavior  Tasking: able to initiate tasks, able to complete tasks and able to mult-task    Social  ADHD social/impulsive symptoms:  not impatient, does not blurt out inappropriate comments and no excessive talking    Behavioral health  Behavior: no concerns  Emotional coping: demonstrates feelings of no concerns       Review of Systems    I have reviewed and agree with the HPI information as above.  Josi Ivory, APRN     Objective   Vital Signs:   /73   Pulse 74   Temp 98 °F (36.7 °C)   Resp 20   Ht 167.6 cm (66\")   Wt 80.3 kg (177 lb)   BMI 28.57 kg/m²     BMI is >= 25 and <30. (Overweight) The following options were offered after discussion;: weight loss educational material (shared in after visit summary), exercise counseling/recommendations and nutrition counseling/recommendations      Physical Exam     JOAQUIN-7: Over the last two weeks, how often have you been bothered by the following problems?  Feeling nervous, anxious or on edge: Not at all  Not being able to stop or control worrying: Not at all  Worrying too much about different things: Not at all  Trouble Relaxing: Not at all  Being so restless that it is hard to sit still: Not at all  Becoming easily annoyed or irritable: Not at all  Feeling afraid as if something awful might happen: Not at all  JOAQUIN 7 Total Score: 0  If you checked any problems, how difficult have these problems made it for you to do your work, take care of things at home, or get along with other people: Not difficult " at all    PHQ-2 Depression Screening  Little interest or pleasure in doing things? 0-->not at all   Feeling down, depressed, or hopeless? 0-->not at all   PHQ-2 Total Score 0     PHQ-9 Depression Screening  Little interest or pleasure in doing things? 0-->not at all   Feeling down, depressed, or hopeless? 0-->not at all   Trouble falling or staying asleep, or sleeping too much?     Feeling tired or having little energy?     Poor appetite or overeating?     Feeling bad about yourself - or that you are a failure or have let yourself or your family down?     Trouble concentrating on things, such as reading the newspaper or watching television?     Moving or speaking so slowly that other people could have noticed? Or the opposite - being so fidgety or restless that you have been moving around a lot more than usual?     Thoughts that you would be better off dead, or of hurting yourself in some way?     PHQ-9 Total Score 0   If you checked off any problems, how difficult have these problems made it for you to do your work, take care of things at home, or get along with other people?        Result Review  Data Reviewed:                   Assessment and Plan      Diagnoses and all orders for this visit:    1. Attention deficit hyperactivity disorder (ADHD), combined type (Primary)    2. Medication monitoring encounter  -     Urine Drug Screen - Urine, Clean Catch; Future    Doing well on ADHD medications. Needs an UTD UDS. She has to go back to work today, but will swing by soon.   ADHD Follow up:    PDMP reviewed and is clean. ADRS (Adult ADHD Assessment Form) reviewed in detail, scanned in chart, and has been reviewed at time of appointment.  All questions, including medication and side effects, were discussed in detail at time of patient's visit. Patient will continue same medication which was discussed at today's visit. Patient is to return in 3 month(s).    Last Urine Toxicity         Latest Ref Rng & Units 4/15/2022  2/9/2021   LAST URINE TOXICITY RESULTS   Amphetamine, Urine Qual Negative Positive   Positive     Barbiturates Screen, Urine Negative Negative   Negative     Benzodiazepine Screen, Urine Negative Negative   Negative     Buprenorphine, Screen, Urine Negative Negative   Negative     Cocaine Screen, Urine Negative Negative   Negative     Methadone Screen , Urine Negative Negative   Negative     Methamphetamine, Ur Negative Negative   Negative                   Urine Drug Screen Results: UDS RESULTS: Current results appropriate          Follow Up   Return in about 3 months (around 8/8/2023) for ADHD follow up.  Patient was given instructions and counseling regarding her condition or for health maintenance advice. Please see specific information pulled into the AVS if appropriate.

## 2023-05-23 ENCOUNTER — TRANSCRIBE ORDERS (OUTPATIENT)
Dept: ADMINISTRATIVE | Facility: HOSPITAL | Age: 43
End: 2023-05-23
Payer: MEDICAID

## 2023-05-23 DIAGNOSIS — Z13.220 ENCOUNTER FOR SCREENING FOR LIPID DISORDER: ICD-10-CM

## 2023-05-23 DIAGNOSIS — Z13.1 SCREENING FOR DIABETES MELLITUS: ICD-10-CM

## 2023-05-23 DIAGNOSIS — E03.9 ACQUIRED HYPOTHYROIDISM: Primary | ICD-10-CM

## 2023-06-09 DIAGNOSIS — N93.9 ABNORMAL UTERINE BLEEDING (AUB): Primary | ICD-10-CM

## 2023-06-30 ENCOUNTER — OFFICE VISIT (OUTPATIENT)
Dept: OBGYN CLINIC | Age: 43
End: 2023-06-30

## 2023-06-30 VITALS
DIASTOLIC BLOOD PRESSURE: 86 MMHG | BODY MASS INDEX: 28.12 KG/M2 | WEIGHT: 175 LBS | HEIGHT: 66 IN | SYSTOLIC BLOOD PRESSURE: 132 MMHG | HEART RATE: 72 BPM

## 2023-06-30 DIAGNOSIS — Z76.89 ENCOUNTER TO ESTABLISH CARE: Primary | ICD-10-CM

## 2023-06-30 DIAGNOSIS — N92.6 IRREGULAR BLEEDING: ICD-10-CM

## 2023-06-30 DIAGNOSIS — Z01.812 PRE-PROCEDURAL LABORATORY EXAMINATION: ICD-10-CM

## 2023-06-30 DIAGNOSIS — D25.9 UTERINE LEIOMYOMA, UNSPECIFIED LOCATION: ICD-10-CM

## 2023-06-30 DIAGNOSIS — N93.8 DYSFUNCTIONAL UTERINE BLEEDING: ICD-10-CM

## 2023-06-30 DIAGNOSIS — F17.200 SMOKER: ICD-10-CM

## 2023-06-30 DIAGNOSIS — Z98.51 HISTORY OF TUBAL LIGATION: ICD-10-CM

## 2023-07-01 ASSESSMENT — ENCOUNTER SYMPTOMS
BACK PAIN: 0
NAUSEA: 0
GASTROINTESTINAL NEGATIVE: 1
SHORTNESS OF BREATH: 0
CHEST TIGHTNESS: 0
DIARRHEA: 0
RESPIRATORY NEGATIVE: 1
RECTAL PAIN: 0
CONSTIPATION: 0
ABDOMINAL PAIN: 0

## 2023-07-03 ENCOUNTER — TELEPHONE (OUTPATIENT)
Dept: OBGYN CLINIC | Age: 43
End: 2023-07-03

## 2023-08-02 ENCOUNTER — OFFICE VISIT (OUTPATIENT)
Dept: FAMILY MEDICINE CLINIC | Facility: CLINIC | Age: 43
End: 2023-08-02
Payer: MEDICAID

## 2023-08-02 VITALS
OXYGEN SATURATION: 100 % | SYSTOLIC BLOOD PRESSURE: 117 MMHG | HEIGHT: 66 IN | WEIGHT: 175 LBS | HEART RATE: 71 BPM | DIASTOLIC BLOOD PRESSURE: 78 MMHG | BODY MASS INDEX: 28.12 KG/M2

## 2023-08-02 DIAGNOSIS — F90.2 ATTENTION DEFICIT HYPERACTIVITY DISORDER (ADHD), COMBINED TYPE: ICD-10-CM

## 2023-08-02 PROCEDURE — 3078F DIAST BP <80 MM HG: CPT | Performed by: PEDIATRICS

## 2023-08-02 PROCEDURE — 3074F SYST BP LT 130 MM HG: CPT | Performed by: PEDIATRICS

## 2023-08-02 PROCEDURE — 99213 OFFICE O/P EST LOW 20 MIN: CPT | Performed by: PEDIATRICS

## 2023-08-02 RX ORDER — DEXTROAMPHETAMINE SULFATE, DEXTROAMPHETAMINE SACCHARATE, AMPHETAMINE ASPARTATE MONOHYDRATE, AND AMPHETAMINE SULFATE 12.5; 12.5; 12.5; 12.5 MG/1; MG/1; MG/1; MG/1
1 CAPSULE, EXTENDED RELEASE ORAL EVERY MORNING
Qty: 30 CAPSULE | Refills: 0 | Status: SHIPPED | OUTPATIENT
Start: 2023-09-01 | End: 2023-10-01

## 2023-08-02 RX ORDER — DEXTROAMPHETAMINE SULFATE, DEXTROAMPHETAMINE SACCHARATE, AMPHETAMINE ASPARTATE MONOHYDRATE, AND AMPHETAMINE SULFATE 12.5; 12.5; 12.5; 12.5 MG/1; MG/1; MG/1; MG/1
1 CAPSULE, EXTENDED RELEASE ORAL EVERY MORNING
Qty: 30 CAPSULE | Refills: 0 | Status: SHIPPED | OUTPATIENT
Start: 2023-08-02 | End: 2023-09-01

## 2023-08-09 ASSESSMENT — ENCOUNTER SYMPTOMS
EYES NEGATIVE: 1
RESPIRATORY NEGATIVE: 1

## 2023-08-09 NOTE — PROGRESS NOTES
Emily Alejo is a 36 y.o.  who presents today for a surgical consult for irregular, heavy periods. 3/4-3/17, 3/20-3/28. Bleeding average 8 days  10/2020 tubal  Severe pain the first 3 days. Lower back, cramping, fatigued. No dyspareuenia. Increased urinary frequency. +constipation    Review of Systems   Constitutional:  Positive for fatigue. HENT: Negative. Eyes: Negative. Respiratory: Negative. Cardiovascular: Negative. Gastrointestinal:  Positive for constipation. Genitourinary:  Positive for menstrual problem. Negative for dysuria, frequency, pelvic pain, urgency and vaginal discharge. Musculoskeletal:  Positive for back pain. Skin: Negative. Neurological: Negative. Psychiatric/Behavioral: Negative.        Past Medical History:   Diagnosis Date    Hypothyroidism     Irritable bowel syndrome        Past Surgical History:   Procedure Laterality Date    CARPAL TUNNEL RELEASE Right 06/16/2022    RIGHT CARPAL TUNNEL RELEASE performed by Efrain Gonzalez MD at [address] [place] [address]  05/2012    DILATION AND CURETTAGE OF UTERUS      GALLBLADDER SURGERY      May 2012    LAPAROSCOPY      SALPINGECTOMY Bilateral        Family History   Problem Relation Age of Onset    Diabetes Father     Cataracts Father     Colon Cancer Neg Hx     Colon Polyps Neg Hx     Liver Cancer Neg Hx     Liver Disease Neg Hx     Esophageal Cancer Neg Hx     Rectal Cancer Neg Hx     Stomach Cancer Neg Hx        Social History     Socioeconomic History    Marital status:      Spouse name: Not on file    Number of children: 1    Years of education: Not on file    Highest education level: Not on file   Occupational History    Occupation:    Tobacco Use    Smoking status: Every Day     Packs/day: 0.50     Years: 17.00     Pack years: 8.50     Types: Cigarettes    Smokeless tobacco: Never    Tobacco comments:     \"some\" desire to quit   Vaping Use    Vaping Use: Never used   Substance and
Pt is here today for a surgical consult for endometrial ablation or hysterectomy
with patient

## 2023-08-10 ENCOUNTER — OFFICE VISIT (OUTPATIENT)
Dept: OBGYN CLINIC | Age: 43
End: 2023-08-10
Payer: MEDICAID

## 2023-08-10 VITALS
WEIGHT: 176 LBS | HEART RATE: 74 BPM | HEIGHT: 66 IN | DIASTOLIC BLOOD PRESSURE: 72 MMHG | BODY MASS INDEX: 28.28 KG/M2 | SYSTOLIC BLOOD PRESSURE: 109 MMHG

## 2023-08-10 DIAGNOSIS — D25.9 UTERINE LEIOMYOMA, UNSPECIFIED LOCATION: ICD-10-CM

## 2023-08-10 DIAGNOSIS — N93.8 DYSFUNCTIONAL UTERINE BLEEDING: ICD-10-CM

## 2023-08-10 DIAGNOSIS — Z98.51 HISTORY OF TUBAL LIGATION: ICD-10-CM

## 2023-08-10 DIAGNOSIS — Z71.9 ENCOUNTER FOR CONSULTATION: Primary | ICD-10-CM

## 2023-08-10 PROCEDURE — 3074F SYST BP LT 130 MM HG: CPT | Performed by: OBSTETRICS & GYNECOLOGY

## 2023-08-10 PROCEDURE — 3078F DIAST BP <80 MM HG: CPT | Performed by: OBSTETRICS & GYNECOLOGY

## 2023-08-10 PROCEDURE — 99214 OFFICE O/P EST MOD 30 MIN: CPT | Performed by: OBSTETRICS & GYNECOLOGY

## 2023-08-14 ASSESSMENT — ENCOUNTER SYMPTOMS
CONSTIPATION: 1
BACK PAIN: 1

## 2023-08-21 ENCOUNTER — TELEPHONE (OUTPATIENT)
Dept: OBGYN CLINIC | Age: 43
End: 2023-08-21

## 2023-08-21 NOTE — TELEPHONE ENCOUNTER
Called pt per her my chart message. Per Dr. Mir Schuster, she will do her colpo during surgery. Pt v/u.

## 2023-08-31 ENCOUNTER — OFFICE VISIT (OUTPATIENT)
Dept: OBGYN CLINIC | Age: 43
End: 2023-08-31
Payer: MEDICAID

## 2023-08-31 VITALS
HEART RATE: 85 BPM | BODY MASS INDEX: 28.45 KG/M2 | HEIGHT: 66 IN | DIASTOLIC BLOOD PRESSURE: 83 MMHG | SYSTOLIC BLOOD PRESSURE: 121 MMHG | WEIGHT: 177 LBS

## 2023-08-31 DIAGNOSIS — Z98.51 HISTORY OF TUBAL LIGATION: ICD-10-CM

## 2023-08-31 DIAGNOSIS — D25.9 UTERINE LEIOMYOMA, UNSPECIFIED LOCATION: ICD-10-CM

## 2023-08-31 DIAGNOSIS — Z01.818 PRE-OP EVALUATION: ICD-10-CM

## 2023-08-31 DIAGNOSIS — J32.9 SINUSITIS, UNSPECIFIED CHRONICITY, UNSPECIFIED LOCATION: ICD-10-CM

## 2023-08-31 DIAGNOSIS — N93.8 DYSFUNCTIONAL UTERINE BLEEDING: Primary | ICD-10-CM

## 2023-08-31 DIAGNOSIS — N92.6 IRREGULAR BLEEDING: ICD-10-CM

## 2023-08-31 PROCEDURE — 3078F DIAST BP <80 MM HG: CPT | Performed by: OBSTETRICS & GYNECOLOGY

## 2023-08-31 PROCEDURE — 3074F SYST BP LT 130 MM HG: CPT | Performed by: OBSTETRICS & GYNECOLOGY

## 2023-08-31 PROCEDURE — 99213 OFFICE O/P EST LOW 20 MIN: CPT | Performed by: OBSTETRICS & GYNECOLOGY

## 2023-08-31 RX ORDER — AZITHROMYCIN 250 MG/1
250 TABLET, FILM COATED ORAL SEE ADMIN INSTRUCTIONS
Qty: 6 TABLET | Refills: 0 | Status: SHIPPED | OUTPATIENT
Start: 2023-08-31 | End: 2023-09-05

## 2023-08-31 RX ORDER — MISOPROSTOL 200 UG/1
200 TABLET ORAL ONCE
Qty: 1 TABLET | Refills: 0 | Status: SHIPPED | OUTPATIENT
Start: 2023-08-31 | End: 2023-08-31

## 2023-08-31 ASSESSMENT — ENCOUNTER SYMPTOMS
RESPIRATORY NEGATIVE: 1
GASTROINTESTINAL NEGATIVE: 1
EYES NEGATIVE: 1

## 2023-08-31 NOTE — PROGRESS NOTES
Emily Alejo is a 36 y.o.  who presents today for her pre op visit for a Hysteroscopy, endometrial ablation with Novosure, D&C, colposcopy on 9-22-23 for DUB, irregular periods, uterine fibroid. Pt declines colpo in office for LSIL on a previous, will do in surgery. Pt states she has a sinus infection with facial pressure and blowing yellow mucous. Symptoms for more than a week without relief using OTC medications    Review of Systems   Constitutional: Negative. HENT: Negative. Eyes: Negative. Respiratory: Negative. Cardiovascular: Negative. Gastrointestinal: Negative. Genitourinary: Negative. Negative for dysuria, frequency, menstrual problem, pelvic pain, urgency and vaginal discharge. Skin: Negative. Neurological: Negative. Psychiatric/Behavioral: Negative.        Past Medical History:   Diagnosis Date    Hypothyroidism     Irritable bowel syndrome        Past Surgical History:   Procedure Laterality Date    CARPAL TUNNEL RELEASE Right 06/16/2022    RIGHT CARPAL TUNNEL RELEASE performed by Efrain Gonzalez MD at [address] [place] [address]  05/2012    DILATION AND CURETTAGE OF UTERUS      GALLBLADDER SURGERY      May 2012    LAPAROSCOPY      SALPINGECTOMY Bilateral        Family History   Problem Relation Age of Onset    Diabetes Father     Cataracts Father     Colon Cancer Neg Hx     Colon Polyps Neg Hx     Liver Cancer Neg Hx     Liver Disease Neg Hx     Esophageal Cancer Neg Hx     Rectal Cancer Neg Hx     Stomach Cancer Neg Hx        Social History     Socioeconomic History    Marital status:      Spouse name: Not on file    Number of children: 1    Years of education: Not on file    Highest education level: Not on file   Occupational History    Occupation:    Tobacco Use    Smoking status: Every Day     Packs/day: 0.50     Years: 17.00     Pack years: 8.50     Types: Cigarettes    Smokeless tobacco: Never    Tobacco comments:     \"some\" desire to quit

## 2023-09-11 DIAGNOSIS — F90.2 ATTENTION DEFICIT HYPERACTIVITY DISORDER (ADHD), COMBINED TYPE: ICD-10-CM

## 2023-09-11 RX ORDER — DEXTROAMPHETAMINE SULFATE, DEXTROAMPHETAMINE SACCHARATE, AMPHETAMINE ASPARTATE MONOHYDRATE, AND AMPHETAMINE SULFATE 12.5; 12.5; 12.5; 12.5 MG/1; MG/1; MG/1; MG/1
1 CAPSULE, EXTENDED RELEASE ORAL EVERY MORNING
Qty: 30 CAPSULE | Refills: 0 | Status: SHIPPED | OUTPATIENT
Start: 2023-09-11 | End: 2023-10-11

## 2023-09-19 ENCOUNTER — HOSPITAL ENCOUNTER (OUTPATIENT)
Dept: PREADMISSION TESTING | Age: 43
End: 2023-09-19
Payer: MEDICAID

## 2023-09-19 VITALS — WEIGHT: 176 LBS | BODY MASS INDEX: 28.41 KG/M2

## 2023-09-19 LAB
BASOPHILS # BLD: 0.1 K/UL (ref 0–0.2)
BASOPHILS NFR BLD: 0.9 % (ref 0–1)
EKG P AXIS: 55 DEGREES
EKG P-R INTERVAL: 138 MS
EKG Q-T INTERVAL: 412 MS
EKG QRS DURATION: 90 MS
EKG QTC CALCULATION (BAZETT): 426 MS
EKG T AXIS: 60 DEGREES
EOSINOPHIL # BLD: 0.6 K/UL (ref 0–0.6)
EOSINOPHIL NFR BLD: 4.1 % (ref 0–5)
ERYTHROCYTE [DISTWIDTH] IN BLOOD BY AUTOMATED COUNT: 14.1 % (ref 11.5–14.5)
HCT VFR BLD AUTO: 46 % (ref 37–47)
HGB BLD-MCNC: 14.9 G/DL (ref 12–16)
IMM GRANULOCYTES # BLD: 0.1 K/UL
LYMPHOCYTES # BLD: 2.7 K/UL (ref 1.1–4.5)
LYMPHOCYTES NFR BLD: 20 % (ref 20–40)
MCH RBC QN AUTO: 30.3 PG (ref 27–31)
MCHC RBC AUTO-ENTMCNC: 32.4 G/DL (ref 33–37)
MCV RBC AUTO: 93.5 FL (ref 81–99)
MONOCYTES # BLD: 1 K/UL (ref 0–0.9)
MONOCYTES NFR BLD: 7.7 % (ref 0–10)
NEUTROPHILS # BLD: 9 K/UL (ref 1.5–7.5)
NEUTS SEG NFR BLD: 66.9 % (ref 50–65)
PLATELET # BLD AUTO: 243 K/UL (ref 130–400)
PMV BLD AUTO: 12.9 FL (ref 9.4–12.3)
RBC # BLD AUTO: 4.92 M/UL (ref 4.2–5.4)
WBC # BLD AUTO: 13.4 K/UL (ref 4.8–10.8)

## 2023-09-19 PROCEDURE — 93010 ELECTROCARDIOGRAM REPORT: CPT | Performed by: INTERNAL MEDICINE

## 2023-09-19 PROCEDURE — 93005 ELECTROCARDIOGRAM TRACING: CPT | Performed by: ANESTHESIOLOGY

## 2023-09-19 PROCEDURE — 85025 COMPLETE CBC W/AUTO DIFF WBC: CPT

## 2023-09-19 NOTE — DISCHARGE INSTRUCTIONS
The day before surgery you will receive a phone call from the surgery nurse to let you know what time to arrive on the day of surgery. This call will usually be between 2-4 PM. If you do not receive a phone call by 4 PM the day before your surgery please call [phone] and let them know you have not received an arrival time. If your surgery is on Monday, your call will be on the Friday before your Monday surgery. The morning of surgery, you may take all your prescribed medications with a sip of water. Any exceptions to this would be listed below:       Alireza Tanner not eat or drink anything after midnight, the night before your surgery. No gum or candy the morning of surgery. This is extremely important for your safety. Take a bath (or shower) the night before your surgery and you may brush your teeth the morning of your surgery. You will be scheduled to arrive at the hospital 2 hours before your surgery, or follow your surgeon's instructions. Dress comfortably. Wear loose clothing that will be easy to remove and comfortable for your trip home. You may wear eyeglasses or contacts but bring your cases with you as they must be remove before your surgery. Hearing aids and dentures will need to be removed before your surgery. Do not wear any jewelry, including body jewelry. All jewelry will need to be removed prior to your surgery. Do not wear fingernail polish or make-up. It is best not to bring any valuables with you. If you are to stay in the hospital overnight, bring your robe, slippers and personal toiletries that you may need. POSTOPERATIVE GUIDELINES AFTER RECEIVING ANESTHESIA    If you are to go home after your surgery, you will need a responsible adult to drive you home.      You will not be able to take public transportation after your discharge from the Operative Care Unit unless you are accompanied by a        responsible

## 2023-09-21 ENCOUNTER — TELEPHONE (OUTPATIENT)
Dept: OBGYN CLINIC | Age: 43
End: 2023-09-21

## 2023-09-21 NOTE — TELEPHONE ENCOUNTER
Called & spoke with pt about sx details. NPO after midnight. Cytotec insert before bedtime. Pt verbalized understanding.   Isabel Ness MA

## 2023-09-22 ENCOUNTER — HOSPITAL ENCOUNTER (OUTPATIENT)
Age: 43
Setting detail: OUTPATIENT SURGERY
Discharge: HOME | End: 2023-09-22
Attending: OBSTETRICS & GYNECOLOGY | Admitting: OBSTETRICS & GYNECOLOGY
Payer: MEDICAID

## 2023-09-22 ENCOUNTER — ANESTHESIA (OUTPATIENT)
Dept: OPERATING ROOM | Age: 43
End: 2023-09-22
Payer: MEDICAID

## 2023-09-22 ENCOUNTER — ANESTHESIA EVENT (OUTPATIENT)
Dept: OPERATING ROOM | Age: 43
End: 2023-09-22
Payer: MEDICAID

## 2023-09-22 VITALS
WEIGHT: 176 LBS | HEIGHT: 66 IN | OXYGEN SATURATION: 97 % | DIASTOLIC BLOOD PRESSURE: 90 MMHG | HEART RATE: 70 BPM | SYSTOLIC BLOOD PRESSURE: 132 MMHG | BODY MASS INDEX: 28.28 KG/M2 | RESPIRATION RATE: 16 BRPM | TEMPERATURE: 97.6 F

## 2023-09-22 DIAGNOSIS — R87.619 ABNORMAL CERVICAL PAPANICOLAOU SMEAR, UNSPECIFIED ABNORMAL PAP FINDING: ICD-10-CM

## 2023-09-22 DIAGNOSIS — D25.9 UTERINE LEIOMYOMA, UNSPECIFIED LOCATION: ICD-10-CM

## 2023-09-22 DIAGNOSIS — N93.8 DUB (DYSFUNCTIONAL UTERINE BLEEDING): ICD-10-CM

## 2023-09-22 DIAGNOSIS — Z98.890 S/P ENDOMETRIAL ABLATION: Primary | ICD-10-CM

## 2023-09-22 LAB — HCG SERPL QL: NEGATIVE

## 2023-09-22 PROCEDURE — 6370000000 HC RX 637 (ALT 250 FOR IP): Performed by: ANESTHESIOLOGY

## 2023-09-22 PROCEDURE — 36415 COLL VENOUS BLD VENIPUNCTURE: CPT

## 2023-09-22 PROCEDURE — 2720000010 HC SURG SUPPLY STERILE: Performed by: OBSTETRICS & GYNECOLOGY

## 2023-09-22 PROCEDURE — A4216 STERILE WATER/SALINE, 10 ML: HCPCS | Performed by: ANESTHESIOLOGY

## 2023-09-22 PROCEDURE — 7100000001 HC PACU RECOVERY - ADDTL 15 MIN: Performed by: OBSTETRICS & GYNECOLOGY

## 2023-09-22 PROCEDURE — 88305 TISSUE EXAM BY PATHOLOGIST: CPT

## 2023-09-22 PROCEDURE — 2500000003 HC RX 250 WO HCPCS: Performed by: ANESTHESIOLOGY

## 2023-09-22 PROCEDURE — 3700000001 HC ADD 15 MINUTES (ANESTHESIA): Performed by: OBSTETRICS & GYNECOLOGY

## 2023-09-22 PROCEDURE — 58563 HYSTEROSCOPY ABLATION: CPT | Performed by: OBSTETRICS & GYNECOLOGY

## 2023-09-22 PROCEDURE — 3700000000 HC ANESTHESIA ATTENDED CARE: Performed by: OBSTETRICS & GYNECOLOGY

## 2023-09-22 PROCEDURE — 7100000011 HC PHASE II RECOVERY - ADDTL 15 MIN: Performed by: OBSTETRICS & GYNECOLOGY

## 2023-09-22 PROCEDURE — 2500000003 HC RX 250 WO HCPCS: Performed by: NURSE ANESTHETIST, CERTIFIED REGISTERED

## 2023-09-22 PROCEDURE — 2580000003 HC RX 258: Performed by: ANESTHESIOLOGY

## 2023-09-22 PROCEDURE — 7100000000 HC PACU RECOVERY - FIRST 15 MIN: Performed by: OBSTETRICS & GYNECOLOGY

## 2023-09-22 PROCEDURE — 2709999900 HC NON-CHARGEABLE SUPPLY: Performed by: OBSTETRICS & GYNECOLOGY

## 2023-09-22 PROCEDURE — 3600000014 HC SURGERY LEVEL 4 ADDTL 15MIN: Performed by: OBSTETRICS & GYNECOLOGY

## 2023-09-22 PROCEDURE — 3600000004 HC SURGERY LEVEL 4 BASE: Performed by: OBSTETRICS & GYNECOLOGY

## 2023-09-22 PROCEDURE — 84703 CHORIONIC GONADOTROPIN ASSAY: CPT

## 2023-09-22 PROCEDURE — 6360000002 HC RX W HCPCS: Performed by: NURSE ANESTHETIST, CERTIFIED REGISTERED

## 2023-09-22 PROCEDURE — 7100000010 HC PHASE II RECOVERY - FIRST 15 MIN: Performed by: OBSTETRICS & GYNECOLOGY

## 2023-09-22 RX ORDER — PROPOFOL 10 MG/ML
INJECTION, EMULSION INTRAVENOUS PRN
Status: DISCONTINUED | OUTPATIENT
Start: 2023-09-22 | End: 2023-09-22 | Stop reason: SDUPTHER

## 2023-09-22 RX ORDER — LABETALOL HYDROCHLORIDE 5 MG/ML
10 INJECTION, SOLUTION INTRAVENOUS
Status: DISCONTINUED | OUTPATIENT
Start: 2023-09-22 | End: 2023-09-22 | Stop reason: HOSPADM

## 2023-09-22 RX ORDER — SODIUM CHLORIDE 0.9 % (FLUSH) 0.9 %
5-40 SYRINGE (ML) INJECTION PRN
Status: DISCONTINUED | OUTPATIENT
Start: 2023-09-22 | End: 2023-09-22 | Stop reason: HOSPADM

## 2023-09-22 RX ORDER — FENTANYL CITRATE 50 UG/ML
INJECTION, SOLUTION INTRAMUSCULAR; INTRAVENOUS PRN
Status: DISCONTINUED | OUTPATIENT
Start: 2023-09-22 | End: 2023-09-22 | Stop reason: SDUPTHER

## 2023-09-22 RX ORDER — HYDRALAZINE HYDROCHLORIDE 20 MG/ML
10 INJECTION INTRAMUSCULAR; INTRAVENOUS
Status: DISCONTINUED | OUTPATIENT
Start: 2023-09-22 | End: 2023-09-22 | Stop reason: HOSPADM

## 2023-09-22 RX ORDER — KETOROLAC TROMETHAMINE 30 MG/ML
INJECTION, SOLUTION INTRAMUSCULAR; INTRAVENOUS PRN
Status: DISCONTINUED | OUTPATIENT
Start: 2023-09-22 | End: 2023-09-22 | Stop reason: SDUPTHER

## 2023-09-22 RX ORDER — SODIUM CHLORIDE 0.9 % (FLUSH) 0.9 %
5-40 SYRINGE (ML) INJECTION EVERY 12 HOURS SCHEDULED
Status: DISCONTINUED | OUTPATIENT
Start: 2023-09-22 | End: 2023-09-22 | Stop reason: HOSPADM

## 2023-09-22 RX ORDER — SODIUM CHLORIDE 9 MG/ML
INJECTION, SOLUTION INTRAVENOUS PRN
Status: DISCONTINUED | OUTPATIENT
Start: 2023-09-22 | End: 2023-09-22 | Stop reason: HOSPADM

## 2023-09-22 RX ORDER — SODIUM CHLORIDE, SODIUM LACTATE, POTASSIUM CHLORIDE, CALCIUM CHLORIDE 600; 310; 30; 20 MG/100ML; MG/100ML; MG/100ML; MG/100ML
INJECTION, SOLUTION INTRAVENOUS CONTINUOUS
Status: DISCONTINUED | OUTPATIENT
Start: 2023-09-22 | End: 2023-09-22 | Stop reason: HOSPADM

## 2023-09-22 RX ORDER — PROCHLORPERAZINE EDISYLATE 5 MG/ML
5 INJECTION INTRAMUSCULAR; INTRAVENOUS
Status: DISCONTINUED | OUTPATIENT
Start: 2023-09-22 | End: 2023-09-22 | Stop reason: HOSPADM

## 2023-09-22 RX ORDER — FENTANYL CITRATE 50 UG/ML
25 INJECTION, SOLUTION INTRAMUSCULAR; INTRAVENOUS EVERY 5 MIN PRN
Status: DISCONTINUED | OUTPATIENT
Start: 2023-09-22 | End: 2023-09-22 | Stop reason: HOSPADM

## 2023-09-22 RX ORDER — MIDAZOLAM HYDROCHLORIDE 2 MG/2ML
2 INJECTION, SOLUTION INTRAMUSCULAR; INTRAVENOUS
Status: DISCONTINUED | OUTPATIENT
Start: 2023-09-22 | End: 2023-09-22 | Stop reason: HOSPADM

## 2023-09-22 RX ORDER — DEXAMETHASONE SODIUM PHOSPHATE 10 MG/ML
INJECTION, SOLUTION INTRAMUSCULAR; INTRAVENOUS PRN
Status: DISCONTINUED | OUTPATIENT
Start: 2023-09-22 | End: 2023-09-22 | Stop reason: SDUPTHER

## 2023-09-22 RX ORDER — ACETAMINOPHEN AND CODEINE PHOSPHATE 300; 30 MG/1; MG/1
1 TABLET ORAL EVERY 6 HOURS PRN
Qty: 12 TABLET | Refills: 0 | Status: SHIPPED | OUTPATIENT
Start: 2023-09-22 | End: 2023-09-25

## 2023-09-22 RX ORDER — LIDOCAINE HYDROCHLORIDE 10 MG/ML
1 INJECTION, SOLUTION EPIDURAL; INFILTRATION; INTRACAUDAL; PERINEURAL
Status: DISCONTINUED | OUTPATIENT
Start: 2023-09-22 | End: 2023-09-22 | Stop reason: HOSPADM

## 2023-09-22 RX ORDER — SCOLOPAMINE TRANSDERMAL SYSTEM 1 MG/1
1 PATCH, EXTENDED RELEASE TRANSDERMAL
Status: DISCONTINUED | OUTPATIENT
Start: 2023-09-22 | End: 2023-09-22 | Stop reason: HOSPADM

## 2023-09-22 RX ORDER — IBUPROFEN 800 MG/1
800 TABLET ORAL EVERY 8 HOURS PRN
Qty: 90 TABLET | Refills: 0 | Status: SHIPPED | OUTPATIENT
Start: 2023-09-22

## 2023-09-22 RX ORDER — ONDANSETRON 2 MG/ML
INJECTION INTRAMUSCULAR; INTRAVENOUS PRN
Status: DISCONTINUED | OUTPATIENT
Start: 2023-09-22 | End: 2023-09-22 | Stop reason: SDUPTHER

## 2023-09-22 RX ORDER — DIPHENHYDRAMINE HYDROCHLORIDE 50 MG/ML
12.5 INJECTION INTRAMUSCULAR; INTRAVENOUS
Status: DISCONTINUED | OUTPATIENT
Start: 2023-09-22 | End: 2023-09-22 | Stop reason: HOSPADM

## 2023-09-22 RX ORDER — LIDOCAINE HYDROCHLORIDE 10 MG/ML
INJECTION, SOLUTION EPIDURAL; INFILTRATION; INTRACAUDAL; PERINEURAL PRN
Status: DISCONTINUED | OUTPATIENT
Start: 2023-09-22 | End: 2023-09-22 | Stop reason: SDUPTHER

## 2023-09-22 RX ADMIN — FENTANYL CITRATE 50 MCG: 0.05 INJECTION, SOLUTION INTRAMUSCULAR; INTRAVENOUS at 13:38

## 2023-09-22 RX ADMIN — KETOROLAC TROMETHAMINE 30 MG: 30 INJECTION, SOLUTION INTRAMUSCULAR; INTRAVENOUS at 13:47

## 2023-09-22 RX ADMIN — FAMOTIDINE 20 MG: 10 INJECTION, SOLUTION INTRAVENOUS at 12:14

## 2023-09-22 RX ADMIN — FENTANYL CITRATE 50 MCG: 0.05 INJECTION, SOLUTION INTRAMUSCULAR; INTRAVENOUS at 13:48

## 2023-09-22 RX ADMIN — SODIUM CHLORIDE, SODIUM LACTATE, POTASSIUM CHLORIDE, AND CALCIUM CHLORIDE: 600; 310; 30; 20 INJECTION, SOLUTION INTRAVENOUS at 11:23

## 2023-09-22 RX ADMIN — ONDANSETRON 4 MG: 2 INJECTION INTRAMUSCULAR; INTRAVENOUS at 13:45

## 2023-09-22 RX ADMIN — LIDOCAINE HYDROCHLORIDE 50 MG: 10 INJECTION, SOLUTION EPIDURAL; INFILTRATION; INTRACAUDAL; PERINEURAL at 13:38

## 2023-09-22 RX ADMIN — PROPOFOL 200 MG: 10 INJECTION, EMULSION INTRAVENOUS at 13:38

## 2023-09-22 RX ADMIN — DEXAMETHASONE SODIUM PHOSPHATE 10 MG: 10 INJECTION, SOLUTION INTRAMUSCULAR; INTRAVENOUS at 13:45

## 2023-09-22 ASSESSMENT — PAIN DESCRIPTION - LOCATION
LOCATION: ABDOMEN
LOCATION: ABDOMEN

## 2023-09-22 ASSESSMENT — ENCOUNTER SYMPTOMS
EYES NEGATIVE: 1
ALLERGIC/IMMUNOLOGIC NEGATIVE: 1
GASTROINTESTINAL NEGATIVE: 1
RESPIRATORY NEGATIVE: 1

## 2023-09-22 ASSESSMENT — PAIN DESCRIPTION - PAIN TYPE: TYPE: SURGICAL PAIN

## 2023-09-22 ASSESSMENT — PAIN SCALES - GENERAL
PAINLEVEL_OUTOF10: 5
PAINLEVEL_OUTOF10: 5

## 2023-09-22 ASSESSMENT — PAIN DESCRIPTION - ORIENTATION
ORIENTATION: LOWER
ORIENTATION: LOWER

## 2023-09-22 ASSESSMENT — PAIN DESCRIPTION - DESCRIPTORS
DESCRIPTORS: CRAMPING
DESCRIPTORS: CRAMPING

## 2023-09-22 ASSESSMENT — LIFESTYLE VARIABLES: SMOKING_STATUS: 1

## 2023-09-22 NOTE — PROGRESS NOTES
CLINICAL PHARMACY NOTE: MEDS TO BEDS    Total # of Prescriptions Filled: 3   The following medications were delivered to the patient:  Discharge Medication List as of 9/22/2023  1:28 PM        START taking these medications    Details   acetaminophen-codeine (TYLENOL/CODEINE #3) 300-30 MG per tablet Take 1 tablet by mouth every 6 hours as needed for Pain for up to 3 days. Intended supply: 3 days. Take lowest dose possible to manage pain Max Daily Amount: 4 tablets, Disp-12 tablet, R-0Normal      ibuprofen (ADVIL;MOTRIN) 800 MG tablet Take 1 tablet by mouth every 8 hours as needed for Pain, Disp-90 tablet, R-0Normal           Naloxone nasal spray    Additional Documentation:    Delivered RX's to patient bedside. Also dispensed free naloxone. No copay.

## 2023-09-22 NOTE — ANESTHESIA POSTPROCEDURE EVALUATION
Department of Anesthesiology  Postprocedure Note    Patient: Jolane Litten  MRN: [id number]  YOB: 1980  Date of evaluation: 9/22/2023      Procedure Summary     Date: 09/22/23 Room / Location: [address]    Anesthesia Start: 2158 Anesthesia Stop: 9724    Procedure: HYSTEROSCOPY, ENDOMETRIAL ABLATION WITH [address], EXAM UNDER ANESTHESIA Diagnosis:       DUB (dysfunctional uterine bleeding)      Uterine leiomyoma, unspecified location      Abnormal cervical Papanicolaou smear, unspecified abnormal pap finding      (DUB (dysfunctional uterine bleeding) [N93.8])      (Uterine leiomyoma, unspecified location [D25.9])      (Abnormal cervical Papanicolaou smear, unspecified abnormal pap finding [R87.619])    Surgeons: Vijay Durand MD Responsible Provider: ATILIO Garcia - GOKUL    Anesthesia Type: general ASA Status: 3          Anesthesia Type: No value filed.     Roland Phase I: Roland Score: 6    Roland Phase II:        Anesthesia Post Evaluation    Patient location during evaluation: PACU  Patient participation: waiting for patient participation  Level of consciousness: obtunded/minimal responses  Pain score: 0  Airway patency: patent  Nausea & Vomiting: no vomiting and no nausea  Complications: no  Cardiovascular status: hemodynamically stable  Respiratory status: acceptable and room air  Hydration status: stable  Comments: T 97.2  Multimodal analgesia pain management approach  Pain management: adequate

## 2023-10-11 DIAGNOSIS — F90.2 ATTENTION DEFICIT HYPERACTIVITY DISORDER (ADHD), COMBINED TYPE: ICD-10-CM

## 2023-10-11 RX ORDER — DEXTROAMPHETAMINE SULFATE, DEXTROAMPHETAMINE SACCHARATE, AMPHETAMINE ASPARTATE MONOHYDRATE, AND AMPHETAMINE SULFATE 12.5; 12.5; 12.5; 12.5 MG/1; MG/1; MG/1; MG/1
1 CAPSULE, EXTENDED RELEASE ORAL EVERY MORNING
Qty: 30 CAPSULE | Refills: 0 | Status: SHIPPED | OUTPATIENT
Start: 2023-10-11 | End: 2023-11-10

## 2023-10-18 ENCOUNTER — PATIENT MESSAGE (OUTPATIENT)
Dept: OBGYN CLINIC | Age: 43
End: 2023-10-18

## 2023-12-01 ENCOUNTER — OFFICE VISIT (OUTPATIENT)
Dept: FAMILY MEDICINE CLINIC | Facility: CLINIC | Age: 43
End: 2023-12-01
Payer: MEDICAID

## 2023-12-01 VITALS
SYSTOLIC BLOOD PRESSURE: 126 MMHG | RESPIRATION RATE: 20 BRPM | BODY MASS INDEX: 28.28 KG/M2 | HEART RATE: 65 BPM | TEMPERATURE: 98 F | HEIGHT: 66 IN | WEIGHT: 176 LBS | DIASTOLIC BLOOD PRESSURE: 80 MMHG

## 2023-12-01 DIAGNOSIS — F90.2 ATTENTION DEFICIT HYPERACTIVITY DISORDER (ADHD), COMBINED TYPE: Primary | ICD-10-CM

## 2023-12-01 DIAGNOSIS — M05.9 RHEUMATOID ARTHRITIS WITH POSITIVE RHEUMATOID FACTOR, INVOLVING UNSPECIFIED SITE: ICD-10-CM

## 2023-12-01 PROCEDURE — 3079F DIAST BP 80-89 MM HG: CPT | Performed by: NURSE PRACTITIONER

## 2023-12-01 PROCEDURE — 1159F MED LIST DOCD IN RCRD: CPT | Performed by: NURSE PRACTITIONER

## 2023-12-01 PROCEDURE — 3074F SYST BP LT 130 MM HG: CPT | Performed by: NURSE PRACTITIONER

## 2023-12-01 PROCEDURE — 1160F RVW MEDS BY RX/DR IN RCRD: CPT | Performed by: NURSE PRACTITIONER

## 2023-12-01 PROCEDURE — 99213 OFFICE O/P EST LOW 20 MIN: CPT | Performed by: NURSE PRACTITIONER

## 2023-12-01 RX ORDER — IBUPROFEN 800 MG/1
800 TABLET ORAL EVERY 6 HOURS PRN
Qty: 90 TABLET | Refills: 3 | Status: SHIPPED | OUTPATIENT
Start: 2023-12-01

## 2023-12-01 NOTE — PROGRESS NOTES
"Chief Complaint  ADHD    Subjective    History of Present Illness      Patient presents to [facility] for   History of Present Illness  Pt also requesting a refill on Ibuprofen 800 mg    ADHD/Mood HPI    Visit for:  follow-up. Most recent visit was 3 months ago.  Interim changes to follow up on today: no change in medication  Work/School Performance:  going well  Cognitive:  able to focus    Behavior  Hyperactivity: is not hyperactive  Impulsivity: no impulsivity and no unsafe behavior  Tasking: able to initiate tasks, able to complete tasks, and able to mult-task    Social  ADHD social/impulsive symptoms:  not impatient, does not blurt out inappropriate comments, and no excessive talking    Behavioral health  Behavior: no concerns  Emotional coping: demonstrates feelings of no concerns           Review of Systems    I have reviewed and agree with the HPI information as above.  Josi Ivory, APRN     Objective   Vital Signs:   /80   Pulse 65   Temp 98 °F (36.7 °C)   Resp 20   Ht 167.6 cm (66\")   Wt 79.8 kg (176 lb)   BMI 28.41 kg/m²            Physical Exam  Vitals and nursing note reviewed.   Constitutional:       Appearance: Normal appearance. She is well-developed.   HENT:      Head: Normocephalic and atraumatic.      Right Ear: Tympanic membrane, ear canal and external ear normal.      Left Ear: Tympanic membrane, ear canal and external ear normal.      Nose: Nose normal. No septal deviation, nasal tenderness or congestion.      Mouth/Throat:      Lips: Pink. No lesions.      Mouth: Mucous membranes are moist. No oral lesions.      Dentition: Normal dentition.      Pharynx: Oropharynx is clear. No pharyngeal swelling, oropharyngeal exudate or posterior oropharyngeal erythema.   Eyes:      General: Lids are normal. Vision grossly intact. No scleral icterus.        Right eye: No discharge.         Left eye: No discharge.      Extraocular Movements: Extraocular " movements intact.      Conjunctiva/sclera: Conjunctivae normal.      Right eye: Right conjunctiva is not injected.      Left eye: Left conjunctiva is not injected.      Pupils: Pupils are equal, round, and reactive to light.   Neck:      Thyroid: No thyroid mass.      Trachea: Trachea normal.   Cardiovascular:      Rate and Rhythm: Normal rate and regular rhythm.      Heart sounds: Normal heart sounds. No murmur heard.     No gallop.   Pulmonary:      Effort: Pulmonary effort is normal.      Breath sounds: Normal breath sounds and air entry. No wheezing, rhonchi or rales.   Musculoskeletal:         General: No tenderness or deformity. Normal range of motion.      Cervical back: Full passive range of motion without pain, normal range of motion and neck supple.      Thoracic back: Normal.      Right lower leg: No edema.      Left lower leg: No edema.   Skin:     General: Skin is warm and dry.      Coloration: Skin is not jaundiced.      Findings: No rash.   Neurological:      Mental Status: She is alert and oriented to person, place, and time.      Sensory: Sensation is intact.      Motor: Motor function is intact.      Coordination: Coordination is intact.      Gait: Gait is intact.      Deep Tendon Reflexes: Reflexes are normal and symmetric.   Psychiatric:         Mood and Affect: Mood and affect normal.         Behavior: Behavior normal.         Judgment: Judgment normal.          JOAQUIN-7: Over the last two weeks, how often have you been bothered by the following problems?  Feeling nervous, anxious or on edge: Not at all  Not being able to stop or control worrying: Not at all  Worrying too much about different things: Not at all  Trouble Relaxing: Not at all  Being so restless that it is hard to sit still: Not at all  Becoming easily annoyed or irritable: Not at all  Feeling afraid as if something awful might happen: Not at all  JOAQUIN 7 Total Score: 0  If you checked any problems, how difficult have these problems  made it for you to do your work, take care of things at home, or get along with other people: Not difficult at all    PHQ-2 Depression Screening  Little interest or pleasure in doing things? 0-->not at all   Feeling down, depressed, or hopeless? 0-->not at all   PHQ-2 Total Score 0     PHQ-9 Depression Screening  Little interest or pleasure in doing things? 0-->not at all   Feeling down, depressed, or hopeless? 0-->not at all   Trouble falling or staying asleep, or sleeping too much?     Feeling tired or having little energy?     Poor appetite or overeating?     Feeling bad about yourself - or that you are a failure or have let yourself or your family down?     Trouble concentrating on things, such as reading the newspaper or watching television?     Moving or speaking so slowly that other people could have noticed? Or the opposite - being so fidgety or restless that you have been moving around a lot more than usual?     Thoughts that you would be better off dead, or of hurting yourself in some way?     PHQ-9 Total Score 0   If you checked off any problems, how difficult have these problems made it for you to do your work, take care of things at home, or get along with other people?        Result Review  Data Reviewed:                   Assessment and Plan      Diagnoses and all orders for this visit:    1. Attention deficit hyperactivity disorder (ADHD), combined type (Primary)    2. Rheumatoid arthritis with positive rheumatoid factor, involving unspecified site  -     ibuprofen (ADVIL,MOTRIN) 800 MG tablet; Take 1 tablet by mouth Every 6 (Six) Hours As Needed for Mild Pain.  Dispense: 90 tablet; Refill: 3      Patient states that she is doing well. Denies any concerns at this time.   Requesting an ibuprofen refill. UDS UTD and appropriate.   ADHD Follow up:    PDMP reviewed and is clean. ADRS (Adult ADHD Assessment Form) reviewed in detail, scanned in chart, and has been reviewed at time of appointment.  All  questions, including medication and side effects, were discussed in detail at time of patient's visit. Patient will continue same medication which was discussed at today's visit. Patient is to return in 3 month(s).    Last Urine Toxicity  More data exists         Latest Ref Rng & Units 7/12/2023 4/15/2022   LAST URINE TOXICITY RESULTS   Amphetamine, Urine Qual Negative Positive  Positive    Barbiturates Screen, Urine Negative Negative  Negative    Benzodiazepine Screen, Urine Negative Negative  Negative    Buprenorphine, Screen, Urine Negative Negative  Negative    Cocaine Screen, Urine Negative Negative  Negative    Fentanyl, Urine Negative Negative  -   Methadone Screen , Urine Negative Negative  Negative    Methamphetamine, Ur Negative Negative  Negative         Urine Drug Screen Results: UDS RESULTS: Current results appropriate       Follow Up   Return in about 3 months (around 3/1/2024) for ADHD follow up.  Patient was given instructions and counseling regarding her condition or for health maintenance advice. Please see specific information pulled into the AVS if appropriate.

## 2023-12-02 DIAGNOSIS — F90.2 ATTENTION DEFICIT HYPERACTIVITY DISORDER (ADHD), COMBINED TYPE: ICD-10-CM

## 2023-12-04 RX ORDER — DEXTROAMPHETAMINE SACCHARATE, AMPHETAMINE ASPARTATE MONOHYDRATE, DEXTROAMPHETAMINE SULFATE, AND AMPHETAMINE SULFATE 12.5; 12.5; 12.5; 12.5 MG/1; MG/1; MG/1; MG/1
1 CAPSULE, EXTENDED RELEASE ORAL EVERY MORNING
Qty: 30 CAPSULE | Refills: 0 | OUTPATIENT
Start: 2023-12-04 | End: 2024-01-03

## 2023-12-04 RX ORDER — DEXTROAMPHETAMINE SACCHARATE, AMPHETAMINE ASPARTATE MONOHYDRATE, DEXTROAMPHETAMINE SULFATE, AND AMPHETAMINE SULFATE 12.5; 12.5; 12.5; 12.5 MG/1; MG/1; MG/1; MG/1
1 CAPSULE, EXTENDED RELEASE ORAL EVERY MORNING
Qty: 30 CAPSULE | Refills: 0 | Status: SHIPPED | OUTPATIENT
Start: 2023-12-31 | End: 2024-01-30

## 2023-12-04 RX ORDER — DEXTROAMPHETAMINE SACCHARATE, AMPHETAMINE ASPARTATE MONOHYDRATE, DEXTROAMPHETAMINE SULFATE, AND AMPHETAMINE SULFATE 12.5; 12.5; 12.5; 12.5 MG/1; MG/1; MG/1; MG/1
1 CAPSULE, EXTENDED RELEASE ORAL EVERY MORNING
Qty: 30 CAPSULE | Refills: 0 | Status: SHIPPED | OUTPATIENT
Start: 2023-12-04 | End: 2023-12-05 | Stop reason: RX

## 2023-12-05 DIAGNOSIS — F90.2 ATTENTION DEFICIT HYPERACTIVITY DISORDER (ADHD), COMBINED TYPE: ICD-10-CM

## 2023-12-05 RX ORDER — DEXTROAMPHETAMINE SACCHARATE, AMPHETAMINE ASPARTATE MONOHYDRATE, DEXTROAMPHETAMINE SULFATE, AND AMPHETAMINE SULFATE 12.5; 12.5; 12.5; 12.5 MG/1; MG/1; MG/1; MG/1
1 CAPSULE, EXTENDED RELEASE ORAL EVERY MORNING
Qty: 30 CAPSULE | Refills: 0 | OUTPATIENT
Start: 2023-12-05 | End: 2024-01-04

## 2023-12-06 RX ORDER — DEXTROAMPHETAMINE SACCHARATE, AMPHETAMINE ASPARTATE MONOHYDRATE, DEXTROAMPHETAMINE SULFATE, AND AMPHETAMINE SULFATE 6.25; 6.25; 6.25; 6.25 MG/1; MG/1; MG/1; MG/1
2 CAPSULE, EXTENDED RELEASE ORAL EVERY MORNING
Qty: 60 CAPSULE | Refills: 0 | Status: SHIPPED | OUTPATIENT
Start: 2023-12-06 | End: 2023-12-07 | Stop reason: SDUPTHER

## 2023-12-07 ENCOUNTER — TELEPHONE (OUTPATIENT)
Dept: FAMILY MEDICINE CLINIC | Facility: CLINIC | Age: 43
End: 2023-12-07
Payer: MEDICAID

## 2023-12-07 DIAGNOSIS — F90.2 ATTENTION DEFICIT HYPERACTIVITY DISORDER (ADHD), COMBINED TYPE: ICD-10-CM

## 2023-12-07 RX ORDER — DEXTROAMPHETAMINE SACCHARATE, AMPHETAMINE ASPARTATE MONOHYDRATE, DEXTROAMPHETAMINE SULFATE, AND AMPHETAMINE SULFATE 6.25; 6.25; 6.25; 6.25 MG/1; MG/1; MG/1; MG/1
1 CAPSULE, EXTENDED RELEASE ORAL EVERY MORNING
Qty: 30 CAPSULE | Refills: 0 | Status: SHIPPED | OUTPATIENT
Start: 2023-12-07 | End: 2024-01-06

## 2023-12-07 NOTE — TELEPHONE ENCOUNTER
"  Hub staff attempted to follow warm transfer process and was unsuccessful     Caller: Uyen José \"Leana\"    Relationship to patient: Self    Best call back number: [phone]     Patient is needing: INSURANCE WILL NOT PAY FOR THE MYDAYIS 2 CAPSULES A DAY. NEEDS A NEW SCRIPT FOR     Amphet-Dextroamphet 3-Bead ER (Mydayis) 25 MG capsule sustained-release 24 hr             "

## 2023-12-07 NOTE — TELEPHONE ENCOUNTER
Contacted pt, verified name and , informed this was sent to [facility] yesterday. Pt requesting mydayis 25mg; take 1 capsule by mouth each morning. Contacted [facility] pharmacy and canceled mydayis sent on 23. Routing medication to Dr. Chavez to review and sign. Pt vu of above and thanked staff.

## 2024-01-05 DIAGNOSIS — F90.2 ATTENTION DEFICIT HYPERACTIVITY DISORDER (ADHD), COMBINED TYPE: ICD-10-CM

## 2024-01-05 RX ORDER — DEXTROAMPHETAMINE SACCHARATE, AMPHETAMINE ASPARTATE MONOHYDRATE, DEXTROAMPHETAMINE SULFATE, AMPHETAMINE SULFATE 12.5; 12.5; 12.5; 12.5 MG/1; MG/1; MG/1; MG/1
1 CAPSULE, EXTENDED RELEASE ORAL EVERY MORNING
Qty: 30 CAPSULE | Refills: 0 | OUTPATIENT
Start: 2024-01-05 | End: 2024-02-04

## 2024-02-02 ENCOUNTER — OFFICE VISIT (OUTPATIENT)
Dept: FAMILY MEDICINE CLINIC | Facility: CLINIC | Age: 44
End: 2024-02-02
Payer: COMMERCIAL

## 2024-02-02 VITALS
HEART RATE: 71 BPM | HEIGHT: 66 IN | SYSTOLIC BLOOD PRESSURE: 128 MMHG | WEIGHT: 171.8 LBS | OXYGEN SATURATION: 100 % | BODY MASS INDEX: 27.61 KG/M2 | DIASTOLIC BLOOD PRESSURE: 84 MMHG

## 2024-02-02 DIAGNOSIS — F90.2 ATTENTION DEFICIT HYPERACTIVITY DISORDER (ADHD), COMBINED TYPE: Primary | ICD-10-CM

## 2024-02-02 PROCEDURE — 99213 OFFICE O/P EST LOW 20 MIN: CPT | Performed by: NURSE PRACTITIONER

## 2024-02-02 RX ORDER — FOLIC ACID 1 MG/1
1 TABLET ORAL DAILY
COMMUNITY
Start: 2023-12-12

## 2024-02-02 RX ORDER — LEFLUNOMIDE 10 MG/1
1 TABLET ORAL DAILY
COMMUNITY
Start: 2023-12-12

## 2024-02-02 RX ORDER — DEXTROAMPHETAMINE SACCHARATE, AMPHETAMINE ASPARTATE MONOHYDRATE, DEXTROAMPHETAMINE SULFATE, AMPHETAMINE SULFATE 12.5; 12.5; 12.5; 12.5 MG/1; MG/1; MG/1; MG/1
1 CAPSULE, EXTENDED RELEASE ORAL EVERY MORNING
Qty: 30 CAPSULE | Refills: 0 | Status: SHIPPED | OUTPATIENT
Start: 2024-03-03 | End: 2024-04-02

## 2024-02-02 RX ORDER — DEXTROAMPHETAMINE SACCHARATE, AMPHETAMINE ASPARTATE MONOHYDRATE, DEXTROAMPHETAMINE SULFATE, AMPHETAMINE SULFATE 12.5; 12.5; 12.5; 12.5 MG/1; MG/1; MG/1; MG/1
1 CAPSULE, EXTENDED RELEASE ORAL EVERY MORNING
Qty: 30 CAPSULE | Refills: 0 | Status: SHIPPED | OUTPATIENT
Start: 2024-02-02 | End: 2024-03-03

## 2024-02-02 NOTE — PROGRESS NOTES
"Chief Complaint  ADHD    Subjective    History of Present Illness      Patient presents to [facility] for   History of Present Illness  ADHD/Mood HPI    Visit for:  follow-up. Most recent visit was 2 months ago.  Interim changes to follow up on today: no change in medication  Work/School Performance:  going well and improving  Cognitive:  able to focus    Behavior  Hyperactivity: is not hyperactive  Impulsivity: no impulsivity and no unsafe behavior  Tasking: able to initiate tasks and able to complete tasks    Social  ADHD social/impulsive symptoms:  not impatient and no excessive talking    Behavioral health  Behavior: no concerns  Emotional coping: demonstrates feelings of no concerns    Pt presents today for 2 month follow up on ADHD. Pt tolerating well, no problems or concerns at this time            Review of Systems    I have reviewed and agree with the HPI information as above.  Josi Ivory, APRN     Objective   Vital Signs:   /84   Pulse 71   Ht 167.6 cm (65.98\")   Wt 77.9 kg (171 lb 12.8 oz)   SpO2 100%   BMI 27.74 kg/m²            Physical Exam     JOAQUIN-7:      PHQ-2 Depression Screening  Little interest or pleasure in doing things?     Feeling down, depressed, or hopeless?     PHQ-2 Total Score       PHQ-9 Depression Screening  Little interest or pleasure in doing things?     Feeling down, depressed, or hopeless?     Trouble falling or staying asleep, or sleeping too much?     Feeling tired or having little energy?     Poor appetite or overeating?     Feeling bad about yourself - or that you are a failure or have let yourself or your family down?     Trouble concentrating on things, such as reading the newspaper or watching television?     Moving or speaking so slowly that other people could have noticed? Or the opposite - being so fidgety or restless that you have been moving around a lot more than usual?     Thoughts that you would be better off dead, " or of hurting yourself in some way?     PHQ-9 Total Score     If you checked off any problems, how difficult have these problems made it for you to do your work, take care of things at home, or get along with other people?        Result Review  Data Reviewed:                   Assessment and Plan      Diagnoses and all orders for this visit:    1. Attention deficit hyperactivity disorder (ADHD), combined type (Primary)    Doing well. Denies concerns.     ADHD Follow up:    PDMP reviewed and is clean. ADRS (Adult ADHD Assessment Form) reviewed in detail, scanned in chart, and has been reviewed at time of appointment.  All questions, including medication and side effects, were discussed in detail at time of patient's visit. Patient will continue same medication which was discussed at today's visit. Patient is to return in 3 month(s).    Last Urine Toxicity  More data exists         Latest Ref Rng & Units 7/12/2023 4/15/2022   LAST URINE TOXICITY RESULTS   Amphetamine, Urine Qual Negative Positive  Positive    Barbiturates Screen, Urine Negative Negative  Negative    Benzodiazepine Screen, Urine Negative Negative  Negative    Buprenorphine, Screen, Urine Negative Negative  Negative    Cocaine Screen, Urine Negative Negative  Negative    Fentanyl, Urine Negative Negative  -   Methadone Screen , Urine Negative Negative  Negative    Methamphetamine, Ur Negative Negative  Negative         Urine Drug Screen Results: UDS RESULTS: Current results appropriate         Follow Up   Return in about 3 months (around 5/2/2024) for ADHD follow up.  Patient was given instructions and counseling regarding her condition or for health maintenance advice. Please see specific information pulled into the AVS if appropriate.

## 2024-02-08 ENCOUNTER — TELEPHONE (OUTPATIENT)
Dept: FAMILY MEDICINE CLINIC | Facility: CLINIC | Age: 44
End: 2024-02-08
Payer: COMMERCIAL

## 2024-02-08 DIAGNOSIS — F90.2 ATTENTION DEFICIT HYPERACTIVITY DISORDER (ADHD), COMBINED TYPE: Primary | ICD-10-CM

## 2024-02-08 RX ORDER — LISDEXAMFETAMINE DIMESYLATE CAPSULES 50 MG/1
50 CAPSULE ORAL EVERY MORNING
Qty: 30 CAPSULE | Refills: 0 | Status: SHIPPED | OUTPATIENT
Start: 2024-02-08 | End: 2024-02-09 | Stop reason: SDUPTHER

## 2024-02-09 DIAGNOSIS — F90.2 ATTENTION DEFICIT HYPERACTIVITY DISORDER (ADHD), COMBINED TYPE: ICD-10-CM

## 2024-02-09 RX ORDER — LISDEXAMFETAMINE DIMESYLATE CAPSULES 50 MG/1
50 CAPSULE ORAL EVERY MORNING
Qty: 30 CAPSULE | Refills: 0 | Status: SHIPPED | OUTPATIENT
Start: 2024-02-09 | End: 2024-02-12

## 2024-02-09 NOTE — TELEPHONE ENCOUNTER
----- Message from Uyen José sent at 2/8/2024  4:21 PM CST -----  Regarding: Insurance denied  Contact: [phone]  That needs to be sent to [facility] as I said in previous message. Please

## 2024-02-09 NOTE — TELEPHONE ENCOUNTER
Started PA and contacted pt to find out her tried and failed medications. Pt states she needs all her medications sent [facility] in [employer] [place] will call [facility] pharmacy tomorrow to check with them about tried and failed medications

## 2024-02-12 ENCOUNTER — LAB (OUTPATIENT)
Dept: LAB | Facility: HOSPITAL | Age: 44
End: 2024-02-12
Payer: COMMERCIAL

## 2024-02-12 ENCOUNTER — OFFICE VISIT (OUTPATIENT)
Dept: FAMILY MEDICINE CLINIC | Facility: CLINIC | Age: 44
End: 2024-02-12
Payer: COMMERCIAL

## 2024-02-12 VITALS
SYSTOLIC BLOOD PRESSURE: 124 MMHG | RESPIRATION RATE: 20 BRPM | BODY MASS INDEX: 28.49 KG/M2 | WEIGHT: 171 LBS | HEIGHT: 65 IN | DIASTOLIC BLOOD PRESSURE: 81 MMHG | HEART RATE: 69 BPM

## 2024-02-12 DIAGNOSIS — R51.9 ACUTE NONINTRACTABLE HEADACHE, UNSPECIFIED HEADACHE TYPE: ICD-10-CM

## 2024-02-12 DIAGNOSIS — R51.9 ACUTE NONINTRACTABLE HEADACHE, UNSPECIFIED HEADACHE TYPE: Primary | ICD-10-CM

## 2024-02-12 LAB
ALBUMIN SERPL-MCNC: 4.1 G/DL (ref 3.5–5)
ALBUMIN/GLOB SERPL: 1.3 G/DL (ref 1.1–2.5)
ALP SERPL-CCNC: 77 U/L (ref 24–120)
ALT SERPL W P-5'-P-CCNC: 25 U/L (ref 0–35)
ANION GAP SERPL CALCULATED.3IONS-SCNC: 4 MMOL/L (ref 4–13)
AST SERPL-CCNC: 24 U/L (ref 7–45)
BASOPHILS # BLD AUTO: 0.12 10*3/MM3 (ref 0–0.2)
BASOPHILS NFR BLD AUTO: 0.9 % (ref 0–1.5)
BILIRUB SERPL-MCNC: 0.6 MG/DL (ref 0.1–1)
BUN SERPL-MCNC: 12 MG/DL (ref 5–21)
BUN/CREAT SERPL: 12.8
CALCIUM SPEC-SCNC: 9.4 MG/DL (ref 8.4–10.4)
CHLORIDE SERPL-SCNC: 102 MMOL/L (ref 98–110)
CO2 SERPL-SCNC: 33 MMOL/L (ref 24–31)
CREAT SERPL-MCNC: 0.94 MG/DL (ref 0.5–1.4)
DEPRECATED RDW RBC AUTO: 44.7 FL (ref 37–54)
EGFRCR SERPLBLD CKD-EPI 2021: 76.9 ML/MIN/1.73
EOSINOPHIL # BLD AUTO: 0.76 10*3/MM3 (ref 0–0.4)
EOSINOPHIL NFR BLD AUTO: 5.8 % (ref 0.3–6.2)
ERYTHROCYTE [DISTWIDTH] IN BLOOD BY AUTOMATED COUNT: 13.6 % (ref 12.3–15.4)
ERYTHROCYTE [SEDIMENTATION RATE] IN BLOOD: 3 MM/HR (ref 0–20)
GLOBULIN UR ELPH-MCNC: 3.1 GM/DL
GLUCOSE SERPL-MCNC: 91 MG/DL (ref 70–100)
HCT VFR BLD AUTO: 46.6 % (ref 34–46.6)
HGB BLD-MCNC: 15.3 G/DL (ref 12–15.9)
LYMPHOCYTES # BLD AUTO: 2.76 10*3/MM3 (ref 0.7–3.1)
LYMPHOCYTES NFR BLD AUTO: 21.1 % (ref 19.6–45.3)
MCH RBC QN AUTO: 29.7 PG (ref 26.6–33)
MCHC RBC AUTO-ENTMCNC: 32.8 G/DL (ref 31.5–35.7)
MCV RBC AUTO: 90.3 FL (ref 79–97)
MONOCYTES # BLD AUTO: 0.86 10*3/MM3 (ref 0.1–0.9)
MONOCYTES NFR BLD AUTO: 6.6 % (ref 5–12)
NEUTROPHILS NFR BLD AUTO: 65.3 % (ref 42.7–76)
NEUTROPHILS NFR BLD AUTO: 8.51 10*3/MM3 (ref 1.7–7)
PLATELET # BLD AUTO: 211 10*3/MM3 (ref 140–450)
PMV BLD AUTO: 13.5 FL (ref 6–12)
POTASSIUM SERPL-SCNC: 3.5 MMOL/L (ref 3.5–5.3)
PROT SERPL-MCNC: 7.2 G/DL (ref 6.3–8.7)
RBC # BLD AUTO: 5.16 10*6/MM3 (ref 3.77–5.28)
SODIUM SERPL-SCNC: 139 MMOL/L (ref 135–145)
WBC NRBC COR # BLD AUTO: 13.05 10*3/MM3 (ref 3.4–10.8)

## 2024-02-12 PROCEDURE — 36415 COLL VENOUS BLD VENIPUNCTURE: CPT

## 2024-02-12 PROCEDURE — 85652 RBC SED RATE AUTOMATED: CPT

## 2024-02-12 PROCEDURE — 85025 COMPLETE CBC W/AUTO DIFF WBC: CPT

## 2024-02-12 PROCEDURE — 80053 COMPREHEN METABOLIC PANEL: CPT

## 2024-02-12 PROCEDURE — 99214 OFFICE O/P EST MOD 30 MIN: CPT | Performed by: NURSE PRACTITIONER

## 2024-02-12 RX ORDER — CYCLOBENZAPRINE HCL 10 MG
TABLET ORAL
Qty: 30 TABLET | Refills: 1 | Status: SHIPPED | OUTPATIENT
Start: 2024-02-12

## 2024-02-12 RX ORDER — METHYLPREDNISOLONE 4 MG/1
TABLET ORAL
Qty: 21 TABLET | Refills: 0 | Status: SHIPPED | OUTPATIENT
Start: 2024-02-12

## 2024-02-13 ENCOUNTER — TELEPHONE (OUTPATIENT)
Dept: FAMILY MEDICINE CLINIC | Facility: CLINIC | Age: 44
End: 2024-02-13
Payer: COMMERCIAL

## 2024-02-14 RX ORDER — BISOPROLOL FUMARATE AND HYDROCHLOROTHIAZIDE 5; 6.25 MG/1; MG/1
1 TABLET ORAL DAILY
Qty: 90 TABLET | Refills: 3 | Status: SHIPPED | OUTPATIENT
Start: 2024-02-14 | End: 2024-02-14 | Stop reason: SDUPTHER

## 2024-02-14 RX ORDER — BISOPROLOL FUMARATE AND HYDROCHLOROTHIAZIDE 5; 6.25 MG/1; MG/1
1 TABLET ORAL DAILY
Qty: 90 TABLET | Refills: 3 | Status: SHIPPED | OUTPATIENT
Start: 2024-02-14

## 2024-02-14 RX ORDER — LEVOTHYROXINE SODIUM 112 UG/1
TABLET ORAL
Qty: 90 TABLET | Refills: 3 | Status: SHIPPED | OUTPATIENT
Start: 2024-02-14

## 2024-02-14 RX ORDER — PANTOPRAZOLE SODIUM 40 MG/1
TABLET, DELAYED RELEASE ORAL
Qty: 90 TABLET | Refills: 3 | Status: SHIPPED | OUTPATIENT
Start: 2024-02-14

## 2024-02-14 RX ORDER — PANTOPRAZOLE SODIUM 40 MG/1
TABLET, DELAYED RELEASE ORAL
Qty: 90 TABLET | Refills: 3 | Status: SHIPPED | OUTPATIENT
Start: 2024-02-14 | End: 2024-02-14 | Stop reason: SDUPTHER

## 2024-02-14 RX ORDER — LEVOTHYROXINE SODIUM 112 UG/1
TABLET ORAL
Qty: 90 TABLET | Refills: 3 | Status: SHIPPED | OUTPATIENT
Start: 2024-02-14 | End: 2024-02-14 | Stop reason: SDUPTHER

## 2024-02-22 ENCOUNTER — LAB (OUTPATIENT)
Dept: LAB | Facility: HOSPITAL | Age: 44
End: 2024-02-22
Payer: COMMERCIAL

## 2024-02-22 ENCOUNTER — TELEPHONE (OUTPATIENT)
Dept: FAMILY MEDICINE CLINIC | Facility: CLINIC | Age: 44
End: 2024-02-22
Payer: COMMERCIAL

## 2024-02-22 ENCOUNTER — OFFICE VISIT (OUTPATIENT)
Dept: FAMILY MEDICINE CLINIC | Facility: CLINIC | Age: 44
End: 2024-02-22
Payer: COMMERCIAL

## 2024-02-22 VITALS
DIASTOLIC BLOOD PRESSURE: 79 MMHG | HEART RATE: 82 BPM | TEMPERATURE: 99.1 F | OXYGEN SATURATION: 98 % | HEIGHT: 65 IN | SYSTOLIC BLOOD PRESSURE: 111 MMHG | BODY MASS INDEX: 28.32 KG/M2 | WEIGHT: 170 LBS

## 2024-02-22 DIAGNOSIS — R50.9 FEVER, UNSPECIFIED FEVER CAUSE: Primary | ICD-10-CM

## 2024-02-22 DIAGNOSIS — R50.9 FEVER, UNSPECIFIED FEVER CAUSE: ICD-10-CM

## 2024-02-22 DIAGNOSIS — Z72.0 TOBACCO USE: ICD-10-CM

## 2024-02-22 DIAGNOSIS — J10.1 INFLUENZA A: ICD-10-CM

## 2024-02-22 LAB
B PARAPERT DNA SPEC QL NAA+PROBE: NOT DETECTED
B PERT DNA SPEC QL NAA+PROBE: NOT DETECTED
C PNEUM DNA NPH QL NAA+NON-PROBE: NOT DETECTED
FLUAV H1 2009 PAND RNA NPH QL NAA+PROBE: DETECTED
FLUBV RNA ISLT QL NAA+PROBE: NOT DETECTED
HADV DNA SPEC NAA+PROBE: NOT DETECTED
HCOV 229E RNA SPEC QL NAA+PROBE: NOT DETECTED
HCOV HKU1 RNA SPEC QL NAA+PROBE: NOT DETECTED
HCOV NL63 RNA SPEC QL NAA+PROBE: NOT DETECTED
HCOV OC43 RNA SPEC QL NAA+PROBE: NOT DETECTED
HMPV RNA NPH QL NAA+NON-PROBE: NOT DETECTED
HPIV1 RNA ISLT QL NAA+PROBE: NOT DETECTED
HPIV2 RNA SPEC QL NAA+PROBE: NOT DETECTED
HPIV3 RNA NPH QL NAA+PROBE: NOT DETECTED
HPIV4 P GENE NPH QL NAA+PROBE: NOT DETECTED
M PNEUMO IGG SER IA-ACNC: NOT DETECTED
RHINOVIRUS RNA SPEC NAA+PROBE: NOT DETECTED
RSV RNA NPH QL NAA+NON-PROBE: NOT DETECTED
SARS-COV-2 RNA NPH QL NAA+NON-PROBE: NOT DETECTED

## 2024-02-22 PROCEDURE — 0202U NFCT DS 22 TRGT SARS-COV-2: CPT

## 2024-02-22 RX ORDER — DEXAMETHASONE SODIUM PHOSPHATE 4 MG/ML
8 INJECTION, SOLUTION INTRA-ARTICULAR; INTRALESIONAL; INTRAMUSCULAR; INTRAVENOUS; SOFT TISSUE ONCE
Status: COMPLETED | OUTPATIENT
Start: 2024-02-22 | End: 2024-02-22

## 2024-02-22 RX ADMIN — DEXAMETHASONE SODIUM PHOSPHATE 8 MG: 4 INJECTION, SOLUTION INTRA-ARTICULAR; INTRALESIONAL; INTRAMUSCULAR; INTRAVENOUS; SOFT TISSUE at 08:27

## 2024-02-22 NOTE — TELEPHONE ENCOUNTER
Sent pt Reacci[employer] message relaying below    RELAY  Your respiratory testing was positive for Flu A. Since you have been symptomatic for more than 3 days, an antiviral will not be helpful. Rest, hydrate, and take Tylenol/Motrin as needed. Please let me know if you have any questions.

## 2024-02-22 NOTE — PROGRESS NOTES
Flu A noted.   She is more than 3 days in with symptoms. Hopefully she is on the down hill slope.

## 2024-02-22 NOTE — TELEPHONE ENCOUNTER
----- Message from BLAS Francois sent at 2/22/2024  1:13 PM CST -----  Flu A noted.   She is more than 3 days in with symptoms. Hopefully she is on the down hill slope.

## 2024-02-22 NOTE — PROGRESS NOTES
"Chief Complaint  Fever, Chills, Cough, Sore Throat, Earache, Generalized Body Aches, Fatigue, and Diarrhea    Subjective    History of Present Illness      Patient presents to [facility] for   History of Present Illness  Is here with c/o Fever, Chills, Cough, Sore Throat, Earache, Generalized Body Aches, Fatigue, and Diarrhea that began 2/20. Her son was sick the week prior with similar symptoms. He had been around friends that tested positive for both Flu and COVID. He was not tested for either.        Review of Systems    I have reviewed and agree with the HPI information as above.  Josi Ivory, APRN     Objective   Vital Signs:   /79   Pulse 82   Temp 99.1 °F (37.3 °C) (Oral)   Ht 165.1 cm (65\")   Wt 77.1 kg (170 lb)   SpO2 98%   BMI 28.29 kg/m²            Physical Exam  Vitals and nursing note reviewed.   Constitutional:       Appearance: Normal appearance. She is well-developed.   HENT:      Head: Normocephalic and atraumatic.      Right Ear: Tympanic membrane, ear canal and external ear normal.      Left Ear: Tympanic membrane, ear canal and external ear normal.      Nose: Nose normal. No septal deviation, nasal tenderness or congestion.      Mouth/Throat:      Lips: Pink. No lesions.      Mouth: Mucous membranes are moist. No oral lesions.      Dentition: Normal dentition.      Pharynx: Oropharynx is clear. No pharyngeal swelling, oropharyngeal exudate or posterior oropharyngeal erythema.   Eyes:      General: Lids are normal. Vision grossly intact. No scleral icterus.        Right eye: No discharge.         Left eye: No discharge.      Extraocular Movements: Extraocular movements intact.      Conjunctiva/sclera: Conjunctivae normal.      Right eye: Right conjunctiva is not injected.      Left eye: Left conjunctiva is not injected.      Pupils: Pupils are equal, round, and reactive to light.   Neck:      Thyroid: No thyroid mass.      Trachea: Trachea " normal.   Cardiovascular:      Rate and Rhythm: Normal rate and regular rhythm.      Heart sounds: Normal heart sounds. No murmur heard.     No gallop.   Pulmonary:      Effort: Pulmonary effort is normal.      Breath sounds: Normal breath sounds and air entry. No wheezing, rhonchi or rales.   Musculoskeletal:         General: No tenderness or deformity. Normal range of motion.      Cervical back: Full passive range of motion without pain, normal range of motion and neck supple.      Thoracic back: Normal.      Right lower leg: No edema.      Left lower leg: No edema.   Skin:     General: Skin is warm and dry.      Coloration: Skin is not jaundiced.      Findings: No rash.   Neurological:      Mental Status: She is alert and oriented to person, place, and time.      Sensory: Sensation is intact.      Motor: Motor function is intact.      Coordination: Coordination is intact.      Gait: Gait is intact.      Deep Tendon Reflexes: Reflexes are normal and symmetric.   Psychiatric:         Mood and Affect: Mood and affect normal.         Behavior: Behavior normal.         Judgment: Judgment normal.             Result Review  Data Reviewed:                   Assessment and Plan      Diagnoses and all orders for this visit:    1. Fever, unspecified fever cause (Primary)  -     dexAMETHasone (DECADRON) injection 8 mg  -     Respiratory Panel PCR w/COVID-19(SARS-CoV-2) THEO/SUMANTH/ARTHUR/PAD/COR/BOBBY In-House, NP Swab in UTM/VTM, 2 HR TAT - Swab, Nasopharynx; Future    2. Tobacco use    3. Influenza A    Has been sick since Sunday.  She states that she has had bodyaches, chills, fever of 102, congestion, and diarrhea.  She states that she is still very fatigued and does not feel well.  Will get a respiratory swab at this time.  Patient is also requested a steroid shot at this time.  Being 4 to 5 days in on symptoms antivirals probably would not be beneficial, however will call with these results.    Flu A noted      Follow Up    Return if symptoms worsen or fail to improve.  Patient was given instructions and counseling regarding her condition or for health maintenance advice. Please see specific information pulled into the AVS if appropriate.

## 2024-02-27 DIAGNOSIS — F90.2 ATTENTION DEFICIT HYPERACTIVITY DISORDER (ADHD), COMBINED TYPE: Primary | ICD-10-CM

## 2024-02-27 RX ORDER — DEXTROAMPHETAMINE SACCHARATE, AMPHETAMINE ASPARTATE MONOHYDRATE, DEXTROAMPHETAMINE SULFATE AND AMPHETAMINE SULFATE 7.5; 7.5; 7.5; 7.5 MG/1; MG/1; MG/1; MG/1
30 CAPSULE, EXTENDED RELEASE ORAL EVERY MORNING
Qty: 30 CAPSULE | Refills: 0 | Status: SHIPPED | OUTPATIENT
Start: 2024-02-27

## 2024-03-06 DIAGNOSIS — F90.2 ATTENTION DEFICIT HYPERACTIVITY DISORDER (ADHD), COMBINED TYPE: Primary | ICD-10-CM

## 2024-03-06 RX ORDER — DEXTROAMPHETAMINE SACCHARATE, AMPHETAMINE ASPARTATE MONOHYDRATE, DEXTROAMPHETAMINE SULFATE AND AMPHETAMINE SULFATE 6.25; 6.25; 6.25; 6.25 MG/1; MG/1; MG/1; MG/1
25 CAPSULE, EXTENDED RELEASE ORAL EVERY MORNING
Qty: 30 CAPSULE | Refills: 0 | Status: SHIPPED | OUTPATIENT
Start: 2024-03-06

## 2024-03-06 NOTE — TELEPHONE ENCOUNTER
"----- Message from BLAS Francois sent at 3/6/2024 12:36 PM CST -----  Regarding: FW: Shalonda  Contact: [phone]  Sent  ----- Message -----  From: Shalonda Moreno MA  Sent: 3/5/2024   3:24 PM CST  To: BLAS Francois  Subject: FW: Shalonda                                          Is this okay?  ----- Message -----  From: Uyen José \"Leana\"  Sent: 3/5/2024  12:05 PM CST  To: [facility]ie Zepeda [facility]  Subject: Shalonda                                              So I've been having problems finding the 30 mg in stock can you have them send generic Adderall ER 25 mg to [facility] at [place] please & thank you  "

## 2024-04-01 DIAGNOSIS — F90.2 ATTENTION DEFICIT HYPERACTIVITY DISORDER (ADHD), COMBINED TYPE: ICD-10-CM

## 2024-04-02 RX ORDER — DEXTROAMPHETAMINE SACCHARATE, AMPHETAMINE ASPARTATE MONOHYDRATE, DEXTROAMPHETAMINE SULFATE AND AMPHETAMINE SULFATE 6.25; 6.25; 6.25; 6.25 MG/1; MG/1; MG/1; MG/1
25 CAPSULE, EXTENDED RELEASE ORAL EVERY MORNING
Qty: 30 CAPSULE | Refills: 0 | OUTPATIENT
Start: 2024-04-02

## 2024-04-02 RX ORDER — DEXTROAMPHETAMINE SACCHARATE, AMPHETAMINE ASPARTATE MONOHYDRATE, DEXTROAMPHETAMINE SULFATE AND AMPHETAMINE SULFATE 6.25; 6.25; 6.25; 6.25 MG/1; MG/1; MG/1; MG/1
25 CAPSULE, EXTENDED RELEASE ORAL EVERY MORNING
Qty: 30 CAPSULE | Refills: 0 | Status: SHIPPED | OUTPATIENT
Start: 2024-04-02

## 2024-04-02 NOTE — TELEPHONE ENCOUNTER
Pt was last seen on 2-2-24 and okayed for a 3 month f/u.  Pt is due for her 3rd refill. UDS is up to date (7-12-23) and appropriate.  Routing to Dr. Chavez for approval.  Sent a [employer] message to pt regarding refill request.

## 2024-04-02 NOTE — TELEPHONE ENCOUNTER
Rx Refill Note  Requested Prescriptions     Pending Prescriptions Disp Refills    amphetamine-dextroamphetamine XR (ADDERALL XR) 25 MG 24 hr capsule [Pharmacy Med Name: AMPHETAMINE/DEXTROAMPHETAMINE 6.25MG-6.25MG-6.25MG-6.25MG CAPSULE EXTENDED RELEASE 24 HOUR] 30 capsule 0     Sig: TAKE 1 CAPSULE BY MOUTH ONCE DAILY IN THE MORNING      Last office visit with prescribing clinician: Visit date not found   Last telemedicine visit with prescribing clinician: Visit date not found   Next office visit with prescribing clinician: 4/1/2024               Mackenzie Campos MA  04/02/24, 07:48 CDT

## 2024-05-09 ENCOUNTER — LAB (OUTPATIENT)
Dept: LAB | Facility: HOSPITAL | Age: 44
End: 2024-05-09
Payer: COMMERCIAL

## 2024-05-09 ENCOUNTER — OFFICE VISIT (OUTPATIENT)
Dept: FAMILY MEDICINE CLINIC | Facility: CLINIC | Age: 44
End: 2024-05-09
Payer: COMMERCIAL

## 2024-05-09 ENCOUNTER — TELEPHONE (OUTPATIENT)
Dept: FAMILY MEDICINE CLINIC | Facility: CLINIC | Age: 44
End: 2024-05-09
Payer: COMMERCIAL

## 2024-05-09 VITALS
DIASTOLIC BLOOD PRESSURE: 79 MMHG | BODY MASS INDEX: 26.61 KG/M2 | HEIGHT: 66 IN | HEART RATE: 70 BPM | SYSTOLIC BLOOD PRESSURE: 116 MMHG | OXYGEN SATURATION: 99 % | TEMPERATURE: 98.6 F | WEIGHT: 165.6 LBS | RESPIRATION RATE: 16 BRPM

## 2024-05-09 DIAGNOSIS — F90.2 ATTENTION DEFICIT HYPERACTIVITY DISORDER (ADHD), COMBINED TYPE: Primary | ICD-10-CM

## 2024-05-09 DIAGNOSIS — F90.2 ATTENTION DEFICIT HYPERACTIVITY DISORDER (ADHD), COMBINED TYPE: ICD-10-CM

## 2024-05-09 DIAGNOSIS — K21.9 GASTROESOPHAGEAL REFLUX DISEASE WITHOUT ESOPHAGITIS: ICD-10-CM

## 2024-05-09 DIAGNOSIS — Z51.81 MEDICATION MONITORING ENCOUNTER: Primary | ICD-10-CM

## 2024-05-09 DIAGNOSIS — Z51.81 MEDICATION MONITORING ENCOUNTER: ICD-10-CM

## 2024-05-09 LAB
AMPHET+METHAMPHET UR QL: POSITIVE
AMPHETAMINES UR QL: NEGATIVE
BARBITURATES UR QL SCN: NEGATIVE
BENZODIAZ UR QL SCN: NEGATIVE
BUPRENORPHINE SERPL-MCNC: NEGATIVE NG/ML
CANNABINOIDS SERPL QL: NEGATIVE
COCAINE UR QL: NEGATIVE
FENTANYL UR-MCNC: NEGATIVE NG/ML
METHADONE UR QL SCN: NEGATIVE
OPIATES UR QL: NEGATIVE
OXYCODONE UR QL SCN: NEGATIVE
PCP UR QL SCN: NEGATIVE
TRICYCLICS UR QL SCN: NEGATIVE

## 2024-05-09 PROCEDURE — 80307 DRUG TEST PRSMV CHEM ANLYZR: CPT

## 2024-05-09 PROCEDURE — 99213 OFFICE O/P EST LOW 20 MIN: CPT | Performed by: NURSE PRACTITIONER

## 2024-05-09 RX ORDER — DEXTROAMPHETAMINE SACCHARATE, AMPHETAMINE ASPARTATE, DEXTROAMPHETAMINE SULFATE AND AMPHETAMINE SULFATE 2.5; 2.5; 2.5; 2.5 MG/1; MG/1; MG/1; MG/1
10 TABLET ORAL DAILY
Qty: 30 TABLET | Refills: 0 | Status: SHIPPED | OUTPATIENT
Start: 2024-05-09 | End: 2024-06-08

## 2024-05-09 RX ORDER — DEXTROAMPHETAMINE SACCHARATE, AMPHETAMINE ASPARTATE MONOHYDRATE, DEXTROAMPHETAMINE SULFATE AND AMPHETAMINE SULFATE 6.25; 6.25; 6.25; 6.25 MG/1; MG/1; MG/1; MG/1
25 CAPSULE, EXTENDED RELEASE ORAL EVERY MORNING
Qty: 30 CAPSULE | Refills: 0 | Status: SHIPPED | OUTPATIENT
Start: 2024-05-09 | End: 2024-06-08

## 2024-05-09 RX ORDER — DEXTROAMPHETAMINE SACCHARATE, AMPHETAMINE ASPARTATE MONOHYDRATE, DEXTROAMPHETAMINE SULFATE AND AMPHETAMINE SULFATE 6.25; 6.25; 6.25; 6.25 MG/1; MG/1; MG/1; MG/1
25 CAPSULE, EXTENDED RELEASE ORAL EVERY MORNING
Qty: 30 CAPSULE | Refills: 0 | Status: SHIPPED | OUTPATIENT
Start: 2024-06-06 | End: 2024-07-06

## 2024-05-09 RX ORDER — DEXTROAMPHETAMINE SACCHARATE, AMPHETAMINE ASPARTATE, DEXTROAMPHETAMINE SULFATE AND AMPHETAMINE SULFATE 2.5; 2.5; 2.5; 2.5 MG/1; MG/1; MG/1; MG/1
10 TABLET ORAL DAILY
Qty: 30 TABLET | Refills: 0 | Status: SHIPPED | OUTPATIENT
Start: 2024-06-06 | End: 2024-07-06

## 2024-05-10 DIAGNOSIS — F90.2 ATTENTION DEFICIT HYPERACTIVITY DISORDER (ADHD), COMBINED TYPE: Primary | ICD-10-CM

## 2024-05-10 RX ORDER — PANTOPRAZOLE SODIUM 40 MG/1
40 TABLET, DELAYED RELEASE ORAL DAILY
Qty: 30 TABLET | Refills: 1 | Status: SHIPPED | OUTPATIENT
Start: 2024-05-10

## 2024-05-10 RX ORDER — LISDEXAMFETAMINE DIMESYLATE 50 MG/1
50 CAPSULE ORAL EVERY MORNING
Qty: 30 CAPSULE | Refills: 0 | Status: SHIPPED | OUTPATIENT
Start: 2024-06-07 | End: 2024-07-07

## 2024-05-10 RX ORDER — LISDEXAMFETAMINE DIMESYLATE 50 MG/1
50 CAPSULE ORAL EVERY MORNING
Qty: 30 CAPSULE | Refills: 0 | Status: SHIPPED | OUTPATIENT
Start: 2024-05-10 | End: 2024-06-09

## 2024-05-15 RX ORDER — LEVOTHYROXINE SODIUM 112 UG/1
TABLET ORAL
Qty: 90 TABLET | Refills: 3 | Status: SHIPPED | OUTPATIENT
Start: 2024-05-15

## 2024-05-23 ENCOUNTER — OFFICE VISIT (OUTPATIENT)
Dept: PRIMARY CARE CLINIC | Age: 44
End: 2024-05-23
Payer: COMMERCIAL

## 2024-05-23 VITALS
BODY MASS INDEX: 27.16 KG/M2 | RESPIRATION RATE: 16 BRPM | OXYGEN SATURATION: 99 % | HEIGHT: 66 IN | DIASTOLIC BLOOD PRESSURE: 70 MMHG | SYSTOLIC BLOOD PRESSURE: 120 MMHG | TEMPERATURE: 97.7 F | WEIGHT: 169 LBS | HEART RATE: 65 BPM

## 2024-05-23 DIAGNOSIS — E03.9 ACQUIRED HYPOTHYROIDISM: ICD-10-CM

## 2024-05-23 DIAGNOSIS — Z01.419 WELL FEMALE EXAM WITH ROUTINE GYNECOLOGICAL EXAM: Primary | ICD-10-CM

## 2024-05-23 PROBLEM — K21.9 GASTROESOPHAGEAL REFLUX DISEASE WITHOUT ESOPHAGITIS: Status: ACTIVE | Noted: 2023-01-19

## 2024-05-23 PROBLEM — F41.8 MIXED ANXIETY AND DEPRESSIVE DISORDER: Status: RESOLVED | Noted: 2018-01-31 | Resolved: 2024-05-23

## 2024-05-23 PROBLEM — F39 MOOD DISORDER (HCC): Status: RESOLVED | Noted: 2021-05-24 | Resolved: 2024-05-23

## 2024-05-23 LAB
CHOLEST SERPL-MCNC: 161 MG/DL (ref 160–199)
HDLC SERPL-MCNC: 48 MG/DL (ref 65–121)
LDLC SERPL CALC-MCNC: 79 MG/DL
T4 FREE SERPL-MCNC: 1.23 NG/DL (ref 0.93–1.7)
TRIGL SERPL-MCNC: 172 MG/DL (ref 0–149)
TSH SERPL DL<=0.005 MIU/L-ACNC: 5.62 UIU/ML (ref 0.27–4.2)

## 2024-05-23 PROCEDURE — 3074F SYST BP LT 130 MM HG: CPT | Performed by: FAMILY MEDICINE

## 2024-05-23 PROCEDURE — 99396 PREV VISIT EST AGE 40-64: CPT | Performed by: FAMILY MEDICINE

## 2024-05-23 PROCEDURE — 3078F DIAST BP <80 MM HG: CPT | Performed by: FAMILY MEDICINE

## 2024-05-23 RX ORDER — LISDEXAMFETAMINE DIMESYLATE 50 MG/1
50 CAPSULE ORAL EVERY MORNING
COMMUNITY
Start: 2024-05-10

## 2024-05-23 RX ORDER — LEFLUNOMIDE 10 MG/1
10 TABLET ORAL DAILY
COMMUNITY
Start: 2023-12-12 | End: 2024-05-23 | Stop reason: SINTOL

## 2024-05-23 RX ORDER — DULOXETIN HYDROCHLORIDE 20 MG/1
20 CAPSULE, DELAYED RELEASE ORAL DAILY
COMMUNITY
Start: 2024-05-09

## 2024-05-23 RX ORDER — FOLIC ACID 1 MG/1
1000 TABLET ORAL DAILY
COMMUNITY
Start: 2023-12-12

## 2024-05-23 RX ORDER — CYCLOBENZAPRINE HCL 10 MG
TABLET ORAL
COMMUNITY
Start: 2024-02-12 | End: 2024-05-23

## 2024-05-23 SDOH — ECONOMIC STABILITY: FOOD INSECURITY: WITHIN THE PAST 12 MONTHS, YOU WORRIED THAT YOUR FOOD WOULD RUN OUT BEFORE YOU GOT MONEY TO BUY MORE.: NEVER TRUE

## 2024-05-23 SDOH — ECONOMIC STABILITY: INCOME INSECURITY: HOW HARD IS IT FOR YOU TO PAY FOR THE VERY BASICS LIKE FOOD, HOUSING, MEDICAL CARE, AND HEATING?: NOT VERY HARD

## 2024-05-23 SDOH — ECONOMIC STABILITY: FOOD INSECURITY: WITHIN THE PAST 12 MONTHS, THE FOOD YOU BOUGHT JUST DIDN'T LAST AND YOU DIDN'T HAVE MONEY TO GET MORE.: NEVER TRUE

## 2024-05-23 SDOH — ECONOMIC STABILITY: HOUSING INSECURITY
IN THE LAST 12 MONTHS, WAS THERE A TIME WHEN YOU DID NOT HAVE A STEADY PLACE TO SLEEP OR SLEPT IN A SHELTER (INCLUDING NOW)?: NO

## 2024-05-23 ASSESSMENT — PATIENT HEALTH QUESTIONNAIRE - PHQ9
SUM OF ALL RESPONSES TO PHQ QUESTIONS 1-9: 0
SUM OF ALL RESPONSES TO PHQ9 QUESTIONS 1 & 2: 0
10. IF YOU CHECKED OFF ANY PROBLEMS, HOW DIFFICULT HAVE THESE PROBLEMS MADE IT FOR YOU TO DO YOUR WORK, TAKE CARE OF THINGS AT HOME, OR GET ALONG WITH OTHER PEOPLE: NOT DIFFICULT AT ALL
SUM OF ALL RESPONSES TO PHQ QUESTIONS 1-9: 0
SUM OF ALL RESPONSES TO PHQ QUESTIONS 1-9: 0
1. LITTLE INTEREST OR PLEASURE IN DOING THINGS: NOT AT ALL
2. FEELING DOWN, DEPRESSED OR HOPELESS: NOT AT ALL
SUM OF ALL RESPONSES TO PHQ9 QUESTIONS 1 & 2: 0
SUM OF ALL RESPONSES TO PHQ QUESTIONS 1-9: 0
4. FEELING TIRED OR HAVING LITTLE ENERGY: NOT AT ALL
1. LITTLE INTEREST OR PLEASURE IN DOING THINGS: NOT AT ALL
7. TROUBLE CONCENTRATING ON THINGS, SUCH AS READING THE NEWSPAPER OR WATCHING TELEVISION: NOT AT ALL
8. MOVING OR SPEAKING SO SLOWLY THAT OTHER PEOPLE COULD HAVE NOTICED. OR THE OPPOSITE, BEING SO FIGETY OR RESTLESS THAT YOU HAVE BEEN MOVING AROUND A LOT MORE THAN USUAL: NOT AT ALL
6. FEELING BAD ABOUT YOURSELF - OR THAT YOU ARE A FAILURE OR HAVE LET YOURSELF OR YOUR FAMILY DOWN: NOT AT ALL
3. TROUBLE FALLING OR STAYING ASLEEP: NOT AT ALL
2. FEELING DOWN, DEPRESSED OR HOPELESS: NOT AT ALL
9. THOUGHTS THAT YOU WOULD BE BETTER OFF DEAD, OR OF HURTING YOURSELF: NOT AT ALL
SUM OF ALL RESPONSES TO PHQ QUESTIONS 1-9: 0
5. POOR APPETITE OR OVEREATING: NOT AT ALL

## 2024-05-23 NOTE — PROGRESS NOTES
SUBJECTIVE:   43 y.o. female for annual routine Pap and checkup.  She states she is doing fairly well.  Her last Pap smear in 2023 was normal but she had an abnormal one before that so we need to continue to repeat it yearly for a while.  She had an ablation several years ago but does not think she has gone through menopause yet.    Evidently she is receiving many of her medications including her Vyvanse from [facility].  They are also seeing her for GERD and URI symptoms.  She was seeing Dr. Salmeron for her ADD medication.    LMP: No LMP recorded. Patient has had an ablation.  Patient Active Problem List    Diagnosis Date Noted    Left carpal tunnel syndrome 05/11/2022    Gastroesophageal reflux disease without esophagitis 01/19/2023    Right carpal tunnel syndrome 04/06/2022    Overweight (BMI 25.0-29.9) 11/30/2021    Irritable bowel syndrome 02/09/2021    Attention deficit hyperactivity disorder 01/31/2018    Essential hypertension 01/31/2018    Acquired hypothyroidism 02/08/2013     Current Outpatient Medications   Medication Sig Dispense Refill    lisdexamfetamine (VYVANSE) 50 MG capsule Take 1 capsule by mouth every morning.      DULoxetine (CYMBALTA) 20 MG extended release capsule Take 1 capsule by mouth daily      folic acid (FOLVITE) 1 MG tablet Take 1 tablet by mouth daily      levothyroxine (SYNTHROID) 112 MCG tablet TAKE 1 TABLET BY MOUTH ONCE DAILY first thing in the morning on empty stomach. 90 tablet 3    bisoprolol-hydroCHLOROthiazide (ZIAC) 5-6.25 MG per tablet Take 1 tablet by mouth daily 90 tablet 3    pantoprazole (PROTONIX) 40 MG tablet TAKE ONE TABLET EVERY DAY IF NEEDED FOR FLAREUP of GERD 90 tablet 3    ibuprofen (ADVIL;MOTRIN) 800 MG tablet Take 1 tablet by mouth every 8 hours as needed for Pain 90 tablet 0    hydroxychloroquine (PLAQUENIL) 200 MG tablet Take 1 tablet by mouth 2 times daily      MYDAYIS 50 MG CP24 Take 50 mg by mouth daily. (Patient not taking: Reported on

## 2024-05-23 NOTE — PATIENT INSTRUCTIONS
We are committed to providing you with the best care possible.   In order to help us achieve these goals please remember to bring all medications, herbal products, and over the counter supplements with you to each visit.     If your provider has ordered testing for you, please be sure to follow up with our office if you have not received results within 7 days after the testing took place.     *If you receive a survey after visiting one of our offices, please take time to share your experience concerning your physician office visit. These surveys are confidential and no health information about you is shared.  We are eager to improve for you and we are counting on your feedback to help make that happen.         Wt Readings from Last 3 Encounters:   05/23/24 76.7 kg (169 lb)   09/19/23 79.8 kg (176 lb)   09/22/23 79.8 kg (176 lb)

## 2024-05-29 DIAGNOSIS — N63.20 MASS OF LEFT BREAST, UNSPECIFIED QUADRANT: ICD-10-CM

## 2024-05-29 DIAGNOSIS — R94.6 ABNORMAL THYROID FUNCTION TEST: Primary | ICD-10-CM

## 2024-06-12 ENCOUNTER — OFFICE VISIT (OUTPATIENT)
Dept: PRIMARY CARE CLINIC | Age: 44
End: 2024-06-12
Payer: COMMERCIAL

## 2024-06-12 VITALS
SYSTOLIC BLOOD PRESSURE: 126 MMHG | WEIGHT: 168.2 LBS | DIASTOLIC BLOOD PRESSURE: 78 MMHG | HEART RATE: 78 BPM | BODY MASS INDEX: 27.03 KG/M2 | OXYGEN SATURATION: 97 % | RESPIRATION RATE: 18 BRPM | HEIGHT: 66 IN | TEMPERATURE: 96.9 F

## 2024-06-12 DIAGNOSIS — M25.511 RIGHT SHOULDER PAIN, UNSPECIFIED CHRONICITY: Primary | ICD-10-CM

## 2024-06-12 DIAGNOSIS — R10.30 LOWER ABDOMINAL PAIN: ICD-10-CM

## 2024-06-12 PROCEDURE — 3078F DIAST BP <80 MM HG: CPT | Performed by: FAMILY MEDICINE

## 2024-06-12 PROCEDURE — 99213 OFFICE O/P EST LOW 20 MIN: CPT | Performed by: FAMILY MEDICINE

## 2024-06-12 PROCEDURE — 96372 THER/PROPH/DIAG INJ SC/IM: CPT | Performed by: FAMILY MEDICINE

## 2024-06-12 PROCEDURE — 3074F SYST BP LT 130 MM HG: CPT | Performed by: FAMILY MEDICINE

## 2024-06-12 RX ORDER — PREDNISONE 20 MG/1
20 TABLET ORAL DAILY
Qty: 7 TABLET | Refills: 0 | Status: SHIPPED | OUTPATIENT
Start: 2024-06-12 | End: 2024-06-19

## 2024-06-12 RX ORDER — BETAMETHASONE SODIUM PHOSPHATE AND BETAMETHASONE ACETATE 3; 3 MG/ML; MG/ML
6 INJECTION, SUSPENSION INTRA-ARTICULAR; INTRALESIONAL; INTRAMUSCULAR; SOFT TISSUE ONCE
Status: COMPLETED | OUTPATIENT
Start: 2024-06-12 | End: 2024-06-12

## 2024-06-12 RX ORDER — DEXTROAMPHETAMINE SACCHARATE, AMPHETAMINE ASPARTATE MONOHYDRATE, DEXTROAMPHETAMINE SULFATE AND AMPHETAMINE SULFATE 6.25; 6.25; 6.25; 6.25 MG/1; MG/1; MG/1; MG/1
CAPSULE, EXTENDED RELEASE ORAL
COMMUNITY
Start: 2024-06-05

## 2024-06-12 RX ORDER — DEXTROAMPHETAMINE SACCHARATE, AMPHETAMINE ASPARTATE, DEXTROAMPHETAMINE SULFATE AND AMPHETAMINE SULFATE 2.5; 2.5; 2.5; 2.5 MG/1; MG/1; MG/1; MG/1
TABLET ORAL
COMMUNITY
Start: 2024-06-05

## 2024-06-12 RX ADMIN — BETAMETHASONE SODIUM PHOSPHATE AND BETAMETHASONE ACETATE 6 MG: 3; 3 INJECTION, SUSPENSION INTRA-ARTICULAR; INTRALESIONAL; INTRAMUSCULAR; SOFT TISSUE at 16:21

## 2024-06-12 ASSESSMENT — ENCOUNTER SYMPTOMS
ABDOMINAL DISTENTION: 1
RESPIRATORY NEGATIVE: 1
ABDOMINAL PAIN: 1

## 2024-06-13 NOTE — PROGRESS NOTES
SUBJECTIVE:    Kelley Villanueva is 43 y.o.female who comes in complaining of Shoulder Pain   .       HPI: Kelley comes in today complaining of right shoulder pain.  She has had this in the past and actually affected both shoulders at 1 time.  Physical therapy did not help.  However a steroid shot did.  She would like another 1.  She denies any injury.  In the morning her shoulder is stiff and it hurts to just raise it up.    She is status post ablation several years ago of the uterus but she is having lower abdominal cramping and pain she says that is fairly constant.  She does not have any menstrual bleeding.  It will radiate across her lower abdomen.  She denies any change in bowel or bladder function.  She wonders if she could have cysts.    Allergies   Allergen Reactions    Oxycodone-Acetaminophen Itching     \"Skin Crawl\"       Social History     Socioeconomic History    Marital status:      Spouse name: None    Number of children: 1    Years of education: None    Highest education level: None   Occupational History    Occupation:    Tobacco Use    Smoking status: Every Day     Current packs/day: 0.50     Average packs/day: 0.5 packs/day for 17.0 years (8.5 ttl pk-yrs)     Types: Cigarettes    Smokeless tobacco: Never    Tobacco comments:     \"some\" desire to quit   Vaping Use    Vaping Use: Never used   Substance and Sexual Activity    Alcohol use: Yes     Alcohol/week: 0.0 standard drinks of alcohol     Comment: occ    Drug use: No    Sexual activity: Yes     Partners: Male     Social Determinants of Health     Financial Resource Strain: Low Risk  (5/23/2024)    Overall Financial Resource Strain (CARDIA)     Difficulty of Paying Living Expenses: Not very hard   Food Insecurity: No Food Insecurity (5/23/2024)    Hunger Vital Sign     Worried About Running Out of Food in the Last Year: Never true     Ran Out of Food in the Last Year: Never true   Transportation Needs: Unknown (5/23/2024)

## 2024-06-14 DIAGNOSIS — R10.30 LOWER ABDOMINAL PAIN: ICD-10-CM

## 2024-06-18 DIAGNOSIS — R93.5 ABNORMAL ULTRASOUND OF UTERUS: ICD-10-CM

## 2024-06-18 DIAGNOSIS — R10.9 ABDOMINAL CRAMPING: Primary | ICD-10-CM

## 2024-06-26 ENCOUNTER — HOSPITAL ENCOUNTER (OUTPATIENT)
Dept: WOMENS IMAGING | Age: 44
Discharge: HOME | End: 2024-06-26
Attending: FAMILY MEDICINE
Payer: COMMERCIAL

## 2024-06-26 ENCOUNTER — HOSPITAL ENCOUNTER (OUTPATIENT)
Dept: ULTRASOUND IMAGING | Age: 44
Discharge: HOME | End: 2024-06-26
Attending: FAMILY MEDICINE
Payer: COMMERCIAL

## 2024-06-26 DIAGNOSIS — N63.20 MASS OF LEFT BREAST, UNSPECIFIED QUADRANT: ICD-10-CM

## 2024-06-26 PROCEDURE — 76642 ULTRASOUND BREAST LIMITED: CPT

## 2024-06-26 PROCEDURE — G0279 TOMOSYNTHESIS, MAMMO: HCPCS

## 2024-06-27 ENCOUNTER — OFFICE VISIT (OUTPATIENT)
Dept: SURGERY | Age: 44
End: 2024-06-27
Payer: COMMERCIAL

## 2024-06-27 VITALS — WEIGHT: 167 LBS | HEIGHT: 66 IN | BODY MASS INDEX: 26.84 KG/M2

## 2024-06-27 DIAGNOSIS — R92.8 ABNORMAL MAMMOGRAM: Primary | ICD-10-CM

## 2024-06-27 PROCEDURE — 99205 OFFICE O/P NEW HI 60 MIN: CPT | Performed by: SURGERY

## 2024-06-27 PROCEDURE — 99417 PROLNG OP E/M EACH 15 MIN: CPT | Performed by: SURGERY

## 2024-06-27 RX ORDER — DIAZEPAM 5 MG/1
TABLET ORAL
Qty: 10 TABLET | Refills: 0 | Status: SHIPPED | OUTPATIENT
Start: 2024-06-27 | End: 2024-07-07

## 2024-06-28 PROBLEM — R92.8 ABNORMAL MAMMOGRAM: Status: ACTIVE | Noted: 2024-06-28

## 2024-07-02 ENCOUNTER — TELEPHONE (OUTPATIENT)
Dept: SURGERY | Age: 44
End: 2024-07-02

## 2024-07-02 NOTE — TELEPHONE ENCOUNTER
I spoke with patient that we are waiting to see if we can schedule biopsy due to her insurance being out of network. I let her know there is a possibility that we may have to refer her to another facility and if she wanted to go ahead and check with her insurance to see who they would cover. I told her I would call her back as soon as I find out anything.

## 2024-07-06 ENCOUNTER — APPOINTMENT (OUTPATIENT)
Dept: ULTRASOUND IMAGING | Facility: HOSPITAL | Age: 44
End: 2024-07-06
Payer: COMMERCIAL

## 2024-07-06 ENCOUNTER — APPOINTMENT (OUTPATIENT)
Dept: CT IMAGING | Facility: HOSPITAL | Age: 44
End: 2024-07-06
Payer: COMMERCIAL

## 2024-07-06 ENCOUNTER — HOSPITAL ENCOUNTER (EMERGENCY)
Facility: HOSPITAL | Age: 44
Discharge: HOME | End: 2024-07-06
Payer: COMMERCIAL

## 2024-07-06 VITALS
DIASTOLIC BLOOD PRESSURE: 77 MMHG | BODY MASS INDEX: 27 KG/M2 | SYSTOLIC BLOOD PRESSURE: 124 MMHG | OXYGEN SATURATION: 99 % | WEIGHT: 168 LBS | HEART RATE: 62 BPM | HEIGHT: 66 IN | TEMPERATURE: 98 F | RESPIRATION RATE: 20 BRPM

## 2024-07-06 DIAGNOSIS — R10.30 LOWER ABDOMINAL PAIN: Primary | ICD-10-CM

## 2024-07-06 DIAGNOSIS — D21.9 FIBROID: ICD-10-CM

## 2024-07-06 LAB
ALBUMIN SERPL-MCNC: 4.1 G/DL (ref 3.5–5.2)
ALBUMIN/GLOB SERPL: 1.5 G/DL
ALP SERPL-CCNC: 74 U/L (ref 39–117)
ALT SERPL W P-5'-P-CCNC: 13 U/L (ref 1–33)
ANION GAP SERPL CALCULATED.3IONS-SCNC: 10 MMOL/L (ref 5–15)
AST SERPL-CCNC: 15 U/L (ref 1–32)
BASOPHILS # BLD AUTO: 0.1 10*3/MM3 (ref 0–0.2)
BASOPHILS NFR BLD AUTO: 0.8 % (ref 0–1.5)
BILIRUB SERPL-MCNC: 0.5 MG/DL (ref 0–1.2)
BILIRUB UR QL STRIP: NEGATIVE
BUN SERPL-MCNC: 12 MG/DL (ref 6–20)
BUN/CREAT SERPL: 14.3 (ref 7–25)
CALCIUM SPEC-SCNC: 9.1 MG/DL (ref 8.6–10.5)
CHLORIDE SERPL-SCNC: 103 MMOL/L (ref 98–107)
CLARITY UR: CLEAR
CO2 SERPL-SCNC: 27 MMOL/L (ref 22–29)
COLOR UR: YELLOW
CREAT SERPL-MCNC: 0.84 MG/DL (ref 0.57–1)
DEPRECATED RDW RBC AUTO: 44.2 FL (ref 37–54)
EGFRCR SERPLBLD CKD-EPI 2021: 88 ML/MIN/1.73
EOSINOPHIL # BLD AUTO: 0.55 10*3/MM3 (ref 0–0.4)
EOSINOPHIL NFR BLD AUTO: 4.4 % (ref 0.3–6.2)
ERYTHROCYTE [DISTWIDTH] IN BLOOD BY AUTOMATED COUNT: 13.7 % (ref 12.3–15.4)
GLOBULIN UR ELPH-MCNC: 2.7 GM/DL
GLUCOSE SERPL-MCNC: 124 MG/DL (ref 65–99)
GLUCOSE UR STRIP-MCNC: NEGATIVE MG/DL
HCG SERPL QL: NEGATIVE
HCT VFR BLD AUTO: 44.1 % (ref 34–46.6)
HGB BLD-MCNC: 14.4 G/DL (ref 12–15.9)
HGB UR QL STRIP.AUTO: NEGATIVE
IMM GRANULOCYTES # BLD AUTO: 0.04 10*3/MM3 (ref 0–0.05)
IMM GRANULOCYTES NFR BLD AUTO: 0.3 % (ref 0–0.5)
KETONES UR QL STRIP: NEGATIVE
LEUKOCYTE ESTERASE UR QL STRIP.AUTO: NEGATIVE
LIPASE SERPL-CCNC: 38 U/L (ref 13–60)
LYMPHOCYTES # BLD AUTO: 2.61 10*3/MM3 (ref 0.7–3.1)
LYMPHOCYTES NFR BLD AUTO: 21 % (ref 19.6–45.3)
MCH RBC QN AUTO: 29 PG (ref 26.6–33)
MCHC RBC AUTO-ENTMCNC: 32.7 G/DL (ref 31.5–35.7)
MCV RBC AUTO: 88.9 FL (ref 79–97)
MONOCYTES # BLD AUTO: 0.93 10*3/MM3 (ref 0.1–0.9)
MONOCYTES NFR BLD AUTO: 7.5 % (ref 5–12)
NEUTROPHILS NFR BLD AUTO: 66 % (ref 42.7–76)
NEUTROPHILS NFR BLD AUTO: 8.19 10*3/MM3 (ref 1.7–7)
NITRITE UR QL STRIP: NEGATIVE
NRBC BLD AUTO-RTO: 0 /100 WBC (ref 0–0.2)
PH UR STRIP.AUTO: 6 [PH] (ref 5–8)
PLATELET # BLD AUTO: 219 10*3/MM3 (ref 140–450)
PMV BLD AUTO: 12.6 FL (ref 6–12)
POTASSIUM SERPL-SCNC: 3.5 MMOL/L (ref 3.5–5.2)
PROT SERPL-MCNC: 6.8 G/DL (ref 6–8.5)
PROT UR QL STRIP: NEGATIVE
RBC # BLD AUTO: 4.96 10*6/MM3 (ref 3.77–5.28)
SODIUM SERPL-SCNC: 140 MMOL/L (ref 136–145)
SP GR UR STRIP: >1.03 (ref 1–1.03)
UROBILINOGEN UR QL STRIP: ABNORMAL
WBC NRBC COR # BLD AUTO: 12.42 10*3/MM3 (ref 3.4–10.8)

## 2024-07-06 PROCEDURE — 25010000002 ONDANSETRON PER 1 MG: Performed by: EMERGENCY MEDICINE

## 2024-07-06 PROCEDURE — 25510000001 IOPAMIDOL 61 % SOLUTION: Performed by: EMERGENCY MEDICINE

## 2024-07-06 PROCEDURE — 25010000002 MORPHINE PER 10 MG: Performed by: EMERGENCY MEDICINE

## 2024-07-06 PROCEDURE — 83690 ASSAY OF LIPASE: CPT

## 2024-07-06 PROCEDURE — 81003 URINALYSIS AUTO W/O SCOPE: CPT

## 2024-07-06 PROCEDURE — 76830 TRANSVAGINAL US NON-OB: CPT

## 2024-07-06 PROCEDURE — 96374 THER/PROPH/DIAG INJ IV PUSH: CPT

## 2024-07-06 PROCEDURE — 25810000003 SODIUM CHLORIDE 0.9 % SOLUTION: Performed by: EMERGENCY MEDICINE

## 2024-07-06 PROCEDURE — 76856 US EXAM PELVIC COMPLETE: CPT

## 2024-07-06 PROCEDURE — 84703 CHORIONIC GONADOTROPIN ASSAY: CPT

## 2024-07-06 PROCEDURE — 80053 COMPREHEN METABOLIC PANEL: CPT

## 2024-07-06 PROCEDURE — 85025 COMPLETE CBC W/AUTO DIFF WBC: CPT

## 2024-07-06 PROCEDURE — 74177 CT ABD & PELVIS W/CONTRAST: CPT

## 2024-07-06 PROCEDURE — 99285 EMERGENCY DEPT VISIT HI MDM: CPT

## 2024-07-06 PROCEDURE — 96375 TX/PRO/DX INJ NEW DRUG ADDON: CPT

## 2024-07-06 PROCEDURE — 93975 VASCULAR STUDY: CPT

## 2024-07-06 RX ORDER — ONDANSETRON 2 MG/ML
4 INJECTION INTRAMUSCULAR; INTRAVENOUS ONCE
Status: COMPLETED | OUTPATIENT
Start: 2024-07-06 | End: 2024-07-06

## 2024-07-06 RX ORDER — SODIUM CHLORIDE 0.9 % (FLUSH) 0.9 %
10 SYRINGE (ML) INJECTION AS NEEDED
Status: DISCONTINUED | OUTPATIENT
Start: 2024-07-06 | End: 2024-07-06 | Stop reason: HOSPADM

## 2024-07-06 RX ORDER — MORPHINE SULFATE 2 MG/ML
2 INJECTION, SOLUTION INTRAMUSCULAR; INTRAVENOUS ONCE
Status: COMPLETED | OUTPATIENT
Start: 2024-07-06 | End: 2024-07-06

## 2024-07-06 RX ADMIN — MORPHINE SULFATE 2 MG: 2 INJECTION, SOLUTION INTRAMUSCULAR; INTRAVENOUS at 16:43

## 2024-07-06 RX ADMIN — IOPAMIDOL 100 ML: 612 INJECTION, SOLUTION INTRAVENOUS at 17:07

## 2024-07-06 RX ADMIN — SODIUM CHLORIDE 1000 ML: 9 INJECTION, SOLUTION INTRAVENOUS at 16:43

## 2024-07-06 RX ADMIN — ONDANSETRON 4 MG: 2 INJECTION INTRAMUSCULAR; INTRAVENOUS at 16:43

## 2024-07-06 NOTE — ED NOTES
Pt unable to urinate at this time. Pt has IV fluids going and was instructed to use the call light for when she feels like the specimen could be obtained.

## 2024-07-06 NOTE — ED PROVIDER NOTES
Subjective   History of Present Illness  Patient is a 43-year-old female who presents emergency department with complaints of lower abdominal pain and pelvic pain.  Reports that it feels like a sharp/stabbing sensation.  States that it started 1 hour prior to arrival.  She has been having cramps for approximately 2 months, but states that this pain is different.  She denies any nausea or vomiting.  Denies urinary symptoms.  Denies vaginal discharge.  States that she sometimes has some vaginal spotting at times.  Denies fevers.        Review of Systems   Gastrointestinal:  Positive for abdominal pain.   Genitourinary:  Positive for pelvic pain.   All other systems reviewed and are negative.      Past Medical History:   Diagnosis Date    Acid reflux     ADHD (attention deficit hyperactivity disorder)     Allergic     Anxiety     Bronchitis     Disease of thyroid gland     Gallbladder abscess     Heart murmur     Hypertension     Hypothyroidism     Mitral valve prolapse     PONV (postoperative nausea and vomiting)        Allergies   Allergen Reactions    Percocet [Oxycodone-Acetaminophen] Hives    Adhesive Tape Rash     ADHESIVE ON BANDAIDS       Past Surgical History:   Procedure Laterality Date    CARPAL TUNNEL RELEASE      COLONOSCOPY  05/01/2003    Normal exam    DIAGNOSTIC LAPAROSCOPY  2000    [facility]; normal findings; [facility]    ENDOSCOPY      LAPAROSCOPIC CHOLECYSTECTOMY  2011    Dr Duncan; [place]    SALPINGECTOMY Bilateral 10/16/2020    Procedure: SALPINGECTOMY LAPAROSCOPIC for sterilization;  Surgeon: Stevan Espinal MD;  Location: [facility] OR;  Service: Obstetrics/Gynecology;  Laterality: Bilateral;    WISDOM TOOTH EXTRACTION         Family History   Problem Relation Age of Onset    Diabetes Mother     Diabetes Father     Cancer Paternal Grandfather     Colon cancer Neg Hx     Colon polyps Neg Hx        Social History     Socioeconomic History    Marital status:    Tobacco Use    Smoking status: Every Day      Current packs/day: 0.50     Average packs/day: 0.5 packs/day for 20.0 years (10.0 ttl pk-yrs)     Types: Cigarettes    Smokeless tobacco: Never   Vaping Use    Vaping status: Some Days    Start date: 2/17/2024    Substances: Nicotine, Flavoring    Devices: Disposable   Substance and Sexual Activity    Alcohol use: Yes     Comment: Occasional    Drug use: Never    Sexual activity: Defer     Partners: Male     Birth control/protection: OCP           Objective   Physical Exam  Vitals and nursing note reviewed.   Constitutional:       General: She is not in acute distress.     Appearance: Normal appearance. She is normal weight. She is not ill-appearing or toxic-appearing.   HENT:      Head: Normocephalic.   Cardiovascular:      Rate and Rhythm: Normal rate and regular rhythm.      Pulses: Normal pulses.      Heart sounds: Normal heart sounds.   Pulmonary:      Effort: Pulmonary effort is normal.      Breath sounds: Normal breath sounds.   Abdominal:      General: Abdomen is flat. Bowel sounds are normal. There is no distension.      Palpations: Abdomen is soft.      Tenderness: There is abdominal tenderness (Lower).   Musculoskeletal:         General: Normal range of motion.      Cervical back: Normal range of motion and neck supple.   Skin:     General: Skin is warm and dry.   Neurological:      General: No focal deficit present.      Mental Status: She is alert and oriented to person, place, and time. Mental status is at baseline.   Psychiatric:         Mood and Affect: Mood normal.         Behavior: Behavior normal.         Thought Content: Thought content normal.         Judgment: Judgment normal.         Procedures           ED Course  ED Course as of 07/06/24 1950   Sat Jul 06, 2024   1900 On reevaluation, patient states that she is feeling much better.  Call has been placed to GYN at this time to discuss ultrasound results and for further recommendations. [KR]   1911 Case has been discussed with Lisa Jeffries  midlevel with OB/GYN.  She stated to have patient follow-up on an outpatient basis and did not think that this was anything emergent at this time. [KR]      ED Course User Index  [KR] Jodi Smith APRN                                             Medical Decision Making  Uyen José is a very pleasant 43 y.o. female who presents to the emergency department for abdominal pain.     Patient was non-toxic and not-ill appearing on arrival. Vital signs stable.     Patient's presentation raises suspicion for differentials including, but not limited to, UTI, gastroenteritis, colitis, ovarian cyst.     External (non-ED) record review: None    Given this, Uyen was placed on the monitor. Laboratory studies and imaging studies were ordered including CBC, CMP, hCG qualitative, lipase, urinalysis, CT abdomen pelvis with contrast, transvaginal non-OB ultrasound.     Uyen was given IV fluids, IV Zofran, IV morphine for symptomatic relief.    Imaging was reviewed by radiologist.  CT abdomen pelvis revealed Rounded cystic dilation of the endometrial cavity measuring up to 3.4 cm with mild peripheral hyperenhancement. Recommend correlation with beta-hCG to exclude intrauterine pregnancy. Other differentials include abnormal endometrial thickening, cervical outlet obstruction/stenosis, and pelvic inflammatory disease.  There may be mild wall thickening of the transverse colon without significant pericolonic inflammatory changes. This may be related to mild fatty infiltration of the colon wall, however early developing colitis is an additional consideration.  Ultrasound revealed Possible septate uterus morphology. The endometrial canal is distended with complex fluid measuring a maximal thickness of 2.8 cm. There is a 2.6 cm intramural mass within the mid uterine segment just proximal to the distended endometrial canal which most likely represents a fibroid.     Labs were reviewed.  CBC with mild leukocytosis, stable H&H.   CMP unremarkable.  Negative hCG.  Lipase normal.  Urinalysis not indicative of infection.  On re-evaluation, patient remained hemodynamically stable and appeared to be comfortable and in no acute distress.  Case was discussed with Lisa Jeffries, midlevel with OB/GYN who felt that patient was appropriate to be discharged home and to follow-up on an outpatient basis.  If patient's abdominal pain persists, she may likely need referral to GI as well on an outpatient basis for further recommendations/testing.  I did inform patient of this and she verbalizes understanding.    I discussed all of the lab and imaging results with the patient during this visit in the emergency department. I answered all the questions regarding the emergency department evaluation, diagnosis, and treatment plan. We talked about how crucial it is for the patient to follow up by calling their primary care provider as soon as possible to schedule an appointment for within the next few days or as soon as possible so that the symptoms can be reassessed to see if they have improved or to answer any additional questions. I also provided the patient with advice on returning safely and urged the patient to visit the emergency department right away if any worsening or new symptoms appeared. The patient verbalized understanding of the discharge instructions and agreed with them. Uyen was discharged in stable condition.    Signed by:   BLAS Polanco 7/6/2024 19:46 CDT     Dragon disclaimer:  Part of this note may be an electronic transcription/translation of spoken language to printed text using the Dragon Dictation System.    Problems Addressed:  Fibroid: acute illness or injury  Lower abdominal pain: acute illness or injury    Amount and/or Complexity of Data Reviewed  Labs: ordered.        Final diagnoses:   Lower abdominal pain   Fibroid       ED Disposition  ED Disposition       ED Disposition   Discharge    Condition   Stable    Comment   --                Josi Ivory, APRN  [address]  [address]  [place] [address]  [phone]    Schedule an appointment as soon as possible for a visit in 1 day      [facility] EMERGENCY DEPARTMENT  [address] [place] [address] [phone]  Go to   If symptoms worsen    Lisa Jeffries, APRN  [address]  [place] [id number] [phone]    Schedule an appointment as soon as possible for a visit in 1 day           Medication List      No changes were made to your prescriptions during this visit.            Jodi Smith, APRN  07/06/24 1950

## 2024-07-07 NOTE — DISCHARGE INSTRUCTIONS
Please follow with primary care provider and OB/GYN as soon as possible to reassess symptoms.  Please return to the ER for any new or worsening symptoms.

## 2024-07-08 DIAGNOSIS — R92.8 ABNORMAL MAMMOGRAM: Primary | ICD-10-CM

## 2024-07-08 DIAGNOSIS — R93.5 ABNORMAL ABDOMINAL ULTRASOUND: ICD-10-CM

## 2024-07-08 DIAGNOSIS — F90.2 ATTENTION DEFICIT HYPERACTIVITY DISORDER (ADHD), COMBINED TYPE: ICD-10-CM

## 2024-07-08 DIAGNOSIS — R10.9 ABDOMINAL CRAMPS: ICD-10-CM

## 2024-07-08 DIAGNOSIS — K21.9 GASTROESOPHAGEAL REFLUX DISEASE WITHOUT ESOPHAGITIS: ICD-10-CM

## 2024-07-08 DIAGNOSIS — R10.30 LOWER ABDOMINAL PAIN: Primary | ICD-10-CM

## 2024-07-08 RX ORDER — PANTOPRAZOLE SODIUM 40 MG/1
40 TABLET, DELAYED RELEASE ORAL DAILY
Qty: 30 TABLET | Refills: 1 | Status: SHIPPED | OUTPATIENT
Start: 2024-07-08

## 2024-07-08 RX ORDER — LISDEXAMFETAMINE DIMESYLATE 50 MG/1
50 CAPSULE ORAL EVERY MORNING
Qty: 30 CAPSULE | Refills: 0 | Status: SHIPPED | OUTPATIENT
Start: 2024-07-08

## 2024-07-08 RX ORDER — BISOPROLOL FUMARATE AND HYDROCHLOROTHIAZIDE 5; 6.25 MG/1; MG/1
1 TABLET ORAL DAILY
Qty: 90 TABLET | Refills: 3 | Status: SHIPPED | OUTPATIENT
Start: 2024-07-08

## 2024-07-12 ENCOUNTER — HOSPITAL ENCOUNTER (EMERGENCY)
Facility: HOSPITAL | Age: 44
Discharge: HOME | End: 2024-07-12
Attending: FAMILY MEDICINE
Payer: COMMERCIAL

## 2024-07-12 ENCOUNTER — APPOINTMENT (OUTPATIENT)
Dept: CT IMAGING | Facility: HOSPITAL | Age: 44
End: 2024-07-12
Payer: COMMERCIAL

## 2024-07-12 VITALS
TEMPERATURE: 97.6 F | WEIGHT: 165 LBS | DIASTOLIC BLOOD PRESSURE: 71 MMHG | BODY MASS INDEX: 26.52 KG/M2 | RESPIRATION RATE: 24 BRPM | HEART RATE: 65 BPM | OXYGEN SATURATION: 97 % | SYSTOLIC BLOOD PRESSURE: 116 MMHG | HEIGHT: 66 IN

## 2024-07-12 DIAGNOSIS — R10.84 GENERALIZED ABDOMINAL PAIN: Primary | ICD-10-CM

## 2024-07-12 DIAGNOSIS — N85.8 UTERINE MASS: ICD-10-CM

## 2024-07-12 DIAGNOSIS — R10.2 PELVIC PAIN: ICD-10-CM

## 2024-07-12 LAB
ALBUMIN SERPL-MCNC: 4.2 G/DL (ref 3.5–5.2)
ALBUMIN/GLOB SERPL: 1.4 G/DL
ALP SERPL-CCNC: 89 U/L (ref 39–117)
ALT SERPL W P-5'-P-CCNC: 14 U/L (ref 1–33)
ANION GAP SERPL CALCULATED.3IONS-SCNC: 13 MMOL/L (ref 5–15)
APTT PPP: 30.7 SECONDS (ref 24.5–36)
AST SERPL-CCNC: 15 U/L (ref 1–32)
BASOPHILS # BLD AUTO: 0.09 10*3/MM3 (ref 0–0.2)
BASOPHILS NFR BLD AUTO: 0.6 % (ref 0–1.5)
BILIRUB SERPL-MCNC: 0.6 MG/DL (ref 0–1.2)
BILIRUB UR QL STRIP: NEGATIVE
BUN SERPL-MCNC: 12 MG/DL (ref 6–20)
BUN/CREAT SERPL: 14.8 (ref 7–25)
CALCIUM SPEC-SCNC: 9.3 MG/DL (ref 8.6–10.5)
CHLORIDE SERPL-SCNC: 100 MMOL/L (ref 98–107)
CLARITY UR: CLEAR
CO2 SERPL-SCNC: 25 MMOL/L (ref 22–29)
COLOR UR: YELLOW
CREAT SERPL-MCNC: 0.81 MG/DL (ref 0.57–1)
D-LACTATE SERPL-SCNC: 1.7 MMOL/L (ref 0.5–2)
DEPRECATED RDW RBC AUTO: 42.5 FL (ref 37–54)
EGFRCR SERPLBLD CKD-EPI 2021: 91.9 ML/MIN/1.73
EOSINOPHIL # BLD AUTO: 0.53 10*3/MM3 (ref 0–0.4)
EOSINOPHIL NFR BLD AUTO: 3.6 % (ref 0.3–6.2)
ERYTHROCYTE [DISTWIDTH] IN BLOOD BY AUTOMATED COUNT: 13.5 % (ref 12.3–15.4)
GLOBULIN UR ELPH-MCNC: 3.1 GM/DL
GLUCOSE SERPL-MCNC: 108 MG/DL (ref 65–99)
GLUCOSE UR STRIP-MCNC: NEGATIVE MG/DL
HCT VFR BLD AUTO: 44.1 % (ref 34–46.6)
HGB BLD-MCNC: 14.8 G/DL (ref 12–15.9)
HGB UR QL STRIP.AUTO: NEGATIVE
HOLD SPECIMEN: NORMAL
HOLD SPECIMEN: NORMAL
IMM GRANULOCYTES # BLD AUTO: 0.05 10*3/MM3 (ref 0–0.05)
IMM GRANULOCYTES NFR BLD AUTO: 0.3 % (ref 0–0.5)
INR PPP: 0.94 (ref 0.91–1.09)
KETONES UR QL STRIP: NEGATIVE
LEUKOCYTE ESTERASE UR QL STRIP.AUTO: NEGATIVE
LIPASE SERPL-CCNC: 37 U/L (ref 13–60)
LYMPHOCYTES # BLD AUTO: 2.87 10*3/MM3 (ref 0.7–3.1)
LYMPHOCYTES NFR BLD AUTO: 19.5 % (ref 19.6–45.3)
MAGNESIUM SERPL-MCNC: 2 MG/DL (ref 1.6–2.6)
MCH RBC QN AUTO: 28.9 PG (ref 26.6–33)
MCHC RBC AUTO-ENTMCNC: 33.6 G/DL (ref 31.5–35.7)
MCV RBC AUTO: 86.1 FL (ref 79–97)
MONOCYTES # BLD AUTO: 0.98 10*3/MM3 (ref 0.1–0.9)
MONOCYTES NFR BLD AUTO: 6.7 % (ref 5–12)
NEUTROPHILS NFR BLD AUTO: 10.17 10*3/MM3 (ref 1.7–7)
NEUTROPHILS NFR BLD AUTO: 69.3 % (ref 42.7–76)
NITRITE UR QL STRIP: NEGATIVE
NRBC BLD AUTO-RTO: 0 /100 WBC (ref 0–0.2)
PH UR STRIP.AUTO: 5.5 [PH] (ref 5–8)
PLATELET # BLD AUTO: 217 10*3/MM3 (ref 140–450)
PMV BLD AUTO: 12.6 FL (ref 6–12)
POTASSIUM SERPL-SCNC: 3.9 MMOL/L (ref 3.5–5.2)
PROT SERPL-MCNC: 7.3 G/DL (ref 6–8.5)
PROT UR QL STRIP: NEGATIVE
PROTHROMBIN TIME: 13 SECONDS (ref 11.8–14.8)
RBC # BLD AUTO: 5.12 10*6/MM3 (ref 3.77–5.28)
SODIUM SERPL-SCNC: 138 MMOL/L (ref 136–145)
SP GR UR STRIP: >=1.03 (ref 1–1.03)
UROBILINOGEN UR QL STRIP: NORMAL
WBC NRBC COR # BLD AUTO: 14.69 10*3/MM3 (ref 3.4–10.8)
WHOLE BLOOD HOLD COAG: NORMAL
WHOLE BLOOD HOLD SPECIMEN: NORMAL

## 2024-07-12 PROCEDURE — 74177 CT ABD & PELVIS W/CONTRAST: CPT

## 2024-07-12 PROCEDURE — 83605 ASSAY OF LACTIC ACID: CPT | Performed by: FAMILY MEDICINE

## 2024-07-12 PROCEDURE — 83690 ASSAY OF LIPASE: CPT | Performed by: FAMILY MEDICINE

## 2024-07-12 PROCEDURE — 99285 EMERGENCY DEPT VISIT HI MDM: CPT

## 2024-07-12 PROCEDURE — 80053 COMPREHEN METABOLIC PANEL: CPT | Performed by: FAMILY MEDICINE

## 2024-07-12 PROCEDURE — 85610 PROTHROMBIN TIME: CPT | Performed by: FAMILY MEDICINE

## 2024-07-12 PROCEDURE — 85730 THROMBOPLASTIN TIME PARTIAL: CPT | Performed by: FAMILY MEDICINE

## 2024-07-12 PROCEDURE — 25810000003 SODIUM CHLORIDE 0.9 % SOLUTION: Performed by: FAMILY MEDICINE

## 2024-07-12 PROCEDURE — 25010000002 KETOROLAC TROMETHAMINE PER 15 MG: Performed by: FAMILY MEDICINE

## 2024-07-12 PROCEDURE — 83735 ASSAY OF MAGNESIUM: CPT | Performed by: FAMILY MEDICINE

## 2024-07-12 PROCEDURE — 25510000001 IOPAMIDOL 61 % SOLUTION: Performed by: FAMILY MEDICINE

## 2024-07-12 PROCEDURE — 0 HYDROMORPHONE 1 MG/ML SOLUTION: Performed by: FAMILY MEDICINE

## 2024-07-12 PROCEDURE — 85025 COMPLETE CBC W/AUTO DIFF WBC: CPT | Performed by: FAMILY MEDICINE

## 2024-07-12 PROCEDURE — 81003 URINALYSIS AUTO W/O SCOPE: CPT | Performed by: FAMILY MEDICINE

## 2024-07-12 PROCEDURE — 96374 THER/PROPH/DIAG INJ IV PUSH: CPT

## 2024-07-12 PROCEDURE — 96375 TX/PRO/DX INJ NEW DRUG ADDON: CPT

## 2024-07-12 PROCEDURE — 25010000002 ONDANSETRON PER 1 MG: Performed by: FAMILY MEDICINE

## 2024-07-12 RX ORDER — KETOROLAC TROMETHAMINE 30 MG/ML
30 INJECTION, SOLUTION INTRAMUSCULAR; INTRAVENOUS ONCE
Status: COMPLETED | OUTPATIENT
Start: 2024-07-12 | End: 2024-07-12

## 2024-07-12 RX ORDER — FAMOTIDINE 10 MG/ML
20 INJECTION, SOLUTION INTRAVENOUS ONCE
Status: COMPLETED | OUTPATIENT
Start: 2024-07-12 | End: 2024-07-12

## 2024-07-12 RX ORDER — IOPAMIDOL 612 MG/ML
100 INJECTION, SOLUTION INTRAVASCULAR
Status: COMPLETED | OUTPATIENT
Start: 2024-07-12 | End: 2024-07-12

## 2024-07-12 RX ORDER — SODIUM CHLORIDE 0.9 % (FLUSH) 0.9 %
10 SYRINGE (ML) INJECTION AS NEEDED
Status: DISCONTINUED | OUTPATIENT
Start: 2024-07-12 | End: 2024-07-12 | Stop reason: HOSPADM

## 2024-07-12 RX ORDER — ONDANSETRON 2 MG/ML
4 INJECTION INTRAMUSCULAR; INTRAVENOUS ONCE
Status: COMPLETED | OUTPATIENT
Start: 2024-07-12 | End: 2024-07-12

## 2024-07-12 RX ADMIN — FAMOTIDINE 20 MG: 10 INJECTION INTRAVENOUS at 03:42

## 2024-07-12 RX ADMIN — KETOROLAC TROMETHAMINE 30 MG: 30 INJECTION, SOLUTION INTRAMUSCULAR; INTRAVENOUS at 03:40

## 2024-07-12 RX ADMIN — IOPAMIDOL 100 ML: 612 INJECTION, SOLUTION INTRAVENOUS at 03:55

## 2024-07-12 RX ADMIN — HYDROMORPHONE HYDROCHLORIDE 1 MG: 1 INJECTION, SOLUTION INTRAMUSCULAR; INTRAVENOUS; SUBCUTANEOUS at 03:41

## 2024-07-12 RX ADMIN — ONDANSETRON 4 MG: 2 INJECTION INTRAMUSCULAR; INTRAVENOUS at 03:39

## 2024-07-12 RX ADMIN — SODIUM CHLORIDE 1000 ML: 9 INJECTION, SOLUTION INTRAVENOUS at 03:38

## 2024-07-12 NOTE — ED PROVIDER NOTES
HPI:     Patient is a 43-year-old female who returns back here to the emergency room due to continued abdominal pain and pain.  Patient states she was here on 7 6 for the same abdominal pain and they had a CT scan of the abdomen and pelvis as well as ultrasound of the pelvis.  The patient stated that the ultrasound showed what looked to be a uterus with a intramural fibroid.  And also a CT scan that showed the beginnings of an early transverse colon colitis.  Otherwise there was no bowel obstructions or other findings.  Now she rates her pain 10 out of 10.  She is concerned that she may have a bowel obstruction because her last movement was more than 6 days ago.      REVIEW OF SYSTEMS  CONSTITUTIONAL:  No complaints of fever, chills,or weakness  EYES:  No complaints of discharge   ENT: No complaints of sore throat or ear pain  CARDIOVASCULAR:  No complaints of chest pain, palpitations, or swelling  RESPIRATORY:  No complaints of cough or shortness of breath  GI: Positive for severe lower abdominal pain without nausea vomiting or trauma.  MUSCULOSKELETAL:  No complaints of back pain  SKIN:  No complaints of rash  NEUROLOGIC:  No complaints of headache, focal weakness, or sensory changes  ENDOCRINE:  No complaints of polyuria or polydipsia  LYMPHATIC:  No complaints of swollen glands  GENITOURINARY: No complaints of urinary frequency or hematuria      PAST MEDICAL HISTORY  Past Medical History:   Diagnosis Date    Acid reflux     ADHD (attention deficit hyperactivity disorder)     Allergic     Anxiety     Bronchitis     Disease of thyroid gland     Gallbladder abscess     Heart murmur     Hypertension     Hypothyroidism     Mitral valve prolapse     PONV (postoperative nausea and vomiting)        FAMILY HISTORY  Family History   Problem Relation Age of Onset    Diabetes Mother     Diabetes Father     Cancer Paternal Grandfather     Colon cancer Neg Hx     Colon polyps Neg Hx        SOCIAL HISTORY  Social History      Socioeconomic History    Marital status:    Tobacco Use    Smoking status: Every Day     Current packs/day: 0.50     Average packs/day: 0.5 packs/day for 20.0 years (10.0 ttl pk-yrs)     Types: Cigarettes    Smokeless tobacco: Never   Vaping Use    Vaping status: Some Days    Start date: 2/17/2024    Substances: Nicotine, Flavoring    Devices: Disposable   Substance and Sexual Activity    Alcohol use: Yes     Comment: Occasional    Drug use: Never    Sexual activity: Defer     Partners: Male     Birth control/protection: OCP       IMMUNIZATION HISTORY  Deferred to primary care physician.    SURGICAL HISTORY  Past Surgical History:   Procedure Laterality Date    CARPAL TUNNEL RELEASE      COLONOSCOPY  05/01/2003    Normal exam    DIAGNOSTIC LAPAROSCOPY  2000    [facility]; normal findings; [facility]    ENDOSCOPY      LAPAROSCOPIC CHOLECYSTECTOMY  2011    Dr Duncan; [facility]    SALPINGECTOMY Bilateral 10/16/2020    Procedure: SALPINGECTOMY LAPAROSCOPIC for sterilization;  Surgeon: Stevan Espinal MD;  Location: [facility];  Service: Obstetrics/Gynecology;  Laterality: Bilateral;    WISDOM TOOTH EXTRACTION         CURRENT MEDICATIONS  No current facility-administered medications for this encounter.    Current Outpatient Medications:     bisoprolol-hydrochlorothiazide (ZIAC) 5-6.25 MG per tablet, Take 1 tablet by mouth once daily, Disp: 30 tablet, Rfl: 0    cyclobenzaprine (FLEXERIL) 10 MG tablet, 1 tab PO up to 2x daily prn headache, neck ache, Disp: 30 tablet, Rfl: 1    folic acid (FOLVITE) 1 MG tablet, Take 1 tablet by mouth Daily., Disp: , Rfl:     hydroxychloroquine (PLAQUENIL) 200 MG tablet, , Disp: , Rfl:     ibuprofen (ADVIL,MOTRIN) 800 MG tablet, Take 1 tablet by mouth Every 6 (Six) Hours As Needed for Mild Pain., Disp: 90 tablet, Rfl: 3    leflunomide (ARAVA) 10 MG tablet, Take 1 tablet by mouth Daily., Disp: , Rfl:     levothyroxine (SYNTHROID, LEVOTHROID) 112 MCG tablet, TAKE 1 TABLET BY MOUTH ONCE DAILY;  "ALTERNATING WITH 100MCG, Disp: , Rfl:     lisdexamfetamine (Vyvanse) 50 MG capsule, Take 1 capsule by mouth Every Morning for 30 days, Disp: 30 capsule, Rfl: 0    lisdexamfetamine (VYVANSE) 50 MG capsule, TAKE 1 CAPSULE BY MOUTH EVERY MORNING, Disp: 30 capsule, Rfl: 0    pantoprazole (PROTONIX) 40 MG EC tablet, TAKE 1 TABLET BY MOUTH DAILY., Disp: 30 tablet, Rfl: 1    ALLERGIES  Allergies   Allergen Reactions    Percocet [Oxycodone-Acetaminophen] Hives    Adhesive Tape Rash     ADHESIVE ON BANDAIDS       ABDOMINAL EXAM    VITAL SIGNS:   /71   Pulse 65   Temp 97.6 °F (36.4 °C) (Oral)   Resp 24   Ht 167.6 cm (66\")   Wt 74.8 kg (165 lb)   SpO2 97%   BMI 26.63 kg/m²     Constitutional: Patient is alert and in no distress.  Patient with severe lower abdominal discomfort.    ENT: There is a normal pharynx with no acute erythema or exudate and oral mucosa is moist.  Nose is clear with no drainage.  Tympanic membranes intact and non-erythemic    Cardiovascular: S1-S2 regular rate and rhythm no murmurs rubs or gallops pulses are equal bilaterally and there is no pitting edema    Respiratory: Patient is clear to auscultation bilaterally with no wheezing or rhonchi.  Chest wall is nontender.  There is no external lesions on the chest.  There is no crepitance    Gastrointestinal: Tender to palpation in the lower half of the abdomen.  No abdominal distention or fluid wave.  Negative Kaba sign.  No rebound or guarding.      Genitourinary: Patient is voiding appropriately.    Integument: No acute lesions noted color appears to be normal    Soto Coma Scale: Total score 15    Neurological: Patient is alert and oriented x4 in no acute findings noted.  Speech is fluent and cognition is normal.  No evidence of acute CVA.  Cranial nerves II through XII intact.  Patient with normal motor function as well as reflexes and sensation    Psychiatric: Normal affect and mood.            RADIOLOGY/PROCEDURES        CT Abdomen " Pelvis With Contrast   Final Result   1. Increased size of the low density/complex cystic mass in the uterine   cavity since the previous study. This may represent abnormal endometrial   thickening, complex endometrial fluid/debris accumulation in the   endometrial canal due to out duct obstruction by a fibroid in the lower   uterine segment as detailed above. There is a septate morphology of the   uterus. Further follow-up with sonography may be obtained.   2. Normal appendix. The gallbladder is surgically absent.   3. The nonacute findings of the remaining abdomen similar to the   previous study.                   This report was signed and finalized on 7/12/2024 5:43 AM by Dr. Jose Antonio Mendoza MD.                 FUTURE APPOINTMENTS     Future Appointments   Date Time Provider Department Center   7/15/2024  8:30 AM Shahla Gentile MD [facility]   7/15/2024 10:30 AM Stevan Espinal MD [facility]  [facility]   7/18/2024  3:30 PM Josi Ivory APRN [facility]          COURSE & MEDICAL DECISION MAKING       Patient's partial differential diagnosis can include:    Acute Appendicitis, Gastritis, gastroenteritis, colitis, enteritis, volvulus, intussusception, esophageal spasms, gastroparesis, GERD, peptic ulcer disease, perforated esophagus, perforated peptic ulcer, partial bowel obstruction, bowel obstruction,, AAA, mesenteric adenitis, mesenteric ischemia, constipation, irritable bowel, diverticulitis, diverticulosis, Crohn's disease, ulcerative colitis, celiac disease and others    Patient's pain is improved after IV Dilaudid, Toradol and Zofran.  CT scan of the abdomen pelvis shows an enlarged cystic mass in the side of the uterus.  This is likely the cause of the patient's pain.  The patient had on previous CT scan    Signs of colitis however this is not seen on this CT scan.  Will call gynecology for evaluation.    Discussed the call with Dr. Almanza gynecologist who states that either he  or another obstetrician gynecologist will come down to evaluate the patient.    Final disposition will be turned over to Dr. Marcano        Patient's level of risk: Moderate       CRITICAL CARE    CRITICAL CARE: No    CRITICAL CARE TIME: None      Recent Results (from the past 24 hour(s))   Comprehensive Metabolic Panel    Collection Time: 07/12/24  3:49 AM    Specimen: Blood   Result Value Ref Range    Glucose 108 (H) 65 - 99 mg/dL    BUN 12 6 - 20 mg/dL    Creatinine 0.81 0.57 - 1.00 mg/dL    Sodium 138 136 - 145 mmol/L    Potassium 3.9 3.5 - 5.2 mmol/L    Chloride 100 98 - 107 mmol/L    CO2 25.0 22.0 - 29.0 mmol/L    Calcium 9.3 8.6 - 10.5 mg/dL    Total Protein 7.3 6.0 - 8.5 g/dL    Albumin 4.2 3.5 - 5.2 g/dL    ALT (SGPT) 14 1 - 33 U/L    AST (SGOT) 15 1 - 32 U/L    Alkaline Phosphatase 89 39 - 117 U/L    Total Bilirubin 0.6 0.0 - 1.2 mg/dL    Globulin 3.1 gm/dL    A/G Ratio 1.4 g/dL    BUN/Creatinine Ratio 14.8 7.0 - 25.0    Anion Gap 13.0 5.0 - 15.0 mmol/L    eGFR 91.9 >60.0 mL/min/1.73   Lipase    Collection Time: 07/12/24  3:49 AM    Specimen: Blood   Result Value Ref Range    Lipase 37 13 - 60 U/L   Lactic Acid, Plasma    Collection Time: 07/12/24  3:49 AM    Specimen: Blood   Result Value Ref Range    Lactate 1.7 0.5 - 2.0 mmol/L   Magnesium    Collection Time: 07/12/24  3:49 AM    Specimen: Blood   Result Value Ref Range    Magnesium 2.0 1.6 - 2.6 mg/dL   Urinalysis With Culture If Indicated - Urine, Clean Catch    Collection Time: 07/12/24  5:15 AM    Specimen: Urine, Clean Catch   Result Value Ref Range    Color, UA Yellow Yellow, Straw    Appearance, UA Clear Clear    pH, UA 5.5 5.0 - 8.0    Specific Gravity, UA >=1.030 1.005 - 1.030    Glucose, UA Negative Negative    Ketones, UA Negative Negative    Bilirubin, UA Negative Negative    Blood, UA Negative Negative    Protein, UA Negative Negative    Leuk Esterase, UA Negative Negative    Nitrite, UA Negative Negative    Urobilinogen, UA 0.2 E.U./dL 0.2  - 1.0 E.U./dL          Old charts were reviewed per [facility] EMR.  Pertinent details are summarized above.  All laboratory, radiologic, and EKG studies that were performed in the Emergency Department were a necessary part of the evaluation needed to exclude unstable or  emergent medical conditions.     Patient was hemodynamically and neurologically stable in the ED.   Pertinent studies were reviewed as above.     The patient received:  Medications   HYDROmorphone (DILAUDID) injection 1 mg (1 mg Intravenous Given 7/12/24 0341)   sodium chloride 0.9 % bolus 1,000 mL (0 mL Intravenous Stopped 7/12/24 0506)   famotidine (PEPCID) injection 20 mg (20 mg Intravenous Given 7/12/24 0342)   ondansetron (ZOFRAN) injection 4 mg (4 mg Intravenous Given 7/12/24 0339)   ketorolac (TORADOL) injection 30 mg (30 mg Intravenous Given 7/12/24 0340)   iopamidol (ISOVUE-300) 61 % injection 100 mL (100 mL Intravenous Given 7/12/24 0355)       ED Course as of 07/13/24 0338 Fri Jul 12, 2024   0850 Please refer to Dr. Medley's report for further details the patient was seen by him referred to gynecology Dr. Loredo came and saw the patient discussed the patient evaluate the patient and the recommendation was that Dr. Loredo has set up an appointment for the patient with Dr. Steinberg and the patient can be discharged home. [TS]   0850 This has been explained to the patient [TS]   0850 Dr. Loredo knew about the CT findings and have discussed this with the patient. [TS]   0852 I have personally not taking care or taking part in care of this patient evaluation of this patient.  I was advised by Dr. Loredo that the patient can be discharged home with an appointment with Dr. Steinberg at 1030 Dr. Loredo has discussed the plan of care with the patient and addressed her concerns or questions.  And was going to discharge her home. [TS]      ED Course User Index  [TS] Conner Marcano MD       Diagnoses that have been ruled out:   None   Diagnoses that are  still under consideration:   None   Final diagnoses:   Generalized abdominal pain   Uterine mass   Pelvic pain        FINAL IMPRESSION   Diagnosis Plan   1. Generalized abdominal pain        2. Uterine mass        3. Pelvic pain              Kimo Medley Jr, MD        ED Disposition       ED Disposition   Discharge    Condition   Stable    Comment   --                 Dragon disclaimer:  Part of this note may be an electronic transcription/translation of spoken language to printed text using the Dragon Dictation System.     I have reviewed the patient’s prescription history via a prescription monitoring program.  This information is consistent with my knowledge of the patient’s controlled substance use history.        Kimo Medley Jr., MD  07/13/24 0333

## 2024-07-12 NOTE — PROGRESS NOTES
[facility]  Uyen José  : 1980  MRN: [id number]  CSN: [id number]    Consult Requested By: ED   Consulting Service: Gynecology   Reason for Consultation: Hematometra   Date of consultation: 2024       Subjective   Uyen José is a 43 y.o. year old  who was seen 6 days ago for the same symptoms and instructed to contact [facility] for followup. Today she presents with the same symptoms and requests emergent hysterectomy. Findings discussed at length with patient and the patient was offered two options. D&C today to correct the hematometra, or an appointment with Dr. Espinal next week to discuss hysterectomy. Patient declnes D&C but did not understand why the hysterectomy could not be done immediately. Explained procedures and precertificaton requirements and what would be appropriate for current situation. Patient again declined D&C. Discussed 3rd option of following with GYN that performed the original procedure and she states this provider is no longer in her network.    Past Medical History:   Diagnosis Date    Acid reflux     ADHD (attention deficit hyperactivity disorder)     Allergic     Anxiety     Bronchitis     Disease of thyroid gland     Gallbladder abscess     Heart murmur     Hypertension     Hypothyroidism     Mitral valve prolapse     PONV (postoperative nausea and vomiting)      Past Surgical History:   Procedure Laterality Date    DIAGNOSTIC LAPAROSCOPY      Almanza; normal findings; [facility]    COLONOSCOPY  2003    Normal exam    LAPAROSCOPIC CHOLECYSTECTOMY      Dr Duncan; [employer]    SALPINGECTOMY Bilateral 10/16/2020    Procedure: SALPINGECTOMY LAPAROSCOPIC for sterilization;  Surgeon: Stevan Espinal MD;  Location: [facility];  Service: Obstetrics/Gynecology;  Laterality: Bilateral;    CARPAL TUNNEL RELEASE      ENDOSCOPY      WISDOM TOOTH EXTRACTION       OB History    Para Term  AB Living   1 1 1 0 0 1   SAB IAB Ectopic Molar Multiple  "Live Births   0 0 0 0 0 1      # Outcome Date GA Lbr Shon/2nd Weight Sex Type Anes PTL Lv   1 Term 07/15/07 38w0d  3374 g (7 lb 7 oz) M Vag-Spont EPI N RAGINI     Social History    Tobacco Use      Smoking status: Every Day        Packs/day: 0.50        Years: 0.5 packs/day for 20.0 years (10.0 ttl pk-yrs)        Types: Cigarettes      Smokeless tobacco: Never      Current Facility-Administered Medications:     [COMPLETED] Insert Peripheral IV, , , Once **AND** sodium chloride 0.9 % flush 10 mL, 10 mL, Intravenous, PRN, Kimo Medley Jr., MD    Current Outpatient Medications:     bisoprolol-hydrochlorothiazide (ZIAC) 5-6.25 MG per tablet, Take 1 tablet by mouth once daily, Disp: 30 tablet, Rfl: 0    cyclobenzaprine (FLEXERIL) 10 MG tablet, 1 tab PO up to 2x daily prn headache, neck ache, Disp: 30 tablet, Rfl: 1    folic acid (FOLVITE) 1 MG tablet, Take 1 tablet by mouth Daily., Disp: , Rfl:     hydroxychloroquine (PLAQUENIL) 200 MG tablet, , Disp: , Rfl:     ibuprofen (ADVIL,MOTRIN) 800 MG tablet, Take 1 tablet by mouth Every 6 (Six) Hours As Needed for Mild Pain., Disp: 90 tablet, Rfl: 3    leflunomide (ARAVA) 10 MG tablet, Take 1 tablet by mouth Daily., Disp: , Rfl:     levothyroxine (SYNTHROID, LEVOTHROID) 112 MCG tablet, TAKE 1 TABLET BY MOUTH ONCE DAILY; ALTERNATING WITH 100MCG, Disp: , Rfl:     lisdexamfetamine (Vyvanse) 50 MG capsule, Take 1 capsule by mouth Every Morning for 30 days, Disp: 30 capsule, Rfl: 0    lisdexamfetamine (VYVANSE) 50 MG capsule, TAKE 1 CAPSULE BY MOUTH EVERY MORNING, Disp: 30 capsule, Rfl: 0    pantoprazole (PROTONIX) 40 MG EC tablet, TAKE 1 TABLET BY MOUTH DAILY., Disp: 30 tablet, Rfl: 1    Allergies   Allergen Reactions    Percocet [Oxycodone-Acetaminophen] Hives    Adhesive Tape Rash     ADHESIVE ON BANDAIDS       Review of Systems      Objective   /71   Pulse 65   Temp 97.6 °F (36.4 °C) (Oral)   Resp 24   Ht 167.6 cm (66\")   Wt 74.8 kg (165 lb)   SpO2 97%   BMI " 26.63 kg/m²   Labs  CBC:   Lab Results   Component Value Date    WBC 14.69 (H) 07/12/2024    HGB 14.8 07/12/2024    HCT 44.1 07/12/2024     07/12/2024       Imaging Reviewed  CT abdomen/pelivs    Dr. Almanza originally saw patient and spoke with Dr. Ayaz Espinal who agreed to see the patient next week. Contacted office and was able to get a followup appointment for patient on Monday at 10:30am,       Assessment   Pelvic pain  Hematometra     Plan   Follow up with Dr. Ayaz Espinal on Monday at 10:30am    My findings from today's consultation along with recommendations and plan of care have been discussed with the patient and her spouse.    Regi Loredo MD  7/12/2024  07:50 CDT

## 2024-07-14 ENCOUNTER — APPOINTMENT (OUTPATIENT)
Dept: CT IMAGING | Facility: HOSPITAL | Age: 44
End: 2024-07-14
Payer: COMMERCIAL

## 2024-07-14 ENCOUNTER — HOSPITAL ENCOUNTER (EMERGENCY)
Facility: HOSPITAL | Age: 44
Discharge: HOME | End: 2024-07-14
Attending: EMERGENCY MEDICINE | Admitting: EMERGENCY MEDICINE
Payer: COMMERCIAL

## 2024-07-14 VITALS
TEMPERATURE: 98.2 F | SYSTOLIC BLOOD PRESSURE: 147 MMHG | WEIGHT: 165 LBS | HEIGHT: 66 IN | BODY MASS INDEX: 26.52 KG/M2 | DIASTOLIC BLOOD PRESSURE: 93 MMHG | RESPIRATION RATE: 16 BRPM | HEART RATE: 81 BPM | OXYGEN SATURATION: 100 %

## 2024-07-14 DIAGNOSIS — N85.7 HEMATOMETRA: Primary | ICD-10-CM

## 2024-07-14 DIAGNOSIS — R10.2 PELVIC PAIN: ICD-10-CM

## 2024-07-14 LAB
ALBUMIN SERPL-MCNC: 3.8 G/DL (ref 3.5–5.2)
ALBUMIN/GLOB SERPL: 1.4 G/DL
ALP SERPL-CCNC: 92 U/L (ref 39–117)
ALT SERPL W P-5'-P-CCNC: 23 U/L (ref 1–33)
ANION GAP SERPL CALCULATED.3IONS-SCNC: 9 MMOL/L (ref 5–15)
AST SERPL-CCNC: 18 U/L (ref 1–32)
BASOPHILS # BLD AUTO: 0.08 10*3/MM3 (ref 0–0.2)
BASOPHILS NFR BLD AUTO: 0.7 % (ref 0–1.5)
BILIRUB SERPL-MCNC: 0.3 MG/DL (ref 0–1.2)
BUN SERPL-MCNC: 11 MG/DL (ref 6–20)
BUN/CREAT SERPL: 14.7 (ref 7–25)
CALCIUM SPEC-SCNC: 8.7 MG/DL (ref 8.6–10.5)
CHLORIDE SERPL-SCNC: 103 MMOL/L (ref 98–107)
CO2 SERPL-SCNC: 28 MMOL/L (ref 22–29)
CREAT SERPL-MCNC: 0.75 MG/DL (ref 0.57–1)
DEPRECATED RDW RBC AUTO: 44.5 FL (ref 37–54)
EGFRCR SERPLBLD CKD-EPI 2021: 100.8 ML/MIN/1.73
EOSINOPHIL # BLD AUTO: 0.5 10*3/MM3 (ref 0–0.4)
EOSINOPHIL NFR BLD AUTO: 4.1 % (ref 0.3–6.2)
ERYTHROCYTE [DISTWIDTH] IN BLOOD BY AUTOMATED COUNT: 13.6 % (ref 12.3–15.4)
GLOBULIN UR ELPH-MCNC: 2.8 GM/DL
GLUCOSE SERPL-MCNC: 88 MG/DL (ref 65–99)
HCT VFR BLD AUTO: 40.9 % (ref 34–46.6)
HGB BLD-MCNC: 13.3 G/DL (ref 12–15.9)
IMM GRANULOCYTES # BLD AUTO: 0.02 10*3/MM3 (ref 0–0.05)
IMM GRANULOCYTES NFR BLD AUTO: 0.2 % (ref 0–0.5)
LIPASE SERPL-CCNC: 36 U/L (ref 13–60)
LYMPHOCYTES # BLD AUTO: 2.97 10*3/MM3 (ref 0.7–3.1)
LYMPHOCYTES NFR BLD AUTO: 24.3 % (ref 19.6–45.3)
MAGNESIUM SERPL-MCNC: 1.9 MG/DL (ref 1.6–2.6)
MCH RBC QN AUTO: 29 PG (ref 26.6–33)
MCHC RBC AUTO-ENTMCNC: 32.5 G/DL (ref 31.5–35.7)
MCV RBC AUTO: 89.3 FL (ref 79–97)
MONOCYTES # BLD AUTO: 0.74 10*3/MM3 (ref 0.1–0.9)
MONOCYTES NFR BLD AUTO: 6.1 % (ref 5–12)
NEUTROPHILS NFR BLD AUTO: 64.6 % (ref 42.7–76)
NEUTROPHILS NFR BLD AUTO: 7.9 10*3/MM3 (ref 1.7–7)
NRBC BLD AUTO-RTO: 0 /100 WBC (ref 0–0.2)
PLATELET # BLD AUTO: 201 10*3/MM3 (ref 140–450)
PMV BLD AUTO: 12 FL (ref 6–12)
POTASSIUM SERPL-SCNC: 4.1 MMOL/L (ref 3.5–5.2)
PROT SERPL-MCNC: 6.6 G/DL (ref 6–8.5)
RBC # BLD AUTO: 4.58 10*6/MM3 (ref 3.77–5.28)
SODIUM SERPL-SCNC: 140 MMOL/L (ref 136–145)
WBC NRBC COR # BLD AUTO: 12.21 10*3/MM3 (ref 3.4–10.8)

## 2024-07-14 PROCEDURE — 25010000002 HYDROMORPHONE PER 4 MG: Performed by: EMERGENCY MEDICINE

## 2024-07-14 PROCEDURE — 80053 COMPREHEN METABOLIC PANEL: CPT | Performed by: EMERGENCY MEDICINE

## 2024-07-14 PROCEDURE — 25010000002 MORPHINE PER 10 MG: Performed by: EMERGENCY MEDICINE

## 2024-07-14 PROCEDURE — 96375 TX/PRO/DX INJ NEW DRUG ADDON: CPT

## 2024-07-14 PROCEDURE — 83735 ASSAY OF MAGNESIUM: CPT | Performed by: EMERGENCY MEDICINE

## 2024-07-14 PROCEDURE — 99285 EMERGENCY DEPT VISIT HI MDM: CPT

## 2024-07-14 PROCEDURE — 85025 COMPLETE CBC W/AUTO DIFF WBC: CPT | Performed by: EMERGENCY MEDICINE

## 2024-07-14 PROCEDURE — 25510000001 IOPAMIDOL 61 % SOLUTION: Performed by: EMERGENCY MEDICINE

## 2024-07-14 PROCEDURE — 25810000003 SODIUM CHLORIDE 0.9 % SOLUTION: Performed by: EMERGENCY MEDICINE

## 2024-07-14 PROCEDURE — 25010000002 ONDANSETRON PER 1 MG: Performed by: EMERGENCY MEDICINE

## 2024-07-14 PROCEDURE — 83690 ASSAY OF LIPASE: CPT | Performed by: EMERGENCY MEDICINE

## 2024-07-14 PROCEDURE — 96374 THER/PROPH/DIAG INJ IV PUSH: CPT

## 2024-07-14 PROCEDURE — 72193 CT PELVIS W/DYE: CPT

## 2024-07-14 RX ORDER — ONDANSETRON 2 MG/ML
4 INJECTION INTRAMUSCULAR; INTRAVENOUS ONCE
Status: COMPLETED | OUTPATIENT
Start: 2024-07-14 | End: 2024-07-14

## 2024-07-14 RX ORDER — HYDROMORPHONE HYDROCHLORIDE 1 MG/ML
0.5 INJECTION, SOLUTION INTRAMUSCULAR; INTRAVENOUS; SUBCUTANEOUS ONCE
Status: COMPLETED | OUTPATIENT
Start: 2024-07-14 | End: 2024-07-14

## 2024-07-14 RX ORDER — ONDANSETRON 4 MG/1
4 TABLET, ORALLY DISINTEGRATING ORAL EVERY 8 HOURS PRN
Qty: 10 TABLET | Refills: 0 | Status: SHIPPED | OUTPATIENT
Start: 2024-07-14

## 2024-07-14 RX ORDER — HYDROCODONE BITARTRATE AND ACETAMINOPHEN 7.5; 325 MG/1; MG/1
1 TABLET ORAL EVERY 6 HOURS PRN
Qty: 10 TABLET | Refills: 0 | Status: SHIPPED | OUTPATIENT
Start: 2024-07-14 | End: 2024-07-25

## 2024-07-14 RX ORDER — ONDANSETRON 2 MG/ML
4 INJECTION INTRAMUSCULAR; INTRAVENOUS ONCE
Status: DISCONTINUED | OUTPATIENT
Start: 2024-07-14 | End: 2024-07-14

## 2024-07-14 RX ADMIN — MORPHINE SULFATE 4 MG: 4 INJECTION, SOLUTION INTRAMUSCULAR; INTRAVENOUS at 02:18

## 2024-07-14 RX ADMIN — IOPAMIDOL 100 ML: 612 INJECTION, SOLUTION INTRAVENOUS at 03:43

## 2024-07-14 RX ADMIN — ONDANSETRON 4 MG: 2 INJECTION INTRAMUSCULAR; INTRAVENOUS at 02:25

## 2024-07-14 RX ADMIN — SODIUM CHLORIDE 1000 ML: 9 INJECTION, SOLUTION INTRAVENOUS at 02:18

## 2024-07-14 RX ADMIN — HYDROMORPHONE HYDROCHLORIDE 0.5 MG: 1 INJECTION, SOLUTION INTRAMUSCULAR; INTRAVENOUS; SUBCUTANEOUS at 03:08

## 2024-07-14 NOTE — ED PROVIDER NOTES
Subjective   History of Present Illness  Pt presents to the  with report of pelvic pain - was seen for the same recently and had hematometra per notes.  Pt was offered D & C but declined this.  Was set up for outpt appt with Dr. Espinal.  States that she has had increased pain and hasn't been able to get comfortable at home.  No f/c. No n/v.  No dysuria.  No vaginal bleeding/discharge.          Review of Systems   Constitutional:  Negative for fever.   Gastrointestinal:  Positive for abdominal pain. Negative for diarrhea, nausea and vomiting.   Genitourinary:  Positive for pelvic pain. Negative for dysuria.   Skin:  Negative for rash.   All other systems reviewed and are negative.      Past Medical History:   Diagnosis Date    Acid reflux     ADHD (attention deficit hyperactivity disorder)     Allergic     Anxiety     Bronchitis     Disease of thyroid gland     Gallbladder abscess     Heart murmur     Hypertension     Hypothyroidism     Mitral valve prolapse     PONV (postoperative nausea and vomiting)        Allergies   Allergen Reactions    Percocet [Oxycodone-Acetaminophen] Hives    Adhesive Tape Rash     ADHESIVE ON BANDAIDS       Past Surgical History:   Procedure Laterality Date    CARPAL TUNNEL RELEASE      COLONOSCOPY  05/01/2003    Normal exam    DIAGNOSTIC LAPAROSCOPY  2000    Almanza; normal findings; [facility]    ENDOSCOPY      LAPAROSCOPIC CHOLECYSTECTOMY  2011    Dr Duncan; [place]    SALPINGECTOMY Bilateral 10/16/2020    Procedure: SALPINGECTOMY LAPAROSCOPIC for sterilization;  Surgeon: Stevan Espinal MD;  Location: [facility] OR;  Service: Obstetrics/Gynecology;  Laterality: Bilateral;    WISDOM TOOTH EXTRACTION         Family History   Problem Relation Age of Onset    Diabetes Mother     Diabetes Father     Cancer Paternal Grandfather     Colon cancer Neg Hx     Colon polyps Neg Hx        Social History     Socioeconomic History    Marital status:    Tobacco Use    Smoking status: Every Day      Current packs/day: 0.50     Average packs/day: 0.5 packs/day for 20.0 years (10.0 ttl pk-yrs)     Types: Cigarettes    Smokeless tobacco: Never   Vaping Use    Vaping status: Some Days    Start date: 2/17/2024    Substances: Nicotine, Flavoring    Devices: Disposable   Substance and Sexual Activity    Alcohol use: Yes     Comment: Occasional    Drug use: Never    Sexual activity: Defer     Partners: Male     Birth control/protection: OCP           Objective   Physical Exam  Vitals and nursing note reviewed.   Constitutional:       General: She is not in acute distress.  HENT:      Head: Normocephalic and atraumatic.      Mouth/Throat:      Mouth: Mucous membranes are moist.   Eyes:      Conjunctiva/sclera: Conjunctivae normal.   Cardiovascular:      Rate and Rhythm: Normal rate and regular rhythm.      Pulses: Normal pulses.      Heart sounds: Normal heart sounds.   Pulmonary:      Effort: Pulmonary effort is normal.      Breath sounds: Normal breath sounds.   Abdominal:      General: Abdomen is flat.      Palpations: Abdomen is soft.   Skin:     General: Skin is warm and dry.      Capillary Refill: Capillary refill takes less than 2 seconds.   Neurological:      Mental Status: She is alert.         Procedures           ED Course      Labs Reviewed   CBC WITH AUTO DIFFERENTIAL - Abnormal; Notable for the following components:       Result Value    WBC 12.21 (*)     Neutrophils, Absolute 7.90 (*)     Eosinophils, Absolute 0.50 (*)     All other components within normal limits   LIPASE - Normal   MAGNESIUM - Normal   COMPREHENSIVE METABOLIC PANEL    Narrative:     GFR Normal >60  Chronic Kidney Disease <60  Kidney Failure <15     URINALYSIS W/ MICROSCOPIC IF INDICATED (NO CULTURE)   CBC AND DIFFERENTIAL    Narrative:     The following orders were created for panel order CBC & Differential.  Procedure                               Abnormality         Status                     ---------                                -----------         ------                     CBC Auto Differential[[id number]]        Abnormal            Final result                 Please view results for these tests on the individual orders.     CT Pelvis With Contrast    (Results Pending)                                              Medical Decision Making  Pt stable in EC - resting comfortably att - was given pain control in EC and IVFs.  No evid of SBI/sepsis.  She has a stable appearance on CT pelvis compared to recent.  As her pain is controlled and no evid of infection - feel patient okay for discharge to home and continued outpt f/u.  Will give rx for pain control to have at home.  Prec given.  Dw pt/spouse    Amount and/or Complexity of Data Reviewed  Labs: ordered.  Radiology: ordered.    Risk  Prescription drug management.        Final diagnoses:   Hematometra   Pelvic pain       ED Disposition  ED Disposition       ED Disposition   Discharge    Condition   Stable    Comment   --               Stevan Espinal MD  [address]  [facility] [id number] [phone]               Medication List        New Prescriptions      HYDROcodone-acetaminophen 7.5-325 MG per tablet  Commonly known as: NORCO  Take 1 tablet by mouth Every 6 (Six) Hours As Needed for Moderate Pain.     ondansetron ODT 4 MG disintegrating tablet  Commonly known as: ZOFRAN-ODT  Place 1 tablet on the tongue Every 8 (Eight) Hours As Needed for Nausea or Vomiting.               Where to Get Your Medications        These medications were sent to [facility]/pharmacy #[id number] - [place], [place] - [address] AT ACROSS FROM [address] - [phone] [facility] [phone]   [address] [facility] [address]      Phone: [phone]   HYDROcodone-acetaminophen 7.5-325 MG per tablet  ondansetron ODT 4 MG disintegrating tablet            Ender Jalloh, DO  07/14/24 0210       Ender Jalloh, DO  07/14/24 0422

## 2024-07-15 ENCOUNTER — OFFICE VISIT (OUTPATIENT)
Dept: SURGERY | Facility: CLINIC | Age: 44
End: 2024-07-15
Payer: COMMERCIAL

## 2024-07-15 ENCOUNTER — OFFICE VISIT (OUTPATIENT)
Age: 44
End: 2024-07-15
Payer: COMMERCIAL

## 2024-07-15 VITALS
BODY MASS INDEX: 27.8 KG/M2 | HEIGHT: 66 IN | WEIGHT: 173 LBS | SYSTOLIC BLOOD PRESSURE: 148 MMHG | DIASTOLIC BLOOD PRESSURE: 92 MMHG

## 2024-07-15 VITALS
HEIGHT: 66 IN | DIASTOLIC BLOOD PRESSURE: 86 MMHG | BODY MASS INDEX: 27.48 KG/M2 | SYSTOLIC BLOOD PRESSURE: 144 MMHG | WEIGHT: 171 LBS

## 2024-07-15 DIAGNOSIS — F17.200 SMOKER: ICD-10-CM

## 2024-07-15 DIAGNOSIS — R59.0 AXILLARY ADENOPATHY: ICD-10-CM

## 2024-07-15 DIAGNOSIS — F17.210 NICOTINE DEPENDENCE, CIGARETTES, UNCOMPLICATED: ICD-10-CM

## 2024-07-15 DIAGNOSIS — E66.3 OVERWEIGHT WITH BODY MASS INDEX (BMI) OF 27 TO 27.9 IN ADULT: ICD-10-CM

## 2024-07-15 DIAGNOSIS — N94.6 DYSMENORRHEA: ICD-10-CM

## 2024-07-15 DIAGNOSIS — R10.2 PELVIC PAIN IN FEMALE: Primary | ICD-10-CM

## 2024-07-15 DIAGNOSIS — N85.7 HEMATOMETRA: ICD-10-CM

## 2024-07-15 DIAGNOSIS — N63.20 MASS OF LEFT BREAST, UNSPECIFIED QUADRANT: Primary | ICD-10-CM

## 2024-07-15 PROCEDURE — 99204 OFFICE O/P NEW MOD 45 MIN: CPT | Performed by: STUDENT IN AN ORGANIZED HEALTH CARE EDUCATION/TRAINING PROGRAM

## 2024-07-15 PROCEDURE — 99214 OFFICE O/P EST MOD 30 MIN: CPT | Performed by: OBSTETRICS & GYNECOLOGY

## 2024-07-15 RX ORDER — DIAZEPAM 5 MG/1
TABLET ORAL
COMMUNITY
Start: 2024-06-28

## 2024-07-15 RX ORDER — SODIUM CHLORIDE 9 MG/ML
40 INJECTION, SOLUTION INTRAVENOUS AS NEEDED
OUTPATIENT
Start: 2024-07-15

## 2024-07-15 RX ORDER — SODIUM CHLORIDE 0.9 % (FLUSH) 0.9 %
10 SYRINGE (ML) INJECTION EVERY 12 HOURS SCHEDULED
OUTPATIENT
Start: 2024-07-15

## 2024-07-15 RX ORDER — TRANEXAMIC ACID 650 MG/1
2 TABLET ORAL 3 TIMES DAILY PRN
Qty: 30 TABLET | Refills: 3 | Status: SHIPPED | OUTPATIENT
Start: 2024-07-15

## 2024-07-15 RX ORDER — SODIUM CHLORIDE 0.9 % (FLUSH) 0.9 %
1-10 SYRINGE (ML) INJECTION AS NEEDED
OUTPATIENT
Start: 2024-07-15

## 2024-07-15 RX ORDER — SODIUM CHLORIDE 9 MG/ML
100 INJECTION, SOLUTION INTRAVENOUS CONTINUOUS
OUTPATIENT
Start: 2024-07-15

## 2024-07-15 RX ORDER — DULOXETIN HYDROCHLORIDE 20 MG/1
1 CAPSULE, DELAYED RELEASE ORAL DAILY
COMMUNITY
Start: 2024-05-09

## 2024-07-15 NOTE — H&P (VIEW-ONLY)
Stevan Espinal MD  [facility]  [address]  [place] [address]  Office [phone]  Fax [phone]      [facility]  Uyen José  1980  [id number]  [id number]  7/15/2024    Subjective   Uyen José is a 43 y.o. year old female  who presents for surgery due to Dysmenorrhea and hematometra s/p ablation (Novasure) .  Failed conservative measures include NSAIDS and Narcotics.    COEMIG Procedure no  Rating of Pain 10  Effect on daily activities significant    HPI    Risks, benefits, and alternatives of a D&C with hysteroscopy were discussed with the patient in detail. Intraoperative risks of bleeding and damage to surrounding organs, including but not limited to intestine, bladder and ureter, were explained. Management of these were also explained. Postoperative complications such as infection, pneumonia, DVT, and bleeding were explained. The importance of compliance with postoperative restrictions was discussed. The diagnostic nature of the procedure was explained.    Uterine perforation was discussed with the patient at length.     All of the patient's questions were answered to her satisfaction. She was encouraged to return for an additional appointment if she had further questions. She verbalized understanding of the above and wished to proceed with the outlined plan.     Past Medical History:   Diagnosis Date    Acid reflux     ADHD (attention deficit hyperactivity disorder)     Allergic     Anxiety     Bronchitis     Disease of thyroid gland     Gallbladder abscess     Heart murmur     Hypertension     Hypothyroidism     Mitral valve prolapse     PONV (postoperative nausea and vomiting)        Past Surgical History:   Procedure Laterality Date    CARPAL TUNNEL RELEASE      COLONOSCOPY  2003    Normal exam    DIAGNOSTIC LAPAROSCOPY      Almanza; normal findings; [facility]    ENDOSCOPY      LAPAROSCOPIC CHOLECYSTECTOMY      Dr Duncan; [facility]     SALPINGECTOMY Bilateral 10/16/2020    Procedure: SALPINGECTOMY LAPAROSCOPIC for sterilization;  Surgeon: Stevan Espinal MD;  Location: [facility] OR;  Service: Obstetrics/Gynecology;  Laterality: Bilateral;    WISDOM TOOTH EXTRACTION           Current Outpatient Medications:     bisoprolol-hydrochlorothiazide (ZIAC) 5-6.25 MG per tablet, Take 1 tablet by mouth once daily, Disp: 30 tablet, Rfl: 0    cyclobenzaprine (FLEXERIL) 10 MG tablet, 1 tab PO up to 2x daily prn headache, neck ache, Disp: 30 tablet, Rfl: 1    diazePAM (VALIUM) 5 MG tablet, , Disp: , Rfl:     DULoxetine (CYMBALTA) 20 MG capsule, Take 1 capsule by mouth Daily., Disp: , Rfl:     folic acid (FOLVITE) 1 MG tablet, Take 1 tablet by mouth Daily., Disp: , Rfl:     HYDROcodone-acetaminophen (NORCO) 7.5-325 MG per tablet, Take 1 tablet by mouth Every 6 (Six) Hours As Needed for Moderate Pain., Disp: 10 tablet, Rfl: 0    hydroxychloroquine (PLAQUENIL) 200 MG tablet, , Disp: , Rfl:     ibuprofen (ADVIL,MOTRIN) 800 MG tablet, Take 1 tablet by mouth Every 6 (Six) Hours As Needed for Mild Pain., Disp: 90 tablet, Rfl: 3    lisdexamfetamine (Vyvanse) 50 MG capsule, Take 1 capsule by mouth Every Morning for 30 days, Disp: 30 capsule, Rfl: 0    lisdexamfetamine (VYVANSE) 50 MG capsule, TAKE 1 CAPSULE BY MOUTH EVERY MORNING, Disp: 30 capsule, Rfl: 0    ondansetron ODT (ZOFRAN-ODT) 4 MG disintegrating tablet, Place 1 tablet on the tongue Every 8 (Eight) Hours As Needed for Nausea or Vomiting., Disp: 10 tablet, Rfl: 0    pantoprazole (PROTONIX) 40 MG EC tablet, TAKE 1 TABLET BY MOUTH DAILY., Disp: 30 tablet, Rfl: 1    Tranexamic Acid 650 MG tablet, Take 2 tablets by mouth 3 (Three) Times a Day As Needed (heavy or painful menses)., Disp: 30 tablet, Rfl: 3    Allergies   Allergen Reactions    Percocet [Oxycodone-Acetaminophen] Hives    Adhesive Tape Rash     ADHESIVE ON BANDAIDS       Family History   Problem Relation Age of Onset    Diabetes Mother     Diabetes  Father     Cancer Paternal Grandfather     Colon cancer Neg Hx     Colon polyps Neg Hx        Social History     Socioeconomic History    Marital status:    Tobacco Use    Smoking status: Every Day     Current packs/day: 0.50     Average packs/day: 0.5 packs/day for 20.0 years (10.0 ttl pk-yrs)     Types: Cigarettes    Smokeless tobacco: Never   Vaping Use    Vaping status: Some Days    Start date: 2024    Substances: Nicotine, Flavoring    Devices: Disposable   Substance and Sexual Activity    Alcohol use: Yes     Comment: Occasional    Drug use: Never    Sexual activity: Yes     Partners: Male     Birth control/protection: Tubal ligation       OB History    Para Term  AB Living   1 1 1 0 0 1   SAB IAB Ectopic Molar Multiple Live Births   0 0 0 0 0 1      # Outcome Date GA Lbr Shon/2nd Weight Sex Type Anes PTL Lv   1 Term 07/15/07 38w0d  3374 g (7 lb 7 oz) M Vag-Spont EPI N RAGINI       Review of Systems   Genitourinary:  Positive for menstrual problem and pelvic pain.   All other systems reviewed and are negative.         Objective   Vitals:    07/15/24 1039   BP: 144/86       Physical Exam  Constitutional:       General: She is not in acute distress.     Appearance: Normal appearance. She is not toxic-appearing.   HENT:      Head: Normocephalic and atraumatic.      Nose: Nose normal.   Neurological:      General: No focal deficit present.      Mental Status: She is alert.   Psychiatric:         Mood and Affect: Mood normal.         Behavior: Behavior normal.         Thought Content: Thought content normal.         Judgment: Judgment normal.            Assessment & Plan   Assessment/Plan:  Diagnoses and all orders for this visit:    1. Pelvic pain in female (Primary)    2. Dysmenorrhea  -     Tranexamic Acid 650 MG tablet; Take 2 tablets by mouth 3 (Three) Times a Day As Needed (heavy or painful menses).  Dispense: 30 tablet; Refill: 3  -     Case Request    3. Hematometra  -     Case  Request    4. Smoker        The risks, benefits, and alternatives of the procedure; along with the risks of anesthesia was discussed in full with the patient and all questions were answered.    DILATATION AND CURETTAGE HYSTEROSCOPY WITH POSSIBLE TISSUE RESECTION     Stevan Espinal MD

## 2024-07-15 NOTE — PROGRESS NOTES
Office New Patient History and Physical:     Referring Provider: Henrik Prieto MD    Chief Complaint   Patient presents with    Abnormal Breast Imaging        Subjective .     History of present illness:  Uyen José is a 43 y.o. female with a BIRADS 5 breast mass as well as left axillary adenopathy.    Breast History information:   Prior abnormal mammograms: not before now   Prior breast biopsies: none   Palpable breast masses: none   Nipple discharge: none   Age at first menses: 13  Age at menopause: n/a  Number of biological children: 1  Age at first birth: 26  Years on birth control: 25 years   Years on HRT: none   Family history of breast cancer: none   Smoking History: current every day smoker at 0.5 PPD   BMI: 27    No blood thinners.      Review of Systems    Review of Systems - General ROS: negative  ENT ROS: negative  Respiratory ROS: no cough, shortness of breath, or wheezing  Cardiovascular ROS: no chest pain or dyspnea on exertion  Gastrointestinal ROS: no abdominal pain, change in bowel habits, or black or bloody stools  Genito-Urinary ROS: no dysuria, trouble voiding, or hematuria  Dermatological ROS: negative   Breast ROS: positive for abnormal mammogram and axillary lymph nodes   Hematological and Lymphatic ROS: negative  Musculoskeletal ROS: negative   Neurological ROS: no TIA or stroke symptoms    Psychological ROS: negative  Endocrine ROS: negative    History  Past Medical History:   Diagnosis Date    Acid reflux     ADHD (attention deficit hyperactivity disorder)     Allergic     Anxiety     Bronchitis     Disease of thyroid gland     Gallbladder abscess     Heart murmur     Hypertension     Hypothyroidism     Mitral valve prolapse     PONV (postoperative nausea and vomiting)    ,   Past Surgical History:   Procedure Laterality Date    CARPAL TUNNEL RELEASE      COLONOSCOPY  05/01/2003    Normal exam    DIAGNOSTIC LAPAROSCOPY  2000    [facility]; normal findings; [facility]    ENDOSCOPY       LAPAROSCOPIC CHOLECYSTECTOMY  2011    Dr Duncan; [facility]    SALPINGECTOMY Bilateral 10/16/2020    Procedure: SALPINGECTOMY LAPAROSCOPIC for sterilization;  Surgeon: Stevan Espinal MD;  Location: [facility];  Service: Obstetrics/Gynecology;  Laterality: Bilateral;    WISDOM TOOTH EXTRACTION     ,   Family History   Problem Relation Age of Onset    Diabetes Mother     Diabetes Father     Cancer Paternal Grandfather     Colon cancer Neg Hx     Colon polyps Neg Hx    ,   Social History     Tobacco Use    Smoking status: Every Day     Current packs/day: 0.50     Average packs/day: 0.5 packs/day for 20.0 years (10.0 ttl pk-yrs)     Types: Cigarettes    Smokeless tobacco: Never   Vaping Use    Vaping status: Some Days    Start date: 2/17/2024    Substances: Nicotine, Flavoring    Devices: Disposable   Substance Use Topics    Alcohol use: Yes     Comment: Occasional    Drug use: Never   , (Not in a hospital admission)   and Allergies:  Percocet [oxycodone-acetaminophen] and Adhesive tape    Current Outpatient Medications:     bisoprolol-hydrochlorothiazide (ZIAC) 5-6.25 MG per tablet, Take 1 tablet by mouth once daily, Disp: 30 tablet, Rfl: 0    cyclobenzaprine (FLEXERIL) 10 MG tablet, 1 tab PO up to 2x daily prn headache, neck ache, Disp: 30 tablet, Rfl: 1    folic acid (FOLVITE) 1 MG tablet, Take 1 tablet by mouth Daily., Disp: , Rfl:     HYDROcodone-acetaminophen (NORCO) 7.5-325 MG per tablet, Take 1 tablet by mouth Every 6 (Six) Hours As Needed for Moderate Pain., Disp: 10 tablet, Rfl: 0    hydroxychloroquine (PLAQUENIL) 200 MG tablet, , Disp: , Rfl:     ibuprofen (ADVIL,MOTRIN) 800 MG tablet, Take 1 tablet by mouth Every 6 (Six) Hours As Needed for Mild Pain., Disp: 90 tablet, Rfl: 3    lisdexamfetamine (Vyvanse) 50 MG capsule, Take 1 capsule by mouth Every Morning for 30 days, Disp: 30 capsule, Rfl: 0    lisdexamfetamine (VYVANSE) 50 MG capsule, TAKE 1 CAPSULE BY MOUTH EVERY MORNING, Disp: 30 capsule, Rfl: 0     "ondansetron ODT (ZOFRAN-ODT) 4 MG disintegrating tablet, Place 1 tablet on the tongue Every 8 (Eight) Hours As Needed for Nausea or Vomiting., Disp: 10 tablet, Rfl: 0    pantoprazole (PROTONIX) 40 MG EC tablet, TAKE 1 TABLET BY MOUTH DAILY., Disp: 30 tablet, Rfl: 1    diazePAM (VALIUM) 5 MG tablet, , Disp: , Rfl:     DULoxetine (CYMBALTA) 20 MG capsule, Take 1 capsule by mouth Daily., Disp: , Rfl:     Tranexamic Acid 650 MG tablet, Take 2 tablets by mouth 3 (Three) Times a Day As Needed (heavy or painful menses)., Disp: 30 tablet, Rfl: 3    Objective     Vital Signs   /92   Ht 167.6 cm (66\")   Wt 78.5 kg (173 lb)   BMI 27.92 kg/m²      Physical Exam:  General appearance - alert, well appearing, and in no distress  Mental status - alert, oriented to person, place, and time  Eyes - pupils equal and reactive, extraocular eye movements intact  Neck - supple, no significant adenopathy  Chest - no tachypnea, retractions or cyanosis  Heart - normal rate and regular rhythm  Abdomen - soft, nontender, nondistended, no masses or organomegaly  Neurological - alert, oriented, normal speech, no focal findings or movement disorder noted    Results Review:     The following data was reviewed by: Shahla Gentile MD on 07/15/2024:  Mammo Diagnostic Bilateral With CAD (06/26/2024 12:12 EDT)   1.  Highly suspicious left breast mass and fine pleomorphic calcifications   as described above spanning multiple quadrants.  An ultrasound-guided core   needle biopsy is recommended of the dominant mass in the left breast at 3   o'clock 6 cm from the nipple.     2.  A stereotactic core needle biopsy is also recommended of the anterior   extent of calcifications near the base of the nipple.   3.  Enlarged, abnormal morphology lymph node in the left axilla,   suspicious for axillary node metastasis.  An ultrasound-guided core needle   biopsy is recommended.     BI-RADS 5:  Highly suggestive of Malignancy.     RECOMMENDATION:     Left " breast ultrasound guided core needle biopsy   Left axillary lymph node core needle biopsy   Stereotactic left breast core needle biopsy     Dr. Prieto's note from Care Everywhere Reviewed.     Assessment & Plan       Diagnoses and all orders for this visit:    1. Mass of left breast, unspecified quadrant (Primary)  -     US Guided Breast Biopsy With & Without Device initial; Future  -     Mammo Post Device Placement Left; Future  -     MRI Breast Bilateral Diagnostic W WO Contrast; Future  -     US Guided Lymph Node Biopsy; Future  -     tissue pathology - Miscellaneous Test; Standing    2. Axillary adenopathy  -     US Guided Breast Biopsy With & Without Device initial; Future  -     Mammo Post Device Placement Left; Future  -     MRI Breast Bilateral Diagnostic W WO Contrast; Future  -     US Guided Lymph Node Biopsy; Future  -     tissue pathology - Miscellaneous Test; Standing    3. Nicotine dependence, cigarettes, uncomplicated    4. Overweight with body mass index (BMI) of 27 to 27.9 in adult         Uyen José is a 43 y.o. female with a BIRADS 5 mass of the left breast at 3:00, 6 cm FN with an abnormal left axillary lymph node. I have recommended a left US guided breast biopsy and left axillary US guided lymph node biopsy. She cannot have her biopsy until 8/2 due to insurance purposes. Will proceed with breast MRI in the meantime to evaluate the full extent of disease. Follow up with me on 8/12 to review biopsy results.     This is a new undiagnosed problem with an uncertain prognosis. I have reviewed the mammogram and note above. I have ordered US guided biopsy x 2 and post biopsy mammogram as well as breast MRI.       Shahla Gentile MD  07/16/24  21:32 CDT

## 2024-07-15 NOTE — H&P
Stevan Espinal MD  [facility]  [address]  [place] [address]  Office [phone]  Fax [phone]      [facility]  Uyen José  1980  [id number]  [id number]  7/15/2024    Subjective   Uyen José is a 43 y.o. year old female  who presents for surgery due to Dysmenorrhea and hematometra s/p ablation (Novasure) .  Failed conservative measures include NSAIDS and Narcotics.    COEMIG Procedure no  Rating of Pain 10  Effect on daily activities significant    HPI    Risks, benefits, and alternatives of a D&C with hysteroscopy were discussed with the patient in detail. Intraoperative risks of bleeding and damage to surrounding organs, including but not limited to intestine, bladder and ureter, were explained. Management of these were also explained. Postoperative complications such as infection, pneumonia, DVT, and bleeding were explained. The importance of compliance with postoperative restrictions was discussed. The diagnostic nature of the procedure was explained.    Uterine perforation was discussed with the patient at length.     All of the patient's questions were answered to her satisfaction. She was encouraged to return for an additional appointment if she had further questions. She verbalized understanding of the above and wished to proceed with the outlined plan.     Past Medical History:   Diagnosis Date    Acid reflux     ADHD (attention deficit hyperactivity disorder)     Allergic     Anxiety     Bronchitis     Disease of thyroid gland     Gallbladder abscess     Heart murmur     Hypertension     Hypothyroidism     Mitral valve prolapse     PONV (postoperative nausea and vomiting)        Past Surgical History:   Procedure Laterality Date    CARPAL TUNNEL RELEASE      COLONOSCOPY  2003    Normal exam    DIAGNOSTIC LAPAROSCOPY      [facility]; normal findings; [facility]    ENDOSCOPY      LAPAROSCOPIC CHOLECYSTECTOMY      Dr Duncan; [facility]     SALPINGECTOMY Bilateral 10/16/2020    Procedure: SALPINGECTOMY LAPAROSCOPIC for sterilization;  Surgeon: Stevan Espinal MD;  Location: [facility] OR;  Service: Obstetrics/Gynecology;  Laterality: Bilateral;    WISDOM TOOTH EXTRACTION           Current Outpatient Medications:     bisoprolol-hydrochlorothiazide (ZIAC) 5-6.25 MG per tablet, Take 1 tablet by mouth once daily, Disp: 30 tablet, Rfl: 0    cyclobenzaprine (FLEXERIL) 10 MG tablet, 1 tab PO up to 2x daily prn headache, neck ache, Disp: 30 tablet, Rfl: 1    diazePAM (VALIUM) 5 MG tablet, , Disp: , Rfl:     DULoxetine (CYMBALTA) 20 MG capsule, Take 1 capsule by mouth Daily., Disp: , Rfl:     folic acid (FOLVITE) 1 MG tablet, Take 1 tablet by mouth Daily., Disp: , Rfl:     HYDROcodone-acetaminophen (NORCO) 7.5-325 MG per tablet, Take 1 tablet by mouth Every 6 (Six) Hours As Needed for Moderate Pain., Disp: 10 tablet, Rfl: 0    hydroxychloroquine (PLAQUENIL) 200 MG tablet, , Disp: , Rfl:     ibuprofen (ADVIL,MOTRIN) 800 MG tablet, Take 1 tablet by mouth Every 6 (Six) Hours As Needed for Mild Pain., Disp: 90 tablet, Rfl: 3    lisdexamfetamine (Vyvanse) 50 MG capsule, Take 1 capsule by mouth Every Morning for 30 days, Disp: 30 capsule, Rfl: 0    lisdexamfetamine (VYVANSE) 50 MG capsule, TAKE 1 CAPSULE BY MOUTH EVERY MORNING, Disp: 30 capsule, Rfl: 0    ondansetron ODT (ZOFRAN-ODT) 4 MG disintegrating tablet, Place 1 tablet on the tongue Every 8 (Eight) Hours As Needed for Nausea or Vomiting., Disp: 10 tablet, Rfl: 0    pantoprazole (PROTONIX) 40 MG EC tablet, TAKE 1 TABLET BY MOUTH DAILY., Disp: 30 tablet, Rfl: 1    Tranexamic Acid 650 MG tablet, Take 2 tablets by mouth 3 (Three) Times a Day As Needed (heavy or painful menses)., Disp: 30 tablet, Rfl: 3    Allergies   Allergen Reactions    Percocet [Oxycodone-Acetaminophen] Hives    Adhesive Tape Rash     ADHESIVE ON BANDAIDS       Family History   Problem Relation Age of Onset    Diabetes Mother     Diabetes  Father     Cancer Paternal Grandfather     Colon cancer Neg Hx     Colon polyps Neg Hx        Social History     Socioeconomic History    Marital status:    Tobacco Use    Smoking status: Every Day     Current packs/day: 0.50     Average packs/day: 0.5 packs/day for 20.0 years (10.0 ttl pk-yrs)     Types: Cigarettes    Smokeless tobacco: Never   Vaping Use    Vaping status: Some Days    Start date: 2024    Substances: Nicotine, Flavoring    Devices: Disposable   Substance and Sexual Activity    Alcohol use: Yes     Comment: Occasional    Drug use: Never    Sexual activity: Yes     Partners: Male     Birth control/protection: Tubal ligation       OB History    Para Term  AB Living   1 1 1 0 0 1   SAB IAB Ectopic Molar Multiple Live Births   0 0 0 0 0 1      # Outcome Date GA Lbr Shon/2nd Weight Sex Type Anes PTL Lv   1 Term 07/15/07 38w0d  3374 g (7 lb 7 oz) M Vag-Spont EPI N RAGINI       Review of Systems   Genitourinary:  Positive for menstrual problem and pelvic pain.   All other systems reviewed and are negative.         Objective   Vitals:    07/15/24 1039   BP: 144/86       Physical Exam  Constitutional:       General: She is not in acute distress.     Appearance: Normal appearance. She is not toxic-appearing.   HENT:      Head: Normocephalic and atraumatic.      Nose: Nose normal.   Neurological:      General: No focal deficit present.      Mental Status: She is alert.   Psychiatric:         Mood and Affect: Mood normal.         Behavior: Behavior normal.         Thought Content: Thought content normal.         Judgment: Judgment normal.            Assessment & Plan   Assessment/Plan:  Diagnoses and all orders for this visit:    1. Pelvic pain in female (Primary)    2. Dysmenorrhea  -     Tranexamic Acid 650 MG tablet; Take 2 tablets by mouth 3 (Three) Times a Day As Needed (heavy or painful menses).  Dispense: 30 tablet; Refill: 3  -     Case Request    3. Hematometra  -     Case  Request    4. Smoker        The risks, benefits, and alternatives of the procedure; along with the risks of anesthesia was discussed in full with the patient and all questions were answered.    DILATATION AND CURETTAGE HYSTEROSCOPY WITH POSSIBLE TISSUE RESECTION     Stevan Espinal MD

## 2024-07-15 NOTE — PROGRESS NOTES
"Chief Complaint  Menstrual Problem (Pt here as f/u from ED for hysterectomy consult, she was seen at [facility] ED 7/6 and 7/12 for pelvic pain and uterine fibroid, s/p ablation 10/2022 with Dr. Mckeon, she c/o severe cramping that started a couple months ago but denies any bleeding, last pap 05/23/2024 at [facility] but cannot review result, last mammogram 06/06/2024, u/s done 7/6 at [facility] ED )    Subjective        Uyen José presents to [facility]  History of Present Illness    Patient presents today as an ER follow-up  She had an endometrial ablation approximately 1 year ago  Symptoms and ultrasound images consistent with hematometra    Risks, benefits, and alternatives of a D&C with hysteroscopy were discussed with the patient in detail. Intraoperative risks of bleeding and damage to surrounding organs, including but not limited to intestine, bladder and ureter, were explained. Management of these were also explained. Postoperative complications such as infection, pneumonia, DVT, and bleeding were explained. The importance of compliance with postoperative restrictions was discussed. The diagnostic nature of the procedure was explained.    Uterine perforation was discussed with the patient at length.     All of the patient's questions were answered to her satisfaction. She was encouraged to return for an additional appointment if she had further questions. She verbalized understanding of the above and wished to proceed with the outlined plan.      Objective   Vital Signs:  /86 (BP Location: Left arm, Patient Position: Sitting, Cuff Size: Adult)   Ht 167.6 cm (66\")   Wt 77.6 kg (171 lb)   BMI 27.60 kg/m²   Estimated body mass index is 27.6 kg/m² as calculated from the following:    Height as of this encounter: 167.6 cm (66\").    Weight as of this encounter: 77.6 kg (171 lb).               Physical Exam  Constitutional:       General: She is not in acute distress.     Appearance: Normal " appearance. She is not toxic-appearing.   HENT:      Head: Normocephalic and atraumatic.      Nose: Nose normal.   Neurological:      General: No focal deficit present.      Mental Status: She is alert.   Psychiatric:         Mood and Affect: Mood normal.         Behavior: Behavior normal.         Thought Content: Thought content normal.         Judgment: Judgment normal.        Result Review :                     Assessment and Plan     Diagnoses and all orders for this visit:    1. Pelvic pain in female (Primary)  -     sodium chloride 0.9 % flush 10 mL  -     sodium chloride 0.9 % flush 1-10 mL  -     sodium chloride 0.9 % infusion 40 mL  -     sodium chloride 0.9 % infusion    2. Dysmenorrhea  -     Tranexamic Acid 650 MG tablet; Take 2 tablets by mouth 3 (Three) Times a Day As Needed (heavy or painful menses).  Dispense: 30 tablet; Refill: 3  -     Case Request  -     sodium chloride 0.9 % flush 10 mL  -     sodium chloride 0.9 % flush 1-10 mL  -     sodium chloride 0.9 % infusion 40 mL  -     sodium chloride 0.9 % infusion    3. Hematometra  -     Case Request  -     sodium chloride 0.9 % flush 10 mL  -     sodium chloride 0.9 % flush 1-10 mL  -     sodium chloride 0.9 % infusion 40 mL  -     sodium chloride 0.9 % infusion    4. Smoker    Other orders  -     Follow Anesthesia Guidelines / Protocol; Future  -     Follow Anesthesia Guidelines / Protocol; Standing  -     Verify / Perform Chlorhexidine Skin Prep; Standing  -     Obtain Informed Consent; Future  -     Provide NPO Instructions to Patient; Future  -     Chlorhexidine Skin Prep; Future  -     Instructions on coughing, deep breathing, and incentive spirometry.; Standing  -     Insert Peripheral IV; Standing  -     Saline Lock & Maintain IV Access; Standing  -     Place Sequential Compression Device; Standing  -     Maintain Sequential Compression Device; Standing             Follow Up     Return for get pap results from [facility].  Patient was given  instructions and counseling regarding her condition or for health maintenance advice. Please see specific information pulled into the AVS if appropriate.     DILATATION AND CURETTAGE HYSTEROSCOPY WITH POSSIBLE TISSUE RESECTION     Stevan Espinal MD

## 2024-07-17 ENCOUNTER — TELEPHONE (OUTPATIENT)
Age: 44
End: 2024-07-17
Payer: COMMERCIAL

## 2024-07-17 NOTE — PATIENT INSTRUCTIONS
"BMI for Adults  What is BMI?  Body mass index (BMI) is a number that is calculated from a person's weight and height. BMI can help estimate how much of a person's weight is composed of fat. BMI does not measure body fat directly. Rather, it is an alternative to procedures that directly measure body fat, which can be difficult and expensive.  BMI can help identify people who may be at higher risk for certain medical problems.  What are BMI measurements used for?  BMI is used as a screening tool to identify possible weight problems. It helps determine whether a person is obese, overweight, a healthy weight, or underweight.  BMI is useful for:  Identifying a weight problem that may be related to a medical condition or may increase the risk for medical problems.  Promoting changes, such as changes in diet and exercise, to help reach a healthy weight. BMI screening can be repeated to see if these changes are working.  How is BMI calculated?  BMI involves measuring your weight in relation to your height. Both height and weight are measured, and the BMI is calculated from those numbers. This can be done either in English (U[place] or metric measurements. Note that charts and online BMI calculators are available to help you find your BMI quickly and easily without having to do these calculations yourself.  To calculate your BMI in English (U.S.) measurements:    Measure your weight in pounds (lb).  Multiply the number of pounds by 703.  For example, for a person who weighs 180 lb, multiply that number by 703, which equals 126,540.  Measure your height in inches. Then multiply that number by itself to get a measurement called \"inches squared.\"  For example, for a person who is 70 inches tall, the \"inches squared\" measurement is 70 inches x 70 inches, which equals 4,900 inches squared.  Divide the total from step 2 (number of lb x 703) by the total from step 3 (inches squared): 126,540 ÷ 4,900 = 25.8. This is your BMI.    To " "calculate your BMI in metric measurements:  Measure your weight in kilograms (kg).  Measure your height in meters (m). Then multiply that number by itself to get a measurement called \"meters squared.\"  For example, for a person who is 1.75 m tall, the \"meters squared\" measurement is 1.75 m x 1.75 m, which is equal to 3.1 meters squared.  Divide the number of kilograms (your weight) by the meters squared number. In this example: 70 ÷ 3.1 = 22.6. This is your BMI.  What do the results mean?  BMI charts are used to identify whether you are underweight, normal weight, overweight, or obese. The following guidelines will be used:  Underweight: BMI less than 18.5.  Normal weight: BMI between 18.5 and 24.9.  Overweight: BMI between 25 and 29.9.  Obese: BMI of 30 or above.  Keep these notes in mind:  Weight includes both fat and muscle, so someone with a muscular build, such as an athlete, may have a BMI that is higher than 24.9. In cases like these, BMI is not an accurate measure of body fat.  To determine if excess body fat is the cause of a BMI of 25 or higher, further assessments may need to be done by a health care provider.  BMI is usually interpreted in the same way for men and women.  Where to find more information  For more information about BMI, including tools to quickly calculate your BMI, go to these websites:  Centers for Disease Control and Prevention: www.cdc.gov  American Heart Association: www.heart.org  National Heart, Lung, and Blood [place]: www.nhlbi.nih.gov  Summary  Body mass index (BMI) is a number that is calculated from a person's weight and height.  BMI may help estimate how much of a person's weight is composed of fat. BMI can help identify those who may be at higher risk for certain medical problems.  BMI can be measured using English measurements or metric measurements.  BMI charts are used to identify whether you are underweight, normal weight, overweight, or obese.  This information is not " intended to replace advice given to you by your health care provider. Make sure you discuss any questions you have with your health care provider.  Document Revised: 09/09/2020 Document Reviewed: 07/17/2020  [employer] Patient Education © 2021 [employer] Smoking Tobacco Information, Adult  Smoking tobacco can be harmful to your health. Tobacco contains a poisonous (toxic), colorless chemical called nicotine. Nicotine is addictive. It changes the brain and can make it hard to stop smoking. Tobacco also has other toxic chemicals that can hurt your body and raise your risk of many cancers.  How can smoking tobacco affect me?  Smoking tobacco puts you at risk for:  Cancer. Smoking is most commonly associated with lung cancer, but can also lead to cancer in other parts of the body.  Chronic obstructive pulmonary disease (COPD). This is a long-term lung condition that makes it hard to breathe. It also gets worse over time.  High blood pressure (hypertension), heart disease, stroke, or heart attack.  Lung infections, such as pneumonia.  Cataracts. This is when the lenses in the eyes become clouded.  Digestive problems. This may include peptic ulcers, heartburn, and gastroesophageal reflux disease (GERD).  Oral health problems, such as gum disease and tooth loss.  Loss of taste and smell.  Smoking can affect your appearance by causing:  Wrinkles.  Yellow or stained teeth, fingers, and fingernails.  Smoking tobacco can also affect your social life, because:  It may be challenging to find places to smoke when away from home. Many workplaces, restaurants, hotels, and public places are tobacco-free.  Smoking is expensive. This is due to the cost of tobacco and the long-term costs of treating health problems from smoking.  Secondhand smoke may affect those around you. Secondhand smoke can cause lung cancer, breathing problems, and heart disease. Children of smokers have a higher risk for:  Sudden infant death syndrome  (SIDS).  Ear infections.  Lung infections.  If you currently smoke tobacco, quitting now can help you:  Lead a longer and healthier life.  Look, smell, breathe, and feel better over time.  Save money.  Protect others from the harms of secondhand smoke.  What actions can I take to prevent health problems?  Quit smoking    Do not start smoking. Quit if you already do.  Make a plan to quit smoking and commit to it. Look for programs to help you and ask your health care provider for recommendations and ideas.  Set a date and write down all the reasons you want to quit.  Let your friends and family know you are quitting so they can help and support you. Consider finding friends who also want to quit. It can be easier to quit with someone else, so that you can support each other.  Talk with your health care provider about using nicotine replacement medicines to help you quit, such as gum, lozenges, patches, sprays, or pills.  Do not replace cigarette smoking with electronic cigarettes, which are commonly called e-cigarettes. The safety of e-cigarettes is not known, and some may contain harmful chemicals.  If you try to quit but return to smoking, stay positive. It is common to slip up when you first quit, so take it one day at a time.  Be prepared for cravings. When you feel the urge to smoke, chew gum or suck on hard candy.    Lifestyle  Stay busy and take care of your body.  Drink enough fluid to keep your urine pale yellow.  Get plenty of exercise and eat a healthy diet. This can help prevent weight gain after quitting.  Monitor your eating habits. Quitting smoking can cause you to have a larger appetite than when you smoke.  Find ways to relax. Go out with friends or family to a movie or a restaurant where people do not smoke.  Ask your health care provider about having regular tests (screenings) to check for cancer. This may include blood tests, imaging tests, and other tests.  Find ways to manage your stress, such  as meditation, yoga, or exercise.  Where to find support  To get support to quit smoking, consider:  Asking your health care provider for more information and resources.  Taking classes to learn more about quitting smoking.  Looking for local organizations that offer resources about quitting smoking.  Joining a support group for people who want to quit smoking in your local community.  Calling the smokefree.gov counselor helpline: 1-800-Quit-Now (1-219.508.2328)  Where to find more information  You may find more information about quitting smoking from:  HelpGuide.org: www.helpguide.org  Smokefree.gov: smokefree.gov  American Lung Association: www.lung.org  Contact a health care provider if you:  Have problems breathing.  Notice that your lips, nose, or fingers turn blue.  Have chest pain.  Are coughing up blood.  Feel faint or you pass out.  Have other health changes that cause you to worry.  Summary  Smoking tobacco can negatively affect your health, the health of those around you, your finances, and your social life.  Do not start smoking. Quit if you already do. If you need help quitting, ask your health care provider.  Think about joining a support group for people who want to quit smoking in your local community. There are many effective programs that will help you to quit this behavior.  This information is not intended to replace advice given to you by your health care provider. Make sure you discuss any questions you have with your health care provider.  Document Revised: 09/11/2020 Document Reviewed: 01/02/2018  [employer]vier Patient Education © 2021 [employer]

## 2024-07-17 NOTE — TELEPHONE ENCOUNTER
Called and informed pt of sx date of 8/9 and to arrive at 1130 and be NPO after midnight.  In addition, pt was informed of PAT on 7/26 at 1345, pt voiced understanding to all above.

## 2024-07-18 ENCOUNTER — PATIENT ROUNDING (BHMG ONLY) (OUTPATIENT)
Dept: SURGERY | Facility: CLINIC | Age: 44
End: 2024-07-18
Payer: COMMERCIAL

## 2024-07-18 NOTE — PROGRESS NOTES
July 18, 2024    Hello, may I speak with Uyen José?    My name is Tanesha      I am  with [facility]  [facility]  [address]  [facility] [id number] [phone].    Before we get started may I verify your date of birth? 1980    I am calling to officially welcome you to our practice and ask about your recent visit. Is this a good time to talk? yes    Tell me about your visit with us. What things went well?  Everything went well. No issues.        We're always looking for ways to make our patients' experiences even better. Do you have recommendations on ways we may improve?  no    Overall were you satisfied with your first visit to our practice? yes       I appreciate you taking the time to speak with me today. Is there anything else I can do for you? no      Thank you, and have a great day.

## 2024-07-25 ENCOUNTER — HOSPITAL ENCOUNTER (OUTPATIENT)
Dept: MRI IMAGING | Facility: HOSPITAL | Age: 44
Discharge: HOME | End: 2024-07-25
Admitting: STUDENT IN AN ORGANIZED HEALTH CARE EDUCATION/TRAINING PROGRAM
Payer: COMMERCIAL

## 2024-07-25 DIAGNOSIS — R10.2 PELVIC PAIN IN FEMALE: Primary | ICD-10-CM

## 2024-07-25 DIAGNOSIS — N63.20 MASS OF LEFT BREAST, UNSPECIFIED QUADRANT: ICD-10-CM

## 2024-07-25 DIAGNOSIS — R59.0 AXILLARY ADENOPATHY: ICD-10-CM

## 2024-07-25 LAB — CREAT BLDA-MCNC: 1.1 MG/DL (ref 0.6–1.3)

## 2024-07-25 PROCEDURE — A9577 INJ MULTIHANCE: HCPCS | Performed by: STUDENT IN AN ORGANIZED HEALTH CARE EDUCATION/TRAINING PROGRAM

## 2024-07-25 PROCEDURE — 77049 MRI BREAST C-+ W/CAD BI: CPT

## 2024-07-25 PROCEDURE — 0 GADOBENATE DIMEGLUMINE 529 MG/ML SOLUTION: Performed by: STUDENT IN AN ORGANIZED HEALTH CARE EDUCATION/TRAINING PROGRAM

## 2024-07-25 PROCEDURE — 82565 ASSAY OF CREATININE: CPT

## 2024-07-25 RX ORDER — HYDROCODONE BITARTRATE AND ACETAMINOPHEN 5; 325 MG/1; MG/1
1 TABLET ORAL EVERY 8 HOURS PRN
Qty: 10 TABLET | Refills: 0 | Status: SHIPPED | OUTPATIENT
Start: 2024-07-25

## 2024-07-25 RX ADMIN — GADOBENATE DIMEGLUMINE 16 ML: 529 INJECTION, SOLUTION INTRAVENOUS at 09:22

## 2024-07-25 NOTE — TELEPHONE ENCOUNTER
Spoke with Dr. Espinal and he wants to leave sx as scheduled on 8/9 due to her other appointments that day and that Dr. Espinal would call her in pain medication to help get her through until she can have the surgery.  Called and informed pt, pt voiced understanding.  Sx moved back to 8/9 with Alexa, arrival time of 1030 and NPO after midnight.  Pt informed and voiced understanding.   Addressed allergy to Percocet with pt and she informed me she can take Norco without difficulty and her only problem is with Percocet.

## 2024-07-25 NOTE — TELEPHONE ENCOUNTER
"----- Message from Stevan Espinal sent at 7/25/2024  2:24 PM CDT -----  Regarding: FW: Cramping  Contact: [phone]  Yes I am fine moving her now that they have moved up my day.  Thanks.  ----- Message -----  From: Chloe Dominique CMA  Sent: 7/25/2024  12:22 PM CDT  To: Stevan Espinal MD  Subject: FW: Cramping                                     I'm happy to put her on for this day if you want me to, just let me know.  I know this is a busy day and for you.  ----- Message -----  From: Uyen José \"Leana\"  Sent: 7/25/2024  12:08 PM CDT  To: [facility] Clinical Pool  Subject: Cramping                                         Nothing has helped touch it except the morphine they gave me at the hospital.  "

## 2024-07-25 NOTE — TELEPHONE ENCOUNTER
Called and r/s sx with Alexa from 8/9 to 8/2 and she needs to arrive at 0830 and be NPO after midnight.  She informed me that she has a breast bx this day and can't do it until the afternoon.  I informed her I would have to speak with Dr. Espinal on how we needed to proceed and let her know, pt voiced understanding.

## 2024-07-26 ENCOUNTER — PRE-ADMISSION TESTING (OUTPATIENT)
Dept: PREADMISSION TESTING | Facility: HOSPITAL | Age: 44
End: 2024-07-26
Payer: COMMERCIAL

## 2024-07-26 VITALS
SYSTOLIC BLOOD PRESSURE: 129 MMHG | HEART RATE: 64 BPM | WEIGHT: 164.68 LBS | BODY MASS INDEX: 26.47 KG/M2 | HEIGHT: 66 IN | OXYGEN SATURATION: 97 % | DIASTOLIC BLOOD PRESSURE: 78 MMHG | RESPIRATION RATE: 16 BRPM

## 2024-07-26 PROCEDURE — 93005 ELECTROCARDIOGRAM TRACING: CPT

## 2024-07-26 NOTE — DISCHARGE INSTRUCTIONS

## 2024-07-29 ENCOUNTER — HOSPITAL ENCOUNTER (EMERGENCY)
Facility: HOSPITAL | Age: 44
Discharge: HOME | End: 2024-07-29
Payer: COMMERCIAL

## 2024-07-29 ENCOUNTER — TELEPHONE (OUTPATIENT)
Age: 44
End: 2024-07-29
Payer: COMMERCIAL

## 2024-07-29 ENCOUNTER — APPOINTMENT (OUTPATIENT)
Dept: ULTRASOUND IMAGING | Facility: HOSPITAL | Age: 44
End: 2024-07-29
Payer: COMMERCIAL

## 2024-07-29 VITALS
BODY MASS INDEX: 26.36 KG/M2 | OXYGEN SATURATION: 98 % | HEIGHT: 66 IN | RESPIRATION RATE: 16 BRPM | SYSTOLIC BLOOD PRESSURE: 137 MMHG | HEART RATE: 53 BPM | WEIGHT: 164 LBS | DIASTOLIC BLOOD PRESSURE: 90 MMHG | TEMPERATURE: 98.2 F

## 2024-07-29 DIAGNOSIS — N85.7 HEMATOMETRA: Primary | ICD-10-CM

## 2024-07-29 DIAGNOSIS — R10.2 PELVIC PAIN: ICD-10-CM

## 2024-07-29 LAB
ALBUMIN SERPL-MCNC: 4.2 G/DL (ref 3.5–5.2)
ALBUMIN/GLOB SERPL: 1.3 G/DL
ALP SERPL-CCNC: 99 U/L (ref 39–117)
ALT SERPL W P-5'-P-CCNC: 14 U/L (ref 1–33)
ANION GAP SERPL CALCULATED.3IONS-SCNC: 9 MMOL/L (ref 5–15)
AST SERPL-CCNC: 18 U/L (ref 1–32)
BACTERIA UR QL AUTO: ABNORMAL /HPF
BASOPHILS # BLD AUTO: 0.11 10*3/MM3 (ref 0–0.2)
BASOPHILS NFR BLD AUTO: 0.9 % (ref 0–1.5)
BILIRUB SERPL-MCNC: 0.5 MG/DL (ref 0–1.2)
BILIRUB UR QL STRIP: NEGATIVE
BUN SERPL-MCNC: 14 MG/DL (ref 6–20)
BUN/CREAT SERPL: 15.2 (ref 7–25)
CALCIUM SPEC-SCNC: 9.6 MG/DL (ref 8.6–10.5)
CHLORIDE SERPL-SCNC: 98 MMOL/L (ref 98–107)
CLARITY UR: CLEAR
CO2 SERPL-SCNC: 31 MMOL/L (ref 22–29)
COLOR UR: YELLOW
CREAT SERPL-MCNC: 0.92 MG/DL (ref 0.57–1)
DEPRECATED RDW RBC AUTO: 43.6 FL (ref 37–54)
EGFRCR SERPLBLD CKD-EPI 2021: 78.9 ML/MIN/1.73
EOSINOPHIL # BLD AUTO: 0.62 10*3/MM3 (ref 0–0.4)
EOSINOPHIL NFR BLD AUTO: 4.9 % (ref 0.3–6.2)
ERYTHROCYTE [DISTWIDTH] IN BLOOD BY AUTOMATED COUNT: 13.3 % (ref 12.3–15.4)
GLOBULIN UR ELPH-MCNC: 3.2 GM/DL
GLUCOSE SERPL-MCNC: 105 MG/DL (ref 65–99)
GLUCOSE UR STRIP-MCNC: NEGATIVE MG/DL
HCG SERPL QL: NEGATIVE
HCT VFR BLD AUTO: 45 % (ref 34–46.6)
HGB BLD-MCNC: 14.8 G/DL (ref 12–15.9)
HGB UR QL STRIP.AUTO: NEGATIVE
HYALINE CASTS UR QL AUTO: ABNORMAL /LPF
IMM GRANULOCYTES # BLD AUTO: 0.04 10*3/MM3 (ref 0–0.05)
IMM GRANULOCYTES NFR BLD AUTO: 0.3 % (ref 0–0.5)
INR PPP: 0.99 (ref 0.91–1.09)
KETONES UR QL STRIP: NEGATIVE
LEUKOCYTE ESTERASE UR QL STRIP.AUTO: ABNORMAL
LIPASE SERPL-CCNC: 35 U/L (ref 13–60)
LYMPHOCYTES # BLD AUTO: 2.57 10*3/MM3 (ref 0.7–3.1)
LYMPHOCYTES NFR BLD AUTO: 20.3 % (ref 19.6–45.3)
MAGNESIUM SERPL-MCNC: 2 MG/DL (ref 1.6–2.6)
MCH RBC QN AUTO: 29 PG (ref 26.6–33)
MCHC RBC AUTO-ENTMCNC: 32.9 G/DL (ref 31.5–35.7)
MCV RBC AUTO: 88.1 FL (ref 79–97)
MONOCYTES # BLD AUTO: 0.87 10*3/MM3 (ref 0.1–0.9)
MONOCYTES NFR BLD AUTO: 6.9 % (ref 5–12)
NEUTROPHILS NFR BLD AUTO: 66.7 % (ref 42.7–76)
NEUTROPHILS NFR BLD AUTO: 8.43 10*3/MM3 (ref 1.7–7)
NITRITE UR QL STRIP: NEGATIVE
NRBC BLD AUTO-RTO: 0 /100 WBC (ref 0–0.2)
PH UR STRIP.AUTO: 6.5 [PH] (ref 5–8)
PLATELET # BLD AUTO: 232 10*3/MM3 (ref 140–450)
PMV BLD AUTO: 12.4 FL (ref 6–12)
POTASSIUM SERPL-SCNC: 3.9 MMOL/L (ref 3.5–5.2)
PROT SERPL-MCNC: 7.4 G/DL (ref 6–8.5)
PROT UR QL STRIP: NEGATIVE
PROTHROMBIN TIME: 13.5 SECONDS (ref 11.8–14.8)
QT INTERVAL: 430 MS
QTC INTERVAL: 432 MS
RBC # BLD AUTO: 5.11 10*6/MM3 (ref 3.77–5.28)
RBC # UR STRIP: ABNORMAL /HPF
REF LAB TEST METHOD: ABNORMAL
SODIUM SERPL-SCNC: 138 MMOL/L (ref 136–145)
SP GR UR STRIP: 1.02 (ref 1–1.03)
SQUAMOUS #/AREA URNS HPF: ABNORMAL /HPF
UROBILINOGEN UR QL STRIP: ABNORMAL
WBC # UR STRIP: ABNORMAL /HPF
WBC NRBC COR # BLD AUTO: 12.64 10*3/MM3 (ref 3.4–10.8)

## 2024-07-29 PROCEDURE — 80053 COMPREHEN METABOLIC PANEL: CPT

## 2024-07-29 PROCEDURE — 81001 URINALYSIS AUTO W/SCOPE: CPT

## 2024-07-29 PROCEDURE — 96374 THER/PROPH/DIAG INJ IV PUSH: CPT

## 2024-07-29 PROCEDURE — 96375 TX/PRO/DX INJ NEW DRUG ADDON: CPT

## 2024-07-29 PROCEDURE — 85610 PROTHROMBIN TIME: CPT

## 2024-07-29 PROCEDURE — 84703 CHORIONIC GONADOTROPIN ASSAY: CPT

## 2024-07-29 PROCEDURE — 87086 URINE CULTURE/COLONY COUNT: CPT

## 2024-07-29 PROCEDURE — 25010000002 MORPHINE PER 10 MG: Performed by: EMERGENCY MEDICINE

## 2024-07-29 PROCEDURE — 99283 EMERGENCY DEPT VISIT LOW MDM: CPT

## 2024-07-29 PROCEDURE — 25010000002 ONDANSETRON PER 1 MG: Performed by: EMERGENCY MEDICINE

## 2024-07-29 PROCEDURE — 83735 ASSAY OF MAGNESIUM: CPT

## 2024-07-29 PROCEDURE — 83690 ASSAY OF LIPASE: CPT

## 2024-07-29 PROCEDURE — 0 HYDROMORPHONE 1 MG/ML SOLUTION: Performed by: EMERGENCY MEDICINE

## 2024-07-29 PROCEDURE — 85025 COMPLETE CBC W/AUTO DIFF WBC: CPT

## 2024-07-29 RX ORDER — ONDANSETRON 2 MG/ML
4 INJECTION INTRAMUSCULAR; INTRAVENOUS ONCE
Status: COMPLETED | OUTPATIENT
Start: 2024-07-29 | End: 2024-07-29

## 2024-07-29 RX ORDER — SODIUM CHLORIDE 0.9 % (FLUSH) 0.9 %
10 SYRINGE (ML) INJECTION AS NEEDED
Status: DISCONTINUED | OUTPATIENT
Start: 2024-07-29 | End: 2024-07-29 | Stop reason: HOSPADM

## 2024-07-29 RX ORDER — HYDROCODONE BITARTRATE AND ACETAMINOPHEN 7.5; 325 MG/1; MG/1
1 TABLET ORAL EVERY 8 HOURS PRN
Qty: 12 TABLET | Refills: 0 | Status: SHIPPED | OUTPATIENT
Start: 2024-07-29

## 2024-07-29 RX ADMIN — MORPHINE SULFATE 4 MG: 4 INJECTION, SOLUTION INTRAMUSCULAR; INTRAVENOUS at 16:20

## 2024-07-29 RX ADMIN — HYDROMORPHONE HYDROCHLORIDE 1 MG: 1 INJECTION, SOLUTION INTRAMUSCULAR; INTRAVENOUS; SUBCUTANEOUS at 14:49

## 2024-07-29 RX ADMIN — ONDANSETRON 4 MG: 2 INJECTION INTRAMUSCULAR; INTRAVENOUS at 14:49

## 2024-07-29 NOTE — TELEPHONE ENCOUNTER
----- Message from [employer] sent at 7/29/2024  3:50 PM CDT -----  Regarding: Cramping  Contact: [phone]  My phone won't call out in this hospital

## 2024-07-29 NOTE — DISCHARGE INSTRUCTIONS
It was very nice to meet you, Uyen. Thank you for allowing us to take care of you today at [facility].    Today you were seen in the emergency department for your symptoms. Please understand that an ER evaluation is just the start of your evaluation. We do the best we can, but we are often unable to fully find what is causing your symptoms from one evaluation.  Because of this, the goal is to determine whether you need to be evaluated in the hospital or if it is safe for you to go home and see other doctors provided such as primary care physicians or specialist on an outpatient basis.     Like we discussed, I strongly urge that you follow up with your primary care doctor and OBGYN. Please call their office to set up an appointment as soon as possible so that you can be re-evaluated for improvement in your symptoms or for any other questions.  I have provided the information needed, including phone number, to call to set up an appointment below in these discharge papers.     Educational material has also been provided in the following pages regarding what we have discussed today.     MEDICATIONS PRESCRIBED: Norco as needed for pain management    Please return to the emergency room within 12-48 hours if you experience symptoms such as the following:   Fever, chills, chest pain or shortness of breath, pain with inspiration/expiration, pain that travels to your arms, neck or back, nausea, vomiting, severe headache, tearing pain in your chest, dizziness, feel as though you are about to pass out, OR if you have any worsening symptoms, or any other concerns.

## 2024-07-29 NOTE — Clinical Note
[facility] EMERGENCY DEPARTMENT  [address]  [facility] [address]  Phone: [phone]    Uyen José was seen and treated in our emergency department on 7/29/2024.  She may return to work on 07/30/2024.         Thank you for choosing [facility].    Mark Winchester, APRN

## 2024-07-29 NOTE — ED PROVIDER NOTES
Subjective   History of Present Illness  Patient is a 43-year-old female that presents to the emergency department for complaints of pelvic pain.  Patient reports this has been ongoing for several weeks and this makes her fourth ER visit for same complaints.  She states she most recently she followed up with Dr. Espinal on 7/15 and was scheduled for D&C with him on 8/9.  Patient states she does not believe she is able to wait until then because her abdominal pain is so severe.  States pain is more so on the right side compared to the left but does radiate through generalized lower pelvic area.  She denies any vaginal bleeding or discharge.  Denies any urinary urgency, frequency, retention or dysuria.  She states that she was prescribed Norco 5 as well as 7.5 and when she stays on top of her pain medication by taking it around-the-clock she can keep her pain somewhat controlled.  She reports she does have to go to work though until surgery and cannot use pain medication while at work which causes her pain to continue to be uncontrolled.  Patient denies any fevers, body aches, chills, cough or congestion.  Denies any chest pain, shortness of breath, lightheadedness, dizziness, blurred vision, headache or near syncope.  Denies any nausea, vomiting or diarrhea.  Patient reports she has been constipated but reports this has been ongoing since she has been taking narcotics.          Review of Systems   Gastrointestinal:  Positive for abdominal pain and nausea.   Genitourinary:  Positive for pelvic pain.   All other systems reviewed and are negative.      Past Medical History:   Diagnosis Date    Acid reflux     ADHD (attention deficit hyperactivity disorder)     Allergic     Anxiety     Bronchitis     Chronic fatigue     Disease of thyroid gland     Fibromyalgia     Gallbladder abscess     Heart murmur     Hypertension     Hypothyroidism     Mitral valve prolapse     PONV (postoperative nausea and vomiting)     RA  (rheumatoid arthritis)        Allergies   Allergen Reactions    Percocet [Oxycodone-Acetaminophen] Hives    Adhesive Tape Rash     ADHESIVE ON BANDAIDS       Past Surgical History:   Procedure Laterality Date    CARPAL TUNNEL RELEASE      COLONOSCOPY  05/01/2003    Normal exam    DIAGNOSTIC LAPAROSCOPY  2000    [facility]; normal findings; [facility]    ENDOMETRIAL ABLATION  2023    ENDOSCOPY      LAPAROSCOPIC CHOLECYSTECTOMY  2011    Dr Duncan; [employer]    SALPINGECTOMY Bilateral 10/16/2020    Procedure: SALPINGECTOMY LAPAROSCOPIC for sterilization;  Surgeon: Stevan Espinal MD;  Location: [facility] OR;  Service: Obstetrics/Gynecology;  Laterality: Bilateral;    WISDOM TOOTH EXTRACTION         Family History   Problem Relation Age of Onset    Diabetes Mother     Diabetes Father     Cancer Paternal Grandfather     Colon cancer Neg Hx     Colon polyps Neg Hx        Social History     Socioeconomic History    Marital status:    Tobacco Use    Smoking status: Former     Average packs/day: 0.5 packs/day for 20.0 years (10.0 ttl pk-yrs)     Types: Cigarettes     Start date: 7/22/2004    Smokeless tobacco: Never    Tobacco comments:     No cigarette since 7/22/24   Vaping Use    Vaping status: Every Day    Start date: 2/17/2024    Substances: Nicotine, Flavoring    Devices: Disposable   Substance and Sexual Activity    Alcohol use: Yes     Comment: Occasional    Drug use: Yes     Types: Marijuana    Sexual activity: Yes     Partners: Male     Birth control/protection: Tubal ligation           Objective   Physical Exam  Vitals and nursing note reviewed.   Constitutional:       Appearance: Normal appearance.      Comments: Nontoxic appearing. In no acute distress.    HENT:      Head: Normocephalic and atraumatic.      Right Ear: External ear normal.      Left Ear: External ear normal.      Nose: Nose normal.      Mouth/Throat:      Mouth: Mucous membranes are moist.      Pharynx: Oropharynx is clear.   Eyes:      Extraocular  Movements: Extraocular movements intact.      Conjunctiva/sclera: Conjunctivae normal.      Pupils: Pupils are equal, round, and reactive to light.   Cardiovascular:      Rate and Rhythm: Normal rate and regular rhythm.      Pulses: Normal pulses.      Heart sounds: Normal heart sounds.   Pulmonary:      Effort: Pulmonary effort is normal. No respiratory distress.      Breath sounds: Normal breath sounds. No wheezing.   Chest:      Chest wall: No tenderness.   Abdominal:      General: Abdomen is protuberant. Bowel sounds are normal. There is no distension.      Palpations: Abdomen is soft.      Tenderness: There is abdominal tenderness in the right lower quadrant, suprapubic area and left lower quadrant. There is no right CVA tenderness, left CVA tenderness, guarding or rebound.   Musculoskeletal:         General: Normal range of motion.      Cervical back: Normal range of motion and neck supple.      Right lower leg: No edema.      Left lower leg: No edema.   Skin:     General: Skin is warm and dry.      Capillary Refill: Capillary refill takes less than 2 seconds.   Neurological:      General: No focal deficit present.      Mental Status: She is alert and oriented to person, place, and time. Mental status is at baseline.   Psychiatric:         Mood and Affect: Mood normal.         Behavior: Behavior normal.         Thought Content: Thought content normal.         Judgment: Judgment normal.       Labs Reviewed   COMPREHENSIVE METABOLIC PANEL - Abnormal; Notable for the following components:       Result Value    Glucose 105 (*)     CO2 31.0 (*)     All other components within normal limits    Narrative:     GFR Normal >60  Chronic Kidney Disease <60  Kidney Failure <15     URINALYSIS W/ CULTURE IF INDICATED - Abnormal; Notable for the following components:    Leuk Esterase, UA Small (1+) (*)     All other components within normal limits    Narrative:     In absence of clinical symptoms, the presence of pyuria,  bacteria, and/or nitrites on the urinalysis result does not correlate with infection.   CBC WITH AUTO DIFFERENTIAL - Abnormal; Notable for the following components:    WBC 12.64 (*)     MPV 12.4 (*)     Neutrophils, Absolute 8.43 (*)     Eosinophils, Absolute 0.62 (*)     All other components within normal limits   URINALYSIS, MICROSCOPIC ONLY - Abnormal; Notable for the following components:    RBC, UA 3-5 (*)     WBC, UA 6-10 (*)     Bacteria, UA 1+ (*)     Squamous Epithelial Cells, UA 7-12 (*)     All other components within normal limits   LIPASE - Normal   HCG, SERUM, QUALITATIVE - Normal   PROTIME-INR - Normal   MAGNESIUM - Normal   URINE CULTURE   CBC AND DIFFERENTIAL    Narrative:     The following orders were created for panel order CBC & Differential.  Procedure                               Abnormality         Status                     ---------                               -----------         ------                     CBC Auto Differential[[id number]]        Abnormal            Final result                 Please view results for these tests on the individual orders.        Procedures           ED Course  ED Course as of 07/29/24 1603   Mon Jul 29, 2024   1452 CT of the abdomen pelvis was performed on 7/12/24 which revealed Increased size of the low density/complex cystic mass in the uterine cavity since the previous study. This may represent abnormal endometrial thickening, complex endometrial fluid/debris accumulation in the endometrial canal due to out duct obstruction by a fibroid in the lower uterine segment as detailed above. There is a septate morphology of the uterus. Further follow-up with sonography may be obtained. 2. Normal appendix. The gallbladder is surgically absent. 3. The nonacute findings of the remaining abdomen similar to the previous study.    Patient most recently on 7/14/2024 underwent CT pelvis with contrast which revealed Stable heterogeneous solid mass to the right of  midline at the mid to lower uterine segment with probable secondary dilated endometrial cavity at the uterine fundus filled with fluid and a septate uterus. Differential diagnosis would include benign etiologies such as submucosal leiomyoma and large endometrial polyp but also neoplasm, possibly endometrial carcinoma, or carcinosarcoma of the uterus.  2. No anabel pelvic soft tissue nodule or lymphadenopathy identified. Small left external iliac chain lymph node is unchanged. 3. Small right ovarian cyst. 4. Nabothian cysts in the cervix and septate uterus. 4. MRI pelvis without and with contrast may be helpful for further evaluation. GYN consultation is recommended. [KF]   1518 OBGYN paged at this time [KF]   6815 Spoke with Dr. Espinal who states nothing more to be done from ER standpoint and that patient will have to continue with scheduled appt for D/C. Request refill pain medication and to f/u as scheduled.  [KF]      ED Course User Index  [KF] Mark Winchester, APRN                                             Medical Decision Making  Uyen José is a 43 y.o. female who presents to the ED for complaints of pelvic pain.  Patient reports this has been ongoing for several weeks and this makes her fourth ER visit for same complaints.  She states she most recently she followed up with Dr. Espinal on 7/15 and was scheduled for D&C with him on 8/9.  Patient states she does not believe she is able to wait until then because her abdominal pain is so severe.  States pain is more so on the right side compared to the left but does radiate through generalized lower pelvic area.  She denies any vaginal bleeding or discharge.  Denies any urinary urgency, frequency, retention or dysuria.  She states that she was prescribed Norco 5 as well as 7.5 and when she stays on top of her pain medication by taking it around-the-clock she can keep her pain somewhat controlled.  She reports she does have to go to work though until  surgery and cannot use pain medication while at work which causes her pain to continue to be uncontrolled.  Patient denies any fevers, body aches, chills, cough or congestion.  Denies any chest pain, shortness of breath, lightheadedness, dizziness, blurred vision, headache or near syncope.  Denies any nausea, vomiting or diarrhea.  Patient reports she has been constipated but reports this has been ongoing since she has been taking narcotics.    Patient was non-toxic appearing on arrival. No acute distress was noted.  Vital signs stable.     Past medical history, surgical history, and medication regimen reviewed.     Previous notes, labs, imaging and more reviewed.    Patient's presentation raises suspicion for differentials including, but not limited to, uncontrolled pain, urinary tract infection, hematometra.    Medications administered,   sodium chloride 0.9 % flush 10 mL (has no administration in time range)  HYDROmorphone (DILAUDID) injection 1 mg (has no administration in time range)  HYDROmorphone (DILAUDID) injection 1 mg (1 mg Intravenous Given 7/29/24 1449)  ondansetron (ZOFRAN) injection 4 mg (4 mg Intravenous Given 7/29/24 1449)     I discussed the patient's presentation as well as lab work and previous imaging results with Dr. Espinal who is aware of patient's complaints.  He states nothing more to be done from ER standpoint and that patient will have to continue with scheduled appt for D/C. Request refill pain medication and to f/u as scheduled.     Discussed this with Dr. Martell who is in agreement with plans of discharge and close f/u with OBGYN.     Given findings described above, patient's presentation is likely consistent with pelvic pain with known hematometra.    I had an in-depth discussion with the patient regarding all lab work as well as recommendation from OB.  Discussed patient we discharged with refill for pain medication and will need to follow-up with Dr. Espinal's office as instructed. I  discussed all of the lab results completed during this ED encounter today.  I answered all the questions regarding the emergency department evaluation, diagnosis, and treatment plan in plain and simple language that was understandable. We discussed that due to always having some diagnostic uncertainty while in the ER, there is always a chance that symptoms may change or new symptoms may reveal themselves after being discharged. Because of this, I stressed the importance of Uyen following up with their OBGYN and PCP. Patient informed that appointment will need to be done by calling their office to set up an appointment within the next few days or as soon as reasonably possible so that the symptoms can be re-evaluated for improvement or for any other questions. I also gave Uyen common sense return precautions and prompted patient to return to the emergency department within 24 - 48hrs if there are any new, worsening, or concerning symptoms. The patient verbalized understanding of the discharge instructions and agreed with them. Uyen was discharged in stable condition.     Dragon disclaimer:  Parts of this note may be an electronic transcription/translation of spoken language to printed text using the Dragon dictation system.    Amount and/or Complexity of Data Reviewed  Labs: ordered.    Risk  Prescription drug management.        Final diagnoses:   Hematometra   Pelvic pain       ED Disposition  ED Disposition       ED Disposition   Discharge    Condition   Stable    Comment   --               Josi Ivory, APRN  [address]  [address]  [place] [address]  [phone]    Schedule an appointment as soon as possible for a visit       Stevan Espinal MD  [address]  [address]  [place] [address]  [phone]    Schedule an appointment as soon as possible for a visit       [facility] EMERGENCY DEPARTMENT  [address] [id number] [phone]    If symptoms  worsen         Medication List        New Prescriptions      naloxone 4 MG/0.1ML nasal spray  Commonly known as: NARCAN  Call 911. Don't prime. Crawford in 1 nostril for overdose. Repeat in 2-3 minutes in other nostril if no or minimal breathing/responsiveness.            Changed      * HYDROcodone-acetaminophen 5-325 MG per tablet  Commonly known as: Norco  Take 1 tablet by mouth Every 8 (Eight) Hours As Needed for Moderate Pain.  What changed: Another medication with the same name was added. Make sure you understand how and when to take each.     * HYDROcodone-acetaminophen 7.5-325 MG per tablet  Commonly known as: NORCO  Take 1 tablet by mouth Every 8 (Eight) Hours As Needed for Moderate Pain.  What changed: You were already taking a medication with the same name, and this prescription was added. Make sure you understand how and when to take each.           * This list has 2 medication(s) that are the same as other medications prescribed for you. Read the directions carefully, and ask your doctor or other care provider to review them with you.                   Where to Get Your Medications        These medications were sent to [facility], [place] - [address] - [phone]  - [phone] [address] [address]      Phone: [phone]   HYDROcodone-acetaminophen 7.5-325 MG per tablet  naloxone 4 MG/0.1ML nasal spray            Mark Winchester, APRN  07/29/24 1563

## 2024-07-30 ENCOUNTER — TELEPHONE (OUTPATIENT)
Age: 44
End: 2024-07-30
Payer: COMMERCIAL

## 2024-07-30 ENCOUNTER — NURSE TRIAGE (OUTPATIENT)
Dept: CALL CENTER | Facility: HOSPITAL | Age: 44
End: 2024-07-30
Payer: COMMERCIAL

## 2024-07-30 LAB — BACTERIA SPEC AEROBE CULT: NORMAL

## 2024-07-30 NOTE — TELEPHONE ENCOUNTER
Pt called back to office in tears and wanted to know at what point she would be considered emergent or life threatening to get her sx before Friday as her pain was a 100/10.  I asked her if she has taken the pain medication she was given at the ED and she informed me that she had but it had not touched her pain.  She informed me that it is constant but will relieve some from time to time.  She informed me she took Norco 5mg at 0930 and has not taken Ibuprofen.  I spoke with Soniya Blanco and she informed me she could take Norco 7.5mg now and then take Ibuprofen in 2-3hrs and then stay on a scheduled for every 8hrs after that to maintain control of her pain.  I asked pt if she had been having normal BM and she informed me it had been a week until today and recommended she do dulcolax or miralax to see if that will help due to taking so much pain medication recently.  I spoke with Dr. Espinal and he wanted me to see if we could get her on for tomorrow at 1200.  I called and spoke with Alexa and there was an available spot. I called and informed pt that her sx was moved to tomorrow, 7/31 at 1200 and needs to arrive at 0930 and be NPO after midnight but if her pain does not improve or worsens to go to [facility] ED as discussed previously.  Pt voiced understanding to all the above.

## 2024-07-30 NOTE — TELEPHONE ENCOUNTER
Called and r/s sx with Alexa for 8/2 at 0700, pt has had PAT, pt to arrive at 0500 and NPO after midnight.  Called and informed pt of date change and arrival time, pt voiced understanding.  Called and informed Glen GARCIA of new date and time, voiced understanding.

## 2024-07-31 ENCOUNTER — ANESTHESIA (OUTPATIENT)
Dept: PERIOP | Facility: HOSPITAL | Age: 44
End: 2024-07-31
Payer: COMMERCIAL

## 2024-07-31 ENCOUNTER — HOSPITAL ENCOUNTER (OUTPATIENT)
Facility: HOSPITAL | Age: 44
Setting detail: HOSPITAL OUTPATIENT SURGERY
Discharge: HOME | End: 2024-07-31
Attending: OBSTETRICS & GYNECOLOGY | Admitting: OBSTETRICS & GYNECOLOGY
Payer: COMMERCIAL

## 2024-07-31 ENCOUNTER — ANESTHESIA EVENT (OUTPATIENT)
Dept: PERIOP | Facility: HOSPITAL | Age: 44
End: 2024-07-31
Payer: COMMERCIAL

## 2024-07-31 VITALS
TEMPERATURE: 96.9 F | RESPIRATION RATE: 16 BRPM | WEIGHT: 161.6 LBS | HEART RATE: 77 BPM | OXYGEN SATURATION: 98 % | BODY MASS INDEX: 25.97 KG/M2 | DIASTOLIC BLOOD PRESSURE: 74 MMHG | SYSTOLIC BLOOD PRESSURE: 120 MMHG | HEIGHT: 66 IN

## 2024-07-31 DIAGNOSIS — R10.2 PELVIC PAIN IN FEMALE: ICD-10-CM

## 2024-07-31 DIAGNOSIS — N85.7 HEMATOMETRA: ICD-10-CM

## 2024-07-31 DIAGNOSIS — N94.6 DYSMENORRHEA: ICD-10-CM

## 2024-07-31 PROCEDURE — 25010000002 DEXAMETHASONE PER 1 MG: Performed by: NURSE ANESTHETIST, CERTIFIED REGISTERED

## 2024-07-31 PROCEDURE — 25010000002 ONDANSETRON PER 1 MG: Performed by: NURSE ANESTHETIST, CERTIFIED REGISTERED

## 2024-07-31 PROCEDURE — 58558 HYSTEROSCOPY BIOPSY: CPT | Performed by: OBSTETRICS & GYNECOLOGY

## 2024-07-31 PROCEDURE — 88305 TISSUE EXAM BY PATHOLOGIST: CPT | Performed by: OBSTETRICS & GYNECOLOGY

## 2024-07-31 PROCEDURE — 25010000002 GLYCOPYRROLATE 0.4 MG/2ML SOLUTION: Performed by: NURSE ANESTHETIST, CERTIFIED REGISTERED

## 2024-07-31 PROCEDURE — 25010000002 PROPOFOL 10 MG/ML EMULSION: Performed by: NURSE ANESTHETIST, CERTIFIED REGISTERED

## 2024-07-31 PROCEDURE — 25010000002 FENTANYL CITRATE (PF) 100 MCG/2ML SOLUTION: Performed by: NURSE ANESTHETIST, CERTIFIED REGISTERED

## 2024-07-31 PROCEDURE — C1782 MORCELLATOR: HCPCS | Performed by: OBSTETRICS & GYNECOLOGY

## 2024-07-31 PROCEDURE — 25810000003 LACTATED RINGERS PER 1000 ML: Performed by: OBSTETRICS & GYNECOLOGY

## 2024-07-31 RX ORDER — IBUPROFEN 600 MG/1
600 TABLET ORAL EVERY 6 HOURS PRN
Status: DISCONTINUED | OUTPATIENT
Start: 2024-07-31 | End: 2024-07-31 | Stop reason: HOSPADM

## 2024-07-31 RX ORDER — LABETALOL HYDROCHLORIDE 5 MG/ML
5 INJECTION, SOLUTION INTRAVENOUS
Status: DISCONTINUED | OUTPATIENT
Start: 2024-07-31 | End: 2024-07-31 | Stop reason: HOSPADM

## 2024-07-31 RX ORDER — SCOLOPAMINE TRANSDERMAL SYSTEM 1 MG/1
1 PATCH, EXTENDED RELEASE TRANSDERMAL ONCE
Status: DISCONTINUED | OUTPATIENT
Start: 2024-07-31 | End: 2024-07-31 | Stop reason: HOSPADM

## 2024-07-31 RX ORDER — SODIUM CHLORIDE 9 MG/ML
100 INJECTION, SOLUTION INTRAVENOUS CONTINUOUS
Status: DISCONTINUED | OUTPATIENT
Start: 2024-07-31 | End: 2024-07-31 | Stop reason: HOSPADM

## 2024-07-31 RX ORDER — FLUMAZENIL 0.1 MG/ML
0.2 INJECTION INTRAVENOUS AS NEEDED
Status: DISCONTINUED | OUTPATIENT
Start: 2024-07-31 | End: 2024-07-31 | Stop reason: HOSPADM

## 2024-07-31 RX ORDER — SODIUM CHLORIDE, SODIUM LACTATE, POTASSIUM CHLORIDE, CALCIUM CHLORIDE 600; 310; 30; 20 MG/100ML; MG/100ML; MG/100ML; MG/100ML
100 INJECTION, SOLUTION INTRAVENOUS CONTINUOUS
Status: DISCONTINUED | OUTPATIENT
Start: 2024-07-31 | End: 2024-07-31 | Stop reason: HOSPADM

## 2024-07-31 RX ORDER — ACETAMINOPHEN 500 MG
1000 TABLET ORAL ONCE
Status: COMPLETED | OUTPATIENT
Start: 2024-07-31 | End: 2024-07-31

## 2024-07-31 RX ORDER — LIDOCAINE HYDROCHLORIDE 20 MG/ML
INJECTION, SOLUTION EPIDURAL; INFILTRATION; INTRACAUDAL; PERINEURAL AS NEEDED
Status: DISCONTINUED | OUTPATIENT
Start: 2024-07-31 | End: 2024-07-31 | Stop reason: SURG

## 2024-07-31 RX ORDER — LEVOTHYROXINE SODIUM 112 UG/1
112 TABLET ORAL DAILY
COMMUNITY
End: 2024-08-05 | Stop reason: SDUPTHER

## 2024-07-31 RX ORDER — MAGNESIUM HYDROXIDE 1200 MG/15ML
LIQUID ORAL AS NEEDED
Status: DISCONTINUED | OUTPATIENT
Start: 2024-07-31 | End: 2024-07-31 | Stop reason: HOSPADM

## 2024-07-31 RX ORDER — FENTANYL CITRATE 50 UG/ML
INJECTION, SOLUTION INTRAMUSCULAR; INTRAVENOUS AS NEEDED
Status: DISCONTINUED | OUTPATIENT
Start: 2024-07-31 | End: 2024-07-31 | Stop reason: SURG

## 2024-07-31 RX ORDER — PROMETHAZINE HYDROCHLORIDE 25 MG/1
12.5 TABLET ORAL ONCE AS NEEDED
Status: DISCONTINUED | OUTPATIENT
Start: 2024-07-31 | End: 2024-07-31 | Stop reason: HOSPADM

## 2024-07-31 RX ORDER — HYDROCODONE BITARTRATE AND ACETAMINOPHEN 10; 325 MG/1; MG/1
1 TABLET ORAL EVERY 4 HOURS PRN
Status: DISCONTINUED | OUTPATIENT
Start: 2024-07-31 | End: 2024-07-31 | Stop reason: HOSPADM

## 2024-07-31 RX ORDER — SODIUM CHLORIDE 0.9 % (FLUSH) 0.9 %
10 SYRINGE (ML) INJECTION EVERY 12 HOURS SCHEDULED
Status: DISCONTINUED | OUTPATIENT
Start: 2024-07-31 | End: 2024-07-31 | Stop reason: HOSPADM

## 2024-07-31 RX ORDER — EPHEDRINE SULFATE 50 MG/ML
INJECTION, SOLUTION INTRAVENOUS AS NEEDED
Status: DISCONTINUED | OUTPATIENT
Start: 2024-07-31 | End: 2024-07-31 | Stop reason: SURG

## 2024-07-31 RX ORDER — MIDAZOLAM HYDROCHLORIDE 2 MG/2ML
1 INJECTION, SOLUTION INTRAMUSCULAR; INTRAVENOUS
Status: DISCONTINUED | OUTPATIENT
Start: 2024-07-31 | End: 2024-07-31 | Stop reason: HOSPADM

## 2024-07-31 RX ORDER — SODIUM CHLORIDE 0.9 % (FLUSH) 0.9 %
3-10 SYRINGE (ML) INJECTION AS NEEDED
Status: DISCONTINUED | OUTPATIENT
Start: 2024-07-31 | End: 2024-07-31 | Stop reason: HOSPADM

## 2024-07-31 RX ORDER — SODIUM CHLORIDE 9 MG/ML
40 INJECTION, SOLUTION INTRAVENOUS AS NEEDED
Status: DISCONTINUED | OUTPATIENT
Start: 2024-07-31 | End: 2024-07-31 | Stop reason: HOSPADM

## 2024-07-31 RX ORDER — NALOXONE HCL 0.4 MG/ML
0.4 VIAL (ML) INJECTION AS NEEDED
Status: DISCONTINUED | OUTPATIENT
Start: 2024-07-31 | End: 2024-07-31 | Stop reason: HOSPADM

## 2024-07-31 RX ORDER — GLYCOPYRROLATE 0.2 MG/ML
INJECTION INTRAMUSCULAR; INTRAVENOUS AS NEEDED
Status: DISCONTINUED | OUTPATIENT
Start: 2024-07-31 | End: 2024-07-31 | Stop reason: SURG

## 2024-07-31 RX ORDER — DEXAMETHASONE SODIUM PHOSPHATE 4 MG/ML
INJECTION, SOLUTION INTRA-ARTICULAR; INTRALESIONAL; INTRAMUSCULAR; INTRAVENOUS; SOFT TISSUE AS NEEDED
Status: DISCONTINUED | OUTPATIENT
Start: 2024-07-31 | End: 2024-07-31 | Stop reason: SURG

## 2024-07-31 RX ORDER — PROPOFOL 10 MG/ML
VIAL (ML) INTRAVENOUS AS NEEDED
Status: DISCONTINUED | OUTPATIENT
Start: 2024-07-31 | End: 2024-07-31 | Stop reason: SURG

## 2024-07-31 RX ORDER — FENTANYL CITRATE 50 UG/ML
50 INJECTION, SOLUTION INTRAMUSCULAR; INTRAVENOUS
Status: DISCONTINUED | OUTPATIENT
Start: 2024-07-31 | End: 2024-07-31 | Stop reason: HOSPADM

## 2024-07-31 RX ORDER — SODIUM CHLORIDE 0.9 % (FLUSH) 0.9 %
1-10 SYRINGE (ML) INJECTION AS NEEDED
Status: DISCONTINUED | OUTPATIENT
Start: 2024-07-31 | End: 2024-07-31 | Stop reason: HOSPADM

## 2024-07-31 RX ORDER — SODIUM CHLORIDE, SODIUM LACTATE, POTASSIUM CHLORIDE, CALCIUM CHLORIDE 600; 310; 30; 20 MG/100ML; MG/100ML; MG/100ML; MG/100ML
1000 INJECTION, SOLUTION INTRAVENOUS CONTINUOUS
Status: DISCONTINUED | OUTPATIENT
Start: 2024-07-31 | End: 2024-07-31 | Stop reason: HOSPADM

## 2024-07-31 RX ORDER — DROPERIDOL 2.5 MG/ML
0.62 INJECTION, SOLUTION INTRAMUSCULAR; INTRAVENOUS ONCE AS NEEDED
Status: DISCONTINUED | OUTPATIENT
Start: 2024-07-31 | End: 2024-07-31 | Stop reason: HOSPADM

## 2024-07-31 RX ORDER — ONDANSETRON 2 MG/ML
INJECTION INTRAMUSCULAR; INTRAVENOUS AS NEEDED
Status: DISCONTINUED | OUTPATIENT
Start: 2024-07-31 | End: 2024-07-31 | Stop reason: SURG

## 2024-07-31 RX ORDER — ONDANSETRON 2 MG/ML
4 INJECTION INTRAMUSCULAR; INTRAVENOUS ONCE AS NEEDED
Status: DISCONTINUED | OUTPATIENT
Start: 2024-07-31 | End: 2024-07-31 | Stop reason: HOSPADM

## 2024-07-31 RX ORDER — SODIUM CHLORIDE 0.9 % (FLUSH) 0.9 %
3 SYRINGE (ML) INJECTION EVERY 12 HOURS SCHEDULED
Status: DISCONTINUED | OUTPATIENT
Start: 2024-07-31 | End: 2024-07-31 | Stop reason: HOSPADM

## 2024-07-31 RX ORDER — HYDROCODONE BITARTRATE AND ACETAMINOPHEN 5; 325 MG/1; MG/1
1 TABLET ORAL EVERY 4 HOURS PRN
Status: DISCONTINUED | OUTPATIENT
Start: 2024-07-31 | End: 2024-07-31 | Stop reason: HOSPADM

## 2024-07-31 RX ADMIN — PROPOFOL 200 MG: 10 INJECTION, EMULSION INTRAVENOUS at 11:52

## 2024-07-31 RX ADMIN — SCOPALAMINE 1 PATCH: 1 PATCH, EXTENDED RELEASE TRANSDERMAL at 11:30

## 2024-07-31 RX ADMIN — LIDOCAINE HYDROCHLORIDE 40 MG: 20 INJECTION, SOLUTION EPIDURAL; INFILTRATION; INTRACAUDAL; PERINEURAL at 11:52

## 2024-07-31 RX ADMIN — EPHEDRINE SULFATE 20 MG: 50 INJECTION INTRAVENOUS at 12:02

## 2024-07-31 RX ADMIN — FENTANYL CITRATE 100 MCG: 50 INJECTION, SOLUTION INTRAMUSCULAR; INTRAVENOUS at 11:49

## 2024-07-31 RX ADMIN — SODIUM CHLORIDE, POTASSIUM CHLORIDE, SODIUM LACTATE AND CALCIUM CHLORIDE 1000 ML: 600; 310; 30; 20 INJECTION, SOLUTION INTRAVENOUS at 09:50

## 2024-07-31 RX ADMIN — DEXAMETHASONE SODIUM PHOSPHATE 4 MG: 4 INJECTION INTRA-ARTICULAR; INTRALESIONAL; INTRAMUSCULAR; INTRAVENOUS; SOFT TISSUE at 12:23

## 2024-07-31 RX ADMIN — ONDANSETRON 4 MG: 2 INJECTION INTRAMUSCULAR; INTRAVENOUS at 12:23

## 2024-07-31 RX ADMIN — ACETAMINOPHEN 1000 MG: 500 TABLET, FILM COATED ORAL at 11:31

## 2024-07-31 RX ADMIN — GLYCOPYRROLATE 0.4 MG: 0.2 INJECTION INTRAMUSCULAR; INTRAVENOUS at 11:57

## 2024-07-31 NOTE — ANESTHESIA POSTPROCEDURE EVALUATION
"Patient: Uyen José    Procedure Summary       Date: 07/31/24 Room / Location: [facility] OR  /  [facility] OR    Anesthesia Start: 1149 Anesthesia Stop: 1227    Procedure: DILATATION AND CURETTAGE HYSTEROSCOPY WITH POSSIBLE TISSUE RESECTION (Vagina) Diagnosis:       Dysmenorrhea      Hematometra      (Dysmenorrhea [N94.6])      (Hematometra [N85.7])    Surgeons: Stevan Espinal MD Provider: Yohannes Verma CRNA    Anesthesia Type: general ASA Status: 2            Anesthesia Type: general    Vitals  Vitals Value Taken Time   /74 07/31/24 1308   Temp 96.9 °F (36.1 °C) 07/31/24 1227   Pulse 82 07/31/24 1308   Resp 16 07/31/24 1308   SpO2 97 % 07/31/24 1308           Post Anesthesia Care and Evaluation    Patient location during evaluation: PHASE II  Patient participation: complete - patient participated  Level of consciousness: awake and awake and alert  Pain score: 0  Pain management: adequate    Airway patency: patent  Anesthetic complications: No anesthetic complications  PONV Status: none  Cardiovascular status: acceptable  Respiratory status: acceptable  Hydration status: acceptable    Comments: Patient discharged according to acceptable Latrell score per RN assessment. See nursing records for further information.     Blood pressure 123/76, pulse 91, temperature 96.9 °F (36.1 °C), temperature source Temporal, resp. rate 16, height 168 cm (66.14\"), weight 73.3 kg (161 lb 9.6 oz), SpO2 97%, not currently breastfeeding.      "

## 2024-07-31 NOTE — ANESTHESIA PROCEDURE NOTES
Airway  Urgency: elective    Date/Time: 7/31/2024 11:53 AM  Airway not difficult    General Information and Staff    Patient location during procedure: OR  CRNA/CAA: Yohannes Verma CRNA    Indications and Patient Condition  Indications for airway management: airway protection    Preoxygenated: yes  Mask difficulty assessment: 1 - vent by mask    Final Airway Details  Final airway type: supraglottic airway      Successful airway: I-gel  Size 3     Number of attempts at approach: 1  Assessment: lips, teeth, and gum same as pre-op and atraumatic intubation

## 2024-07-31 NOTE — ANESTHESIA PREPROCEDURE EVALUATION
Anesthesia Evaluation     Patient summary reviewed   no history of anesthetic complications:   NPO Solid Status: > 8 hours  NPO Liquid Status: > 8 hours           Airway   Mallampati: I  TM distance: >3 FB  Neck ROM: full  No difficulty expected  Dental - normal exam     Pulmonary    (+) a smoker Current, Smoked day of surgery,  Cardiovascular   Exercise tolerance: good (4-7 METS)    ECG reviewed    (+) hypertension      Neuro/Psych  (+) psychiatric history Anxiety  (-) seizures, TIA, CVA  GI/Hepatic/Renal/Endo    (+) GERD, thyroid problem hypothyroidism  (-) no renal disease, diabetes    Musculoskeletal     Abdominal    Substance History      OB/GYN          Other                          Anesthesia Plan    ASA 2     general     intravenous induction     Anesthetic plan, risks, benefits, and alternatives have been provided, discussed and informed consent has been obtained with: patient.

## 2024-07-31 NOTE — OP NOTE
Stevan Espinal MD  [facility]  [address]  [place] [address]  Office [phone]  Fax [phone]      [facility]  Uyen José  1980  [id number]  [phone]  7/31/2024      Pre-operative Diagnosis:  Hematametrium, pelvic pain    Post-operative Diagnosis:  Same    Operation: Endocervical Currettage, Endometrial Currettage, and hysteroscopic resected uterine synechia    Surgeon: MUKESH Espinal MD, FACOG    Assistants:     Anesthesia: General endotracheal anesthesia    Findings: Moderate uterine descent and obliterated endometrial cavity    Estimated Blood Loss: Minimal      Specimens: Endocervical currettings, Endometrial currettings, and Hysteroscopic resected edometrial tissue    Complications:  None    Disposition: PACU - hemodynamically stable.      Procedure Details      After consents were obtained the patient was taken to the operating room, where general anesthesia was administered. She was placed in dorsal lithotomy position, with care taken in placement of legs to avoid nerve injury. She is prepped and draped in the usual sterile fashion.    In-and-out catheter was placed into the bladder for decompression. The cervix was identified and grasped with a single-tooth tenaculum. Please see comments above regarding details of the exam.   Endocervical curettage was performed sharply and specimen sent separately. The cervix was then gradually and gently dilated to a #7 and the MyoSure hysteroscope was inserted under direct visualization. The above findings were noted.     MyoSure Lyte device was then used to resect synechia. Hemostasis was noted. Fluid deficit was 310. The instrument and the hysteroscope were removed. Curettage was then performed with a sharp curette to a gritty texture. Hemostasis was noted from the os, as well as the single-tooth tenaculum site.     She tolerated the procedure well. Instrument counts are correct. She was extubated, awakened,  and taken to recovery room in stable condition.       Stevan Espinal MD  07/31/24  12:24 CDT

## 2024-07-31 NOTE — TELEPHONE ENCOUNTER
"Reason for Disposition   Health Information question, no triage required and triager able to answer question    Additional Information   Negative: [1] Caller is not with the adult (patient) AND [2] reporting urgent symptoms   Negative: Lab result questions   Negative: Medication questions   Negative: Caller can't be reached by phone   Negative: Caller has already spoken to PCP or another triager   Negative: RN needs further essential information from caller in order to complete triage   Negative: Requesting regular office appointment   Negative: [1] Caller requesting NON-URGENT health information AND [2] PCP's office is the best resource    Answer Assessment - Initial Assessment Questions  1. REASON FOR CALL or QUESTION: \"What is your reason for calling today?\" or \"How can I best help you?\" or \"What question do you have that I can help answer?\"      Caller wanting to know what time surgery is scheduled for on 7/31    Protocols used: Information Only Call - No Triage-ADULT-    "

## 2024-08-05 ENCOUNTER — OFFICE VISIT (OUTPATIENT)
Dept: FAMILY MEDICINE CLINIC | Facility: CLINIC | Age: 44
End: 2024-08-05
Payer: COMMERCIAL

## 2024-08-05 VITALS
WEIGHT: 162 LBS | HEIGHT: 66 IN | RESPIRATION RATE: 20 BRPM | BODY MASS INDEX: 26.03 KG/M2 | TEMPERATURE: 98 F | SYSTOLIC BLOOD PRESSURE: 115 MMHG | HEART RATE: 71 BPM | DIASTOLIC BLOOD PRESSURE: 80 MMHG

## 2024-08-05 DIAGNOSIS — K21.9 GASTROESOPHAGEAL REFLUX DISEASE WITHOUT ESOPHAGITIS: ICD-10-CM

## 2024-08-05 DIAGNOSIS — E03.9 HYPOTHYROIDISM, UNSPECIFIED TYPE: ICD-10-CM

## 2024-08-05 DIAGNOSIS — I10 ESSENTIAL HYPERTENSION: ICD-10-CM

## 2024-08-05 DIAGNOSIS — E66.3 OVERWEIGHT (BMI 25.0-29.9): ICD-10-CM

## 2024-08-05 DIAGNOSIS — F90.2 ATTENTION DEFICIT HYPERACTIVITY DISORDER (ADHD), COMBINED TYPE: Primary | ICD-10-CM

## 2024-08-05 DIAGNOSIS — M05.9 RHEUMATOID ARTHRITIS WITH POSITIVE RHEUMATOID FACTOR, INVOLVING UNSPECIFIED SITE: ICD-10-CM

## 2024-08-05 PROCEDURE — 99214 OFFICE O/P EST MOD 30 MIN: CPT | Performed by: NURSE PRACTITIONER

## 2024-08-05 RX ORDER — PANTOPRAZOLE SODIUM 40 MG/1
40 TABLET, DELAYED RELEASE ORAL DAILY
Qty: 30 TABLET | Refills: 2 | Status: SHIPPED | OUTPATIENT
Start: 2024-08-05

## 2024-08-05 RX ORDER — DEXTROAMPHETAMINE SACCHARATE, AMPHETAMINE ASPARTATE, DEXTROAMPHETAMINE SULFATE AND AMPHETAMINE SULFATE 2.5; 2.5; 2.5; 2.5 MG/1; MG/1; MG/1; MG/1
10 TABLET ORAL DAILY
Qty: 30 TABLET | Refills: 0 | Status: SHIPPED | OUTPATIENT
Start: 2024-08-05 | End: 2024-09-04

## 2024-08-05 RX ORDER — LISDEXAMFETAMINE DIMESYLATE 50 MG/1
50 CAPSULE ORAL EVERY MORNING
Qty: 30 CAPSULE | Refills: 0 | Status: SHIPPED | OUTPATIENT
Start: 2024-09-02 | End: 2024-10-02

## 2024-08-05 RX ORDER — LISDEXAMFETAMINE DIMESYLATE 50 MG/1
50 CAPSULE ORAL EVERY MORNING
Qty: 30 CAPSULE | Refills: 0 | Status: SHIPPED | OUTPATIENT
Start: 2024-08-05 | End: 2024-09-04

## 2024-08-05 RX ORDER — BISOPROLOL FUMARATE AND HYDROCHLOROTHIAZIDE 5; 6.25 MG/1; MG/1
1 TABLET ORAL DAILY
Qty: 30 TABLET | Refills: 2 | Status: SHIPPED | OUTPATIENT
Start: 2024-08-05

## 2024-08-05 RX ORDER — IBUPROFEN 800 MG/1
800 TABLET ORAL EVERY 6 HOURS PRN
Qty: 90 TABLET | Refills: 2 | Status: SHIPPED | OUTPATIENT
Start: 2024-08-05

## 2024-08-05 RX ORDER — DEXTROAMPHETAMINE SACCHARATE, AMPHETAMINE ASPARTATE, DEXTROAMPHETAMINE SULFATE AND AMPHETAMINE SULFATE 2.5; 2.5; 2.5; 2.5 MG/1; MG/1; MG/1; MG/1
10 TABLET ORAL DAILY
Qty: 30 TABLET | Refills: 0 | Status: SHIPPED | OUTPATIENT
Start: 2024-09-02 | End: 2024-10-02

## 2024-08-05 RX ORDER — LEVOTHYROXINE SODIUM 112 UG/1
112 TABLET ORAL DAILY
Qty: 30 TABLET | Refills: 2 | Status: SHIPPED | OUTPATIENT
Start: 2024-08-05

## 2024-08-05 NOTE — PROGRESS NOTES
"Chief Complaint  ADHD    Subjective    History of Present Illness      Patient presents to [facility] for   History of Present Illness  ADHD/Mood HPI    Visit for:  follow-up. Most recent visit was 3 months ago.  Interim changes to follow up on today: no change in medication  Work/School Performance:  going well  Cognitive:  able to focus    Behavior  Hyperactivity: is not hyperactive  Impulsivity: no impulsivity and no unsafe behavior  Tasking: able to initiate tasks, able to complete tasks, and able to mult-task    Social  ADHD social/impulsive symptoms:  not impatient, does not blurt out inappropriate comments, and no excessive talking    Behavioral health  Behavior: no concerns  Emotional coping: demonstrates feelings of no concerns           Review of Systems   Constitutional: Negative.    HENT: Negative.     Eyes: Negative.    Respiratory: Negative.     Cardiovascular: Negative.    Gastrointestinal: Negative.    Endocrine: Negative.    Genitourinary: Negative.    Musculoskeletal: Negative.    Skin: Negative.    Allergic/Immunologic: Negative.    Neurological: Negative.    Hematological: Negative.    Psychiatric/Behavioral: Negative.         I have reviewed and agree with the HPI and ROS information as above.  Jois Aguilera, APRN     Objective   Vital Signs:   /80   Pulse 71   Temp 98 °F (36.7 °C)   Resp 20   Ht 168 cm (66.14\")   Wt 73.5 kg (162 lb)   BMI 26.04 kg/m²            Physical Exam  Constitutional:       Appearance: Normal appearance. She is well-developed.      Comments: overweight   HENT:      Head: Normocephalic and atraumatic.      Right Ear: External ear normal.      Left Ear: External ear normal.      Nose: Nose normal. No nasal tenderness or congestion.      Mouth/Throat:      Lips: Pink. No lesions.      Mouth: Mucous membranes are moist. No oral lesions.      Dentition: Normal dentition.      Pharynx: Oropharynx is clear. No pharyngeal swelling, " oropharyngeal exudate or posterior oropharyngeal erythema.   Eyes:      General: Lids are normal. Vision grossly intact. No scleral icterus.        Right eye: No discharge.         Left eye: No discharge.      Extraocular Movements: Extraocular movements intact.      Conjunctiva/sclera: Conjunctivae normal.      Right eye: Right conjunctiva is not injected.      Left eye: Left conjunctiva is not injected.      Pupils: Pupils are equal, round, and reactive to light.   Cardiovascular:      Rate and Rhythm: Normal rate and regular rhythm.      Heart sounds: Normal heart sounds. No murmur heard.     No gallop.   Pulmonary:      Effort: Pulmonary effort is normal.      Breath sounds: Normal breath sounds and air entry. No wheezing, rhonchi or rales.   Musculoskeletal:         General: No tenderness or deformity. Normal range of motion.      Cervical back: Full passive range of motion without pain, normal range of motion and neck supple.      Right lower leg: No edema.      Left lower leg: No edema.   Skin:     General: Skin is warm and dry.      Coloration: Skin is not jaundiced.      Findings: No rash.   Neurological:      Mental Status: She is alert and oriented to person, place, and time.      Sensory: Sensation is intact.      Motor: Motor function is intact.      Coordination: Coordination is intact.      Gait: Gait is intact.   Psychiatric:         Attention and Perception: Attention normal.         Mood and Affect: Mood and affect normal.         Behavior: Behavior is not hyperactive. Behavior is cooperative.         Thought Content: Thought content normal.         Judgment: Judgment normal.          JOAQUIN-7: Over the last two weeks, how often have you been bothered by the following problems?  Feeling nervous, anxious or on edge: Not at all  Not being able to stop or control worrying: Not at all  Worrying too much about different things: Not at all  Trouble Relaxing: Not at all  Being so restless that it is hard to  sit still: Not at all  Becoming easily annoyed or irritable: Not at all  Feeling afraid as if something awful might happen: Not at all  JOAQUIN 7 Total Score: 0  If you checked any problems, how difficult have these problems made it for you to do your work, take care of things at home, or get along with other people: Not difficult at all    PHQ-2 Depression Screening  Little interest or pleasure in doing things? 0-->not at all   Feeling down, depressed, or hopeless? 0-->not at all   PHQ-2 Total Score 0     PHQ-9 Depression Screening  Little interest or pleasure in doing things? 0-->not at all   Feeling down, depressed, or hopeless? 0-->not at all   Trouble falling or staying asleep, or sleeping too much?     Feeling tired or having little energy?     Poor appetite or overeating?     Feeling bad about yourself - or that you are a failure or have let yourself or your family down?     Trouble concentrating on things, such as reading the newspaper or watching television?     Moving or speaking so slowly that other people could have noticed? Or the opposite - being so fidgety or restless that you have been moving around a lot more than usual?     Thoughts that you would be better off dead, or of hurting yourself in some way?     PHQ-9 Total Score 0   If you checked off any problems, how difficult have these problems made it for you to do your work, take care of things at home, or get along with other people?        Result Review  Data Reviewed:   The following data was reviewed by: BLAS Ren on 08/05/2024:  Urine Drug Screen - Urine, Clean Catch (05/09/2024 08:06)   TSH RFX ON ABNORMAL TO FREE T4 (05/23/2024 18:04 EDT)          Assessment and Plan      Diagnoses and all orders for this visit:    1. Attention deficit hyperactivity disorder (ADHD), combined type (Primary)    2. Essential hypertension  -     bisoprolol-hydrochlorothiazide (ZIAC) 5-6.25 MG per tablet; Take 1 tablet by mouth Daily.  Dispense: 30  tablet; Refill: 2    3. Rheumatoid arthritis with positive rheumatoid factor, involving unspecified site  -     ibuprofen (ADVIL,MOTRIN) 800 MG tablet; Take 1 tablet by mouth Every 6 (Six) Hours As Needed for Mild Pain.  Dispense: 90 tablet; Refill: 2    4. Gastroesophageal reflux disease without esophagitis  -     pantoprazole (PROTONIX) 40 MG EC tablet; Take 1 tablet by mouth Daily.  Dispense: 30 tablet; Refill: 2    5. Hypothyroidism, unspecified type  -     levothyroxine (SYNTHROID, LEVOTHROID) 112 MCG tablet; Take 1 tablet by mouth Daily.  Dispense: 30 tablet; Refill: 2  -     TSH; Future    6. Overweight (BMI 25.0-29.9)        Patient here today for 3-month ADHD and hypertension follow-up.  She states she is doing well with her current dose of Vyvanse and Adderall IR in the afternoon.  She feels that this dose combo works well for her and does not cause any issues with insomnia would like Mydayis did in the past.  Her blood pressure is stable in office today.  She is also requesting refills of ibuprofen, Protonix, and Synthroid.  She had a TSH drawn in May 2024 which was slightly elevated.  She states her medication was not changed at that time.    Plan:    1.  Continue Vyvanse 50 mg and Adderall IR 10 mg.  Chip is pending.  UDS up-to-date and appropriate.  CSA up-to-date.  2.  Refill sent of bisoprolol-HCTZ, Synthroid, ibuprofen, and Protonix.  3.  Repeat TSH ordered.  4.  Follow-up 3 months.        ADHD Follow up:    PDMP reviewed and pending. ADRS (Adult ADHD Assessment Form) reviewed in detail, scanned in chart, and has been reviewed at time of appointment.  All questions, including medication and side effects, were discussed in detail at time of patient's visit. Patient will continue same medication which was discussed at today's visit. Patient is to return in 3 month(s).    Last Urine Toxicity  More data exists         Latest Ref Rng & Units 5/9/2024 7/12/2023   LAST URINE TOXICITY RESULTS    Amphetamine, Urine Qual Negative Positive  Positive    Barbiturates Screen, Urine Negative Negative  Negative    Benzodiazepine Screen, Urine Negative Negative  Negative    Buprenorphine, Screen, Urine Negative Negative  Negative    Cocaine Screen, Urine Negative Negative  Negative    Fentanyl, Urine Negative Negative  Negative    Methadone Screen , Urine Negative Negative  Negative    Methamphetamine, Ur Negative Negative  Negative       Details                    Urine Drug Screen Results: UDS RESULTS: Current results appropriate    CSA completed on: 08/05/2024        Follow Up   Return in about 3 months (around 11/5/2024) for Recheck.  Patient was given instructions and counseling regarding her condition or for health maintenance advice. Please see specific information pulled into the AVS if appropriate.

## 2024-08-06 LAB
CYTO UR: NORMAL
LAB AP CASE REPORT: NORMAL
Lab: NORMAL
PATH REPORT.FINAL DX SPEC: NORMAL
PATH REPORT.GROSS SPEC: NORMAL

## 2024-08-07 ENCOUNTER — HOSPITAL ENCOUNTER (OUTPATIENT)
Dept: ULTRASOUND IMAGING | Facility: HOSPITAL | Age: 44
Discharge: HOME | End: 2024-08-07
Payer: COMMERCIAL

## 2024-08-07 ENCOUNTER — HOSPITAL ENCOUNTER (OUTPATIENT)
Dept: MAMMOGRAPHY | Facility: HOSPITAL | Age: 44
Discharge: HOME | End: 2024-08-07
Payer: COMMERCIAL

## 2024-08-07 DIAGNOSIS — N63.20 MASS OF LEFT BREAST, UNSPECIFIED QUADRANT: ICD-10-CM

## 2024-08-07 DIAGNOSIS — R59.0 AXILLARY ADENOPATHY: ICD-10-CM

## 2024-08-07 DIAGNOSIS — R92.8 ABNORMAL MAMMOGRAM: ICD-10-CM

## 2024-08-07 PROCEDURE — 76942 ECHO GUIDE FOR BIOPSY: CPT

## 2024-08-07 PROCEDURE — 88360 TUMOR IMMUNOHISTOCHEM/MANUAL: CPT | Performed by: STUDENT IN AN ORGANIZED HEALTH CARE EDUCATION/TRAINING PROGRAM

## 2024-08-07 PROCEDURE — 76642 ULTRASOUND BREAST LIMITED: CPT

## 2024-08-07 PROCEDURE — 88305 TISSUE EXAM BY PATHOLOGIST: CPT | Performed by: STUDENT IN AN ORGANIZED HEALTH CARE EDUCATION/TRAINING PROGRAM

## 2024-08-07 PROCEDURE — A4648 IMPLANTABLE TISSUE MARKER: HCPCS

## 2024-08-07 PROCEDURE — 88342 IMHCHEM/IMCYTCHM 1ST ANTB: CPT | Performed by: STUDENT IN AN ORGANIZED HEALTH CARE EDUCATION/TRAINING PROGRAM

## 2024-08-07 RX ORDER — LIDOCAINE HYDROCHLORIDE AND EPINEPHRINE 10; 10 MG/ML; UG/ML
30 INJECTION, SOLUTION INFILTRATION; PERINEURAL ONCE
Status: DISPENSED | OUTPATIENT
Start: 2024-08-07

## 2024-08-07 RX ORDER — LIDOCAINE HYDROCHLORIDE 10 MG/ML
30 INJECTION, SOLUTION INFILTRATION; PERINEURAL ONCE
Status: DISPENSED | OUTPATIENT
Start: 2024-08-07

## 2024-08-08 LAB
CYTO UR: NORMAL
LAB AP CASE REPORT: NORMAL
LAB AP CLINICAL INFORMATION: NORMAL
LAB AP DIAGNOSIS COMMENT: NORMAL
Lab: NORMAL
PATH REPORT.FINAL DX SPEC: NORMAL
PATH REPORT.GROSS SPEC: NORMAL

## 2024-08-12 ENCOUNTER — OFFICE VISIT (OUTPATIENT)
Dept: SURGERY | Facility: CLINIC | Age: 44
End: 2024-08-12
Payer: COMMERCIAL

## 2024-08-12 VITALS
WEIGHT: 159 LBS | BODY MASS INDEX: 25.55 KG/M2 | SYSTOLIC BLOOD PRESSURE: 116 MMHG | HEART RATE: 82 BPM | HEIGHT: 66 IN | OXYGEN SATURATION: 98 % | DIASTOLIC BLOOD PRESSURE: 75 MMHG

## 2024-08-12 DIAGNOSIS — C50.912 INVASIVE DUCTAL CARCINOMA OF BREAST, LEFT: Primary | ICD-10-CM

## 2024-08-12 DIAGNOSIS — E66.3 OVERWEIGHT WITH BODY MASS INDEX (BMI) OF 27 TO 27.9 IN ADULT: ICD-10-CM

## 2024-08-12 NOTE — PROGRESS NOTES
Office Established Patient Note:     Referring Provider: No ref. provider found    Chief Complaint   Patient presents with    Mass of left breast       Subjective .     History of present illness:  Uyen José is a 43 y.o. female who presents to discuss her breast biopsy results. She tolerated the biopsy well.    History  Past Medical History:   Diagnosis Date    Acid reflux     ADHD (attention deficit hyperactivity disorder)     Allergic     Anxiety     Bronchitis     Chronic fatigue     Disease of thyroid gland     Fibromyalgia     Gallbladder abscess     Heart murmur     Hypertension     Hypothyroidism     Mitral valve prolapse     PONV (postoperative nausea and vomiting)     RA (rheumatoid arthritis)    ,   Past Surgical History:   Procedure Laterality Date    CARPAL TUNNEL RELEASE      COLONOSCOPY  05/01/2003    Normal exam    D & C HYSTEROSCOPY MYOSURE N/A 7/31/2024    Procedure: DILATATION AND CURETTAGE HYSTEROSCOPY WITH POSSIBLE TISSUE RESECTION;  Surgeon: Stevan Espinal MD;  Location: [facility] OR;  Service: Obstetrics/Gynecology;  Laterality: N/A;    DIAGNOSTIC LAPAROSCOPY  2000 [place]; normal findings; [facility]    ENDOMETRIAL ABLATION  2023    ENDOSCOPY      LAPAROSCOPIC CHOLECYSTECTOMY  2011    Dr Duncan; [facility]    SALPINGECTOMY Bilateral 10/16/2020    Procedure: SALPINGECTOMY LAPAROSCOPIC for sterilization;  Surgeon: Stevan Espinal MD;  Location: [facility] OR;  Service: Obstetrics/Gynecology;  Laterality: Bilateral;    US GUIDED LYMPH NODE BIOPSY  8/7/2024    WISDOM TOOTH EXTRACTION     ,   Family History   Problem Relation Age of Onset    Diabetes Mother     Diabetes Father     Cancer Paternal Grandfather     Colon cancer Neg Hx     Colon polyps Neg Hx    ,   Social History     Tobacco Use    Smoking status: Former     Average packs/day: 0.5 packs/day for 20.0 years (10.0 ttl pk-yrs)     Types: Cigarettes     Start date: 7/22/2004    Smokeless tobacco: Never    Tobacco comments:     No cigarette  since 7/22/24   Vaping Use    Vaping status: Every Day    Start date: 2/17/2024    Substances: Nicotine, Flavoring    Devices: Disposable   Substance Use Topics    Alcohol use: Yes     Comment: Occasional    Drug use: Yes     Types: Marijuana   , (Not in a hospital admission)   and Allergies:  Percocet [oxycodone-acetaminophen] and Adhesive tape    Current Outpatient Medications:     amphetamine-dextroamphetamine (Adderall) 10 MG tablet, Take 1 tablet by mouth Daily for 30 days., Disp: 30 tablet, Rfl: 0    [START ON 9/2/2024] amphetamine-dextroamphetamine (Adderall) 10 MG tablet, Take 1 tablet by mouth Daily for 30 days., Disp: 30 tablet, Rfl: 0    bisoprolol-hydrochlorothiazide (ZIAC) 5-6.25 MG per tablet, Take 1 tablet by mouth Daily., Disp: 30 tablet, Rfl: 2    cyclobenzaprine (FLEXERIL) 10 MG tablet, 1 tab PO up to 2x daily prn headache, neck ache, Disp: 30 tablet, Rfl: 1    diazePAM (VALIUM) 5 MG tablet, Take 1 tablet by mouth Every 6 (Six) Hours As Needed for Anxiety., Disp: , Rfl:     DULoxetine (CYMBALTA) 20 MG capsule, Take 1 capsule by mouth Daily., Disp: , Rfl:     folic acid (FOLVITE) 1 MG tablet, Take 1 tablet by mouth Daily., Disp: , Rfl:     HYDROcodone-acetaminophen (Norco) 5-325 MG per tablet, Take 1 tablet by mouth Every 8 (Eight) Hours As Needed for Moderate Pain., Disp: 10 tablet, Rfl: 0    HYDROcodone-acetaminophen (NORCO) 7.5-325 MG per tablet, Take 1 tablet by mouth Every 8 (Eight) Hours As Needed for Moderate Pain., Disp: 12 tablet, Rfl: 0    hydroxychloroquine (PLAQUENIL) 200 MG tablet, , Disp: , Rfl:     ibuprofen (ADVIL,MOTRIN) 800 MG tablet, Take 1 tablet by mouth Every 6 (Six) Hours As Needed for Mild Pain., Disp: 90 tablet, Rfl: 2    levothyroxine (SYNTHROID, LEVOTHROID) 112 MCG tablet, Take 1 tablet by mouth Daily., Disp: 30 tablet, Rfl: 2    lisdexamfetamine (VYVANSE) 50 MG capsule, TAKE 1 CAPSULE BY MOUTH EVERY MORNING, Disp: 30 capsule, Rfl: 0    lisdexamfetamine (Vyvanse) 50 MG  "capsule, Take 1 capsule by mouth Every Morning for 30 days, Disp: 30 capsule, Rfl: 0    [START ON 9/2/2024] lisdexamfetamine (Vyvanse) 50 MG capsule, Take 1 capsule by mouth Every Morning for 30 days, Disp: 30 capsule, Rfl: 0    ondansetron ODT (ZOFRAN-ODT) 4 MG disintegrating tablet, Place 1 tablet on the tongue Every 8 (Eight) Hours As Needed for Nausea or Vomiting., Disp: 10 tablet, Rfl: 0    pantoprazole (PROTONIX) 40 MG EC tablet, Take 1 tablet by mouth Daily., Disp: 30 tablet, Rfl: 2    Current Facility-Administered Medications:     lidocaine (XYLOCAINE) 1 % injection 30 mL, 30 mL, Subcutaneous, Once, Taylor Salinas MD    lidocaine 1% - EPINEPHrine [id number] (XYLOCAINE W/EPI) 1 %-1:176494 injection 30 mL, 30 mL, Infiltration, Once, Taylor Salinas MD        Objective     Vital Signs   /75   Pulse 82   Ht 168 cm (66.14\")   Wt 72.1 kg (159 lb)   LMP  (LMP Unknown)   SpO2 98%   BMI 25.55 kg/m²      Physical Exam:  General appearance - alert, well appearing, and in no distress  Mental status - alert, oriented to person, place, and time  Eyes - pupils equal and reactive, extraocular eye movements intact  Neck - supple, no significant adenopathy  Chest - no tachypnea, retractions or cyanosis  Heart - normal rate and regular rhythm  Abdomen - soft, nontender, nondistended, no masses or organomegaly  Neurological - alert, oriented, normal speech, no focal findings or movement disorder noted    Results Review:     The following data was reviewed by: Shahla Gentile MD on 08/12/2024:  I reviewed the US guided breast and lymph node biopsies and the post biopsy mammograms.   Tissue Pathology Exam (08/07/2024 09:33)   2-3:00 showed invasive ductal carcinoma, Grade 3, at least 1.5 cm. ER+MN+HER2+.   6:00 high grade DCIS. ER-MN-.   Left axillary lymph node - metastatic adenocarcinoma of breast     Assessment & Plan       Diagnoses and all orders for this visit:    1. Invasive ductal carcinoma " of breast, left (Primary)  -     NM PET/CT Skull Base to Mid Thigh; Future  -     CT Abdomen Pelvis With Contrast; Future  -     CT Chest With Contrast; Future  -     NM Bone Scan Whole Body; Future  -     Ambulatory Referral to Genetic Counseling/Testing  -     Ambulatory Referral to Oncology    2. Overweight with body mass index (BMI) of 27 to 27.9 in adult       Ms. José is a 43 year old female with left breast invasive ductal carcinoma, ER+OK+HER2+ with axillary lymph node involvement and ER-OK- DCIS. I have ordered a metastatic work up and referred her to medical oncology. She already had her breast MRI. Genetics referral placed. She will need neoadjuvant chemotherapy if this is not metastatic and possibly definitive chemotherapy if there is evidence of metastatic disease. My office will call her with results of the scans. I will present her case at tumor board this week.     This is a life threatening diagnosis. I have reviewed the biopsies and pathology. I have ordered the above scans and referrals. I will discuss the patient with medical oncology and radiation oncology at tumor board.         Shahla Gentile MD  08/15/24  13:59 CDT

## 2024-08-14 ENCOUNTER — HOSPITAL ENCOUNTER (OUTPATIENT)
Dept: CT IMAGING | Facility: HOSPITAL | Age: 44
Discharge: HOME | End: 2024-08-14
Admitting: STUDENT IN AN ORGANIZED HEALTH CARE EDUCATION/TRAINING PROGRAM
Payer: COMMERCIAL

## 2024-08-14 ENCOUNTER — APPOINTMENT (OUTPATIENT)
Dept: CT IMAGING | Facility: HOSPITAL | Age: 44
End: 2024-08-14
Payer: COMMERCIAL

## 2024-08-14 ENCOUNTER — TELEPHONE (OUTPATIENT)
Dept: GENETICS | Facility: HOSPITAL | Age: 44
End: 2024-08-14
Payer: COMMERCIAL

## 2024-08-14 DIAGNOSIS — C50.912 INVASIVE DUCTAL CARCINOMA OF BREAST, LEFT: ICD-10-CM

## 2024-08-14 PROCEDURE — 74177 CT ABD & PELVIS W/CONTRAST: CPT

## 2024-08-14 PROCEDURE — 71260 CT THORAX DX C+: CPT

## 2024-08-14 PROCEDURE — 25510000001 IOPAMIDOL 61 % SOLUTION: Performed by: STUDENT IN AN ORGANIZED HEALTH CARE EDUCATION/TRAINING PROGRAM

## 2024-08-14 RX ADMIN — IOPAMIDOL 100 ML: 612 INJECTION, SOLUTION INTRAVENOUS at 11:00

## 2024-08-14 NOTE — PROGRESS NOTES
"[facility] ON[facility] GROUP HEMATOLOGY & ONCOLOGY  [address]  [place] [id number] [phone]    Patient Name: Uyen José  Encounter Date: 2024  YOB: 1980  Patient Number: [id number]    REASON FOR CONSULTATION:  Multifocal IDC left breast, ER/FL/HER2+    HISTORY OF PRESENT ILLNESS:  Uyen \"Leana\" Arnav is a 43 yoF  A0 with tobacco use disorder, hypothyroidism, depression/anxiety, obesity, prior bilateral salpingectomy, , newly diagnosed IDC left breast    - 24- CT a/p-  Increased size of the low density/complex cystic mass in the uterine cavity since the previous study. This may represent abnormal endometrial thickening, complex endometrial fluid/debris accumulation in the endometrial canal due to out duct obstruction by a fibroid in the lower uterine segment as detailed above. There is a septate morphology of the uterus. Further follow-up with sonography may be obtained. Normal appendix. The gallbladder is surgically absent. The nonacute findings of the remaining abdomen similar to the previous study.  Stable heterogeneous solid mass to the right of midline at the mid to  lower uterine segment with probable secondary dilated endometrial cavity at the uterine fundus filled with fluid and a septate uterus.  Differential diagnosis would include benign etiologies such as submucosal leiomyoma and large endometrial polyp but also neoplasm, possibly endometrial carcinoma, or carcinosarcoma of the uterus.  No anabel pelvic soft tissue nodule or lymphadenopathy identified. Small left external iliac chain lymph node is unchanged. Small right ovarian cyst. Nabothian cysts in the cervix and septate uterus. MRI pelvis without and with contrast may be helpful for further evaluation. GYN consultation is recommended.     -24- MRI breast bilateral-1. Findings suggestive for multicentric left breast malignancy with diffuse " ductal carcinoma in situ extending from the dominant 2.7 cm mass within the lateral left breast at 3:00 extending to and likely involving the left nipple. A second 8 mm mass within the central left breast tissue with a third mass in the anterior depth of the inferior left breast at 6:00. Two 8 mm homogeneous persistently enhancing hyperintense T2 masses at the upper outer right breast, mid depth at the 10:00 in the 11-12 o'clock positions, with overall benign signal characteristics for which background enhancement versus papilloma fibroadenoma considered. MRI directed/second look ultrasound recommended given the presence of the contralateral left breast findings. BI-RADS CATEGORY 5: Highly suggestive of malignancy. Appropriate action should be taken. Findings strongly favoring multicentric left breast malignancy. MRI directed/second look bilateral breast ultrasound recommended. Ultrasound-guided biopsy of the left breast mass at 3:00 and of a second left breast mass (6:00 if visible on ultrasound versus mass central to the nipple) with sampling of the largest left axillary  node may be performed if clinically desired. Sampling of a finding within the upper outer right breast should be considered if solid nodule identified on ultrasound.    -7/29/24- CMP normal (GFR 78.9), UA/cultures negative, HCG negative, WBC 12.65, ANC 8.43 otherwise normal CBC    -7/31/24-  Endocervical Currettage, Endometrial Currettage, and hysteroscopic resected uterine synechia.  Final diagnosis:  1.Endometrium, curettage: A. Blood and fragments of smooth muscle. B. No histologic evidence of malignancy. Comment: Endometrial mucosal tissue is not identified. 2. Endocervix, curettage: A. Fragments of benign endocervical tissue, benign squamous mucosa and benign lower uterine segment endometrium. B. Blood and mucus. C. No dysplasia identified. 3. Endometrium, hysteroscopic resected tissue: A. Fragments of smooth muscle. B. No histologic  evidence of malignancy. Comment: Endometrial mucosal tissue is not identified.    -8/7/24- US biopsy:  Final diagnosis-  1.  Mass, left breast at 6:00, 1 cm from nipple, core needle biopsies: High-grade ductal carcinoma in situ, comedo type. 2.  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 Histologic grade ([place] histologic score). Glandular (acinar)/tubular differentiation: Score 2. Nuclear pleomorphism: Score 3. Mitotic rate: Score 3. Overall grade: Grade 3. Maximum tumor diameter is at least 1.5 cm. 3.  Left axillary lymph node, core needle biopsy: Metastatic adenocarcinoma of breast. -ER 69%+; CT 85%+; HER2 3+ (positive); Ki67 54%+    -8/12/24- CT CAP- Known left breast malignancy with enlarged left level 1 and asymmetrically prominent left level 2 and borderline level 3 axillary lymph nodes which are suspicious for axillary disease.  No additional evidence of metastatic disease in the chest.  1. Small 0.4 cm low-attenuation region in the right lobe of the liver may have been present on the prior examinations but was not as well visualized. This is too small to characterize. Continued attention on follow-up is recommended.  Fatty infiltration of the liver.  Persistent heterogeneity of the lower uterine segment of the uterus and fluid in the endometrial canal although the fluid has decreased. Stable septate uterus    -8/12/24- Follow-up Dr. Gentile- A/P: IDC left breast.  Met w/u, referral onc, genetics. RTC 2 weeks    -8/16/2024-bone scan-no evidence of osteoblastic metastatic disease.    -8/20/2024-PET scan-abnormal PET/CT, biopsy-proven left breast malignancy and demonstrates FDG avidity with maximum SUV of 7.9.  Also potential abnormal activity at 6:00 in the left breast with a maximal SUV of 3.9.  MRI of the breast with and without contrast will be helpful to exclude multifocal or multicentric disease in the left breast.  No  abnormal contralateral right breast or axillary activity identified.  3 hypermetabolic level 1 lymph nodes in the left axilla compatible with kiersten metastasis and solitary borderline level 2 axillary lymph node on the left.    -8/22/24- Today is seen for management considerations    I have reviewed the HPI and verified with the patient the accuracy of it. No changes to interval history since the information was documented. Shyam Weiss MD 08/22/24      LABS    Lab Results - Last 18 Months   Lab Units 07/29/24  1435 07/14/24  0217 07/12/24  0332 07/06/24  1626 02/12/24  0813 09/19/23  0830   HEMOGLOBIN g/dL 14.8 13.3 14.8 14.4 15.3 14.9   HEMATOCRIT % 45.0 40.9 44.1 44.1 46.6 46.0   MCV fL 88.1 89.3 86.1 88.9 90.3 93.5   WBC 10*3/mm3 12.64* 12.21* 14.69* 12.42* 13.05* 13.4*   RDW % 13.3 13.6 13.5 13.7 13.6 14.1   MPV fL 12.4* 12.0 12.6* 12.6* 13.5* 12.9*   PLATELETS 10*3/mm3 232 201 217 219 211 243   IMM GRAN % % 0.3 0.2 0.3 0.3  --   --    NEUTROS ABS 10*3/mm3 8.43* 7.90* 10.17* 8.19* 8.51* 9.0*   LYMPHS ABS 10*3/mm3 2.57 2.97 2.87 2.61 2.76 2.7   MONOS ABS 10*3/mm3 0.87 0.74 0.98* 0.93* 0.86 1.00*   EOS ABS 10*3/mm3 0.62* 0.50* 0.53* 0.55* 0.76* 0.60   BASOS ABS 10*3/mm3 0.11 0.08 0.09 0.10 0.12 0.10   IMMATURE GRANS (ABS) 10*3/mm3 0.04 0.02 0.05 0.04  --  0.1   NRBC /100 WBC 0.0 0.0 0.0 0.0  --   --        Lab Results - Last 18 Months   Lab Units 07/29/24  1435 07/25/24  0757 07/14/24  0217 07/12/24  0349 07/06/24  1626 02/12/24  0813 07/12/23  1101   GLUCOSE mg/dL 105*  --  88 108* 124* 91 83   SODIUM mmol/L 138  --  140 138 140 139 142   POTASSIUM mmol/L 3.9  --  4.1 3.9 3.5 3.5 4.0   CO2 mmol/L 31.0*  --  28.0 25.0 27.0 33.0* 32.0*   CHLORIDE mmol/L 98  --  103 100 103 102 104   ANION GAP mmol/L 9.0  --  9.0 13.0 10.0 4.0 6.0   CREATININE mg/dL 0.92 1.10 0.75 0.81 0.84 0.94 0.88   BUN mg/dL 14  --  11 12 12 12 11   BUN / CREAT RATIO  15.2  --  14.7 14.8 14.3 12.8 12.5   CALCIUM mg/dL 9.6  --  8.7 9.3 9.1  "9.4 9.5   ALK PHOS U/L 99  --  92 89 74 77 80   TOTAL PROTEIN g/dL 7.4  --  6.6 7.3 6.8 7.2 8.3   ALT (SGPT) U/L 14  --  23 14 13 25 24   AST (SGOT) U/L 18  --  18 15 15 24 24   BILIRUBIN mg/dL 0.5  --  0.3 0.6 0.5 0.6 0.7   ALBUMIN g/dL 4.2  --  3.8 4.2 4.1 4.1 4.6   GLOBULIN gm/dL 3.2  --  2.8 3.1 2.7 3.1 3.7       No results for input(s): \"MSPIKE\", \"KAPPALAMB\", \"IGLFLC\", \"URICACID\", \"FREEKAPPAL\", \"CEA\", \"LDH\", \"REFLABREPO\" in the last 32833 hours.    Lab Results - Last 18 Months   Lab Units 07/12/23  1101   TSH uIU/mL 3.460         PAST MEDICAL HISTORY:  ALLERGIES:  Allergies   Allergen Reactions    Percocet [Oxycodone-Acetaminophen] Hives    Adhesive Tape Rash     ADHESIVE ON BANDAIDS     CURRENT MEDICATIONS:  Outpatient Encounter Medications as of 8/22/2024   Medication Sig Dispense Refill    amphetamine-dextroamphetamine (Adderall) 10 MG tablet Take 1 tablet by mouth Daily for 30 days. 30 tablet 0    bisoprolol-hydrochlorothiazide (ZIAC) 5-6.25 MG per tablet Take 1 tablet by mouth Daily. 30 tablet 2    cyclobenzaprine (FLEXERIL) 10 MG tablet 1 tab PO up to 2x daily prn headache, neck ache 30 tablet 1    diazePAM (VALIUM) 5 MG tablet Take 1 tablet by mouth Every 6 (Six) Hours As Needed for Anxiety.      DULoxetine (CYMBALTA) 20 MG capsule Take 1 capsule by mouth Daily.      folic acid (FOLVITE) 1 MG tablet Take 1 tablet by mouth Daily.      HYDROcodone-acetaminophen (Norco) 5-325 MG per tablet Take 1 tablet by mouth Every 8 (Eight) Hours As Needed for Moderate Pain. 10 tablet 0    HYDROcodone-acetaminophen (NORCO) 7.5-325 MG per tablet Take 1 tablet by mouth Every 8 (Eight) Hours As Needed for Moderate Pain. 12 tablet 0    hydroxychloroquine (PLAQUENIL) 200 MG tablet       ibuprofen (ADVIL,MOTRIN) 800 MG tablet Take 1 tablet by mouth Every 6 (Six) Hours As Needed for Mild Pain. 90 tablet 2    levothyroxine (SYNTHROID, LEVOTHROID) 112 MCG tablet Take 1 tablet by mouth Daily. 30 tablet 2    lisdexamfetamine " (VYVANSE) 50 MG capsule TAKE 1 CAPSULE BY MOUTH EVERY MORNING 30 capsule 0    lisdexamfetamine (Vyvanse) 50 MG capsule Take 1 capsule by mouth Every Morning for 30 days 30 capsule 0    ondansetron ODT (ZOFRAN-ODT) 4 MG disintegrating tablet Place 1 tablet on the tongue Every 8 (Eight) Hours As Needed for Nausea or Vomiting. 10 tablet 2    pantoprazole (PROTONIX) 40 MG EC tablet Take 1 tablet by mouth Daily. 30 tablet 2    [DISCONTINUED] amphetamine-dextroamphetamine (Adderall) 10 MG tablet Take 1 tablet by mouth Daily for 30 days. 30 tablet 0    [DISCONTINUED] lisdexamfetamine (Vyvanse) 50 MG capsule Take 1 capsule by mouth Every Morning for 30 days 30 capsule 0    [DISCONTINUED] ondansetron ODT (ZOFRAN-ODT) 4 MG disintegrating tablet Place 1 tablet on the tongue Every 8 (Eight) Hours As Needed for Nausea or Vomiting. 10 tablet 0     Facility-Administered Encounter Medications as of 8/22/2024   Medication Dose Route Frequency Provider Last Rate Last Admin    [COMPLETED] iopamidol (ISOVUE-300) 61 % injection 100 mL  100 mL Intravenous Once in imaging Shahla Gentile MD   100 mL at 08/14/24 1100    lidocaine (XYLOCAINE) 1 % injection 30 mL  30 mL Subcutaneous Once Taylor Salinas MD        lidocaine 1% - EPINEPHrine 1:225210 (XYLOCAINE W/EPI) 1 %-1:318248 injection 30 mL  30 mL Infiltration Once Taylor Salinas MD         ADULT ILLNESSES:  Patient Active Problem List   Diagnosis Code    Tobacco abuse counseling Z71.6    Hypothyroidism E03.9    Attention deficit hyperactivity disorder F90.9    Essential hypertension I10    Irritable bowel syndrome K58.9    Mixed anxiety and depressive disorder F41.8    Mood disorder F39    Overweight (BMI 25.0-29.9) E66.3    Gastroesophageal reflux disease without esophagitis K21.9    Dysmenorrhea N94.6    Hematometra N85.7     SURGERIES:  Past Surgical History:   Procedure Laterality Date    CARPAL TUNNEL RELEASE      COLONOSCOPY  05/01/2003    Normal exam    D &  C HYSTEROSCOPY MYOSURE N/A 7/31/2024    Procedure: DILATATION AND CURETTAGE HYSTEROSCOPY WITH POSSIBLE TISSUE RESECTION;  Surgeon: Stevan Espinal MD;  Location: [facility] OR;  Service: Obstetrics/Gynecology;  Laterality: N/A;    DIAGNOSTIC LAPAROSCOPY  2000    [facility]; normal findings; [facility]    ENDOMETRIAL ABLATION  2023    ENDOSCOPY      LAPAROSCOPIC CHOLECYSTECTOMY  2011    Dr Duncan; [facility]    SALPINGECTOMY Bilateral 10/16/2020    Procedure: SALPINGECTOMY LAPAROSCOPIC for sterilization;  Surgeon: Stevan Espinal MD;  Location:  [facility] OR;  Service: Obstetrics/Gynecology;  Laterality: Bilateral;    US GUIDED LYMPH NODE BIOPSY  8/7/2024    WISDOM TOOTH EXTRACTION       HEALTH MAINTENANCE ITEMS:  Health Maintenance Due   Topic Date Due    TDAP/TD VACCINES (1 - Tdap) Never done    HEPATITIS C SCREENING  Never done    ANNUAL PHYSICAL  Never done    COVID-19 Vaccine (6 - 2023-24 season) 09/01/2023    INFLUENZA VACCINE  08/01/2024       <no information>  Last Completed Colonoscopy       This patient has no relevant Health Maintenance data.          Immunization History   Administered Date(s) Administered    COVID-19 (MODERNA) 1st,2nd,3rd Dose Monovalent 10/29/2021, 11/04/2021, 11/26/2021, 11/26/2021, 12/06/2022    DT 07/07/2004    Influenza, Unspecified 11/30/2021     Last Completed Mammogram            MAMMOGRAM (Yearly) Next due on 6/26/2025 06/26/2024  Mammo Diagnostic Bilateral With CAD    04/19/2022  MAMMO SCREENING BILATERAL W CAD                      FAMILY HISTORY:  Family History   Problem Relation Age of Onset    Diabetes Mother     Diabetes Father     Cancer Maternal Grandmother         vulva, HPV+    Cancer Maternal Grandfather     Lung cancer Paternal Grandfather 60 - 69    Breast cancer Maternal Great-Grandmother     Stomach cancer Maternal Great-Grandmother     Colon cancer Neg Hx     Colon polyps Neg Hx      SOCIAL HISTORY:  Social History     Socioeconomic History    Marital status:   "  Tobacco Use    Smoking status: Former     Average packs/day: 0.5 packs/day for 20.0 years (10.0 ttl pk-yrs)     Types: Cigarettes     Start date: 2004    Smokeless tobacco: Never    Tobacco comments:     No cigarette since 23   Vaping Use    Vaping status: Every Day    Start date: 2024    Substances: Nicotine, Flavoring    Devices: Disposable   Substance and Sexual Activity    Alcohol use: Yes     Comment: Occasional    Drug use: Yes     Types: Marijuana    Sexual activity: Yes     Partners: Male     Birth control/protection: Tubal ligation       REVIEW OF SYSTEMS:  Review of Systems   Constitutional:  Positive for fatigue (from RA + fibromyalgia) and unexpected weight loss (10 pounds in the past 2 months). Negative for activity change.        Manages her ADLs to include chores, errands and driving.  Is up and about, \"all the time.\"  Still works full time in customer service at [employer]    Eyes: Negative.    Respiratory: Negative.  Negative for cough and shortness of breath.         Smoker age 15- 1/2-1 ppd.  No \"vaping\"   Cardiovascular: Negative.    Gastrointestinal: Negative.    Endocrine:         A0    Menarche age 13    Menopause age 42   Genitourinary: Negative.  Negative for vaginal bleeding (Had a D&C per Dr. Espinal).   Musculoskeletal:  Positive for arthralgias (Chronic- from RA + fibromyalgia), back pain (Chronic) and neck pain (Chronic).   Allergic/Immunologic: Negative.    Neurological: Negative.    Hematological: Negative.    Psychiatric/Behavioral: Negative.         /70   Pulse 70   Temp 97.4 °F (36.3 °C) (Tympanic)   Resp 18   Ht 168 cm (66.14\")   Wt 72.5 kg (159 lb 12.8 oz)   SpO2 97%   BMI 25.68 kg/m²  Body surface area is 1.82 meters squared.  Pain Score    24 1514   PainSc: 2  Comment: located left breast biopsy area x 2 weeks ago       Physical Exam:  Physical Exam  Vitals and nursing note reviewed. Exam conducted with a chaperone present. "   Constitutional:       Comments: Cooperative, well-developed, modestly Middle-aged female.  Ambulatory.  ECOG 0.  She is accompanied by her spouse, Lam QURESHIT:      Head: Normocephalic and atraumatic.   Eyes:      General: No scleral icterus.     Extraocular Movements: Extraocular movements intact.      Pupils: Pupils are equal, round, and reactive to light.   Cardiovascular:      Rate and Rhythm: Normal rate.   Pulmonary:      Effort: Pulmonary effort is normal.   Chest:   Breasts:     Right: Normal.      Left: Mass present.          Comments: Chaperoned exam: Nasra Tomas LPN    Right breast with no masses, no skin changes.  Subtle clear nipple discharge    Left breast with palpable masses at 3:00 and 6:00 positions.  Palpable left axillary nodes.  Subtle clear nipple discharge.  Abdominal:      Palpations: Abdomen is soft.      Tenderness: There is no abdominal tenderness.   Musculoskeletal:         General: Normal range of motion.      Cervical back: Normal range of motion.   Lymphadenopathy:      Cervical: No cervical adenopathy.      Upper Body:      Right upper body: No supraclavicular or axillary adenopathy.      Left upper body: Axillary adenopathy present. No supraclavicular adenopathy.   Skin:     General: Skin is warm.   Neurological:      General: No focal deficit present.      Mental Status: She is alert and oriented to person, place, and time.   Psychiatric:         Mood and Affect: Mood normal.         Behavior: Behavior normal.         Thought Content: Thought content normal.         .  Assessment:  1.   Invasive carcinoma of no special type (ductal) of the left breast  --Tumor stage: at least TNM/AJCC stage IIIA (cT2, cN2, M0,G3) ER 69%(+), CT 85% (+), HER-2 positive (3+).  --Original tumor burden:    ---7/25/24- MRI breast bilateral-1. Findings suggestive for multicentric left breast malignancy with diffuse ductal carcinoma in situ extending from the dominant 2.7 cm mass within the lateral  left breast at 3:00 extending to and likely involving the left nipple. A second 8 mm mass within the central left breast tissue with a third mass in the anterior depth of the inferior left breast at 6:00. Two 8 mm homogeneous persistently enhancing hyperintense T2 masses at the upper outer right breast, mid depth at the 10:00 in the 11-12 o'clock positions, with overall benign signal characteristics for which background enhancement versus papilloma fibroadenoma considered. MRI directed/second look ultrasound recommended given the presence of the contralateral left breast findings. BI-RADS CATEGORY 5: Highly suggestive of malignancy. Appropriate action should be taken. Findings strongly favoring multicentric left breast malignancy. MRI directed/second look bilateral breast ultrasound recommended. Ultrasound-guided biopsy of the left breast mass at 3:00 and of a second left breast mass (6:00 if visible on ultrasound versus mass central to the nipple) with sampling of the largest left axillary  node may be performed if clinically desired. Sampling of a finding within the upper outer right breast should be considered if solid nodule identified on ultrasound.  -8/7/24- US biopsy:  Final diagnosis-  1.  Mass, left breast at 6:00, 1 cm from nipple, core needle biopsies: High-grade ductal carcinoma in situ, comedo type. 2.  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 Histologic grade ([place] histologic score). Glandular (acinar)/tubular differentiation: Score 2. Nuclear pleomorphism: Score 3. Mitotic rate: Score 3. Overall grade: Grade 3. Maximum tumor diameter is at least 1.5 cm. 3.  Left axillary lymph node, core needle biopsy: Metastatic adenocarcinoma of breast. -ER 69%+; LA 85%+; HER2 3+ (positive); Ki67 54%+  -8/12/24- CT CAP- Known left breast malignancy with enlarged left level 1 and asymmetrically prominent left level 2 and borderline  level 3 axillary lymph nodes which are suspicious for axillary disease.  No additional evidence of metastatic disease in the chest.  1. Small 0.4 cm low-attenuation region in the right lobe of the liver may have been present on the prior examinations but was not as well visualized. This is too small to characterize. Continued attention on follow-up is recommended.  Fatty infiltration of the liver.  Persistent heterogeneity of the lower uterine segment of the uterus and fluid in the endometrial canal although the fluid has decreased. Stable septate uterus  -8/16/2024-bone scan-no evidence of osteoblastic metastatic disease.  -8/20/2024-PET scan-abnormal PET/CT, biopsy-proven left breast malignancy and demonstrates FDG avidity with maximum SUV of 7.9.  Also potential abnormal activity at 6:00 in the left breast with a maximal SUV of 3.9.  MRI of the breast with and without contrast will be helpful to exclude multifocal or multicentric disease in the left breast.  No abnormal contralateral right breast or axillary activity identified.  3 hypermetabolic level 1 lymph nodes in the left axilla compatible with kiersten metastasis and solitary borderline level 2 axillary lymph node on the left.    2.   Uterine cystic mass  - 7/12/24 - Abnormal CT a/p- Increased size of the low density/complex cystic mass in the uterine cavity since the previous study. This may represent abnormal endometrial thickening, complex endometrial fluid/debris accumulation in the endometrial canal due to out duct obstruction by a fibroid in the lower uterine segment as detailed above. There is a septate morphology of the uterus. Further follow-up with sonography may be obtained. Normal appendix. The gallbladder is surgically absent. The nonacute findings of the remaining abdomen similar to the previous study.  Stable heterogeneous solid mass to the right of midline at the mid to lower uterine segment with probable secondary dilated endometrial cavity  at the uterine fundus filled with fluid and a septate uterus.  Differential diagnosis would include benign etiologies such as submucosal leiomyoma and large endometrial polyp but also neoplasm, possibly endometrial carcinoma, or carcinosarcoma of the uterus.  No anabel pelvic soft tissue nodule or lymphadenopathy identified. Small left external iliac chain lymph node is unchanged. Small right ovarian cyst. Nabothian cysts in the cervix and septate uterus. MRI pelvis without and with contrast may be helpful for further evaluation. GYN consultation is recommended.  -7/31/24-  Endocervical Currettage, Endometrial Currettage, and hysteroscopic resected uterine synechia.  Final diagnosis:  1.Endometrium, curettage: A. Blood and fragments of smooth muscle. B. No histologic evidence of malignancy. Comment: Endometrial mucosal tissue is not identified. 2. Endocervix, curettage: A. Fragments of benign endocervical tissue, benign squamous mucosa and benign lower uterine segment endometrium. B. Blood and mucus. C. No dysplasia identified. 3. Endometrium, hysteroscopic resected tissue: A. Fragments of smooth muscle. B. No histologic evidence of malignancy. Comment: Endometrial mucosal tissue is not identified.    3.   Nicotine dependence  4.   Mixed depression/anxiety  5.   Hypothyroidism  6.   Obesity  7.   RA and fibromyalgia on Plaquenil and Cymbalta.  Followed by Dr. Ackerman       Plan:  1.   Apprised of the available diagnostic information including imaging (breast MRI, CT scans or PET scan and bone scan), breast biopsy findings and labs.   2.   Draw CBC with diff, CMP, CEA, CA 27-29  3.   Schedule baseline 2D echocardiogram   4.   Review NCCN guidelines version invasive breast cancer.  Principles of preoperative systemic therapy: Candidate for preoperative systemic therapy--A: Patients with inoperable breast cancer: IBC; bulky or matted cN2 axillary nodes; cN3 kiersten disease; cT4 tumors.  B. In selected patients with  operable breast cancer--HER-2 positive disease and TNBC, if >/=cT2 OR >/=cN1; Large primary tumor relative to breast size in a patient who desires breast conservation; cN+ disease likely to become cN0 with preoperative systemic therapy; C.  Preoperative systemic therapy can be considered for cT1,N0, HER-2 positive disease and TNBC.     5.   Teaching sheets for docetaxel, carboplatin, trastuzumab and Pertuzumab.     6.    Re: Toxicities of chemotherapy noted to include, but not limited to: Myelosuppression (neutropenia, anemia, thrombocytopenia), alopecia, neuropathy, arthralgia, myalgia, nausea, vomiting, hypersensitivity reactions, mucositis, diarrhea, infection, abnormal liver enzymes, asthenia, fever, hypertension, bleeding, edema, opportunistic infections, anaphylaxis, cardiac conduction disturbance/arrhythmias, syncope, thromboembolism, myocardial infarction, severe injection site reactions, pulmonary toxicity, GI obstruction/perforation, paralytic ileus, ischemic colitis, pancreatitis, hepatotoxicity, severe skin reactions). Questions answered to the patient's apparent satisfaction. She agrees to a trial of therapy.   7.   Discussed the potential toxicities of pertuzumab and trastuzumab, to include but not limited to: Cardiac failure/LV dysfunction, cardiomyopathy, CHF, arrhythmia, hypersensitivity reaction, anaphylaxis, angioedema, ARDS, interstitial pneumonitis, pulmonary infiltrates, pulmonary fibrosis, pleural effusion, pulmonary edema, hypoxia, myelosuppression (anemia, neutropenia, thrombocytopenia), febrile neutropenia, tumor lysis syndrome, glomerulonephritis, hypokalemia, peripheral neuropathy, hand-foot syndrome, alopecia, nausea, diarrhea, ALT/AST increase, lymphopenia, asthenia, fatigue, stomatitis, myalgia, arthralgia, constipation, vomiting, dysgeusia, headache, decreased appetite, insomnia, peripheral neuropathy, rash, decreased albumin, xeroderma, mucositis, cough, dyspepsia, fever,  dizziness, increased potassium, decreased sodium, epistaxis, hot flashes, weight loss, URI, paresthesia, back pain, dyspnea, UTI, hypokalemia, hand-foot syndrome.  Questions answered to the patient's apparent satisfaction.  She agrees to a trial of therapy.     8.  Schedule neoadjuvant treatment (plan: q 21 days x 6 cycles) C1, 9/2/24; C2, 9/23/24; C3,  10/14/24     Docetaxel 75 mg/m2 (BSA = ; TD =  mg) per administration guidelines q 21 days, C1-C6  Carboplatin AUC 6 (BSA = ; TD =  mg) per administration guidelines q 21 days, C1-C6  Perjeta 840 mg loading dose C1D1; then 420 mg, C2 -C6    Trastuzumab 8 mg/kg loading dose C1D1; then 6 mg/kg, C2- C6   Observe patient for 1 hour after initial dose pertuzumab and for 30 minutes after all subsequent doses for signs of infusion reactions.  Hold pertuzumab and trastuzumab if LVEF drops to<50% with a 10% or greater absolute decrease below pretreatment baseline.     9.  Premeds:   Aloxi 0.25 mg IV   Emend 150 mg IV   Decadron 10 mg IV   Pepcid 20 mg IV   Benadryl 25 mg IV  Decadron 8 mg twice daily day before and 8 mg twice daily day after chemo        10.  Neulasta 6 mg sc on day 2 chemo  11.  CMP, magnesium level, and CBC with differential weekly on day of chemotherapy. Procrit 40,000 units subcutaneously if hemoglobin less than 10 and hematocrit less than 30. Zarxio/Neupogen 480 mcg daily x3 if ANC less than 1.0.      12.  Rx:  Zofran 8 mg po tid prn # 30                Compazine 10 mg p.o. 3 times daily as needed dispense 30                Decadron 8 mg twice daily day before and 8 mg twice daily day after chemo     13.  Anticipate adjuvant (postop) treatment (plan: q 21 days x 17 cycles - to complete 1 year of therapy) C7..pending     Trastuzumab 8 mg/kg loading dose C7D1; then 6 mg/kg, C8- C17      14.  Reappoint to Dr. Gentile Re:  Port placement     15.  Return to the office in 6 weeks with pre-office CBC with differential, CMP.  We will schedule follow-up 2D  "echocardiogram after C3 and C6 of Tx  16.  Importance of Smoking Cessation discussed with patient and informed patient additional information will be on today's [facility]'s Flyer - Plan to Be Tobacco Free handout provided to patient           I spent ~99 minutes caring for Uyen, \"Leana\" on this date of service. This time includes time spent by me in the following activities: preparing for the visit, reviewing tests, performing a medically appropriate examination and/or evaluation, counseling and educating the patient/family/caregiver, ordering medications, tests, or procedures and documenting information in the medical record.       "

## 2024-08-14 NOTE — TELEPHONE ENCOUNTER
Called pt to remind her of her upcoming genetic counseling appt on Friday. Pt is aware this appt is by phone. Reviewed family history with patient.

## 2024-08-15 DIAGNOSIS — C50.912 MALIGNANT NEOPLASM OF LEFT FEMALE BREAST, UNSPECIFIED ESTROGEN RECEPTOR STATUS, UNSPECIFIED SITE OF BREAST: Primary | ICD-10-CM

## 2024-08-15 NOTE — PATIENT INSTRUCTIONS

## 2024-08-16 ENCOUNTER — HOSPITAL ENCOUNTER (OUTPATIENT)
Dept: NUCLEAR MEDICINE | Facility: HOSPITAL | Age: 44
Discharge: HOME | End: 2024-08-16
Payer: COMMERCIAL

## 2024-08-16 ENCOUNTER — CLINICAL SUPPORT (OUTPATIENT)
Dept: GENETICS | Facility: HOSPITAL | Age: 44
End: 2024-08-16
Payer: COMMERCIAL

## 2024-08-16 ENCOUNTER — LAB (OUTPATIENT)
Dept: LAB | Facility: HOSPITAL | Age: 44
End: 2024-08-16
Payer: COMMERCIAL

## 2024-08-16 DIAGNOSIS — C50.912 INVASIVE DUCTAL CARCINOMA OF BREAST, LEFT: ICD-10-CM

## 2024-08-16 DIAGNOSIS — Z13.79 GENETIC TESTING: ICD-10-CM

## 2024-08-16 DIAGNOSIS — Z80.3 FAMILY HISTORY OF BREAST CANCER: Primary | ICD-10-CM

## 2024-08-16 PROCEDURE — A9503 TC99M MEDRONATE: HCPCS | Performed by: STUDENT IN AN ORGANIZED HEALTH CARE EDUCATION/TRAINING PROGRAM

## 2024-08-16 PROCEDURE — 78306 BONE IMAGING WHOLE BODY: CPT

## 2024-08-16 PROCEDURE — 0 TECHNETIUM MEDRONATE KIT: Performed by: STUDENT IN AN ORGANIZED HEALTH CARE EDUCATION/TRAINING PROGRAM

## 2024-08-16 RX ORDER — ONDANSETRON 4 MG/1
4 TABLET, ORALLY DISINTEGRATING ORAL EVERY 8 HOURS PRN
Qty: 10 TABLET | Refills: 2 | Status: SHIPPED | OUTPATIENT
Start: 2024-08-16

## 2024-08-16 RX ORDER — TC 99M MEDRONATE 20 MG/10ML
21.4 INJECTION, POWDER, LYOPHILIZED, FOR SOLUTION INTRAVENOUS
Status: COMPLETED | OUTPATIENT
Start: 2024-08-16 | End: 2024-08-16

## 2024-08-16 RX ADMIN — TECHNETIUM TC 99M MEDRONATE 21.4 MILLICURIE: 25 INJECTION, POWDER, FOR SOLUTION INTRAVENOUS at 07:35

## 2024-08-16 NOTE — PROGRESS NOTES
Uyen José is a 43 y.o. female who was referred for genetic counseling due to a personal history of breast cancer. Genetic counseling was performed via telephone. Ms. José confirmed her full name, date of birth, and that she was physically located in the [facility] at the time of the appointment. 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 She is currently having a metastatic work-up and is scheduled to see oncology. She retains her uterus and ovaries. She had a colonoscopy in 2003 due to changes noted on a CT scan. The colonoscopy was normal. Ms. José was interested in discussing her risk for a hereditary cancer syndrome, and decided to pursue genetic testing. The CancerNext-Expanded panel was ordered through [employer] which analyzes 71 genes associated with an increased cancer risk. her blood was drawn 8/16/2024 for testing. Results from the high/moderate risk breast cancer genes are expected in 7-10 days and the remainder of the panel is expected in 2-3 weeks.    PERTINENT FAMILY HISTORY:  Pat. Grandfather:   Lung cancer, 60s  Pat. Great-Aunt:   Breast cancer  Mat. Grandfather:   Unknown cancer  Mat. Grandmother:   Vulvar cancer, HPV+  Mat. Great-Grandmother:  Breast and stomach cancer, unknown if breast or stomach was first    Medical records regarding the diagnoses in the family were not available for review.     RISK ASSESSMENT:  Ms. José's personal history of breast cancer led to concern for a hereditary cancer syndrome. she clearly meets NCCN criteria for high-penetrance breast cancer susceptibility genes (Specifically BRCA1, BRCA2, CDH1, PALB2, PTEN, STK11, and TP53) testing based on her personal history of a breast cancer diagnosed below the age of 50. We discussed multigene panel testing that would evaluate multiple genes simultaneously associated with hereditary  cancer risk. This risk assessment is based on the family history information provided at the time of the appointment and could change in the future should new information be obtained.    GENETIC COUNSELING (30 minutes) We reviewed the family history information in detail.  Cases of cancer follow three general patterns: sporadic, familial, and hereditary.  While most cancer is sporadic, some cases appear to occur in family clusters.  These cases are said to be familial and account for 10-20% of cancer cases.  Familial cases may be due to a combination of shared genes and environmental factors among family members.  In even fewer families, the cancer is said to be inherited, and the genes responsible for the cancer are known.      Family histories typical of hereditary cancer syndromes usually include multiple first- and second-degree relatives diagnosed with cancer types that define a syndrome.  These cases tend to be diagnosed at younger-than-expected ages and can be bilateral or multifocal.  The cancer in these families follows an autosomal dominant inheritance pattern, which indicates the likely presence of a mutation in a cancer susceptibility gene.  Children and siblings of an individual believed to carry this mutation have a 50% chance of inheriting that mutation, thereby inheriting the increased risk to develop cancer.  These mutations can be passed down from the maternal or the paternal lineage.    Hereditary breast cancer accounts for 5-10% of all cases of breast cancer.  A significant proportion of hereditary breast and ovarian cancer can be attributed to mutations in the BRCA1 and BRCA2 genes.  Mutations in these genes confer an increased risk for breast cancer, ovarian cancer, male breast cancer, prostate cancer, and pancreatic cancer. Women with a BRCA1 or BRCA2 mutation who have already been diagnosed with breast cancer have a 20-40% risk of a contralateral breast cancer. Women with a BRCA1 or BRCA2  mutation have up to a 60% risk of ovarian cancer.  BRCA1 has been more significantly associated with triple negative breast cancers than other genes. There are other genes considered to confer a high risk for breast cancer.    There are other genes that are known to be associated with an increased risk for cancer.  Some of these genes have well defined risks and established management guidelines.  Other genes that can be tested for have been more recently described, and there may be less data regarding the risks and therefore may not have established management guidelines. Based on Ms. José's desire to get as much information as possible regarding her personal risks and potential risks for her family, she opted to pursue testing through a panel that would evaluate multiple genes that have been associated with cancer risk.     GENETIC TESTING:  The risks, benefits and limitations of genetic testing and implications for clinical management following testing were reviewed.  DNA test results can influence decisions regarding screening, prevention and surgical management.  Genetic testing can have significant psychological implications for both individuals and families.  Also discussed was the possibility of employment and insurance discrimination based on genetic test results and the laws in place to prevent this (HUA).    We discussed panel testing that would evaluate 71 genes associated with increased cancer risk. The implications of a positive or negative test result were discussed. We discussed the possibility that, in some cases, genetic test results may be informative or may be ambiguous due to the identification of a genetic variant. These variants may or may not be associated with an increased cancer risk.  With multigene panel testing, it is not uncommon for a variant of uncertain significance (VUS) to be identified.  If a VUS is identified, testing unaffected family members is typically not recommended and  screening recommendations are made based on the family history.  The laboratories that perform genetic testing work to reclassify the VUS and send out an amended report if and when a VUS is reclassified.  The majority of variant findings are ultimately reclassified to a negative result.  Given her personal and family history, a negative test result would not eliminate all risk to her relatives.      PLAN: Genetic testing via the CancerNext-Expanded panel through [employer] was ordered. She plans to have her blood was drawn 8/16/2024 at [facility] for testing. Results from the high/moderate risk breast cancer genes are expected back in 7-10 days and the results from the remainder of the panel are expected in 2-3 weeks. Ms. José will be contacted by telephone to discuss results at that time. she is welcome to contact us in the meantime with any questions or concerns at [phone].    Yesenia Miranda MS, [facility], [facility]  Licensed Certified Genetic Counselor

## 2024-08-19 ENCOUNTER — HOSPITAL ENCOUNTER (OUTPATIENT)
Dept: CT IMAGING | Facility: HOSPITAL | Age: 44
Discharge: HOME | End: 2024-08-19
Payer: COMMERCIAL

## 2024-08-19 DIAGNOSIS — C50.912 INVASIVE DUCTAL CARCINOMA OF BREAST, LEFT: ICD-10-CM

## 2024-08-19 PROCEDURE — A9552 F18 FDG: HCPCS | Performed by: STUDENT IN AN ORGANIZED HEALTH CARE EDUCATION/TRAINING PROGRAM

## 2024-08-19 PROCEDURE — 0 FLUDEOXYGLUCOSE F18 SOLUTION: Performed by: STUDENT IN AN ORGANIZED HEALTH CARE EDUCATION/TRAINING PROGRAM

## 2024-08-19 RX ADMIN — FLUDEOXYGLUCOSE F 18 1 DOSE: 200 INJECTION, SOLUTION INTRAVENOUS at 08:46

## 2024-08-19 NOTE — PROGRESS NOTES
Patient called and receptor status as well as CT scan results reviewed.  Patient voiced understanding.  Patient has follow-up with Dr. Gentile on 8/26/2024 for further planning.

## 2024-08-20 ENCOUNTER — HOSPITAL ENCOUNTER (OUTPATIENT)
Dept: CT IMAGING | Facility: HOSPITAL | Age: 44
Discharge: HOME | End: 2024-08-20
Payer: COMMERCIAL

## 2024-08-20 PROCEDURE — 78815 PET IMAGE W/CT SKULL-THIGH: CPT

## 2024-08-20 PROCEDURE — 0 FLUDEOXYGLUCOSE F18 SOLUTION: Performed by: STUDENT IN AN ORGANIZED HEALTH CARE EDUCATION/TRAINING PROGRAM

## 2024-08-20 PROCEDURE — A9552 F18 FDG: HCPCS | Performed by: STUDENT IN AN ORGANIZED HEALTH CARE EDUCATION/TRAINING PROGRAM

## 2024-08-20 RX ADMIN — FLUDEOXYGLUCOSE F 18 1 DOSE: 200 INJECTION, SOLUTION INTRAVENOUS at 08:06

## 2024-08-22 ENCOUNTER — LAB (OUTPATIENT)
Dept: LAB | Facility: HOSPITAL | Age: 44
End: 2024-08-22
Payer: COMMERCIAL

## 2024-08-22 ENCOUNTER — CONSULT (OUTPATIENT)
Dept: ONCOLOGY | Facility: CLINIC | Age: 44
End: 2024-08-22
Payer: COMMERCIAL

## 2024-08-22 VITALS
WEIGHT: 159.8 LBS | HEIGHT: 66 IN | DIASTOLIC BLOOD PRESSURE: 70 MMHG | OXYGEN SATURATION: 97 % | TEMPERATURE: 97.4 F | HEART RATE: 70 BPM | SYSTOLIC BLOOD PRESSURE: 120 MMHG | RESPIRATION RATE: 18 BRPM | BODY MASS INDEX: 25.68 KG/M2

## 2024-08-22 DIAGNOSIS — C50.912 DUCTAL CARCINOMA OF LEFT BREAST: Primary | ICD-10-CM

## 2024-08-22 RX ORDER — ONDANSETRON HYDROCHLORIDE 8 MG/1
8 TABLET, FILM COATED ORAL EVERY 8 HOURS PRN
Qty: 30 TABLET | Refills: 0 | Status: SHIPPED | OUTPATIENT
Start: 2024-08-22

## 2024-08-22 RX ORDER — DEXAMETHASONE 4 MG/1
TABLET ORAL
Qty: 48 TABLET | Refills: 0 | Status: SHIPPED | OUTPATIENT
Start: 2024-08-22

## 2024-08-22 RX ORDER — PROCHLORPERAZINE MALEATE 10 MG
10 TABLET ORAL EVERY 8 HOURS PRN
Qty: 30 TABLET | Refills: 0 | Status: SHIPPED | OUTPATIENT
Start: 2024-08-22

## 2024-08-23 ENCOUNTER — TELEPHONE (OUTPATIENT)
Dept: GENETICS | Facility: HOSPITAL | Age: 44
End: 2024-08-23
Payer: COMMERCIAL

## 2024-08-23 ENCOUNTER — DOCUMENTATION (OUTPATIENT)
Dept: GENETICS | Facility: HOSPITAL | Age: 44
End: 2024-08-23
Payer: COMMERCIAL

## 2024-08-23 NOTE — TELEPHONE ENCOUNTER
Spoke with pt and disclosed negative results on the 13 BRCAPlus STAT genes. Pt is aware the rest of her panel is still pending and we will give her another call when the rest of her results are back.

## 2024-08-23 NOTE — PROGRESS NOTES
Genetic testing was negative for mutations in BRCA1/2 and 11 additional high/moderate risk breast cancer genes.   These results were discussed with the patient by telephone.  This part of the panel was reported out first to help with surgical decision making.  The remainder of the panel including lower risk genes, non-breast cancer related genes, and RNA analysis is still pending.  Patient will be contacted with remainder of panel once available.    Cc: Shahla Goldberg MD

## 2024-08-26 ENCOUNTER — TELEPHONE (OUTPATIENT)
Dept: GENETICS | Facility: HOSPITAL | Age: 44
End: 2024-08-26
Payer: COMMERCIAL

## 2024-08-26 ENCOUNTER — HOSPITAL ENCOUNTER (OUTPATIENT)
Dept: CARDIOLOGY | Facility: HOSPITAL | Age: 44
Discharge: HOME | End: 2024-08-26
Admitting: INTERNAL MEDICINE
Payer: COMMERCIAL

## 2024-08-26 ENCOUNTER — OFFICE VISIT (OUTPATIENT)
Dept: SURGERY | Facility: CLINIC | Age: 44
End: 2024-08-26
Payer: COMMERCIAL

## 2024-08-26 VITALS
WEIGHT: 159 LBS | DIASTOLIC BLOOD PRESSURE: 82 MMHG | OXYGEN SATURATION: 97 % | SYSTOLIC BLOOD PRESSURE: 129 MMHG | HEIGHT: 66 IN | HEART RATE: 77 BPM | BODY MASS INDEX: 25.55 KG/M2

## 2024-08-26 VITALS
HEIGHT: 66 IN | DIASTOLIC BLOOD PRESSURE: 70 MMHG | WEIGHT: 159 LBS | SYSTOLIC BLOOD PRESSURE: 120 MMHG | BODY MASS INDEX: 25.55 KG/M2

## 2024-08-26 DIAGNOSIS — C50.912 INVASIVE DUCTAL CARCINOMA OF BREAST, LEFT: Primary | ICD-10-CM

## 2024-08-26 DIAGNOSIS — E66.3 OVERWEIGHT (BMI 25.0-29.9): ICD-10-CM

## 2024-08-26 DIAGNOSIS — F17.210 NICOTINE DEPENDENCE, CIGARETTES, UNCOMPLICATED: ICD-10-CM

## 2024-08-26 DIAGNOSIS — C50.912 DUCTAL CARCINOMA OF LEFT BREAST: ICD-10-CM

## 2024-08-26 LAB
BH CV ECHO LEFT VENTRICLE GLOBAL LONGITUDINAL STRAIN: -25.7 %
BH CV ECHO MEAS - AO MAX PG: 5.8 MMHG
BH CV ECHO MEAS - AO MEAN PG: 3 MMHG
BH CV ECHO MEAS - AO ROOT DIAM: 2.9 CM
BH CV ECHO MEAS - AO V2 MAX: 120 CM/SEC
BH CV ECHO MEAS - AO V2 VTI: 26.9 CM
BH CV ECHO MEAS - AVA(I,D): 2.19 CM2
BH CV ECHO MEAS - EDV(CUBED): 97.3 ML
BH CV ECHO MEAS - EDV(MOD-SP2): 79.4 ML
BH CV ECHO MEAS - EDV(MOD-SP4): 69.4 ML
BH CV ECHO MEAS - EF(MOD-BP): 57.7 %
BH CV ECHO MEAS - EF(MOD-SP2): 57.9 %
BH CV ECHO MEAS - EF(MOD-SP4): 56.9 %
BH CV ECHO MEAS - ESV(CUBED): 22 ML
BH CV ECHO MEAS - ESV(MOD-SP2): 33.4 ML
BH CV ECHO MEAS - ESV(MOD-SP4): 29.9 ML
BH CV ECHO MEAS - FS: 39.1 %
BH CV ECHO MEAS - IVS/LVPW: 0.7 CM
BH CV ECHO MEAS - IVSD: 0.7 CM
BH CV ECHO MEAS - LA DIMENSION: 3.6 CM
BH CV ECHO MEAS - LAT PEAK E' VEL: 13.9 CM/SEC
BH CV ECHO MEAS - LV DIASTOLIC VOL/BSA (35-75): 38.3 CM2
BH CV ECHO MEAS - LV MASS(C)D: 127.7 GRAMS
BH CV ECHO MEAS - LV MAX PG: 5.1 MMHG
BH CV ECHO MEAS - LV MEAN PG: 2 MMHG
BH CV ECHO MEAS - LV SYSTOLIC VOL/BSA (12-30): 16.5 CM2
BH CV ECHO MEAS - LV V1 MAX: 113 CM/SEC
BH CV ECHO MEAS - LV V1 VTI: 23.1 CM
BH CV ECHO MEAS - LVIDD: 4.6 CM
BH CV ECHO MEAS - LVIDS: 2.8 CM
BH CV ECHO MEAS - LVOT AREA: 2.5 CM2
BH CV ECHO MEAS - LVOT DIAM: 1.8 CM
BH CV ECHO MEAS - LVPWD: 1 CM
BH CV ECHO MEAS - MED PEAK E' VEL: 10.9 CM/SEC
BH CV ECHO MEAS - MV A MAX VEL: 74.6 CM/SEC
BH CV ECHO MEAS - MV DEC TIME: 0.23 SEC
BH CV ECHO MEAS - MV E MAX VEL: 93.4 CM/SEC
BH CV ECHO MEAS - MV E/A: 1.25
BH CV ECHO MEAS - MV MAX PG: 4 MMHG
BH CV ECHO MEAS - MV MEAN PG: 2 MMHG
BH CV ECHO MEAS - MV V2 VTI: 35.5 CM
BH CV ECHO MEAS - MVA(VTI): 1.66 CM2
BH CV ECHO MEAS - PA V2 MAX: 69.5 CM/SEC
BH CV ECHO MEAS - PULM A REVS DUR: 0.13 SEC
BH CV ECHO MEAS - PULM A REVS VEL: 27.4 CM/SEC
BH CV ECHO MEAS - PULM DIAS VEL: 58.3 CM/SEC
BH CV ECHO MEAS - PULM S/D: 1.13
BH CV ECHO MEAS - PULM SYS VEL: 65.6 CM/SEC
BH CV ECHO MEAS - RAP SYSTOLE: 3 MMHG
BH CV ECHO MEAS - RV MAX PG: 1.54 MMHG
BH CV ECHO MEAS - RV V1 MAX: 62 CM/SEC
BH CV ECHO MEAS - RV V1 VTI: 15.3 CM
BH CV ECHO MEAS - RVDD: 2.8 CM
BH CV ECHO MEAS - RVSP: 18.4 MMHG
BH CV ECHO MEAS - SV(LVOT): 58.8 ML
BH CV ECHO MEAS - SV(MOD-SP2): 46 ML
BH CV ECHO MEAS - SV(MOD-SP4): 39.5 ML
BH CV ECHO MEAS - SVI(LVOT): 32.4 ML/M2
BH CV ECHO MEAS - SVI(MOD-SP2): 25.4 ML/M2
BH CV ECHO MEAS - SVI(MOD-SP4): 21.8 ML/M2
BH CV ECHO MEAS - TAPSE (>1.6): 2.7 CM
BH CV ECHO MEAS - TR MAX PG: 15.4 MMHG
BH CV ECHO MEAS - TR MAX VEL: 196 CM/SEC
BH CV ECHO MEASUREMENTS AVERAGE E/E' RATIO: 7.53
BH CV XLRA - RV BASE: 2.5 CM
BH CV XLRA - RV LENGTH: 6.1 CM
BH CV XLRA - RV MID: 2.3 CM
BH CV XLRA - TDI S': 13.5 CM/SEC
LEFT ATRIUM VOLUME INDEX: 21.8 ML/M2
LEFT ATRIUM VOLUME: 39.5 ML

## 2024-08-26 PROCEDURE — 93356 MYOCRD STRAIN IMG SPCKL TRCK: CPT

## 2024-08-26 PROCEDURE — 93306 TTE W/DOPPLER COMPLETE: CPT | Performed by: INTERNAL MEDICINE

## 2024-08-26 PROCEDURE — 93306 TTE W/DOPPLER COMPLETE: CPT

## 2024-08-26 PROCEDURE — 93356 MYOCRD STRAIN IMG SPCKL TRCK: CPT | Performed by: INTERNAL MEDICINE

## 2024-08-26 RX ORDER — HEPARIN SODIUM 5000 [USP'U]/ML
5000 INJECTION, SOLUTION INTRAVENOUS; SUBCUTANEOUS EVERY 8 HOURS SCHEDULED
Status: CANCELLED | OUTPATIENT
Start: 2024-08-26

## 2024-08-26 NOTE — H&P (VIEW-ONLY)
Office Established Patient Note:     Referring Provider: No ref. provider found    Chief Complaint   Patient presents with    Breast Cancer       Subjective .     History of present illness:    History of Present Illness  The patient presents for evaluation of breast cancer. She is accompanied by an adult male.    Genetic testing negative. She saw oncology and needs a port to start chemotherapy. She had a lot of questions about timing of chemotherapy and surgery which were answered.        History  Past Medical History:   Diagnosis Date    Acid reflux     ADHD (attention deficit hyperactivity disorder)     Allergic     Anxiety     Bronchitis     Chronic fatigue     Disease of thyroid gland     Fibromyalgia     Gallbladder abscess     Heart murmur     Hypertension     Hypothyroidism     Mitral valve prolapse     PONV (postoperative nausea and vomiting)     RA (rheumatoid arthritis)    ,   Past Surgical History:   Procedure Laterality Date    CARPAL TUNNEL RELEASE      COLONOSCOPY  05/01/2003    Normal exam    D & C HYSTEROSCOPY MYOSURE N/A 7/31/2024    Procedure: DILATATION AND CURETTAGE HYSTEROSCOPY WITH POSSIBLE TISSUE RESECTION;  Surgeon: Stevan Espinal MD;  Location: [facility] OR;  Service: Obstetrics/Gynecology;  Laterality: N/A;    DIAGNOSTIC LAPAROSCOPY  2000    [facility]; normal findings; [facility]    ENDOMETRIAL ABLATION  2023    ENDOSCOPY      LAPAROSCOPIC CHOLECYSTECTOMY  2011    Dr Duncan; [facility]    SALPINGECTOMY Bilateral 10/16/2020    Procedure: SALPINGECTOMY LAPAROSCOPIC for sterilization;  Surgeon: Stevan Espinal MD;  Location: [facility] OR;  Service: Obstetrics/Gynecology;  Laterality: Bilateral;    US GUIDED LYMPH NODE BIOPSY  8/7/2024    WISDOM TOOTH EXTRACTION     ,   Family History   Problem Relation Age of Onset    Diabetes Mother     Diabetes Father     Cancer Maternal Grandmother         vulva, HPV+    Cancer Maternal Grandfather     Lung cancer Paternal Grandfather 60 - 69    Breast cancer Maternal  Great-Grandmother     Stomach cancer Maternal Great-Grandmother     Colon cancer Neg Hx     Colon polyps Neg Hx    ,   Social History     Tobacco Use    Smoking status: Former     Average packs/day: 0.5 packs/day for 20.0 years (10.0 ttl pk-yrs)     Types: Cigarettes     Start date: 7/22/2004    Smokeless tobacco: Never    Tobacco comments:     No cigarette since 7/22/24   Vaping Use    Vaping status: Every Day    Start date: 2/17/2024    Substances: Nicotine, Flavoring    Devices: Disposable   Substance Use Topics    Alcohol use: Yes     Comment: Occasional    Drug use: Yes     Types: Marijuana   , (Not in a hospital admission)   and Allergies:  Percocet [oxycodone-acetaminophen] and Adhesive tape    Current Outpatient Medications:     amphetamine-dextroamphetamine (Adderall) 10 MG tablet, Take 1 tablet by mouth Daily for 30 days., Disp: 30 tablet, Rfl: 0    bisoprolol-hydrochlorothiazide (ZIAC) 5-6.25 MG per tablet, Take 1 tablet by mouth Daily., Disp: 30 tablet, Rfl: 2    cyclobenzaprine (FLEXERIL) 10 MG tablet, 1 tab PO up to 2x daily prn headache, neck ache, Disp: 30 tablet, Rfl: 1    dexAMETHasone (DECADRON) 4 MG tablet, Take 2 tablets 2 times daily the day before chemo and 2 tablets 2 times daily the day after chemo, Disp: 48 tablet, Rfl: 0    diazePAM (VALIUM) 5 MG tablet, Take 1 tablet by mouth Every 6 (Six) Hours As Needed for Anxiety., Disp: , Rfl:     DULoxetine (CYMBALTA) 20 MG capsule, Take 1 capsule by mouth Daily., Disp: , Rfl:     folic acid (FOLVITE) 1 MG tablet, Take 1 tablet by mouth Daily., Disp: , Rfl:     HYDROcodone-acetaminophen (Norco) 5-325 MG per tablet, Take 1 tablet by mouth Every 8 (Eight) Hours As Needed for Moderate Pain., Disp: 10 tablet, Rfl: 0    HYDROcodone-acetaminophen (NORCO) 7.5-325 MG per tablet, Take 1 tablet by mouth Every 8 (Eight) Hours As Needed for Moderate Pain., Disp: 12 tablet, Rfl: 0    hydroxychloroquine (PLAQUENIL) 200 MG tablet, , Disp: , Rfl:     ibuprofen  (ADVIL,MOTRIN) 800 MG tablet, Take 1 tablet by mouth Every 6 (Six) Hours As Needed for Mild Pain., Disp: 90 tablet, Rfl: 2    levothyroxine (SYNTHROID, LEVOTHROID) 112 MCG tablet, Take 1 tablet by mouth Daily., Disp: 30 tablet, Rfl: 2    lisdexamfetamine (VYVANSE) 50 MG capsule, TAKE 1 CAPSULE BY MOUTH EVERY MORNING, Disp: 30 capsule, Rfl: 0    lisdexamfetamine (Vyvanse) 50 MG capsule, Take 1 capsule by mouth Every Morning for 30 days, Disp: 30 capsule, Rfl: 0    ondansetron (ZOFRAN) 8 MG tablet, Take 1 tablet by mouth Every 8 (Eight) Hours As Needed for Nausea or Vomiting., Disp: 30 tablet, Rfl: 0    ondansetron ODT (ZOFRAN-ODT) 4 MG disintegrating tablet, Place 1 tablet on the tongue Every 8 (Eight) Hours As Needed for Nausea or Vomiting., Disp: 10 tablet, Rfl: 2    pantoprazole (PROTONIX) 40 MG EC tablet, Take 1 tablet by mouth Daily., Disp: 30 tablet, Rfl: 2    prochlorperazine (COMPAZINE) 10 MG tablet, Take 1 tablet by mouth Every 8 (Eight) Hours As Needed for Nausea or Vomiting., Disp: 30 tablet, Rfl: 0    Current Facility-Administered Medications:     lidocaine (XYLOCAINE) 1 % injection 30 mL, 30 mL, Subcutaneous, Once, Taylor Salinas MD    lidocaine 1% - EPINEPHrine 1:[id number] (XYLOCAINE W/EPI) 1 %-1:133252 injection 30 mL, 30 mL, Infiltration, Once, Taylor Salinas MD    Review of Systems    Review of Systems - General ROS: negative  ENT ROS: negative  Respiratory ROS: no cough, shortness of breath, or wheezing  Cardiovascular ROS: no chest pain or dyspnea on exertion  Gastrointestinal ROS: no abdominal pain, change in bowel habits, or black or bloody stools  Genito-Urinary ROS: no dysuria, trouble voiding, or hematuria  Dermatological ROS: negative   Breast ROS: + for known breast cancer   Hematological and Lymphatic ROS: negative  Musculoskeletal ROS: negative   Neurological ROS: no TIA or stroke symptoms    Psychological ROS: negative  Endocrine ROS: negative    Objective     Vital Signs  "  /82   Pulse 77   Ht 167.6 cm (66\")   Wt 72.1 kg (159 lb)   SpO2 97%   BMI 25.66 kg/m²      Physical Exam:  General appearance - alert, well appearing, and in no distress  Mental status - alert, oriented to person, place, and time  Eyes - pupils equal and reactive, extraocular eye movements intact  Neck - supple, no significant adenopathy  Neurological - alert, oriented, normal speech, no focal findings or movement disorder noted  Physical Exam      Results Review:    The following data was reviewed by: Shahla Gentile MD on 08/26/2024:    NM PET/CT Skull Base to Mid Thigh (08/20/2024 09:10)   Genetics Negative   Progress Notes by Shyam Weiss MD (08/22/2024 15:00)   Results      Assessment & Plan       Diagnoses and all orders for this visit:    1. Invasive ductal carcinoma of breast, left (Primary)  -     Case Request; Standing  -     MRSA Screen Culture (Outpatient) - Swab, Nares; Future  -     ECG 12 Lead; Future  -     heparin (porcine) 5000 UNIT/ML injection 5,000 Units  -     CBC & Differential; Future  -     Comprehensive Metabolic Panel; Future  -     ceFAZolin (ANCEF) 2,000 mg in sodium chloride 0.9 % 100 mL IVPB  -     Case Request    2. Overweight (BMI 25.0-29.9)    3. Nicotine dependence, cigarettes, uncomplicated    Other orders  -     Follow Anesthesia Guidelines / Protocol; Future  -     Follow Anesthesia Guidelines / Protocol; Standing  -     Verify / Perform Chlorhexidine Skin Prep; Standing  -     Obtain Informed Consent; Future  -     Provide NPO Instructions to Patient; Future  -     Chlorhexidine Skin Prep; Future  -     Notify physician (specify); Standing  -     Instructions on coughing, deep breathing, and incentive spirometry.; Standing  -     Oxygen Therapy-; Standing  -     Place Sequential Compression Device; Standing  -     Maintain Sequential Compression Device; Standing       Assessment & Plan  1. Breast Cancer.  The PET scan revealed activity in the breast and " some lymph nodes, but no evidence of disease in the right breast or elsewhere in the body. Genetic testing indicated no hereditary factors. A port will be placed for her treatment, with the procedure scheduled for this week. The risks associated with port placement, including potential lung puncture, bleeding, and infection, were discussed. If the preferred vein is not available, there is a 1% risk of lung puncture, which will be monitored with a postoperative chest x-ray. She will also be discussed at the tumor board this week to ensure a comprehensive treatment plan, including potential radiation therapy under the arm due to lymph node involvement.    This is a life threatening condition. I have reviewed the note and PET above. I have ordered the above prework. She is at increased risk of perioperative complications 2/2 her elevated BMI and active cancer status. I will discuss her case at tumor board this week.      Shahla Gentile MD  08/28/24  10:34 CDT    Patient or patient representative verbalized consent for the use of Ambient Listening during the visit with  Shahla Gentile MD for chart documentation. 8/28/2024  10:35 CDT

## 2024-08-26 NOTE — TELEPHONE ENCOUNTER
Spoke with patient and disclosed negative genetic results. Informed patient these results would be on [facility] and sent to her Dr. Patient requested a copy be mailed to her.

## 2024-08-26 NOTE — PROGRESS NOTES
Office Established Patient Note:     Referring Provider: No ref. provider found    Chief Complaint   Patient presents with    Breast Cancer       Subjective .     History of present illness:    History of Present Illness  The patient presents for evaluation of breast cancer. She is accompanied by an adult male.    Genetic testing negative. She saw oncology and needs a port to start chemotherapy. She had a lot of questions about timing of chemotherapy and surgery which were answered.        History  Past Medical History:   Diagnosis Date    Acid reflux     ADHD (attention deficit hyperactivity disorder)     Allergic     Anxiety     Bronchitis     Chronic fatigue     Disease of thyroid gland     Fibromyalgia     Gallbladder abscess     Heart murmur     Hypertension     Hypothyroidism     Mitral valve prolapse     PONV (postoperative nausea and vomiting)     RA (rheumatoid arthritis)    ,   Past Surgical History:   Procedure Laterality Date    CARPAL TUNNEL RELEASE      COLONOSCOPY  05/01/2003    Normal exam    D & C HYSTEROSCOPY MYOSURE N/A 7/31/2024    Procedure: DILATATION AND CURETTAGE HYSTEROSCOPY WITH POSSIBLE TISSUE RESECTION;  Surgeon: Stevan Espinal MD;  Location: [facility] OR;  Service: Obstetrics/Gynecology;  Laterality: N/A;    DIAGNOSTIC LAPAROSCOPY  2000    [facility]; normal findings; [facility]    ENDOMETRIAL ABLATION  2023    ENDOSCOPY      LAPAROSCOPIC CHOLECYSTECTOMY  2011    Dr Duncan; [facility]    SALPINGECTOMY Bilateral 10/16/2020    Procedure: SALPINGECTOMY LAPAROSCOPIC for sterilization;  Surgeon: Stevan Espinal MD;  Location: [facility] OR;  Service: Obstetrics/Gynecology;  Laterality: Bilateral;    US GUIDED LYMPH NODE BIOPSY  8/7/2024    WISDOM TOOTH EXTRACTION     ,   Family History   Problem Relation Age of Onset    Diabetes Mother     Diabetes Father     Cancer Maternal Grandmother         vulva, HPV+    Cancer Maternal Grandfather     Lung cancer Paternal Grandfather 60 - 69    Breast cancer Maternal  Great-Grandmother     Stomach cancer Maternal Great-Grandmother     Colon cancer Neg Hx     Colon polyps Neg Hx    ,   Social History     Tobacco Use    Smoking status: Former     Average packs/day: 0.5 packs/day for 20.0 years (10.0 ttl pk-yrs)     Types: Cigarettes     Start date: 7/22/2004    Smokeless tobacco: Never    Tobacco comments:     No cigarette since 7/22/24   Vaping Use    Vaping status: Every Day    Start date: 2/17/2024    Substances: Nicotine, Flavoring    Devices: Disposable   Substance Use Topics    Alcohol use: Yes     Comment: Occasional    Drug use: Yes     Types: Marijuana   , (Not in a hospital admission)   and Allergies:  Percocet [oxycodone-acetaminophen] and Adhesive tape    Current Outpatient Medications:     amphetamine-dextroamphetamine (Adderall) 10 MG tablet, Take 1 tablet by mouth Daily for 30 days., Disp: 30 tablet, Rfl: 0    bisoprolol-hydrochlorothiazide (ZIAC) 5-6.25 MG per tablet, Take 1 tablet by mouth Daily., Disp: 30 tablet, Rfl: 2    cyclobenzaprine (FLEXERIL) 10 MG tablet, 1 tab PO up to 2x daily prn headache, neck ache, Disp: 30 tablet, Rfl: 1    dexAMETHasone (DECADRON) 4 MG tablet, Take 2 tablets 2 times daily the day before chemo and 2 tablets 2 times daily the day after chemo, Disp: 48 tablet, Rfl: 0    diazePAM (VALIUM) 5 MG tablet, Take 1 tablet by mouth Every 6 (Six) Hours As Needed for Anxiety., Disp: , Rfl:     DULoxetine (CYMBALTA) 20 MG capsule, Take 1 capsule by mouth Daily., Disp: , Rfl:     folic acid (FOLVITE) 1 MG tablet, Take 1 tablet by mouth Daily., Disp: , Rfl:     HYDROcodone-acetaminophen (Norco) 5-325 MG per tablet, Take 1 tablet by mouth Every 8 (Eight) Hours As Needed for Moderate Pain., Disp: 10 tablet, Rfl: 0    HYDROcodone-acetaminophen (NORCO) 7.5-325 MG per tablet, Take 1 tablet by mouth Every 8 (Eight) Hours As Needed for Moderate Pain., Disp: 12 tablet, Rfl: 0    hydroxychloroquine (PLAQUENIL) 200 MG tablet, , Disp: , Rfl:     ibuprofen  (ADVIL,MOTRIN) 800 MG tablet, Take 1 tablet by mouth Every 6 (Six) Hours As Needed for Mild Pain., Disp: 90 tablet, Rfl: 2    levothyroxine (SYNTHROID, LEVOTHROID) 112 MCG tablet, Take 1 tablet by mouth Daily., Disp: 30 tablet, Rfl: 2    lisdexamfetamine (VYVANSE) 50 MG capsule, TAKE 1 CAPSULE BY MOUTH EVERY MORNING, Disp: 30 capsule, Rfl: 0    lisdexamfetamine (Vyvanse) 50 MG capsule, Take 1 capsule by mouth Every Morning for 30 days, Disp: 30 capsule, Rfl: 0    ondansetron (ZOFRAN) 8 MG tablet, Take 1 tablet by mouth Every 8 (Eight) Hours As Needed for Nausea or Vomiting., Disp: 30 tablet, Rfl: 0    ondansetron ODT (ZOFRAN-ODT) 4 MG disintegrating tablet, Place 1 tablet on the tongue Every 8 (Eight) Hours As Needed for Nausea or Vomiting., Disp: 10 tablet, Rfl: 2    pantoprazole (PROTONIX) 40 MG EC tablet, Take 1 tablet by mouth Daily., Disp: 30 tablet, Rfl: 2    prochlorperazine (COMPAZINE) 10 MG tablet, Take 1 tablet by mouth Every 8 (Eight) Hours As Needed for Nausea or Vomiting., Disp: 30 tablet, Rfl: 0    Current Facility-Administered Medications:     lidocaine (XYLOCAINE) 1 % injection 30 mL, 30 mL, Subcutaneous, Once, Taylor Salinas MD    lidocaine 1% - EPINEPHrine 1:[id number] (XYLOCAINE W/EPI) 1 %-1:251488 injection 30 mL, 30 mL, Infiltration, Once, Taylor Salinas MD    Review of Systems    Review of Systems - General ROS: negative  ENT ROS: negative  Respiratory ROS: no cough, shortness of breath, or wheezing  Cardiovascular ROS: no chest pain or dyspnea on exertion  Gastrointestinal ROS: no abdominal pain, change in bowel habits, or black or bloody stools  Genito-Urinary ROS: no dysuria, trouble voiding, or hematuria  Dermatological ROS: negative   Breast ROS: + for known breast cancer   Hematological and Lymphatic ROS: negative  Musculoskeletal ROS: negative   Neurological ROS: no TIA or stroke symptoms    Psychological ROS: negative  Endocrine ROS: negative    Objective     Vital Signs  "  /82   Pulse 77   Ht 167.6 cm (66\")   Wt 72.1 kg (159 lb)   SpO2 97%   BMI 25.66 kg/m²      Physical Exam:  General appearance - alert, well appearing, and in no distress  Mental status - alert, oriented to person, place, and time  Eyes - pupils equal and reactive, extraocular eye movements intact  Neck - supple, no significant adenopathy  Neurological - alert, oriented, normal speech, no focal findings or movement disorder noted  Physical Exam      Results Review:    The following data was reviewed by: Shahla Gentile MD on 08/26/2024:    NM PET/CT Skull Base to Mid Thigh (08/20/2024 09:10)   Genetics Negative   Progress Notes by Shyam Weiss MD (08/22/2024 15:00)   Results      Assessment & Plan       Diagnoses and all orders for this visit:    1. Invasive ductal carcinoma of breast, left (Primary)  -     Case Request; Standing  -     MRSA Screen Culture (Outpatient) - Swab, Nares; Future  -     ECG 12 Lead; Future  -     heparin (porcine) 5000 UNIT/ML injection 5,000 Units  -     CBC & Differential; Future  -     Comprehensive Metabolic Panel; Future  -     ceFAZolin (ANCEF) 2,000 mg in sodium chloride 0.9 % 100 mL IVPB  -     Case Request    2. Overweight (BMI 25.0-29.9)    3. Nicotine dependence, cigarettes, uncomplicated    Other orders  -     Follow Anesthesia Guidelines / Protocol; Future  -     Follow Anesthesia Guidelines / Protocol; Standing  -     Verify / Perform Chlorhexidine Skin Prep; Standing  -     Obtain Informed Consent; Future  -     Provide NPO Instructions to Patient; Future  -     Chlorhexidine Skin Prep; Future  -     Notify physician (specify); Standing  -     Instructions on coughing, deep breathing, and incentive spirometry.; Standing  -     Oxygen Therapy-; Standing  -     Place Sequential Compression Device; Standing  -     Maintain Sequential Compression Device; Standing       Assessment & Plan  1. Breast Cancer.  The PET scan revealed activity in the breast and " some lymph nodes, but no evidence of disease in the right breast or elsewhere in the body. Genetic testing indicated no hereditary factors. A port will be placed for her treatment, with the procedure scheduled for this week. The risks associated with port placement, including potential lung puncture, bleeding, and infection, were discussed. If the preferred vein is not available, there is a 1% risk of lung puncture, which will be monitored with a postoperative chest x-ray. She will also be discussed at the tumor board this week to ensure a comprehensive treatment plan, including potential radiation therapy under the arm due to lymph node involvement.    This is a life threatening condition. I have reviewed the note and PET above. I have ordered the above prework. She is at increased risk of perioperative complications 2/2 her elevated BMI and active cancer status. I will discuss her case at tumor board this week.      Shahla Gentile MD  08/28/24  10:34 CDT    Patient or patient representative verbalized consent for the use of Ambient Listening during the visit with  Shahla Gentile MD for chart documentation. 8/28/2024  10:35 CDT

## 2024-08-27 ENCOUNTER — DOCUMENTATION (OUTPATIENT)
Dept: GENETICS | Facility: HOSPITAL | Age: 44
End: 2024-08-27
Payer: COMMERCIAL

## 2024-08-27 NOTE — PROGRESS NOTES
Uyen José is a 43 y.o. female who was referred for genetic counseling due to a personal history of breast cancer. Genetic counseling was performed via telephone. Ms. José confirmed her full name, date of birth, and that she was physically located in the [facility] at the time of the appointment. She was recently diagnosed with an Invasive ductal carcinoma which is ER/MD and HER2 positive. 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 She is currently having a metastatic work-up and is scheduled to see oncology. She retains her uterus and ovaries. She had a colonoscopy in 2003 due to changes noted on a CT scan. The colonoscopy was normal. Ms. José was interested in discussing her risk for a hereditary cancer syndrome, and decided to pursue genetic testing. The CancerNext-Expanded panel was ordered through [employer] which analyzes 71 genes associated with an increased cancer risk. Genetic testing was negative for known pathogenic mutations in the 71 genes on this panel. These results were discussed with Ms. José by telephone on 8/26/2024.     PERTINENT FAMILY HISTORY:  Pat. Grandfather:                    Lung cancer, 60s  Pat. Great-Aunt:                      Breast cancer  Mat. Grandfather:                    Unknown cancer  Mat. Grandmother:                  Vulvar cancer, HPV+  Mat. Great-Grandmother:       Breast and stomach cancer, unknown if breast or stomach was first     Medical records regarding the diagnoses in the family were not available for review.      RISK ASSESSMENT:  Ms. José's personal history of breast cancer led to concern for a hereditary cancer syndrome. she clearly meets NCCN criteria for high-penetrance breast cancer susceptibility genes (Specifically BRCA1, BRCA2, CDH1, PALB2, PTEN, STK11, and TP53) testing based on her personal history of a breast cancer diagnosed below the age of 50. We discussed multigene panel testing that would evaluate  multiple genes simultaneously associated with hereditary cancer risk. This risk assessment is based on the family history information provided at the time of the appointment and could change in the future should new information be obtained.     GENETIC COUNSELING (30 minutes) We reviewed the family history information in detail.  Cases of cancer follow three general patterns: sporadic, familial, and hereditary.  While most cancer is sporadic, some cases appear to occur in family clusters.  These cases are said to be familial and account for 10-20% of cancer cases.  Familial cases may be due to a combination of shared genes and environmental factors among family members.  In even fewer families, the cancer is said to be inherited, and the genes responsible for the cancer are known.       Family histories typical of hereditary cancer syndromes usually include multiple first- and second-degree relatives diagnosed with cancer types that define a syndrome.  These cases tend to be diagnosed at younger-than-expected ages and can be bilateral or multifocal.  The cancer in these families follows an autosomal dominant inheritance pattern, which indicates the likely presence of a mutation in a cancer susceptibility gene.  Children and siblings of an individual believed to carry this mutation have a 50% chance of inheriting that mutation, thereby inheriting the increased risk to develop cancer.  These mutations can be passed down from the maternal or the paternal lineage.     Hereditary breast cancer accounts for 5-10% of all cases of breast cancer.  A significant proportion of hereditary breast and ovarian cancer can be attributed to mutations in the BRCA1 and BRCA2 genes.  Mutations in these genes confer an increased risk for breast cancer, ovarian cancer, male breast cancer, prostate cancer, and pancreatic cancer. Women with a BRCA1 or BRCA2 mutation who have already been diagnosed with breast cancer have a 20-40% risk of a  contralateral breast cancer. Women with a BRCA1 or BRCA2 mutation have up to a 60% risk of ovarian cancer.  BRCA1 has been more significantly associated with triple negative breast cancers than other genes. There are other genes considered to confer a high risk for breast cancer.     There are other genes that are known to be associated with an increased risk for cancer.  Some of these genes have well defined risks and established management guidelines.  Other genes that can be tested for have been more recently described, and there may be less data regarding the risks and therefore may not have established management guidelines. Based on Ms. José's desire to get as much information as possible regarding her personal risks and potential risks for her family, she opted to pursue testing through a panel that would evaluate multiple genes that have been associated with cancer risk.      GENETIC TESTING:  The risks, benefits and limitations of genetic testing and implications for clinical management following testing were reviewed.  DNA test results can influence decisions regarding screening, prevention and surgical management.  Genetic testing can have significant psychological implications for both individuals and families.  Also discussed was the possibility of employment and insurance discrimination based on genetic test results and the laws in place to prevent this (HUA).     We discussed panel testing that would evaluate 71 genes associated with increased cancer risk. The implications of a positive or negative test result were discussed. We discussed the possibility that, in some cases, genetic test results may be informative or may be ambiguous due to the identification of a genetic variant. These variants may or may not be associated with an increased cancer risk.  With multigene panel testing, it is not uncommon for a variant of uncertain significance (VUS) to be identified.  If a VUS is identified, testing  unaffected family members is typically not recommended and screening recommendations are made based on the family history.  The laboratories that perform genetic testing work to reclassify the VUS and send out an amended report if and when a VUS is reclassified.  The majority of variant findings are ultimately reclassified to a negative result.  Given her personal and family history, a negative test result would not eliminate all risk to her relatives.      TEST RESULTS:  Genetic testing was negative for known pathogenic mutations by sequencing, rearrangement testing, and RNA analysis for the 71 genes included on the CancerNext-Expanded panel.   This negative result greatly lowers but does not eliminate the risk of a hereditary cancer syndrome for Ms. José. This assessment is based on the information provided at the time of consultation and could change should new information be obtained regarding her personal and/or family history.     CANCER PREVENTION: Ms. José's surveillance and management will be determined by her oncology team. Despite the negative genetic test results, Ms. José's female relatives may have a somewhat increased lifetime risk for breast cancer based on family history. Female relatives could have a risk assessment performed using a family history-based model, such as the Tyrer-Cuzick model, to determine their individual risks.  Surveillance for individuals with a high lifetime risk of breast cancer (>20%, versus the average risk of 12%), based on NCCN guidelines, would consist of semi-annual clinical breast exams and monthly self-breast exams starting by age 18 and annual mammography starting 10 years younger than the earliest diagnosis in a close relative, or starting by age 40, whichever is earliest.  According to an American Cancer Society expert panel, annual breast MRI should be offered to women whose lifetime risk of breast cancer is 20-25 percent or more, also starting by age 40 or  earlier if indicated by family history.      PLAN:  Genetic counseling remains available to Ms. José and her family.  Ms. José is welcome to contact us with any questions in the future at [phone].     Yesenia Miranda MS, [facility] [place] [facility]  Licensed Certified Genetic Counselor     Cc: Shahla Garza MD

## 2024-08-28 ENCOUNTER — TELEMEDICINE (OUTPATIENT)
Age: 44
End: 2024-08-28
Payer: COMMERCIAL

## 2024-08-28 ENCOUNTER — PRE-ADMISSION TESTING (OUTPATIENT)
Dept: PREADMISSION TESTING | Facility: HOSPITAL | Age: 44
End: 2024-08-28
Payer: COMMERCIAL

## 2024-08-28 ENCOUNTER — TELEPHONE (OUTPATIENT)
Age: 44
End: 2024-08-28
Payer: COMMERCIAL

## 2024-08-28 VITALS
SYSTOLIC BLOOD PRESSURE: 124 MMHG | BODY MASS INDEX: 25.57 KG/M2 | DIASTOLIC BLOOD PRESSURE: 75 MMHG | WEIGHT: 162.92 LBS | OXYGEN SATURATION: 99 % | HEART RATE: 69 BPM | HEIGHT: 67 IN | RESPIRATION RATE: 16 BRPM

## 2024-08-28 DIAGNOSIS — C50.912 INVASIVE DUCTAL CARCINOMA OF BREAST, LEFT: ICD-10-CM

## 2024-08-28 DIAGNOSIS — Z09 POSTOP CHECK: Primary | ICD-10-CM

## 2024-08-28 DIAGNOSIS — F17.200 SMOKER: ICD-10-CM

## 2024-08-28 LAB
ALBUMIN SERPL-MCNC: 4 G/DL (ref 3.5–5.2)
ALBUMIN/GLOB SERPL: 1.6 G/DL
ALP SERPL-CCNC: 78 U/L (ref 39–117)
ALT SERPL W P-5'-P-CCNC: 12 U/L (ref 1–33)
ANION GAP SERPL CALCULATED.3IONS-SCNC: 8 MMOL/L (ref 5–15)
AST SERPL-CCNC: 15 U/L (ref 1–32)
BASOPHILS # BLD AUTO: 0.07 10*3/MM3 (ref 0–0.2)
BASOPHILS NFR BLD AUTO: 0.7 % (ref 0–1.5)
BILIRUB SERPL-MCNC: 0.5 MG/DL (ref 0–1.2)
BUN SERPL-MCNC: 13 MG/DL (ref 6–20)
BUN/CREAT SERPL: 15.9 (ref 7–25)
CALCIUM SPEC-SCNC: 9.1 MG/DL (ref 8.6–10.5)
CHLORIDE SERPL-SCNC: 102 MMOL/L (ref 98–107)
CO2 SERPL-SCNC: 29 MMOL/L (ref 22–29)
CREAT SERPL-MCNC: 0.82 MG/DL (ref 0.57–1)
DEPRECATED RDW RBC AUTO: 43.8 FL (ref 37–54)
EGFRCR SERPLBLD CKD-EPI 2021: 90.6 ML/MIN/1.73
EOSINOPHIL # BLD AUTO: 0.84 10*3/MM3 (ref 0–0.4)
EOSINOPHIL NFR BLD AUTO: 8.6 % (ref 0.3–6.2)
ERYTHROCYTE [DISTWIDTH] IN BLOOD BY AUTOMATED COUNT: 13.6 % (ref 12.3–15.4)
GLOBULIN UR ELPH-MCNC: 2.5 GM/DL
GLUCOSE SERPL-MCNC: 87 MG/DL (ref 65–99)
HCT VFR BLD AUTO: 38 % (ref 34–46.6)
HGB BLD-MCNC: 12.8 G/DL (ref 12–15.9)
IMM GRANULOCYTES # BLD AUTO: 0.02 10*3/MM3 (ref 0–0.05)
IMM GRANULOCYTES NFR BLD AUTO: 0.2 % (ref 0–0.5)
LYMPHOCYTES # BLD AUTO: 2.55 10*3/MM3 (ref 0.7–3.1)
LYMPHOCYTES NFR BLD AUTO: 26.2 % (ref 19.6–45.3)
MCH RBC QN AUTO: 29.6 PG (ref 26.6–33)
MCHC RBC AUTO-ENTMCNC: 33.7 G/DL (ref 31.5–35.7)
MCV RBC AUTO: 87.8 FL (ref 79–97)
MONOCYTES # BLD AUTO: 0.8 10*3/MM3 (ref 0.1–0.9)
MONOCYTES NFR BLD AUTO: 8.2 % (ref 5–12)
NEUTROPHILS NFR BLD AUTO: 5.45 10*3/MM3 (ref 1.7–7)
NEUTROPHILS NFR BLD AUTO: 56.1 % (ref 42.7–76)
NRBC BLD AUTO-RTO: 0 /100 WBC (ref 0–0.2)
PLATELET # BLD AUTO: 181 10*3/MM3 (ref 140–450)
PMV BLD AUTO: 12.9 FL (ref 6–12)
POTASSIUM SERPL-SCNC: 4.1 MMOL/L (ref 3.5–5.2)
PROT SERPL-MCNC: 6.5 G/DL (ref 6–8.5)
RBC # BLD AUTO: 4.33 10*6/MM3 (ref 3.77–5.28)
SODIUM SERPL-SCNC: 139 MMOL/L (ref 136–145)
WBC NRBC COR # BLD AUTO: 9.73 10*3/MM3 (ref 3.4–10.8)

## 2024-08-28 PROCEDURE — 36415 COLL VENOUS BLD VENIPUNCTURE: CPT

## 2024-08-28 PROCEDURE — 80053 COMPREHEN METABOLIC PANEL: CPT

## 2024-08-28 PROCEDURE — 87081 CULTURE SCREEN ONLY: CPT

## 2024-08-28 PROCEDURE — 85025 COMPLETE CBC W/AUTO DIFF WBC: CPT

## 2024-08-28 NOTE — PATIENT INSTRUCTIONS

## 2024-08-28 NOTE — PROGRESS NOTES
POSTOPERATIVE VIDEO VISIT      HPI  Uyen José presents for postoperative video visit. The visit from a scheduled appointment was initiated with an audio and visual connection by the patient. The advantages and limitations of video visits were discussed and understood by the patient. She is s/p DILATATION AND CURETTAGE HYSTEROSCOPY WITH POSSIBLE TISSUE RESECTION. The patient has had a relatively normal postoperative course.  The patient has had no current complaints. The patient has had improving normal postoperative pain.  The patient has had no issues with the wound.     Op Note by Stevan Espinal MD (07/31/2024 12:03)   Tissue Pathology Exam (07/31/2024 12:05)     Review of Systems     Past History:  The following portions of the patient's history were reviewed and updated as appropriate: allergies, current medications, past family history, past medical history, past social history, past surgical history, and problem list.      Physical Exam   Constitutional: She appears well-developed and well-nourished.   HENT:   Head: Normocephalic and atraumatic.   Neurological: She is alert.   Psychiatric: She has a normal mood and affect.        Assessment & Plan   Assessment:    1. Postop check    2. Smoker        Plan:   Continue postoperative care as instructed        Return in about 3 months (around 11/28/2024) for Telehealth, with me.     Stevan Espinal MD  8/28/2024    Mode of Visit: Video  Location of patient[place]  [place] [facility] [place]  You have chosen to receive care through a telehealth visit.  The patient has signed the video visit consent form.  The visit included audio and video interaction. No technical issues occurred during this visit.    In the meantime, she has been diagnosed with breast cancer  She plans to have a port placed on Friday of this week followed by chemo starting next week  She then will have interval breast surgery with Dr. Gentile followed by more  chemotherapy  For now, she has complete resolution of her pelvic pain  She may be a candidate for hysterectomy with removal of adnexa based on her breast pathology  However, that will need to be time to the future so as not to delay her treatment for breast cancer  If all she needs is her ovaries removed, we could potentially do that concomitantly at the time of her breast surgery down the road  However, if hysterectomy is warranted, either from a symptomatic standpoint or from a breast cancer standpoint, that will have to be scheduled at a different time from her breast surgery due to the clean contaminated nature of hysterectomy  Expectant management for now from a gynecologic standpoint  Return to office in 3 months for telehealth    Stevan Espinal MD

## 2024-08-28 NOTE — DISCHARGE INSTRUCTIONS
Preparing for Surgery  Follow these instructions before the procedure:  Several days or weeks before your procedure      Ask your health care provider about:  Changing or stopping your regular medicines. This is especially important if you are taking diabetes medicines or blood thinners.  Taking medicines such as aspirin and ibuprofen. These medicines can thin your blood. Do not take these medicines unless your health care provider tells you to take them.  Taking over-the-counter medicines, vitamins, herbs, and supplements.    Contact your surgeon if you:  Develop a fever of more than 100.4°F (38°C) or other feelings of illness during the 48 hours before your surgery.  Have symptoms that get worse.  Have questions or concerns about your surgery.  If you are going home the same day of your surgery you will need to arrange for a responsible adult, age 18 years old or older, to drive you home from the hospital and stay with you for 24 hours. Verification of the  will be made prior to any procedure requiring sedation. You may not go home in a taxi or any form of public transportation by yourself.     Day before your procedure    24 hours before your procedure DO NOT drink alcoholic beverages or smoke.  24 hours before your procedure STOP taking Erectile Dysfunction medication (i.e.,Cialis, Viagra)   You may be asked to shower with a germ-killing soap.  Day of your procedure   You may take the following medication(s) the morning of surgery with a sip of water: PROTONIX, ZIAC      8 hours before your scheduled arrival time, STOP all food, any dairy products, and full liquids. This includes hard candy, chewing gum or mints. This is extremely important to prevent serious complications.     Up to 2 hours before your scheduled arrival time, you may have clear liquids no cream, powder, or pulp of any kind. Safe options are water, black coffee, plain tea, soda, Gatorade/Powerade, clear broth, apple juice.    2 hours  before your scheduled arrival time, STOP drinking clear liquids.    You may need to take another shower with a germ-killing soap before you leave home in the morning. Do not use perfumes, colognes, or body lotions.  Wear comfortable loose-fitting clothing.  Remove all jewelry including body piercing and rings, dark colored nail polish, and make up prior to arrival at the hospital. Leave all valuables at home.   Bring your hearing aids if you rely on them.  Do not wear contact lenses. If you wear eyeglasses remember to bring a case to store them in while you are in surgery.  Do not use denture adhesives since you will be asked to remove them during your surgery.    You do not need to bring your home medications into the hospital.   Bring your sleep apnea device with you on the day of your surgery (if this applies to you).  If you wear portable oxygen, bring it with you.   If you are staying overnight, you may bring a bag of items you may need such as slippers, robe and a change of clothes for your discharge. You may want to leave these items in the car until you are ready for them since your family will take your belongings when you leave the pre-operative area.  Arrive at the hospital as scheduled by the office. You will be asked to arrive 2 hours prior to your surgery time in order to prepare for your procedure.  When you arrive at the hospital  Go to the registration desk located at the main entrance of the hospital.  After registration is completed, you will be given a beeper and a sticker sheet. Take the stickers to Outpatient Surgery and place in the tray at the end of the desk to notify the staff that you have arrived and registered.   Return to the lobby to wait. You are not always called back according to the time of arrival but rather the time your doctor will be ready.  When your beeper lights up and vibrates proceed through the double doors, under the stairs, and a member of the Outpatient Surgery staff  will escort you to your preoperative room.             How to Use Chlorhexidine Before Surgery  Chlorhexidine gluconate (CHG) is a germ-killing (antiseptic) solution that is used to clean the skin. It can get rid of the bacteria that normally live on the skin and can keep them away for about 24 hours. To clean your skin with CHG, you may be given:  A CHG solution to use in the shower or as part of a sponge bath.  A prepackaged cloth that contains CHG.  Cleaning your skin with CHG may help lower the risk for infection:  While you are staying in the intensive care unit of the hospital.  If you have a vascular access, such as a central line, to provide short-term or long-term access to your veins.  If you have a catheter to drain urine from your bladder.  If you are on a ventilator. A ventilator is a machine that helps you breathe by moving air in and out of your lungs.  After surgery.  What are the risks?  Risks of using CHG include:  A skin reaction.  Hearing loss, if CHG gets in your ears and you have a perforated eardrum.  Eye injury, if CHG gets in your eyes and is not rinsed out.  The CHG product catching fire.  Make sure that you avoid smoking and flames after applying CHG to your skin.  Do not use CHG:  If you have a chlorhexidine allergy or have previously reacted to chlorhexidine.  On babies younger than 2 months of age.  How to use CHG solution  Use CHG only as told by your health care provider, and follow the instructions on the label.  Use the full amount of CHG as directed. Usually, this is one bottle.  During a shower    Follow these steps when using CHG solution during a shower (unless your health care provider gives you different instructions):  Start the shower.  Use your normal soap and shampoo to wash your face and hair.  Turn off the shower or move out of the shower stream.  Pour the CHG onto a clean washcloth. Do not use any type of brush or rough-edged sponge.  Starting at your neck, lather your  body down to your toes. Make sure you follow these instructions:  If you will be having surgery, pay special attention to the part of your body where you will be having surgery. Scrub this area for at least 1 minute.  Do not use CHG on your head or face. If the solution gets into your ears or eyes, rinse them well with water.  Avoid your genital area.  Avoid any areas of skin that have broken skin, cuts, or scrapes.  Scrub your back and under your arms. Make sure to wash skin folds.  Let the lather sit on your skin for 1-2 minutes or as long as told by your health care provider.  Thoroughly rinse your entire body in the shower. Make sure that all body creases and crevices are rinsed well.  Dry off with a clean towel. Do not put any substances on your body afterward--such as powder, lotion, or perfume--unless you are told to do so by your health care provider. Only use lotions that are recommended by the .  Put on clean clothes or pajamas.  If it is the night before your surgery, sleep in clean sheets.     During a sponge bath  Follow these steps when using CHG solution during a sponge bath (unless your health care provider gives you different instructions):  Use your normal soap and shampoo to wash your face and hair.  Pour the CHG onto a clean washcloth.  Starting at your neck, lather your body down to your toes. Make sure you follow these instructions:  If you will be having surgery, pay special attention to the part of your body where you will be having surgery. Scrub this area for at least 1 minute.  Do not use CHG on your head or face. If the solution gets into your ears or eyes, rinse them well with water.  Avoid your genital area.  Avoid any areas of skin that have broken skin, cuts, or scrapes.  Scrub your back and under your arms. Make sure to wash skin folds.  Let the lather sit on your skin for 1-2 minutes or as long as told by your health care provider.  Using a different clean, wet  washcloth, thoroughly rinse your entire body. Make sure that all body creases and crevices are rinsed well.  Dry off with a clean towel. Do not put any substances on your body afterward--such as powder, lotion, or perfume--unless you are told to do so by your health care provider. Only use lotions that are recommended by the .  Put on clean clothes or pajamas.  If it is the night before your surgery, sleep in clean sheets.  How to use CHG prepackaged cloths  Only use CHG cloths as told by your health care provider, and follow the instructions on the label.  Use the CHG cloth on clean, dry skin.  Do not use the CHG cloth on your head or face unless your health care provider tells you to.  When washing with the CHG cloth:  Avoid your genital area.  Avoid any areas of skin that have broken skin, cuts, or scrapes.  Before surgery    Follow these steps when using a CHG cloth to clean before surgery (unless your health care provider gives you different instructions):  Using the CHG cloth, vigorously scrub the part of your body where you will be having surgery. Scrub using a back-and-forth motion for 3 minutes. The area on your body should be completely wet with CHG when you are done scrubbing.  Do not rinse. Discard the cloth and let the area air-dry. Do not put any substances on the area afterward, such as powder, lotion, or perfume.  Put on clean clothes or pajamas.  If it is the night before your surgery, sleep in clean sheets.     For general bathing  Follow these steps when using CHG cloths for general bathing (unless your health care provider gives you different instructions).  Use a separate CHG cloth for each area of your body. Make sure you wash between any folds of skin and between your fingers and toes. Wash your body in the following order, switching to a new cloth after each step:  The front of your neck, shoulders, and chest.  Both of your arms, under your arms, and your hands.  Your stomach and  groin area, avoiding the genitals.  Your right leg and foot.  Your left leg and foot.  The back of your neck, your back, and your buttocks.  Do not rinse. Discard the cloth and let the area air-dry. Do not put any substances on your body afterward--such as powder, lotion, or perfume--unless you are told to do so by your health care provider. Only use lotions that are recommended by the .  Put on clean clothes or pajamas.  Contact a health care provider if:  Your skin gets irritated after scrubbing.  You have questions about using your solution or cloth.  You swallow any chlorhexidine. Call your local poison control center (1-626.892.7506 in the [place]  Get help right away if:  Your eyes itch badly, or they become very red or swollen.  Your skin itches badly and is red or swollen.  Your hearing changes.  You have trouble seeing.  You have swelling or tingling in your mouth or throat.  You have trouble breathing.  These symptoms may represent a serious problem that is an emergency. Do not wait to see if the symptoms will go away. Get medical help right away. Call your local emergency services (141 in the [place] Do not drive yourself to the hospital.  Summary  Chlorhexidine gluconate (CHG) is a germ-killing (antiseptic) solution that is used to clean the skin. Cleaning your skin with CHG may help to lower your risk for infection.  You may be given CHG to use for bathing. It may be in a bottle or in a prepackaged cloth to use on your skin. Carefully follow your health care provider's instructions and the instructions on the product label.  Do not use CHG if you have a chlorhexidine allergy.  Contact your health care provider if your skin gets irritated after scrubbing.  This information is not intended to replace advice given to you by your health care provider. Make sure you discuss any questions you have with your health care provider.  Document Revised: 04/17/2023 Document Reviewed: 02/28/2022  Jennifer  Patient Education © 2023 [employer]

## 2024-08-28 NOTE — TELEPHONE ENCOUNTER
Called to schedule 3mo MyChart f/u with pt, caller not available.  Okay for HUB or  to schedule if pt returns call.

## 2024-08-29 ENCOUNTER — TELEPHONE (OUTPATIENT)
Dept: SURGERY | Facility: CLINIC | Age: 44
End: 2024-08-29
Payer: COMMERCIAL

## 2024-08-29 DIAGNOSIS — C50.912 INVASIVE DUCTAL CARCINOMA OF BREAST, LEFT: ICD-10-CM

## 2024-08-29 DIAGNOSIS — C50.912 DUCTAL CARCINOMA OF LEFT BREAST: Primary | ICD-10-CM

## 2024-08-29 LAB — MRSA SPEC QL CULT: NORMAL

## 2024-08-29 RX ORDER — SODIUM CHLORIDE 0.9 % (FLUSH) 0.9 %
10 SYRINGE (ML) INJECTION AS NEEDED
OUTPATIENT
Start: 2024-09-02

## 2024-08-29 RX ORDER — SODIUM CHLORIDE 9 MG/ML
20 INJECTION, SOLUTION INTRAVENOUS ONCE
OUTPATIENT
Start: 2024-09-09

## 2024-08-29 RX ORDER — PALONOSETRON 0.05 MG/ML
0.25 INJECTION, SOLUTION INTRAVENOUS ONCE
OUTPATIENT
Start: 2024-09-09

## 2024-08-29 RX ORDER — FAMOTIDINE 10 MG/ML
20 INJECTION, SOLUTION INTRAVENOUS AS NEEDED
OUTPATIENT
Start: 2024-09-09

## 2024-08-29 RX ORDER — FAMOTIDINE 10 MG/ML
20 INJECTION, SOLUTION INTRAVENOUS ONCE
Start: 2024-09-09

## 2024-08-29 RX ORDER — HEPARIN SODIUM (PORCINE) LOCK FLUSH IV SOLN 100 UNIT/ML 100 UNIT/ML
500 SOLUTION INTRAVENOUS AS NEEDED
OUTPATIENT
Start: 2024-09-02

## 2024-08-29 RX ORDER — DIPHENHYDRAMINE HYDROCHLORIDE 50 MG/ML
50 INJECTION INTRAMUSCULAR; INTRAVENOUS AS NEEDED
OUTPATIENT
Start: 2024-09-09

## 2024-08-29 NOTE — TELEPHONE ENCOUNTER
"Called Uyen \"Leana\" to confirm her surgery date 08/30/2024 with an arrival time of 8:30AM.   Reminded her not to eat or drink after midnight.   Let her know to come through the main entrance of the hospital and check in at main registration.      Confirmed with patient.    "

## 2024-08-30 ENCOUNTER — HOSPITAL ENCOUNTER (OUTPATIENT)
Facility: HOSPITAL | Age: 44
Setting detail: HOSPITAL OUTPATIENT SURGERY
Discharge: HOME | End: 2024-08-30
Attending: STUDENT IN AN ORGANIZED HEALTH CARE EDUCATION/TRAINING PROGRAM | Admitting: STUDENT IN AN ORGANIZED HEALTH CARE EDUCATION/TRAINING PROGRAM
Payer: COMMERCIAL

## 2024-08-30 ENCOUNTER — ANESTHESIA (OUTPATIENT)
Dept: PERIOP | Facility: HOSPITAL | Age: 44
End: 2024-08-30
Payer: COMMERCIAL

## 2024-08-30 ENCOUNTER — ANESTHESIA EVENT (OUTPATIENT)
Dept: PERIOP | Facility: HOSPITAL | Age: 44
End: 2024-08-30
Payer: COMMERCIAL

## 2024-08-30 ENCOUNTER — APPOINTMENT (OUTPATIENT)
Dept: GENERAL RADIOLOGY | Facility: HOSPITAL | Age: 44
End: 2024-08-30
Payer: COMMERCIAL

## 2024-08-30 VITALS
HEART RATE: 61 BPM | DIASTOLIC BLOOD PRESSURE: 70 MMHG | SYSTOLIC BLOOD PRESSURE: 133 MMHG | TEMPERATURE: 96.3 F | RESPIRATION RATE: 16 BRPM | OXYGEN SATURATION: 99 %

## 2024-08-30 DIAGNOSIS — C50.912 INVASIVE DUCTAL CARCINOMA OF BREAST, LEFT: ICD-10-CM

## 2024-08-30 LAB — B-HCG UR QL: NEGATIVE

## 2024-08-30 PROCEDURE — 81025 URINE PREGNANCY TEST: CPT | Performed by: STUDENT IN AN ORGANIZED HEALTH CARE EDUCATION/TRAINING PROGRAM

## 2024-08-30 PROCEDURE — 25810000003 SODIUM CHLORIDE PER 500 ML: Performed by: STUDENT IN AN ORGANIZED HEALTH CARE EDUCATION/TRAINING PROGRAM

## 2024-08-30 PROCEDURE — 36561 INSERT TUNNELED CV CATH: CPT | Performed by: STUDENT IN AN ORGANIZED HEALTH CARE EDUCATION/TRAINING PROGRAM

## 2024-08-30 PROCEDURE — 25010000002 HEPARIN LOCK FLUSH PER 10 UNITS: Performed by: STUDENT IN AN ORGANIZED HEALTH CARE EDUCATION/TRAINING PROGRAM

## 2024-08-30 PROCEDURE — 25010000002 FENTANYL CITRATE (PF) 100 MCG/2ML SOLUTION: Performed by: NURSE ANESTHETIST, CERTIFIED REGISTERED

## 2024-08-30 PROCEDURE — C1788 PORT, INDWELLING, IMP: HCPCS | Performed by: STUDENT IN AN ORGANIZED HEALTH CARE EDUCATION/TRAINING PROGRAM

## 2024-08-30 PROCEDURE — 25010000002 PROPOFOL 1000 MG/100ML EMULSION: Performed by: NURSE ANESTHETIST, CERTIFIED REGISTERED

## 2024-08-30 PROCEDURE — 76000 FLUOROSCOPY <1 HR PHYS/QHP: CPT

## 2024-08-30 PROCEDURE — 25010000002 CEFAZOLIN PER 500 MG: Performed by: STUDENT IN AN ORGANIZED HEALTH CARE EDUCATION/TRAINING PROGRAM

## 2024-08-30 PROCEDURE — 77001 FLUOROGUIDE FOR VEIN DEVICE: CPT | Performed by: STUDENT IN AN ORGANIZED HEALTH CARE EDUCATION/TRAINING PROGRAM

## 2024-08-30 PROCEDURE — 25810000003 LACTATED RINGERS PER 1000 ML: Performed by: STUDENT IN AN ORGANIZED HEALTH CARE EDUCATION/TRAINING PROGRAM

## 2024-08-30 DEVICE — PRT INTRO VASC/INTERV VORTEX FILL/HL DETACH/POLYURET/CATH 8F: Type: IMPLANTABLE DEVICE | Site: CHEST | Status: FUNCTIONAL

## 2024-08-30 RX ORDER — ONDANSETRON 4 MG/1
4 TABLET, FILM COATED ORAL EVERY 8 HOURS PRN
Qty: 15 TABLET | Refills: 0 | Status: SHIPPED | OUTPATIENT
Start: 2024-08-30 | End: 2025-08-30

## 2024-08-30 RX ORDER — LABETALOL HYDROCHLORIDE 5 MG/ML
5 INJECTION, SOLUTION INTRAVENOUS
Status: DISCONTINUED | OUTPATIENT
Start: 2024-08-30 | End: 2024-08-30 | Stop reason: HOSPADM

## 2024-08-30 RX ORDER — TRAMADOL HYDROCHLORIDE 50 MG/1
50 TABLET ORAL EVERY 8 HOURS PRN
Qty: 5 TABLET | Refills: 0 | Status: SHIPPED | OUTPATIENT
Start: 2024-08-30 | End: 2024-09-02

## 2024-08-30 RX ORDER — HEPARIN SODIUM (PORCINE) LOCK FLUSH IV SOLN 100 UNIT/ML 100 UNIT/ML
SOLUTION INTRAVENOUS AS NEEDED
Status: DISCONTINUED | OUTPATIENT
Start: 2024-08-30 | End: 2024-08-30 | Stop reason: HOSPADM

## 2024-08-30 RX ORDER — FENTANYL CITRATE 50 UG/ML
INJECTION, SOLUTION INTRAMUSCULAR; INTRAVENOUS AS NEEDED
Status: DISCONTINUED | OUTPATIENT
Start: 2024-08-30 | End: 2024-08-30 | Stop reason: SURG

## 2024-08-30 RX ORDER — SODIUM CHLORIDE 9 MG/ML
INJECTION, SOLUTION INTRAVENOUS AS NEEDED
Status: DISCONTINUED | OUTPATIENT
Start: 2024-08-30 | End: 2024-08-30 | Stop reason: HOSPADM

## 2024-08-30 RX ORDER — DROPERIDOL 2.5 MG/ML
0.62 INJECTION, SOLUTION INTRAMUSCULAR; INTRAVENOUS ONCE AS NEEDED
Status: DISCONTINUED | OUTPATIENT
Start: 2024-08-30 | End: 2024-08-30 | Stop reason: HOSPADM

## 2024-08-30 RX ORDER — IBUPROFEN 200 MG
600 TABLET ORAL EVERY 8 HOURS
Start: 2024-08-30 | End: 2025-08-30

## 2024-08-30 RX ORDER — IBUPROFEN 600 MG/1
600 TABLET, FILM COATED ORAL EVERY 6 HOURS PRN
Status: DISCONTINUED | OUTPATIENT
Start: 2024-08-30 | End: 2024-08-30 | Stop reason: HOSPADM

## 2024-08-30 RX ORDER — SODIUM CHLORIDE 0.9 % (FLUSH) 0.9 %
3 SYRINGE (ML) INJECTION AS NEEDED
Status: DISCONTINUED | OUTPATIENT
Start: 2024-08-30 | End: 2024-08-30 | Stop reason: HOSPADM

## 2024-08-30 RX ORDER — PROPOFOL 10 MG/ML
INJECTION, EMULSION INTRAVENOUS AS NEEDED
Status: DISCONTINUED | OUTPATIENT
Start: 2024-08-30 | End: 2024-08-30 | Stop reason: SURG

## 2024-08-30 RX ORDER — SODIUM CHLORIDE, SODIUM LACTATE, POTASSIUM CHLORIDE, CALCIUM CHLORIDE 600; 310; 30; 20 MG/100ML; MG/100ML; MG/100ML; MG/100ML
100 INJECTION, SOLUTION INTRAVENOUS CONTINUOUS
Status: DISCONTINUED | OUTPATIENT
Start: 2024-08-30 | End: 2024-08-30 | Stop reason: HOSPADM

## 2024-08-30 RX ORDER — MAGNESIUM HYDROXIDE 1200 MG/15ML
LIQUID ORAL AS NEEDED
Status: DISCONTINUED | OUTPATIENT
Start: 2024-08-30 | End: 2024-08-30 | Stop reason: HOSPADM

## 2024-08-30 RX ORDER — MIDAZOLAM HYDROCHLORIDE 2 MG/2ML
1 INJECTION, SOLUTION INTRAMUSCULAR; INTRAVENOUS
Status: DISCONTINUED | OUTPATIENT
Start: 2024-08-30 | End: 2024-08-30 | Stop reason: HOSPADM

## 2024-08-30 RX ORDER — ACETAMINOPHEN 325 MG/1
975 TABLET ORAL EVERY 8 HOURS
Start: 2024-08-30 | End: 2025-08-30

## 2024-08-30 RX ORDER — SODIUM CHLORIDE 0.9 % (FLUSH) 0.9 %
3 SYRINGE (ML) INJECTION EVERY 12 HOURS SCHEDULED
Status: DISCONTINUED | OUTPATIENT
Start: 2024-08-30 | End: 2024-08-30 | Stop reason: HOSPADM

## 2024-08-30 RX ORDER — FLUMAZENIL 0.1 MG/ML
0.2 INJECTION INTRAVENOUS AS NEEDED
Status: DISCONTINUED | OUTPATIENT
Start: 2024-08-30 | End: 2024-08-30 | Stop reason: HOSPADM

## 2024-08-30 RX ORDER — SODIUM CHLORIDE, SODIUM LACTATE, POTASSIUM CHLORIDE, CALCIUM CHLORIDE 600; 310; 30; 20 MG/100ML; MG/100ML; MG/100ML; MG/100ML
1000 INJECTION, SOLUTION INTRAVENOUS CONTINUOUS
Status: DISCONTINUED | OUTPATIENT
Start: 2024-08-30 | End: 2024-08-30 | Stop reason: HOSPADM

## 2024-08-30 RX ORDER — LIDOCAINE HYDROCHLORIDE AND EPINEPHRINE 10; 10 MG/ML; UG/ML
INJECTION, SOLUTION INFILTRATION; PERINEURAL AS NEEDED
Status: DISCONTINUED | OUTPATIENT
Start: 2024-08-30 | End: 2024-08-30 | Stop reason: HOSPADM

## 2024-08-30 RX ORDER — SODIUM CHLORIDE 0.9 % (FLUSH) 0.9 %
3-10 SYRINGE (ML) INJECTION AS NEEDED
Status: DISCONTINUED | OUTPATIENT
Start: 2024-08-30 | End: 2024-08-30 | Stop reason: HOSPADM

## 2024-08-30 RX ORDER — HYDROCODONE BITARTRATE AND ACETAMINOPHEN 5; 325 MG/1; MG/1
1 TABLET ORAL EVERY 4 HOURS PRN
Status: DISCONTINUED | OUTPATIENT
Start: 2024-08-30 | End: 2024-08-30 | Stop reason: HOSPADM

## 2024-08-30 RX ORDER — SODIUM CHLORIDE 9 MG/ML
40 INJECTION, SOLUTION INTRAVENOUS AS NEEDED
Status: DISCONTINUED | OUTPATIENT
Start: 2024-08-30 | End: 2024-08-30 | Stop reason: HOSPADM

## 2024-08-30 RX ORDER — LIDOCAINE HYDROCHLORIDE 10 MG/ML
0.5 INJECTION, SOLUTION EPIDURAL; INFILTRATION; INTRACAUDAL; PERINEURAL ONCE AS NEEDED
Status: DISCONTINUED | OUTPATIENT
Start: 2024-08-30 | End: 2024-08-30 | Stop reason: HOSPADM

## 2024-08-30 RX ORDER — HYDROCODONE BITARTRATE AND ACETAMINOPHEN 10; 325 MG/1; MG/1
1 TABLET ORAL EVERY 4 HOURS PRN
Status: DISCONTINUED | OUTPATIENT
Start: 2024-08-30 | End: 2024-08-30 | Stop reason: HOSPADM

## 2024-08-30 RX ORDER — HEPARIN SODIUM 5000 [USP'U]/ML
5000 INJECTION, SOLUTION INTRAVENOUS; SUBCUTANEOUS EVERY 8 HOURS SCHEDULED
Status: DISCONTINUED | OUTPATIENT
Start: 2024-08-30 | End: 2024-08-30

## 2024-08-30 RX ORDER — ONDANSETRON 2 MG/ML
4 INJECTION INTRAMUSCULAR; INTRAVENOUS ONCE AS NEEDED
Status: DISCONTINUED | OUTPATIENT
Start: 2024-08-30 | End: 2024-08-30 | Stop reason: HOSPADM

## 2024-08-30 RX ORDER — FENTANYL CITRATE 50 UG/ML
50 INJECTION, SOLUTION INTRAMUSCULAR; INTRAVENOUS
Status: DISCONTINUED | OUTPATIENT
Start: 2024-08-30 | End: 2024-08-30 | Stop reason: HOSPADM

## 2024-08-30 RX ORDER — NALOXONE HCL 0.4 MG/ML
0.4 VIAL (ML) INJECTION AS NEEDED
Status: DISCONTINUED | OUTPATIENT
Start: 2024-08-30 | End: 2024-08-30 | Stop reason: HOSPADM

## 2024-08-30 RX ADMIN — PROPOFOL INJECTABLE EMULSION 150 MCG/KG/MIN: 10 INJECTION, EMULSION INTRAVENOUS at 10:03

## 2024-08-30 RX ADMIN — CEFAZOLIN 2 G: 2 INJECTION, POWDER, FOR SOLUTION INTRAMUSCULAR; INTRAVENOUS at 10:02

## 2024-08-30 RX ADMIN — FENTANYL CITRATE 100 MCG: 50 INJECTION, SOLUTION INTRAMUSCULAR; INTRAVENOUS at 10:02

## 2024-08-30 RX ADMIN — SODIUM CHLORIDE, POTASSIUM CHLORIDE, SODIUM LACTATE AND CALCIUM CHLORIDE 1000 ML: 600; 310; 30; 20 INJECTION, SOLUTION INTRAVENOUS at 08:36

## 2024-08-30 RX ADMIN — PROPOFOL INJECTABLE EMULSION 100 MG: 10 INJECTION, EMULSION INTRAVENOUS at 10:02

## 2024-08-30 NOTE — ANESTHESIA PREPROCEDURE EVALUATION
Anesthesia Evaluation     Patient summary reviewed   no history of anesthetic complications:   NPO Solid Status: > 8 hours  NPO Liquid Status: > 8 hours           Airway   Mallampati: I  TM distance: >3 FB  Neck ROM: full  No difficulty expected  Dental - normal exam     Pulmonary    (+) a smoker Current, Smoked day of surgery,  Cardiovascular   Exercise tolerance: good (4-7 METS)    ECG reviewed    (+) hypertension      Neuro/Psych  (+) psychiatric history Anxiety  (-) seizures, TIA, CVA  GI/Hepatic/Renal/Endo    (+) GERD, thyroid problem hypothyroidism  (-) no renal disease, diabetes    Musculoskeletal     Abdominal    Substance History      OB/GYN          Other                          Anesthesia Plan    ASA 2     MAC     intravenous induction     Anesthetic plan, risks, benefits, and alternatives have been provided, discussed and informed consent has been obtained with: patient.

## 2024-08-30 NOTE — OP NOTE
Port-A-Cath Placement with Fluoroscopy Operative Report:     Patient: Uyen José  MRN: [id number]    YOB: 1980  Age: 43 y.o.  Sex: female  Unit:  PAD OR Room/Bed: PAD OR/MAIN OR Location: [facility]      Admitting Physician: JESUS AMES    Primary Care Physician: Josi Ivory APRN             INDICATIONS: This is a 43 y.o. female who presents with breast cancer as indication for port placement.     DATE OF OPERATION: 8/30/2024     Surgeons and Role:     * Jesus Ames MD - Primary  Deedee Briones PA-C - Assist     ANESTHESIA: Monitored Anesthesia Care     PREOPERATIVE DIAGNOSIS: Invasive ductal carcinoma of breast, left [C50.912]    POSTOPERATIVE DIAGNOSIS: Same    PROCEDURES PERFORMED:  Port-A-Cath placement in the right cephalic vein     PROCEDURE DETAILS:     After patient was placed on the table in a supine position the bilateral chest and neck were prepped with ChloraPrep and draped in the usual fashion.  Preoperative antibiotics were given.  A timeout was performed.    On the right, the skin overlying the deltopectoral groove was infiltrated with 1% lidocaine with epinephrine. An incision was made, and I dissected down to the deltopectoral groove with a combination of cautery and sharp dissection. The cephalic vein was identified. 2-0 silk ties were placed proximally and distally. The distal tie was tied down.  An 11 blade was used to create a venotomy. The yellow pic was used to open the vein and the catheter was inserted. Under live fluoroscopy, the catheter was noted to be in the SVC. The proximal tie was tied down, ensuring not to narrow the lumen of the catheter in the vein. A pocket was created in the subcutaneous tissue for the port.  The catheter was connected to the Port-A-Cath which was buried into the subcutaneous pocket and secured with 0-Vicryl sutures.  The pocket was closed with 3-0 Vicryl and then 4-0 Monocryl.  The port was accessed  through the skin, blood was easily aspirated, and it was flushed with a final heparin flush. Skin glue was placed over the incision.  The patient tolerated procedure well.  There were no complications. No x-ray needed in PACU     Deedee Briones PA-C was responsible for performing the following activities: Retraction, Suction, Irrigation, Suturing, Closing, and Placing Dressing and their skilled assistance was necessary for the success of this case.    Findings: Port-a-cath placed in the righ cephalic vein   Estimated Blood Loss:  20 mL   Complications: none apparent           Specimens: None      Disposition: PACU - hemodynamically stable.           Condition: stable    Shahla Gentile MD  08/26/2024

## 2024-08-30 NOTE — ANESTHESIA POSTPROCEDURE EVALUATION
Patient: Uyen José    Procedure Summary       Date: 08/30/24 Room / Location:  PAD OR 09 /  PAD OR    Anesthesia Start: 0955 Anesthesia Stop: 1032    Procedure: Single Lumen Port-a-cath insertion with flouroscopy (Chin to Nipples) Diagnosis:       Invasive ductal carcinoma of breast, left      (Invasive ductal carcinoma of breast, left [C50.912])    Surgeons: Shahla Gentile MD Provider: Christopher Edwards CRNA    Anesthesia Type: MAC ASA Status: 2            Anesthesia Type: MAC    Vitals  Vitals Value Taken Time   /70 08/30/24 1101   Temp 96.3 °F (35.7 °C) 08/30/24 1032   Pulse 59 08/30/24 1105   Resp 16 08/30/24 1032   SpO2 100 % 08/30/24 1105   Vitals shown include unfiled device data.        Post Anesthesia Care and Evaluation    Patient location during evaluation: PHASE II  Patient participation: complete - patient participated  Level of consciousness: awake and alert  Pain management: adequate    Airway patency: patent  Anesthetic complications: No anesthetic complications  PONV Status: none  Cardiovascular status: acceptable and stable  Respiratory status: acceptable and unassisted  Hydration status: acceptable

## 2024-09-03 ENCOUNTER — CLINICAL SUPPORT (OUTPATIENT)
Dept: ONCOLOGY | Facility: CLINIC | Age: 44
End: 2024-09-03
Payer: COMMERCIAL

## 2024-09-03 ENCOUNTER — TELEPHONE (OUTPATIENT)
Dept: SURGERY | Facility: CLINIC | Age: 44
End: 2024-09-03
Payer: COMMERCIAL

## 2024-09-03 DIAGNOSIS — L64.0 DRUG-INDUCED ANDROGENIC ALOPECIA: Primary | ICD-10-CM

## 2024-09-03 DIAGNOSIS — L53.9 REDNESS: ICD-10-CM

## 2024-09-03 DIAGNOSIS — L29.9 ITCHING: Primary | ICD-10-CM

## 2024-09-03 DIAGNOSIS — C50.912 INVASIVE DUCTAL CARCINOMA OF BREAST, LEFT: Primary | ICD-10-CM

## 2024-09-03 RX ORDER — LIDOCAINE/PRILOCAINE 2.5 %-2.5%
CREAM (GRAM) TOPICAL
Qty: 30 G | Refills: 1 | Status: SHIPPED | OUTPATIENT
Start: 2024-09-03

## 2024-09-03 NOTE — TELEPHONE ENCOUNTER
Post OP phone call visit:    Type of surgery: Single lumen port a cath insertion.       Unable to reach patient. No voicemail available to leave message.

## 2024-09-03 NOTE — PROGRESS NOTES
Subjective     PATIENT NAME:  Uyen José  YOB: 1980  PATIENTS AGE:  43 y.o.  PATIENTS SEX:  female  DATE OF SERVICE:  09/03/2024  PROVIDER:  [facility] NURSE      ____________________PATIENT EDUCATION____________________    PATIENT EDUCATION:  Today I met with the patient to discuss the chemotherapy regimen recommended for treatment of her disease breast cancer.  The patient was given explanation of treatment premed side effects including office policy that prohibits patients to drive if sedating medications are administered, MD explanation given regarding benefits, side effects, toxicities and goals of treatment.  The patient received a Chemotherapy/Biotherapy Plan Summary including diagnosis and specific treatment plan.    SIDE EFFECTS:  Common side effects were discussed with the patient.  Discussion included hair loss/discoloration, anemia/fatigue, infection/chills/fever, appetite, bleeding risk/precautions, constipation, diarrhea, mouth sores, taste alteration, loss of appetite,nausea/vomiting, peripheral neuropathy, skin/nail changes, rash, muscle aches/weakness, photosensitivity, weight gain/loss, hearing loss, dizziness, menopausal symptoms, menstrrual irregularity, sterility, high blood pressure, heart damage, liver damage, lung damage, kidney damage, DVT/PE risk, fluid retention, pleural/pericardial effusion, somnolence, electrolyte/LFT imbalance.  The patient was advice that although uncommon, leakage of an infused medication from the vein or venous access device (port) may lead to skin breakdown and/or other tissue damage.  The patient was advised that he/she may have pain, bleeding, and/or bruising from the insertion of a needle in their vein or venous access device (port).  The patient was further advised that, in spite of proper technique, infection with redness and irritation may rarely occur at the site where the needle was inserted.  The patient was advised that if  complications occur, additional medical treatment is available.    Discussion also included side effects specific to drugs in the treatment plan, specifically Carboplatin, Docetaxel, Perjeta, Trastuzumab, and neulasta onpro.    Reproductive risks were discussed, including appropriate use of birth control and protection during sexual relations.    A total of 65 minutes were spent with the patient, with 100% of time spent in education and counseling.

## 2024-09-09 ENCOUNTER — OFFICE VISIT (OUTPATIENT)
Dept: SURGERY | Facility: CLINIC | Age: 44
End: 2024-09-09
Payer: COMMERCIAL

## 2024-09-09 VITALS
HEART RATE: 70 BPM | BODY MASS INDEX: 25.62 KG/M2 | WEIGHT: 163.2 LBS | DIASTOLIC BLOOD PRESSURE: 72 MMHG | SYSTOLIC BLOOD PRESSURE: 122 MMHG | HEIGHT: 67 IN | OXYGEN SATURATION: 99 %

## 2024-09-09 DIAGNOSIS — Z95.828 PORT-A-CATH IN PLACE: Primary | ICD-10-CM

## 2024-09-09 PROCEDURE — 99212 OFFICE O/P EST SF 10 MIN: CPT

## 2024-09-09 NOTE — PROGRESS NOTES
"Patient: Uyen José    YOB: 1980    Date: 09/09/2024    Primary Care Provider: Josi Ivory APRN    Vital Signs:   Vitals:    09/09/24 0902   BP: 122/72   BP Location: Left arm   Patient Position: Sitting   Cuff Size: Adult   Pulse: 70   SpO2: 99%   Weight: 74 kg (163 lb 3.2 oz)   Height: 169 cm (66.54\")       The patient is tolerating a regular diet and has no complaints s/p port placement by Dr. Gentile on 8/30/24. The patient denies fevers, chills, nausea, vomiting, and excessive pain. Incision is healing well with no signs of infection or wound dehiscence. The previous area of redness that she sent a picture in of has resolved. She is supposed to start chemotherapy next Monday.       Assessment / Plan:    Diagnoses and all orders for this visit:    1. Port-A-Cath in place (Primary)        Uyen José is 2 weeks s/p port placement. She is overall doing well. At this time, the patient is able to return to normal activity as tolerated. She voiced understanding of this. I instructed her that if there are any problems with the port, such as the inability to flush or access the port, to give our office a call for an appointment. She is agreeable to the plan. She will follow up with Dr. Gentile in 3 months for check in regarding treatments and possible surgery.       Follow up:     Return in about 3 months (around 12/9/2024) for 3 month breast f/u with [facility].      Electronically signed by Deedee Briones PA-C  09/09/24  11:20 CDT                   "

## 2024-09-10 ENCOUNTER — TELEPHONE (OUTPATIENT)
Dept: FAMILY MEDICINE CLINIC | Facility: CLINIC | Age: 44
End: 2024-09-10
Payer: COMMERCIAL

## 2024-09-15 ENCOUNTER — HOSPITAL ENCOUNTER (EMERGENCY)
Facility: HOSPITAL | Age: 44
Discharge: HOME | End: 2024-09-15
Attending: EMERGENCY MEDICINE | Admitting: EMERGENCY MEDICINE
Payer: COMMERCIAL

## 2024-09-15 ENCOUNTER — APPOINTMENT (OUTPATIENT)
Dept: CT IMAGING | Facility: HOSPITAL | Age: 44
End: 2024-09-15
Payer: COMMERCIAL

## 2024-09-15 VITALS
RESPIRATION RATE: 18 BRPM | WEIGHT: 163.14 LBS | HEART RATE: 55 BPM | BODY MASS INDEX: 26.22 KG/M2 | DIASTOLIC BLOOD PRESSURE: 88 MMHG | TEMPERATURE: 97.9 F | SYSTOLIC BLOOD PRESSURE: 133 MMHG | HEIGHT: 66 IN | OXYGEN SATURATION: 94 %

## 2024-09-15 DIAGNOSIS — R10.2 PELVIC PAIN: ICD-10-CM

## 2024-09-15 DIAGNOSIS — N85.7 HEMATOMETRA: Primary | ICD-10-CM

## 2024-09-15 LAB
ALBUMIN SERPL-MCNC: 4.1 G/DL (ref 3.5–5.2)
ALBUMIN/GLOB SERPL: 1.5 G/DL
ALP SERPL-CCNC: 84 U/L (ref 39–117)
ALT SERPL W P-5'-P-CCNC: 9 U/L (ref 1–33)
ANION GAP SERPL CALCULATED.3IONS-SCNC: 8 MMOL/L (ref 5–15)
AST SERPL-CCNC: 13 U/L (ref 1–32)
BACTERIA UR QL AUTO: ABNORMAL /HPF
BASOPHILS # BLD AUTO: 0.08 10*3/MM3 (ref 0–0.2)
BASOPHILS NFR BLD AUTO: 0.5 % (ref 0–1.5)
BILIRUB SERPL-MCNC: 0.3 MG/DL (ref 0–1.2)
BILIRUB UR QL STRIP: NEGATIVE
BUN SERPL-MCNC: 13 MG/DL (ref 6–20)
BUN/CREAT SERPL: 15.3 (ref 7–25)
CALCIUM SPEC-SCNC: 8.8 MG/DL (ref 8.6–10.5)
CHLORIDE SERPL-SCNC: 101 MMOL/L (ref 98–107)
CLARITY UR: CLEAR
CO2 SERPL-SCNC: 30 MMOL/L (ref 22–29)
COLOR UR: YELLOW
CREAT SERPL-MCNC: 0.85 MG/DL (ref 0.57–1)
DEPRECATED RDW RBC AUTO: 44.2 FL (ref 37–54)
EGFRCR SERPLBLD CKD-EPI 2021: 86.8 ML/MIN/1.73
EOSINOPHIL # BLD AUTO: 0.9 10*3/MM3 (ref 0–0.4)
EOSINOPHIL NFR BLD AUTO: 6.1 % (ref 0.3–6.2)
ERYTHROCYTE [DISTWIDTH] IN BLOOD BY AUTOMATED COUNT: 13.6 % (ref 12.3–15.4)
GLOBULIN UR ELPH-MCNC: 2.8 GM/DL
GLUCOSE SERPL-MCNC: 100 MG/DL (ref 65–99)
GLUCOSE UR STRIP-MCNC: NEGATIVE MG/DL
HCT VFR BLD AUTO: 42.3 % (ref 34–46.6)
HGB BLD-MCNC: 13.9 G/DL (ref 12–15.9)
HGB UR QL STRIP.AUTO: NEGATIVE
HYALINE CASTS UR QL AUTO: ABNORMAL /LPF
IMM GRANULOCYTES # BLD AUTO: 0.05 10*3/MM3 (ref 0–0.05)
IMM GRANULOCYTES NFR BLD AUTO: 0.3 % (ref 0–0.5)
INR PPP: 0.93 (ref 0.91–1.09)
KETONES UR QL STRIP: NEGATIVE
LEUKOCYTE ESTERASE UR QL STRIP.AUTO: ABNORMAL
LIPASE SERPL-CCNC: 33 U/L (ref 13–60)
LYMPHOCYTES # BLD AUTO: 2.25 10*3/MM3 (ref 0.7–3.1)
LYMPHOCYTES NFR BLD AUTO: 15.1 % (ref 19.6–45.3)
MAGNESIUM SERPL-MCNC: 2 MG/DL (ref 1.6–2.6)
MCH RBC QN AUTO: 29.2 PG (ref 26.6–33)
MCHC RBC AUTO-ENTMCNC: 32.9 G/DL (ref 31.5–35.7)
MCV RBC AUTO: 88.9 FL (ref 79–97)
MONOCYTES # BLD AUTO: 0.97 10*3/MM3 (ref 0.1–0.9)
MONOCYTES NFR BLD AUTO: 6.5 % (ref 5–12)
NEUTROPHILS NFR BLD AUTO: 10.61 10*3/MM3 (ref 1.7–7)
NEUTROPHILS NFR BLD AUTO: 71.5 % (ref 42.7–76)
NITRITE UR QL STRIP: NEGATIVE
NRBC BLD AUTO-RTO: 0 /100 WBC (ref 0–0.2)
PH UR STRIP.AUTO: 7 [PH] (ref 5–8)
PLATELET # BLD AUTO: 230 10*3/MM3 (ref 140–450)
PMV BLD AUTO: 12 FL (ref 6–12)
POTASSIUM SERPL-SCNC: 4.1 MMOL/L (ref 3.5–5.2)
PROT SERPL-MCNC: 6.9 G/DL (ref 6–8.5)
PROT UR QL STRIP: NEGATIVE
PROTHROMBIN TIME: 12.8 SECONDS (ref 11.8–14.8)
RBC # BLD AUTO: 4.76 10*6/MM3 (ref 3.77–5.28)
RBC # UR STRIP: ABNORMAL /HPF
REF LAB TEST METHOD: ABNORMAL
SODIUM SERPL-SCNC: 139 MMOL/L (ref 136–145)
SP GR UR STRIP: 1.01 (ref 1–1.03)
SQUAMOUS #/AREA URNS HPF: ABNORMAL /HPF
UROBILINOGEN UR QL STRIP: ABNORMAL
WBC # UR STRIP: ABNORMAL /HPF
WBC NRBC COR # BLD AUTO: 14.86 10*3/MM3 (ref 3.4–10.8)

## 2024-09-15 PROCEDURE — 25010000002 KETOROLAC TROMETHAMINE PER 15 MG: Performed by: EMERGENCY MEDICINE

## 2024-09-15 PROCEDURE — 96375 TX/PRO/DX INJ NEW DRUG ADDON: CPT

## 2024-09-15 PROCEDURE — 80053 COMPREHEN METABOLIC PANEL: CPT | Performed by: EMERGENCY MEDICINE

## 2024-09-15 PROCEDURE — 96374 THER/PROPH/DIAG INJ IV PUSH: CPT

## 2024-09-15 PROCEDURE — 87086 URINE CULTURE/COLONY COUNT: CPT | Performed by: EMERGENCY MEDICINE

## 2024-09-15 PROCEDURE — 25810000003 LACTATED RINGERS SOLUTION: Performed by: EMERGENCY MEDICINE

## 2024-09-15 PROCEDURE — 99285 EMERGENCY DEPT VISIT HI MDM: CPT

## 2024-09-15 PROCEDURE — 25010000002 MORPHINE PER 10 MG: Performed by: EMERGENCY MEDICINE

## 2024-09-15 PROCEDURE — 81001 URINALYSIS AUTO W/SCOPE: CPT | Performed by: EMERGENCY MEDICINE

## 2024-09-15 PROCEDURE — 83735 ASSAY OF MAGNESIUM: CPT | Performed by: EMERGENCY MEDICINE

## 2024-09-15 PROCEDURE — 25010000002 HYDROMORPHONE PER 4 MG: Performed by: EMERGENCY MEDICINE

## 2024-09-15 PROCEDURE — 25510000001 IOPAMIDOL 61 % SOLUTION: Performed by: EMERGENCY MEDICINE

## 2024-09-15 PROCEDURE — 36415 COLL VENOUS BLD VENIPUNCTURE: CPT

## 2024-09-15 PROCEDURE — 85025 COMPLETE CBC W/AUTO DIFF WBC: CPT | Performed by: EMERGENCY MEDICINE

## 2024-09-15 PROCEDURE — 85610 PROTHROMBIN TIME: CPT | Performed by: EMERGENCY MEDICINE

## 2024-09-15 PROCEDURE — 83690 ASSAY OF LIPASE: CPT | Performed by: EMERGENCY MEDICINE

## 2024-09-15 PROCEDURE — 74177 CT ABD & PELVIS W/CONTRAST: CPT

## 2024-09-15 RX ORDER — HYDROCODONE BITARTRATE AND ACETAMINOPHEN 10; 325 MG/1; MG/1
1 TABLET ORAL EVERY 4 HOURS PRN
Qty: 6 TABLET | Refills: 0 | Status: SHIPPED | OUTPATIENT
Start: 2024-09-15 | End: 2024-09-18 | Stop reason: SDUPTHER

## 2024-09-15 RX ORDER — IOPAMIDOL 612 MG/ML
100 INJECTION, SOLUTION INTRAVASCULAR
Status: COMPLETED | OUTPATIENT
Start: 2024-09-15 | End: 2024-09-15

## 2024-09-15 RX ORDER — SODIUM CHLORIDE 0.9 % (FLUSH) 0.9 %
10 SYRINGE (ML) INJECTION AS NEEDED
Status: DISCONTINUED | OUTPATIENT
Start: 2024-09-15 | End: 2024-09-15 | Stop reason: HOSPADM

## 2024-09-15 RX ORDER — HYDROMORPHONE HYDROCHLORIDE 1 MG/ML
0.5 INJECTION, SOLUTION INTRAMUSCULAR; INTRAVENOUS; SUBCUTANEOUS ONCE
Status: COMPLETED | OUTPATIENT
Start: 2024-09-15 | End: 2024-09-15

## 2024-09-15 RX ORDER — KETOROLAC TROMETHAMINE 15 MG/ML
15 INJECTION, SOLUTION INTRAMUSCULAR; INTRAVENOUS ONCE
Status: COMPLETED | OUTPATIENT
Start: 2024-09-15 | End: 2024-09-15

## 2024-09-15 RX ADMIN — MORPHINE SULFATE 4 MG: 4 INJECTION, SOLUTION INTRAMUSCULAR; INTRAVENOUS at 09:39

## 2024-09-15 RX ADMIN — KETOROLAC TROMETHAMINE 15 MG: 15 INJECTION, SOLUTION INTRAMUSCULAR; INTRAVENOUS at 07:34

## 2024-09-15 RX ADMIN — HYDROMORPHONE HYDROCHLORIDE 0.5 MG: 1 INJECTION, SOLUTION INTRAMUSCULAR; INTRAVENOUS; SUBCUTANEOUS at 07:34

## 2024-09-15 RX ADMIN — SODIUM CHLORIDE, POTASSIUM CHLORIDE, SODIUM LACTATE AND CALCIUM CHLORIDE 1000 ML: 600; 310; 30; 20 INJECTION, SOLUTION INTRAVENOUS at 07:33

## 2024-09-15 RX ADMIN — IOPAMIDOL 100 ML: 612 INJECTION, SOLUTION INTRAVENOUS at 08:12

## 2024-09-16 ENCOUNTER — OFFICE VISIT (OUTPATIENT)
Age: 44
End: 2024-09-16
Payer: COMMERCIAL

## 2024-09-16 ENCOUNTER — TELEPHONE (OUTPATIENT)
Dept: ONCOLOGY | Facility: CLINIC | Age: 44
End: 2024-09-16
Payer: COMMERCIAL

## 2024-09-16 ENCOUNTER — TELEPHONE (OUTPATIENT)
Dept: OBSTETRICS AND GYNECOLOGY | Age: 44
End: 2024-09-16
Payer: COMMERCIAL

## 2024-09-16 ENCOUNTER — TELEPHONE (OUTPATIENT)
Age: 44
End: 2024-09-16
Payer: COMMERCIAL

## 2024-09-16 VITALS
WEIGHT: 168 LBS | HEIGHT: 66 IN | SYSTOLIC BLOOD PRESSURE: 102 MMHG | DIASTOLIC BLOOD PRESSURE: 68 MMHG | BODY MASS INDEX: 27 KG/M2

## 2024-09-16 DIAGNOSIS — F17.200 SMOKER: ICD-10-CM

## 2024-09-16 DIAGNOSIS — N85.7 HEMATOMETRA: Primary | ICD-10-CM

## 2024-09-16 DIAGNOSIS — R10.2 PELVIC PAIN IN FEMALE: ICD-10-CM

## 2024-09-16 LAB — BACTERIA SPEC AEROBE CULT: NO GROWTH

## 2024-09-16 PROCEDURE — 99214 OFFICE O/P EST MOD 30 MIN: CPT | Performed by: OBSTETRICS & GYNECOLOGY

## 2024-09-16 RX ORDER — GABAPENTIN 100 MG/1
600 CAPSULE ORAL ONCE
Status: CANCELLED | OUTPATIENT
Start: 2024-09-16 | End: 2024-09-16

## 2024-09-16 RX ORDER — PHENAZOPYRIDINE HYDROCHLORIDE 100 MG/1
200 TABLET, FILM COATED ORAL ONCE
Status: CANCELLED | OUTPATIENT
Start: 2024-09-16 | End: 2024-09-16

## 2024-09-16 RX ORDER — SODIUM CHLORIDE 0.9 % (FLUSH) 0.9 %
10 SYRINGE (ML) INJECTION EVERY 12 HOURS SCHEDULED
Status: CANCELLED | OUTPATIENT
Start: 2024-09-16

## 2024-09-16 RX ORDER — SODIUM CHLORIDE 0.9 % (FLUSH) 0.9 %
10 SYRINGE (ML) INJECTION AS NEEDED
Status: CANCELLED | OUTPATIENT
Start: 2024-09-16

## 2024-09-16 RX ORDER — SCOLOPAMINE TRANSDERMAL SYSTEM 1 MG/1
1 PATCH, EXTENDED RELEASE TRANSDERMAL CONTINUOUS
Status: CANCELLED | OUTPATIENT
Start: 2024-09-16 | End: 2024-09-19

## 2024-09-16 RX ORDER — SODIUM CHLORIDE, SODIUM LACTATE, POTASSIUM CHLORIDE, CALCIUM CHLORIDE 600; 310; 30; 20 MG/100ML; MG/100ML; MG/100ML; MG/100ML
125 INJECTION, SOLUTION INTRAVENOUS CONTINUOUS
Status: CANCELLED | OUTPATIENT
Start: 2024-09-16

## 2024-09-16 RX ORDER — SODIUM CHLORIDE 9 MG/ML
40 INJECTION, SOLUTION INTRAVENOUS AS NEEDED
Status: CANCELLED | OUTPATIENT
Start: 2024-09-16

## 2024-09-16 RX ORDER — ACETAMINOPHEN 500 MG
1000 TABLET ORAL ONCE
Status: CANCELLED | OUTPATIENT
Start: 2024-09-16 | End: 2024-09-16

## 2024-09-16 RX ORDER — DEXTROAMPHETAMINE SACCHARATE, AMPHETAMINE ASPARTATE, DEXTROAMPHETAMINE SULFATE AND AMPHETAMINE SULFATE 2.5; 2.5; 2.5; 2.5 MG/1; MG/1; MG/1; MG/1
1 TABLET ORAL DAILY
COMMUNITY
Start: 2024-09-03

## 2024-09-18 ENCOUNTER — PRE-ADMISSION TESTING (OUTPATIENT)
Dept: PREADMISSION TESTING | Facility: HOSPITAL | Age: 44
End: 2024-09-18
Payer: COMMERCIAL

## 2024-09-18 VITALS
OXYGEN SATURATION: 98 % | HEIGHT: 67 IN | DIASTOLIC BLOOD PRESSURE: 64 MMHG | WEIGHT: 163.14 LBS | HEART RATE: 65 BPM | BODY MASS INDEX: 25.61 KG/M2 | RESPIRATION RATE: 16 BRPM | SYSTOLIC BLOOD PRESSURE: 118 MMHG

## 2024-09-18 DIAGNOSIS — N85.7 HEMATOMETRA: ICD-10-CM

## 2024-09-18 LAB
ABO GROUP BLD: NORMAL
ANION GAP SERPL CALCULATED.3IONS-SCNC: 8 MMOL/L (ref 5–15)
ANTI-S: NORMAL
BIG S ANTIGEN: NEGATIVE
BLD GP AB SCN SERPL QL: POSITIVE
BUN SERPL-MCNC: 8 MG/DL (ref 6–20)
BUN/CREAT SERPL: 10.5 (ref 7–25)
CALCIUM SPEC-SCNC: 8.9 MG/DL (ref 8.6–10.5)
CHLORIDE SERPL-SCNC: 99 MMOL/L (ref 98–107)
CO2 SERPL-SCNC: 30 MMOL/L (ref 22–29)
CREAT SERPL-MCNC: 0.76 MG/DL (ref 0.57–1)
DEPRECATED RDW RBC AUTO: 45 FL (ref 37–54)
EGFRCR SERPLBLD CKD-EPI 2021: 99.2 ML/MIN/1.73
ERYTHROCYTE [DISTWIDTH] IN BLOOD BY AUTOMATED COUNT: 13.6 % (ref 12.3–15.4)
GLUCOSE SERPL-MCNC: 110 MG/DL (ref 65–99)
HCT VFR BLD AUTO: 40 % (ref 34–46.6)
HGB BLD-MCNC: 13 G/DL (ref 12–15.9)
MCH RBC QN AUTO: 29.3 PG (ref 26.6–33)
MCHC RBC AUTO-ENTMCNC: 32.5 G/DL (ref 31.5–35.7)
MCV RBC AUTO: 90.3 FL (ref 79–97)
PLATELET # BLD AUTO: 208 10*3/MM3 (ref 140–450)
PMV BLD AUTO: 12.3 FL (ref 6–12)
POTASSIUM SERPL-SCNC: 3.6 MMOL/L (ref 3.5–5.2)
RBC # BLD AUTO: 4.43 10*6/MM3 (ref 3.77–5.28)
RH BLD: POSITIVE
SODIUM SERPL-SCNC: 137 MMOL/L (ref 136–145)
T&S EXPIRATION DATE: NORMAL
WBC NRBC COR # BLD AUTO: 6.04 10*3/MM3 (ref 3.4–10.8)

## 2024-09-18 PROCEDURE — 86901 BLOOD TYPING SEROLOGIC RH(D): CPT | Performed by: OBSTETRICS & GYNECOLOGY

## 2024-09-18 PROCEDURE — 86870 RBC ANTIBODY IDENTIFICATION: CPT

## 2024-09-18 PROCEDURE — 36415 COLL VENOUS BLD VENIPUNCTURE: CPT

## 2024-09-18 PROCEDURE — 85027 COMPLETE CBC AUTOMATED: CPT

## 2024-09-18 PROCEDURE — 86902 BLOOD TYPE ANTIGEN DONOR EA: CPT

## 2024-09-18 PROCEDURE — 86905 BLOOD TYPING RBC ANTIGENS: CPT | Performed by: OBSTETRICS & GYNECOLOGY

## 2024-09-18 PROCEDURE — 86850 RBC ANTIBODY SCREEN: CPT | Performed by: OBSTETRICS & GYNECOLOGY

## 2024-09-18 PROCEDURE — 86900 BLOOD TYPING SEROLOGIC ABO: CPT | Performed by: OBSTETRICS & GYNECOLOGY

## 2024-09-18 PROCEDURE — 80048 BASIC METABOLIC PNL TOTAL CA: CPT

## 2024-09-18 PROCEDURE — 86870 RBC ANTIBODY IDENTIFICATION: CPT | Performed by: OBSTETRICS & GYNECOLOGY

## 2024-09-18 RX ORDER — HYDROCODONE BITARTRATE AND ACETAMINOPHEN 10; 325 MG/1; MG/1
1 TABLET ORAL EVERY 4 HOURS PRN
Qty: 6 TABLET | Refills: 0 | Status: SHIPPED | OUTPATIENT
Start: 2024-09-18 | End: 2024-09-20 | Stop reason: HOSPADM

## 2024-09-20 ENCOUNTER — HOSPITAL ENCOUNTER (OUTPATIENT)
Facility: HOSPITAL | Age: 44
Setting detail: HOSPITAL OUTPATIENT SURGERY
Discharge: HOME | End: 2024-09-20
Attending: OBSTETRICS & GYNECOLOGY | Admitting: OBSTETRICS & GYNECOLOGY
Payer: COMMERCIAL

## 2024-09-20 ENCOUNTER — ANESTHESIA EVENT (OUTPATIENT)
Dept: PERIOP | Facility: HOSPITAL | Age: 44
End: 2024-09-20
Payer: COMMERCIAL

## 2024-09-20 ENCOUNTER — ANESTHESIA (OUTPATIENT)
Dept: PERIOP | Facility: HOSPITAL | Age: 44
End: 2024-09-20
Payer: COMMERCIAL

## 2024-09-20 VITALS
HEIGHT: 67 IN | HEART RATE: 60 BPM | TEMPERATURE: 97.8 F | BODY MASS INDEX: 25.61 KG/M2 | SYSTOLIC BLOOD PRESSURE: 114 MMHG | OXYGEN SATURATION: 92 % | RESPIRATION RATE: 16 BRPM | WEIGHT: 163.14 LBS | DIASTOLIC BLOOD PRESSURE: 82 MMHG

## 2024-09-20 DIAGNOSIS — N85.7 HEMATOMETRA: ICD-10-CM

## 2024-09-20 DIAGNOSIS — G89.18 POST-OP PAIN: Primary | ICD-10-CM

## 2024-09-20 DIAGNOSIS — R10.2 PELVIC PAIN IN FEMALE: ICD-10-CM

## 2024-09-20 LAB — HCG SERPL QL: NEGATIVE

## 2024-09-20 PROCEDURE — 25810000003 LACTATED RINGERS PER 1000 ML: Performed by: OBSTETRICS & GYNECOLOGY

## 2024-09-20 PROCEDURE — 88307 TISSUE EXAM BY PATHOLOGIST: CPT | Performed by: OBSTETRICS & GYNECOLOGY

## 2024-09-20 PROCEDURE — 25010000002 PROPOFOL 10 MG/ML EMULSION: Performed by: NURSE ANESTHETIST, CERTIFIED REGISTERED

## 2024-09-20 PROCEDURE — 25010000002 DROPERIDOL PER 5 MG: Performed by: ANESTHESIOLOGY

## 2024-09-20 PROCEDURE — 84703 CHORIONIC GONADOTROPIN ASSAY: CPT | Performed by: OBSTETRICS & GYNECOLOGY

## 2024-09-20 PROCEDURE — S2900 ROBOTIC SURGICAL SYSTEM: HCPCS | Performed by: OBSTETRICS & GYNECOLOGY

## 2024-09-20 PROCEDURE — 25010000002 CEFAZOLIN PER 500 MG: Performed by: OBSTETRICS & GYNECOLOGY

## 2024-09-20 PROCEDURE — 25010000002 DEXAMETHASONE PER 1 MG: Performed by: NURSE ANESTHETIST, CERTIFIED REGISTERED

## 2024-09-20 PROCEDURE — 25010000002 ONDANSETRON PER 1 MG: Performed by: NURSE ANESTHETIST, CERTIFIED REGISTERED

## 2024-09-20 PROCEDURE — 25010000002 FENTANYL CITRATE (PF) 50 MCG/ML SOLUTION: Performed by: ANESTHESIOLOGY

## 2024-09-20 PROCEDURE — 25010000002 DEXAMETHASONE PER 1 MG: Performed by: ANESTHESIOLOGY

## 2024-09-20 PROCEDURE — 25010000002 FENTANYL CITRATE (PF) 50 MCG/ML SOLUTION: Performed by: NURSE ANESTHETIST, CERTIFIED REGISTERED

## 2024-09-20 PROCEDURE — 25010000002 GLYCOPYRROLATE 0.4 MG/2ML SOLUTION: Performed by: NURSE ANESTHETIST, CERTIFIED REGISTERED

## 2024-09-20 PROCEDURE — 58571 TLH W/T/O 250 G OR LESS: CPT | Performed by: OBSTETRICS & GYNECOLOGY

## 2024-09-20 PROCEDURE — 25810000003 SODIUM CHLORIDE PER 500 ML: Performed by: OBSTETRICS & GYNECOLOGY

## 2024-09-20 PROCEDURE — 25010000002 MIDAZOLAM PER 1MG: Performed by: ANESTHESIOLOGY

## 2024-09-20 PROCEDURE — 25010000002 KETOROLAC TROMETHAMINE PER 15 MG: Performed by: NURSE ANESTHETIST, CERTIFIED REGISTERED

## 2024-09-20 DEVICE — DEV CONTRL TISS STRATAFIX SPIRAL PDS PLUS SZ0 CT/2 30CM VIL: Type: IMPLANTABLE DEVICE | Site: ABDOMEN | Status: FUNCTIONAL

## 2024-09-20 RX ORDER — HYDROCODONE BITARTRATE AND ACETAMINOPHEN 10; 325 MG/1; MG/1
1 TABLET ORAL EVERY 4 HOURS PRN
Status: DISCONTINUED | OUTPATIENT
Start: 2024-09-20 | End: 2024-09-20 | Stop reason: HOSPADM

## 2024-09-20 RX ORDER — FENTANYL CITRATE 50 UG/ML
INJECTION, SOLUTION INTRAMUSCULAR; INTRAVENOUS AS NEEDED
Status: DISCONTINUED | OUTPATIENT
Start: 2024-09-20 | End: 2024-09-20 | Stop reason: SURG

## 2024-09-20 RX ORDER — SODIUM CHLORIDE, SODIUM LACTATE, POTASSIUM CHLORIDE, CALCIUM CHLORIDE 600; 310; 30; 20 MG/100ML; MG/100ML; MG/100ML; MG/100ML
9 INJECTION, SOLUTION INTRAVENOUS CONTINUOUS
Status: DISCONTINUED | OUTPATIENT
Start: 2024-09-20 | End: 2024-09-20 | Stop reason: HOSPADM

## 2024-09-20 RX ORDER — SODIUM CHLORIDE, SODIUM LACTATE, POTASSIUM CHLORIDE, CALCIUM CHLORIDE 600; 310; 30; 20 MG/100ML; MG/100ML; MG/100ML; MG/100ML
1000 INJECTION, SOLUTION INTRAVENOUS CONTINUOUS
Status: DISCONTINUED | OUTPATIENT
Start: 2024-09-20 | End: 2024-09-20 | Stop reason: HOSPADM

## 2024-09-20 RX ORDER — PROMETHAZINE HYDROCHLORIDE 25 MG/1
12.5 TABLET ORAL ONCE AS NEEDED
Status: DISCONTINUED | OUTPATIENT
Start: 2024-09-20 | End: 2024-09-20 | Stop reason: HOSPADM

## 2024-09-20 RX ORDER — ACETAMINOPHEN 500 MG
1000 TABLET ORAL ONCE
Status: COMPLETED | OUTPATIENT
Start: 2024-09-20 | End: 2024-09-20

## 2024-09-20 RX ORDER — PHENAZOPYRIDINE HYDROCHLORIDE 200 MG/1
200 TABLET, FILM COATED ORAL ONCE
Status: COMPLETED | OUTPATIENT
Start: 2024-09-20 | End: 2024-09-20

## 2024-09-20 RX ORDER — GABAPENTIN 300 MG/1
600 CAPSULE ORAL ONCE
Status: COMPLETED | OUTPATIENT
Start: 2024-09-20 | End: 2024-09-20

## 2024-09-20 RX ORDER — SODIUM CHLORIDE 9 MG/ML
40 INJECTION, SOLUTION INTRAVENOUS AS NEEDED
Status: DISCONTINUED | OUTPATIENT
Start: 2024-09-20 | End: 2024-09-20 | Stop reason: HOSPADM

## 2024-09-20 RX ORDER — MIDAZOLAM HYDROCHLORIDE 2 MG/2ML
2 INJECTION, SOLUTION INTRAMUSCULAR; INTRAVENOUS ONCE
Status: COMPLETED | OUTPATIENT
Start: 2024-09-20 | End: 2024-09-20

## 2024-09-20 RX ORDER — SCOLOPAMINE TRANSDERMAL SYSTEM 1 MG/1
1 PATCH, EXTENDED RELEASE TRANSDERMAL CONTINUOUS
Status: DISCONTINUED | OUTPATIENT
Start: 2024-09-20 | End: 2024-09-20 | Stop reason: HOSPADM

## 2024-09-20 RX ORDER — ROCURONIUM BROMIDE 10 MG/ML
INJECTION, SOLUTION INTRAVENOUS AS NEEDED
Status: DISCONTINUED | OUTPATIENT
Start: 2024-09-20 | End: 2024-09-20 | Stop reason: SURG

## 2024-09-20 RX ORDER — DROPERIDOL 2.5 MG/ML
0.62 INJECTION, SOLUTION INTRAMUSCULAR; INTRAVENOUS ONCE AS NEEDED
Status: COMPLETED | OUTPATIENT
Start: 2024-09-20 | End: 2024-09-20

## 2024-09-20 RX ORDER — SODIUM CHLORIDE 9 MG/ML
INJECTION, SOLUTION INTRAVENOUS AS NEEDED
Status: DISCONTINUED | OUTPATIENT
Start: 2024-09-20 | End: 2024-09-20 | Stop reason: HOSPADM

## 2024-09-20 RX ORDER — MIDAZOLAM HYDROCHLORIDE 2 MG/2ML
1 INJECTION, SOLUTION INTRAMUSCULAR; INTRAVENOUS
Status: DISCONTINUED | OUTPATIENT
Start: 2024-09-20 | End: 2024-09-20 | Stop reason: HOSPADM

## 2024-09-20 RX ORDER — HYDROCODONE BITARTRATE AND ACETAMINOPHEN 5; 325 MG/1; MG/1
1 TABLET ORAL EVERY 4 HOURS PRN
Status: DISCONTINUED | OUTPATIENT
Start: 2024-09-20 | End: 2024-09-20 | Stop reason: HOSPADM

## 2024-09-20 RX ORDER — SODIUM CHLORIDE 0.9 % (FLUSH) 0.9 %
10 SYRINGE (ML) INJECTION AS NEEDED
Status: DISCONTINUED | OUTPATIENT
Start: 2024-09-20 | End: 2024-09-20 | Stop reason: HOSPADM

## 2024-09-20 RX ORDER — FENTANYL CITRATE 50 UG/ML
50 INJECTION, SOLUTION INTRAMUSCULAR; INTRAVENOUS
Status: COMPLETED | OUTPATIENT
Start: 2024-09-20 | End: 2024-09-20

## 2024-09-20 RX ORDER — DEXAMETHASONE SODIUM PHOSPHATE 4 MG/ML
INJECTION, SOLUTION INTRA-ARTICULAR; INTRALESIONAL; INTRAMUSCULAR; INTRAVENOUS; SOFT TISSUE AS NEEDED
Status: DISCONTINUED | OUTPATIENT
Start: 2024-09-20 | End: 2024-09-20 | Stop reason: SURG

## 2024-09-20 RX ORDER — SODIUM CHLORIDE, SODIUM LACTATE, POTASSIUM CHLORIDE, CALCIUM CHLORIDE 600; 310; 30; 20 MG/100ML; MG/100ML; MG/100ML; MG/100ML
125 INJECTION, SOLUTION INTRAVENOUS CONTINUOUS
Status: DISCONTINUED | OUTPATIENT
Start: 2024-09-20 | End: 2024-09-20 | Stop reason: HOSPADM

## 2024-09-20 RX ORDER — LIDOCAINE HYDROCHLORIDE 10 MG/ML
0.5 INJECTION, SOLUTION EPIDURAL; INFILTRATION; INTRACAUDAL; PERINEURAL ONCE AS NEEDED
Status: DISCONTINUED | OUTPATIENT
Start: 2024-09-20 | End: 2024-09-20 | Stop reason: HOSPADM

## 2024-09-20 RX ORDER — ONDANSETRON 2 MG/ML
INJECTION INTRAMUSCULAR; INTRAVENOUS AS NEEDED
Status: DISCONTINUED | OUTPATIENT
Start: 2024-09-20 | End: 2024-09-20 | Stop reason: SURG

## 2024-09-20 RX ORDER — ONDANSETRON 2 MG/ML
4 INJECTION INTRAMUSCULAR; INTRAVENOUS ONCE AS NEEDED
Status: DISCONTINUED | OUTPATIENT
Start: 2024-09-20 | End: 2024-09-20 | Stop reason: HOSPADM

## 2024-09-20 RX ORDER — SODIUM CHLORIDE 0.9 % (FLUSH) 0.9 %
3 SYRINGE (ML) INJECTION AS NEEDED
Status: DISCONTINUED | OUTPATIENT
Start: 2024-09-20 | End: 2024-09-20 | Stop reason: HOSPADM

## 2024-09-20 RX ORDER — FENTANYL CITRATE 50 UG/ML
25 INJECTION, SOLUTION INTRAMUSCULAR; INTRAVENOUS
Status: DISCONTINUED | OUTPATIENT
Start: 2024-09-20 | End: 2024-09-20 | Stop reason: HOSPADM

## 2024-09-20 RX ORDER — IBUPROFEN 600 MG/1
600 TABLET, FILM COATED ORAL EVERY 6 HOURS PRN
Status: DISCONTINUED | OUTPATIENT
Start: 2024-09-20 | End: 2024-09-20 | Stop reason: HOSPADM

## 2024-09-20 RX ORDER — SCOLOPAMINE TRANSDERMAL SYSTEM 1 MG/1
1 PATCH, EXTENDED RELEASE TRANSDERMAL ONCE
Status: DISCONTINUED | OUTPATIENT
Start: 2024-09-20 | End: 2024-09-20 | Stop reason: HOSPADM

## 2024-09-20 RX ORDER — SODIUM CHLORIDE 0.9 % (FLUSH) 0.9 %
10 SYRINGE (ML) INJECTION EVERY 12 HOURS SCHEDULED
Status: DISCONTINUED | OUTPATIENT
Start: 2024-09-20 | End: 2024-09-20 | Stop reason: HOSPADM

## 2024-09-20 RX ORDER — FAMOTIDINE 10 MG/ML
20 INJECTION, SOLUTION INTRAVENOUS
Status: DISCONTINUED | OUTPATIENT
Start: 2024-09-20 | End: 2024-09-20 | Stop reason: HOSPADM

## 2024-09-20 RX ORDER — PROPOFOL 10 MG/ML
VIAL (ML) INTRAVENOUS AS NEEDED
Status: DISCONTINUED | OUTPATIENT
Start: 2024-09-20 | End: 2024-09-20 | Stop reason: SURG

## 2024-09-20 RX ORDER — HYDROCODONE BITARTRATE AND ACETAMINOPHEN 7.5; 325 MG/1; MG/1
1 TABLET ORAL EVERY 8 HOURS PRN
Qty: 6 TABLET | Refills: 0 | Status: SHIPPED | OUTPATIENT
Start: 2024-09-20

## 2024-09-20 RX ORDER — GLYCOPYRROLATE 0.2 MG/ML
INJECTION INTRAMUSCULAR; INTRAVENOUS AS NEEDED
Status: DISCONTINUED | OUTPATIENT
Start: 2024-09-20 | End: 2024-09-20 | Stop reason: SURG

## 2024-09-20 RX ORDER — KETOROLAC TROMETHAMINE 30 MG/ML
INJECTION, SOLUTION INTRAMUSCULAR; INTRAVENOUS AS NEEDED
Status: DISCONTINUED | OUTPATIENT
Start: 2024-09-20 | End: 2024-09-20 | Stop reason: SURG

## 2024-09-20 RX ORDER — LABETALOL HYDROCHLORIDE 5 MG/ML
5 INJECTION, SOLUTION INTRAVENOUS
Status: DISCONTINUED | OUTPATIENT
Start: 2024-09-20 | End: 2024-09-20 | Stop reason: HOSPADM

## 2024-09-20 RX ORDER — DEXAMETHASONE SODIUM PHOSPHATE 4 MG/ML
4 INJECTION, SOLUTION INTRA-ARTICULAR; INTRALESIONAL; INTRAMUSCULAR; INTRAVENOUS; SOFT TISSUE ONCE AS NEEDED
Status: COMPLETED | OUTPATIENT
Start: 2024-09-20 | End: 2024-09-20

## 2024-09-20 RX ORDER — FLUMAZENIL 0.1 MG/ML
0.2 INJECTION INTRAVENOUS AS NEEDED
Status: DISCONTINUED | OUTPATIENT
Start: 2024-09-20 | End: 2024-09-20 | Stop reason: HOSPADM

## 2024-09-20 RX ORDER — MAGNESIUM HYDROXIDE 1200 MG/15ML
LIQUID ORAL AS NEEDED
Status: DISCONTINUED | OUTPATIENT
Start: 2024-09-20 | End: 2024-09-20 | Stop reason: HOSPADM

## 2024-09-20 RX ORDER — DEXTROSE MONOHYDRATE 25 G/50ML
12.5 INJECTION, SOLUTION INTRAVENOUS AS NEEDED
Status: DISCONTINUED | OUTPATIENT
Start: 2024-09-20 | End: 2024-09-20 | Stop reason: HOSPADM

## 2024-09-20 RX ORDER — NEOSTIGMINE METHYLSULFATE 5 MG/5 ML
SYRINGE (ML) INTRAVENOUS AS NEEDED
Status: DISCONTINUED | OUTPATIENT
Start: 2024-09-20 | End: 2024-09-20 | Stop reason: SURG

## 2024-09-20 RX ORDER — NALOXONE HCL 0.4 MG/ML
0.4 VIAL (ML) INJECTION AS NEEDED
Status: DISCONTINUED | OUTPATIENT
Start: 2024-09-20 | End: 2024-09-20 | Stop reason: HOSPADM

## 2024-09-20 RX ADMIN — DEXAMETHASONE SODIUM PHOSPHATE 4 MG: 4 INJECTION, SOLUTION INTRA-ARTICULAR; INTRALESIONAL; INTRAMUSCULAR; INTRAVENOUS; SOFT TISSUE at 11:58

## 2024-09-20 RX ADMIN — FAMOTIDINE 20 MG: 10 INJECTION INTRAVENOUS at 10:24

## 2024-09-20 RX ADMIN — SODIUM CHLORIDE, POTASSIUM CHLORIDE, SODIUM LACTATE AND CALCIUM CHLORIDE 1000 ML: 600; 310; 30; 20 INJECTION, SOLUTION INTRAVENOUS at 10:01

## 2024-09-20 RX ADMIN — KETOROLAC TROMETHAMINE 30 MG: 30 INJECTION, SOLUTION INTRAMUSCULAR; INTRAVENOUS at 12:14

## 2024-09-20 RX ADMIN — PHENAZOPYRIDINE HYDROCHLORIDE 200 MG: 200 TABLET ORAL at 10:03

## 2024-09-20 RX ADMIN — DEXAMETHASONE SODIUM PHOSPHATE 4 MG: 4 INJECTION, SOLUTION INTRA-ARTICULAR; INTRALESIONAL; INTRAMUSCULAR; INTRAVENOUS; SOFT TISSUE at 10:24

## 2024-09-20 RX ADMIN — PROPOFOL 150 MG: 10 INJECTION, EMULSION INTRAVENOUS at 11:03

## 2024-09-20 RX ADMIN — FENTANYL CITRATE 50 MCG: 50 INJECTION, SOLUTION INTRAMUSCULAR; INTRAVENOUS at 12:51

## 2024-09-20 RX ADMIN — FENTANYL CITRATE 50 MCG: 50 INJECTION, SOLUTION INTRAMUSCULAR; INTRAVENOUS at 13:01

## 2024-09-20 RX ADMIN — GABAPENTIN 600 MG: 300 CAPSULE ORAL at 10:03

## 2024-09-20 RX ADMIN — GLYCOPYRROLATE 0.4 MG: 0.2 INJECTION INTRAMUSCULAR; INTRAVENOUS at 12:03

## 2024-09-20 RX ADMIN — ROCURONIUM BROMIDE 50 MG: 10 INJECTION, SOLUTION INTRAVENOUS at 11:03

## 2024-09-20 RX ADMIN — MIDAZOLAM HYDROCHLORIDE 2 MG: 1 INJECTION, SOLUTION INTRAMUSCULAR; INTRAVENOUS at 10:23

## 2024-09-20 RX ADMIN — HYDROCODONE BITARTRATE AND ACETAMINOPHEN 1 TABLET: 10; 325 TABLET ORAL at 12:55

## 2024-09-20 RX ADMIN — FENTANYL CITRATE 150 MCG: 50 INJECTION, SOLUTION INTRAMUSCULAR; INTRAVENOUS at 11:00

## 2024-09-20 RX ADMIN — SCOPALAMINE 1 PATCH: 1 PATCH, EXTENDED RELEASE TRANSDERMAL at 10:24

## 2024-09-20 RX ADMIN — ACETAMINOPHEN 1000 MG: 500 TABLET, FILM COATED ORAL at 10:03

## 2024-09-20 RX ADMIN — SODIUM CHLORIDE, POTASSIUM CHLORIDE, SODIUM LACTATE AND CALCIUM CHLORIDE 125 ML/HR: 600; 310; 30; 20 INJECTION, SOLUTION INTRAVENOUS at 10:01

## 2024-09-20 RX ADMIN — CEFAZOLIN 2000 MG: 2 INJECTION, POWDER, FOR SOLUTION INTRAMUSCULAR; INTRAVENOUS at 11:14

## 2024-09-20 RX ADMIN — Medication 3 MG: at 12:03

## 2024-09-20 RX ADMIN — ONDANSETRON 4 MG: 2 INJECTION INTRAMUSCULAR; INTRAVENOUS at 11:58

## 2024-09-20 RX ADMIN — FENTANYL CITRATE 100 MCG: 50 INJECTION, SOLUTION INTRAMUSCULAR; INTRAVENOUS at 11:18

## 2024-09-20 RX ADMIN — DROPERIDOL 0.62 MG: 2.5 INJECTION, SOLUTION INTRAMUSCULAR; INTRAVENOUS at 12:51

## 2024-09-23 ENCOUNTER — PATIENT MESSAGE (OUTPATIENT)
Age: 44
End: 2024-09-23
Payer: COMMERCIAL

## 2024-09-28 NOTE — PROGRESS NOTES
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  On 2024 underwent total laparoscopic hysterectomy with bilateral salpingoophorectomy.  She is here for neoadjuvant management considerations.  She is here alone (accompanied by her spouse, aLm).    I have reviewed the HPI and verified with the patient the accuracy of it. No changes to interval history since the information was documented. Shyam Weiss MD 10/03/24      Diagnostic abnormalities:  - Tobacco use disorder, hypothyroidism, depression/anxiety, obesity, prior bilateral salpingectomy, , newly diagnosed IDC left breast  - 24- CT a/p-  Increased size of the low density/complex cystic mass in the uterine cavity since the previous study. This may represent abnormal endometrial thickening, complex endometrial fluid/debris accumulation in the endometrial canal due to out duct obstruction by a fibroid in the lower uterine segment as detailed above. There is a septate morphology of the uterus. Further follow-up with sonography may be obtained. Normal appendix. The gallbladder is surgically absent. The nonacute findings of the remaining abdomen similar to the previous study.  Stable heterogeneous solid mass to the right of midline at the mid to  lower uterine segment with probable secondary dilated endometrial cavity at the uterine fundus filled with fluid and a septate uterus.  Differential diagnosis would include benign etiologies such as submucosal leiomyoma and " large endometrial polyp but also neoplasm, possibly endometrial carcinoma, or carcinosarcoma of the uterus.  No anabel pelvic soft tissue nodule or lymphadenopathy identified. Small left external iliac chain lymph node is unchanged. Small right ovarian cyst. Nabothian cysts in the cervix and septate uterus. MRI pelvis without and with contrast may be helpful for further evaluation. GYN consultation is recommended.   -7/25/24- MRI breast bilateral-1. Findings suggestive for multicentric left breast malignancy with diffuse ductal carcinoma in situ extending from the dominant 2.7 cm mass within the lateral left breast at 3:00 extending to and likely involving the left nipple. A second 8 mm mass within the central left breast tissue with a third mass in the anterior depth of the inferior left breast at 6:00. Two 8 mm homogeneous persistently enhancing hyperintense T2 masses at the upper outer right breast, mid depth at the 10:00 in the 11-12 o'clock positions, with overall benign signal characteristics for which background enhancement versus papilloma fibroadenoma considered. MRI directed/second look ultrasound recommended given the presence of the contralateral left breast findings. BI-RADS CATEGORY 5: Highly suggestive of malignancy. Appropriate action should be taken. Findings strongly favoring multicentric left breast malignancy. MRI directed/second look bilateral breast ultrasound recommended. Ultrasound-guided biopsy of the left breast mass at 3:00 and of a second left breast mass (6:00 if visible on ultrasound versus mass central to the nipple) with sampling of the largest left axillary node may be performed if clinically desired. Sampling of a finding within the upper outer right breast should be considered if solid nodule identified on ultrasound.  -7/29/24- CMP normal (GFR 78.9), UA/cultures negative, HCG negative, WBC 12.65, ANC 8.43 otherwise normal CBC  -7/31/24-  Endocervical Currettage, Endometrial  Currettage, and hysteroscopic resected uterine synechia.  Final diagnosis:  1.Endometrium, curettage: A. Blood and fragments of smooth muscle. B. No histologic evidence of malignancy. Comment: Endometrial mucosal tissue is not identified. 2. Endocervix, curettage: A. Fragments of benign endocervical tissue, benign squamous mucosa and benign lower uterine segment endometrium. B. Blood and mucus. C. No dysplasia identified. 3. Endometrium, hysteroscopic resected tissue: A. Fragments of smooth muscle. B. No histologic evidence of malignancy. Comment: Endometrial mucosal tissue is not identified.  -8/7/24- US biopsy:  Final diagnosis-  1.  Mass, left breast at 6:00, 1 cm from nipple, core needle biopsies: High-grade ductal carcinoma in situ, comedo type. 2.  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 Histologic grade ([place] histologic score). Glandular (acinar)/tubular differentiation: Score 2. Nuclear pleomorphism: Score 3. Mitotic rate: Score 3. Overall grade: Grade 3. Maximum tumor diameter is at least 1.5 cm. 3.  Left axillary lymph node, core needle biopsy: Metastatic adenocarcinoma of breast. -ER 69%+; [place] 85%+; HER2 3+ (positive); Ki67 54%+  -8/12/24- CT CAP- Known left breast malignancy with enlarged left level 1 and asymmetrically prominent left level 2 and borderline level 3 axillary lymph nodes which are suspicious for axillary disease.  No additional evidence of metastatic disease in the chest.  1. Small 0.4 cm low-attenuation region in the right lobe of the liver may have been present on the prior examinations but was not as well visualized. This is too small to characterize. Continued attention on follow-up is recommended.  Fatty infiltration of the liver.  Persistent heterogeneity of the lower uterine segment of the uterus and fluid in the endometrial canal although the fluid has decreased. Stable septate  uterus  -8/12/2024- Follow-up Dr. Gentile- A/P: IDC left breast.  Met w/u, referral onc, genetics. RTC 2 weeks.  -8/16/2024-bone scan-no evidence of osteoblastic metastatic disease.  -8/16/2024- BRCA 1/2 analyses with cancer next-summary: Negative-no clinically significant variants detected (pathogenic mutations, variants of unknown significance, gross deletions/duplications: Not detected)  -8/20/2024-PET scan-abnormal PET/CT, biopsy-proven left breast malignancy and demonstrates FDG avidity with maximum SUV of 7.9.  Also potential abnormal activity at 6:00 in the left breast with a maximal SUV of 3.9.  MRI of the breast with and without contrast will be helpful to exclude multifocal or multicentric disease in the left breast.  No abnormal contralateral right breast or axillary activity identified.  3 hypermetabolic level 1 lymph nodes in the left axilla compatible with kiersten metastasis and solitary borderline level 2 axillary lymph node on the left.  -8/22/2024- Today is seen for management considerations-  -8/26/24- 2D echo-  Left ventricular systolic function is normal. Left ventricular ejection fraction appears to be 56 - 60%.    Normal right ventricular cavity size and systolic function noted.    No significant valvular abnormalities identified on this study.     -8/28/2024-CBC normal.  CMP normal.  -9/15/2024- CT abdomen/pelvis-There is a 3.2 x 3.0 x 2.6 cm focus of rounded masslike enhancement in the lower uterine segment centered along the endometrial canal (series 2-image 70, series 4-image 41). The uterine cavity is distended and fluid-filled above this at the fundus. Primary imaging concern would be endometrial carcinoma obstructing the canal although it seems there was a recent D&C with benign tissue sampling. Endometritis is also a consideration and cannot be excluded on CT.  No free fluid in the pelvis, pelvic abscess or pelvic adenopathy.        Previous interventions:  -9/20/2024-   Total  Laparoscopic Hysterectomy with bilateral salpingoophorectomy, da Kenzie assisted -Final diagnosis:  Uterus with bilateral tubes and ovaries, hysterectomy with bilateral salpingo-oophorectomies: Unremarkable cervix, negative for squamous dysplasia.  Atrophic endometrium, negative for hyperplasia and atypia.  Benign intramural leiomyoma.  Adenomyosis.  Unremarkable right ovary.  Left ovary with cystic follicles and hemorrhagic corpora lutea.     -Anticipate neoadjuvant DCPT      LABS    Lab Results - Last 18 Months   Lab Units 09/18/24  0838 09/15/24  0725 08/28/24  1536 07/29/24  1435 07/14/24  0217 07/12/24  0332 07/06/24  1626   HEMOGLOBIN g/dL 13.0 13.9 12.8 14.8 13.3 14.8 14.4   HEMATOCRIT % 40.0 42.3 38.0 45.0 40.9 44.1 44.1   MCV fL 90.3 88.9 87.8 88.1 89.3 86.1 88.9   WBC 10*3/mm3 6.04 14.86* 9.73 12.64* 12.21* 14.69* 12.42*   RDW % 13.6 13.6 13.6 13.3 13.6 13.5 13.7   MPV fL 12.3* 12.0 12.9* 12.4* 12.0 12.6* 12.6*   PLATELETS 10*3/mm3 208 230 181 232 201 217 219   IMM GRAN % %  --  0.3 0.2 0.3 0.2 0.3 0.3   NEUTROS ABS 10*3/mm3  --  10.61* 5.45 8.43* 7.90* 10.17* 8.19*   LYMPHS ABS 10*3/mm3  --  2.25 2.55 2.57 2.97 2.87 2.61   MONOS ABS 10*3/mm3  --  0.97* 0.80 0.87 0.74 0.98* 0.93*   EOS ABS 10*3/mm3  --  0.90* 0.84* 0.62* 0.50* 0.53* 0.55*   BASOS ABS 10*3/mm3  --  0.08 0.07 0.11 0.08 0.09 0.10   IMMATURE GRANS (ABS) 10*3/mm3  --  0.05 0.02 0.04 0.02 0.05 0.04   NRBC /100 WBC  --  0.0 0.0 0.0 0.0 0.0 0.0       Lab Results - Last 18 Months   Lab Units 09/18/24  0838 09/15/24  0725 08/28/24  1536 07/29/24  1435 07/25/24  0757 07/14/24  0217 07/12/24  0349 07/06/24  1626   GLUCOSE mg/dL 110* 100* 87 105*  --  88 108* 124*   SODIUM mmol/L 137 139 139 138  --  140 138 140   POTASSIUM mmol/L 3.6 4.1 4.1 3.9  --  4.1 3.9 3.5   CO2 mmol/L 30.0* 30.0* 29.0 31.0*  --  28.0 25.0 27.0   CHLORIDE mmol/L 99 101 102 98  --  103 100 103   ANION GAP mmol/L 8.0 8.0 8.0 9.0  --  9.0 13.0 10.0   CREATININE mg/dL 0.76 0.85 0.82  0.92 1.10 0.75 0.81 0.84   BUN mg/dL 8 13 13 14  --  11 12 12   BUN / CREAT RATIO  10.5 15.3 15.9 15.2  --  14.7 14.8 14.3   CALCIUM mg/dL 8.9 8.8 9.1 9.6  --  8.7 9.3 9.1   ALK PHOS U/L  --  84 78 99  --  92 89 74   TOTAL PROTEIN g/dL  --  6.9 6.5 7.4  --  6.6 7.3 6.8   ALT (SGPT) U/L  --  9 12 14  --  23 14 13   AST (SGOT) U/L  --  13 15 18  --  18 15 15   BILIRUBIN mg/dL  --  0.3 0.5 0.5  --  0.3 0.6 0.5   ALBUMIN g/dL  --  4.1 4.0 4.2  --  3.8 4.2 4.1   GLOBULIN gm/dL  --  2.8 2.5 3.2  --  2.8 3.1 2.7         Lab Results - Last 18 Months   Lab Units 07/12/23  1101   TSH uIU/mL 3.460         PAST MEDICAL HISTORY:  ALLERGIES:  Allergies   Allergen Reactions    Percocet [Oxycodone-Acetaminophen] Hives     CURRENT MEDICATIONS:  Outpatient Encounter Medications as of 10/3/2024   Medication Sig Dispense Refill    amphetamine-dextroamphetamine (ADDERALL) 10 MG tablet Take 1 tablet by mouth Daily.      bisoprolol-hydrochlorothiazide (ZIAC) 5-6.25 MG per tablet Take 1 tablet by mouth Daily. 30 tablet 2    dexAMETHasone (DECADRON) 4 MG tablet Take 2 tablets 2 times daily the day before chemo and 2 tablets 2 times daily the day after chemo 48 tablet 0    DULoxetine (CYMBALTA) 20 MG capsule Take 1 capsule by mouth Daily.      folic acid (FOLVITE) 1 MG tablet Take 1 tablet by mouth 3 (Three) Times a Day.      ibuprofen (ADVIL,MOTRIN) 800 MG tablet Take 1 tablet by mouth Every 6 (Six) Hours As Needed for Mild Pain. 90 tablet 2    ibuprofen (Motrin IB) 200 MG tablet Take 3 tablets by mouth Every 8 (Eight) Hours. Take every 8 hours for three days then take as needed.      levothyroxine (SYNTHROID, LEVOTHROID) 112 MCG tablet Take 1 tablet by mouth Daily. 30 tablet 2    lidocaine-prilocaine (EMLA) 2.5-2.5 % cream Apply to port site 30 minutes before it is accessed and cover with occlusive dressing. 30 g 1    lisdexamfetamine (VYVANSE) 50 MG capsule TAKE 1 CAPSULE BY MOUTH EVERY MORNING 30 capsule 0    ondansetron (Zofran) 4 MG  tablet Take 1 tablet by mouth Every 8 (Eight) Hours As Needed for Nausea or Vomiting. 15 tablet 0    ondansetron (ZOFRAN) 8 MG tablet Take 1 tablet by mouth Every 8 (Eight) Hours As Needed for Nausea or Vomiting. 30 tablet 0    ondansetron ODT (ZOFRAN-ODT) 4 MG disintegrating tablet Place 1 tablet on the tongue Every 8 (Eight) Hours As Needed for Nausea or Vomiting. 10 tablet 2    pantoprazole (PROTONIX) 40 MG EC tablet Take 1 tablet by mouth Daily. 30 tablet 2    prochlorperazine (COMPAZINE) 10 MG tablet Take 1 tablet by mouth Every 8 (Eight) Hours As Needed for Nausea or Vomiting. 30 tablet 0    [DISCONTINUED] acetaminophen (Tylenol) 325 MG tablet Take 3 tablets by mouth Every 8 (Eight) Hours. Take every 8 hours for 3 days then take prn as needed.      [DISCONTINUED] cyclobenzaprine (FLEXERIL) 10 MG tablet 1 tab PO up to 2x daily prn headache, neck ache 30 tablet 1    [DISCONTINUED] HYDROcodone-acetaminophen (NORCO) 7.5-325 MG per tablet Take 1 tablet by mouth Every 8 (Eight) Hours As Needed for Moderate Pain 6 tablet 0     Facility-Administered Encounter Medications as of 10/3/2024   Medication Dose Route Frequency Provider Last Rate Last Admin    lidocaine (XYLOCAINE) 1 % injection 30 mL  30 mL Subcutaneous Once Taylor Salinas MD        lidocaine 1% - EPINEPHrine 1:119779 (XYLOCAINE W/EPI) 1 %-1:210401 injection 30 mL  30 mL Infiltration Once Taylor Salinas MD         ADULT ILLNESSES:  Patient Active Problem List   Diagnosis Code    Tobacco abuse counseling Z71.6    Hypothyroidism E03.9    Attention deficit hyperactivity disorder F90.9    Essential hypertension I10    Irritable bowel syndrome K58.9    Mixed anxiety and depressive disorder F41.8    Mood disorder F39    Overweight (BMI 25.0-29.9) E66.3    Gastroesophageal reflux disease without esophagitis K21.9    Dysmenorrhea N94.6    Hematometra N85.7    Ductal carcinoma of left breast C50.912    Invasive ductal carcinoma of breast, left  C50.912    Pelvic pain in female R10.2     SURGERIES:  Past Surgical History:   Procedure Laterality Date    CARPAL TUNNEL RELEASE Right     COLONOSCOPY  05/01/2003    Normal exam    D & C HYSTEROSCOPY MYOSURE N/A 07/31/2024    Procedure: DILATATION AND CURETTAGE HYSTEROSCOPY WITH POSSIBLE TISSUE RESECTION;  Surgeon: Stevan Espinal MD;  Location:  [facility];  Service: Obstetrics/Gynecology;  Laterality: N/A;    DIAGNOSTIC LAPAROSCOPY  2000    [facility]; normal findings; [facility]    ENDOMETRIAL ABLATION  2023    ENDOSCOPY      LAPAROSCOPIC CHOLECYSTECTOMY  2011    Dr Duncan; Worship    SALPINGECTOMY Bilateral 10/16/2020    Procedure: SALPINGECTOMY LAPAROSCOPIC for sterilization;  Surgeon: Stevan Espinal MD;  Location:  [facility];  Service: Obstetrics/Gynecology;  Laterality: Bilateral;    TOTAL LAPAROSCOPIC HYSTERECTOMY SALPINGO OOPHORECTOMY N/A 9/20/2024    Procedure: TOTAL LAPAROSCOPIC HYSTERECTOMY BILATERAL SALPINGOOPHORECTOMY WITH DAVINCI ROBOT;  Surgeon: Stevan Espinal MD;  Location:  [facility];  Service: Robotics - DaVinci;  Laterality: N/A;    US GUIDED LYMPH NODE BIOPSY  08/07/2024    VENOUS ACCESS DEVICE (PORT) INSERTION N/A 8/30/2024    Procedure: Single Lumen Port-a-cath insertion with flouroscopy;  Surgeon: Shahla Gentile MD;  Location:  [facility];  Service: General;  Laterality: N/A;    WISDOM TOOTH EXTRACTION       HEALTH MAINTENANCE ITEMS:  Health Maintenance Due   Topic Date Due    TDAP/TD VACCINES (1 - Tdap) Never done    HEPATITIS C SCREENING  Never done    ANNUAL PHYSICAL  Never done    INFLUENZA VACCINE  08/01/2024    COVID-19 Vaccine (6 - 2023-24 season) 09/01/2024       <no information>  Last Completed Colonoscopy       This patient has no relevant Health Maintenance data.          Immunization History   Administered Date(s) Administered    COVID-19 (MODERNA) 1st,2nd,3rd Dose Monovalent 10/29/2021, 11/04/2021, 11/26/2021, 11/26/2021, 12/06/2022    DT 07/07/2004    Influenza, Unspecified  "2021     Last Completed Mammogram            MAMMOGRAM (Yearly) Next due on 2024  Mammo Diagnostic Bilateral With CAD    2022  MAMMO SCREENING BILATERAL W CAD                      FAMILY HISTORY:  Family History   Problem Relation Age of Onset    Diabetes Mother     Diabetes Father     Cancer Maternal Grandmother         vulva, HPV+    Cancer Maternal Grandfather     Lung cancer Paternal Grandfather 60 - 69    Breast cancer Maternal Great-Grandmother     Stomach cancer Maternal Great-Grandmother     Colon cancer Neg Hx     Colon polyps Neg Hx      SOCIAL HISTORY:  Social History     Socioeconomic History    Marital status:    Tobacco Use    Smoking status: Former     Average packs/day: 0.5 packs/day for 20.0 years (10.0 ttl pk-yrs)     Types: Cigarettes     Start date: 2004    Smokeless tobacco: Never    Tobacco comments:     No cigarette since 23   Vaping Use    Vaping status: Every Day    Start date: 2024    Substances: Nicotine, Flavoring    Devices: Disposable   Substance and Sexual Activity    Alcohol use: Yes     Comment: Occasional    Drug use: Yes     Types: Marijuana    Sexual activity: Defer     Partners: Male     Birth control/protection: Tubal ligation       REVIEW OF SYSTEMS:  Review of Systems   Constitutional:  Positive for fatigue (from RA + fibromyalgia). Negative for activity change, appetite change and unexpected weight loss (None since the last visit).        Manages her ADLs to include chores, errands and driving.  Is up and about, \"all the time.\"  Still works full time in customer service at [employer]    Eyes: Negative.    Respiratory: Negative.  Negative for cough and shortness of breath.         Smoker age 15- 1/2-1 ppd.  No \"still vaping\"   Cardiovascular: Negative.    Gastrointestinal: Negative.    Endocrine:         A0    Menarche age 13    Menopause age 42   Genitourinary: Negative.  Negative for vaginal bleeding (Had a " "CHRISTIANO/BSO per Dr. Espinal).   Musculoskeletal:  Positive for arthralgias (Chronic- from RA + fibromyalgia), back pain (Chronic) and neck pain (Chronic).   Allergic/Immunologic: Negative.    Neurological: Negative.    Hematological: Negative.    Psychiatric/Behavioral: Negative.         /72   Pulse 68   Temp 97.3 °F (36.3 °C) (Tympanic)   Resp 18   Ht 169.5 cm (66.73\")   Wt 72.3 kg (159 lb 6.4 oz)   LMP  (LMP Unknown)   BMI 25.17 kg/m²  Body surface area is 1.83 meters squared.  Pain Score    10/03/24 1402   PainSc: 0-No pain         Physical Exam  Vitals and nursing note reviewed. Exam conducted with a chaperone present.   Constitutional:       Comments: Cooperative, well-developed, modestly Middle-aged female.  Ambulatory.  ECOG 0.      No weight changes   HENT:      Head: Normocephalic and atraumatic.   Eyes:      General: No scleral icterus.     Extraocular Movements: Extraocular movements intact.      Pupils: Pupils are equal, round, and reactive to light.   Cardiovascular:      Rate and Rhythm: Normal rate.   Pulmonary:      Effort: Pulmonary effort is normal.      Comments: Port right upper chest is well seated  Chest:   Breasts:     Right: Normal.      Left: Mass present.          Comments: Chaperoned exam: Nasra Tomas LPN    Right breast with no masses, no skin changes.  No nipple discharge today.    Left breast with palpable masses at 3:00 and 6:00 positions.  Less prominent left axillary nodes today.  No nipple discharge today.  No skin changes.  Abdominal:      Palpations: Abdomen is soft.      Tenderness: There is no abdominal tenderness.      Comments: Laparoscopic incisions are healing very well.   Musculoskeletal:         General: Normal range of motion.      Cervical back: Normal range of motion.   Lymphadenopathy:      Cervical: No cervical adenopathy.      Upper Body:      Right upper body: No supraclavicular or axillary adenopathy.      Left upper body: Axillary adenopathy present. " No supraclavicular adenopathy.   Skin:     General: Skin is warm.   Neurological:      General: No focal deficit present.      Mental Status: She is alert and oriented to person, place, and time.   Psychiatric:         Mood and Affect: Mood normal.         Behavior: Behavior normal.         Thought Content: Thought content normal.         .  Assessment:  1.   Invasive carcinoma of no special type (ductal) of the left breast  --Tumor stage: at least TNM/AJCC stage IIIA (cT2, cN2, M0,G3) ER 69%(+), [place] 85% (+), HER-2 positive (3+).  --Original tumor burden:    ---7/25/24- MRI breast bilateral-1. Findings suggestive for multicentric left breast malignancy with diffuse ductal carcinoma in situ extending from the dominant 2.7 cm mass within the lateral left breast at 3:00 extending to and likely involving the left nipple. A second 8 mm mass within the central left breast tissue with a third mass in the anterior depth of the inferior left breast at 6:00. Two 8 mm homogeneous persistently enhancing hyperintense T2 masses at the upper outer right breast, mid depth at the 10:00 in the 11-12 o'clock positions, with overall benign signal characteristics for which background enhancement versus papilloma fibroadenoma considered. MRI directed/second look ultrasound recommended given the presence of the contralateral left breast findings. BI-RADS CATEGORY 5: Highly suggestive of malignancy. Appropriate action should be taken. Findings strongly favoring multicentric left breast malignancy. MRI directed/second look bilateral breast ultrasound recommended. Ultrasound-guided biopsy of the left breast mass at 3:00 and of a second left breast mass (6:00 if visible on ultrasound versus mass central to the nipple) with sampling of the largest left axillary node may be performed if clinically desired. Sampling of a finding within the upper outer right breast should be considered if solid nodule identified on ultrasound.  -8/7/24- US biopsy:   Final diagnosis-  1.  Mass, left breast at 6:00, 1 cm from nipple, core needle biopsies: High-grade ductal carcinoma in situ, comedo type. 2.  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 Histologic grade ([place] histologic score). Glandular (acinar)/tubular differentiation: Score 2. Nuclear pleomorphism: Score 3. Mitotic rate: Score 3. Overall grade: Grade 3. Maximum tumor diameter is at least 1.5 cm. 3.  Left axillary lymph node, core needle biopsy: Metastatic adenocarcinoma of breast. -ER 69%+; CT 85%+; HER2 3+ (positive); Ki67 54%+  -8/12/24- CT CAP- Known left breast malignancy with enlarged left level 1 and asymmetrically prominent left level 2 and borderline level 3 axillary lymph nodes which are suspicious for axillary disease.  No additional evidence of metastatic disease in the chest.  1. Small 0.4 cm low-attenuation region in the right lobe of the liver may have been present on the prior examinations but was not as well visualized. This is too small to characterize. Continued attention on follow-up is recommended.  Fatty infiltration of the liver.  Persistent heterogeneity of the lower uterine segment of the uterus and fluid in the endometrial canal although the fluid has decreased. Stable septate uterus  -8/16/2024-bone scan-no evidence of osteoblastic metastatic disease.  -8/16/2024- 2D echo    Left ventricular systolic function is normal. Left ventricular ejection fraction appears to be 56 - 60%.    Normal right ventricular cavity size and systolic function noted.    No significant valvular abnormalities identified on this study.     -8/20/2024-PET scan-abnormal PET/CT, biopsy-proven left breast malignancy and demonstrates FDG avidity with maximum SUV of 7.9.  Also potential abnormal activity at 6:00 in the left breast with a maximal SUV of 3.9.  MRI of the breast with and without contrast will be helpful to exclude  multifocal or multicentric disease in the left breast.  No abnormal contralateral right breast or axillary activity identified.  3 hypermetabolic level 1 lymph nodes in the left axilla compatible with kiersten metastasis and solitary borderline level 2 axillary lymph node on the left.    2.   Uterine cystic mass  - 7/12/24 - Abnormal CT a/p- Increased size of the low density/complex cystic mass in the uterine cavity since the previous study. This may represent abnormal endometrial thickening, complex endometrial fluid/debris accumulation in the endometrial canal due to out duct obstruction by a fibroid in the lower uterine segment as detailed above. There is a septate morphology of the uterus. Further follow-up with sonography may be obtained. Normal appendix. The gallbladder is surgically absent. The nonacute findings of the remaining abdomen similar to the previous study.  Stable heterogeneous solid mass to the right of midline at the mid to lower uterine segment with probable secondary dilated endometrial cavity at the uterine fundus filled with fluid and a septate uterus.  Differential diagnosis would include benign etiologies such as submucosal leiomyoma and large endometrial polyp but also neoplasm, possibly endometrial carcinoma, or carcinosarcoma of the uterus.  No anabel pelvic soft tissue nodule or lymphadenopathy identified. Small left external iliac chain lymph node is unchanged. Small right ovarian cyst. Nabothian cysts in the cervix and septate uterus. MRI pelvis without and with contrast may be helpful for further evaluation. GYN consultation is recommended.  -7/31/24-  Endocervical Currettage, Endometrial Currettage, and hysteroscopic resected uterine synechia.  Final diagnosis:  1.Endometrium, curettage: A. Blood and fragments of smooth muscle. B. No histologic evidence of malignancy. Comment: Endometrial mucosal tissue is not identified. 2. Endocervix, curettage: A. Fragments of benign endocervical  tissue, benign squamous mucosa and benign lower uterine segment endometrium. B. Blood and mucus. C. No dysplasia identified. 3. Endometrium, hysteroscopic resected tissue: A. Fragments of smooth muscle. B. No histologic evidence of malignancy. Comment: Endometrial mucosal tissue is not identified.  --9/15/2024- CT abdomen/pelvis-There is a 3.2 x 3.0 x 2.6 cm focus of rounded masslike enhancement in the lower uterine segment centered along the endometrial canal (series 2-image 70, series 4-image 41). The uterine cavity is distended and fluid-filled above this at the fundus. Primary imaging concern would be endometrial carcinoma obstructing the canal although it seems there was a recent D&C with benign tissue sampling. Endometritis is also a consideration and cannot be excluded on CT.  No free fluid in the pelvis, pelvic abscess or pelvic adenopathy.  --9/20/2024-   Total Laparoscopic Hysterectomy with bilateral salpingoophorectomy, da Kenzie assisted -Final diagnosis:  Uterus with bilateral tubes and ovaries, hysterectomy with bilateral salpingo-oophorectomies: Unremarkable cervix, negative for squamous dysplasia.  Atrophic endometrium, negative for hyperplasia and atypia.  Benign intramural leiomyoma.  Adenomyosis.  Unremarkable right ovary.  Left ovary with cystic follicles and hemorrhagic corpora lutea.    3.   Nicotine dependence-stopped in favor of vaping  4.   Mixed depression/anxiety  5.   Hypothyroidism  6.   Obesity  7.   RA and fibromyalgia on Plaquenil and Cymbalta.  Followed by Dr. Ackerman       Plan:  1.   Previously apprised of the available diagnostic information including imaging (breast MRI, CT scans or PET scan and bone scan), breast biopsy findings 2D echo, and labs (normal CBC and CMP).   2.   Review path from CHRISTIANO/BSO, 9/20/2024 (above).  No malignancy  3.   Review BRCA 1/2 analyses with cancer next, 8/16/2034 -summary: Negative-no clinically significant variants detected  4.   Draw today: CBC  with diff, CMP, CEA, CA 27-29  5.   Review NCCN guidelines version invasive breast cancer.  Principles of preoperative systemic therapy: Candidate for preoperative systemic therapy--A: Patients with inoperable breast cancer: IBC; bulky or matted cN2 axillary nodes; cN3 kiersten disease; cT4 tumors.  B. In selected patients with operable breast cancer--HER-2 positive disease and TNBC, if >/=cT2 OR >/=cN1; Large primary tumor relative to breast size in a patient who desires breast conservation; cN+ disease likely to become cN0 with preoperative systemic therapy; C.  Preoperative systemic therapy can be considered for cT1,N0, HER-2 positive disease and TNBC.     6.   Docetaxel, carboplatin: Toxicities of chemotherapy noted to include, but not limited to: Myelosuppression (neutropenia, anemia, thrombocytopenia), alopecia, neuropathy, arthralgia, myalgia, nausea, vomiting, hypersensitivity reactions, mucositis, diarrhea, infection, abnormal liver enzymes, asthenia, fever, hypertension, bleeding, edema, opportunistic infections, anaphylaxis, cardiac conduction disturbance/arrhythmias, syncope, thromboembolism, myocardial infarction, severe injection site reactions, pulmonary toxicity, GI obstruction/perforation, paralytic ileus, ischemic colitis, pancreatitis, hepatotoxicity, severe skin reactions). Questions answered to the patient's apparent satisfaction. She agrees to a trial of therapy.   7.   Discussed the potential toxicities of pertuzumab and trastuzumab, to include but not limited to: Cardiac failure/LV dysfunction, cardiomyopathy, CHF, arrhythmia, hypersensitivity reaction, anaphylaxis, angioedema, ARDS, interstitial pneumonitis, pulmonary infiltrates, pulmonary fibrosis, pleural effusion, pulmonary edema, hypoxia, myelosuppression (anemia, neutropenia, thrombocytopenia), febrile neutropenia, tumor lysis syndrome, glomerulonephritis, hypokalemia, peripheral neuropathy, hand-foot syndrome, alopecia, nausea, diarrhea,  ALT/AST increase, lymphopenia, asthenia, fatigue, stomatitis, myalgia, arthralgia, constipation, vomiting, dysgeusia, headache, decreased appetite, insomnia, peripheral neuropathy, rash, decreased albumin, xeroderma, mucositis, cough, dyspepsia, fever, dizziness, increased potassium, decreased sodium, epistaxis, hot flashes, weight loss, URI, paresthesia, back pain, dyspnea, UTI, hypokalemia, hand-foot syndrome.  Questions answered to the patient's apparent satisfaction.  She agrees to a trial of therapy.     8.  Discussed with Dr. Espinla today (re: surgical wounds).  Okay to schedule neoadjuvant treatment (plan: q 21 days x 6 cycles) C1, 10/14/24; C2, 11/4/24; C3,  11/25/24     Docetaxel 75 mg/m2 (BSA = ; TD =  mg) per administration guidelines q 21 days, C1-C6  Carboplatin AUC 6 (BSA = ; TD =  mg) per administration guidelines q 21 days, C1-C6  Perjeta 840 mg loading dose C1D1; then 420 mg, C2 -C6    Trastuzumab 8 mg/kg loading dose C1D1; then 6 mg/kg, C2- C6   Observe patient for 1 hour after initial dose pertuzumab and for 30 minutes after all subsequent doses for signs of infusion reactions.  Hold pertuzumab and trastuzumab if LVEF drops to<50% with a 10% or greater absolute decrease below pretreatment baseline.     9.  Premeds:   Aloxi 0.25 mg IV   Emend 150 mg IV   Decadron 10 mg IV   Pepcid 20 mg IV   Benadryl 25 mg IV  Decadron 8 mg twice daily day before and 8 mg twice daily day after chemo        10.  Neulasta 6 mg sc on day 2 chemo  11.  CMP, magnesium level, and CBC with differential weekly on day of chemotherapy. Procrit 40,000 units subcutaneously if hemoglobin less than 10 and hematocrit less than 30. Zarxio/Neupogen 480 mcg daily x3 if ANC less than 1.0.      12.  Rx:  Zofran 8 mg po tid prn # 30                Compazine 10 mg p.o. 3 times daily as needed dispense 30                Decadron 8 mg twice daily day before and 8 mg twice daily day after chemo     13.  Anticipate adjuvant (postop)  "treatment (plan: q 21 days x 17 cycles - to complete 1 year of therapy) C7..pending     Trastuzumab 8 mg/kg loading dose C7D1; then 6 mg/kg, C8- C17      14.  Return to the office in 6 weeks with pre-office CBC with differential, CMP.  We will schedule follow-up 2D echocardiogram after C3 and C6 of Tx  15.  Importance of Smoking Cessation discussed with patient and informed patient additional information will be on today's [facility]'s Flyer - Plan to Be Tobacco Free handout provided to patient           I spent ~57 minutes caring for Uyen, \"Leana\" on this date of service. This time includes time spent by me in the following activities: preparing for the visit, reviewing tests, performing a medically appropriate examination and/or evaluation, counseling and educating the patient/family/caregiver, ordering medications, tests, or procedures and documenting information in the medical record.       "

## 2024-10-02 ENCOUNTER — OFFICE VISIT (OUTPATIENT)
Age: 44
End: 2024-10-02
Payer: COMMERCIAL

## 2024-10-02 VITALS
SYSTOLIC BLOOD PRESSURE: 138 MMHG | HEIGHT: 67 IN | WEIGHT: 156 LBS | BODY MASS INDEX: 24.48 KG/M2 | DIASTOLIC BLOOD PRESSURE: 84 MMHG

## 2024-10-02 DIAGNOSIS — Z09 POSTOP CHECK: Primary | ICD-10-CM

## 2024-10-02 DIAGNOSIS — F17.200 SMOKER: ICD-10-CM

## 2024-10-02 NOTE — PROGRESS NOTES
"Chief Complaint  Post-op (Pt here for 2wk postop, had TLH/BSO with Armandoinci 09/20/2024, benign pathology)    Subjective        Uyne José presents to [facility]  History of Present Illness    Patient presents today for postop check  She has been compliant with postoperative restrictions  Pathology is benign  She has complete resolution of her preoperative pelvic pain    Tentatively, chemotherapy will begin next week  She seems to be handling this appropriately    Objective   Vital Signs:  /84 (BP Location: Left arm, Patient Position: Sitting, Cuff Size: Adult)   Ht 169.5 cm (66.73\")   Wt 70.8 kg (156 lb)   BMI 24.63 kg/m²   Estimated body mass index is 24.63 kg/m² as calculated from the following:    Height as of this encounter: 169.5 cm (66.73\").    Weight as of this encounter: 70.8 kg (156 lb).    BMI is within normal parameters. No other follow-up for BMI required.      Physical Exam  Constitutional:       General: She is not in acute distress.     Appearance: Normal appearance. She is not toxic-appearing.   HENT:      Head: Normocephalic and atraumatic.      Nose: Nose normal.   Abdominal:      Comments: Laparoscopic sites are healing well   Neurological:      General: No focal deficit present.      Mental Status: She is alert.   Psychiatric:         Mood and Affect: Mood normal.         Behavior: Behavior normal.         Thought Content: Thought content normal.         Judgment: Judgment normal.        Result Review :                Assessment and Plan   Diagnoses and all orders for this visit:    1. Postop check (Primary)    2. Smoker             Follow Up   Return in about 6 weeks (around 11/13/2024) for with me.  Patient was given instructions and counseling regarding her condition or for health maintenance advice. Please see specific information pulled into the AVS if appropriate.     Continue pelvic rest    Stevan Espinal MD            "

## 2024-10-03 ENCOUNTER — OFFICE VISIT (OUTPATIENT)
Dept: ONCOLOGY | Facility: CLINIC | Age: 44
End: 2024-10-03
Payer: COMMERCIAL

## 2024-10-03 VITALS
TEMPERATURE: 97.3 F | SYSTOLIC BLOOD PRESSURE: 118 MMHG | RESPIRATION RATE: 18 BRPM | HEART RATE: 68 BPM | HEIGHT: 67 IN | BODY MASS INDEX: 25.02 KG/M2 | DIASTOLIC BLOOD PRESSURE: 72 MMHG | WEIGHT: 159.4 LBS

## 2024-10-03 DIAGNOSIS — C50.912 INVASIVE DUCTAL CARCINOMA OF BREAST, LEFT: Primary | ICD-10-CM

## 2024-10-08 ENCOUNTER — SPECIALTY PHARMACY (OUTPATIENT)
Dept: ONCOLOGY | Facility: HOSPITAL | Age: 44
End: 2024-10-08
Payer: COMMERCIAL

## 2024-10-08 DIAGNOSIS — C50.912 INVASIVE DUCTAL CARCINOMA OF BREAST, LEFT: ICD-10-CM

## 2024-10-08 DIAGNOSIS — C50.912 DUCTAL CARCINOMA OF LEFT BREAST: Primary | ICD-10-CM

## 2024-10-08 RX ORDER — PERTUZUMAB 30 MG/ML
840 INJECTION, SOLUTION, CONCENTRATE INTRAVENOUS ONCE
Qty: 28 ML | Refills: 0 | Status: SHIPPED | OUTPATIENT
Start: 2024-10-08 | End: 2024-10-10

## 2024-10-08 RX ORDER — TRASTUZUMAB 420 MG/20ML
580 INJECTION, POWDER, LYOPHILIZED, FOR SOLUTION INTRAVENOUS TAKE AS DIRECTED
Qty: 2 EACH | Refills: 0 | Status: SHIPPED | OUTPATIENT
Start: 2024-10-08

## 2024-10-09 ENCOUNTER — TELEPHONE (OUTPATIENT)
Dept: ONCOLOGY | Facility: CLINIC | Age: 44
End: 2024-10-09
Payer: COMMERCIAL

## 2024-10-09 DIAGNOSIS — K21.9 GASTROESOPHAGEAL REFLUX DISEASE WITHOUT ESOPHAGITIS: ICD-10-CM

## 2024-10-10 RX ORDER — PANTOPRAZOLE SODIUM 40 MG/1
40 TABLET, DELAYED RELEASE ORAL DAILY
Qty: 30 TABLET | Refills: 2 | Status: SHIPPED | OUTPATIENT
Start: 2024-10-10

## 2024-10-11 RX ORDER — DEXAMETHASONE SODIUM PHOSPHATE 10 MG/ML
10 INJECTION INTRAMUSCULAR; INTRAVENOUS ONCE
Start: 2024-10-15

## 2024-10-11 RX ORDER — DIPHENHYDRAMINE HYDROCHLORIDE 50 MG/ML
25 INJECTION INTRAMUSCULAR; INTRAVENOUS ONCE
Start: 2024-10-15 | End: 2024-10-15

## 2024-10-11 RX ORDER — APREPITANT 80 MG/1
80 CAPSULE ORAL ONCE
Start: 2024-10-15 | End: 2024-10-15

## 2024-10-15 ENCOUNTER — LAB (OUTPATIENT)
Dept: LAB | Facility: HOSPITAL | Age: 44
End: 2024-10-15
Payer: COMMERCIAL

## 2024-10-15 ENCOUNTER — DOCUMENTATION (OUTPATIENT)
Dept: RADIATION ONCOLOGY | Facility: HOSPITAL | Age: 44
End: 2024-10-15
Payer: COMMERCIAL

## 2024-10-15 ENCOUNTER — INFUSION (OUTPATIENT)
Dept: ONCOLOGY | Facility: HOSPITAL | Age: 44
End: 2024-10-15
Payer: COMMERCIAL

## 2024-10-15 VITALS
SYSTOLIC BLOOD PRESSURE: 121 MMHG | BODY MASS INDEX: 25.36 KG/M2 | OXYGEN SATURATION: 95 % | DIASTOLIC BLOOD PRESSURE: 70 MMHG | HEIGHT: 67 IN | HEART RATE: 63 BPM | TEMPERATURE: 96.7 F | WEIGHT: 161.6 LBS | RESPIRATION RATE: 16 BRPM

## 2024-10-15 DIAGNOSIS — C50.912 INVASIVE DUCTAL CARCINOMA OF BREAST, LEFT: ICD-10-CM

## 2024-10-15 DIAGNOSIS — C50.912 DUCTAL CARCINOMA OF LEFT BREAST: Primary | ICD-10-CM

## 2024-10-15 DIAGNOSIS — E03.9 HYPOTHYROIDISM, UNSPECIFIED TYPE: ICD-10-CM

## 2024-10-15 LAB
ALBUMIN SERPL-MCNC: 4.1 G/DL (ref 3.5–5.2)
ALBUMIN/GLOB SERPL: 1.6 G/DL
ALP SERPL-CCNC: 76 U/L (ref 39–117)
ALT SERPL W P-5'-P-CCNC: 10 U/L (ref 1–33)
ANION GAP SERPL CALCULATED.3IONS-SCNC: 10 MMOL/L (ref 5–15)
AST SERPL-CCNC: 12 U/L (ref 1–32)
BASOPHILS # BLD AUTO: 0.01 10*3/MM3 (ref 0–0.2)
BASOPHILS NFR BLD AUTO: 0.1 % (ref 0–1.5)
BILIRUB SERPL-MCNC: 0.4 MG/DL (ref 0–1.2)
BUN SERPL-MCNC: 12 MG/DL (ref 6–20)
BUN/CREAT SERPL: 16.7 (ref 7–25)
CALCIUM SPEC-SCNC: 9.5 MG/DL (ref 8.6–10.5)
CHLORIDE SERPL-SCNC: 102 MMOL/L (ref 98–107)
CO2 SERPL-SCNC: 28 MMOL/L (ref 22–29)
CREAT SERPL-MCNC: 0.72 MG/DL (ref 0.57–1)
DEPRECATED RDW RBC AUTO: 43.4 FL (ref 37–54)
EGFRCR SERPLBLD CKD-EPI 2021: 105.9 ML/MIN/1.73
EOSINOPHIL # BLD AUTO: 0 10*3/MM3 (ref 0–0.4)
EOSINOPHIL NFR BLD AUTO: 0 % (ref 0.3–6.2)
ERYTHROCYTE [DISTWIDTH] IN BLOOD BY AUTOMATED COUNT: 13.1 % (ref 12.3–15.4)
GLOBULIN UR ELPH-MCNC: 2.6 GM/DL
GLUCOSE SERPL-MCNC: 165 MG/DL (ref 65–99)
HCT VFR BLD AUTO: 38.6 % (ref 34–46.6)
HGB BLD-MCNC: 12.5 G/DL (ref 12–15.9)
IMM GRANULOCYTES # BLD AUTO: 0.06 10*3/MM3 (ref 0–0.05)
IMM GRANULOCYTES NFR BLD AUTO: 0.5 % (ref 0–0.5)
LYMPHOCYTES # BLD AUTO: 1.07 10*3/MM3 (ref 0.7–3.1)
LYMPHOCYTES NFR BLD AUTO: 8.8 % (ref 19.6–45.3)
MAGNESIUM SERPL-MCNC: 1.8 MG/DL (ref 1.6–2.6)
MCH RBC QN AUTO: 29.3 PG (ref 26.6–33)
MCHC RBC AUTO-ENTMCNC: 32.4 G/DL (ref 31.5–35.7)
MCV RBC AUTO: 90.6 FL (ref 79–97)
MONOCYTES # BLD AUTO: 0.45 10*3/MM3 (ref 0.1–0.9)
MONOCYTES NFR BLD AUTO: 3.7 % (ref 5–12)
NEUTROPHILS NFR BLD AUTO: 10.6 10*3/MM3 (ref 1.7–7)
NEUTROPHILS NFR BLD AUTO: 86.9 % (ref 42.7–76)
NRBC BLD AUTO-RTO: 0 /100 WBC (ref 0–0.2)
PLATELET # BLD AUTO: 211 10*3/MM3 (ref 140–450)
PMV BLD AUTO: 12.1 FL (ref 6–12)
POTASSIUM SERPL-SCNC: 4.4 MMOL/L (ref 3.5–5.2)
PROT SERPL-MCNC: 6.7 G/DL (ref 6–8.5)
RBC # BLD AUTO: 4.26 10*6/MM3 (ref 3.77–5.28)
SODIUM SERPL-SCNC: 140 MMOL/L (ref 136–145)
TSH SERPL DL<=0.05 MIU/L-ACNC: 0.51 UIU/ML (ref 0.27–4.2)
WBC NRBC COR # BLD AUTO: 12.19 10*3/MM3 (ref 3.4–10.8)

## 2024-10-15 PROCEDURE — 83735 ASSAY OF MAGNESIUM: CPT

## 2024-10-15 PROCEDURE — 96375 TX/PRO/DX INJ NEW DRUG ADDON: CPT

## 2024-10-15 PROCEDURE — 25010000002 DIPHENHYDRAMINE PER 50 MG: Performed by: INTERNAL MEDICINE

## 2024-10-15 PROCEDURE — 96413 CHEMO IV INFUSION 1 HR: CPT

## 2024-10-15 PROCEDURE — 25010000002 DOCETAXEL 20 MG/ML SOLUTION 8 ML VIAL: Performed by: INTERNAL MEDICINE

## 2024-10-15 PROCEDURE — 80053 COMPREHEN METABOLIC PANEL: CPT

## 2024-10-15 PROCEDURE — 25010000002 PALONOSETRON PER 25 MCG: Performed by: INTERNAL MEDICINE

## 2024-10-15 PROCEDURE — 96367 TX/PROPH/DG ADDL SEQ IV INF: CPT

## 2024-10-15 PROCEDURE — 96415 CHEMO IV INFUSION ADDL HR: CPT

## 2024-10-15 PROCEDURE — 25010000002 PERTUZUMAB 420 MG/14ML SOLUTION 420 MG VIAL: Performed by: INTERNAL MEDICINE

## 2024-10-15 PROCEDURE — 25810000003 SODIUM CHLORIDE 0.9 % SOLUTION 250 ML FLEX CONT: Performed by: INTERNAL MEDICINE

## 2024-10-15 PROCEDURE — 25010000002 DEXAMETHASONE PER 1 MG: Performed by: INTERNAL MEDICINE

## 2024-10-15 PROCEDURE — 84443 ASSAY THYROID STIM HORMONE: CPT

## 2024-10-15 PROCEDURE — 85025 COMPLETE CBC W/AUTO DIFF WBC: CPT

## 2024-10-15 PROCEDURE — 25010000002 CARBOPLATIN PER 50 MG: Performed by: INTERNAL MEDICINE

## 2024-10-15 PROCEDURE — 25010000002 TRASTUZUMAB-DKST 420 MG RECONSTITUTED SOLUTION 1 EACH VIAL: Performed by: INTERNAL MEDICINE

## 2024-10-15 PROCEDURE — 25010000002 HEPARIN LOCK FLUSH PER 10 UNITS: Performed by: INTERNAL MEDICINE

## 2024-10-15 PROCEDURE — 96377 APPLICATON ON-BODY INJECTOR: CPT

## 2024-10-15 PROCEDURE — 36415 COLL VENOUS BLD VENIPUNCTURE: CPT

## 2024-10-15 PROCEDURE — 25810000003 SODIUM CHLORIDE 0.9 % SOLUTION: Performed by: INTERNAL MEDICINE

## 2024-10-15 PROCEDURE — 25010000002 FOSAPREPITANT PER 1 MG: Performed by: INTERNAL MEDICINE

## 2024-10-15 PROCEDURE — 96417 CHEMO IV INFUS EACH ADDL SEQ: CPT

## 2024-10-15 PROCEDURE — 25010000002 PEGFILGRASTIM-CBQV 6 MG/0.6ML SOLUTION PREFILLED SYRINGE: Performed by: INTERNAL MEDICINE

## 2024-10-15 RX ORDER — APREPITANT 80 MG/1
80 CAPSULE ORAL ONCE
Status: DISCONTINUED | OUTPATIENT
Start: 2024-10-15 | End: 2024-10-15 | Stop reason: ALTCHOICE

## 2024-10-15 RX ORDER — SODIUM CHLORIDE 9 MG/ML
20 INJECTION, SOLUTION INTRAVENOUS ONCE
Status: COMPLETED | OUTPATIENT
Start: 2024-10-15 | End: 2024-10-15

## 2024-10-15 RX ORDER — DIPHENHYDRAMINE HYDROCHLORIDE 50 MG/ML
25 INJECTION INTRAMUSCULAR; INTRAVENOUS ONCE
Status: COMPLETED | OUTPATIENT
Start: 2024-10-15 | End: 2024-10-15

## 2024-10-15 RX ORDER — PALONOSETRON 0.05 MG/ML
0.25 INJECTION, SOLUTION INTRAVENOUS ONCE
Status: DISCONTINUED | OUTPATIENT
Start: 2024-10-15 | End: 2024-10-15

## 2024-10-15 RX ORDER — ACETAMINOPHEN 325 MG/1
650 TABLET ORAL ONCE
Status: COMPLETED | OUTPATIENT
Start: 2024-10-15 | End: 2024-10-15

## 2024-10-15 RX ORDER — PALONOSETRON 0.05 MG/ML
0.25 INJECTION, SOLUTION INTRAVENOUS ONCE
Status: COMPLETED | OUTPATIENT
Start: 2024-10-15 | End: 2024-10-15

## 2024-10-15 RX ORDER — DEXAMETHASONE SODIUM PHOSPHATE 10 MG/ML
10 INJECTION INTRAMUSCULAR; INTRAVENOUS ONCE
Status: COMPLETED | OUTPATIENT
Start: 2024-10-15 | End: 2024-10-15

## 2024-10-15 RX ORDER — HEPARIN SODIUM (PORCINE) LOCK FLUSH IV SOLN 100 UNIT/ML 100 UNIT/ML
500 SOLUTION INTRAVENOUS AS NEEDED
Status: DISCONTINUED | OUTPATIENT
Start: 2024-10-15 | End: 2024-10-15 | Stop reason: HOSPADM

## 2024-10-15 RX ORDER — FAMOTIDINE 10 MG/ML
20 INJECTION, SOLUTION INTRAVENOUS ONCE
Status: COMPLETED | OUTPATIENT
Start: 2024-10-15 | End: 2024-10-15

## 2024-10-15 RX ORDER — SODIUM CHLORIDE 0.9 % (FLUSH) 0.9 %
10 SYRINGE (ML) INJECTION AS NEEDED
OUTPATIENT
Start: 2024-10-15

## 2024-10-15 RX ORDER — FAMOTIDINE 10 MG/ML
20 INJECTION, SOLUTION INTRAVENOUS AS NEEDED
Status: DISCONTINUED | OUTPATIENT
Start: 2024-10-15 | End: 2024-10-15 | Stop reason: HOSPADM

## 2024-10-15 RX ORDER — DIPHENHYDRAMINE HYDROCHLORIDE 50 MG/ML
50 INJECTION INTRAMUSCULAR; INTRAVENOUS AS NEEDED
Status: DISCONTINUED | OUTPATIENT
Start: 2024-10-15 | End: 2024-10-15 | Stop reason: HOSPADM

## 2024-10-15 RX ORDER — HEPARIN SODIUM (PORCINE) LOCK FLUSH IV SOLN 100 UNIT/ML 100 UNIT/ML
500 SOLUTION INTRAVENOUS AS NEEDED
OUTPATIENT
Start: 2024-10-15

## 2024-10-15 RX ORDER — SODIUM CHLORIDE 0.9 % (FLUSH) 0.9 %
10 SYRINGE (ML) INJECTION AS NEEDED
Status: DISCONTINUED | OUTPATIENT
Start: 2024-10-15 | End: 2024-10-15 | Stop reason: HOSPADM

## 2024-10-15 RX ADMIN — DIPHENHYDRAMINE HYDROCHLORIDE 25 MG: 50 INJECTION, SOLUTION INTRAMUSCULAR; INTRAVENOUS at 09:24

## 2024-10-15 RX ADMIN — FOSAPREPITANT 150 MG: 150 INJECTION, POWDER, LYOPHILIZED, FOR SOLUTION INTRAVENOUS at 09:29

## 2024-10-15 RX ADMIN — CARBOPLATIN 600 MG: 10 INJECTION, SOLUTION INTRAVENOUS at 14:51

## 2024-10-15 RX ADMIN — ACETAMINOPHEN 650 MG: 325 TABLET ORAL at 10:11

## 2024-10-15 RX ADMIN — PEGFILGRASTIM-CBQV 6 MG: 6 INJECTION, SOLUTION SUBCUTANEOUS at 16:08

## 2024-10-15 RX ADMIN — PERTUZUMAB 840 MG: 30 INJECTION, SOLUTION, CONCENTRATE INTRAVENOUS at 10:14

## 2024-10-15 RX ADMIN — DOCETAXEL 135 MG: 20 INJECTION, SOLUTION, CONCENTRATE INTRAVENOUS at 13:42

## 2024-10-15 RX ADMIN — FAMOTIDINE 20 MG: 10 INJECTION INTRAVENOUS at 09:23

## 2024-10-15 RX ADMIN — DEXAMETHASONE SODIUM PHOSPHATE 10 MG: 10 INJECTION INTRAMUSCULAR; INTRAVENOUS at 09:25

## 2024-10-15 RX ADMIN — Medication 10 ML: at 16:00

## 2024-10-15 RX ADMIN — TRASTUZUMAB-DKST 580 MG: KIT at 11:55

## 2024-10-15 RX ADMIN — PALONOSETRON HYDROCHLORIDE 0.25 MG: 0.25 INJECTION, SOLUTION INTRAVENOUS at 09:27

## 2024-10-15 RX ADMIN — HEPARIN 500 UNITS: 100 SYRINGE at 16:00

## 2024-10-15 RX ADMIN — SODIUM CHLORIDE 20 ML/HR: 9 INJECTION, SOLUTION INTRAVENOUS at 08:50

## 2024-10-15 NOTE — PROGRESS NOTES
ANTIONETTE met with Mrs. José who is here to start treatment for invasive ductal carcinoma of breast. ANTIONETTE introduced self and explained role and source of support. She is 43 years old and lives with her spouse and 17-year-old son. She has a strong support system which includes her family, friends, and co-workers. She works full-time at [employer] and her one-year anniversary will be on November 13th. She plans to continue to work throughout treatment. She will be short on her hours due to her treatments. ANTIONETTE did speak to her regarding non-medical grants.     Mrs. José states she is ready to get started with treatment. She was not feeling anxious or nervous. She does not see a counselor and she does not take any medication for anxiety/depression. ANTIONETTE did inform her about the resource room, support, and peer to peer groups. ANTIONETTE encouraged her to call as needed.

## 2024-10-16 RX ORDER — ONDANSETRON 8 MG/1
8 TABLET, ORALLY DISINTEGRATING ORAL EVERY 8 HOURS PRN
Qty: 30 TABLET | Refills: 1 | Status: SHIPPED | OUTPATIENT
Start: 2024-10-16

## 2024-10-16 NOTE — TELEPHONE ENCOUNTER
Discusses with Leana that the steroid she is taking before and after her treatment as well as the steroid she got before the treatment yesterday can cause flushing.  Stated that she has had some nausea and ws wanting to know if she can get the dissolvable zofran.

## 2024-10-17 DIAGNOSIS — F90.2 ATTENTION DEFICIT HYPERACTIVITY DISORDER (ADHD), COMBINED TYPE: ICD-10-CM

## 2024-10-17 RX ORDER — LISDEXAMFETAMINE DIMESYLATE 50 MG/1
50 CAPSULE ORAL EVERY MORNING
Qty: 30 CAPSULE | Refills: 0 | Status: SHIPPED | OUTPATIENT
Start: 2024-10-17

## 2024-10-17 NOTE — TELEPHONE ENCOUNTER
Pt last seen 8/5/24. Pt was ok'd for 3 months.  Refill: 3rd  Date of last script: 9/2/24  UDS 5/9/24  F/u apt: 11/5/24  Routing to Dr. Chavez to review

## 2024-10-21 ENCOUNTER — HOSPITAL ENCOUNTER (EMERGENCY)
Facility: HOSPITAL | Age: 44
Discharge: HOME | End: 2024-10-21
Attending: EMERGENCY MEDICINE | Admitting: EMERGENCY MEDICINE
Payer: COMMERCIAL

## 2024-10-21 ENCOUNTER — TELEPHONE (OUTPATIENT)
Dept: FAMILY MEDICINE CLINIC | Facility: CLINIC | Age: 44
End: 2024-10-21
Payer: COMMERCIAL

## 2024-10-21 VITALS
WEIGHT: 153.5 LBS | SYSTOLIC BLOOD PRESSURE: 123 MMHG | BODY MASS INDEX: 24.09 KG/M2 | OXYGEN SATURATION: 97 % | TEMPERATURE: 98.1 F | RESPIRATION RATE: 18 BRPM | HEIGHT: 67 IN | DIASTOLIC BLOOD PRESSURE: 81 MMHG | HEART RATE: 63 BPM

## 2024-10-21 DIAGNOSIS — R11.2 CHEMOTHERAPY INDUCED NAUSEA AND VOMITING: Primary | ICD-10-CM

## 2024-10-21 DIAGNOSIS — B37.0 ORAL THRUSH: ICD-10-CM

## 2024-10-21 DIAGNOSIS — T45.1X5A CHEMOTHERAPY INDUCED NAUSEA AND VOMITING: Primary | ICD-10-CM

## 2024-10-21 LAB
ALBUMIN SERPL-MCNC: 4.6 G/DL (ref 3.5–5.2)
ALBUMIN/GLOB SERPL: 1.4 G/DL
ALP SERPL-CCNC: 113 U/L (ref 39–117)
ALT SERPL W P-5'-P-CCNC: 26 U/L (ref 1–33)
ANION GAP SERPL CALCULATED.3IONS-SCNC: 11 MMOL/L (ref 5–15)
AST SERPL-CCNC: 25 U/L (ref 1–32)
BILIRUB SERPL-MCNC: 0.7 MG/DL (ref 0–1.2)
BUN SERPL-MCNC: 10 MG/DL (ref 6–20)
BUN/CREAT SERPL: 13.3 (ref 7–25)
CALCIUM SPEC-SCNC: 9.8 MG/DL (ref 8.6–10.5)
CHLORIDE SERPL-SCNC: 98 MMOL/L (ref 98–107)
CO2 SERPL-SCNC: 30 MMOL/L (ref 22–29)
CREAT SERPL-MCNC: 0.75 MG/DL (ref 0.57–1)
DEPRECATED RDW RBC AUTO: 40.4 FL (ref 37–54)
EGFRCR SERPLBLD CKD-EPI 2021: 100.8 ML/MIN/1.73
EOSINOPHIL # BLD MANUAL: 0.39 10*3/MM3 (ref 0–0.4)
EOSINOPHIL NFR BLD MANUAL: 9 % (ref 0.3–6.2)
ERYTHROCYTE [DISTWIDTH] IN BLOOD BY AUTOMATED COUNT: 12.8 % (ref 12.3–15.4)
GLOBULIN UR ELPH-MCNC: 3.3 GM/DL
GLUCOSE SERPL-MCNC: 100 MG/DL (ref 65–99)
HCT VFR BLD AUTO: 43.9 % (ref 34–46.6)
HGB BLD-MCNC: 14.9 G/DL (ref 12–15.9)
LYMPHOCYTES # BLD MANUAL: 1.8 10*3/MM3 (ref 0.7–3.1)
LYMPHOCYTES NFR BLD MANUAL: 11 % (ref 5–12)
MCH RBC QN AUTO: 29.5 PG (ref 26.6–33)
MCHC RBC AUTO-ENTMCNC: 33.9 G/DL (ref 31.5–35.7)
MCV RBC AUTO: 86.9 FL (ref 79–97)
MONOCYTES # BLD: 0.47 10*3/MM3 (ref 0.1–0.9)
NEUTROPHILS # BLD AUTO: 1.63 10*3/MM3 (ref 1.7–7)
NEUTROPHILS NFR BLD MANUAL: 27 % (ref 42.7–76)
NEUTS BAND NFR BLD MANUAL: 11 % (ref 0–5)
PLATELET # BLD AUTO: 132 10*3/MM3 (ref 140–450)
PMV BLD AUTO: 13.2 FL (ref 6–12)
POTASSIUM SERPL-SCNC: 4 MMOL/L (ref 3.5–5.2)
PROT SERPL-MCNC: 7.9 G/DL (ref 6–8.5)
RBC # BLD AUTO: 5.05 10*6/MM3 (ref 3.77–5.28)
RBC MORPH BLD: NORMAL
SMALL PLATELETS BLD QL SMEAR: ABNORMAL
SODIUM SERPL-SCNC: 139 MMOL/L (ref 136–145)
VARIANT LYMPHS NFR BLD MANUAL: 2 % (ref 0–5)
VARIANT LYMPHS NFR BLD MANUAL: 40 % (ref 19.6–45.3)
WBC MORPH BLD: NORMAL
WBC NRBC COR # BLD AUTO: 4.29 10*3/MM3 (ref 3.4–10.8)

## 2024-10-21 PROCEDURE — 25810000003 LACTATED RINGERS SOLUTION: Performed by: EMERGENCY MEDICINE

## 2024-10-21 PROCEDURE — 85025 COMPLETE CBC W/AUTO DIFF WBC: CPT | Performed by: EMERGENCY MEDICINE

## 2024-10-21 PROCEDURE — 80053 COMPREHEN METABOLIC PANEL: CPT | Performed by: EMERGENCY MEDICINE

## 2024-10-21 PROCEDURE — 96366 THER/PROPH/DIAG IV INF ADDON: CPT

## 2024-10-21 PROCEDURE — 25010000002 ONDANSETRON PER 1 MG: Performed by: EMERGENCY MEDICINE

## 2024-10-21 PROCEDURE — 25010000002 DEXAMETHASONE PER 1 MG: Performed by: EMERGENCY MEDICINE

## 2024-10-21 PROCEDURE — 96375 TX/PRO/DX INJ NEW DRUG ADDON: CPT

## 2024-10-21 PROCEDURE — 96365 THER/PROPH/DIAG IV INF INIT: CPT

## 2024-10-21 PROCEDURE — 36415 COLL VENOUS BLD VENIPUNCTURE: CPT

## 2024-10-21 PROCEDURE — 96374 THER/PROPH/DIAG INJ IV PUSH: CPT

## 2024-10-21 PROCEDURE — 99283 EMERGENCY DEPT VISIT LOW MDM: CPT

## 2024-10-21 PROCEDURE — 85007 BL SMEAR W/DIFF WBC COUNT: CPT | Performed by: EMERGENCY MEDICINE

## 2024-10-21 RX ORDER — FLUCONAZOLE 150 MG/1
150 TABLET ORAL DAILY
Qty: 3 TABLET | Refills: 0 | Status: SHIPPED | OUTPATIENT
Start: 2024-10-21 | End: 2024-10-24

## 2024-10-21 RX ORDER — FAMOTIDINE 10 MG/ML
20 INJECTION, SOLUTION INTRAVENOUS ONCE
Status: COMPLETED | OUTPATIENT
Start: 2024-10-21 | End: 2024-10-21

## 2024-10-21 RX ORDER — DEXAMETHASONE SODIUM PHOSPHATE 10 MG/ML
10 INJECTION INTRAMUSCULAR; INTRAVENOUS ONCE
Status: COMPLETED | OUTPATIENT
Start: 2024-10-21 | End: 2024-10-21

## 2024-10-21 RX ADMIN — FAMOTIDINE 20 MG: 10 INJECTION INTRAVENOUS at 09:22

## 2024-10-21 RX ADMIN — SODIUM CHLORIDE, POTASSIUM CHLORIDE, SODIUM LACTATE AND CALCIUM CHLORIDE 1000 ML: 600; 310; 30; 20 INJECTION, SOLUTION INTRAVENOUS at 09:18

## 2024-10-21 RX ADMIN — DEXAMETHASONE SODIUM PHOSPHATE 10 MG: 10 INJECTION INTRAMUSCULAR; INTRAVENOUS at 09:18

## 2024-10-21 RX ADMIN — ONDANSETRON 8 MG: 2 INJECTION INTRAMUSCULAR; INTRAVENOUS at 09:22

## 2024-10-21 NOTE — ED PROVIDER NOTES
Subjective   History of Present Illness  Patient is a very pleasant 43-year-old who came the ER complaining of nausea vomiting and diarrhea s/p chemotherapy no abdominal pain.    Vomiting  The primary symptoms include nausea, vomiting and diarrhea. Primary symptoms do not include weight loss, fatigue, jaundice, hematochezia, arthralgias or rash. The illness began 2 days ago.   The illness does not include chills, anorexia, bloating or back pain. Associated medical issues do not include GERD, gallstones or irritable bowel syndrome.   Diarrhea  The primary symptoms include nausea, vomiting and diarrhea. Primary symptoms do not include weight loss, fatigue, jaundice, hematochezia, arthralgias or rash.   The illness does not include chills, anorexia, bloating or back pain. Associated medical issues do not include GERD, gallstones or irritable bowel syndrome.       Review of Systems   Constitutional: Negative.  Negative for chills, fatigue and weight loss.   HENT: Negative.     Eyes: Negative.    Respiratory: Negative.     Cardiovascular: Negative.    Gastrointestinal:  Positive for diarrhea, nausea and vomiting. Negative for anorexia, bloating, hematochezia and jaundice.   Musculoskeletal: Negative.  Negative for arthralgias, back pain and neck pain.   Skin: Negative.  Negative for rash.   Neurological: Negative.    All other systems reviewed and are negative.      Past Medical History:   Diagnosis Date    Acid reflux     ADHD (attention deficit hyperactivity disorder)     Allergic     Anxiety     Breast cancer     Bronchitis     Chronic fatigue     Disease of thyroid gland     Fibromyalgia     Gallbladder abscess     Heart murmur     Hypertension     Hypothyroidism     Mitral valve prolapse     PONV (postoperative nausea and vomiting)     RA (rheumatoid arthritis)        Allergies   Allergen Reactions    Percocet [Oxycodone-Acetaminophen] Hives       Past Surgical History:   Procedure Laterality Date    CARPAL TUNNEL  RELEASE Right     COLONOSCOPY  05/01/2003    Normal exam    D & C HYSTEROSCOPY MYOSURE N/A 07/31/2024    Procedure: DILATATION AND CURETTAGE HYSTEROSCOPY WITH POSSIBLE TISSUE RESECTION;  Surgeon: Stevan Espinal MD;  Location: [facility] OR;  Service: Obstetrics/Gynecology;  Laterality: N/A;    DIAGNOSTIC LAPAROSCOPY  2000    [facility]; normal findings; [facility]    ENDOMETRIAL ABLATION  2023    ENDOSCOPY      LAPAROSCOPIC CHOLECYSTECTOMY  2011    Dr Duncan; [facility]    SALPINGECTOMY Bilateral 10/16/2020    Procedure: SALPINGECTOMY LAPAROSCOPIC for sterilization;  Surgeon: Stevan Espinal MD;  Location: [facility] OR;  Service: Obstetrics/Gynecology;  Laterality: Bilateral;    TOTAL LAPAROSCOPIC HYSTERECTOMY SALPINGO OOPHORECTOMY N/A 9/20/2024    Procedure: TOTAL LAPAROSCOPIC HYSTERECTOMY BILATERAL SALPINGOOPHORECTOMY WITH DAVINCI ROBOT;  Surgeon: Stevan Espinal MD;  Location: [facility] OR;  Service: Robotics - DaVinci;  Laterality: N/A;    US GUIDED LYMPH NODE BIOPSY  08/07/2024    VENOUS ACCESS DEVICE (PORT) INSERTION N/A 8/30/2024    Procedure: Single Lumen Port-a-cath insertion with flouroscopy;  Surgeon: Shahla Gentile MD;  Location:  [facility] OR;  Service: General;  Laterality: N/A;    WISDOM TOOTH EXTRACTION         Family History   Problem Relation Age of Onset    Diabetes Mother     Diabetes Father     Cancer Maternal Grandmother         vulva, HPV+    Cancer Maternal Grandfather     Lung cancer Paternal Grandfather 60 - 69    Breast cancer Maternal Great-Grandmother     Stomach cancer Maternal Great-Grandmother     Colon cancer Neg Hx     Colon polyps Neg Hx        Social History     Socioeconomic History    Marital status:    Tobacco Use    Smoking status: Former     Average packs/day: 0.5 packs/day for 20.0 years (10.0 ttl pk-yrs)     Types: Cigarettes     Start date: 7/22/2004    Smokeless tobacco: Never    Tobacco comments:     No cigarette since 7/22/24   Vaping Use    Vaping status: Every Day    Start  date: 2/17/2024    Substances: Nicotine, Flavoring    Devices: Disposable   Substance and Sexual Activity    Alcohol use: Yes     Comment: Occasional    Drug use: Yes     Types: Marijuana    Sexual activity: Defer     Partners: Male     Birth control/protection: Tubal ligation           Objective   Physical Exam  Vitals and nursing note reviewed. Exam conducted with a chaperone present.   Constitutional:       General: She is awake. She is not in acute distress.     Appearance: Normal appearance. She is well-developed. She is not toxic-appearing.   HENT:      Head: Normocephalic and atraumatic.      Comments: Oral thrush     Nose:      Right Nostril: No epistaxis.      Left Nostril: No epistaxis.      Mouth/Throat:      Mouth: Mucous membranes are moist.   Eyes:      General: Lids are normal. No scleral icterus.     Conjunctiva/sclera: Conjunctivae normal.      Pupils: Pupils are equal, round, and reactive to light.   Neck:      Vascular: No hepatojugular reflux or JVD.   Cardiovascular:      Rate and Rhythm: Normal rate and regular rhythm.      Chest Wall: PMI is not displaced.      Pulses: Normal pulses. No decreased pulses.      Heart sounds: Normal heart sounds. No murmur heard.  Pulmonary:      Effort: Pulmonary effort is normal. No accessory muscle usage or respiratory distress.      Breath sounds: Normal breath sounds. No decreased breath sounds or wheezing.   Abdominal:      General: Abdomen is flat. Bowel sounds are normal. There is no distension or abdominal bruit.      Palpations: Abdomen is soft. There is no shifting dullness, fluid wave, mass or pulsatile mass.      Tenderness: There is no abdominal tenderness. There is no right CVA tenderness, left CVA tenderness, guarding or rebound.      Hernia: No hernia is present.   Musculoskeletal:         General: Normal range of motion.      Cervical back: Normal range of motion and neck supple. No rigidity.      Right lower leg: No edema.      Left lower leg:  No edema.   Skin:     General: Skin is warm and dry.      Capillary Refill: Capillary refill takes 2 to 3 seconds. Capillary refill takes less than 2 seconds.      Coloration: Skin is not cyanotic, jaundiced, mottled or pale.      Comments: Poor skin turgor   Neurological:      General: No focal deficit present.      Mental Status: She is alert and oriented to person, place, and time. Mental status is at baseline.      GCS: GCS eye subscore is 4. GCS verbal subscore is 5. GCS motor subscore is 6.      Cranial Nerves: No cranial nerve deficit.      Sensory: Sensation is intact.      Motor: Motor function is intact. No weakness.      Deep Tendon Reflexes: Reflexes are normal and symmetric.   Psychiatric:         Behavior: Behavior normal. Behavior is cooperative.         Procedures           ED Course  ED Course as of 10/21/24 1116   Mon Oct 21, 2024   1108 Patient has no fever no clinical evidence of sepsis hemodynamically stable blood pressure is stable heart rate is stable shock index less than 0.7 bandemia is probably related to her nausea and vomiting and also may have received colony-stimulating factor for chemotherapy.  I have informed the patient he starts having fever or anything else she is to come back to the ED for evaluation. [TS]   1112 Discussed the case at length with the patient after admission she said that she feels better she has antiemetics at home she has been given liter of fluid and will discharge home and follow-up with the primary MD. [TS]      ED Course User Index  [TS] Conner Marcano MD                                             Medical Decision Making  Nausea and vomiting and diarrhea status post chemotherapy chemotherapy-induced nausea and vomiting to give some fluids and see how she does with this.  Patient has any abdominal pain or any fever.  There is no melena hematochezia or hematemesis.  Recently received chemotherapy.    Problems Addressed:  Chemotherapy induced nausea and  vomiting: complicated acute illness or injury  Oral thrush: complicated acute illness or injury    Amount and/or Complexity of Data Reviewed  Labs: ordered.     Details: Labs reviewed    Risk  Prescription drug management.  Risk Details: Came in with nausea vomiting and some diarrhea.  This is probably chemotherapy induced abdominal examination negative.  Shock index is less than 0.7.  And there is no fever associate with this.  Case admit discussed plan with the patient patient will be discharged home with a follow-up with her primary MD to return for any worsening symptoms.        Final diagnoses:   Chemotherapy induced nausea and vomiting   Oral thrush       ED Disposition  ED Disposition       ED Disposition   Discharge    Condition   Stable    Comment   --               Josi Ivory, APRN  [address]  [address]  [place] [address]  [phone]    Schedule an appointment as soon as possible for a visit in 2 days           Medication List        New Prescriptions      fluconazole 150 MG tablet  Commonly known as: Diflucan  Take 1 tablet by mouth Daily for 3 days.               Where to Get Your Medications        These medications were sent to Pending sale to [facility] [address] [facility] - [phone] [facility] [phone] [address] [address]      Phone: [phone]   fluconazole 150 MG tablet            Conner Marcano MD  10/21/24 0831       Conner Marcano MD  10/21/24 1114       Conner Marcano MD  10/21/24 3307

## 2024-10-21 NOTE — DISCHARGE INSTRUCTIONS
It was very nice to meet you, Uyen. Thank you for allowing us to take care of you today at [facility].     Today you were seen in the emergency department for your symptoms. Please understand that an ER evaluation is just the start of your evaluation. We do the best we can, but we are often unable to fully find what is causing your symptoms from one evaluation.  Because of this, the goal is to determine whether you need to be evaluated in the hospital or if it is safe for you to go home and see other doctors provided such as primary care physicians or specialist on an outpatient basis.      Like we discussed, I strongly urge that you follow up with your primary care doctor. Please call their office to set up an appointment as soon as possible so that you can be re-evaluated for improvement in your symptoms or for any other questions.  I have provided the information needed, including phone number, to call to set up an appointment below in these discharge papers.      Educational material has also been provided in the following pages regarding what we have discussed today.      Please return to the emergency room within 12-48 hours if you experience symptoms such as the following:   Fever, chills, chest pain or shortness of breath, pain with inspiration/expiration, pain that travels to your arms, neck or back, nausea, vomiting, severe headache, tearing pain in your chest, dizziness, feel as though you are about to pass out, OR if you have any worsening symptoms, or any other concerns.

## 2024-10-21 NOTE — TELEPHONE ENCOUNTER
Sent pt St[place] message relaying below    HUB TO RELAY  Your thyroid hormone was normal. Please let me know if you have any questions.

## 2024-10-23 ENCOUNTER — TELEPHONE (OUTPATIENT)
Dept: ONCOLOGY | Facility: CLINIC | Age: 44
End: 2024-10-23
Payer: COMMERCIAL

## 2024-10-23 NOTE — TELEPHONE ENCOUNTER
Discussed with Dr. Weiss and he wanted to check with one of the pharmacist about using the patch.  Talked with Sherie REEDER and the Scopolamine patch is not recommended for nausea from chemotherapy it is used for after anesthesia.  Recommended trying phenergan 12.5 mg po every 4 to 6 hours for nausea.  Called Leana to let her know and she stated that she already has phenergan.  Discussed with her that I will let Dr. Weiss know.

## 2024-10-23 NOTE — TELEPHONE ENCOUNTER
"    Caller: Uyen José \"Leana\"    Relationship: Self    Best call back number: [phone]     Requested Prescriptions: SCOPOLAMINE PATCHES        Pharmacy where request should be sent: [facility]  [place], [place] - [address] [facility] [phone] [facility] [phone] FX     Last office visit with prescribing clinician: 10/3/2024   Last telemedicine visit with prescribing clinician: Visit date not found   Next office visit with prescribing clinician: 11/14/2024     Additional details provided by patient: PT REPORTS NAUSEA MEDS WERE NOT WORKING, ASKING IF SCOPOLAMINE PATCHES CAN BE CALLED IN TO TRY.  WOULD LIKE IT CALLED IN TODAY IF POSSIBLE.    Does the patient have less than a 3 day supply:  [x] Yes  [] No    Would you like a call back once the refill request has been completed: [] Yes [x] No    If the office needs to give you a call back, can they leave a voicemail: [] Yes [x] No    Bg Tripathi Rep   10/23/24 15:02 CDT           "

## 2024-10-24 ENCOUNTER — TELEPHONE (OUTPATIENT)
Dept: ONCOLOGY | Facility: CLINIC | Age: 44
End: 2024-10-24
Payer: COMMERCIAL

## 2024-10-24 ENCOUNTER — INFUSION (OUTPATIENT)
Dept: ONCOLOGY | Facility: HOSPITAL | Age: 44
End: 2024-10-24
Payer: COMMERCIAL

## 2024-10-24 VITALS
TEMPERATURE: 96.1 F | BODY MASS INDEX: 23.98 KG/M2 | RESPIRATION RATE: 18 BRPM | HEART RATE: 70 BPM | SYSTOLIC BLOOD PRESSURE: 112 MMHG | HEIGHT: 67 IN | OXYGEN SATURATION: 98 % | WEIGHT: 152.8 LBS | DIASTOLIC BLOOD PRESSURE: 69 MMHG

## 2024-10-24 DIAGNOSIS — R11.0 NAUSEA: Primary | ICD-10-CM

## 2024-10-24 DIAGNOSIS — C50.912 DUCTAL CARCINOMA OF LEFT BREAST: ICD-10-CM

## 2024-10-24 DIAGNOSIS — C50.912 INVASIVE DUCTAL CARCINOMA OF BREAST, LEFT: ICD-10-CM

## 2024-10-24 DIAGNOSIS — C50.912 DUCTAL CARCINOMA OF LEFT BREAST: Primary | ICD-10-CM

## 2024-10-24 PROCEDURE — 96374 THER/PROPH/DIAG INJ IV PUSH: CPT

## 2024-10-24 PROCEDURE — 25810000003 SODIUM CHLORIDE 0.9 % SOLUTION: Performed by: INTERNAL MEDICINE

## 2024-10-24 PROCEDURE — 25010000002 ONDANSETRON PER 1 MG: Performed by: INTERNAL MEDICINE

## 2024-10-24 PROCEDURE — 96367 TX/PROPH/DG ADDL SEQ IV INF: CPT

## 2024-10-24 PROCEDURE — 25010000002 DEXAMETHASONE SODIUM PHOSPHATE 100 MG/10ML SOLUTION 10 ML VIAL: Performed by: INTERNAL MEDICINE

## 2024-10-24 PROCEDURE — 25010000002 HEPARIN LOCK FLUSH PER 10 UNITS: Performed by: INTERNAL MEDICINE

## 2024-10-24 PROCEDURE — 96361 HYDRATE IV INFUSION ADD-ON: CPT

## 2024-10-24 PROCEDURE — 96365 THER/PROPH/DIAG IV INF INIT: CPT

## 2024-10-24 PROCEDURE — 96375 TX/PRO/DX INJ NEW DRUG ADDON: CPT

## 2024-10-24 RX ORDER — SODIUM CHLORIDE 0.9 % (FLUSH) 0.9 %
10 SYRINGE (ML) INJECTION AS NEEDED
Status: DISCONTINUED | OUTPATIENT
Start: 2024-10-24 | End: 2024-10-24 | Stop reason: HOSPADM

## 2024-10-24 RX ORDER — FAMOTIDINE 10 MG/ML
20 INJECTION, SOLUTION INTRAVENOUS ONCE
Start: 2024-11-06

## 2024-10-24 RX ORDER — DEXAMETHASONE SODIUM PHOSPHATE 10 MG/ML
10 INJECTION INTRAMUSCULAR; INTRAVENOUS ONCE
Status: CANCELLED
Start: 2024-11-05 | End: 2024-11-05

## 2024-10-24 RX ORDER — DIPHENHYDRAMINE HYDROCHLORIDE 50 MG/ML
50 INJECTION INTRAMUSCULAR; INTRAVENOUS AS NEEDED
OUTPATIENT
Start: 2024-11-06

## 2024-10-24 RX ORDER — DEXAMETHASONE SODIUM PHOSPHATE 10 MG/ML
10 INJECTION INTRAMUSCULAR; INTRAVENOUS ONCE
Status: DISCONTINUED | OUTPATIENT
Start: 2024-10-24 | End: 2024-10-24

## 2024-10-24 RX ORDER — DIPHENHYDRAMINE HYDROCHLORIDE 50 MG/ML
25 INJECTION INTRAMUSCULAR; INTRAVENOUS ONCE
Status: CANCELLED
Start: 2024-11-05 | End: 2024-11-05

## 2024-10-24 RX ORDER — HEPARIN SODIUM (PORCINE) LOCK FLUSH IV SOLN 100 UNIT/ML 100 UNIT/ML
500 SOLUTION INTRAVENOUS AS NEEDED
OUTPATIENT
Start: 2024-10-24

## 2024-10-24 RX ORDER — SODIUM CHLORIDE 9 MG/ML
20 INJECTION, SOLUTION INTRAVENOUS ONCE
OUTPATIENT
Start: 2024-11-06

## 2024-10-24 RX ORDER — SODIUM CHLORIDE 0.9 % (FLUSH) 0.9 %
10 SYRINGE (ML) INJECTION AS NEEDED
OUTPATIENT
Start: 2024-10-24

## 2024-10-24 RX ORDER — FAMOTIDINE 10 MG/ML
20 INJECTION, SOLUTION INTRAVENOUS AS NEEDED
OUTPATIENT
Start: 2024-11-06

## 2024-10-24 RX ORDER — HEPARIN SODIUM (PORCINE) LOCK FLUSH IV SOLN 100 UNIT/ML 100 UNIT/ML
500 SOLUTION INTRAVENOUS AS NEEDED
Status: DISCONTINUED | OUTPATIENT
Start: 2024-10-24 | End: 2024-10-24 | Stop reason: HOSPADM

## 2024-10-24 RX ORDER — PALONOSETRON 0.05 MG/ML
0.25 INJECTION, SOLUTION INTRAVENOUS ONCE
OUTPATIENT
Start: 2024-11-06

## 2024-10-24 RX ADMIN — HEPARIN 500 UNITS: 100 SYRINGE at 14:19

## 2024-10-24 RX ADMIN — ONDANSETRON 8 MG: 2 INJECTION INTRAMUSCULAR; INTRAVENOUS at 12:07

## 2024-10-24 RX ADMIN — DEXAMETHASONE SODIUM PHOSPHATE 10 MG: 10 INJECTION, SOLUTION INTRAMUSCULAR; INTRAVENOUS at 12:23

## 2024-10-24 RX ADMIN — SODIUM CHLORIDE 1000 ML: 9 INJECTION, SOLUTION INTRAVENOUS at 12:03

## 2024-10-24 RX ADMIN — Medication 10 ML: at 14:19

## 2024-10-24 NOTE — TELEPHONE ENCOUNTER
Call to patient about mychart message that was sent on 10/23/24.     Spoke with Dr. Rangel and agueda to order 1 liter ns with iv zofran 8 mg and dexamethasone 10 mg. I called Leana and she will come in today at 12. I let her know that I have sent a message to Dr. Weiss and the pharmacists to review her regimen to see if there is anything we can do to make this treatment easier for her. She reports that she has had a very hard time.

## 2024-10-24 NOTE — TELEPHONE ENCOUNTER
[birth year] Uyen José here for hydration/antiemetics.  Her BP on the machine was 140/95, took it manually and got 132/85 (prior to giving any medication).  She has ran 120s/70s from what I see and what she reports.  She does take BP meds that usually keep her in normal range.  Does she need to see PCP before next infusion for this?  Also, she is having 3-4 liquid stools per day.  Taking imodium twice daily, has also tried pepto.      Discussed with Dr. Rangel. Patient advised to call pcp for her blood pressure and she was given the imodium recommendations. imodium protocol is   Take 2 tablets po after 1st loose stool  Then take 1 tablet after each loose stool to follow.  Do not exceed 8 tablets during a 24 hour period  Stop taking when diarrhea stop

## 2024-10-24 NOTE — PROGRESS NOTES
Msg to hem/onc: [birth year] Uyen José here for hydration/antiemetics.  Her BP on the machine was 140/95, took it manually and got 132/85 (prior to giving any medication).  She has ran 120s/70s from what I see and what she reports.  She does take BP meds that usually keep her in normal range.  Does she need to see PCP before next infusion for this?  Also, she is having 3-4 liquid stools per day.  Taking imodium twice daily, has also tried pepto. Has also lost close to 9lb since last infusion.    Per Dr. Rangel follow imodium protocol closely. Call PCP about BP management.  Informed pt of this, also gave her another sheet of imodium protocol and provided her with BP readings which will also be on her AVS.  She is agreeable to plan.

## 2024-10-26 RX ORDER — LORAZEPAM 2 MG/ML
1 INJECTION INTRAMUSCULAR EVERY 4 HOURS PRN
Start: 2024-11-06 | End: 2024-11-10

## 2024-10-26 RX ORDER — OLANZAPINE 5 MG/1
5 TABLET ORAL ONCE
Start: 2024-11-06 | End: 2024-11-05

## 2024-10-26 RX ORDER — APREPITANT 80 MG/1
80 CAPSULE ORAL DAILY
Start: 2024-11-06

## 2024-10-29 ENCOUNTER — TELEPHONE (OUTPATIENT)
Dept: ONCOLOGY | Facility: CLINIC | Age: 44
End: 2024-10-29
Payer: COMMERCIAL

## 2024-10-29 ENCOUNTER — LAB (OUTPATIENT)
Dept: LAB | Facility: HOSPITAL | Age: 44
End: 2024-10-29
Payer: COMMERCIAL

## 2024-10-29 DIAGNOSIS — C50.912 INVASIVE DUCTAL CARCINOMA OF BREAST, LEFT: ICD-10-CM

## 2024-10-29 DIAGNOSIS — C50.912 DUCTAL CARCINOMA OF LEFT BREAST: ICD-10-CM

## 2024-10-29 DIAGNOSIS — C50.912 MALIGNANT NEOPLASM OF LEFT FEMALE BREAST, UNSPECIFIED ESTROGEN RECEPTOR STATUS, UNSPECIFIED SITE OF BREAST: ICD-10-CM

## 2024-10-29 DIAGNOSIS — C50.912 INVASIVE DUCTAL CARCINOMA OF BREAST, LEFT: Primary | ICD-10-CM

## 2024-10-29 PROBLEM — R79.0 LOW MAGNESIUM LEVEL: Status: ACTIVE | Noted: 2024-10-29

## 2024-10-29 LAB
ALBUMIN SERPL-MCNC: 4.2 G/DL (ref 3.5–5.2)
ALBUMIN/GLOB SERPL: 1.6 G/DL
ALP SERPL-CCNC: 177 U/L (ref 39–117)
ALT SERPL W P-5'-P-CCNC: 26 U/L (ref 1–33)
ANION GAP SERPL CALCULATED.3IONS-SCNC: 10 MMOL/L (ref 5–15)
AST SERPL-CCNC: 18 U/L (ref 1–32)
BASOPHILS # BLD AUTO: 0.12 10*3/MM3 (ref 0–0.2)
BASOPHILS NFR BLD AUTO: 0.6 % (ref 0–1.5)
BILIRUB SERPL-MCNC: 0.3 MG/DL (ref 0–1.2)
BUN SERPL-MCNC: 8 MG/DL (ref 6–20)
BUN/CREAT SERPL: 9.5 (ref 7–25)
CALCIUM SPEC-SCNC: 9.5 MG/DL (ref 8.6–10.5)
CEA SERPL-MCNC: 6.76 NG/ML
CHLORIDE SERPL-SCNC: 100 MMOL/L (ref 98–107)
CO2 SERPL-SCNC: 30 MMOL/L (ref 22–29)
CREAT SERPL-MCNC: 0.84 MG/DL (ref 0.57–1)
DEPRECATED RDW RBC AUTO: 44.1 FL (ref 37–54)
EGFRCR SERPLBLD CKD-EPI 2021: 88 ML/MIN/1.73
EOSINOPHIL # BLD AUTO: 0 10*3/MM3 (ref 0–0.4)
EOSINOPHIL NFR BLD AUTO: 0 % (ref 0.3–6.2)
ERYTHROCYTE [DISTWIDTH] IN BLOOD BY AUTOMATED COUNT: 13.6 % (ref 12.3–15.4)
GLOBULIN UR ELPH-MCNC: 2.7 GM/DL
GLUCOSE SERPL-MCNC: 107 MG/DL (ref 65–99)
HCT VFR BLD AUTO: 42.3 % (ref 34–46.6)
HGB BLD-MCNC: 13.8 G/DL (ref 12–15.9)
IMM GRANULOCYTES # BLD AUTO: 0.5 10*3/MM3 (ref 0–0.05)
IMM GRANULOCYTES NFR BLD AUTO: 2.5 % (ref 0–0.5)
LYMPHOCYTES # BLD AUTO: 2.59 10*3/MM3 (ref 0.7–3.1)
LYMPHOCYTES NFR BLD AUTO: 13.2 % (ref 19.6–45.3)
MAGNESIUM SERPL-MCNC: 1.5 MG/DL (ref 1.6–2.6)
MCH RBC QN AUTO: 29.6 PG (ref 26.6–33)
MCHC RBC AUTO-ENTMCNC: 32.6 G/DL (ref 31.5–35.7)
MCV RBC AUTO: 90.6 FL (ref 79–97)
MONOCYTES # BLD AUTO: 0.66 10*3/MM3 (ref 0.1–0.9)
MONOCYTES NFR BLD AUTO: 3.4 % (ref 5–12)
NEUTROPHILS NFR BLD AUTO: 15.81 10*3/MM3 (ref 1.7–7)
NEUTROPHILS NFR BLD AUTO: 80.3 % (ref 42.7–76)
NRBC BLD AUTO-RTO: 0 /100 WBC (ref 0–0.2)
PLATELET # BLD AUTO: 144 10*3/MM3 (ref 140–450)
PMV BLD AUTO: 11.3 FL (ref 6–12)
POTASSIUM SERPL-SCNC: 3.4 MMOL/L (ref 3.5–5.2)
PROT SERPL-MCNC: 6.9 G/DL (ref 6–8.5)
RBC # BLD AUTO: 4.67 10*6/MM3 (ref 3.77–5.28)
SODIUM SERPL-SCNC: 140 MMOL/L (ref 136–145)
WBC NRBC COR # BLD AUTO: 19.68 10*3/MM3 (ref 3.4–10.8)

## 2024-10-29 PROCEDURE — 83735 ASSAY OF MAGNESIUM: CPT

## 2024-10-29 PROCEDURE — 86300 IMMUNOASSAY TUMOR CA 15-3: CPT

## 2024-10-29 PROCEDURE — 82378 CARCINOEMBRYONIC ANTIGEN: CPT

## 2024-10-29 PROCEDURE — 85025 COMPLETE CBC W/AUTO DIFF WBC: CPT

## 2024-10-29 PROCEDURE — 36415 COLL VENOUS BLD VENIPUNCTURE: CPT

## 2024-10-29 PROCEDURE — 80053 COMPREHEN METABOLIC PANEL: CPT

## 2024-10-29 RX ORDER — MAGNESIUM SULFATE HEPTAHYDRATE 40 MG/ML
2 INJECTION, SOLUTION INTRAVENOUS ONCE
Status: CANCELLED | OUTPATIENT
Start: 2024-10-30

## 2024-10-29 RX ORDER — SODIUM CHLORIDE 9 MG/ML
20 INJECTION, SOLUTION INTRAVENOUS ONCE
Status: CANCELLED | OUTPATIENT
Start: 2024-10-30

## 2024-10-29 RX ORDER — TRASTUZUMAB 420 MG/20ML
420 INJECTION, POWDER, LYOPHILIZED, FOR SOLUTION INTRAVENOUS
Qty: 1 EACH | Refills: 2 | Status: SHIPPED | OUTPATIENT
Start: 2024-10-29

## 2024-10-29 RX ORDER — POTASSIUM CHLORIDE 1500 MG/1
20 TABLET, EXTENDED RELEASE ORAL 3 TIMES DAILY
Qty: 9 TABLET | Refills: 0 | Status: SHIPPED | OUTPATIENT
Start: 2024-10-29 | End: 2024-11-01

## 2024-10-29 NOTE — TELEPHONE ENCOUNTER
Called and spoke with Leana. She will come in for magnesium. I gave her the number to infusion scheduling to set up the magnesium appointment. She will take the potassium tablets. She will call me to let me know when to schedule the repeat lab.

## 2024-10-29 NOTE — TELEPHONE ENCOUNTER
----- Message from Shyam Weiss sent at 10/29/2024  8:34 AM CDT -----  2 g IV magnesium.  K-Dur 20 p.o. 3 times daily x 3 days.  Repeat magnesium and potassium on Friday.  ----- Message -----  From: Lab, Background User  Sent: 10/29/2024   7:55 AM CDT  To: Shyam Weiss MD

## 2024-10-30 LAB — CANCER AG27-29 SERPL-ACNC: 26.7 U/ML (ref 0–38.6)

## 2024-10-31 ENCOUNTER — INFUSION (OUTPATIENT)
Dept: ONCOLOGY | Facility: HOSPITAL | Age: 44
End: 2024-10-31
Payer: COMMERCIAL

## 2024-10-31 VITALS
BODY MASS INDEX: 25.49 KG/M2 | SYSTOLIC BLOOD PRESSURE: 126 MMHG | RESPIRATION RATE: 18 BRPM | TEMPERATURE: 97.2 F | WEIGHT: 158.6 LBS | DIASTOLIC BLOOD PRESSURE: 87 MMHG | HEART RATE: 72 BPM | OXYGEN SATURATION: 99 % | HEIGHT: 66 IN

## 2024-10-31 DIAGNOSIS — C50.912 DUCTAL CARCINOMA OF LEFT BREAST: ICD-10-CM

## 2024-10-31 DIAGNOSIS — C50.912 INVASIVE DUCTAL CARCINOMA OF BREAST, LEFT: ICD-10-CM

## 2024-10-31 DIAGNOSIS — R79.0 LOW MAGNESIUM LEVEL: Primary | ICD-10-CM

## 2024-10-31 PROCEDURE — 25810000003 SODIUM CHLORIDE 0.9 % SOLUTION: Performed by: INTERNAL MEDICINE

## 2024-10-31 PROCEDURE — 25010000002 MAGNESIUM SULFATE 2 GM/50ML SOLUTION: Performed by: INTERNAL MEDICINE

## 2024-10-31 PROCEDURE — 96366 THER/PROPH/DIAG IV INF ADDON: CPT

## 2024-10-31 PROCEDURE — 96365 THER/PROPH/DIAG IV INF INIT: CPT

## 2024-10-31 PROCEDURE — 25010000002 HEPARIN LOCK FLUSH PER 10 UNITS: Performed by: INTERNAL MEDICINE

## 2024-10-31 RX ORDER — HEPARIN SODIUM (PORCINE) LOCK FLUSH IV SOLN 100 UNIT/ML 100 UNIT/ML
500 SOLUTION INTRAVENOUS AS NEEDED
Status: DISCONTINUED | OUTPATIENT
Start: 2024-10-31 | End: 2024-10-31 | Stop reason: HOSPADM

## 2024-10-31 RX ORDER — MAGNESIUM SULFATE HEPTAHYDRATE 40 MG/ML
2 INJECTION, SOLUTION INTRAVENOUS ONCE
Status: COMPLETED | OUTPATIENT
Start: 2024-10-31 | End: 2024-10-31

## 2024-10-31 RX ORDER — SODIUM CHLORIDE 0.9 % (FLUSH) 0.9 %
10 SYRINGE (ML) INJECTION AS NEEDED
Status: DISCONTINUED | OUTPATIENT
Start: 2024-10-31 | End: 2024-10-31 | Stop reason: HOSPADM

## 2024-10-31 RX ORDER — HEPARIN SODIUM (PORCINE) LOCK FLUSH IV SOLN 100 UNIT/ML 100 UNIT/ML
500 SOLUTION INTRAVENOUS AS NEEDED
OUTPATIENT
Start: 2024-10-31

## 2024-10-31 RX ORDER — SODIUM CHLORIDE 9 MG/ML
20 INJECTION, SOLUTION INTRAVENOUS ONCE
Status: COMPLETED | OUTPATIENT
Start: 2024-10-31 | End: 2024-10-31

## 2024-10-31 RX ORDER — SODIUM CHLORIDE 0.9 % (FLUSH) 0.9 %
10 SYRINGE (ML) INJECTION AS NEEDED
OUTPATIENT
Start: 2024-10-31

## 2024-10-31 RX ADMIN — HEPARIN 500 UNITS: 100 SYRINGE at 10:33

## 2024-10-31 RX ADMIN — MAGNESIUM SULFATE HEPTAHYDRATE 2 G: 2 INJECTION, SOLUTION INTRAVENOUS at 08:17

## 2024-10-31 RX ADMIN — SODIUM CHLORIDE 20 ML/HR: 9 INJECTION, SOLUTION INTRAVENOUS at 08:17

## 2024-10-31 RX ADMIN — Medication 10 ML: at 10:33

## 2024-11-01 ENCOUNTER — OFFICE VISIT (OUTPATIENT)
Dept: FAMILY MEDICINE CLINIC | Facility: CLINIC | Age: 44
End: 2024-11-01
Payer: COMMERCIAL

## 2024-11-01 ENCOUNTER — DOCUMENTATION (OUTPATIENT)
Dept: RADIATION ONCOLOGY | Facility: HOSPITAL | Age: 44
End: 2024-11-01
Payer: COMMERCIAL

## 2024-11-01 ENCOUNTER — LAB (OUTPATIENT)
Dept: LAB | Facility: HOSPITAL | Age: 44
End: 2024-11-01
Payer: COMMERCIAL

## 2024-11-01 VITALS
SYSTOLIC BLOOD PRESSURE: 126 MMHG | HEART RATE: 66 BPM | DIASTOLIC BLOOD PRESSURE: 81 MMHG | HEIGHT: 67 IN | OXYGEN SATURATION: 97 % | WEIGHT: 158 LBS | BODY MASS INDEX: 24.8 KG/M2

## 2024-11-01 DIAGNOSIS — K21.9 GASTROESOPHAGEAL REFLUX DISEASE WITHOUT ESOPHAGITIS: ICD-10-CM

## 2024-11-01 DIAGNOSIS — E03.9 HYPOTHYROIDISM, UNSPECIFIED TYPE: ICD-10-CM

## 2024-11-01 DIAGNOSIS — F90.2 ATTENTION DEFICIT HYPERACTIVITY DISORDER (ADHD), COMBINED TYPE: Primary | ICD-10-CM

## 2024-11-01 DIAGNOSIS — F90.2 ATTENTION DEFICIT HYPERACTIVITY DISORDER (ADHD), COMBINED TYPE: ICD-10-CM

## 2024-11-01 DIAGNOSIS — C50.912 DUCTAL CARCINOMA OF LEFT BREAST: ICD-10-CM

## 2024-11-01 DIAGNOSIS — M05.9 RHEUMATOID ARTHRITIS WITH POSITIVE RHEUMATOID FACTOR, INVOLVING UNSPECIFIED SITE: ICD-10-CM

## 2024-11-01 DIAGNOSIS — I10 ESSENTIAL HYPERTENSION: Primary | ICD-10-CM

## 2024-11-01 DIAGNOSIS — C50.912 INVASIVE DUCTAL CARCINOMA OF BREAST, LEFT: ICD-10-CM

## 2024-11-01 PROBLEM — R92.8 ABNORMAL MAMMOGRAM: Status: ACTIVE | Noted: 2024-06-28

## 2024-11-01 LAB
ALBUMIN SERPL-MCNC: 3.8 G/DL (ref 3.5–5.2)
ALBUMIN/GLOB SERPL: 1.5 G/DL
ALP SERPL-CCNC: 135 U/L (ref 39–117)
ALT SERPL W P-5'-P-CCNC: 23 U/L (ref 1–33)
ANION GAP SERPL CALCULATED.3IONS-SCNC: 9 MMOL/L (ref 5–15)
AST SERPL-CCNC: 17 U/L (ref 1–32)
BASOPHILS # BLD AUTO: 0.09 10*3/MM3 (ref 0–0.2)
BASOPHILS NFR BLD AUTO: 0.6 % (ref 0–1.5)
BILIRUB SERPL-MCNC: 0.3 MG/DL (ref 0–1.2)
BUN SERPL-MCNC: 12 MG/DL (ref 6–20)
BUN/CREAT SERPL: 16.7 (ref 7–25)
CALCIUM SPEC-SCNC: 9 MG/DL (ref 8.6–10.5)
CHLORIDE SERPL-SCNC: 105 MMOL/L (ref 98–107)
CO2 SERPL-SCNC: 25 MMOL/L (ref 22–29)
CREAT SERPL-MCNC: 0.72 MG/DL (ref 0.57–1)
DEPRECATED RDW RBC AUTO: 45.6 FL (ref 37–54)
EGFRCR SERPLBLD CKD-EPI 2021: 105.9 ML/MIN/1.73
EOSINOPHIL # BLD AUTO: 0 10*3/MM3 (ref 0–0.4)
EOSINOPHIL NFR BLD AUTO: 0 % (ref 0.3–6.2)
ERYTHROCYTE [DISTWIDTH] IN BLOOD BY AUTOMATED COUNT: 14 % (ref 12.3–15.4)
GLOBULIN UR ELPH-MCNC: 2.5 GM/DL
GLUCOSE SERPL-MCNC: 105 MG/DL (ref 65–99)
HCT VFR BLD AUTO: 37.8 % (ref 34–46.6)
HGB BLD-MCNC: 12.3 G/DL (ref 12–15.9)
IMM GRANULOCYTES # BLD AUTO: 0.13 10*3/MM3 (ref 0–0.05)
IMM GRANULOCYTES NFR BLD AUTO: 0.9 % (ref 0–0.5)
LYMPHOCYTES # BLD AUTO: 2.12 10*3/MM3 (ref 0.7–3.1)
LYMPHOCYTES NFR BLD AUTO: 14.7 % (ref 19.6–45.3)
MAGNESIUM SERPL-MCNC: 1.7 MG/DL (ref 1.6–2.6)
MCH RBC QN AUTO: 29.6 PG (ref 26.6–33)
MCHC RBC AUTO-ENTMCNC: 32.5 G/DL (ref 31.5–35.7)
MCV RBC AUTO: 91.1 FL (ref 79–97)
MONOCYTES # BLD AUTO: 0.6 10*3/MM3 (ref 0.1–0.9)
MONOCYTES NFR BLD AUTO: 4.2 % (ref 5–12)
NEUTROPHILS NFR BLD AUTO: 11.45 10*3/MM3 (ref 1.7–7)
NEUTROPHILS NFR BLD AUTO: 79.6 % (ref 42.7–76)
NRBC BLD AUTO-RTO: 0 /100 WBC (ref 0–0.2)
PLATELET # BLD AUTO: 151 10*3/MM3 (ref 140–450)
PMV BLD AUTO: 11.8 FL (ref 6–12)
POTASSIUM SERPL-SCNC: 4 MMOL/L (ref 3.5–5.2)
PROT SERPL-MCNC: 6.3 G/DL (ref 6–8.5)
RBC # BLD AUTO: 4.15 10*6/MM3 (ref 3.77–5.28)
SODIUM SERPL-SCNC: 139 MMOL/L (ref 136–145)
WBC NRBC COR # BLD AUTO: 14.39 10*3/MM3 (ref 3.4–10.8)

## 2024-11-01 PROCEDURE — 80053 COMPREHEN METABOLIC PANEL: CPT

## 2024-11-01 PROCEDURE — 85025 COMPLETE CBC W/AUTO DIFF WBC: CPT

## 2024-11-01 PROCEDURE — 83735 ASSAY OF MAGNESIUM: CPT

## 2024-11-01 PROCEDURE — 36415 COLL VENOUS BLD VENIPUNCTURE: CPT

## 2024-11-01 RX ORDER — DEXTROAMPHETAMINE SACCHARATE, AMPHETAMINE ASPARTATE, DEXTROAMPHETAMINE SULFATE AND AMPHETAMINE SULFATE 2.5; 2.5; 2.5; 2.5 MG/1; MG/1; MG/1; MG/1
10 TABLET ORAL DAILY
Qty: 30 TABLET | Refills: 0 | Status: SHIPPED | OUTPATIENT
Start: 2024-11-29 | End: 2024-12-29

## 2024-11-01 RX ORDER — LISDEXAMFETAMINE DIMESYLATE 60 MG/1
60 CAPSULE ORAL EVERY MORNING
Qty: 30 CAPSULE | Refills: 0 | Status: SHIPPED | OUTPATIENT
Start: 2024-11-16 | End: 2024-12-16

## 2024-11-01 RX ORDER — PANTOPRAZOLE SODIUM 40 MG/1
40 TABLET, DELAYED RELEASE ORAL DAILY
Qty: 30 TABLET | Refills: 2 | Status: SHIPPED | OUTPATIENT
Start: 2024-11-01

## 2024-11-01 RX ORDER — LEVOTHYROXINE SODIUM 112 UG/1
112 TABLET ORAL DAILY
Qty: 30 TABLET | Refills: 2 | Status: SHIPPED | OUTPATIENT
Start: 2024-11-01

## 2024-11-01 RX ORDER — IBUPROFEN 800 MG/1
800 TABLET, FILM COATED ORAL EVERY 6 HOURS PRN
Qty: 90 TABLET | Refills: 2 | Status: SHIPPED | OUTPATIENT
Start: 2024-11-01

## 2024-11-01 RX ORDER — LISDEXAMFETAMINE DIMESYLATE 60 MG/1
60 CAPSULE ORAL EVERY MORNING
Qty: 30 CAPSULE | Refills: 0 | Status: SHIPPED | OUTPATIENT
Start: 2024-12-16 | End: 2025-01-15

## 2024-11-01 RX ORDER — DULOXETIN HYDROCHLORIDE 20 MG/1
20 CAPSULE, DELAYED RELEASE ORAL DAILY
Qty: 30 CAPSULE | Refills: 2 | Status: SHIPPED | OUTPATIENT
Start: 2024-11-01

## 2024-11-01 RX ORDER — DEXTROAMPHETAMINE SACCHARATE, AMPHETAMINE ASPARTATE, DEXTROAMPHETAMINE SULFATE AND AMPHETAMINE SULFATE 2.5; 2.5; 2.5; 2.5 MG/1; MG/1; MG/1; MG/1
10 TABLET ORAL DAILY
Qty: 30 TABLET | Refills: 0 | Status: SHIPPED | OUTPATIENT
Start: 2024-11-01 | End: 2024-12-01

## 2024-11-01 RX ORDER — BISOPROLOL FUMARATE AND HYDROCHLOROTHIAZIDE 5; 6.25 MG/1; MG/1
1 TABLET ORAL DAILY
Qty: 30 TABLET | Refills: 2 | Status: SHIPPED | OUTPATIENT
Start: 2024-11-01

## 2024-11-01 NOTE — PROGRESS NOTES
"Chief Complaint  ADHD    Subjective    History of Present Illness      Patient presents to [facility] for   History of Present Illness  Pt is here today for 3 month ADHD f/u. She has begun chemo therapy since her last OV. ADHD meds working well with current regimen.        Review of Systems    I have reviewed and agree with the HPI information as above.  Josi Ivory, APRN     Objective   Vital Signs:   /81   Pulse 66   Ht 168.9 cm (66.5\")   Wt 71.7 kg (158 lb)   SpO2 97%   BMI 25.12 kg/m²            Physical Exam  Vitals and nursing note reviewed.   Constitutional:       Appearance: Normal appearance. She is well-developed.   HENT:      Head: Normocephalic and atraumatic.      Right Ear: Tympanic membrane, ear canal and external ear normal.      Left Ear: Tympanic membrane, ear canal and external ear normal.      Nose: Nose normal. No septal deviation, nasal tenderness or congestion.      Mouth/Throat:      Lips: Pink. No lesions.      Mouth: Mucous membranes are moist. No oral lesions.      Dentition: Normal dentition.      Pharynx: Oropharynx is clear. No pharyngeal swelling, oropharyngeal exudate or posterior oropharyngeal erythema.   Eyes:      General: Lids are normal. Vision grossly intact. No scleral icterus.        Right eye: No discharge.         Left eye: No discharge.      Extraocular Movements: Extraocular movements intact.      Conjunctiva/sclera: Conjunctivae normal.      Right eye: Right conjunctiva is not injected.      Left eye: Left conjunctiva is not injected.      Pupils: Pupils are equal, round, and reactive to light.   Neck:      Thyroid: No thyroid mass.      Trachea: Trachea normal.   Cardiovascular:      Rate and Rhythm: Normal rate and regular rhythm.      Heart sounds: Normal heart sounds. No murmur heard.     No gallop.   Pulmonary:      Effort: Pulmonary effort is normal.      Breath sounds: Normal breath sounds and air entry. No " wheezing, rhonchi or rales.   Musculoskeletal:         General: No tenderness or deformity. Normal range of motion.      Cervical back: Full passive range of motion without pain, normal range of motion and neck supple.      Thoracic back: Normal.      Right lower leg: No edema.      Left lower leg: No edema.   Skin:     General: Skin is warm and dry.      Coloration: Skin is not jaundiced.      Findings: No rash.   Neurological:      Mental Status: She is alert and oriented to person, place, and time.      Sensory: Sensation is intact.      Motor: Motor function is intact.      Coordination: Coordination is intact.      Gait: Gait is intact.      Deep Tendon Reflexes: Reflexes are normal and symmetric.   Psychiatric:         Mood and Affect: Mood and affect normal.         Behavior: Behavior normal.         Judgment: Judgment normal.                    Result Review  Data Reviewed:                   Assessment and Plan      Diagnoses and all orders for this visit:    1. Essential hypertension (Primary)  -     bisoprolol-hydrochlorothiazide (ZIAC) 5-6.25 MG per tablet; Take 1 tablet by mouth Daily.  Dispense: 30 tablet; Refill: 2    2. Hypothyroidism, unspecified type  -     levothyroxine (SYNTHROID, LEVOTHROID) 112 MCG tablet; Take 1 tablet by mouth Daily.  Dispense: 30 tablet; Refill: 2    3. Ductal carcinoma of left breast    4. Attention deficit hyperactivity disorder (ADHD), combined type    5. Rheumatoid arthritis with positive rheumatoid factor, involving unspecified site  -     ibuprofen (ADVIL,MOTRIN) 800 MG tablet; Take 1 tablet by mouth Every 6 (Six) Hours As Needed for Mild Pain.  Dispense: 90 tablet; Refill: 2    6. Gastroesophageal reflux disease without esophagitis  -     pantoprazole (PROTONIX) 40 MG EC tablet; Take 1 tablet by mouth Daily.  Dispense: 30 tablet; Refill: 2    Other orders  -     DULoxetine (CYMBALTA) 20 MG capsule; Take 1 capsule by mouth Daily.  Dispense: 30 capsule; Refill: 2    Is  here today to follow-up on her ADHD medication.  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  She states that she is doing okay with that but does feel fatigued all the time.  She is having decreased focus.  Patient is still trying to work full-time.  Okay to go ahead and increase Vyvanse to 60 mg to see if this does not help.  Still continue the Adderall 10 mg.  Refill other medications at this time.  Plan  1.  Increase Vyvanse to 60 mg  2.  Continue Adderall 10 mg  3.  Refill other routine medications    ADHD Follow up:    PDMP reviewed and is clean. ADRS (Adult ADHD Assessment Form) reviewed in detail, scanned in chart, and has been reviewed at time of appointment.  All questions, including medication and side effects, were discussed in detail at time of patient's visit. Patient will continue same medication which was discussed at today's visit. Patient is to return in 3 month(s).    Last Urine Toxicity  More data exists         Latest Ref Rng & Units 5/9/2024 7/12/2023   LAST URINE TOXICITY RESULTS   Amphetamine, Urine Qual Negative Positive  Positive    Barbiturates Screen, Urine Negative Negative  Negative    Benzodiazepine Screen, Urine Negative Negative  Negative    Buprenorphine, Screen, Urine Negative Negative  Negative    Cocaine Screen, Urine Negative Negative  Negative    Fentanyl, Urine Negative Negative  Negative    Methadone Screen , Urine Negative Negative  Negative    Methamphetamine, Ur Negative Negative  Negative       Details                    Urine Drug Screen Results: UDS RESULTS: Current results appropriate    CSA completed on: 8/5/24          Follow Up   Return in about 3 months (around 2/1/2025) for ADHD follow up.  Patient was given instructions and counseling regarding her condition or for health maintenance advice. Please see specific information pulled into the AVS if appropriate.

## 2024-11-06 ENCOUNTER — LAB (OUTPATIENT)
Dept: LAB | Facility: HOSPITAL | Age: 44
End: 2024-11-06
Payer: COMMERCIAL

## 2024-11-06 ENCOUNTER — DOCUMENTATION (OUTPATIENT)
Dept: RADIATION ONCOLOGY | Facility: HOSPITAL | Age: 44
End: 2024-11-06
Payer: COMMERCIAL

## 2024-11-06 ENCOUNTER — INFUSION (OUTPATIENT)
Dept: ONCOLOGY | Facility: HOSPITAL | Age: 44
End: 2024-11-06
Payer: COMMERCIAL

## 2024-11-06 ENCOUNTER — TELEPHONE (OUTPATIENT)
Dept: ONCOLOGY | Facility: CLINIC | Age: 44
End: 2024-11-06
Payer: COMMERCIAL

## 2024-11-06 VITALS
SYSTOLIC BLOOD PRESSURE: 110 MMHG | HEART RATE: 64 BPM | DIASTOLIC BLOOD PRESSURE: 51 MMHG | WEIGHT: 160.2 LBS | RESPIRATION RATE: 16 BRPM | HEIGHT: 67 IN | OXYGEN SATURATION: 99 % | BODY MASS INDEX: 25.15 KG/M2 | TEMPERATURE: 96.2 F

## 2024-11-06 DIAGNOSIS — R79.0 LOW MAGNESIUM LEVEL: ICD-10-CM

## 2024-11-06 DIAGNOSIS — C50.912 INVASIVE DUCTAL CARCINOMA OF BREAST, LEFT: ICD-10-CM

## 2024-11-06 DIAGNOSIS — C50.912 DUCTAL CARCINOMA OF LEFT BREAST: Primary | ICD-10-CM

## 2024-11-06 LAB
ALBUMIN SERPL-MCNC: 4.1 G/DL (ref 3.5–5.2)
ALBUMIN/GLOB SERPL: 1.6 G/DL
ALP SERPL-CCNC: 112 U/L (ref 39–117)
ALT SERPL W P-5'-P-CCNC: 19 U/L (ref 1–33)
ANION GAP SERPL CALCULATED.3IONS-SCNC: 11 MMOL/L (ref 5–15)
AST SERPL-CCNC: 15 U/L (ref 1–32)
BASOPHILS # BLD AUTO: 0.05 10*3/MM3 (ref 0–0.2)
BASOPHILS NFR BLD AUTO: 0.3 % (ref 0–1.5)
BILIRUB SERPL-MCNC: 0.3 MG/DL (ref 0–1.2)
BUN SERPL-MCNC: 14 MG/DL (ref 6–20)
BUN/CREAT SERPL: 17.9 (ref 7–25)
CALCIUM SPEC-SCNC: 9.6 MG/DL (ref 8.6–10.5)
CHLORIDE SERPL-SCNC: 102 MMOL/L (ref 98–107)
CO2 SERPL-SCNC: 29 MMOL/L (ref 22–29)
CREAT SERPL-MCNC: 0.78 MG/DL (ref 0.57–1)
DEPRECATED RDW RBC AUTO: 46.6 FL (ref 37–54)
EGFRCR SERPLBLD CKD-EPI 2021: 96.2 ML/MIN/1.73
EOSINOPHIL # BLD AUTO: 0.01 10*3/MM3 (ref 0–0.4)
EOSINOPHIL NFR BLD AUTO: 0.1 % (ref 0.3–6.2)
ERYTHROCYTE [DISTWIDTH] IN BLOOD BY AUTOMATED COUNT: 14.6 % (ref 12.3–15.4)
GLOBULIN UR ELPH-MCNC: 2.6 GM/DL
GLUCOSE SERPL-MCNC: 160 MG/DL (ref 65–99)
HCT VFR BLD AUTO: 36.7 % (ref 34–46.6)
HGB BLD-MCNC: 11.9 G/DL (ref 12–15.9)
IMM GRANULOCYTES # BLD AUTO: 0.11 10*3/MM3 (ref 0–0.05)
IMM GRANULOCYTES NFR BLD AUTO: 0.7 % (ref 0–0.5)
LYMPHOCYTES # BLD AUTO: 2.43 10*3/MM3 (ref 0.7–3.1)
LYMPHOCYTES NFR BLD AUTO: 15.6 % (ref 19.6–45.3)
MAGNESIUM SERPL-MCNC: 1.5 MG/DL (ref 1.6–2.6)
MCH RBC QN AUTO: 29.5 PG (ref 26.6–33)
MCHC RBC AUTO-ENTMCNC: 32.4 G/DL (ref 31.5–35.7)
MCV RBC AUTO: 91.1 FL (ref 79–97)
MONOCYTES # BLD AUTO: 0.96 10*3/MM3 (ref 0.1–0.9)
MONOCYTES NFR BLD AUTO: 6.2 % (ref 5–12)
NEUTROPHILS NFR BLD AUTO: 11.97 10*3/MM3 (ref 1.7–7)
NEUTROPHILS NFR BLD AUTO: 77.1 % (ref 42.7–76)
NRBC BLD AUTO-RTO: 0 /100 WBC (ref 0–0.2)
PLATELET # BLD AUTO: 307 10*3/MM3 (ref 140–450)
PMV BLD AUTO: 11.4 FL (ref 6–12)
POTASSIUM SERPL-SCNC: 4 MMOL/L (ref 3.5–5.2)
PROT SERPL-MCNC: 6.7 G/DL (ref 6–8.5)
RBC # BLD AUTO: 4.03 10*6/MM3 (ref 3.77–5.28)
SODIUM SERPL-SCNC: 142 MMOL/L (ref 136–145)
WBC NRBC COR # BLD AUTO: 15.53 10*3/MM3 (ref 3.4–10.8)

## 2024-11-06 PROCEDURE — 25810000003 SODIUM CHLORIDE 0.9 % SOLUTION: Performed by: INTERNAL MEDICINE

## 2024-11-06 PROCEDURE — 96366 THER/PROPH/DIAG IV INF ADDON: CPT

## 2024-11-06 PROCEDURE — 85025 COMPLETE CBC W/AUTO DIFF WBC: CPT

## 2024-11-06 PROCEDURE — 96368 THER/DIAG CONCURRENT INF: CPT

## 2024-11-06 PROCEDURE — 25010000002 DEXAMETHASONE SODIUM PHOSPHATE 100 MG/10ML SOLUTION: Performed by: INTERNAL MEDICINE

## 2024-11-06 PROCEDURE — 25010000002 MAGNESIUM SULFATE 2 GM/50ML SOLUTION: Performed by: INTERNAL MEDICINE

## 2024-11-06 PROCEDURE — 25810000003 SODIUM CHLORIDE 0.9 % SOLUTION 250 ML FLEX CONT: Performed by: INTERNAL MEDICINE

## 2024-11-06 PROCEDURE — 25010000002 TRASTUZUMAB-DKST 420 MG RECONSTITUTED SOLUTION 1 EACH VIAL: Performed by: INTERNAL MEDICINE

## 2024-11-06 PROCEDURE — 96367 TX/PROPH/DG ADDL SEQ IV INF: CPT

## 2024-11-06 PROCEDURE — 80053 COMPREHEN METABOLIC PANEL: CPT

## 2024-11-06 PROCEDURE — 25010000002 PERTUZUMAB 420 MG/14ML SOLUTION 420 MG VIAL: Performed by: INTERNAL MEDICINE

## 2024-11-06 PROCEDURE — 25010000002 DIPHENHYDRAMINE PER 50 MG: Performed by: INTERNAL MEDICINE

## 2024-11-06 PROCEDURE — 25010000002 DOCETAXEL 80 MG/8ML SOLUTION 8 ML VIAL: Performed by: INTERNAL MEDICINE

## 2024-11-06 PROCEDURE — 96417 CHEMO IV INFUS EACH ADDL SEQ: CPT

## 2024-11-06 PROCEDURE — 25010000002 CARBOPLATIN PER 50 MG: Performed by: INTERNAL MEDICINE

## 2024-11-06 PROCEDURE — 25010000002 PALONOSETRON PER 25 MCG: Performed by: INTERNAL MEDICINE

## 2024-11-06 PROCEDURE — 25010000002 LORAZEPAM PER 2 MG: Performed by: INTERNAL MEDICINE

## 2024-11-06 PROCEDURE — 25010000002 HEPARIN LOCK FLUSH PER 10 UNITS: Performed by: INTERNAL MEDICINE

## 2024-11-06 PROCEDURE — 36415 COLL VENOUS BLD VENIPUNCTURE: CPT

## 2024-11-06 PROCEDURE — 63710000001 APREPITANT PER 5 MG: Performed by: INTERNAL MEDICINE

## 2024-11-06 PROCEDURE — 96413 CHEMO IV INFUSION 1 HR: CPT

## 2024-11-06 PROCEDURE — 96375 TX/PRO/DX INJ NEW DRUG ADDON: CPT

## 2024-11-06 PROCEDURE — 83735 ASSAY OF MAGNESIUM: CPT

## 2024-11-06 RX ORDER — FAMOTIDINE 10 MG/ML
20 INJECTION, SOLUTION INTRAVENOUS AS NEEDED
Status: DISCONTINUED | OUTPATIENT
Start: 2024-11-06 | End: 2024-11-06 | Stop reason: HOSPADM

## 2024-11-06 RX ORDER — PALONOSETRON 0.05 MG/ML
0.25 INJECTION, SOLUTION INTRAVENOUS ONCE
Status: COMPLETED | OUTPATIENT
Start: 2024-11-06 | End: 2024-11-06

## 2024-11-06 RX ORDER — DIPHENHYDRAMINE HYDROCHLORIDE 50 MG/ML
50 INJECTION INTRAMUSCULAR; INTRAVENOUS ONCE
Status: COMPLETED | OUTPATIENT
Start: 2024-11-06 | End: 2024-11-06

## 2024-11-06 RX ORDER — OLANZAPINE 5 MG/1
5 TABLET ORAL ONCE
Status: COMPLETED | OUTPATIENT
Start: 2024-11-06 | End: 2024-11-06

## 2024-11-06 RX ORDER — DIPHENHYDRAMINE HYDROCHLORIDE 50 MG/ML
50 INJECTION INTRAMUSCULAR; INTRAVENOUS AS NEEDED
Status: DISCONTINUED | OUTPATIENT
Start: 2024-11-06 | End: 2024-11-06 | Stop reason: HOSPADM

## 2024-11-06 RX ORDER — SODIUM CHLORIDE 9 MG/ML
20 INJECTION, SOLUTION INTRAVENOUS ONCE
Status: CANCELLED | OUTPATIENT
Start: 2024-11-06

## 2024-11-06 RX ORDER — MAGNESIUM SULFATE HEPTAHYDRATE 40 MG/ML
2 INJECTION, SOLUTION INTRAVENOUS ONCE
Status: COMPLETED | OUTPATIENT
Start: 2024-11-06 | End: 2024-11-06

## 2024-11-06 RX ORDER — SODIUM CHLORIDE 0.9 % (FLUSH) 0.9 %
10 SYRINGE (ML) INJECTION AS NEEDED
Status: DISCONTINUED | OUTPATIENT
Start: 2024-11-06 | End: 2024-11-06 | Stop reason: HOSPADM

## 2024-11-06 RX ORDER — HEPARIN SODIUM (PORCINE) LOCK FLUSH IV SOLN 100 UNIT/ML 100 UNIT/ML
500 SOLUTION INTRAVENOUS AS NEEDED
Status: DISCONTINUED | OUTPATIENT
Start: 2024-11-06 | End: 2024-11-06 | Stop reason: HOSPADM

## 2024-11-06 RX ORDER — MAGNESIUM SULFATE HEPTAHYDRATE 40 MG/ML
2 INJECTION, SOLUTION INTRAVENOUS ONCE
Status: CANCELLED | OUTPATIENT
Start: 2024-11-06

## 2024-11-06 RX ORDER — FAMOTIDINE 10 MG/ML
20 INJECTION, SOLUTION INTRAVENOUS ONCE
Status: COMPLETED | OUTPATIENT
Start: 2024-11-06 | End: 2024-11-06

## 2024-11-06 RX ORDER — SODIUM CHLORIDE 9 MG/ML
20 INJECTION, SOLUTION INTRAVENOUS ONCE
Status: COMPLETED | OUTPATIENT
Start: 2024-11-06 | End: 2024-11-06

## 2024-11-06 RX ORDER — APREPITANT 80 MG/1
80 CAPSULE ORAL DAILY
Status: DISCONTINUED | OUTPATIENT
Start: 2024-11-06 | End: 2024-11-06 | Stop reason: HOSPADM

## 2024-11-06 RX ORDER — SODIUM CHLORIDE 0.9 % (FLUSH) 0.9 %
10 SYRINGE (ML) INJECTION AS NEEDED
Status: CANCELLED | OUTPATIENT
Start: 2024-11-06

## 2024-11-06 RX ORDER — HEPARIN SODIUM (PORCINE) LOCK FLUSH IV SOLN 100 UNIT/ML 100 UNIT/ML
500 SOLUTION INTRAVENOUS AS NEEDED
Status: CANCELLED | OUTPATIENT
Start: 2024-11-06

## 2024-11-06 RX ORDER — LORAZEPAM 2 MG/ML
1 INJECTION INTRAMUSCULAR EVERY 4 HOURS PRN
Status: DISCONTINUED | OUTPATIENT
Start: 2024-11-06 | End: 2024-11-06 | Stop reason: HOSPADM

## 2024-11-06 RX ADMIN — APREPITANT 80 MG: 80 CAPSULE ORAL at 09:25

## 2024-11-06 RX ADMIN — MAGNESIUM SULFATE HEPTAHYDRATE 2 G: 2 INJECTION, SOLUTION INTRAVENOUS at 11:26

## 2024-11-06 RX ADMIN — TRASTUZUMAB-DKST 420 MG: KIT at 10:45

## 2024-11-06 RX ADMIN — CARBOPLATIN 420 MG: 10 INJECTION INTRAVENOUS at 12:31

## 2024-11-06 RX ADMIN — PERTUZUMAB 420 MG: 30 INJECTION, SOLUTION, CONCENTRATE INTRAVENOUS at 10:00

## 2024-11-06 RX ADMIN — FAMOTIDINE 20 MG: 10 INJECTION INTRAVENOUS at 09:28

## 2024-11-06 RX ADMIN — Medication 10 ML: at 13:43

## 2024-11-06 RX ADMIN — LORAZEPAM 1 MG: 2 INJECTION INTRAMUSCULAR; INTRAVENOUS at 10:39

## 2024-11-06 RX ADMIN — PALONOSETRON HYDROCHLORIDE 0.25 MG: 0.25 INJECTION, SOLUTION INTRAVENOUS at 09:24

## 2024-11-06 RX ADMIN — DOCETAXEL ANHYDROUS 96 MG: 10 INJECTION, SOLUTION INTRAVENOUS at 11:21

## 2024-11-06 RX ADMIN — DEXAMETHASONE SODIUM PHOSPHATE 20 MG: 10 INJECTION, SOLUTION INTRAMUSCULAR; INTRAVENOUS at 09:39

## 2024-11-06 RX ADMIN — HEPARIN 500 UNITS: 100 SYRINGE at 13:43

## 2024-11-06 RX ADMIN — SODIUM CHLORIDE 20 ML/HR: 9 INJECTION, SOLUTION INTRAVENOUS at 09:20

## 2024-11-06 RX ADMIN — DIPHENHYDRAMINE HYDROCHLORIDE 50 MG: 50 INJECTION, SOLUTION INTRAMUSCULAR; INTRAVENOUS at 09:30

## 2024-11-06 RX ADMIN — OLANZAPINE 5 MG: 5 TABLET, FILM COATED ORAL at 09:26

## 2024-11-06 NOTE — PROGRESS NOTES
0950t- Message to Hem/Onc:  Just for clarification, is Isabela José, [birth year], suppose to get Ativan 1 mg as part of premed today or is it just PRN?  Thanks.     0910-Per Dr. Weiss/Mildred:  Give as part of premed since so much trouble last time and make sure she has a  to get her home.

## 2024-11-06 NOTE — PROGRESS NOTES
[id number]: Isabela José, [birth year],  I see you have added some new premeds for nausea so you are already aware that she has had nausea nearly all the time since first treatment .  She has Zofran and Compazine at home, and is alt every four hour, but still feels queasy most of the time (without vomiting).  Also, and you may be aware already, she has had loose watery stool, not large amounts, but she said when she urinates, she also has a small bm 1-3 times per day and she is using Immodium (it has lessened since the first week of treatment.)  She feels fine today other than a little queasy.  Magnesium is 1.5 today, all other labs look good.        Per Dr. Weiss/Mildred:  Okay for treatment today with Mag 2 gram added for today.

## 2024-11-06 NOTE — TELEPHONE ENCOUNTER
----- Message from Shyam Weiss sent at 11/6/2024  8:25 AM CST -----  2gm iv mag  ----- Message -----  From: Lab, Background User  Sent: 11/6/2024   7:33 AM CST  To: Shyam Weiss MD

## 2024-11-07 ENCOUNTER — INFUSION (OUTPATIENT)
Dept: ONCOLOGY | Facility: HOSPITAL | Age: 44
End: 2024-11-07
Payer: COMMERCIAL

## 2024-11-07 VITALS
SYSTOLIC BLOOD PRESSURE: 120 MMHG | HEART RATE: 74 BPM | RESPIRATION RATE: 16 BRPM | TEMPERATURE: 97 F | DIASTOLIC BLOOD PRESSURE: 65 MMHG | OXYGEN SATURATION: 98 %

## 2024-11-07 DIAGNOSIS — C50.912 INVASIVE DUCTAL CARCINOMA OF BREAST, LEFT: ICD-10-CM

## 2024-11-07 DIAGNOSIS — C50.912 DUCTAL CARCINOMA OF LEFT BREAST: Primary | ICD-10-CM

## 2024-11-07 PROCEDURE — 25010000002 PEGFILGRASTIM 6 MG/0.6ML SOLUTION PREFILLED SYRINGE: Performed by: INTERNAL MEDICINE

## 2024-11-07 PROCEDURE — 96372 THER/PROPH/DIAG INJ SC/IM: CPT

## 2024-11-07 RX ADMIN — PEGFILGRASTIM 6 MG: 6 INJECTION SUBCUTANEOUS at 14:16

## 2024-11-07 NOTE — PROGRESS NOTES
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  On 2024 underwent total laparoscopic hysterectomy with bilateral salpingoophorectomy.  She is currently receiving neoadjuvant carboplatin+docetaxel+pertuzumab+trastuzumab- C1, 10/15/24; C2, 24.  She is here alone (accompanied by her spouse, Lam).    I have reviewed the HPI and verified with the patient the accuracy of it. No changes to interval history since the information was documented. Shyam Weiss MD 24      Diagnostic abnormalities:  - Tobacco use disorder, hypothyroidism, depression/anxiety, obesity, prior bilateral salpingectomy, , newly diagnosed IDC left breast  - 24- CT a/p-  Increased size of the low density/complex cystic mass in the uterine cavity since the previous study. This may represent abnormal endometrial thickening, complex endometrial fluid/debris accumulation in the endometrial canal due to out duct obstruction by a fibroid in the lower uterine segment as detailed above. There is a septate morphology of the uterus. Further follow-up with sonography may be obtained. Normal appendix. The gallbladder is surgically absent. The nonacute findings of the remaining abdomen similar to the previous study.  Stable heterogeneous solid mass to the right of midline at the mid to  lower uterine segment with probable secondary dilated endometrial cavity at the uterine fundus filled with fluid and a septate uterus.  Differential diagnosis would " include benign etiologies such as submucosal leiomyoma and large endometrial polyp but also neoplasm, possibly endometrial carcinoma, or carcinosarcoma of the uterus.  No anabel pelvic soft tissue nodule or lymphadenopathy identified. Small left external iliac chain lymph node is unchanged. Small right ovarian cyst. Nabothian cysts in the cervix and septate uterus. MRI pelvis without and with contrast may be helpful for further evaluation. GYN consultation is recommended.   -7/25/24- MRI breast bilateral-1. Findings suggestive for multicentric left breast malignancy with diffuse ductal carcinoma in situ extending from the dominant 2.7 cm mass within the lateral left breast at 3:00 extending to and likely involving the left nipple. A second 8 mm mass within the central left breast tissue with a third mass in the anterior depth of the inferior left breast at 6:00. Two 8 mm homogeneous persistently enhancing hyperintense T2 masses at the upper outer right breast, mid depth at the 10:00 in the 11-12 o'clock positions, with overall benign signal characteristics for which background enhancement versus papilloma fibroadenoma considered. MRI directed/second look ultrasound recommended given the presence of the contralateral left breast findings. BI-RADS CATEGORY 5: Highly suggestive of malignancy. Appropriate action should be taken. Findings strongly favoring multicentric left breast malignancy. MRI directed/second look bilateral breast ultrasound recommended. Ultrasound-guided biopsy of the left breast mass at 3:00 and of a second left breast mass (6:00 if visible on ultrasound versus mass central to the nipple) with sampling of the largest left axillary node may be performed if clinically desired. Sampling of a finding within the upper outer right breast should be considered if solid nodule identified on ultrasound.  -7/29/24- CMP normal (GFR 78.9), UA/cultures negative, HCG negative, WBC 12.65, ANC 8.43 otherwise normal  [facility]  -7/31/24-  Endocervical Currettage, Endometrial Currettage, and hysteroscopic resected uterine synechia.  Final diagnosis:  1.Endometrium, curettage: A. Blood and fragments of smooth muscle. B. No histologic evidence of malignancy. Comment: Endometrial mucosal tissue is not identified. 2. Endocervix, curettage: A. Fragments of benign endocervical tissue, benign squamous mucosa and benign lower uterine segment endometrium. B. Blood and mucus. C. No dysplasia identified. 3. Endometrium, hysteroscopic resected tissue: A. Fragments of smooth muscle. B. No histologic evidence of malignancy. Comment: Endometrial mucosal tissue is not identified.  -8/7/24- US biopsy:  Final diagnosis-  1.  Mass, left breast at 6:00, 1 cm from nipple, core needle biopsies: High-grade ductal carcinoma in situ, comedo type. 2.  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 Histologic grade (Corwin histologic score). Glandular (acinar)/tubular differentiation: Score 2. Nuclear pleomorphism: Score 3. Mitotic rate: Score 3. Overall grade: Grade 3. Maximum tumor diameter is at least 1.5 cm. 3.  Left axillary lymph node, core needle biopsy: Metastatic adenocarcinoma of breast. -ER 69%+; OH 85%+; HER2 3+ (positive); Ki67 54%+  -8/12/24- CT CAP- Known left breast malignancy with enlarged left level 1 and asymmetrically prominent left level 2 and borderline level 3 axillary lymph nodes which are suspicious for axillary disease.  No additional evidence of metastatic disease in the chest.  1. Small 0.4 cm low-attenuation region in the right lobe of the liver may have been present on the prior examinations but was not as well visualized. This is too small to characterize. Continued attention on follow-up is recommended.  Fatty infiltration of the liver.  Persistent heterogeneity of the lower uterine segment of the uterus and fluid in the endometrial canal although the  fluid has decreased. Stable septate uterus  -8/12/2024- Follow-up Dr. Gentile- A/P: IDC left breast.  Met w/u, referral onc, genetics. RTC 2 weeks.  -8/16/2024-bone scan-no evidence of osteoblastic metastatic disease.  -8/16/2024- BRCA 1/2 analyses with cancer next-summary: Negative-no clinically significant variants detected (pathogenic mutations, variants of unknown significance, gross deletions/duplications: Not detected)  -8/20/2024-PET scan-abnormal PET/CT, biopsy-proven left breast malignancy and demonstrates FDG avidity with maximum SUV of 7.9.  Also potential abnormal activity at 6:00 in the left breast with a maximal SUV of 3.9.  MRI of the breast with and without contrast will be helpful to exclude multifocal or multicentric disease in the left breast.  No abnormal contralateral right breast or axillary activity identified.  3 hypermetabolic level 1 lymph nodes in the left axilla compatible with kiersten metastasis and solitary borderline level 2 axillary lymph node on the left.  -8/22/2024- Today is seen for management considerations-  -8/26/24- 2D echo-  Left ventricular systolic function is normal. Left ventricular ejection fraction appears to be 56 - 60%.    Normal right ventricular cavity size and systolic function noted.    No significant valvular abnormalities identified on this study.     -8/28/2024-CBC normal.  CMP normal.  -9/15/2024- CT abdomen/pelvis-There is a 3.2 x 3.0 x 2.6 cm focus of rounded masslike enhancement in the lower uterine segment centered along the endometrial canal (series 2-image 70, series 4-image 41). The uterine cavity is distended and fluid-filled above this at the fundus. Primary imaging concern would be endometrial carcinoma obstructing the canal although it seems there was a recent D&C with benign tissue sampling. Endometritis is also a consideration and cannot be excluded on CT.  No free fluid in the pelvis, pelvic abscess or pelvic adenopathy.        Previous  interventions:  -9/20/2024-   Total Laparoscopic Hysterectomy with bilateral salpingoophorectomy, da Kenzie assisted -Final diagnosis:  Uterus with bilateral tubes and ovaries, hysterectomy with bilateral salpingo-oophorectomies: Unremarkable cervix, negative for squamous dysplasia.  Atrophic endometrium, negative for hyperplasia and atypia.  Benign intramural leiomyoma.  Adenomyosis.  Unremarkable right ovary.  Left ovary with cystic follicles and hemorrhagic corpora lutea.     -Neoadjuvant DCPT (carboplatin+docetaxel+pertuzumab+trastuzumab)- C1, 10/15/24; C2, 11/6/24.        LABS    Lab Results - Last 18 Months   Lab Units 11/12/24  1239 11/06/24  0712 11/01/24  0923 10/29/24  0745 10/21/24  0917 10/15/24  0744 09/18/24  0838 09/15/24  0725 08/28/24  1536   HEMOGLOBIN g/dL 13.3 11.9* 12.3 13.8 14.9 12.5   < > 13.9 12.8   HEMATOCRIT % 41.3 36.7 37.8 42.3 43.9 38.6   < > 42.3 38.0   MCV fL 91.2 91.1 91.1 90.6 86.9 90.6   < > 88.9 87.8   WBC 10*3/mm3 13.52* 15.53* 14.39* 19.68* 4.29 12.19*   < > 14.86* 9.73   RDW % 14.2 14.6 14.0 13.6 12.8 13.1   < > 13.6 13.6   MPV fL 11.5 11.4 11.8 11.3 13.2* 12.1*   < > 12.0 12.9*   PLATELETS 10*3/mm3 217 307 151 144 132* 211   < > 230 181   IMM GRAN % %  --  0.7* 0.9* 2.5*  --  0.5  --  0.3 0.2   NEUTROS ABS 10*3/mm3 9.33* 11.97* 11.45* 15.81* 1.63* 10.60*  --  10.61* 5.45   LYMPHS ABS 10*3/mm3  --  2.43 2.12 2.59  --  1.07  --  2.25 2.55   MONOS ABS 10*3/mm3  --  0.96* 0.60 0.66  --  0.45  --  0.97* 0.80   EOS ABS 10*3/mm3 0.00 0.01 0.00 0.00 0.39 0.00  --  0.90* 0.84*   BASOS ABS 10*3/mm3 0.00 0.05 0.09 0.12  --  0.01  --  0.08 0.07   IMMATURE GRANS (ABS) 10*3/mm3  --  0.11* 0.13* 0.50*  --  0.06*  --  0.05 0.02   NRBC /100 WBC  --  0.0 0.0 0.0  --  0.0  --  0.0 0.0   NEUTROPHIL % % 50.0  --   --   --  27.0*  --   --   --   --    MONOCYTES % % 5.0  --   --   --  11.0  --   --   --   --    BASOPHIL % % 0.0  --   --   --   --   --   --   --   --    ATYP LYMPH % %  --   --   --    --  2.0  --   --   --   --     < > = values in this interval not displayed.       Lab Results - Last 18 Months   Lab Units 11/12/24  1239 11/06/24  0712 11/01/24  0923 10/29/24  0745 10/21/24  0917 10/15/24  0744   GLUCOSE mg/dL 100* 160* 105* 107* 100* 165*   SODIUM mmol/L 139 142 139 140 139 140   POTASSIUM mmol/L 4.6 4.0 4.0 3.4* 4.0 4.4   CO2 mmol/L 30.0* 29.0 25.0 30.0* 30.0* 28.0   CHLORIDE mmol/L 101 102 105 100 98 102   ANION GAP mmol/L 8.0 11.0 9.0 10.0 11.0 10.0   CREATININE mg/dL 0.82 0.78 0.72 0.84 0.75 0.72   BUN mg/dL 14 14 12 8 10 12   BUN / CREAT RATIO  17.1 17.9 16.7 9.5 13.3 16.7   CALCIUM mg/dL 9.3 9.6 9.0 9.5 9.8 9.5   ALK PHOS U/L 187* 112 135* 177* 113 76   TOTAL PROTEIN g/dL 6.9 6.7 6.3 6.9 7.9 6.7   ALT (SGPT) U/L 26 19 23 26 26 10   AST (SGOT) U/L 16 15 17 18 25 12   BILIRUBIN mg/dL 0.6 0.3 0.3 0.3 0.7 0.4   ALBUMIN g/dL 4.2 4.1 3.8 4.2 4.6 4.1   GLOBULIN gm/dL 2.7 2.6 2.5 2.7 3.3 2.6         Lab Results - Last 18 Months   Lab Units 10/15/24  0744 07/12/23  1101   TSH uIU/mL 0.506 3.460         PAST MEDICAL HISTORY:  ALLERGIES:  Allergies   Allergen Reactions    Percocet [Oxycodone-Acetaminophen] Hives     CURRENT MEDICATIONS:  Outpatient Encounter Medications as of 11/14/2024   Medication Sig Dispense Refill    amphetamine-dextroamphetamine (Adderall) 10 MG tablet Take 1 tablet by mouth Daily for 30 days. 30 tablet 0    [START ON 11/29/2024] amphetamine-dextroamphetamine (Adderall) 10 MG tablet Take 1 tablet by mouth Daily for 30 days. 30 tablet 0    bisoprolol-hydrochlorothiazide (ZIAC) 5-6.25 MG per tablet Take 1 tablet by mouth Daily. 30 tablet 2    dexAMETHasone (DECADRON) 4 MG tablet Take 2 tablets 2 times daily the day before chemo and 2 tablets 2 times daily the day after chemo 48 tablet 0    DULoxetine (CYMBALTA) 20 MG capsule Take 1 capsule by mouth Daily. 30 capsule 2    ibuprofen (ADVIL,MOTRIN) 800 MG tablet Take 1 tablet by mouth Every 6 (Six) Hours As Needed for Mild Pain. 90  tablet 2    levothyroxine (SYNTHROID, LEVOTHROID) 112 MCG tablet Take 1 tablet by mouth Daily. 30 tablet 2    lidocaine-prilocaine (EMLA) 2.5-2.5 % cream Apply to port site 30 minutes before it is accessed and cover with occlusive dressing. 30 g 1    [START ON 11/16/2024] lisdexamfetamine (Vyvanse) 60 MG capsule Take 1 capsule by mouth Every Morning for 30 days 30 capsule 0    [START ON 12/16/2024] lisdexamfetamine (Vyvanse) 60 MG capsule Take 1 capsule by mouth Every Morning for 30 days 30 capsule 0    ondansetron (ZOFRAN) 8 MG tablet Take 1 tablet by mouth Every 8 (Eight) Hours As Needed for Nausea or Vomiting. 30 tablet 0    ondansetron ODT (ZOFRAN-ODT) 8 MG disintegrating tablet Place 1 tablet on the tongue Every 8 (Eight) Hours As Needed for Nausea or Vomiting. 30 tablet 1    pantoprazole (PROTONIX) 40 MG EC tablet Take 1 tablet by mouth Daily. 30 tablet 2    pertuzumab (Perjeta) 420 MG/14ML solution injection Infuse 14 mL into a venous catheter Every 21 (Twenty-One) Days. To be used in cancer center infusions 14 mL 2    prochlorperazine (COMPAZINE) 10 MG tablet Take 1 tablet by mouth Every 8 (Eight) Hours As Needed for Nausea or Vomiting. 30 tablet 0    trastuzumab-dkst (Ogivri) 420 MG chemo injection Infuse 420 mg into a venous catheter Every 21 (Twenty-One) Days. To be used in the cancer center for infusions 1 each 2    [DISCONTINUED] folic acid (FOLVITE) 1 MG tablet Take 1 tablet by mouth 3 (Three) Times a Day.       Facility-Administered Encounter Medications as of 11/14/2024   Medication Dose Route Frequency Provider Last Rate Last Admin    [COMPLETED] CARBOplatin (PARAPLATIN) 420 mg in sodium chloride 0.9 % 292 mL chemo IVPB  420 mg Intravenous Once Shyam Weiss MD   Stopped at 11/06/24 1323    [COMPLETED] dexAMETHasone (DECADRON) IVPB 20 mg  20 mg Intravenous Once Shyam Weiss MD   Stopped at 11/06/24 0959    [COMPLETED] diphenhydrAMINE (BENADRYL) injection 50 mg  50 mg Intravenous Once  Shyam Weiss MD   50 mg at 11/06/24 0930    [COMPLETED] DOCEtaxel (TAXOTERE) 96 mg in sodium chloride 0.9 % 259.6 mL chemo IVPB  52.5 mg/m2 (Treatment Plan Recorded) Intravenous Once Shyam Weiss MD   Stopped at 11/06/24 1228    [COMPLETED] famotidine (PEPCID) injection 20 mg  20 mg Intravenous Once Owen Rangel MD   20 mg at 11/06/24 0928    lidocaine (XYLOCAINE) 1 % injection 30 mL  30 mL Subcutaneous Once Taylor Salinas MD        lidocaine 1% - EPINEPHrine 1:[id number] (XYLOCAINE W/EPI) 1 %-1:541875 injection 30 mL  30 mL Infiltration Once Taylor Salinas MD        [COMPLETED] magnesium sulfate 2g/50 mL (PREMIX) infusion  2 g Intravenous Once Shyam Weiss MD   Stopped at 11/06/24 1324    [COMPLETED] OLANZapine (zyPREXA) tablet 5 mg  5 mg Oral Once Shyam Weiss MD   5 mg at 11/06/24 0926    [COMPLETED] palonosetron (ALOXI) injection 0.25 mg  0.25 mg Intravenous Once Owen Rangel MD   0.25 mg at 11/06/24 0924    [COMPLETED] pertuzumab (PERJETA) 420 mg in sodium chloride 0.9 % 264 mL chemo IVPB  420 mg Intravenous Once Owen Rangel MD   Stopped at 11/06/24 1037    [COMPLETED] sodium chloride 0.9 % infusion  20 mL/hr Intravenous Once Owen Rangel MD   Stopped at 11/06/24 1340    [COMPLETED] trastuzumab-dkst (OGIVRI) 420 mg in sodium chloride 0.9 % 270 mL chemo IVPB  420 mg Intravenous Once Owen Rangel MD   Stopped at 11/06/24 1120    [DISCONTINUED] aprepitant (EMEND) capsule 80 mg  80 mg Oral Daily Shyam Weiss MD   80 mg at 11/06/24 0925    [DISCONTINUED] diphenhydrAMINE (BENADRYL) injection 50 mg  50 mg Intravenous PRN Owen Rangel MD        [DISCONTINUED] diphenhydrAMINE (BENADRYL) IVPB 50 mg  50 mg Intravenous Once Shyam Weiss MD        [DISCONTINUED] famotidine (PEPCID) injection 20 mg  20 mg Intravenous PRN Owen Rangel MD        [DISCONTINUED] heparin injection 500 Units  500 Units Intravenous PRN Shyam Weiss MD   500 Units at  11/06/24 1343    [DISCONTINUED] Hydrocortisone Sod Suc (PF) (Solu-CORTEF) injection 100 mg  100 mg Intravenous PRN Owen Rangel MD        [DISCONTINUED] LORazepam (ATIVAN) injection 1 mg  1 mg Intravenous Q4H PRN Shyam Weiss MD   1 mg at 11/06/24 1039    [DISCONTINUED] Pharmacy Consult   Not Applicable Continuous PRN Owen Rangel MD        [DISCONTINUED] sodium chloride 0.9 % flush 10 mL  10 mL Intravenous PRN Shyam Weiss MD   10 mL at 11/06/24 1343     ADULT ILLNESSES:  Patient Active Problem List   Diagnosis Code    Tobacco abuse counseling Z71.6    Hypothyroidism E03.9    Attention deficit hyperactivity disorder F90.9    Essential hypertension I10    Irritable bowel syndrome K58.9    Mixed anxiety and depressive disorder F41.8    Mood disorder F39    Overweight (BMI 25.0-29.9) E66.3    Gastroesophageal reflux disease without esophagitis K21.9    Dysmenorrhea N94.6    Hematometra N85.7    Ductal carcinoma of left breast C50.912    Invasive ductal carcinoma of breast, left C50.912    Pelvic pain in female R10.2    Nausea R11.0    Low magnesium level R79.0    Abnormal mammogram R92.8     SURGERIES:  Past Surgical History:   Procedure Laterality Date    CARPAL TUNNEL RELEASE Right     COLONOSCOPY  05/01/2003    Normal exam    D & C HYSTEROSCOPY MYOSURE N/A 07/31/2024    Procedure: DILATATION AND CURETTAGE HYSTEROSCOPY WITH POSSIBLE TISSUE RESECTION;  Surgeon: Stevan Espinal MD;  Location: [facility] OR;  Service: Obstetrics/Gynecology;  Laterality: N/A;    DIAGNOSTIC LAPAROSCOPY  2000 [place]; normal findings; [facility]    ENDOMETRIAL ABLATION  2023    ENDOSCOPY      LAPAROSCOPIC CHOLECYSTECTOMY  2011    Dr Duncan; [facility]    LYMPH NODE BIOPSY  8/7/2024    Cancer Positive    SALPINGECTOMY Bilateral 10/16/2020    Procedure: SALPINGECTOMY LAPAROSCOPIC for sterilization;  Surgeon: Stevan Espinal MD;  Location: [facility] OR;  Service: Obstetrics/Gynecology;  Laterality: Bilateral;    TOTAL LAPAROSCOPIC  HYSTERECTOMY SALPINGO OOPHORECTOMY N/A 09/20/2024    Procedure: TOTAL LAPAROSCOPIC HYSTERECTOMY BILATERAL SALPINGOOPHORECTOMY WITH DAVINCI ROBOT;  Surgeon: Stevan Espinal MD;  Location[facility] OR;  Service: Robotics - DaVinci;  Laterality: N/A;    TUBAL ABDOMINAL LIGATION      cut not tied    US GUIDED LYMPH NODE BIOPSY  08/07/2024    VENOUS ACCESS DEVICE (PORT) INSERTION N/A 08/30/2024    Procedure: Single Lumen Port-a-cath insertion with flouroscopy;  Surgeon: Shahla Gentile MD;  Location:  [facility] OR;  Service: General;  Laterality: N/A;    WISDOM TOOTH EXTRACTION       HEALTH MAINTENANCE ITEMS:  Health Maintenance Due   Topic Date Due    TDAP/TD VACCINES (1 - Tdap) Never done    HEPATITIS C SCREENING  Never done    ANNUAL PHYSICAL  Never done    COVID-19 Vaccine (6 - 2024-25 season) 09/01/2024       <no information>  Last Completed Colonoscopy       This patient has no relevant Health Maintenance data.          Immunization History   Administered Date(s) Administered    COVID-19 (MODERNA) 1st,2nd,3rd Dose Monovalent 10/29/2021, 11/04/2021, 11/26/2021, 11/26/2021, 12/06/2022    DT 07/07/2004    Influenza, Unspecified 11/30/2021     Last Completed Mammogram            MAMMOGRAM (Yearly) Next due on 6/26/2025 06/26/2024  Mammo Diagnostic Bilateral With CAD    04/19/2022  MAMMO SCREENING BILATERAL W CAD                      FAMILY HISTORY:  Family History   Problem Relation Age of Onset    Diabetes Mother     Asthma Mother     Depression Mother     Hyperlipidemia Mother     Kidney disease Mother     Thyroid disease Mother     Diabetes Father     Arthritis Father         RA    Cancer Maternal Grandmother         vulva, HPV+    Thyroid disease Maternal Grandmother     Cancer Maternal Grandfather     Lung cancer Paternal Grandfather 60 - 69    Cancer Paternal Grandfather     Breast cancer Maternal Great-Grandmother     Stomach cancer Maternal Great-Grandmother     Asthma Son         Ecezma and asthma     "Colon cancer Neg Hx     Colon polyps Neg Hx      SOCIAL HISTORY:  Social History     Socioeconomic History    Marital status:    Tobacco Use    Smoking status: Former     Current packs/day: 0.00     Average packs/day: 0.5 packs/day for 20.0 years (10.0 ttl pk-yrs)     Types: Cigarettes     Start date: 2004     Quit date: 2024     Years since quittin.3    Smokeless tobacco: Never    Tobacco comments:     Stop ciggarettes 24 using vape now to stop completely   Vaping Use    Vaping status: Former    Start date: 2024    Quit date: 10/17/2024   Substance and Sexual Activity    Alcohol use: Not Currently     Comment: Socially    Drug use: Yes     Types: Marijuana    Sexual activity: Yes     Partners: Male     Birth control/protection: None, Bilateral salpingectomy        REVIEW OF SYSTEMS:  Review of Systems   Constitutional:  Positive for fatigue (from RA + fibromyalgia). Negative for activity change, appetite change and unexpected weight loss (None since the last visit).        Manages her ADLs to include chores, errands and driving.  Is up and about, \"all the time when I am recovered from each cycle of chemo.\"  Still works full time in customer service at [employer]     Chemo tolerance:  Fatigue, nausea, achiness, runny nose starting days 2-3, blurry vision on day 4, \"then better next day\", no mouth sores, no skin changes, no neuropathy, no diarrhea.  \"It takes about 10 to 12 days before I feel back to normal the week before the next cycle.  I do not know if I can endure 4 more cycles\"    On 10/26/24- discussed with Sherie Oliver, PharmD.  Due to protracted nausea/vomiting \"dry heaves\" after 1st cycle -refractory to usual antiemetics:  - We added into the treatment plan:   - Zyprexa 5 mg on Day 1   - Decadron 20 mg (   - Benadryl 50 mg was added as well   - 30% dose reduction of docetaxel and carboplatin        Eyes: Negative.    Respiratory: Negative.  Negative for cough and " "shortness of breath.         Smoker age 15- 1/2-1 ppd.  Has stopped smoking.  He is no longer vaping.   Cardiovascular: Negative.    Gastrointestinal: Negative.    Endocrine:         A0    Menarche age 13    Menopause age 42   Genitourinary: Negative.  Negative for vaginal bleeding (Had a CHRISTIANO/BSO per Dr. Espinal).   Musculoskeletal:  Positive for arthralgias (Chronic- from RA + fibromyalgia), back pain (Chronic) and neck pain (Chronic).   Allergic/Immunologic: Negative.    Neurological: Negative.    Hematological: Negative.    Psychiatric/Behavioral: Negative.         /76   Pulse 82   Temp 97.5 °F (36.4 °C) (Tympanic)   Resp 18   Ht 168.9 cm (66.5\")   Wt 70.6 kg (155 lb 11.2 oz)   LMP  (LMP Unknown)   SpO2 97%   BMI 24.75 kg/m²  Body surface area is 1.81 meters squared.  Pain Score    24 1528   PainSc: 0-No pain       Physical Exam  Vitals and nursing note reviewed. Exam conducted with a chaperone present.   Constitutional:       Comments: Cooperative, well-developed, modestly Middle-aged female.  Ambulatory.  ECOG 0.      Lost 4 lb since the last visit   HENT:      Head: Normocephalic and atraumatic.   Eyes:      General: No scleral icterus.     Extraocular Movements: Extraocular movements intact.      Pupils: Pupils are equal, round, and reactive to light.   Cardiovascular:      Rate and Rhythm: Normal rate.   Pulmonary:      Effort: Pulmonary effort is normal.      Comments: Port right upper chest is well seated  Chest:   Breasts:     Right: Normal.      Left: Mass present.          Comments: Chaperoned exam: Nasra Tomas LPN    Right breast with no masses, no skin changes.  No nipple discharge today.    Left breast with previously palpable masses at 3:00 and 6:00 positions are significantly less prominent.  Previously palpable left axillary nodes are also barely evident today.  No nipple discharge today.  No skin changes.  Abdominal:      Palpations: Abdomen is soft.      Tenderness: " There is no abdominal tenderness.      Comments: Laparoscopic incisions are healing very well.   Musculoskeletal:         General: Normal range of motion.      Cervical back: Normal range of motion.   Lymphadenopathy:      Cervical: No cervical adenopathy.      Upper Body:      Right upper body: No supraclavicular or axillary adenopathy.      Left upper body: Axillary adenopathy (Clinically improved) present. No supraclavicular adenopathy.   Skin:     General: Skin is warm.   Neurological:      General: No focal deficit present.      Mental Status: She is alert and oriented to person, place, and time.   Psychiatric:         Mood and Affect: Mood normal.         Behavior: Behavior normal.         Thought Content: Thought content normal.         .  Assessment:  1.   Invasive carcinoma of no special type (ductal) of the left breast  --Tumor stage: at least TNM/AJCC stage IIIA (cT2, cN2, M0,G3) ER 69%(+), OH 85% (+), HER-2 positive (3+).  --Original tumor burden:    ---7/25/24- MRI breast bilateral-1. Findings suggestive for multicentric left breast malignancy with diffuse ductal carcinoma in situ extending from the dominant 2.7 cm mass within the lateral left breast at 3:00 extending to and likely involving the left nipple. A second 8 mm mass within the central left breast tissue with a third mass in the anterior depth of the inferior left breast at 6:00. Two 8 mm homogeneous persistently enhancing hyperintense T2 masses at the upper outer right breast, mid depth at the 10:00 in the 11-12 o'clock positions, with overall benign signal characteristics for which background enhancement versus papilloma fibroadenoma considered. MRI directed/second look ultrasound recommended given the presence of the contralateral left breast findings. BI-RADS CATEGORY 5: Highly suggestive of malignancy. Appropriate action should be taken. Findings strongly favoring multicentric left breast malignancy. MRI directed/second look bilateral  breast ultrasound recommended. Ultrasound-guided biopsy of the left breast mass at 3:00 and of a second left breast mass (6:00 if visible on ultrasound versus mass central to the nipple) with sampling of the largest left axillary node may be performed if clinically desired. Sampling of a finding within the upper outer right breast should be considered if solid nodule identified on ultrasound.  -8/7/24- US biopsy:  Final diagnosis-  1.  Mass, left breast at 6:00, 1 cm from nipple, core needle biopsies: High-grade ductal carcinoma in situ, comedo type. 2.  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 Histologic grade ([place] histologic score). Glandular (acinar)/tubular differentiation: Score 2. Nuclear pleomorphism: Score 3. Mitotic rate: Score 3. Overall grade: Grade 3. Maximum tumor diameter is at least 1.5 cm. 3.  Left axillary lymph node, core needle biopsy: Metastatic adenocarcinoma of breast. -ER 69%+; [place] 85%+; HER2 3+ (positive); Ki67 54%+  -8/12/24- CT CAP- Known left breast malignancy with enlarged left level 1 and asymmetrically prominent left level 2 and borderline level 3 axillary lymph nodes which are suspicious for axillary disease.  No additional evidence of metastatic disease in the chest.  1. Small 0.4 cm low-attenuation region in the right lobe of the liver may have been present on the prior examinations but was not as well visualized. This is too small to characterize. Continued attention on follow-up is recommended.  Fatty infiltration of the liver.  Persistent heterogeneity of the lower uterine segment of the uterus and fluid in the endometrial canal although the fluid has decreased. Stable septate uterus  -8/16/2024-bone scan-no evidence of osteoblastic metastatic disease.  -8/16/2024- 2D echo    Left ventricular systolic function is normal. Left ventricular ejection fraction appears to be 56 - 60%.    Normal right  ventricular cavity size and systolic function noted.    No significant valvular abnormalities identified on this study.     -8/20/2024-PET scan-abnormal PET/CT, biopsy-proven left breast malignancy and demonstrates FDG avidity with maximum SUV of 7.9.  Also potential abnormal activity at 6:00 in the left breast with a maximal SUV of 3.9.  MRI of the breast with and without contrast will be helpful to exclude multifocal or multicentric disease in the left breast.  No abnormal contralateral right breast or axillary activity identified.  3 hypermetabolic level 1 lymph nodes in the left axilla compatible with kiersten metastasis and solitary borderline level 2 axillary lymph node on the left.  - Neoadjuvant DCPT in progress    2.   Nausea, fatigue, myalgias, blurry vision (for 1 day) and flulike symptoms following chemo.  Feels like steps were taken to include dose reduction and augmenting her antiemetic regimen have helped but still last the better part of 2 weeks.  We further discussed the option of more dose reduction (we will dose reduce to 50%.  Chemotherapy) in order to maximize her ability to complete as close to 6 cycles of chemo as possible.  Will check an MRI of the breasts after cycle 3 and obtain CT of the head (to assess the blurry vision).    3.   Uterine cystic mass  - 7/12/24 - Abnormal CT a/p- Increased size of the low density/complex cystic mass in the uterine cavity since the previous study. This may represent abnormal endometrial thickening, complex endometrial fluid/debris accumulation in the endometrial canal due to out duct obstruction by a fibroid in the lower uterine segment as detailed above. There is a septate morphology of the uterus. Further follow-up with sonography may be obtained. Normal appendix. The gallbladder is surgically absent. The nonacute findings of the remaining abdomen similar to the previous study.  Stable heterogeneous solid mass to the right of midline at the mid to lower  uterine segment with probable secondary dilated endometrial cavity at the uterine fundus filled with fluid and a septate uterus.  Differential diagnosis would include benign etiologies such as submucosal leiomyoma and large endometrial polyp but also neoplasm, possibly endometrial carcinoma, or carcinosarcoma of the uterus.  No anabel pelvic soft tissue nodule or lymphadenopathy identified. Small left external iliac chain lymph node is unchanged. Small right ovarian cyst. Nabothian cysts in the cervix and septate uterus. MRI pelvis without and with contrast may be helpful for further evaluation. GYN consultation is recommended.  -7/31/24-  Endocervical Currettage, Endometrial Currettage, and hysteroscopic resected uterine synechia.  Final diagnosis:  1.Endometrium, curettage: A. Blood and fragments of smooth muscle. B. No histologic evidence of malignancy. Comment: Endometrial mucosal tissue is not identified. 2. Endocervix, curettage: A. Fragments of benign endocervical tissue, benign squamous mucosa and benign lower uterine segment endometrium. B. Blood and mucus. C. No dysplasia identified. 3. Endometrium, hysteroscopic resected tissue: A. Fragments of smooth muscle. B. No histologic evidence of malignancy. Comment: Endometrial mucosal tissue is not identified.  --9/15/2024- CT abdomen/pelvis-There is a 3.2 x 3.0 x 2.6 cm focus of rounded masslike enhancement in the lower uterine segment centered along the endometrial canal (series 2-image 70, series 4-image 41). The uterine cavity is distended and fluid-filled above this at the fundus. Primary imaging concern would be endometrial carcinoma obstructing the canal although it seems there was a recent D&C with benign tissue sampling. Endometritis is also a consideration and cannot be excluded on CT.  No free fluid in the pelvis, pelvic abscess or pelvic adenopathy.  --9/20/2024-   Total Laparoscopic Hysterectomy with bilateral salpingoophorectomy, da Kenzie assisted  -Final diagnosis:  Uterus with bilateral tubes and ovaries, hysterectomy with bilateral salpingo-oophorectomies: Unremarkable cervix, negative for squamous dysplasia.  Atrophic endometrium, negative for hyperplasia and atypia.  Benign intramural leiomyoma.  Adenomyosis.  Unremarkable right ovary.  Left ovary with cystic follicles and hemorrhagic corpora lutea.    4.   Nicotine dependence-stopped in favor of vaping  5.   Mixed depression/anxiety  6.   Hypothyroidism  7.   Obesity  8.   RA and fibromyalgia on Plaquenil and Cymbalta.  Followed by Dr. Ackerman.  Symptoms have been quiescent while on chemo.       Plan:  1.   Labs, 11/6/24-11/12/24 gluc 100, alk phos 187 (prior peak: 177) otherwise normal CMP, mag 1.5 (2 gm IV mag given), WBC 13 (Neulasta), otherwise normal CBC  2.   Chemo tolerance (see below).  Excess nausea/vomiting/fatigue/blurry vision/malaise.  Persistent but improved with dose reduction of TC and augmented anti-emetics  3.   Review BRCA 1/2 analyses with cancer next, 8/16/2034 -summary: Negative-no clinically significant variants detected  4.   Review NCCN guidelines version invasive breast cancer.  Principles of preoperative systemic therapy: Candidate for preoperative systemic therapy--A: Patients with inoperable breast cancer: IBC; bulky or matted cN2 axillary nodes; cN3 kiersten disease; cT4 tumors.  B. In selected patients with operable breast cancer--HER-2 positive disease and TNBC, if >/=cT2 OR >/=cN1; Large primary tumor relative to breast size in a patient who desires breast conservation; cN+ disease likely to become cN0 with preoperative systemic therapy; C.  Preoperative systemic therapy can be considered for cT1,N0, HER-2 positive disease and TNBC.     5.   Docetaxel, carboplatin: Toxicities of chemotherapy noted to include, but not limited to: Myelosuppression (neutropenia, anemia, thrombocytopenia), alopecia, neuropathy, arthralgia, myalgia, nausea, vomiting, hypersensitivity reactions,  mucositis, diarrhea, infection, abnormal liver enzymes, asthenia, fever, hypertension, bleeding, edema, opportunistic infections, anaphylaxis, cardiac conduction disturbance/arrhythmias, syncope, thromboembolism, myocardial infarction, severe injection site reactions, pulmonary toxicity, GI obstruction/perforation, paralytic ileus, ischemic colitis, pancreatitis, hepatotoxicity, severe skin reactions). Questions answered to the patient's apparent satisfaction. She agrees to press on with therapy.   7.   Discussed the potential toxicities of pertuzumab and trastuzumab, to include but not limited to: Cardiac failure/LV dysfunction, cardiomyopathy, CHF, arrhythmia, hypersensitivity reaction, anaphylaxis, angioedema, ARDS, interstitial pneumonitis, pulmonary infiltrates, pulmonary fibrosis, pleural effusion, pulmonary edema, hypoxia, myelosuppression (anemia, neutropenia, thrombocytopenia), febrile neutropenia, tumor lysis syndrome, glomerulonephritis, hypokalemia, peripheral neuropathy, hand-foot syndrome, alopecia, nausea, diarrhea, ALT/AST increase, lymphopenia, asthenia, fatigue, stomatitis, myalgia, arthralgia, constipation, vomiting, dysgeusia, headache, decreased appetite, insomnia, peripheral neuropathy, rash, decreased albumin, xeroderma, mucositis, cough, dyspepsia, fever, dizziness, increased potassium, decreased sodium, epistaxis, hot flashes, weight loss, URI, paresthesia, back pain, dyspnea, UTI, hypokalemia, hand-foot syndrome.  Questions answered to the patient's apparent satisfaction.  She agrees to press on with therapy.     8.  Schedule neoadjuvant treatment (plan: q 21 days x 6 cycles-further dose reduce docetaxel and carboplatin to 50% of original dose beginning cycle 3) C3, 11/27/24; C4, 12/18/24; C5 etc (with 30% dose reduction of docetaxel and carboplatin)     Docetaxel 45 mg/m2 (BSA = ; TD =  mg) per administration guidelines q 21 days, C1-C6  Carboplatin AUC 3 (BSA = ; TD =  mg) per  "administration guidelines q 21 days, C1-C6  Perjeta 840 mg loading dose C1D1; then 420 mg, C2 -C6    Trastuzumab 8 mg/kg loading dose C1D1; then 6 mg/kg, C2- C6   Observe patient for 1 hour after initial dose pertuzumab and for 30 minutes after all subsequent doses for signs of infusion reactions.  Hold pertuzumab and trastuzumab if LVEF drops to<50% with a 10% or greater absolute decrease below pretreatment baseline.     9.  Premeds:   Zyprexa 5 mg IV  Aloxi 0.25 mg IV   Emend 150 mg IV   Decadron 20 mg IV   Pepcid 20 mg IV   Benadryl 50 mg IV  Decadron 8 mg twice daily day before and 8 mg twice daily day after chemo  Ativan 1 mg IV        10.  Neulasta 6 mg sc on day 2 chemo  11.  CMP, magnesium level, and CBC with differential weekly on day of chemotherapy. Procrit 40,000 units subcutaneously if hemoglobin less than 10 and hematocrit less than 30. Zarxio/Neupogen 480 mcg daily x3 if ANC less than 1.0.      12.  Rx:  Zofran 8 mg po tid prn # 30                Compazine 10 mg p.o. 3 times daily as needed dispense 30                Decadron 8 mg twice daily day before and 8 mg twice daily day after chemo     13.  Anticipate adjuvant (postop) treatment (plan: q 21 days x 17 cycles - to complete 1 year of therapy) C7..pending     Trastuzumab 8 mg/kg loading dose C7D1; then 6 mg/kg, C8- C17      14.  Return to the office in 6 weeks with pre-office CBC with differential, CMP.  We will schedule follow-up 2D echocardiogram after C3 and C6 of Tx  15.  Schedule CT head with and without contrast next week   16.  Schedule MRI breasts 12/4/2024 and schedule 2D echo on 12/4/2024 (after cycle 3 of chemo)  17.  Importance of Smoking Cessation discussed with patient and informed patient additional information will be on today's [facility]'s Flyer - Plan to Be Tobacco Free handout provided to patient         I spent ~58 minutes caring for Uyen, \"Leana\" on this date of service. This time includes time spent by " me in the following activities: preparing for the visit, reviewing tests, performing a medically appropriate examination and/or evaluation, counseling and educating the patient/family/caregiver, ordering medications, tests, or procedures and documenting information in the medical record.

## 2024-11-11 ENCOUNTER — TELEPHONE (OUTPATIENT)
Dept: ONCOLOGY | Facility: CLINIC | Age: 44
End: 2024-11-11
Payer: COMMERCIAL

## 2024-11-11 ENCOUNTER — INFUSION (OUTPATIENT)
Dept: ONCOLOGY | Facility: HOSPITAL | Age: 44
End: 2024-11-11
Payer: COMMERCIAL

## 2024-11-11 VITALS
WEIGHT: 155 LBS | DIASTOLIC BLOOD PRESSURE: 63 MMHG | RESPIRATION RATE: 18 BRPM | HEIGHT: 67 IN | HEART RATE: 73 BPM | SYSTOLIC BLOOD PRESSURE: 107 MMHG | OXYGEN SATURATION: 97 % | TEMPERATURE: 98.1 F | BODY MASS INDEX: 24.33 KG/M2

## 2024-11-11 DIAGNOSIS — C50.912 INVASIVE DUCTAL CARCINOMA OF BREAST, LEFT: ICD-10-CM

## 2024-11-11 DIAGNOSIS — C50.912 DUCTAL CARCINOMA OF LEFT BREAST: ICD-10-CM

## 2024-11-11 DIAGNOSIS — R11.0 NAUSEA: Primary | ICD-10-CM

## 2024-11-11 DIAGNOSIS — C50.912 INVASIVE DUCTAL CARCINOMA OF BREAST, LEFT: Primary | ICD-10-CM

## 2024-11-11 PROCEDURE — 25010000002 LORAZEPAM PER 2 MG: Performed by: INTERNAL MEDICINE

## 2024-11-11 PROCEDURE — 96361 HYDRATE IV INFUSION ADD-ON: CPT

## 2024-11-11 PROCEDURE — 96365 THER/PROPH/DIAG IV INF INIT: CPT

## 2024-11-11 PROCEDURE — 96374 THER/PROPH/DIAG INJ IV PUSH: CPT

## 2024-11-11 PROCEDURE — 25010000002 ONDANSETRON PER 1 MG: Performed by: INTERNAL MEDICINE

## 2024-11-11 PROCEDURE — 96375 TX/PRO/DX INJ NEW DRUG ADDON: CPT

## 2024-11-11 PROCEDURE — 25010000002 HEPARIN LOCK FLUSH PER 10 UNITS: Performed by: INTERNAL MEDICINE

## 2024-11-11 PROCEDURE — 25810000003 SODIUM CHLORIDE 0.9 % SOLUTION: Performed by: INTERNAL MEDICINE

## 2024-11-11 RX ORDER — SODIUM CHLORIDE 0.9 % (FLUSH) 0.9 %
10 SYRINGE (ML) INJECTION AS NEEDED
Status: CANCELLED | OUTPATIENT
Start: 2024-11-11

## 2024-11-11 RX ORDER — HEPARIN SODIUM (PORCINE) LOCK FLUSH IV SOLN 100 UNIT/ML 100 UNIT/ML
500 SOLUTION INTRAVENOUS AS NEEDED
Status: CANCELLED | OUTPATIENT
Start: 2024-11-11

## 2024-11-11 RX ORDER — LORAZEPAM 2 MG/ML
0.5 INJECTION INTRAMUSCULAR ONCE
Status: CANCELLED | OUTPATIENT
Start: 2024-11-11

## 2024-11-11 RX ORDER — LORAZEPAM 2 MG/ML
0.5 INJECTION INTRAMUSCULAR ONCE
Status: COMPLETED | OUTPATIENT
Start: 2024-11-11 | End: 2024-11-11

## 2024-11-11 RX ORDER — HEPARIN SODIUM (PORCINE) LOCK FLUSH IV SOLN 100 UNIT/ML 100 UNIT/ML
500 SOLUTION INTRAVENOUS AS NEEDED
Status: DISCONTINUED | OUTPATIENT
Start: 2024-11-11 | End: 2024-11-11 | Stop reason: HOSPADM

## 2024-11-11 RX ORDER — ONDANSETRON 2 MG/ML
8 INJECTION INTRAMUSCULAR; INTRAVENOUS ONCE
Status: CANCELLED | OUTPATIENT
Start: 2024-11-11

## 2024-11-11 RX ORDER — SODIUM CHLORIDE 0.9 % (FLUSH) 0.9 %
10 SYRINGE (ML) INJECTION AS NEEDED
Status: DISCONTINUED | OUTPATIENT
Start: 2024-11-11 | End: 2024-11-11 | Stop reason: HOSPADM

## 2024-11-11 RX ADMIN — LORAZEPAM 0.5 MG: 2 INJECTION, SOLUTION INTRAMUSCULAR; INTRAVENOUS at 14:55

## 2024-11-11 RX ADMIN — ONDANSETRON HYDROCHLORIDE 8 MG: 2 INJECTION INTRAMUSCULAR; INTRAVENOUS at 14:13

## 2024-11-11 RX ADMIN — HEPARIN 500 UNITS: 100 SYRINGE at 16:14

## 2024-11-11 RX ADMIN — Medication 10 ML: at 16:14

## 2024-11-11 RX ADMIN — SODIUM CHLORIDE 1000 ML: 9 INJECTION, SOLUTION INTRAVENOUS at 14:10

## 2024-11-11 NOTE — TELEPHONE ENCOUNTER
Yoana Levy I know you talked to Uyen José earlier.  She told me she is still having some blurred vision issues as well, states yesterday was the worst.  She also says the inside of her nose is very sensitive like it has been rubbed raw.  Ok to proceed with the orders Dr. FRANCISCO JAVIER larios earlier?  Anything else to add?      Per Dr. Weiss okay for fluids. He will also order a CT of the head.

## 2024-11-11 NOTE — TELEPHONE ENCOUNTER
Call from Uyen and she reports that since her chemo last week, she has not felt ll. She has had a headach, nausea, no energy. Feeling a bit jittery and unable to satisfy her hunger. She is asking if she could come in for fluids. I discussed with Dr. Weiss and he ordered 1 liter ns with zofran 8 mg and ativan 0.5 mg. She will come in today at 1

## 2024-11-12 ENCOUNTER — OFFICE VISIT (OUTPATIENT)
Age: 44
End: 2024-11-12
Payer: COMMERCIAL

## 2024-11-12 ENCOUNTER — TELEPHONE (OUTPATIENT)
Dept: ONCOLOGY | Facility: CLINIC | Age: 44
End: 2024-11-12
Payer: COMMERCIAL

## 2024-11-12 ENCOUNTER — APPOINTMENT (OUTPATIENT)
Dept: LAB | Facility: HOSPITAL | Age: 44
End: 2024-11-12
Payer: COMMERCIAL

## 2024-11-12 ENCOUNTER — LAB (OUTPATIENT)
Dept: LAB | Facility: HOSPITAL | Age: 44
End: 2024-11-12
Payer: COMMERCIAL

## 2024-11-12 VITALS
WEIGHT: 154.5 LBS | BODY MASS INDEX: 24.25 KG/M2 | DIASTOLIC BLOOD PRESSURE: 82 MMHG | HEIGHT: 67 IN | SYSTOLIC BLOOD PRESSURE: 122 MMHG

## 2024-11-12 DIAGNOSIS — R74.8 ELEVATED ALKALINE PHOSPHATASE LEVEL: Primary | ICD-10-CM

## 2024-11-12 DIAGNOSIS — F17.200 SMOKER: ICD-10-CM

## 2024-11-12 DIAGNOSIS — C50.912 INVASIVE DUCTAL CARCINOMA OF BREAST, LEFT: ICD-10-CM

## 2024-11-12 DIAGNOSIS — Z09 POSTOP CHECK: Primary | ICD-10-CM

## 2024-11-12 LAB
ALBUMIN SERPL-MCNC: 4.2 G/DL (ref 3.5–5.2)
ALBUMIN/GLOB SERPL: 1.6 G/DL
ALP SERPL-CCNC: 187 U/L (ref 39–117)
ALT SERPL W P-5'-P-CCNC: 26 U/L (ref 1–33)
ANION GAP SERPL CALCULATED.3IONS-SCNC: 8 MMOL/L (ref 5–15)
AST SERPL-CCNC: 16 U/L (ref 1–32)
BASOPHILS # BLD MANUAL: 0 10*3/MM3 (ref 0–0.2)
BASOPHILS NFR BLD MANUAL: 0 % (ref 0–1.5)
BILIRUB SERPL-MCNC: 0.6 MG/DL (ref 0–1.2)
BUN SERPL-MCNC: 14 MG/DL (ref 6–20)
BUN/CREAT SERPL: 17.1 (ref 7–25)
CALCIUM SPEC-SCNC: 9.3 MG/DL (ref 8.6–10.5)
CHLORIDE SERPL-SCNC: 101 MMOL/L (ref 98–107)
CO2 SERPL-SCNC: 30 MMOL/L (ref 22–29)
CREAT SERPL-MCNC: 0.82 MG/DL (ref 0.57–1)
DEPRECATED RDW RBC AUTO: 47.3 FL (ref 37–54)
EGFRCR SERPLBLD CKD-EPI 2021: 90.6 ML/MIN/1.73
EOSINOPHIL # BLD MANUAL: 0 10*3/MM3 (ref 0–0.4)
EOSINOPHIL NFR BLD MANUAL: 0 % (ref 0.3–6.2)
ERYTHROCYTE [DISTWIDTH] IN BLOOD BY AUTOMATED COUNT: 14.2 % (ref 12.3–15.4)
GLOBULIN UR ELPH-MCNC: 2.7 GM/DL
GLUCOSE SERPL-MCNC: 100 MG/DL (ref 65–99)
HCT VFR BLD AUTO: 41.3 % (ref 34–46.6)
HGB BLD-MCNC: 13.3 G/DL (ref 12–15.9)
LYMPHOCYTES # BLD MANUAL: 3.24 10*3/MM3 (ref 0.7–3.1)
LYMPHOCYTES NFR BLD MANUAL: 5 % (ref 5–12)
MAGNESIUM SERPL-MCNC: 1.9 MG/DL (ref 1.6–2.6)
MCH RBC QN AUTO: 29.4 PG (ref 26.6–33)
MCHC RBC AUTO-ENTMCNC: 32.2 G/DL (ref 31.5–35.7)
MCV RBC AUTO: 91.2 FL (ref 79–97)
METAMYELOCYTES NFR BLD MANUAL: 2 % (ref 0–0)
MONOCYTES # BLD: 0.68 10*3/MM3 (ref 0.1–0.9)
NEUTROPHILS # BLD AUTO: 9.33 10*3/MM3 (ref 1.7–7)
NEUTROPHILS NFR BLD MANUAL: 50 % (ref 42.7–76)
NEUTS BAND NFR BLD MANUAL: 19 % (ref 0–5)
PLAT MORPH BLD: NORMAL
PLATELET # BLD AUTO: 217 10*3/MM3 (ref 140–450)
PMV BLD AUTO: 11.5 FL (ref 6–12)
POIKILOCYTOSIS BLD QL SMEAR: ABNORMAL
POTASSIUM SERPL-SCNC: 4.6 MMOL/L (ref 3.5–5.2)
PROT SERPL-MCNC: 6.9 G/DL (ref 6–8.5)
RBC # BLD AUTO: 4.53 10*6/MM3 (ref 3.77–5.28)
SODIUM SERPL-SCNC: 139 MMOL/L (ref 136–145)
VARIANT LYMPHS NFR BLD MANUAL: 24 % (ref 19.6–45.3)
WBC MORPH BLD: NORMAL
WBC NRBC COR # BLD AUTO: 13.52 10*3/MM3 (ref 3.4–10.8)

## 2024-11-12 PROCEDURE — 85007 BL SMEAR W/DIFF WBC COUNT: CPT

## 2024-11-12 PROCEDURE — 36415 COLL VENOUS BLD VENIPUNCTURE: CPT

## 2024-11-12 PROCEDURE — 85025 COMPLETE CBC W/AUTO DIFF WBC: CPT

## 2024-11-12 PROCEDURE — 80053 COMPREHEN METABOLIC PANEL: CPT

## 2024-11-12 PROCEDURE — 83735 ASSAY OF MAGNESIUM: CPT

## 2024-11-12 NOTE — PROGRESS NOTES
"Chief Complaint  Post-op (Pt here for 6wk postop, had TLH/BSO with Kacey 09/20/2024, benign pathology, she has tried to have intercourse but was unsuccessful)    Subjective        Uyen José presents to [facility]  History of Present Illness    Patient presents today for postop check  Pathology was benign  She has been compliant with postoperative restrictions    Objective   Vital Signs:  /82 (BP Location: Left arm, Patient Position: Sitting, Cuff Size: Adult)   Ht 168.9 cm (66.5\")   Wt 70.1 kg (154 lb 8 oz)   BMI 24.56 kg/m²   Estimated body mass index is 24.56 kg/m² as calculated from the following:    Height as of this encounter: 168.9 cm (66.5\").    Weight as of this encounter: 70.1 kg (154 lb 8 oz).    BMI is within normal parameters. No other follow-up for BMI required.      Physical Exam  Vitals and nursing note reviewed.   Constitutional:       Appearance: Normal appearance. She is well-developed.   Cardiovascular:      Rate and Rhythm: Normal rate and regular rhythm.   Pulmonary:      Effort: Pulmonary effort is normal.      Breath sounds: Normal breath sounds.   Abdominal:      General: Bowel sounds are normal. There is no distension.      Palpations: Abdomen is soft.      Tenderness: There is no abdominal tenderness.      Hernia: No hernia is present.      Comments: Laparoscopic incisions have healed well without evidence of infection or hernia.   Genitourinary:     Exam position: Supine.      Labia:         Right: No tenderness or lesion.         Left: No tenderness or lesion.       Vagina: No tenderness or bleeding.      Comments: Vaginal cuff in tact and healing well.  No evidence of infection or dehiscence.  Skin:     General: Skin is warm and dry.   Neurological:      General: No focal deficit present.      Mental Status: She is alert and oriented to person, place, and time. Mental status is at baseline.   Psychiatric:         Mood and Affect: Mood normal.  "        Behavior: Behavior normal.         Thought Content: Thought content normal.         Judgment: Judgment normal.        Result Review :                Assessment and Plan   Diagnoses and all orders for this visit:    1. Postop check (Primary)    2. Smoker             Follow Up   Return in about 3 months (around 2/12/2025) for with me, Telehealth.  Patient was given instructions and counseling regarding her condition or for health maintenance advice. Please see specific information pulled into the AVS if appropriate.    Continue pelvic rest for a total of 2 months postop  Call for concerns or questions    Stevan Espinal MD

## 2024-11-12 NOTE — TELEPHONE ENCOUNTER
----- Message from Shyam Weiss sent at 11/12/2024  1:25 PM CST -----  Schedule liver US  ----- Message -----  From: Lab, Background User  Sent: 11/12/2024  12:57 PM CST  To: Shyam Weiss MD

## 2024-11-14 ENCOUNTER — OFFICE VISIT (OUTPATIENT)
Dept: ONCOLOGY | Facility: CLINIC | Age: 44
End: 2024-11-14
Payer: COMMERCIAL

## 2024-11-14 VITALS
OXYGEN SATURATION: 97 % | HEART RATE: 82 BPM | RESPIRATION RATE: 18 BRPM | WEIGHT: 155.7 LBS | HEIGHT: 67 IN | TEMPERATURE: 97.5 F | BODY MASS INDEX: 24.44 KG/M2 | SYSTOLIC BLOOD PRESSURE: 124 MMHG | DIASTOLIC BLOOD PRESSURE: 76 MMHG

## 2024-11-14 DIAGNOSIS — C50.912 INVASIVE DUCTAL CARCINOMA OF BREAST, LEFT: Primary | ICD-10-CM

## 2024-11-14 DIAGNOSIS — C50.912 DUCTAL CARCINOMA OF LEFT BREAST: Primary | ICD-10-CM

## 2024-11-14 RX ORDER — PROCHLORPERAZINE MALEATE 10 MG
TABLET ORAL
Qty: 60 TABLET | Refills: 1 | Status: SHIPPED | OUTPATIENT
Start: 2024-11-14

## 2024-11-15 ENCOUNTER — TELEPHONE (OUTPATIENT)
Dept: ONCOLOGY | Facility: CLINIC | Age: 44
End: 2024-11-15
Payer: COMMERCIAL

## 2024-11-15 DIAGNOSIS — H53.8 BLURRY VISION: Primary | ICD-10-CM

## 2024-11-15 NOTE — TELEPHONE ENCOUNTER
----- Message from Shyam Weiss sent at 11/14/2024  4:26 PM CST -----  Please note that I further dose reduced docetaxel and carboplatin to 50% of original dose beginning cycle 3  · Docetaxel 45 mg/m2 (BSA = ; TD =  mg) per administration guidelines q 21 days, C1-C6  · Carboplatin AUC 3 (BSA = ; TD =  mg) per administration guidelines q 21 days, C1-C6    15.  Schedule CT head with and without contrast next week   16.  Schedule MRI breasts 12/4/2024 and schedule 2D echo on 12/4/2024 (after cycle 3 of chemo)    Thank you

## 2024-11-18 ENCOUNTER — INFUSION (OUTPATIENT)
Dept: ONCOLOGY | Facility: HOSPITAL | Age: 44
End: 2024-11-18
Payer: COMMERCIAL

## 2024-11-18 ENCOUNTER — LAB (OUTPATIENT)
Dept: LAB | Facility: HOSPITAL | Age: 44
End: 2024-11-18
Payer: COMMERCIAL

## 2024-11-18 ENCOUNTER — TELEPHONE (OUTPATIENT)
Dept: ONCOLOGY | Facility: CLINIC | Age: 44
End: 2024-11-18
Payer: COMMERCIAL

## 2024-11-18 VITALS
OXYGEN SATURATION: 98 % | TEMPERATURE: 95.9 F | HEART RATE: 71 BPM | RESPIRATION RATE: 18 BRPM | SYSTOLIC BLOOD PRESSURE: 111 MMHG | DIASTOLIC BLOOD PRESSURE: 82 MMHG

## 2024-11-18 DIAGNOSIS — C50.912 INVASIVE DUCTAL CARCINOMA OF BREAST, LEFT: ICD-10-CM

## 2024-11-18 DIAGNOSIS — R79.0 LOW MAGNESIUM LEVEL: Primary | ICD-10-CM

## 2024-11-18 DIAGNOSIS — C50.912 DUCTAL CARCINOMA OF LEFT BREAST: ICD-10-CM

## 2024-11-18 DIAGNOSIS — C50.912 DUCTAL CARCINOMA OF LEFT BREAST: Primary | ICD-10-CM

## 2024-11-18 LAB
ALBUMIN SERPL-MCNC: 3.9 G/DL (ref 3.5–5.2)
ALBUMIN/GLOB SERPL: 1.6 G/DL
ALP SERPL-CCNC: 183 U/L (ref 39–117)
ALT SERPL W P-5'-P-CCNC: 20 U/L (ref 1–33)
ANION GAP SERPL CALCULATED.3IONS-SCNC: 11 MMOL/L (ref 5–15)
ANION GAP SERPL CALCULATED.3IONS-SCNC: 7 MMOL/L (ref 5–15)
AST SERPL-CCNC: 18 U/L (ref 1–32)
BASOPHILS # BLD AUTO: 0.16 10*3/MM3 (ref 0–0.2)
BASOPHILS NFR BLD AUTO: 0.8 % (ref 0–1.5)
BILIRUB SERPL-MCNC: 0.4 MG/DL (ref 0–1.2)
BUN SERPL-MCNC: 12 MG/DL (ref 6–20)
BUN SERPL-MCNC: 8 MG/DL (ref 6–20)
BUN/CREAT SERPL: 10.1 (ref 7–25)
BUN/CREAT SERPL: 9 (ref 7–25)
CALCIUM SPEC-SCNC: 8.2 MG/DL (ref 8.6–10.5)
CALCIUM SPEC-SCNC: 9.1 MG/DL (ref 8.6–10.5)
CHLORIDE SERPL-SCNC: 100 MMOL/L (ref 98–107)
CHLORIDE SERPL-SCNC: 105 MMOL/L (ref 98–107)
CO2 SERPL-SCNC: 28 MMOL/L (ref 22–29)
CO2 SERPL-SCNC: 30 MMOL/L (ref 22–29)
CREAT SERPL-MCNC: 0.89 MG/DL (ref 0.57–1)
CREAT SERPL-MCNC: 1.19 MG/DL (ref 0.57–1)
DEPRECATED RDW RBC AUTO: 48.5 FL (ref 37–54)
EGFRCR SERPLBLD CKD-EPI 2021: 57.9 ML/MIN/1.73
EGFRCR SERPLBLD CKD-EPI 2021: 82.1 ML/MIN/1.73
EOSINOPHIL # BLD AUTO: 0.01 10*3/MM3 (ref 0–0.4)
EOSINOPHIL NFR BLD AUTO: 0 % (ref 0.3–6.2)
ERYTHROCYTE [DISTWIDTH] IN BLOOD BY AUTOMATED COUNT: 14.9 % (ref 12.3–15.4)
GLOBULIN UR ELPH-MCNC: 2.5 GM/DL
GLUCOSE SERPL-MCNC: 130 MG/DL (ref 65–99)
GLUCOSE SERPL-MCNC: 132 MG/DL (ref 65–99)
HCT VFR BLD AUTO: 39.7 % (ref 34–46.6)
HGB BLD-MCNC: 12.7 G/DL (ref 12–15.9)
IMM GRANULOCYTES # BLD AUTO: 0.61 10*3/MM3 (ref 0–0.05)
IMM GRANULOCYTES NFR BLD AUTO: 3 % (ref 0–0.5)
LYMPHOCYTES # BLD AUTO: 3.22 10*3/MM3 (ref 0.7–3.1)
LYMPHOCYTES NFR BLD AUTO: 16.1 % (ref 19.6–45.3)
MAGNESIUM SERPL-MCNC: 1.6 MG/DL (ref 1.6–2.6)
MCH RBC QN AUTO: 29.1 PG (ref 26.6–33)
MCHC RBC AUTO-ENTMCNC: 32 G/DL (ref 31.5–35.7)
MCV RBC AUTO: 90.8 FL (ref 79–97)
MONOCYTES # BLD AUTO: 0.85 10*3/MM3 (ref 0.1–0.9)
MONOCYTES NFR BLD AUTO: 4.2 % (ref 5–12)
NEUTROPHILS NFR BLD AUTO: 15.19 10*3/MM3 (ref 1.7–7)
NEUTROPHILS NFR BLD AUTO: 75.9 % (ref 42.7–76)
NRBC BLD AUTO-RTO: 0 /100 WBC (ref 0–0.2)
PLATELET # BLD AUTO: 190 10*3/MM3 (ref 140–450)
PMV BLD AUTO: 11.2 FL (ref 6–12)
POTASSIUM SERPL-SCNC: 3.2 MMOL/L (ref 3.5–5.2)
POTASSIUM SERPL-SCNC: 3.3 MMOL/L (ref 3.5–5.2)
PROT SERPL-MCNC: 6.4 G/DL (ref 6–8.5)
RBC # BLD AUTO: 4.37 10*6/MM3 (ref 3.77–5.28)
SODIUM SERPL-SCNC: 140 MMOL/L (ref 136–145)
SODIUM SERPL-SCNC: 141 MMOL/L (ref 136–145)
WBC NRBC COR # BLD AUTO: 20.04 10*3/MM3 (ref 3.4–10.8)

## 2024-11-18 PROCEDURE — 96366 THER/PROPH/DIAG IV INF ADDON: CPT

## 2024-11-18 PROCEDURE — 85025 COMPLETE CBC W/AUTO DIFF WBC: CPT

## 2024-11-18 PROCEDURE — 80053 COMPREHEN METABOLIC PANEL: CPT

## 2024-11-18 PROCEDURE — 25010000002 POTASSIUM CHLORIDE 10 MEQ/100ML SOLUTION: Performed by: INTERNAL MEDICINE

## 2024-11-18 PROCEDURE — 25010000002 HEPARIN LOCK FLUSH PER 10 UNITS: Performed by: INTERNAL MEDICINE

## 2024-11-18 PROCEDURE — 25810000003 SODIUM CHLORIDE 0.9 % SOLUTION: Performed by: INTERNAL MEDICINE

## 2024-11-18 PROCEDURE — 36415 COLL VENOUS BLD VENIPUNCTURE: CPT

## 2024-11-18 PROCEDURE — 83735 ASSAY OF MAGNESIUM: CPT

## 2024-11-18 PROCEDURE — 96365 THER/PROPH/DIAG IV INF INIT: CPT

## 2024-11-18 RX ORDER — HEPARIN SODIUM (PORCINE) LOCK FLUSH IV SOLN 100 UNIT/ML 100 UNIT/ML
500 SOLUTION INTRAVENOUS AS NEEDED
Status: DISCONTINUED | OUTPATIENT
Start: 2024-11-18 | End: 2024-11-18 | Stop reason: HOSPADM

## 2024-11-18 RX ORDER — POTASSIUM CHLORIDE 1500 MG/1
20 TABLET, EXTENDED RELEASE ORAL 3 TIMES DAILY
Qty: 9 TABLET | Refills: 0 | Status: SHIPPED | OUTPATIENT
Start: 2024-11-18 | End: 2024-11-21

## 2024-11-18 RX ORDER — SODIUM CHLORIDE 9 MG/ML
1000 INJECTION, SOLUTION INTRAVENOUS CONTINUOUS
Status: CANCELLED
Start: 2024-11-18

## 2024-11-18 RX ORDER — SODIUM CHLORIDE 0.9 % (FLUSH) 0.9 %
10 SYRINGE (ML) INJECTION AS NEEDED
OUTPATIENT
Start: 2024-11-18

## 2024-11-18 RX ORDER — POTASSIUM CHLORIDE 14.9 MG/ML
20 INJECTION INTRAVENOUS ONCE
Status: CANCELLED | OUTPATIENT
Start: 2024-11-18

## 2024-11-18 RX ORDER — HEPARIN SODIUM (PORCINE) LOCK FLUSH IV SOLN 100 UNIT/ML 100 UNIT/ML
500 SOLUTION INTRAVENOUS AS NEEDED
OUTPATIENT
Start: 2024-11-18

## 2024-11-18 RX ORDER — POTASSIUM CHLORIDE 7.45 MG/ML
10 INJECTION INTRAVENOUS
Status: COMPLETED | OUTPATIENT
Start: 2024-11-18 | End: 2024-11-18

## 2024-11-18 RX ORDER — SODIUM CHLORIDE 0.9 % (FLUSH) 0.9 %
10 SYRINGE (ML) INJECTION AS NEEDED
Status: DISCONTINUED | OUTPATIENT
Start: 2024-11-18 | End: 2024-11-18 | Stop reason: HOSPADM

## 2024-11-18 RX ORDER — POTASSIUM CHLORIDE 14.9 MG/ML
20 INJECTION INTRAVENOUS ONCE
Status: DISCONTINUED | OUTPATIENT
Start: 2024-11-18 | End: 2024-11-18

## 2024-11-18 RX ORDER — SODIUM CHLORIDE 9 MG/ML
1000 INJECTION, SOLUTION INTRAVENOUS CONTINUOUS
Status: DISPENSED | OUTPATIENT
Start: 2024-11-18 | End: 2024-11-18

## 2024-11-18 RX ADMIN — POTASSIUM CHLORIDE 10 MEQ: 10 INJECTION, SOLUTION INTRAVENOUS at 15:03

## 2024-11-18 RX ADMIN — HEPARIN 500 UNITS: 100 SYRINGE at 16:23

## 2024-11-18 RX ADMIN — POTASSIUM CHLORIDE 10 MEQ: 10 INJECTION, SOLUTION INTRAVENOUS at 13:51

## 2024-11-18 RX ADMIN — SODIUM CHLORIDE 1000 ML: 9 INJECTION, SOLUTION INTRAVENOUS at 13:51

## 2024-11-18 RX ADMIN — Medication 10 ML: at 16:23

## 2024-11-18 NOTE — TELEPHONE ENCOUNTER
----- Message from Shyam Weiss sent at 11/18/2024  8:15 AM CST -----  Hydrate 1 L normal saline   KCl 20 mEq IV x 1 then K-Dur 20 p.o. 3 times daily x 3 days  Repeat BMP after IV fluids and IV potassium  ----- Message -----  From: Lab, Background User  Sent: 11/18/2024   7:31 AM CST  To: Shyam Weiss MD

## 2024-11-18 NOTE — TELEPHONE ENCOUNTER
Notified Leana that her potassium level is low and Dr. Weiss wants her to get fluids and a potassium run today.  Also, will send in script for oral potassium.  Scheduled today at 1:30.

## 2024-11-19 ENCOUNTER — TELEPHONE (OUTPATIENT)
Dept: ONCOLOGY | Facility: CLINIC | Age: 44
End: 2024-11-19
Payer: COMMERCIAL

## 2024-11-19 ENCOUNTER — HOSPITAL ENCOUNTER (OUTPATIENT)
Dept: ULTRASOUND IMAGING | Facility: HOSPITAL | Age: 44
Discharge: HOME | End: 2024-11-19
Admitting: INTERNAL MEDICINE
Payer: COMMERCIAL

## 2024-11-19 DIAGNOSIS — R74.8 ELEVATED ALKALINE PHOSPHATASE LEVEL: ICD-10-CM

## 2024-11-19 DIAGNOSIS — C50.912 DUCTAL CARCINOMA OF LEFT BREAST: Primary | ICD-10-CM

## 2024-11-19 DIAGNOSIS — C50.912 INVASIVE DUCTAL CARCINOMA OF BREAST, LEFT: ICD-10-CM

## 2024-11-19 PROCEDURE — 76705 ECHO EXAM OF ABDOMEN: CPT

## 2024-11-19 RX ORDER — FAMOTIDINE 10 MG/ML
20 INJECTION, SOLUTION INTRAVENOUS AS NEEDED
OUTPATIENT
Start: 2024-12-04

## 2024-11-19 RX ORDER — DIPHENHYDRAMINE HYDROCHLORIDE 50 MG/ML
50 INJECTION INTRAMUSCULAR; INTRAVENOUS ONCE
Start: 2024-12-04 | End: 2024-12-04

## 2024-11-19 RX ORDER — LORAZEPAM 2 MG/ML
1 INJECTION INTRAMUSCULAR EVERY 4 HOURS PRN
Start: 2024-12-04 | End: 2024-12-09

## 2024-11-19 RX ORDER — PALONOSETRON 0.05 MG/ML
0.25 INJECTION, SOLUTION INTRAVENOUS ONCE
OUTPATIENT
Start: 2024-12-04

## 2024-11-19 RX ORDER — OLANZAPINE 5 MG/1
5 TABLET ORAL ONCE
Start: 2024-12-04 | End: 2024-12-04

## 2024-11-19 RX ORDER — DIPHENHYDRAMINE HYDROCHLORIDE 50 MG/ML
50 INJECTION INTRAMUSCULAR; INTRAVENOUS AS NEEDED
OUTPATIENT
Start: 2024-12-04

## 2024-11-19 RX ORDER — FAMOTIDINE 10 MG/ML
20 INJECTION, SOLUTION INTRAVENOUS ONCE
Start: 2024-12-04

## 2024-11-19 RX ORDER — APREPITANT 80 MG/1
80 CAPSULE ORAL DAILY
Start: 2024-12-04

## 2024-11-19 RX ORDER — SODIUM CHLORIDE 9 MG/ML
20 INJECTION, SOLUTION INTRAVENOUS ONCE
OUTPATIENT
Start: 2024-12-04

## 2024-11-19 NOTE — TELEPHONE ENCOUNTER
----- Message from Shyam Weiss sent at 11/18/2024  7:23 PM CST -----  Pls call in ur 20 tid x 3 d  ----- Message -----  From: Lab, Background User  Sent: 11/18/2024   4:55 PM CST  To: Shyam Weiss MD

## 2024-11-20 ENCOUNTER — TELEPHONE (OUTPATIENT)
Dept: RADIATION ONCOLOGY | Facility: HOSPITAL | Age: 44
End: 2024-11-20
Payer: COMMERCIAL

## 2024-11-25 ENCOUNTER — HOSPITAL ENCOUNTER (OUTPATIENT)
Dept: CARDIOLOGY | Facility: HOSPITAL | Age: 44
Discharge: HOME | End: 2024-11-25
Admitting: INTERNAL MEDICINE
Payer: COMMERCIAL

## 2024-11-25 VITALS
BODY MASS INDEX: 24.57 KG/M2 | HEIGHT: 67 IN | DIASTOLIC BLOOD PRESSURE: 82 MMHG | WEIGHT: 156.53 LBS | SYSTOLIC BLOOD PRESSURE: 111 MMHG

## 2024-11-25 DIAGNOSIS — C50.912 DUCTAL CARCINOMA OF LEFT BREAST: ICD-10-CM

## 2024-11-25 LAB
BH CV ECHO LEFT VENTRICLE GLOBAL LONGITUDINAL STRAIN: -21 %
BH CV ECHO MEAS - AO MAX PG: 7.4 MMHG
BH CV ECHO MEAS - AO MEAN PG: 4.2 MMHG
BH CV ECHO MEAS - AO ROOT DIAM: 2.7 CM
BH CV ECHO MEAS - AO V2 MAX: 135.9 CM/SEC
BH CV ECHO MEAS - AO V2 VTI: 31.6 CM
BH CV ECHO MEAS - AVA(I,D): 3.2 CM2
BH CV ECHO MEAS - EDV(CUBED): 96 ML
BH CV ECHO MEAS - ESV(CUBED): 26.2 ML
BH CV ECHO MEAS - FS: 35.2 %
BH CV ECHO MEAS - IVS/LVPW: 0.89 CM
BH CV ECHO MEAS - IVSD: 0.7 CM
BH CV ECHO MEAS - LA DIMENSION: 3.1 CM
BH CV ECHO MEAS - LAT PEAK E' VEL: 15 CM/SEC
BH CV ECHO MEAS - LV MASS(C)D: 106 GRAMS
BH CV ECHO MEAS - LV MAX PG: 5.5 MMHG
BH CV ECHO MEAS - LV MEAN PG: 3 MMHG
BH CV ECHO MEAS - LV V1 MAX: 117.2 CM/SEC
BH CV ECHO MEAS - LV V1 VTI: 25.1 CM
BH CV ECHO MEAS - LVIDD: 4.6 CM
BH CV ECHO MEAS - LVIDS: 3 CM
BH CV ECHO MEAS - LVOT AREA: 4 CM2
BH CV ECHO MEAS - LVOT DIAM: 2.26 CM
BH CV ECHO MEAS - LVPWD: 0.79 CM
BH CV ECHO MEAS - MED PEAK E' VEL: 11 CM/SEC
BH CV ECHO MEAS - MV A MAX VEL: 79 CM/SEC
BH CV ECHO MEAS - MV DEC SLOPE: 584.6 CM/SEC2
BH CV ECHO MEAS - MV DEC TIME: 0.17 SEC
BH CV ECHO MEAS - MV E MAX VEL: 101.3 CM/SEC
BH CV ECHO MEAS - MV E/A: 1.28
BH CV ECHO MEAS - MV MAX PG: 3.8 MMHG
BH CV ECHO MEAS - MV MEAN PG: 1.6 MMHG
BH CV ECHO MEAS - MV V2 VTI: 36 CM
BH CV ECHO MEAS - MVA(VTI): 2.8 CM2
BH CV ECHO MEAS - PA V2 MAX: 78.9 CM/SEC
BH CV ECHO MEAS - RAP SYSTOLE: 5 MMHG
BH CV ECHO MEAS - RV MAX PG: 1.6 MMHG
BH CV ECHO MEAS - RV V1 MAX: 63.2 CM/SEC
BH CV ECHO MEAS - RV V1 VTI: 15.9 CM
BH CV ECHO MEAS - RVDD: 2.7 CM
BH CV ECHO MEAS - RVSP: 26.8 MMHG
BH CV ECHO MEAS - SV(LVOT): 100.4 ML
BH CV ECHO MEAS - SVI(LVOT): 55.4 ML/M2
BH CV ECHO MEAS - TAPSE (>1.6): 2.2 CM
BH CV ECHO MEAS - TR MAX PG: 21.8 MMHG
BH CV ECHO MEAS - TR MAX VEL: 233.2 CM/SEC
BH CV ECHO MEASUREMENTS AVERAGE E/E' RATIO: 7.79
BH CV XLRA - RV BASE: 3.6 CM
BH CV XLRA - RV MID: 2.2 CM
BH CV XLRA - TDI S': 12 CM/SEC
LEFT ATRIUM VOLUME INDEX: 21 ML/M2
LEFT ATRIUM VOLUME: 39 ML

## 2024-11-25 PROCEDURE — 93356 MYOCRD STRAIN IMG SPCKL TRCK: CPT | Performed by: HOSPITALIST

## 2024-11-25 PROCEDURE — 93356 MYOCRD STRAIN IMG SPCKL TRCK: CPT

## 2024-11-25 PROCEDURE — 93306 TTE W/DOPPLER COMPLETE: CPT

## 2024-11-25 PROCEDURE — 93306 TTE W/DOPPLER COMPLETE: CPT | Performed by: HOSPITALIST

## 2024-12-04 ENCOUNTER — LAB (OUTPATIENT)
Dept: LAB | Facility: HOSPITAL | Age: 44
End: 2024-12-04
Payer: COMMERCIAL

## 2024-12-04 ENCOUNTER — INFUSION (OUTPATIENT)
Dept: ONCOLOGY | Facility: HOSPITAL | Age: 44
End: 2024-12-04
Payer: COMMERCIAL

## 2024-12-04 ENCOUNTER — TELEPHONE (OUTPATIENT)
Dept: ONCOLOGY | Facility: CLINIC | Age: 44
End: 2024-12-04
Payer: COMMERCIAL

## 2024-12-04 VITALS
DIASTOLIC BLOOD PRESSURE: 69 MMHG | WEIGHT: 160.6 LBS | HEART RATE: 65 BPM | BODY MASS INDEX: 25.21 KG/M2 | SYSTOLIC BLOOD PRESSURE: 126 MMHG | HEIGHT: 67 IN | RESPIRATION RATE: 18 BRPM | TEMPERATURE: 96.7 F | OXYGEN SATURATION: 97 %

## 2024-12-04 DIAGNOSIS — R79.0 LOW MAGNESIUM LEVEL: ICD-10-CM

## 2024-12-04 DIAGNOSIS — C50.912 DUCTAL CARCINOMA OF LEFT BREAST: Primary | ICD-10-CM

## 2024-12-04 DIAGNOSIS — C50.912 INVASIVE DUCTAL CARCINOMA OF BREAST, LEFT: ICD-10-CM

## 2024-12-04 DIAGNOSIS — C50.912 DUCTAL CARCINOMA OF LEFT BREAST: ICD-10-CM

## 2024-12-04 LAB
ALBUMIN SERPL-MCNC: 4.3 G/DL (ref 3.5–5.2)
ALBUMIN/GLOB SERPL: 1.7 G/DL
ALP SERPL-CCNC: 105 U/L (ref 39–117)
ALT SERPL W P-5'-P-CCNC: 15 U/L (ref 1–33)
ANION GAP SERPL CALCULATED.3IONS-SCNC: 13 MMOL/L (ref 5–15)
AST SERPL-CCNC: 14 U/L (ref 1–32)
BASOPHILS # BLD AUTO: 0.02 10*3/MM3 (ref 0–0.2)
BASOPHILS NFR BLD AUTO: 0.2 % (ref 0–1.5)
BILIRUB SERPL-MCNC: 0.7 MG/DL (ref 0–1.2)
BUN SERPL-MCNC: 11 MG/DL (ref 6–20)
BUN/CREAT SERPL: 16.4 (ref 7–25)
CALCIUM SPEC-SCNC: 9.4 MG/DL (ref 8.6–10.5)
CHLORIDE SERPL-SCNC: 99 MMOL/L (ref 98–107)
CO2 SERPL-SCNC: 28 MMOL/L (ref 22–29)
CREAT SERPL-MCNC: 0.67 MG/DL (ref 0.57–1)
DEPRECATED RDW RBC AUTO: 51.7 FL (ref 37–54)
EGFRCR SERPLBLD CKD-EPI 2021: 110.7 ML/MIN/1.73
EOSINOPHIL # BLD AUTO: 0 10*3/MM3 (ref 0–0.4)
EOSINOPHIL NFR BLD AUTO: 0 % (ref 0.3–6.2)
ERYTHROCYTE [DISTWIDTH] IN BLOOD BY AUTOMATED COUNT: 15.2 % (ref 12.3–15.4)
GLOBULIN UR ELPH-MCNC: 2.6 GM/DL
GLUCOSE SERPL-MCNC: 205 MG/DL (ref 65–99)
HCT VFR BLD AUTO: 37.5 % (ref 34–46.6)
HGB BLD-MCNC: 11.9 G/DL (ref 12–15.9)
IMM GRANULOCYTES # BLD AUTO: 0.07 10*3/MM3 (ref 0–0.05)
IMM GRANULOCYTES NFR BLD AUTO: 0.6 % (ref 0–0.5)
LYMPHOCYTES # BLD AUTO: 1.05 10*3/MM3 (ref 0.7–3.1)
LYMPHOCYTES NFR BLD AUTO: 8.7 % (ref 19.6–45.3)
MAGNESIUM SERPL-MCNC: 1.4 MG/DL (ref 1.6–2.6)
MCH RBC QN AUTO: 29.3 PG (ref 26.6–33)
MCHC RBC AUTO-ENTMCNC: 31.7 G/DL (ref 31.5–35.7)
MCV RBC AUTO: 92.4 FL (ref 79–97)
MONOCYTES # BLD AUTO: 0.34 10*3/MM3 (ref 0.1–0.9)
MONOCYTES NFR BLD AUTO: 2.8 % (ref 5–12)
NEUTROPHILS NFR BLD AUTO: 10.6 10*3/MM3 (ref 1.7–7)
NEUTROPHILS NFR BLD AUTO: 87.7 % (ref 42.7–76)
NRBC BLD AUTO-RTO: 0 /100 WBC (ref 0–0.2)
PLATELET # BLD AUTO: 264 10*3/MM3 (ref 140–450)
PMV BLD AUTO: 10.8 FL (ref 6–12)
POTASSIUM SERPL-SCNC: 3.7 MMOL/L (ref 3.5–5.2)
PROT SERPL-MCNC: 6.9 G/DL (ref 6–8.5)
RBC # BLD AUTO: 4.06 10*6/MM3 (ref 3.77–5.28)
SODIUM SERPL-SCNC: 140 MMOL/L (ref 136–145)
WBC NRBC COR # BLD AUTO: 12.08 10*3/MM3 (ref 3.4–10.8)

## 2024-12-04 PROCEDURE — 36415 COLL VENOUS BLD VENIPUNCTURE: CPT

## 2024-12-04 PROCEDURE — 96366 THER/PROPH/DIAG IV INF ADDON: CPT

## 2024-12-04 PROCEDURE — 63710000001 APREPITANT PER 5 MG: Performed by: INTERNAL MEDICINE

## 2024-12-04 PROCEDURE — 25810000003 SODIUM CHLORIDE 0.9 % SOLUTION 250 ML FLEX CONT: Performed by: INTERNAL MEDICINE

## 2024-12-04 PROCEDURE — 25010000002 HEPARIN LOCK FLUSH PER 10 UNITS: Performed by: INTERNAL MEDICINE

## 2024-12-04 PROCEDURE — 96375 TX/PRO/DX INJ NEW DRUG ADDON: CPT

## 2024-12-04 PROCEDURE — 25010000002 DEXAMETHASONE SODIUM PHOSPHATE 100 MG/10ML SOLUTION: Performed by: INTERNAL MEDICINE

## 2024-12-04 PROCEDURE — 96417 CHEMO IV INFUS EACH ADDL SEQ: CPT

## 2024-12-04 PROCEDURE — 25010000002 DOCETAXEL 80 MG/4ML CONCENTRATION 4 ML VIAL: Performed by: INTERNAL MEDICINE

## 2024-12-04 PROCEDURE — 83735 ASSAY OF MAGNESIUM: CPT

## 2024-12-04 PROCEDURE — 96367 TX/PROPH/DG ADDL SEQ IV INF: CPT

## 2024-12-04 PROCEDURE — 85025 COMPLETE CBC W/AUTO DIFF WBC: CPT

## 2024-12-04 PROCEDURE — 25010000002 DIPHENHYDRAMINE PER 50 MG: Performed by: INTERNAL MEDICINE

## 2024-12-04 PROCEDURE — 25010000002 PERTUZUMAB 420 MG/14ML SOLUTION 420 MG VIAL: Performed by: INTERNAL MEDICINE

## 2024-12-04 PROCEDURE — 25810000003 SODIUM CHLORIDE 0.9 % SOLUTION: Performed by: INTERNAL MEDICINE

## 2024-12-04 PROCEDURE — 25010000002 PALONOSETRON PER 25 MCG: Performed by: INTERNAL MEDICINE

## 2024-12-04 PROCEDURE — 80053 COMPREHEN METABOLIC PANEL: CPT

## 2024-12-04 PROCEDURE — 96413 CHEMO IV INFUSION 1 HR: CPT

## 2024-12-04 PROCEDURE — 96368 THER/DIAG CONCURRENT INF: CPT

## 2024-12-04 PROCEDURE — 25010000002 CARBOPLATIN PER 50 MG: Performed by: INTERNAL MEDICINE

## 2024-12-04 PROCEDURE — 25010000002 MAGNESIUM SULFATE 2 GM/50ML SOLUTION: Performed by: INTERNAL MEDICINE

## 2024-12-04 PROCEDURE — 25010000002 TRASTUZUMAB-DKST 420 MG RECONSTITUTED SOLUTION 1 EACH VIAL: Performed by: INTERNAL MEDICINE

## 2024-12-04 RX ORDER — FAMOTIDINE 10 MG/ML
20 INJECTION, SOLUTION INTRAVENOUS ONCE
Status: COMPLETED | OUTPATIENT
Start: 2024-12-04 | End: 2024-12-04

## 2024-12-04 RX ORDER — DIPHENHYDRAMINE HYDROCHLORIDE 50 MG/ML
50 INJECTION INTRAMUSCULAR; INTRAVENOUS AS NEEDED
Status: DISCONTINUED | OUTPATIENT
Start: 2024-12-04 | End: 2024-12-04 | Stop reason: HOSPADM

## 2024-12-04 RX ORDER — FAMOTIDINE 10 MG/ML
20 INJECTION, SOLUTION INTRAVENOUS AS NEEDED
Status: DISCONTINUED | OUTPATIENT
Start: 2024-12-04 | End: 2024-12-04 | Stop reason: HOSPADM

## 2024-12-04 RX ORDER — SODIUM CHLORIDE 0.9 % (FLUSH) 0.9 %
10 SYRINGE (ML) INJECTION AS NEEDED
Status: DISCONTINUED | OUTPATIENT
Start: 2024-12-04 | End: 2024-12-04 | Stop reason: HOSPADM

## 2024-12-04 RX ORDER — HEPARIN SODIUM (PORCINE) LOCK FLUSH IV SOLN 100 UNIT/ML 100 UNIT/ML
500 SOLUTION INTRAVENOUS AS NEEDED
OUTPATIENT
Start: 2024-12-04

## 2024-12-04 RX ORDER — PALONOSETRON 0.05 MG/ML
0.25 INJECTION, SOLUTION INTRAVENOUS ONCE
Status: COMPLETED | OUTPATIENT
Start: 2024-12-04 | End: 2024-12-04

## 2024-12-04 RX ORDER — DIPHENHYDRAMINE HYDROCHLORIDE 50 MG/ML
50 INJECTION INTRAMUSCULAR; INTRAVENOUS ONCE
Status: COMPLETED | OUTPATIENT
Start: 2024-12-04 | End: 2024-12-04

## 2024-12-04 RX ORDER — MAGNESIUM SULFATE HEPTAHYDRATE 40 MG/ML
2 INJECTION, SOLUTION INTRAVENOUS ONCE
Status: CANCELLED | OUTPATIENT
Start: 2024-12-04

## 2024-12-04 RX ORDER — HEPARIN SODIUM (PORCINE) LOCK FLUSH IV SOLN 100 UNIT/ML 100 UNIT/ML
500 SOLUTION INTRAVENOUS AS NEEDED
Status: DISCONTINUED | OUTPATIENT
Start: 2024-12-04 | End: 2024-12-04 | Stop reason: HOSPADM

## 2024-12-04 RX ORDER — OLANZAPINE 5 MG/1
5 TABLET ORAL ONCE
Status: COMPLETED | OUTPATIENT
Start: 2024-12-04 | End: 2024-12-04

## 2024-12-04 RX ORDER — LORAZEPAM 2 MG/ML
1 INJECTION INTRAMUSCULAR EVERY 4 HOURS PRN
Status: DISCONTINUED | OUTPATIENT
Start: 2024-12-04 | End: 2024-12-04 | Stop reason: HOSPADM

## 2024-12-04 RX ORDER — APREPITANT 80 MG/1
80 CAPSULE ORAL DAILY
Status: DISCONTINUED | OUTPATIENT
Start: 2024-12-04 | End: 2024-12-04 | Stop reason: HOSPADM

## 2024-12-04 RX ORDER — SODIUM CHLORIDE 0.9 % (FLUSH) 0.9 %
10 SYRINGE (ML) INJECTION AS NEEDED
OUTPATIENT
Start: 2024-12-04

## 2024-12-04 RX ORDER — HYDROCORTISONE SODIUM SUCCINATE 100 MG/2ML
100 INJECTION INTRAMUSCULAR; INTRAVENOUS AS NEEDED
Status: DISCONTINUED | OUTPATIENT
Start: 2024-12-04 | End: 2024-12-04 | Stop reason: HOSPADM

## 2024-12-04 RX ORDER — SODIUM CHLORIDE 9 MG/ML
20 INJECTION, SOLUTION INTRAVENOUS ONCE
Status: COMPLETED | OUTPATIENT
Start: 2024-12-04 | End: 2024-12-04

## 2024-12-04 RX ORDER — MAGNESIUM SULFATE HEPTAHYDRATE 40 MG/ML
2 INJECTION, SOLUTION INTRAVENOUS ONCE
Status: COMPLETED | OUTPATIENT
Start: 2024-12-04 | End: 2024-12-04

## 2024-12-04 RX ADMIN — FAMOTIDINE 20 MG: 10 INJECTION INTRAVENOUS at 08:48

## 2024-12-04 RX ADMIN — HEPARIN 500 UNITS: 100 SYRINGE at 13:11

## 2024-12-04 RX ADMIN — APREPITANT 80 MG: 80 CAPSULE ORAL at 08:48

## 2024-12-04 RX ADMIN — OLANZAPINE 5 MG: 5 TABLET, FILM COATED ORAL at 08:48

## 2024-12-04 RX ADMIN — CARBOPLATIN 440 MG: 10 INJECTION INTRAVENOUS at 12:14

## 2024-12-04 RX ADMIN — DEXAMETHASONE SODIUM PHOSPHATE 20 MG: 10 INJECTION, SOLUTION INTRAMUSCULAR; INTRAVENOUS at 08:55

## 2024-12-04 RX ADMIN — SODIUM CHLORIDE 20 ML/HR: 9 INJECTION, SOLUTION INTRAVENOUS at 08:40

## 2024-12-04 RX ADMIN — DIPHENHYDRAMINE HYDROCHLORIDE 50 MG: 50 INJECTION, SOLUTION INTRAMUSCULAR; INTRAVENOUS at 08:48

## 2024-12-04 RX ADMIN — DOCETAXEL 80 MG: 20 INJECTION, SOLUTION INTRAVENOUS at 11:03

## 2024-12-04 RX ADMIN — MAGNESIUM SULFATE HEPTAHYDRATE 2 G: 2 INJECTION, SOLUTION INTRAVENOUS at 11:03

## 2024-12-04 RX ADMIN — TRASTUZUMAB-DKST 420 MG: KIT at 10:17

## 2024-12-04 RX ADMIN — PALONOSETRON HYDROCHLORIDE 0.25 MG: 0.25 INJECTION, SOLUTION INTRAVENOUS at 08:48

## 2024-12-04 RX ADMIN — PERTUZUMAB 420 MG: 30 INJECTION, SOLUTION, CONCENTRATE INTRAVENOUS at 09:37

## 2024-12-04 RX ADMIN — Medication 10 ML: at 13:11

## 2024-12-04 NOTE — TELEPHONE ENCOUNTER
----- Message from Shyam Weiss sent at 12/4/2024  8:16 AM CST -----  2 gm mag iv  ----- Message -----  From: Lab, Background User  Sent: 12/4/2024   7:39 AM CST  To: Shyam Weiss MD

## 2024-12-05 ENCOUNTER — INFUSION (OUTPATIENT)
Dept: ONCOLOGY | Facility: HOSPITAL | Age: 44
End: 2024-12-05
Payer: COMMERCIAL

## 2024-12-05 ENCOUNTER — HOSPITAL ENCOUNTER (OUTPATIENT)
Dept: MRI IMAGING | Facility: HOSPITAL | Age: 44
Discharge: HOME | End: 2024-12-05
Payer: COMMERCIAL

## 2024-12-05 VITALS
RESPIRATION RATE: 18 BRPM | DIASTOLIC BLOOD PRESSURE: 85 MMHG | OXYGEN SATURATION: 100 % | SYSTOLIC BLOOD PRESSURE: 147 MMHG | HEART RATE: 66 BPM | TEMPERATURE: 97.3 F

## 2024-12-05 DIAGNOSIS — C50.912 DUCTAL CARCINOMA OF LEFT BREAST: Primary | ICD-10-CM

## 2024-12-05 DIAGNOSIS — C50.912 INVASIVE DUCTAL CARCINOMA OF BREAST, LEFT: ICD-10-CM

## 2024-12-05 DIAGNOSIS — H53.8 BLURRY VISION: ICD-10-CM

## 2024-12-05 PROCEDURE — 25510000001 GADOPICLENOL 0.5 MMOL/ML SOLUTION: Performed by: INTERNAL MEDICINE

## 2024-12-05 PROCEDURE — 77049 MRI BREAST C-+ W/CAD BI: CPT

## 2024-12-05 PROCEDURE — 25010000002 PEGFILGRASTIM 6 MG/0.6ML SOLUTION PREFILLED SYRINGE: Performed by: INTERNAL MEDICINE

## 2024-12-05 PROCEDURE — A9579 GAD-BASE MR CONTRAST NOS,1ML: HCPCS | Performed by: INTERNAL MEDICINE

## 2024-12-05 PROCEDURE — 96372 THER/PROPH/DIAG INJ SC/IM: CPT

## 2024-12-05 RX ADMIN — PEGFILGRASTIM 6 MG: 6 INJECTION SUBCUTANEOUS at 14:40

## 2024-12-05 RX ADMIN — GADOPICLENOL 7 ML: 485.1 INJECTION INTRAVENOUS at 09:25

## 2024-12-10 ENCOUNTER — TELEPHONE (OUTPATIENT)
Dept: ONCOLOGY | Facility: CLINIC | Age: 44
End: 2024-12-10
Payer: COMMERCIAL

## 2024-12-10 NOTE — TELEPHONE ENCOUNTER
Received call from patient Uyen José, she calls requesting results of her recent Breast MRI 12/5/24  Patient reports that she has reviewed the report in MY CHART account but reports that she can still feel the nodule in her breast, and report says not abnormalities noted, she feels this Mammo belongs to some one else can you clarify?

## 2024-12-11 ENCOUNTER — OFFICE VISIT (OUTPATIENT)
Dept: SURGERY | Facility: CLINIC | Age: 44
End: 2024-12-11
Payer: COMMERCIAL

## 2024-12-11 VITALS
DIASTOLIC BLOOD PRESSURE: 78 MMHG | WEIGHT: 160 LBS | HEIGHT: 67 IN | OXYGEN SATURATION: 97 % | HEART RATE: 51 BPM | BODY MASS INDEX: 25.11 KG/M2 | SYSTOLIC BLOOD PRESSURE: 118 MMHG

## 2024-12-11 DIAGNOSIS — F17.210 NICOTINE DEPENDENCE, CIGARETTES, UNCOMPLICATED: ICD-10-CM

## 2024-12-11 DIAGNOSIS — C50.912 INVASIVE DUCTAL CARCINOMA OF BREAST, LEFT: Primary | ICD-10-CM

## 2024-12-11 DIAGNOSIS — E66.3 OVERWEIGHT (BMI 25.0-29.9): ICD-10-CM

## 2024-12-11 PROCEDURE — 99214 OFFICE O/P EST MOD 30 MIN: CPT | Performed by: STUDENT IN AN ORGANIZED HEALTH CARE EDUCATION/TRAINING PROGRAM

## 2024-12-11 NOTE — TELEPHONE ENCOUNTER
"Return call back to patient Uyen Arnav, called to inform patient that Dr Weiss has requested Dr Salinas review her Breast MRI 12/5/24, patient informed that we have asked Dr Salinas to review her recent scan again. Patient says, \"I just feel like it's to good to be true, and I can still feel a nodule in my breast area\"  Explained to patient that the report reads that she is responding to her chemotherapy treatment, and this is the goal, and what she wants, this is a good thing, patient says \"yea, but Im not sure\", patient informed once Dr Salinas reviews her Breast MRI again we will give her a call back. Patient v/u.           IMPRESSION:  1. Complete radiographic response to neoadjuvant chemotherapy of the  multicentric tumor within the left breast. Resolution of the previous  nonspecific enhancement within the upper outer quadrant right breast. No  abnormal enhancement seen within the left or right breast on today's  study. Interval decrease in the pathologic left axillary  lymphadenopathy, axillary lymph nodes with normal morphology on today's  study. No internal mammary lymphadenopathy.  2. BI-RADS CATEGORY 6: Known biopsy-proven malignancy of the left breast  and axilla.     "

## 2024-12-11 NOTE — PROGRESS NOTES
Office Established Patient Note:     Referring Provider: No ref. provider found    No chief complaint on file.      Subjective .     History of present illness:    History of Present Illness  The patient presents for evaluation of breast cancer.    She reports a significant reduction in her chemotherapy dosage, with a 50 percent decrease from the initial dose due to severe side effects. Her last chemotherapy session was on 12/04/2024. An MRI was conducted, which showed no evidence of disease. She has expressed interest in exploring surgical options, including a double mastectomy, and is seeking a plastic surgeon for potential reconstructive surgery. She has been abstaining from smoking since 07/2024 and has not used nicotine since 10/17/2024. Her  had many questions on the need for surgery given the MRI findings.     SOCIAL HISTORY  The patient has not smoked cigarettes since July and has not had nicotine since October 17.       History  Past Medical History:   Diagnosis Date    Acid reflux     ADHD (attention deficit hyperactivity disorder)     Allergic     Anxiety     Breast cancer     Bronchitis     Chronic fatigue     Disease of thyroid gland     Fibromyalgia     Fibromyalgia, primary 2024    And chronic fatigue    Gallbladder abscess     Heart murmur     Hypertension     Hypothyroidism     Irritable bowel syndrome     Low back pain     Mitral valve prolapse     PONV (postoperative nausea and vomiting)     RA (rheumatoid arthritis)    ,   Past Surgical History:   Procedure Laterality Date    CARPAL TUNNEL RELEASE Right     COLONOSCOPY  05/01/2003    Normal exam    D & C HYSTEROSCOPY MYOSURE N/A 07/31/2024    Procedure: DILATATION AND CURETTAGE HYSTEROSCOPY WITH POSSIBLE TISSUE RESECTION;  Surgeon: Stevan Espinal MD;  Location: [facility];  Service: Obstetrics/Gynecology;  Laterality: N/A;    DIAGNOSTIC LAPAROSCOPY  2000    [facility]; normal findings; [facility]    ENDOMETRIAL ABLATION  2023    ENDOSCOPY       LAPAROSCOPIC CHOLECYSTECTOMY      Dr Duncan; [facility]    LYMPH NODE BIOPSY  2024    Cancer Positive    SALPINGECTOMY Bilateral 10/16/2020    Procedure: SALPINGECTOMY LAPAROSCOPIC for sterilization;  Surgeon: Stevan Espinal MD;  Location:  [facility];  Service: Obstetrics/Gynecology;  Laterality: Bilateral;    TOTAL LAPAROSCOPIC HYSTERECTOMY SALPINGO OOPHORECTOMY N/A 2024    Procedure: TOTAL LAPAROSCOPIC HYSTERECTOMY BILATERAL SALPINGOOPHORECTOMY WITH DAVINCI ROBOT;  Surgeon: Stevan Espinal MD;  Location:  [facility];  Service: Robotics - DaVinci;  Laterality: N/A;    TUBAL ABDOMINAL LIGATION      cut not tied    US GUIDED LYMPH NODE BIOPSY  2024    VENOUS ACCESS DEVICE (PORT) INSERTION N/A 2024    Procedure: Single Lumen Port-a-cath insertion with flouroscopy;  Surgeon: Shahla Gentile MD;  Location:  [facility];  Service: General;  Laterality: N/A;    WISDOM TOOTH EXTRACTION     ,   Family History   Problem Relation Age of Onset    Diabetes Mother     Asthma Mother     Depression Mother     Hyperlipidemia Mother     Kidney disease Mother     Thyroid disease Mother     Diabetes Father     Arthritis Father         RA    Cancer Maternal Grandmother         vulva, HPV+    Thyroid disease Maternal Grandmother     Cancer Maternal Grandfather     Lung cancer Paternal Grandfather 60 - 69    Cancer Paternal Grandfather     Breast cancer Maternal Great-Grandmother     Stomach cancer Maternal Great-Grandmother     Asthma Son         Ecezma and asthma    Colon cancer Neg Hx     Colon polyps Neg Hx    ,   Social History     Tobacco Use    Smoking status: Former     Current packs/day: 0.00     Average packs/day: 0.5 packs/day for 20.0 years (10.0 ttl pk-yrs)     Types: Cigarettes     Start date: 2004     Quit date: 2024     Years since quittin.3    Smokeless tobacco: Never    Tobacco comments:     Stop ciggarettes 24 using vape now to stop completely   Vaping Use    Vaping status:  Former    Start date: 2/17/2024    Quit date: 10/17/2024   Substance Use Topics    Alcohol use: Not Currently     Comment: Socially    Drug use: Yes     Types: Marijuana   , (Not in a hospital admission)   and Allergies:  Percocet [oxycodone-acetaminophen]    Current Outpatient Medications:     amphetamine-dextroamphetamine (Adderall) 10 MG tablet, Take 1 tablet by mouth Daily for 30 days., Disp: 30 tablet, Rfl: 0    bisoprolol-hydrochlorothiazide (ZIAC) 5-6.25 MG per tablet, Take 1 tablet by mouth Daily., Disp: 30 tablet, Rfl: 2    dexAMETHasone (DECADRON) 4 MG tablet, Take 2 tablets 2 times daily the day before chemo and 2 tablets 2 times daily the day after chemo, Disp: 48 tablet, Rfl: 0    DULoxetine (CYMBALTA) 20 MG capsule, Take 1 capsule by mouth Daily., Disp: 30 capsule, Rfl: 2    ibuprofen (ADVIL,MOTRIN) 800 MG tablet, Take 1 tablet by mouth Every 6 (Six) Hours As Needed for Mild Pain., Disp: 90 tablet, Rfl: 2    levothyroxine (SYNTHROID, LEVOTHROID) 112 MCG tablet, Take 1 tablet by mouth Daily., Disp: 30 tablet, Rfl: 2    lidocaine-prilocaine (EMLA) 2.5-2.5 % cream, Apply to port site 30 minutes before it is accessed and cover with occlusive dressing., Disp: 30 g, Rfl: 1    lisdexamfetamine (Vyvanse) 60 MG capsule, Take 1 capsule by mouth Every Morning for 30 days, Disp: 30 capsule, Rfl: 0    [START ON 12/16/2024] lisdexamfetamine (Vyvanse) 60 MG capsule, Take 1 capsule by mouth Every Morning for 30 days, Disp: 30 capsule, Rfl: 0    ondansetron (ZOFRAN) 8 MG tablet, Take 1 tablet by mouth Every 8 (Eight) Hours As Needed for Nausea or Vomiting., Disp: 30 tablet, Rfl: 0    ondansetron ODT (ZOFRAN-ODT) 8 MG disintegrating tablet, Place 1 tablet on the tongue Every 8 (Eight) Hours As Needed for Nausea or Vomiting., Disp: 30 tablet, Rfl: 1    pantoprazole (PROTONIX) 40 MG EC tablet, Take 1 tablet by mouth Daily., Disp: 30 tablet, Rfl: 2    pertuzumab (Perjeta) 420 MG/14ML solution injection, Infuse 14 mL into a  "venous catheter Every 21 (Twenty-One) Days. To be used in cancer center infusions, Disp: 14 mL, Rfl: 2    prochlorperazine (COMPAZINE) 10 MG tablet, Take 1 tablet by mouth Q 6 hours prn as needed for nausea, Disp: 60 tablet, Rfl: 1    trastuzumab-dkst (Ogivri) 420 MG chemo injection, Infuse 420 mg into a venous catheter Every 21 (Twenty-One) Days. To be used in the cancer center for infusions, Disp: 1 each, Rfl: 2    Current Facility-Administered Medications:     lidocaine (XYLOCAINE) 1 % injection 30 mL, 30 mL, Subcutaneous, Once, Taylor Salinas MD    lidocaine 1% - EPINEPHrine 1:420184 (XYLOCAINE W/EPI) 1 %-1:969216 injection 30 mL, 30 mL, Infiltration, Once, Taylor Salinas MD      Objective     Vital Signs   /78   Pulse 51   Ht 168.9 cm (66.5\")   Wt 72.6 kg (160 lb)   LMP  (LMP Unknown)   SpO2 97%   BMI 25.44 kg/m²      Physical Exam:  General appearance - alert, well appearing, and in no distress  Mental status - alert, oriented to person, place, and time  Eyes - pupils equal and reactive, extraocular eye movements intact  Neck - supple, no significant adenopathy  Neurological - alert, oriented, normal speech, no focal findings or movement disorder noted  Physical Exam    Results Review:     The following data was reviewed by: Shahla Gentile MD on 12/11/2024:  MRI Breast Bilateral Diagnostic W WO Contrast (12/05/2024 09:32)   Progress Notes by Shyam Weiss MD (11/14/2024 15:30)   LABS SCANNED (08/16/2024 00:00)   Genetics negative   Results  Imaging  Breast MRI shows complete radiographic response to neoadjuvant chemotherapy, no abnormal enhancement in either breast, and normal lymph nodes.       Assessment & Plan       Diagnoses and all orders for this visit:    1. Invasive ductal carcinoma of breast, left (Primary)    2. Overweight (BMI 25.0-29.9)    3. Nicotine dependence, cigarettes, uncomplicated       Assessment & Plan  1. Breast cancer.  The MRI results indicate a " complete radiographic response to neoadjuvant chemotherapy, with no abnormal enhancement in either breast and normal lymph nodes. Despite this positive response, surgery is still recommended to ensure all cancer cells are removed, as MRI can not detect every cell. I answered all questions from her  and explained NCCN guidelines on this matter. The patient was informed that there is a 50% chance of finding residual cancer during surgery. A sentinel lymph node biopsy will be performed instead of an axillary dissection due to the normal appearance of the lymph nodes. If the biopsy is positive, further lymph node removal will be necessary. If negative, radiation therapy will be administered to the area, but no further surgery will be needed. The patient was also informed about the differences between radiation and chemotherapy, with radiation providing local control and chemotherapy offering systemic control. The patient expressed interest in a double mastectomy to maintain symmetry and was advised that this would include nipple removal. The patient was also informed about the possibility of reconstruction and the need to consult a plastic surgeon for further details. Unfortunately, we do not have a plastic surgeon in [facility] who takes her insurance. I offered to refer her to a plastic surgeon at [facility]. I also offered to refer her to a general/breast surgeon at [facility] so she could have both surgeries at once. I did explain that if she decides to do both there, her post operative care for a year will be done by that surgeon. I would be happy to take over annual exams at the one year ronni. She is going to think about her options and requested a call from my office on Friday to give her decision. We will plan for surgery 4-6 weeks after the last chemo dose.      This is a life threatening diagnosis. I have reviewed the MRI, genetics and note above.      Shahla Gentile MD  12/11/24  11:43  CST    Patient or patient representative verbalized consent for the use of Ambient Listening during the visit with  Shahla Gentile MD for chart documentation. 12/16/2024  16:19 CST

## 2024-12-12 ENCOUNTER — TELEPHONE (OUTPATIENT)
Dept: ONCOLOGY | Facility: CLINIC | Age: 44
End: 2024-12-12
Payer: COMMERCIAL

## 2024-12-12 NOTE — TELEPHONE ENCOUNTER
Call from Dr. Salinas of radiology who has reviewed the patient's breast MRIsand notes no abnormal enhancement whatsoever.  States she will be happy to talk to the patient if she wishes.

## 2024-12-12 NOTE — TELEPHONE ENCOUNTER
Return call to patient Uyen José relayed the enclosed message from Dr Weiss regarding his correspondence with Dr Salinas and mutual patient Uyen José recent Breast MRI results 12/5/24. Patient informed Dr Salinas would be more than happy to speak with her regarding te results of her scan. Patient v/u of information, and at this time she is comfortable with results of her scan. Patient questions if she needs to continue with her planned treatment therapy?  Patient informed that she will need to continue with her planned treatment therapy until course/regimen is completed. Patient reports that she is having difficulties locating a surgeon and plastic surgeon for her upcoming surgeries post TXT. But will continue to look for a provider in her net work.  Patient does have a f/u sophy with Dr Weiss on 12/26/24 she questions why she needs to keep this apt, explained to patient this is a 6 week f/u apt and Dr Weiss has to monitor her well being and care while undergoing her chemotherapy treatments, patient v/u of all information.    Patient next sophy txt is: 1/2/25

## 2024-12-13 ENCOUNTER — TELEPHONE (OUTPATIENT)
Dept: SURGERY | Facility: CLINIC | Age: 44
End: 2024-12-13
Payer: COMMERCIAL

## 2024-12-13 NOTE — TELEPHONE ENCOUNTER
Called to see if patient would like a referral to just plastics at [facility] or if she also wants to see gen surg and plastics there. Pt does not want any referrals to either gen surg/plastics at this time. Pt states she will call the office if she decides she wants any referrals.

## 2024-12-16 NOTE — PATIENT INSTRUCTIONS

## 2024-12-18 DIAGNOSIS — C50.912 DUCTAL CARCINOMA OF LEFT BREAST: Primary | ICD-10-CM

## 2024-12-18 DIAGNOSIS — C50.912 INVASIVE DUCTAL CARCINOMA OF BREAST, LEFT: ICD-10-CM

## 2024-12-19 DIAGNOSIS — C50.912 DUCTAL CARCINOMA OF LEFT BREAST: Primary | ICD-10-CM

## 2024-12-19 DIAGNOSIS — C50.912 INVASIVE DUCTAL CARCINOMA OF BREAST, LEFT: ICD-10-CM

## 2024-12-19 RX ORDER — HYDROCORTISONE SODIUM SUCCINATE 100 MG/2ML
100 INJECTION INTRAMUSCULAR; INTRAVENOUS AS NEEDED
OUTPATIENT
Start: 2025-01-02

## 2024-12-19 RX ORDER — LORAZEPAM 2 MG/ML
1 INJECTION INTRAMUSCULAR EVERY 4 HOURS PRN
Start: 2025-01-02 | End: 2025-01-07

## 2024-12-19 RX ORDER — DIPHENHYDRAMINE HYDROCHLORIDE 50 MG/ML
50 INJECTION INTRAMUSCULAR; INTRAVENOUS AS NEEDED
OUTPATIENT
Start: 2025-01-02

## 2024-12-19 RX ORDER — OLANZAPINE 5 MG/1
5 TABLET ORAL ONCE
Start: 2025-01-02 | End: 2025-01-02

## 2024-12-19 RX ORDER — APREPITANT 80 MG/1
80 CAPSULE ORAL DAILY
Start: 2025-01-02

## 2024-12-19 RX ORDER — FAMOTIDINE 10 MG/ML
20 INJECTION, SOLUTION INTRAVENOUS AS NEEDED
OUTPATIENT
Start: 2025-01-02

## 2024-12-19 RX ORDER — FAMOTIDINE 10 MG/ML
20 INJECTION, SOLUTION INTRAVENOUS ONCE
Start: 2025-01-02

## 2024-12-19 RX ORDER — SODIUM CHLORIDE 9 MG/ML
20 INJECTION, SOLUTION INTRAVENOUS ONCE
OUTPATIENT
Start: 2025-01-02

## 2024-12-19 RX ORDER — DIPHENHYDRAMINE HYDROCHLORIDE 50 MG/ML
50 INJECTION INTRAMUSCULAR; INTRAVENOUS ONCE
Start: 2025-01-02 | End: 2025-01-02

## 2024-12-19 RX ORDER — PALONOSETRON 0.05 MG/ML
0.25 INJECTION, SOLUTION INTRAVENOUS ONCE
OUTPATIENT
Start: 2025-01-02

## 2024-12-20 ENCOUNTER — TELEPHONE (OUTPATIENT)
Dept: ONCOLOGY | Facility: CLINIC | Age: 44
End: 2024-12-20
Payer: COMMERCIAL

## 2024-12-20 ENCOUNTER — SPECIALTY PHARMACY (OUTPATIENT)
Dept: ONCOLOGY | Facility: HOSPITAL | Age: 44
End: 2024-12-20
Payer: COMMERCIAL

## 2024-12-20 DIAGNOSIS — C50.912 DUCTAL CARCINOMA OF LEFT BREAST: Primary | ICD-10-CM

## 2024-12-20 DIAGNOSIS — C50.912 INVASIVE DUCTAL CARCINOMA OF BREAST, LEFT: ICD-10-CM

## 2024-12-20 RX ORDER — METHYLPREDNISOLONE 4 MG/1
TABLET ORAL
Qty: 21 TABLET | Refills: 0 | Status: SHIPPED | OUTPATIENT
Start: 2024-12-20

## 2024-12-20 RX ORDER — PERTUZUMAB 30 MG/ML
340 INJECTION, SOLUTION, CONCENTRATE INTRAVENOUS TAKE AS DIRECTED
Qty: 14 ML | Refills: 2 | Status: SHIPPED | OUTPATIENT
Start: 2024-12-20

## 2024-12-20 RX ORDER — TRASTUZUMAB 420 MG/20ML
363 INJECTION, POWDER, LYOPHILIZED, FOR SOLUTION INTRAVENOUS TAKE AS DIRECTED
Qty: 2 EACH | Refills: 2 | Status: SHIPPED | OUTPATIENT
Start: 2024-12-20 | End: 2024-12-20

## 2024-12-20 NOTE — PROGRESS NOTES
"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  On 2024 underwent total laparoscopic hysterectomy with bilateral salpingoophorectomy.  She is currently receiving neoadjuvant carboplatin+docetaxel+pertuzumab+trastuzumab- C1, 10/15/24; C2, 24; C3, 24 (carbo+docetaxel dose reduced 50% since C3 due to excess nausea/vomiting).  She is here alone (accompanied by her spouse, Lam).    I have reviewed the HPI and verified with the patient the accuracy of it. No changes to interval history since the information was documented. Shyam Weiss MD 24      Diagnostic abnormalities:  - Tobacco use disorder, hypothyroidism, depression/anxiety, obesity, prior bilateral salpingectomy, , newly diagnosed IDC left breast  - 24- CT a/p-  Increased size of the low density/complex cystic mass in the uterine cavity since the previous study. This may represent abnormal endometrial thickening, complex endometrial fluid/debris accumulation in the endometrial canal due to out duct obstruction by a fibroid in the lower uterine segment as detailed above. There is a septate morphology of the uterus. Further follow-up with sonography may be obtained. Normal appendix. The gallbladder is surgically absent. The nonacute findings of the remaining abdomen similar to the previous study.  Stable heterogeneous solid mass to the right of midline at the mid to  lower uterine segment with probable secondary dilated endometrial cavity at the " uterine fundus filled with fluid and a septate uterus.  Differential diagnosis would include benign etiologies such as submucosal leiomyoma and large endometrial polyp but also neoplasm, possibly endometrial carcinoma, or carcinosarcoma of the uterus.  No anabel pelvic soft tissue nodule or lymphadenopathy identified. Small left external iliac chain lymph node is unchanged. Small right ovarian cyst. Nabothian cysts in the cervix and septate uterus. MRI pelvis without and with contrast may be helpful for further evaluation. GYN consultation is recommended.   -7/25/24- MRI breast bilateral-1. Findings suggestive for multicentric left breast malignancy with diffuse ductal carcinoma in situ extending from the dominant 2.7 cm mass within the lateral left breast at 3:00 extending to and likely involving the left nipple. A second 8 mm mass within the central left breast tissue with a third mass in the anterior depth of the inferior left breast at 6:00. Two 8 mm homogeneous persistently enhancing hyperintense T2 masses at the upper outer right breast, mid depth at the 10:00 in the 11-12 o'clock positions, with overall benign signal characteristics for which background enhancement versus papilloma fibroadenoma considered. MRI directed/second look ultrasound recommended given the presence of the contralateral left breast findings. BI-RADS CATEGORY 5: Highly suggestive of malignancy. Appropriate action should be taken. Findings strongly favoring multicentric left breast malignancy. MRI directed/second look bilateral breast ultrasound recommended. Ultrasound-guided biopsy of the left breast mass at 3:00 and of a second left breast mass (6:00 if visible on ultrasound versus mass central to the nipple) with sampling of the largest left axillary node may be performed if clinically desired. Sampling of a finding within the upper outer right breast should be considered if solid nodule identified on ultrasound.  -7/29/24- CMP normal  (GFR 78.9), UA/cultures negative, HCG negative, WBC 12.65, ANC 8.43 otherwise normal CBC  -7/31/24-  Endocervical Currettage, Endometrial Currettage, and hysteroscopic resected uterine synechia.  Final diagnosis:  1.Endometrium, curettage: A. Blood and fragments of smooth muscle. B. No histologic evidence of malignancy. Comment: Endometrial mucosal tissue is not identified. 2. Endocervix, curettage: A. Fragments of benign endocervical tissue, benign squamous mucosa and benign lower uterine segment endometrium. B. Blood and mucus. C. No dysplasia identified. 3. Endometrium, hysteroscopic resected tissue: A. Fragments of smooth muscle. B. No histologic evidence of malignancy. Comment: Endometrial mucosal tissue is not identified.  -8/7/24- US biopsy:  Final diagnosis-  1.  Mass, left breast at 6:00, 1 cm from nipple, core needle biopsies: High-grade ductal carcinoma in situ, comedo type. 2.  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 Histologic grade (Corwin histologic score). Glandular (acinar)/tubular differentiation: Score 2. Nuclear pleomorphism: Score 3. Mitotic rate: Score 3. Overall grade: Grade 3. Maximum tumor diameter is at least 1.5 cm. 3.  Left axillary lymph node, core needle biopsy: Metastatic adenocarcinoma of breast. -ER 69%+; SD 85%+; HER2 3+ (positive); Ki67 54%+  -8/12/24- CT CAP- Known left breast malignancy with enlarged left level 1 and asymmetrically prominent left level 2 and borderline level 3 axillary lymph nodes which are suspicious for axillary disease.  No additional evidence of metastatic disease in the chest.  1. Small 0.4 cm low-attenuation region in the right lobe of the liver may have been present on the prior examinations but was not as well visualized. This is too small to characterize. Continued attention on follow-up is recommended.  Fatty infiltration of the liver.  Persistent heterogeneity of the  lower uterine segment of the uterus and fluid in the endometrial canal although the fluid has decreased. Stable septate uterus  -8/12/2024- Follow-up Dr. Gentile- A/P: IDC left breast.  Met w/u, referral onc, genetics. RTC 2 weeks.  -8/16/2024-bone scan-no evidence of osteoblastic metastatic disease.  -8/16/2024- BRCA 1/2 analyses with cancer next-summary: Negative-no clinically significant variants detected (pathogenic mutations, variants of unknown significance, gross deletions/duplications: Not detected)  -8/20/2024-PET scan-abnormal PET/CT, biopsy-proven left breast malignancy and demonstrates FDG avidity with maximum SUV of 7.9.  Also potential abnormal activity at 6:00 in the left breast with a maximal SUV of 3.9.  MRI of the breast with and without contrast will be helpful to exclude multifocal or multicentric disease in the left breast.  No abnormal contralateral right breast or axillary activity identified.  3 hypermetabolic level 1 lymph nodes in the left axilla compatible with kiersten metastasis and solitary borderline level 2 axillary lymph node on the left.  -8/22/2024- Today is seen for management considerations-  -8/26/24- 2D echo-  Left ventricular systolic function is normal. Left ventricular ejection fraction appears to be 56 - 60%.    Normal right ventricular cavity size and systolic function noted.    No significant valvular abnormalities identified on this study.     -8/28/2024-CBC normal.  CMP normal.  -9/15/2024- CT abdomen/pelvis-There is a 3.2 x 3.0 x 2.6 cm focus of rounded masslike enhancement in the lower uterine segment centered along the endometrial canal (series 2-image 70, series 4-image 41). The uterine cavity is distended and fluid-filled above this at the fundus. Primary imaging concern would be endometrial carcinoma obstructing the canal although it seems there was a recent D&C with benign tissue sampling. Endometritis is also a consideration and cannot be excluded on CT.  No free  fluid in the pelvis, pelvic abscess or pelvic adenopathy.        Previous interventions:  -9/20/2024-   Total Laparoscopic Hysterectomy with bilateral salpingoophorectomy, da Kenzie assisted -Final diagnosis:  Uterus with bilateral tubes and ovaries, hysterectomy with bilateral salpingo-oophorectomies: Unremarkable cervix, negative for squamous dysplasia.  Atrophic endometrium, negative for hyperplasia and atypia.  Benign intramural leiomyoma.  Adenomyosis.  Unremarkable right ovary.  Left ovary with cystic follicles and hemorrhagic corpora lutea.     -Neoadjuvant DCPT (carboplatin+docetaxel+pertuzumab+trastuzumab)- C1, 10/15/24; C2, 11/6/24.        LABS    Lab Results - Last 18 Months   Lab Units 12/04/24  0723 11/18/24  0716 11/12/24  1239 11/06/24  0712 11/01/24  0923 10/29/24  0745 10/21/24  0917 10/15/24  0744   HEMOGLOBIN g/dL 11.9* 12.7 13.3 11.9* 12.3 13.8 14.9 12.5   HEMATOCRIT % 37.5 39.7 41.3 36.7 37.8 42.3 43.9 38.6   MCV fL 92.4 90.8 91.2 91.1 91.1 90.6 86.9 90.6   WBC 10*3/mm3 12.08* 20.04* 13.52* 15.53* 14.39* 19.68* 4.29 12.19*   RDW % 15.2 14.9 14.2 14.6 14.0 13.6 12.8 13.1   MPV fL 10.8 11.2 11.5 11.4 11.8 11.3 13.2* 12.1*   PLATELETS 10*3/mm3 264 190 217 307 151 144 132* 211   IMM GRAN % % 0.6* 3.0*  --  0.7* 0.9* 2.5*  --  0.5   NEUTROS ABS 10*3/mm3 10.60* 15.19* 9.33* 11.97* 11.45* 15.81* 1.63* 10.60*   LYMPHS ABS 10*3/mm3 1.05 3.22*  --  2.43 2.12 2.59  --  1.07   MONOS ABS 10*3/mm3 0.34 0.85  --  0.96* 0.60 0.66  --  0.45   EOS ABS 10*3/mm3 0.00 0.01 0.00 0.01 0.00 0.00 0.39 0.00   BASOS ABS 10*3/mm3 0.02 0.16 0.00 0.05 0.09 0.12  --  0.01   IMMATURE GRANS (ABS) 10*3/mm3 0.07* 0.61*  --  0.11* 0.13* 0.50*  --  0.06*   NRBC /100 WBC 0.0 0.0  --  0.0 0.0 0.0  --  0.0   NEUTROPHIL % %  --   --  50.0  --   --   --  27.0*  --    MONOCYTES % %  --   --  5.0  --   --   --  11.0  --    BASOPHIL % %  --   --  0.0  --   --   --   --   --    ATYP LYMPH % %  --   --   --   --   --   --  2.0  --        Lab  Results - Last 18 Months   Lab Units 12/04/24  0723 11/18/24  1611 11/18/24  0716 11/12/24  1239 11/06/24  0712 11/01/24  0923 10/29/24  0745   GLUCOSE mg/dL 205* 130* 132* 100* 160* 105* 107*   SODIUM mmol/L 140 140 141 139 142 139 140   POTASSIUM mmol/L 3.7 3.3* 3.2* 4.6 4.0 4.0 3.4*   CO2 mmol/L 28.0 28.0 30.0* 30.0* 29.0 25.0 30.0*   CHLORIDE mmol/L 99 105 100 101 102 105 100   ANION GAP mmol/L 13.0 7.0 11.0 8.0 11.0 9.0 10.0   CREATININE mg/dL 0.67 0.89 1.19* 0.82 0.78 0.72 0.84   BUN mg/dL 11 8 12 14 14 12 8   BUN / CREAT RATIO  16.4 9.0 10.1 17.1 17.9 16.7 9.5   CALCIUM mg/dL 9.4 8.2* 9.1 9.3 9.6 9.0 9.5   ALK PHOS U/L 105  --  183* 187* 112 135* 177*   TOTAL PROTEIN g/dL 6.9  --  6.4 6.9 6.7 6.3 6.9   ALT (SGPT) U/L 15  --  20 26 19 23 26   AST (SGOT) U/L 14  --  18 16 15 17 18   BILIRUBIN mg/dL 0.7  --  0.4 0.6 0.3 0.3 0.3   ALBUMIN g/dL 4.3  --  3.9 4.2 4.1 3.8 4.2   GLOBULIN gm/dL 2.6  --  2.5 2.7 2.6 2.5 2.7         Lab Results - Last 18 Months   Lab Units 10/15/24  0744 07/12/23  1101   TSH uIU/mL 0.506 3.460         PAST MEDICAL HISTORY:  ALLERGIES:  Allergies   Allergen Reactions    Percocet [Oxycodone-Acetaminophen] Hives     CURRENT MEDICATIONS:  Outpatient Encounter Medications as of 12/26/2024   Medication Sig Dispense Refill    amphetamine-dextroamphetamine (Adderall) 10 MG tablet Take 1 tablet by mouth Daily for 30 days. 30 tablet 0    bisoprolol-hydrochlorothiazide (ZIAC) 5-6.25 MG per tablet Take 1 tablet by mouth Daily. 30 tablet 2    dexAMETHasone (DECADRON) 4 MG tablet Take 2 tablets 2 times daily the day before chemo and 2 tablets 2 times daily the day after chemo 48 tablet 0    DULoxetine (CYMBALTA) 20 MG capsule Take 1 capsule by mouth Daily. 30 capsule 2    ibuprofen (ADVIL,MOTRIN) 800 MG tablet Take 1 tablet by mouth Every 6 (Six) Hours As Needed for Mild Pain. 90 tablet 2    levothyroxine (SYNTHROID, LEVOTHROID) 112 MCG tablet Take 1 tablet by mouth Daily. 30 tablet 2     lidocaine-prilocaine (EMLA) 2.5-2.5 % cream Apply to port site 30 minutes before it is accessed and cover with occlusive dressing. 30 g 1    lisdexamfetamine (Vyvanse) 60 MG capsule Take 1 capsule by mouth Every Morning for 30 days 30 capsule 0    lisdexamfetamine (Vyvanse) 60 MG capsule Take 1 capsule by mouth Every Morning for 30 days 30 capsule 0    methylPREDNISolone (MEDROL) 4 MG dose pack Take as directed on package instructions. 21 tablet 0    ondansetron (ZOFRAN) 8 MG tablet Take 1 tablet by mouth Every 8 (Eight) Hours As Needed for Nausea or Vomiting. 30 tablet 0    ondansetron ODT (ZOFRAN-ODT) 8 MG disintegrating tablet Place 1 tablet on the tongue Every 8 (Eight) Hours As Needed for Nausea or Vomiting. 30 tablet 1    pantoprazole (PROTONIX) 40 MG EC tablet Take 1 tablet by mouth Daily. 30 tablet 2    pertuzumab (Perjeta) 420 MG/14ML solution injection Infuse 11.33 mL into a venous catheter As Directed. Will infuse intravenously in the outpatient infusion center every 3 weeks. 14 mL 2    prochlorperazine (COMPAZINE) 10 MG tablet Take 1 tablet by mouth Q 6 hours prn as needed for nausea 60 tablet 1    trastuzumab-dkst (Ogivri) 420 MG chemo injection Infuse 360 mg into a venous catheter As Directed. Will infuse intravenously in the outpatient infusion center every 3 weeks. 2 each 2    [DISCONTINUED] pertuzumab (Perjeta) 420 MG/14ML solution injection Infuse 14 mL into a venous catheter Every 21 (Twenty-One) Days. To be used in cancer center infusions 14 mL 2    [DISCONTINUED] trastuzumab-dkst (Ogivri) 420 MG chemo injection Infuse 420 mg into a venous catheter Every 21 (Twenty-One) Days. To be used in the cancer center for infusions 1 each 2     Facility-Administered Encounter Medications as of 12/26/2024   Medication Dose Route Frequency Provider Last Rate Last Admin    lidocaine (XYLOCAINE) 1 % injection 30 mL  30 mL Subcutaneous Once Taylor Salinas MD        lidocaine 1% - EPINEPHrine 1:[id number]  (XYLOCAINE W/EPI) 1 %-1:[id number] injection 30 mL  30 mL Infiltration Taylor Vu MD         ADULT ILLNESSES:  Patient Active Problem List   Diagnosis Code    Tobacco abuse counseling Z71.6    Hypothyroidism E03.9    Attention deficit hyperactivity disorder F90.9    Essential hypertension I10    Irritable bowel syndrome K58.9    Mixed anxiety and depressive disorder F41.8    Mood disorder F39    Overweight (BMI 25.0-29.9) E66.3    Gastroesophageal reflux disease without esophagitis K21.9    Dysmenorrhea N94.6    Hematometra N85.7    Ductal carcinoma of left breast C50.912    Invasive ductal carcinoma of breast, left C50.912    Pelvic pain in female R10.2    Nausea R11.0    Low magnesium level R79.0    Abnormal mammogram R92.8     SURGERIES:  Past Surgical History:   Procedure Laterality Date    CARPAL TUNNEL RELEASE Right     COLONOSCOPY  05/01/2003    Normal exam    D & C HYSTEROSCOPY MYOSURE N/A 07/31/2024    Procedure: DILATATION AND CURETTAGE HYSTEROSCOPY WITH POSSIBLE TISSUE RESECTION;  Surgeon: Stevan Espinal MD;  Location: [facility] OR;  Service: Obstetrics/Gynecology;  Laterality: N/A;    DIAGNOSTIC LAPAROSCOPY  2000 [place]; normal findings; [facility]    ENDOMETRIAL ABLATION  2023    ENDOSCOPY      LAPAROSCOPIC CHOLECYSTECTOMY  2011    Dr Duncan; [facility]    LYMPH NODE BIOPSY  8/7/2024    Cancer Positive    SALPINGECTOMY Bilateral 10/16/2020    Procedure: SALPINGECTOMY LAPAROSCOPIC for sterilization;  Surgeon: Stevan Espinal MD;  Location:  [facility] OR;  Service: Obstetrics/Gynecology;  Laterality: Bilateral;    TOTAL LAPAROSCOPIC HYSTERECTOMY SALPINGO OOPHORECTOMY N/A 09/20/2024    Procedure: TOTAL LAPAROSCOPIC HYSTERECTOMY BILATERAL SALPINGOOPHORECTOMY WITH DAVINCI ROBOT;  Surgeon: Stevan Espinal MD;  Location:  [facility] OR;  Service: Robotics - DaVinci;  Laterality: N/A;    TUBAL ABDOMINAL LIGATION      cut not tied    US GUIDED LYMPH NODE BIOPSY  08/07/2024    VENOUS ACCESS DEVICE (PORT)  INSERTION N/A 08/30/2024    Procedure: Single Lumen Port-a-cath insertion with flouroscopy;  Surgeon: Shahla Gentile MD;  Location: [facility];  Service: General;  Laterality: N/A;    WISDOM TOOTH EXTRACTION       HEALTH MAINTENANCE ITEMS:  Health Maintenance Due   Topic Date Due    TDAP/TD VACCINES (1 - Tdap) Never done    COLORECTAL CANCER SCREENING  05/01/2013    HEPATITIS C SCREENING  Never done    ANNUAL PHYSICAL  Never done    COVID-19 Vaccine (6 - 2024-25 season) 09/01/2024       <no information>  Last Completed Colonoscopy       This patient has no relevant Health Maintenance data.          Immunization History   Administered Date(s) Administered    COVID-19 (MODERNA) 1st,2nd,3rd Dose Monovalent 10/29/2021, 11/04/2021, 11/26/2021, 11/26/2021, 12/06/2022    DT 07/07/2004    Influenza, Unspecified 11/30/2021     Last Completed Mammogram            MAMMOGRAM (Yearly) Next due on 6/26/2025 06/26/2024  MAMMO DIAGNOSTIC BILATERAL W CAD    04/19/2022  MAMMO SCREENING BILATERAL W CAD                      FAMILY HISTORY:  Family History   Problem Relation Age of Onset    Diabetes Mother     Asthma Mother     Depression Mother     Hyperlipidemia Mother     Kidney disease Mother     Thyroid disease Mother     Diabetes Father     Arthritis Father         RA    Cancer Maternal Grandmother         vulva, HPV+    Thyroid disease Maternal Grandmother     Cancer Maternal Grandfather     Lung cancer Paternal Grandfather 60 - 69    Cancer Paternal Grandfather     Breast cancer Maternal Great-Grandmother     Stomach cancer Maternal Great-Grandmother     Asthma Son         Ecezma and asthma    Colon cancer Neg Hx     Colon polyps Neg Hx      SOCIAL HISTORY:  Social History     Socioeconomic History    Marital status:    Tobacco Use    Smoking status: Former     Current packs/day: 0.00     Average packs/day: 0.5 packs/day for 20.0 years (10.0 ttl pk-yrs)     Types: Cigarettes     Start date: 7/22/2004     Quit  "date: 2024     Years since quittin.4    Smokeless tobacco: Never    Tobacco comments:     Stop to 7.24 using vape now to stop completely   Vaping Use    Vaping status: Former    Start date: 2024    Quit date: 10/17/2024   Substance and Sexual Activity    Alcohol use: Not Currently     Comment: Socially    Drug use: Yes     Types: Marijuana    Sexual activity: Yes     Partners: Male     Birth control/protection: None, Bilateral salpingectomy        REVIEW OF SYSTEMS:  Review of Systems   Constitutional:  Positive for fatigue (from RA + fibromyalgia). Negative for activity change, appetite change and unexpected weight loss (None since the last visit).        Manages her ADLs to include chores, errands and driving.  Is up and about, \"all the time when I recover from chemo.\"  Still works full time in customer service at [employer]     Chemo tolerance:  Since 50% dose reduction of carbo+docetaxel has noted that the, \"prickly rash\" on her forearms has greatly improved.  Again with fatigue, nausea, achiness, runny nose starting days 2-3, but no more blurry vision on day 4, again \"all better in a week (previously 2 weeks)\", loose stools (modulated by 1 dose of Imodium), no overt diarrhea, no mouth sores, no neuropathy.  Now states that it takes about 5 to 7 days (previously 10 to 12 days), \"before I feel back to normal\" this time feels that she can go the distance of 6 cycles (3 more cycles)     On 10/26/24- previously discussed with Sherie Oliver, PharmD.  Due to protracted nausea/vomiting \"dry heaves\" after 1st cycle -refractory to usual antiemetics:  - We added into the treatment plan:   - Zyprexa 5 mg on Day 1   - Decadron 20 mg (   - Benadryl 50 mg was added as well   - 30% dose reduction of docetaxel and carboplatin      Eyes: Negative.    Respiratory: Negative.  Negative for cough and shortness of breath.         Smoker age 15- 1/2-1 ppd.  Has stopped smoking.  He is no longer " vaping.   Cardiovascular: Negative.    Gastrointestinal: Negative.    Endocrine:         A0    Menarche age 13    Menopause age 42   Genitourinary: Negative.  Negative for vaginal bleeding (Had a CHRISTIANO/BSO per Dr. Espinal).   Musculoskeletal:  Positive for arthralgias (Chronic- from RA + fibromyalgia), back pain (Chronic) and neck pain (Chronic).   Skin:  Negative for rash.   Allergic/Immunologic: Negative.    Neurological: Negative.    Hematological: Negative.    Psychiatric/Behavioral: Negative.         Wt 75.2 kg (165 lb 11.2 oz)   LMP  (LMP Unknown)   BMI 26.34 kg/m²  Body surface area is 1.86 meters squared.  Pain Score    24 1530   PainSc: 0-No pain         Physical Exam  Vitals and nursing note reviewed. Exam conducted with a chaperone present.   Constitutional:       Comments: Cooperative, well-developed, modestly Middle-aged female.  Ambulatory.  ECOG 0.      Has regained 10 lb (had lost 4 lb at her prior visit) since the last visit   HENT:      Head: Normocephalic and atraumatic.   Eyes:      General: No scleral icterus.     Extraocular Movements: Extraocular movements intact.      Pupils: Pupils are equal, round, and reactive to light.   Cardiovascular:      Rate and Rhythm: Normal rate.   Pulmonary:      Effort: Pulmonary effort is normal.      Comments: Port right upper chest is well seated  Chest:   Breasts:     Right: Normal.      Left: Mass present.          Comments: Chaperoned exam: Mildred Carlisle MA    Right breast with no masses, no skin changes.  No nipple discharge today.    Left breast with previously palpable masses at 3:00 and 6:00 positions are barely palpable.  Previously palpable left axillary nodes are not evident today.  No nipple discharge today.  No skin changes.  Abdominal:      Palpations: Abdomen is soft.      Tenderness: There is no abdominal tenderness.      Comments: Laparoscopic incisions are healing very well.   Musculoskeletal:         General: Normal range of  motion.      Cervical back: Normal range of motion.   Lymphadenopathy:      Cervical: No cervical adenopathy.      Upper Body:      Right upper body: No supraclavicular or axillary adenopathy.      Left upper body: Axillary adenopathy (Clinically improved) present. No supraclavicular adenopathy.   Skin:     General: Skin is warm.   Neurological:      General: No focal deficit present.      Mental Status: She is alert and oriented to person, place, and time.   Psychiatric:         Mood and Affect: Mood normal.         Behavior: Behavior normal.         Thought Content: Thought content normal.         .  Assessment:  1.   Invasive carcinoma of no special type (ductal) of the left breast  --Tumor stage: at least TNM/AJCC stage IIIA (cT2, cN2, M0,G3) ER 69%(+), LA 85% (+), HER-2 positive (3+).  --Original tumor burden:    ---7/25/24- MRI breast bilateral-1. Findings suggestive for multicentric left breast malignancy with diffuse ductal carcinoma in situ extending from the dominant 2.7 cm mass within the lateral left breast at 3:00 extending to and likely involving the left nipple. A second 8 mm mass within the central left breast tissue with a third mass in the anterior depth of the inferior left breast at 6:00. Two 8 mm homogeneous persistently enhancing hyperintense T2 masses at the upper outer right breast, mid depth at the 10:00 in the 11-12 o'clock positions, with overall benign signal characteristics for which background enhancement versus papilloma fibroadenoma considered. MRI directed/second look ultrasound recommended given the presence of the contralateral left breast findings. BI-RADS CATEGORY 5: Highly suggestive of malignancy. Appropriate action should be taken. Findings strongly favoring multicentric left breast malignancy. MRI directed/second look bilateral breast ultrasound recommended. Ultrasound-guided biopsy of the left breast mass at 3:00 and of a second left breast mass (6:00 if visible on  ultrasound versus mass central to the nipple) with sampling of the largest left axillary node may be performed if clinically desired. Sampling of a finding within the upper outer right breast should be considered if solid nodule identified on ultrasound.  -8/7/24- US biopsy:  Final diagnosis-  1.  Mass, left breast at 6:00, 1 cm from nipple, core needle biopsies: High-grade ductal carcinoma in situ, comedo type. 2.  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 Histologic grade (Corwin histologic score). Glandular (acinar)/tubular differentiation: Score 2. Nuclear pleomorphism: Score 3. Mitotic rate: Score 3. Overall grade: Grade 3. Maximum tumor diameter is at least 1.5 cm. 3.  Left axillary lymph node, core needle biopsy: Metastatic adenocarcinoma of breast. -ER 69%+; WI 85%+; HER2 3+ (positive); Ki67 54%+  -8/12/24- CT CAP- Known left breast malignancy with enlarged left level 1 and asymmetrically prominent left level 2 and borderline level 3 axillary lymph nodes which are suspicious for axillary disease.  No additional evidence of metastatic disease in the chest.  1. Small 0.4 cm low-attenuation region in the right lobe of the liver may have been present on the prior examinations but was not as well visualized. This is too small to characterize. Continued attention on follow-up is recommended.  Fatty infiltration of the liver.  Persistent heterogeneity of the lower uterine segment of the uterus and fluid in the endometrial canal although the fluid has decreased. Stable septate uterus  -8/16/2024-bone scan-no evidence of osteoblastic metastatic disease.  -8/16/2024- 2D echo    Left ventricular systolic function is normal. Left ventricular ejection fraction appears to be 56 - 60%.    Normal right ventricular cavity size and systolic function noted.    No significant valvular abnormalities identified on this study.     -8/20/2024-PET  scan-abnormal PET/CT, biopsy-proven left breast malignancy and demonstrates FDG avidity with maximum SUV of 7.9.  Also potential abnormal activity at 6:00 in the left breast with a maximal SUV of 3.9.  MRI of the breast with and without contrast will be helpful to exclude multifocal or multicentric disease in the left breast.  No abnormal contralateral right breast or axillary activity identified.  3 hypermetabolic level 1 lymph nodes in the left axilla compatible with kiersten metastasis and solitary borderline level 2 axillary lymph node on the left.  - 11/25/24-2D echo- LVEF 61-65%  - 12/5/2024- MRI breasts- 1. Complete radiographic response to neoadjuvant chemotherapy of the multicentric tumor within the left breast. Resolution of the previous nonspecific enhancement within the upper outer quadrant right breast. No abnormal enhancement seen within the left or right breast on today's study. Interval decrease in the pathologic left axillary lymphadenopathy, axillary lymph nodes with normal morphology on today's study. No internal mammary lymphadenopathy.  - Neoadjuvant DCPT in progress    2.   Nausea, fatigue, myalgias, blurry vision (for 1 day) and flulike symptoms following chemo.  Feels like steps that were taken to include dose reduction and augmenting her antiemetic regimen have helped.  States symptoms now last less than 1 week (previously lasted the better part of 2 weeks).  We further discussed the option of more dose reduction (we will dose reduce to 50%.  Chemotherapy) in order to maximize her ability to complete as close to 6 cycles of systemic neoadjuvant therapy as possible.  MRI of the breasts after cycle 3 on 12/5 (above) showed CR.  CT of the head was planned but not yet done (resolution of blurry vision).    3.   Uterine cystic mass  - 7/12/24 - Abnormal CT a/p- Increased size of the low density/complex cystic mass in the uterine cavity since the previous study. This may represent abnormal  endometrial thickening, complex endometrial fluid/debris accumulation in the endometrial canal due to out duct obstruction by a fibroid in the lower uterine segment as detailed above. There is a septate morphology of the uterus. Further follow-up with sonography may be obtained. Normal appendix. The gallbladder is surgically absent. The nonacute findings of the remaining abdomen similar to the previous study.  Stable heterogeneous solid mass to the right of midline at the mid to lower uterine segment with probable secondary dilated endometrial cavity at the uterine fundus filled with fluid and a septate uterus.  Differential diagnosis would include benign etiologies such as submucosal leiomyoma and large endometrial polyp but also neoplasm, possibly endometrial carcinoma, or carcinosarcoma of the uterus.  No anabel pelvic soft tissue nodule or lymphadenopathy identified. Small left external iliac chain lymph node is unchanged. Small right ovarian cyst. Nabothian cysts in the cervix and septate uterus. MRI pelvis without and with contrast may be helpful for further evaluation. GYN consultation is recommended.  -7/31/24-  Endocervical Currettage, Endometrial Currettage, and hysteroscopic resected uterine synechia.  Final diagnosis:  1.Endometrium, curettage: A. Blood and fragments of smooth muscle. B. No histologic evidence of malignancy. Comment: Endometrial mucosal tissue is not identified. 2. Endocervix, curettage: A. Fragments of benign endocervical tissue, benign squamous mucosa and benign lower uterine segment endometrium. B. Blood and mucus. C. No dysplasia identified. 3. Endometrium, hysteroscopic resected tissue: A. Fragments of smooth muscle. B. No histologic evidence of malignancy. Comment: Endometrial mucosal tissue is not identified.  --9/15/2024- CT abdomen/pelvis-There is a 3.2 x 3.0 x 2.6 cm focus of rounded masslike enhancement in the lower uterine segment centered along the endometrial canal (series  "2-image 70, series 4-image 41). The uterine cavity is distended and fluid-filled above this at the fundus. Primary imaging concern would be endometrial carcinoma obstructing the canal although it seems there was a recent D&C with benign tissue sampling. Endometritis is also a consideration and cannot be excluded on CT.  No free fluid in the pelvis, pelvic abscess or pelvic adenopathy.  --9/20/2024-   Total Laparoscopic Hysterectomy with bilateral salpingoophorectomy, da Kenzie assisted -Final diagnosis:  Uterus with bilateral tubes and ovaries, hysterectomy with bilateral salpingo-oophorectomies: Unremarkable cervix, negative for squamous dysplasia. Atrophic endometrium, negative for hyperplasia and atypia. Benign intramural leiomyoma. Adenomyosis. Unremarkable right ovary. Left ovary with cystic follicles and hemorrhagic corpora lutea.    4.   Nicotine dependence-stopped in favor of vaping  5.   Mixed depression/anxiety  6.   Hypothyroidism  7.   Obesity  8.   RA and fibromyalgia. Plaquenil on hold while on chemo.  Followed by Dr. Ackerman.  Symptoms have been quiescent while on chemo.       Plan:  1.   Labs, 11/6/24-11/12/24-12/4/24-  gluc 205 alk phos 105 (prior peak: 177) otherwise normal CMP, mag 1.4 (2 gm IV mag given), WBC 12 (Neulasta), Hgb 11.9 otherwise normal CBC  2.   Chemo tolerance (see below).  \"Prickly forearms but no more rash.\"  Nausea/vomiting/fatigue/blurry vision/malaise much improved with dose reduction of TC and augmented anti-emetics  3.   Review breast MRI, 12/5/24 (above).  CR to neoadjuvant chemo!  4.   Review 2D echo, 11/24/24 - EF 61-65%  5.   Review BRCA 1/2 analyses with cancer next, 8/16/2034 -summary: Negative-no clinically significant variants detected  6.   Review NCCN guidelines version invasive breast cancer.  Principles of preoperative systemic therapy: Candidate for preoperative systemic therapy--A: Patients with inoperable breast cancer: IBC; bulky or matted cN2 axillary nodes; " cN3 kiersten disease; cT4 tumors.  B. In selected patients with operable breast cancer--HER-2 positive disease and TNBC, if >/=cT2 OR >/=cN1; Large primary tumor relative to breast size in a patient who desires breast conservation; cN+ disease likely to become cN0 with preoperative systemic therapy; C.  Preoperative systemic therapy can be considered for cT1,N0, HER-2 positive disease and TNBC.     7.   Docetaxel, carboplatin: Toxicities of chemotherapy noted to include, but not limited to: Myelosuppression (neutropenia, anemia, thrombocytopenia), alopecia, neuropathy, arthralgia, myalgia, nausea, vomiting, hypersensitivity reactions, mucositis, diarrhea, infection, abnormal liver enzymes, asthenia, fever, hypertension, bleeding, edema, opportunistic infections, anaphylaxis, cardiac conduction disturbance/arrhythmias, syncope, thromboembolism, myocardial infarction, severe injection site reactions, pulmonary toxicity, GI obstruction/perforation, paralytic ileus, ischemic colitis, pancreatitis, hepatotoxicity, severe skin reactions). Questions answered to the patient's apparent satisfaction. She agrees to press on with therapy.   8.   Discussed the potential toxicities of pertuzumab and trastuzumab, to include but not limited to: Cardiac failure/LV dysfunction, cardiomyopathy, CHF, arrhythmia, hypersensitivity reaction, anaphylaxis, angioedema, ARDS, interstitial pneumonitis, pulmonary infiltrates, pulmonary fibrosis, pleural effusion, pulmonary edema, hypoxia, myelosuppression (anemia, neutropenia, thrombocytopenia), febrile neutropenia, tumor lysis syndrome, glomerulonephritis, hypokalemia, peripheral neuropathy, hand-foot syndrome, alopecia, nausea, diarrhea, ALT/AST increase, lymphopenia, asthenia, fatigue, stomatitis, myalgia, arthralgia, constipation, vomiting, dysgeusia, headache, decreased appetite, insomnia, peripheral neuropathy, rash, decreased albumin, xeroderma, mucositis, cough, dyspepsia, fever,  dizziness, increased potassium, decreased sodium, epistaxis, hot flashes, weight loss, URI, paresthesia, back pain, dyspnea, UTI, hypokalemia, hand-foot syndrome.  Questions answered to the patient's apparent satisfaction.  She agrees to press on with therapy.    9.  Schedule neoadjuvant treatment (plan: q 21 days x 6 cycles- dose reduced docetaxel and carboplatin to 50% of original dose beginning cycle 3) C4, 1/2/25; C5, 1/23/25; C6, 2/13/25. Dose reduce 20% pertuzumab+trastuzumab beginning C4     Docetaxel 36 mg/m2 per administration guidelines q 21 days, C1-C6  Carboplatin AUC 3 per administration guidelines q 21 days, C1-C6  Perjeta 340 mg, C2 -C6    Trastuzumab 5 mg/kg, C2- C6   Observe patient for 1 hour after initial dose pertuzumab and for 30 minutes after all subsequent doses for signs of infusion reactions.  Hold pertuzumab and trastuzumab if LVEF drops to<50% with a 10% or greater absolute decrease below pretreatment baseline.     10.  Premeds:   Zyprexa 5 mg IV  Aloxi 0.25 mg IV   Emend 150 mg IV   Decadron 20 mg IV   Pepcid 20 mg IV   Benadryl 50 mg IV  Decadron 8 mg twice daily day before and 8 mg twice daily day after chemo  Ativan 1 mg IV        11.  Neulasta 6 mg sc on day 2 chemo  12.  CMP, magnesium level, and CBC with differential weekly on day of chemotherapy. Procrit 40,000 units subcutaneously if hemoglobin less than 10 and hematocrit less than 30. Zarxio/Neupogen 480 mcg daily x3 if ANC less than 1.0.      13.  Rx:  Zofran 8 mg po tid prn # 30                Compazine 10 mg p.o. 3 times daily as needed dispense 30                Decadron 8 mg twice daily day before and 8 mg twice daily day after chemo     14.  Anticipate adjuvant (postop) treatment (plan: q 21 days x 17 cycles - to complete 1 year of therapy) C7..pending     Trastuzumab 8 mg/kg loading dose C7D1; then 6 mg/kg, C8- C17      15.  Schedule CT head with and without contrast next week-2nd request   16.  Schedule 2D echo after  "cycle 6 of perjeta/trastuzumab on 2/20/25  17.  Return to the office in 8 weeks with pre-office CBC with differential, CMP.  We will schedule follow-up 2D echocardiogram after C6 of Tx and refer back to Dr. Gentile for surgery at that time.  18.  Importance of Smoking Cessation discussed with patient and informed patient additional information will be on today's [facility]'s Flyer - Plan to Be Tobacco Free handout provided to patient         I spent ~91 minutes caring for Uyen, \"Leana\" on this date of service. This time includes time spent by me in the following activities: preparing for the visit, reviewing tests, performing a medically appropriate examination and/or evaluation, counseling and educating the patient/family/caregiver, ordering medications, tests, or procedures and documenting information in the medical record.       "

## 2024-12-20 NOTE — TELEPHONE ENCOUNTER
Called and spoke with Leana and she has not tried a steroid dose pack. She also reports that the dose was not reduced the last time as discussed at the last appointment she wanted to make sure that was updated.

## 2024-12-26 ENCOUNTER — OFFICE VISIT (OUTPATIENT)
Dept: ONCOLOGY | Facility: CLINIC | Age: 44
End: 2024-12-26
Payer: COMMERCIAL

## 2024-12-26 VITALS
BODY MASS INDEX: 26.01 KG/M2 | DIASTOLIC BLOOD PRESSURE: 64 MMHG | SYSTOLIC BLOOD PRESSURE: 108 MMHG | TEMPERATURE: 97 F | OXYGEN SATURATION: 99 % | HEART RATE: 69 BPM | WEIGHT: 165.7 LBS | HEIGHT: 67 IN | RESPIRATION RATE: 18 BRPM

## 2024-12-26 DIAGNOSIS — C50.912 INVASIVE DUCTAL CARCINOMA OF BREAST, LEFT: Primary | ICD-10-CM

## 2025-01-02 ENCOUNTER — TELEPHONE (OUTPATIENT)
Dept: ONCOLOGY | Facility: CLINIC | Age: 45
End: 2025-01-02
Payer: COMMERCIAL

## 2025-01-02 ENCOUNTER — INFUSION (OUTPATIENT)
Dept: ONCOLOGY | Facility: HOSPITAL | Age: 45
End: 2025-01-02
Payer: COMMERCIAL

## 2025-01-02 ENCOUNTER — LAB (OUTPATIENT)
Dept: LAB | Facility: HOSPITAL | Age: 45
End: 2025-01-02
Payer: COMMERCIAL

## 2025-01-02 VITALS
BODY MASS INDEX: 25.55 KG/M2 | HEART RATE: 75 BPM | TEMPERATURE: 97 F | RESPIRATION RATE: 17 BRPM | OXYGEN SATURATION: 99 % | SYSTOLIC BLOOD PRESSURE: 145 MMHG | WEIGHT: 162.8 LBS | HEIGHT: 67 IN | DIASTOLIC BLOOD PRESSURE: 75 MMHG

## 2025-01-02 DIAGNOSIS — C50.912 INVASIVE DUCTAL CARCINOMA OF BREAST, LEFT: ICD-10-CM

## 2025-01-02 DIAGNOSIS — E87.6 LOW SERUM POTASSIUM LEVEL: Primary | ICD-10-CM

## 2025-01-02 DIAGNOSIS — C50.912 DUCTAL CARCINOMA OF LEFT BREAST: ICD-10-CM

## 2025-01-02 DIAGNOSIS — C50.912 DUCTAL CARCINOMA OF LEFT BREAST: Primary | ICD-10-CM

## 2025-01-02 DIAGNOSIS — R79.0 LOW MAGNESIUM LEVEL: ICD-10-CM

## 2025-01-02 LAB
ALBUMIN SERPL-MCNC: 4.1 G/DL (ref 3.5–5.2)
ALBUMIN/GLOB SERPL: 1.9 G/DL
ALP SERPL-CCNC: 89 U/L (ref 39–117)
ALT SERPL W P-5'-P-CCNC: 13 U/L (ref 1–33)
ANION GAP SERPL CALCULATED.3IONS-SCNC: 9 MMOL/L (ref 5–15)
AST SERPL-CCNC: 15 U/L (ref 1–32)
BASOPHILS # BLD AUTO: 0.04 10*3/MM3 (ref 0–0.2)
BASOPHILS NFR BLD AUTO: 0.5 % (ref 0–1.5)
BILIRUB SERPL-MCNC: 0.8 MG/DL (ref 0–1.2)
BUN SERPL-MCNC: 12 MG/DL (ref 6–20)
BUN/CREAT SERPL: 16.7 (ref 7–25)
CALCIUM SPEC-SCNC: 9.9 MG/DL (ref 8.6–10.5)
CHLORIDE SERPL-SCNC: 102 MMOL/L (ref 98–107)
CO2 SERPL-SCNC: 32 MMOL/L (ref 22–29)
CREAT SERPL-MCNC: 0.72 MG/DL (ref 0.57–1)
DEPRECATED RDW RBC AUTO: 51.3 FL (ref 37–54)
EGFRCR SERPLBLD CKD-EPI 2021: 105.2 ML/MIN/1.73
EOSINOPHIL # BLD AUTO: 0.32 10*3/MM3 (ref 0–0.4)
EOSINOPHIL NFR BLD AUTO: 4.2 % (ref 0.3–6.2)
ERYTHROCYTE [DISTWIDTH] IN BLOOD BY AUTOMATED COUNT: 15 % (ref 12.3–15.4)
GLOBULIN UR ELPH-MCNC: 2.2 GM/DL
GLUCOSE SERPL-MCNC: 132 MG/DL (ref 65–99)
HCT VFR BLD AUTO: 38.1 % (ref 34–46.6)
HGB BLD-MCNC: 11.8 G/DL (ref 12–15.9)
IMM GRANULOCYTES # BLD AUTO: 0.01 10*3/MM3 (ref 0–0.05)
IMM GRANULOCYTES NFR BLD AUTO: 0.1 % (ref 0–0.5)
LYMPHOCYTES # BLD AUTO: 1.53 10*3/MM3 (ref 0.7–3.1)
LYMPHOCYTES NFR BLD AUTO: 20 % (ref 19.6–45.3)
MAGNESIUM SERPL-MCNC: 1.1 MG/DL (ref 1.6–2.6)
MCH RBC QN AUTO: 28.6 PG (ref 26.6–33)
MCHC RBC AUTO-ENTMCNC: 31 G/DL (ref 31.5–35.7)
MCV RBC AUTO: 92.3 FL (ref 79–97)
MONOCYTES # BLD AUTO: 0.52 10*3/MM3 (ref 0.1–0.9)
MONOCYTES NFR BLD AUTO: 6.8 % (ref 5–12)
NEUTROPHILS NFR BLD AUTO: 5.22 10*3/MM3 (ref 1.7–7)
NEUTROPHILS NFR BLD AUTO: 68.4 % (ref 42.7–76)
NRBC BLD AUTO-RTO: 0 /100 WBC (ref 0–0.2)
PLATELET # BLD AUTO: 223 10*3/MM3 (ref 140–450)
PMV BLD AUTO: 11.1 FL (ref 6–12)
POTASSIUM SERPL-SCNC: 3.2 MMOL/L (ref 3.5–5.2)
PROT SERPL-MCNC: 6.3 G/DL (ref 6–8.5)
RBC # BLD AUTO: 4.13 10*6/MM3 (ref 3.77–5.28)
SODIUM SERPL-SCNC: 143 MMOL/L (ref 136–145)
WBC NRBC COR # BLD AUTO: 7.64 10*3/MM3 (ref 3.4–10.8)

## 2025-01-02 PROCEDURE — 63710000001 APREPITANT PER 5 MG: Performed by: INTERNAL MEDICINE

## 2025-01-02 PROCEDURE — 96375 TX/PRO/DX INJ NEW DRUG ADDON: CPT

## 2025-01-02 PROCEDURE — 96366 THER/PROPH/DIAG IV INF ADDON: CPT

## 2025-01-02 PROCEDURE — 96368 THER/DIAG CONCURRENT INF: CPT

## 2025-01-02 PROCEDURE — 25010000002 PERTUZUMAB 420 MG/14ML SOLUTION 420 MG VIAL: Performed by: INTERNAL MEDICINE

## 2025-01-02 PROCEDURE — 80053 COMPREHEN METABOLIC PANEL: CPT

## 2025-01-02 PROCEDURE — 36415 COLL VENOUS BLD VENIPUNCTURE: CPT

## 2025-01-02 PROCEDURE — 25010000002 DIPHENHYDRAMINE PER 50 MG: Performed by: INTERNAL MEDICINE

## 2025-01-02 PROCEDURE — 25810000003 SODIUM CHLORIDE 0.9 % SOLUTION: Performed by: INTERNAL MEDICINE

## 2025-01-02 PROCEDURE — 25010000002 MAGNESIUM SULFATE 2 GM/50ML SOLUTION: Performed by: INTERNAL MEDICINE

## 2025-01-02 PROCEDURE — 96367 TX/PROPH/DG ADDL SEQ IV INF: CPT

## 2025-01-02 PROCEDURE — 83735 ASSAY OF MAGNESIUM: CPT

## 2025-01-02 PROCEDURE — 25010000002 TRASTUZUMAB-DKST 420 MG RECONSTITUTED SOLUTION 1 EACH VIAL: Performed by: INTERNAL MEDICINE

## 2025-01-02 PROCEDURE — 25010000002 HYDROCORTISONE SOD SUC (PF) 100 MG RECONSTITUTED SOLUTION: Performed by: INTERNAL MEDICINE

## 2025-01-02 PROCEDURE — 25010000002 CARBOPLATIN PER 50 MG: Performed by: INTERNAL MEDICINE

## 2025-01-02 PROCEDURE — 25810000003 SODIUM CHLORIDE 0.9 % SOLUTION 250 ML FLEX CONT: Performed by: INTERNAL MEDICINE

## 2025-01-02 PROCEDURE — 96413 CHEMO IV INFUSION 1 HR: CPT

## 2025-01-02 PROCEDURE — 25010000002 PALONOSETRON PER 25 MCG: Performed by: INTERNAL MEDICINE

## 2025-01-02 PROCEDURE — 25010000002 DEXAMETHASONE SODIUM PHOSPHATE 100 MG/10ML SOLUTION: Performed by: INTERNAL MEDICINE

## 2025-01-02 PROCEDURE — 25010000002 DOCETAXEL 20 MG/ML SOLUTION 8 ML VIAL: Performed by: INTERNAL MEDICINE

## 2025-01-02 PROCEDURE — 85025 COMPLETE CBC W/AUTO DIFF WBC: CPT

## 2025-01-02 PROCEDURE — 25010000002 POTASSIUM CHLORIDE 10 MEQ/100ML SOLUTION: Performed by: INTERNAL MEDICINE

## 2025-01-02 PROCEDURE — 96417 CHEMO IV INFUS EACH ADDL SEQ: CPT

## 2025-01-02 RX ORDER — OLANZAPINE 5 MG/1
5 TABLET ORAL ONCE
Status: COMPLETED | OUTPATIENT
Start: 2025-01-02 | End: 2025-01-02

## 2025-01-02 RX ORDER — POTASSIUM CHLORIDE 7.45 MG/ML
10 INJECTION INTRAVENOUS
Status: COMPLETED | OUTPATIENT
Start: 2025-01-02 | End: 2025-01-02

## 2025-01-02 RX ORDER — SODIUM CHLORIDE 0.9 % (FLUSH) 0.9 %
10 SYRINGE (ML) INJECTION AS NEEDED
OUTPATIENT
Start: 2025-01-02

## 2025-01-02 RX ORDER — FAMOTIDINE 10 MG/ML
20 INJECTION, SOLUTION INTRAVENOUS AS NEEDED
Status: DISCONTINUED | OUTPATIENT
Start: 2025-01-02 | End: 2025-01-02 | Stop reason: HOSPADM

## 2025-01-02 RX ORDER — POTASSIUM CHLORIDE 14.9 MG/ML
20 INJECTION INTRAVENOUS ONCE
Status: CANCELLED | OUTPATIENT
Start: 2025-01-02

## 2025-01-02 RX ORDER — MAGNESIUM SULFATE HEPTAHYDRATE 40 MG/ML
2 INJECTION, SOLUTION INTRAVENOUS ONCE
Status: CANCELLED | OUTPATIENT
Start: 2025-01-02

## 2025-01-02 RX ORDER — LORAZEPAM 2 MG/ML
1 INJECTION INTRAMUSCULAR EVERY 4 HOURS PRN
Status: DISCONTINUED | OUTPATIENT
Start: 2025-01-02 | End: 2025-01-02 | Stop reason: HOSPADM

## 2025-01-02 RX ORDER — HYDROCORTISONE SODIUM SUCCINATE 100 MG/2ML
100 INJECTION INTRAMUSCULAR; INTRAVENOUS AS NEEDED
Status: COMPLETED | OUTPATIENT
Start: 2025-01-02 | End: 2025-01-02

## 2025-01-02 RX ORDER — POTASSIUM CHLORIDE 1500 MG/1
20 TABLET, EXTENDED RELEASE ORAL 3 TIMES DAILY
Qty: 9 TABLET | Refills: 0 | Status: SHIPPED | OUTPATIENT
Start: 2025-01-02

## 2025-01-02 RX ORDER — APREPITANT 40 MG/1
80 CAPSULE ORAL ONCE
Status: COMPLETED | OUTPATIENT
Start: 2025-01-02 | End: 2025-01-02

## 2025-01-02 RX ORDER — SODIUM CHLORIDE 9 MG/ML
20 INJECTION, SOLUTION INTRAVENOUS ONCE
Status: COMPLETED | OUTPATIENT
Start: 2025-01-02 | End: 2025-01-02

## 2025-01-02 RX ORDER — PALONOSETRON 0.05 MG/ML
0.25 INJECTION, SOLUTION INTRAVENOUS ONCE
Status: COMPLETED | OUTPATIENT
Start: 2025-01-02 | End: 2025-01-02

## 2025-01-02 RX ORDER — HEPARIN SODIUM (PORCINE) LOCK FLUSH IV SOLN 100 UNIT/ML 100 UNIT/ML
500 SOLUTION INTRAVENOUS AS NEEDED
Status: DISCONTINUED | OUTPATIENT
Start: 2025-01-02 | End: 2025-01-02 | Stop reason: HOSPADM

## 2025-01-02 RX ORDER — DIPHENHYDRAMINE HYDROCHLORIDE 50 MG/ML
50 INJECTION INTRAMUSCULAR; INTRAVENOUS ONCE
Status: COMPLETED | OUTPATIENT
Start: 2025-01-02 | End: 2025-01-02

## 2025-01-02 RX ORDER — FAMOTIDINE 10 MG/ML
20 INJECTION, SOLUTION INTRAVENOUS ONCE
Status: COMPLETED | OUTPATIENT
Start: 2025-01-02 | End: 2025-01-02

## 2025-01-02 RX ORDER — DIPHENHYDRAMINE HYDROCHLORIDE 50 MG/ML
50 INJECTION INTRAMUSCULAR; INTRAVENOUS AS NEEDED
Status: DISCONTINUED | OUTPATIENT
Start: 2025-01-02 | End: 2025-01-02

## 2025-01-02 RX ORDER — MAGNESIUM SULFATE HEPTAHYDRATE 40 MG/ML
2 INJECTION, SOLUTION INTRAVENOUS ONCE
Status: COMPLETED | OUTPATIENT
Start: 2025-01-02 | End: 2025-01-02

## 2025-01-02 RX ORDER — HEPARIN SODIUM (PORCINE) LOCK FLUSH IV SOLN 100 UNIT/ML 100 UNIT/ML
500 SOLUTION INTRAVENOUS AS NEEDED
OUTPATIENT
Start: 2025-01-02

## 2025-01-02 RX ORDER — SODIUM CHLORIDE 0.9 % (FLUSH) 0.9 %
10 SYRINGE (ML) INJECTION AS NEEDED
Status: DISCONTINUED | OUTPATIENT
Start: 2025-01-02 | End: 2025-01-02 | Stop reason: HOSPADM

## 2025-01-02 RX ADMIN — OLANZAPINE 5 MG: 5 TABLET, FILM COATED ORAL at 09:24

## 2025-01-02 RX ADMIN — POTASSIUM CHLORIDE 10 MEQ: 10 INJECTION, SOLUTION INTRAVENOUS at 13:43

## 2025-01-02 RX ADMIN — HYDROCORTISONE SODIUM SUCCINATE 100 MG: 100 INJECTION, POWDER, FOR SOLUTION INTRAMUSCULAR; INTRAVENOUS at 09:57

## 2025-01-02 RX ADMIN — DOCETAXEL 65 MG: 20 INJECTION, SOLUTION, CONCENTRATE INTRAVENOUS at 11:27

## 2025-01-02 RX ADMIN — SODIUM CHLORIDE 20 ML/HR: 9 INJECTION, SOLUTION INTRAVENOUS at 08:50

## 2025-01-02 RX ADMIN — DIPHENHYDRAMINE HYDROCHLORIDE 50 MG: 50 INJECTION INTRAMUSCULAR; INTRAVENOUS at 09:53

## 2025-01-02 RX ADMIN — CARBOPLATIN 350 MG: 10 INJECTION INTRAVENOUS at 12:44

## 2025-01-02 RX ADMIN — PALONOSETRON HYDROCHLORIDE 0.25 MG: 0.25 INJECTION, SOLUTION INTRAVENOUS at 10:01

## 2025-01-02 RX ADMIN — Medication 20 ML: at 16:08

## 2025-01-02 RX ADMIN — DEXAMETHASONE SODIUM PHOSPHATE 20 MG: 10 INJECTION, SOLUTION INTRAMUSCULAR; INTRAVENOUS at 09:25

## 2025-01-02 RX ADMIN — MAGNESIUM SULFATE HEPTAHYDRATE 2 G: 2 INJECTION, SOLUTION INTRAVENOUS at 12:48

## 2025-01-02 RX ADMIN — PERTUZUMAB 340 MG: 30 INJECTION, SOLUTION, CONCENTRATE INTRAVENOUS at 10:11

## 2025-01-02 RX ADMIN — FAMOTIDINE 20 MG: 10 INJECTION INTRAVENOUS at 09:49

## 2025-01-02 RX ADMIN — APREPITANT 80 MG: 40 CAPSULE ORAL at 09:23

## 2025-01-02 RX ADMIN — TRASTUZUMAB-DKST 360 MG: KIT at 10:45

## 2025-01-02 RX ADMIN — POTASSIUM CHLORIDE 10 MEQ: 10 INJECTION, SOLUTION INTRAVENOUS at 12:48

## 2025-01-02 NOTE — TELEPHONE ENCOUNTER
I called and spoke with the patient regarding her schedule for onpro tomorrow. She would like for it to be around 1130. She was downstairs. Scheduling has left for the day. CHARLES Muniz was there and said she would have Eileen put her on for tomorrow. No c/o were voiced at this time.

## 2025-01-02 NOTE — PROGRESS NOTES
0900 Per pharmacy carbo dose in plan accurate according to creat clearance, ok to release. B Janna SOTO

## 2025-01-03 ENCOUNTER — INFUSION (OUTPATIENT)
Dept: ONCOLOGY | Facility: HOSPITAL | Age: 45
End: 2025-01-03
Payer: COMMERCIAL

## 2025-01-03 VITALS
OXYGEN SATURATION: 100 % | SYSTOLIC BLOOD PRESSURE: 145 MMHG | HEART RATE: 83 BPM | DIASTOLIC BLOOD PRESSURE: 95 MMHG | RESPIRATION RATE: 20 BRPM | TEMPERATURE: 97.7 F

## 2025-01-03 DIAGNOSIS — C50.912 INVASIVE DUCTAL CARCINOMA OF BREAST, LEFT: ICD-10-CM

## 2025-01-03 DIAGNOSIS — C50.912 DUCTAL CARCINOMA OF LEFT BREAST: Primary | ICD-10-CM

## 2025-01-03 PROCEDURE — 25010000002 PEGFILGRASTIM 6 MG/0.6ML SOLUTION PREFILLED SYRINGE: Performed by: INTERNAL MEDICINE

## 2025-01-03 PROCEDURE — 96372 THER/PROPH/DIAG INJ SC/IM: CPT

## 2025-01-03 RX ADMIN — PEGFILGRASTIM 6 MG: 6 INJECTION SUBCUTANEOUS at 12:24

## 2025-01-07 ENCOUNTER — TELEPHONE (OUTPATIENT)
Dept: ONCOLOGY | Facility: CLINIC | Age: 45
End: 2025-01-07
Payer: COMMERCIAL

## 2025-01-07 DIAGNOSIS — C50.912 INVASIVE DUCTAL CARCINOMA OF BREAST, LEFT: Primary | ICD-10-CM

## 2025-01-07 NOTE — TELEPHONE ENCOUNTER
I have found a plastic surgeon that will accept my insurance and do my breast spacers/implants.  Jeancarlos Hemphill MD, Plastic and Reconstructive Surgery  [facility]  [address], [facility], [place], [phone].    They need all my records faxed so he can review my case and let me know if he will do it.  Please fax to [phone].  Thanks

## 2025-01-09 ENCOUNTER — TELEPHONE (OUTPATIENT)
Dept: ONCOLOGY | Facility: CLINIC | Age: 45
End: 2025-01-09
Payer: COMMERCIAL

## 2025-01-09 ENCOUNTER — LAB (OUTPATIENT)
Dept: LAB | Facility: HOSPITAL | Age: 45
End: 2025-01-09
Payer: COMMERCIAL

## 2025-01-09 DIAGNOSIS — C50.912 INVASIVE DUCTAL CARCINOMA OF BREAST, LEFT: Primary | ICD-10-CM

## 2025-01-09 DIAGNOSIS — C50.912 INVASIVE DUCTAL CARCINOMA OF BREAST, LEFT: ICD-10-CM

## 2025-01-09 LAB
ALBUMIN SERPL-MCNC: 4.3 G/DL (ref 3.5–5.2)
ALBUMIN/GLOB SERPL: 1.6 G/DL
ALP SERPL-CCNC: 289 U/L (ref 39–117)
ALT SERPL W P-5'-P-CCNC: 13 U/L (ref 1–33)
ANION GAP SERPL CALCULATED.3IONS-SCNC: 10 MMOL/L (ref 5–15)
AST SERPL-CCNC: 14 U/L (ref 1–32)
BASOPHILS # BLD MANUAL: 0.32 10*3/MM3 (ref 0–0.2)
BASOPHILS NFR BLD MANUAL: 1 % (ref 0–1.5)
BILIRUB SERPL-MCNC: 0.6 MG/DL (ref 0–1.2)
BUN SERPL-MCNC: 14 MG/DL (ref 6–20)
BUN/CREAT SERPL: 17.3 (ref 7–25)
CALCIUM SPEC-SCNC: 10 MG/DL (ref 8.6–10.5)
CHLORIDE SERPL-SCNC: 99 MMOL/L (ref 98–107)
CO2 SERPL-SCNC: 32 MMOL/L (ref 22–29)
CREAT SERPL-MCNC: 0.81 MG/DL (ref 0.57–1)
DEPRECATED RDW RBC AUTO: 49.8 FL (ref 37–54)
EGFRCR SERPLBLD CKD-EPI 2021: 91.4 ML/MIN/1.73
EOSINOPHIL # BLD MANUAL: 0 10*3/MM3 (ref 0–0.4)
EOSINOPHIL NFR BLD MANUAL: 0 % (ref 0.3–6.2)
ERYTHROCYTE [DISTWIDTH] IN BLOOD BY AUTOMATED COUNT: 14.8 % (ref 12.3–15.4)
GLOBULIN UR ELPH-MCNC: 2.7 GM/DL
GLUCOSE SERPL-MCNC: 135 MG/DL (ref 65–99)
HCT VFR BLD AUTO: 41.6 % (ref 34–46.6)
HGB BLD-MCNC: 13.3 G/DL (ref 12–15.9)
LYMPHOCYTES # BLD MANUAL: 4.83 10*3/MM3 (ref 0.7–3.1)
LYMPHOCYTES NFR BLD MANUAL: 5 % (ref 5–12)
MAGNESIUM SERPL-MCNC: 1.7 MG/DL (ref 1.6–2.6)
MCH RBC QN AUTO: 29.1 PG (ref 26.6–33)
MCHC RBC AUTO-ENTMCNC: 32 G/DL (ref 31.5–35.7)
MCV RBC AUTO: 91 FL (ref 79–97)
METAMYELOCYTES NFR BLD MANUAL: 2 % (ref 0–0)
MONOCYTES # BLD: 1.61 10*3/MM3 (ref 0.1–0.9)
NEUTROPHILS # BLD AUTO: 24.77 10*3/MM3 (ref 1.7–7)
NEUTROPHILS NFR BLD MANUAL: 61 % (ref 42.7–76)
NEUTS BAND NFR BLD MANUAL: 16 % (ref 0–5)
PLAT MORPH BLD: NORMAL
PLATELET # BLD AUTO: 243 10*3/MM3 (ref 140–450)
PMV BLD AUTO: 11.3 FL (ref 6–12)
POIKILOCYTOSIS BLD QL SMEAR: ABNORMAL
POTASSIUM SERPL-SCNC: 3.8 MMOL/L (ref 3.5–5.2)
PROT SERPL-MCNC: 7 G/DL (ref 6–8.5)
RBC # BLD AUTO: 4.57 10*6/MM3 (ref 3.77–5.28)
SODIUM SERPL-SCNC: 141 MMOL/L (ref 136–145)
STOMATOCYTES BLD QL SMEAR: ABNORMAL
TARGETS BLD QL SMEAR: ABNORMAL
TOXIC GRANULATION: ABNORMAL
VARIANT LYMPHS NFR BLD MANUAL: 11 % (ref 19.6–45.3)
VARIANT LYMPHS NFR BLD MANUAL: 4 % (ref 0–5)
WBC NRBC COR # BLD AUTO: 32.17 10*3/MM3 (ref 3.4–10.8)

## 2025-01-09 PROCEDURE — 83735 ASSAY OF MAGNESIUM: CPT

## 2025-01-09 PROCEDURE — 80053 COMPREHEN METABOLIC PANEL: CPT

## 2025-01-09 PROCEDURE — 85025 COMPLETE CBC W/AUTO DIFF WBC: CPT

## 2025-01-09 PROCEDURE — 36415 COLL VENOUS BLD VENIPUNCTURE: CPT

## 2025-01-09 PROCEDURE — 85007 BL SMEAR W/DIFF WBC COUNT: CPT

## 2025-01-09 NOTE — TELEPHONE ENCOUNTER
Mildred with Lab called in Critical results of WBC 32.17. I notified Dr Rangel of this result and that the patient received Udenyca on 1/3/25. He said that it will be elevated and this is ok.

## 2025-01-14 DIAGNOSIS — C50.912 DUCTAL CARCINOMA OF LEFT BREAST: Primary | ICD-10-CM

## 2025-01-14 DIAGNOSIS — C50.912 INVASIVE DUCTAL CARCINOMA OF BREAST, LEFT: ICD-10-CM

## 2025-01-14 RX ORDER — LORAZEPAM 2 MG/ML
1 INJECTION INTRAMUSCULAR EVERY 4 HOURS PRN
Start: 2025-01-23 | End: 2025-01-28

## 2025-01-14 RX ORDER — FAMOTIDINE 10 MG/ML
20 INJECTION, SOLUTION INTRAVENOUS AS NEEDED
OUTPATIENT
Start: 2025-01-23

## 2025-01-14 RX ORDER — SODIUM CHLORIDE 9 MG/ML
20 INJECTION, SOLUTION INTRAVENOUS ONCE
OUTPATIENT
Start: 2025-01-23

## 2025-01-14 RX ORDER — HYDROCORTISONE SODIUM SUCCINATE 100 MG/2ML
100 INJECTION INTRAMUSCULAR; INTRAVENOUS AS NEEDED
OUTPATIENT
Start: 2025-01-23

## 2025-01-14 RX ORDER — OLANZAPINE 5 MG/1
5 TABLET ORAL ONCE
Start: 2025-01-23 | End: 2025-01-23

## 2025-01-14 RX ORDER — PALONOSETRON 0.05 MG/ML
0.25 INJECTION, SOLUTION INTRAVENOUS ONCE
OUTPATIENT
Start: 2025-01-23

## 2025-01-14 RX ORDER — APREPITANT 80 MG/1
80 CAPSULE ORAL DAILY
Start: 2025-01-23

## 2025-01-14 RX ORDER — DIPHENHYDRAMINE HYDROCHLORIDE 50 MG/ML
50 INJECTION INTRAMUSCULAR; INTRAVENOUS ONCE
Start: 2025-01-23 | End: 2025-01-23

## 2025-01-14 RX ORDER — FAMOTIDINE 10 MG/ML
20 INJECTION, SOLUTION INTRAVENOUS ONCE
Start: 2025-01-23

## 2025-01-14 RX ORDER — DIPHENHYDRAMINE HYDROCHLORIDE 50 MG/ML
50 INJECTION INTRAMUSCULAR; INTRAVENOUS AS NEEDED
OUTPATIENT
Start: 2025-01-23

## 2025-01-16 ENCOUNTER — LAB (OUTPATIENT)
Dept: LAB | Facility: HOSPITAL | Age: 45
End: 2025-01-16
Payer: COMMERCIAL

## 2025-01-16 DIAGNOSIS — C50.912 DUCTAL CARCINOMA OF LEFT BREAST: ICD-10-CM

## 2025-01-16 DIAGNOSIS — C50.912 INVASIVE DUCTAL CARCINOMA OF BREAST, LEFT: ICD-10-CM

## 2025-01-16 LAB
ALBUMIN SERPL-MCNC: 4.3 G/DL (ref 3.5–5.2)
ALBUMIN/GLOB SERPL: 1.8 G/DL
ALP SERPL-CCNC: 179 U/L (ref 39–117)
ALT SERPL W P-5'-P-CCNC: 21 U/L (ref 1–33)
ANION GAP SERPL CALCULATED.3IONS-SCNC: 13 MMOL/L (ref 5–15)
AST SERPL-CCNC: 19 U/L (ref 1–32)
BASOPHILS # BLD AUTO: 0.06 10*3/MM3 (ref 0–0.2)
BASOPHILS NFR BLD AUTO: 0.5 % (ref 0–1.5)
BILIRUB SERPL-MCNC: 0.5 MG/DL (ref 0–1.2)
BUN SERPL-MCNC: 11 MG/DL (ref 6–20)
BUN/CREAT SERPL: 13.3 (ref 7–25)
CALCIUM SPEC-SCNC: 9.5 MG/DL (ref 8.6–10.5)
CHLORIDE SERPL-SCNC: 102 MMOL/L (ref 98–107)
CO2 SERPL-SCNC: 26 MMOL/L (ref 22–29)
CREAT SERPL-MCNC: 0.83 MG/DL (ref 0.57–1)
DEPRECATED RDW RBC AUTO: 52.1 FL (ref 37–54)
EGFRCR SERPLBLD CKD-EPI 2021: 88.7 ML/MIN/1.73
EOSINOPHIL # BLD AUTO: 0.05 10*3/MM3 (ref 0–0.4)
EOSINOPHIL NFR BLD AUTO: 0.4 % (ref 0.3–6.2)
ERYTHROCYTE [DISTWIDTH] IN BLOOD BY AUTOMATED COUNT: 15.4 % (ref 12.3–15.4)
GLOBULIN UR ELPH-MCNC: 2.4 GM/DL
GLUCOSE SERPL-MCNC: 156 MG/DL (ref 65–99)
HCT VFR BLD AUTO: 40.7 % (ref 34–46.6)
HGB BLD-MCNC: 12.8 G/DL (ref 12–15.9)
IMM GRANULOCYTES # BLD AUTO: 0.06 10*3/MM3 (ref 0–0.05)
IMM GRANULOCYTES NFR BLD AUTO: 0.5 % (ref 0–0.5)
LYMPHOCYTES # BLD AUTO: 2.02 10*3/MM3 (ref 0.7–3.1)
LYMPHOCYTES NFR BLD AUTO: 16 % (ref 19.6–45.3)
MAGNESIUM SERPL-MCNC: 1.6 MG/DL (ref 1.6–2.6)
MCH RBC QN AUTO: 28.9 PG (ref 26.6–33)
MCHC RBC AUTO-ENTMCNC: 31.4 G/DL (ref 31.5–35.7)
MCV RBC AUTO: 91.9 FL (ref 79–97)
MONOCYTES # BLD AUTO: 0.44 10*3/MM3 (ref 0.1–0.9)
MONOCYTES NFR BLD AUTO: 3.5 % (ref 5–12)
NEUTROPHILS NFR BLD AUTO: 10.02 10*3/MM3 (ref 1.7–7)
NEUTROPHILS NFR BLD AUTO: 79.1 % (ref 42.7–76)
NRBC BLD AUTO-RTO: 0 /100 WBC (ref 0–0.2)
PLATELET # BLD AUTO: 189 10*3/MM3 (ref 140–450)
PMV BLD AUTO: 11.7 FL (ref 6–12)
POTASSIUM SERPL-SCNC: 3.6 MMOL/L (ref 3.5–5.2)
PROT SERPL-MCNC: 6.7 G/DL (ref 6–8.5)
RBC # BLD AUTO: 4.43 10*6/MM3 (ref 3.77–5.28)
SODIUM SERPL-SCNC: 141 MMOL/L (ref 136–145)
WBC NRBC COR # BLD AUTO: 12.65 10*3/MM3 (ref 3.4–10.8)

## 2025-01-16 PROCEDURE — 83735 ASSAY OF MAGNESIUM: CPT

## 2025-01-16 PROCEDURE — 36415 COLL VENOUS BLD VENIPUNCTURE: CPT

## 2025-01-16 PROCEDURE — 85025 COMPLETE CBC W/AUTO DIFF WBC: CPT

## 2025-01-16 PROCEDURE — 80053 COMPREHEN METABOLIC PANEL: CPT

## 2025-01-20 DIAGNOSIS — F90.2 ATTENTION DEFICIT HYPERACTIVITY DISORDER (ADHD), COMBINED TYPE: ICD-10-CM

## 2025-01-21 ENCOUNTER — PATIENT MESSAGE (OUTPATIENT)
Dept: FAMILY MEDICINE CLINIC | Facility: CLINIC | Age: 45
End: 2025-01-21
Payer: COMMERCIAL

## 2025-01-21 DIAGNOSIS — F90.2 ATTENTION DEFICIT HYPERACTIVITY DISORDER (ADHD), COMBINED TYPE: ICD-10-CM

## 2025-01-21 RX ORDER — DULOXETIN HYDROCHLORIDE 20 MG/1
20 CAPSULE, DELAYED RELEASE ORAL DAILY
Qty: 30 CAPSULE | Refills: 2 | Status: SHIPPED | OUTPATIENT
Start: 2025-01-21

## 2025-01-21 NOTE — TELEPHONE ENCOUNTER
Rx Refill Note  Requested Prescriptions     Pending Prescriptions Disp Refills    DULoxetine (CYMBALTA) 20 MG capsule 30 capsule 2     Sig: Take 1 capsule by mouth Daily.      Last office visit with prescribing clinician: 11/1/2024   Last telemedicine visit with prescribing clinician: Visit date not found   Next office visit with prescribing clinician: 1/28/2025    Radha Michelle MA  01/21/25, 15:55 CST

## 2025-01-22 ENCOUNTER — OFFICE VISIT (OUTPATIENT)
Age: 45
End: 2025-01-22
Payer: COMMERCIAL

## 2025-01-22 VITALS
SYSTOLIC BLOOD PRESSURE: 134 MMHG | BODY MASS INDEX: 25.46 KG/M2 | HEIGHT: 66 IN | WEIGHT: 158.4 LBS | DIASTOLIC BLOOD PRESSURE: 92 MMHG

## 2025-01-22 DIAGNOSIS — N94.10 DYSPAREUNIA IN FEMALE: Primary | ICD-10-CM

## 2025-01-22 DIAGNOSIS — F17.200 SMOKER: ICD-10-CM

## 2025-01-22 RX ORDER — LISDEXAMFETAMINE DIMESYLATE 60 MG/1
60 CAPSULE ORAL EVERY MORNING
Qty: 30 CAPSULE | Refills: 0 | Status: SHIPPED | OUTPATIENT
Start: 2025-01-22 | End: 2025-02-21

## 2025-01-22 RX ORDER — DEXTROAMPHETAMINE SACCHARATE, AMPHETAMINE ASPARTATE, DEXTROAMPHETAMINE SULFATE AND AMPHETAMINE SULFATE 2.5; 2.5; 2.5; 2.5 MG/1; MG/1; MG/1; MG/1
10 TABLET ORAL DAILY
Qty: 30 TABLET | Refills: 0 | Status: SHIPPED | OUTPATIENT
Start: 2025-01-22 | End: 2025-02-21

## 2025-01-22 NOTE — PROGRESS NOTES
Chief Complaint  Dyspareunia (Pt here for f/u after worsening dyspareunia with insertion and penetration and unable to have intercourse, she had TLH/BSO with Kacey 09/20/2024, benign pathology)    Subjective        Uyen José presents to [facility]  History of Present Illness    History of Present Illness  The patient is a 44-year-old female who presents for evaluation of dyspareunia.    She underwent a hysterectomy and bilateral adnexectomy 4 months ago for benign disease. She had a previous ablation that was causing her significant pain and hematometrium formation. She reports persistent discomfort during sexual intercourse, describing it as a sensation of something rubbing internally. This discomfort is experienced by both her and her partner. She also reports a lack of sexual desire, which she attributes to her ongoing chemotherapy treatment. She does not experience any post-coital bleeding. She has noticed small bumps on the left and right sides of her vagina, which appear bright and feel irritated. These bumps have been present since her first procedure prior to the hysterectomy. She has attempted to alleviate the external tenderness using coconut oil, which provided some relief. However, the internal discomfort persists. She expresses concern about the potential impact of hormone replacement therapy on her cancer treatment.    She also has a recent diagnosis of breast cancer. She has 2 more treatments left, treatment 5 and 6. After treatment 3, she had an MRI which showed no evidence of cancer. She has not had a mastectomy or lumpectomy yet. She is having trouble finding a plastic surgeon that will take her insurance, but it looks like she is going to go to [facility]. She had chemotherapy on 02/01/2025 and 03/01/2025. She has an appointment with Dr. Gentile on 02/17/2025.    Objective   Vital Signs:  /92 (BP Location: Left arm, Patient Position: Sitting, Cuff  "Size: Adult)   Ht 168.7 cm (66.4\")   Wt 71.8 kg (158 lb 6.4 oz)   BMI 25.26 kg/m²   Estimated body mass index is 25.26 kg/m² as calculated from the following:    Height as of this encounter: 168.7 cm (66.4\").    Weight as of this encounter: 71.8 kg (158 lb 6.4 oz).    Physical Exam  Genitourinary:     Comments: Vaginal cuff is intact.  Barbed suture is palpated.  She is tender specifically over the vaginal cuff.       Result Review :                Assessment and Plan   Diagnoses and all orders for this visit:    1. Dyspareunia in female (Primary)    2. Smoker        Assessment & Plan  1. Dyspareunia.  The sutures are expected to dissolve within a period of 3 to 6 months. The presence of skin tags was noted during the examination. The only area that elicits tenderness is right where the suture is. She was advised to abstain from deep penetration during sexual intercourse until the sutures have completely dissolved. Various lubricants such as Astroglide or coconut oil were recommended for use to alleviate external irritation. Hormonal treatments were discussed, but given her recent breast cancer diagnosis, non-hormonal options were preferred. A prescription for Vagifem, an estrogen suppository, could be considered, contingent upon approval from Dr. Gentile.    2. Recent diagnosis of breast cancer.  She has completed three chemotherapy treatments and has two more scheduled for 02/01/2025 and 03/01/2025. An MRI after the third treatment showed no evidence of cancer. She is currently seeking a plastic surgeon who accepts her insurance for a mastectomy or lumpectomy post-chemotherapy. She was advised to follow up with Dr. Gentile on 02/17/2025 to discuss further treatment options and to check if there have been any changes regarding insurance acceptance by the preferred plastic surgeon.    Follow-up  The patient will follow up in 3 months.    PROCEDURE  The patient underwent a hysterectomy and bilateral adnexectomy " 4 months ago for benign disease. She had a previous ablation that was causing her significant pain and hematometrium formation.         Follow Up   Return in about 3 months (around 4/22/2025) for with me.  Patient was given instructions and counseling regarding her condition or for health maintenance advice. Please see specific information pulled into the AVS if appropriate.         Stevan Espinal MD    Patient or patient representative verbalized consent for the use of Ambient Listening during the visit with  Stevan Espinal MD for chart documentation. 1/22/2025  12:00 CST

## 2025-01-22 NOTE — TELEPHONE ENCOUNTER
Pt is due for 3nd fill on Vyvanse and 2nd fill on Adderall 10 mg.  Pt was last seen on 11-1-24 and okayed for a 3 month f/u. UDS is up to date ( 5-9-24) and appropriate.  Routing to Dr. Chavez for approval.  Sent a [employer] message to pt regarding refill request.

## 2025-01-23 ENCOUNTER — LAB (OUTPATIENT)
Dept: LAB | Facility: HOSPITAL | Age: 45
End: 2025-01-23
Payer: COMMERCIAL

## 2025-01-23 DIAGNOSIS — C50.912 INVASIVE DUCTAL CARCINOMA OF BREAST, LEFT: ICD-10-CM

## 2025-01-23 LAB
ALBUMIN SERPL-MCNC: 4.2 G/DL (ref 3.5–5.2)
ALBUMIN/GLOB SERPL: 1.6 G/DL
ALP SERPL-CCNC: 116 U/L (ref 39–117)
ALT SERPL W P-5'-P-CCNC: 16 U/L (ref 1–33)
ANION GAP SERPL CALCULATED.3IONS-SCNC: 10 MMOL/L (ref 5–15)
AST SERPL-CCNC: 15 U/L (ref 1–32)
BASOPHILS # BLD AUTO: 0.07 10*3/MM3 (ref 0–0.2)
BASOPHILS NFR BLD AUTO: 0.9 % (ref 0–1.5)
BILIRUB SERPL-MCNC: 1 MG/DL (ref 0–1.2)
BUN SERPL-MCNC: 14 MG/DL (ref 6–20)
BUN/CREAT SERPL: 18.2 (ref 7–25)
CALCIUM SPEC-SCNC: 10 MG/DL (ref 8.6–10.5)
CHLORIDE SERPL-SCNC: 101 MMOL/L (ref 98–107)
CO2 SERPL-SCNC: 29 MMOL/L (ref 22–29)
CREAT SERPL-MCNC: 0.77 MG/DL (ref 0.57–1)
DEPRECATED RDW RBC AUTO: 52.4 FL (ref 37–54)
EGFRCR SERPLBLD CKD-EPI 2021: 97.1 ML/MIN/1.73
EOSINOPHIL # BLD AUTO: 0.03 10*3/MM3 (ref 0–0.4)
EOSINOPHIL NFR BLD AUTO: 0.4 % (ref 0.3–6.2)
ERYTHROCYTE [DISTWIDTH] IN BLOOD BY AUTOMATED COUNT: 15.2 % (ref 12.3–15.4)
GLOBULIN UR ELPH-MCNC: 2.7 GM/DL
GLUCOSE SERPL-MCNC: 137 MG/DL (ref 65–99)
HCT VFR BLD AUTO: 40 % (ref 34–46.6)
HGB BLD-MCNC: 12.6 G/DL (ref 12–15.9)
IMM GRANULOCYTES # BLD AUTO: 0.02 10*3/MM3 (ref 0–0.05)
IMM GRANULOCYTES NFR BLD AUTO: 0.3 % (ref 0–0.5)
LYMPHOCYTES # BLD AUTO: 1.57 10*3/MM3 (ref 0.7–3.1)
LYMPHOCYTES NFR BLD AUTO: 20.3 % (ref 19.6–45.3)
MAGNESIUM SERPL-MCNC: 1.6 MG/DL (ref 1.6–2.6)
MCH RBC QN AUTO: 29.3 PG (ref 26.6–33)
MCHC RBC AUTO-ENTMCNC: 31.5 G/DL (ref 31.5–35.7)
MCV RBC AUTO: 93 FL (ref 79–97)
MONOCYTES # BLD AUTO: 0.61 10*3/MM3 (ref 0.1–0.9)
MONOCYTES NFR BLD AUTO: 7.9 % (ref 5–12)
NEUTROPHILS NFR BLD AUTO: 5.43 10*3/MM3 (ref 1.7–7)
NEUTROPHILS NFR BLD AUTO: 70.2 % (ref 42.7–76)
NRBC BLD AUTO-RTO: 0 /100 WBC (ref 0–0.2)
PLATELET # BLD AUTO: 250 10*3/MM3 (ref 140–450)
PMV BLD AUTO: 10.9 FL (ref 6–12)
POTASSIUM SERPL-SCNC: 3.9 MMOL/L (ref 3.5–5.2)
PROT SERPL-MCNC: 6.9 G/DL (ref 6–8.5)
RBC # BLD AUTO: 4.3 10*6/MM3 (ref 3.77–5.28)
SODIUM SERPL-SCNC: 140 MMOL/L (ref 136–145)
WBC NRBC COR # BLD AUTO: 7.73 10*3/MM3 (ref 3.4–10.8)

## 2025-01-23 PROCEDURE — 85025 COMPLETE CBC W/AUTO DIFF WBC: CPT

## 2025-01-23 PROCEDURE — 80053 COMPREHEN METABOLIC PANEL: CPT

## 2025-01-23 PROCEDURE — 36415 COLL VENOUS BLD VENIPUNCTURE: CPT

## 2025-01-23 PROCEDURE — 83735 ASSAY OF MAGNESIUM: CPT

## 2025-01-27 DIAGNOSIS — C50.912 INVASIVE DUCTAL CARCINOMA OF BREAST, LEFT: Primary | ICD-10-CM

## 2025-01-28 ENCOUNTER — OFFICE VISIT (OUTPATIENT)
Dept: FAMILY MEDICINE CLINIC | Facility: CLINIC | Age: 45
End: 2025-01-28
Payer: COMMERCIAL

## 2025-01-28 VITALS
SYSTOLIC BLOOD PRESSURE: 131 MMHG | WEIGHT: 160 LBS | HEART RATE: 67 BPM | HEIGHT: 67 IN | DIASTOLIC BLOOD PRESSURE: 80 MMHG | BODY MASS INDEX: 25.11 KG/M2 | OXYGEN SATURATION: 99 %

## 2025-01-28 DIAGNOSIS — F90.2 ATTENTION DEFICIT HYPERACTIVITY DISORDER (ADHD), COMBINED TYPE: Primary | ICD-10-CM

## 2025-01-28 DIAGNOSIS — M05.9 RHEUMATOID ARTHRITIS WITH POSITIVE RHEUMATOID FACTOR, INVOLVING UNSPECIFIED SITE: ICD-10-CM

## 2025-01-28 DIAGNOSIS — C50.912 DUCTAL CARCINOMA OF LEFT BREAST: ICD-10-CM

## 2025-01-28 DIAGNOSIS — I10 ESSENTIAL HYPERTENSION: ICD-10-CM

## 2025-01-28 DIAGNOSIS — E03.9 HYPOTHYROIDISM, UNSPECIFIED TYPE: ICD-10-CM

## 2025-01-28 DIAGNOSIS — K21.9 GASTROESOPHAGEAL REFLUX DISEASE WITHOUT ESOPHAGITIS: ICD-10-CM

## 2025-01-28 PROCEDURE — 99214 OFFICE O/P EST MOD 30 MIN: CPT | Performed by: NURSE PRACTITIONER

## 2025-01-28 RX ORDER — DEXTROAMPHETAMINE SACCHARATE, AMPHETAMINE ASPARTATE, DEXTROAMPHETAMINE SULFATE AND AMPHETAMINE SULFATE 2.5; 2.5; 2.5; 2.5 MG/1; MG/1; MG/1; MG/1
10 TABLET ORAL DAILY
Qty: 30 TABLET | Refills: 0 | Status: SHIPPED | OUTPATIENT
Start: 2025-03-22 | End: 2025-04-21

## 2025-01-28 RX ORDER — BISOPROLOL FUMARATE AND HYDROCHLOROTHIAZIDE 5; 6.25 MG/1; MG/1
1 TABLET ORAL DAILY
Qty: 30 TABLET | Refills: 2 | Status: SHIPPED | OUTPATIENT
Start: 2025-01-28

## 2025-01-28 RX ORDER — IBUPROFEN 800 MG/1
800 TABLET, FILM COATED ORAL EVERY 6 HOURS PRN
Qty: 90 TABLET | Refills: 2 | Status: SHIPPED | OUTPATIENT
Start: 2025-01-28

## 2025-01-28 RX ORDER — LISDEXAMFETAMINE DIMESYLATE 60 MG/1
60 CAPSULE ORAL EVERY MORNING
Qty: 30 CAPSULE | Refills: 0 | Status: SHIPPED | OUTPATIENT
Start: 2025-02-21 | End: 2025-03-23

## 2025-01-28 RX ORDER — DEXTROAMPHETAMINE SACCHARATE, AMPHETAMINE ASPARTATE, DEXTROAMPHETAMINE SULFATE AND AMPHETAMINE SULFATE 2.5; 2.5; 2.5; 2.5 MG/1; MG/1; MG/1; MG/1
10 TABLET ORAL DAILY
Qty: 30 TABLET | Refills: 0 | Status: SHIPPED | OUTPATIENT
Start: 2025-02-21 | End: 2025-03-23

## 2025-01-28 RX ORDER — DULOXETIN HYDROCHLORIDE 20 MG/1
20 CAPSULE, DELAYED RELEASE ORAL DAILY
Qty: 30 CAPSULE | Refills: 2 | Status: SHIPPED | OUTPATIENT
Start: 2025-01-28

## 2025-01-28 RX ORDER — PANTOPRAZOLE SODIUM 40 MG/1
40 TABLET, DELAYED RELEASE ORAL DAILY
Qty: 30 TABLET | Refills: 2 | Status: SHIPPED | OUTPATIENT
Start: 2025-01-28

## 2025-01-28 RX ORDER — LISDEXAMFETAMINE DIMESYLATE 60 MG/1
60 CAPSULE ORAL EVERY MORNING
Qty: 30 CAPSULE | Refills: 0 | Status: SHIPPED | OUTPATIENT
Start: 2025-03-22 | End: 2025-04-21

## 2025-01-28 RX ORDER — LEVOTHYROXINE SODIUM 112 UG/1
112 TABLET ORAL DAILY
Qty: 30 TABLET | Refills: 2 | Status: SHIPPED | OUTPATIENT
Start: 2025-01-28

## 2025-01-28 NOTE — PROGRESS NOTES
"Chief Complaint  ADHD, Hypertension, Hypothyroidism, and Heartburn    Subjective    History of Present Illness      Patient presents to [facility] for   History of Present Illness  Pt is here today for 3 month med check and refills. She reports continued improvement with current medication regimen. No complaints or concerns at this time.        History of Present Illness      Review of Systems    I have reviewed and agree with the HPI information as above.  Josi Ivory, APRN     Objective   Vital Signs:   /80   Pulse 67   Ht 168.9 cm (66.5\")   Wt 72.6 kg (160 lb)   SpO2 99%   BMI 25.44 kg/m²            Physical Exam  Vitals and nursing note reviewed.   Constitutional:       Appearance: Normal appearance. She is well-developed.   HENT:      Head: Normocephalic and atraumatic.      Right Ear: Tympanic membrane, ear canal and external ear normal.      Left Ear: Tympanic membrane, ear canal and external ear normal.      Nose: Nose normal. No septal deviation, nasal tenderness or congestion.      Mouth/Throat:      Lips: Pink. No lesions.      Mouth: Mucous membranes are moist. No oral lesions.      Dentition: Normal dentition.      Pharynx: Oropharynx is clear. No pharyngeal swelling, oropharyngeal exudate or posterior oropharyngeal erythema.   Eyes:      General: Lids are normal. Vision grossly intact. No scleral icterus.        Right eye: No discharge.         Left eye: No discharge.      Extraocular Movements: Extraocular movements intact.      Conjunctiva/sclera: Conjunctivae normal.      Right eye: Right conjunctiva is not injected.      Left eye: Left conjunctiva is not injected.      Pupils: Pupils are equal, round, and reactive to light.   Neck:      Thyroid: No thyroid mass.      Trachea: Trachea normal.   Cardiovascular:      Rate and Rhythm: Normal rate and regular rhythm.      Heart sounds: Normal heart sounds. No murmur heard.     No gallop.   Pulmonary:    "   Effort: Pulmonary effort is normal.      Breath sounds: Normal breath sounds and air entry. No wheezing, rhonchi or rales.   Musculoskeletal:         General: No tenderness or deformity. Normal range of motion.      Cervical back: Full passive range of motion without pain, normal range of motion and neck supple.      Thoracic back: Normal.      Right lower leg: No edema.      Left lower leg: No edema.   Skin:     General: Skin is warm and dry.      Coloration: Skin is not jaundiced.      Findings: No rash.   Neurological:      Mental Status: She is alert and oriented to person, place, and time.      Sensory: Sensation is intact.      Motor: Motor function is intact.      Coordination: Coordination is intact.      Gait: Gait is intact.      Deep Tendon Reflexes: Reflexes are normal and symmetric.   Psychiatric:         Mood and Affect: Mood and affect normal.         Behavior: Behavior normal.         Judgment: Judgment normal.                    Result Review  Data Reviewed:                   Assessment and Plan      Diagnoses and all orders for this visit:    1. Attention deficit hyperactivity disorder (ADHD), combined type (Primary)  Comments:  Stable. cont vyvanse 60 mg  and adderall 10 mg  CSA and UDS are UTD    2. Essential hypertension  Comments:  Controlled. cont iac 5/6.25 mg  Orders:  -     bisoprolol-hydrochlorothiazide (ZIAC) 5-6.25 MG per tablet; Take 1 tablet by mouth Daily.  Dispense: 30 tablet; Refill: 2    3. Hypothyroidism, unspecified type  Comments:  Last TSH normal  cont synthroid 112mcg  Orders:  -     levothyroxine (SYNTHROID, LEVOTHROID) 112 MCG tablet; Take 1 tablet by mouth Daily.  Dispense: 30 tablet; Refill: 2    4. Rheumatoid arthritis with positive rheumatoid factor, involving unspecified site  -     ibuprofen (ADVIL,MOTRIN) 800 MG tablet; Take 1 tablet by mouth Every 6 (Six) Hours As Needed for Mild Pain.  Dispense: 90 tablet; Refill: 2    5. Gastroesophageal reflux disease without  esophagitis  -     pantoprazole (PROTONIX) 40 MG EC tablet; Take 1 tablet by mouth Daily.  Dispense: 30 tablet; Refill: 2    6. Ductal carcinoma of left breast  Comments:  following with oncology. has 2 chemo tx left.   [facility]    Other orders  -     DULoxetine (CYMBALTA) 20 MG capsule; Take 1 capsule by mouth Daily.  Dispense: 30 capsule; Refill: 2      ADHD Follow up:    PDMP reviewed and is clean. ADRS (Adult ADHD Assessment Form) reviewed in detail, scanned in chart, and has been reviewed at time of appointment.  All questions, including medication and side effects, were discussed in detail at time of patient's visit. Patient will continue same medication which was discussed at today's visit. Patient is to return in 3 month(s).    Last Urine Toxicity  More data exists         Latest Ref Rng & Units 5/9/2024 7/12/2023   LAST URINE TOXICITY RESULTS   Amphetamine, Urine Qual Negative Positive  Positive    Barbiturates Screen, Urine Negative Negative  Negative    Benzodiazepine Screen, Urine Negative Negative  Negative    Buprenorphine, Screen, Urine Negative Negative  Negative    Cocaine Screen, Urine Negative Negative  Negative    Fentanyl, Urine Negative Negative  Negative    Methadone Screen , Urine Negative Negative  Negative    Methamphetamine, Ur Negative Negative  Negative       Details                    Urine Drug Screen Results: UDS RESULTS: Current results appropriate    CSA completed on: 5/9/24    Assessment & Plan          Follow Up   Return in about 3 months (around 4/28/2025) for ADHD follow up, Recheck.  Patient was given instructions and counseling regarding her condition or for health maintenance advice. Please see specific information pulled into the AVS if appropriate.

## 2025-01-30 ENCOUNTER — LAB (OUTPATIENT)
Dept: LAB | Facility: HOSPITAL | Age: 45
End: 2025-01-30
Payer: COMMERCIAL

## 2025-01-30 ENCOUNTER — TELEPHONE (OUTPATIENT)
Dept: ONCOLOGY | Facility: CLINIC | Age: 45
End: 2025-01-30
Payer: COMMERCIAL

## 2025-01-30 DIAGNOSIS — C50.912 INVASIVE DUCTAL CARCINOMA OF BREAST, LEFT: ICD-10-CM

## 2025-01-30 LAB
ALBUMIN SERPL-MCNC: 4.3 G/DL (ref 3.5–5.2)
ALBUMIN/GLOB SERPL: 1.6 G/DL
ALP SERPL-CCNC: 109 U/L (ref 39–117)
ALT SERPL W P-5'-P-CCNC: 14 U/L (ref 1–33)
ANION GAP SERPL CALCULATED.3IONS-SCNC: 10 MMOL/L (ref 5–15)
AST SERPL-CCNC: 16 U/L (ref 1–32)
BASOPHILS # BLD AUTO: 0.05 10*3/MM3 (ref 0–0.2)
BASOPHILS NFR BLD AUTO: 0.7 % (ref 0–1.5)
BILIRUB SERPL-MCNC: 0.7 MG/DL (ref 0–1.2)
BUN SERPL-MCNC: 10 MG/DL (ref 6–20)
BUN/CREAT SERPL: 12.7 (ref 7–25)
CALCIUM SPEC-SCNC: 9.7 MG/DL (ref 8.6–10.5)
CHLORIDE SERPL-SCNC: 102 MMOL/L (ref 98–107)
CO2 SERPL-SCNC: 31 MMOL/L (ref 22–29)
CREAT SERPL-MCNC: 0.79 MG/DL (ref 0.57–1)
DEPRECATED RDW RBC AUTO: 48.8 FL (ref 37–54)
EGFRCR SERPLBLD CKD-EPI 2021: 94.1 ML/MIN/1.73
EOSINOPHIL # BLD AUTO: 0.47 10*3/MM3 (ref 0–0.4)
EOSINOPHIL NFR BLD AUTO: 6.4 % (ref 0.3–6.2)
ERYTHROCYTE [DISTWIDTH] IN BLOOD BY AUTOMATED COUNT: 14.4 % (ref 12.3–15.4)
GLOBULIN UR ELPH-MCNC: 2.7 GM/DL
GLUCOSE SERPL-MCNC: 121 MG/DL (ref 65–99)
HCT VFR BLD AUTO: 39.5 % (ref 34–46.6)
HGB BLD-MCNC: 12.4 G/DL (ref 12–15.9)
IMM GRANULOCYTES # BLD AUTO: 0.01 10*3/MM3 (ref 0–0.05)
IMM GRANULOCYTES NFR BLD AUTO: 0.1 % (ref 0–0.5)
LYMPHOCYTES # BLD AUTO: 1.69 10*3/MM3 (ref 0.7–3.1)
LYMPHOCYTES NFR BLD AUTO: 23 % (ref 19.6–45.3)
MAGNESIUM SERPL-MCNC: 1.7 MG/DL (ref 1.6–2.6)
MCH RBC QN AUTO: 28.8 PG (ref 26.6–33)
MCHC RBC AUTO-ENTMCNC: 31.4 G/DL (ref 31.5–35.7)
MCV RBC AUTO: 91.6 FL (ref 79–97)
MONOCYTES # BLD AUTO: 0.54 10*3/MM3 (ref 0.1–0.9)
MONOCYTES NFR BLD AUTO: 7.4 % (ref 5–12)
NEUTROPHILS NFR BLD AUTO: 4.58 10*3/MM3 (ref 1.7–7)
NEUTROPHILS NFR BLD AUTO: 62.4 % (ref 42.7–76)
NRBC BLD AUTO-RTO: 0 /100 WBC (ref 0–0.2)
PLATELET # BLD AUTO: 172 10*3/MM3 (ref 140–450)
PMV BLD AUTO: 10.8 FL (ref 6–12)
POTASSIUM SERPL-SCNC: 3.4 MMOL/L (ref 3.5–5.2)
PROT SERPL-MCNC: 7 G/DL (ref 6–8.5)
RBC # BLD AUTO: 4.31 10*6/MM3 (ref 3.77–5.28)
SODIUM SERPL-SCNC: 143 MMOL/L (ref 136–145)
WBC NRBC COR # BLD AUTO: 7.34 10*3/MM3 (ref 3.4–10.8)

## 2025-01-30 PROCEDURE — 80053 COMPREHEN METABOLIC PANEL: CPT

## 2025-01-30 PROCEDURE — 36415 COLL VENOUS BLD VENIPUNCTURE: CPT

## 2025-01-30 PROCEDURE — 85025 COMPLETE CBC W/AUTO DIFF WBC: CPT

## 2025-01-30 PROCEDURE — 83735 ASSAY OF MAGNESIUM: CPT

## 2025-01-30 NOTE — TELEPHONE ENCOUNTER
Called and spoke with Leana and she reports that she already has some potassium that she will start.

## 2025-01-30 NOTE — PROGRESS NOTES
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  On 2024 underwent total laparoscopic hysterectomy with bilateral salpingoophorectomy.  She is currently receiving neoadjuvant carboplatin+docetaxel+pertuzumab+trastuzumab- C1, 10/15/24; C2, 24; C3, 24 (carbo+docetaxel dose reduced 50% since C3 due to excess nausea/vomiting); C4, 25 (dose reduced 20% pertuzumab/trastuzumab from C4).  She is here alone (accompanied by her spouse, Lam).    I have reviewed the HPI and verified with the patient the accuracy of it. No changes to interval history since the information was documented. Shyam Weiss MD 25      Diagnostic abnormalities:  - Tobacco use disorder, hypothyroidism, depression/anxiety, obesity, prior bilateral salpingectomy, , newly diagnosed IDC left breast  - 24- CT a/p-  Increased size of the low density/complex cystic mass in the uterine cavity since the previous study. This may represent abnormal endometrial thickening, complex endometrial fluid/debris accumulation in the endometrial canal due to out duct obstruction by a fibroid in the lower uterine segment as detailed above. There is a septate morphology of the uterus. Further follow-up with sonography may be obtained. Normal appendix. The gallbladder is surgically absent. The nonacute findings of the remaining abdomen similar to the previous study.  Stable heterogeneous solid mass to the right of midline at the mid to  lower uterine segment " with probable secondary dilated endometrial cavity at the uterine fundus filled with fluid and a septate uterus.  Differential diagnosis would include benign etiologies such as submucosal leiomyoma and large endometrial polyp but also neoplasm, possibly endometrial carcinoma, or carcinosarcoma of the uterus.  No anabel pelvic soft tissue nodule or lymphadenopathy identified. Small left external iliac chain lymph node is unchanged. Small right ovarian cyst. Nabothian cysts in the cervix and septate uterus. MRI pelvis without and with contrast may be helpful for further evaluation. GYN consultation is recommended.   -7/25/24- MRI breast bilateral-1. Findings suggestive for multicentric left breast malignancy with diffuse ductal carcinoma in situ extending from the dominant 2.7 cm mass within the lateral left breast at 3:00 extending to and likely involving the left nipple. A second 8 mm mass within the central left breast tissue with a third mass in the anterior depth of the inferior left breast at 6:00. Two 8 mm homogeneous persistently enhancing hyperintense T2 masses at the upper outer right breast, mid depth at the 10:00 in the 11-12 o'clock positions, with overall benign signal characteristics for which background enhancement versus papilloma fibroadenoma considered. MRI directed/second look ultrasound recommended given the presence of the contralateral left breast findings. BI-RADS CATEGORY 5: Highly suggestive of malignancy. Appropriate action should be taken. Findings strongly favoring multicentric left breast malignancy. MRI directed/second look bilateral breast ultrasound recommended. Ultrasound-guided biopsy of the left breast mass at 3:00 and of a second left breast mass (6:00 if visible on ultrasound versus mass central to the nipple) with sampling of the largest left axillary node may be performed if clinically desired. Sampling of a finding within the upper outer right breast should be considered if  solid nodule identified on ultrasound.  -7/29/24- CMP normal (GFR 78.9), UA/cultures negative, HCG negative, WBC 12.65, ANC 8.43 otherwise normal CBC  -7/31/24-  Endocervical Currettage, Endometrial Currettage, and hysteroscopic resected uterine synechia.  Final diagnosis:  1.Endometrium, curettage: A. Blood and fragments of smooth muscle. B. No histologic evidence of malignancy. Comment: Endometrial mucosal tissue is not identified. 2. Endocervix, curettage: A. Fragments of benign endocervical tissue, benign squamous mucosa and benign lower uterine segment endometrium. B. Blood and mucus. C. No dysplasia identified. 3. Endometrium, hysteroscopic resected tissue: A. Fragments of smooth muscle. B. No histologic evidence of malignancy. Comment: Endometrial mucosal tissue is not identified.  -8/7/24- US biopsy:  Final diagnosis-  1.  Mass, left breast at 6:00, 1 cm from nipple, core needle biopsies: High-grade ductal carcinoma in situ, comedo type. 2.  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 Histologic grade ([place] histologic score). Glandular (acinar)/tubular differentiation: Score 2. Nuclear pleomorphism: Score 3. Mitotic rate: Score 3. Overall grade: Grade 3. Maximum tumor diameter is at least 1.5 cm. 3.  Left axillary lymph node, core needle biopsy: Metastatic adenocarcinoma of breast. -ER 69%+; [place] 85%+; HER2 3+ (positive); Ki67 54%+  -8/12/24- CT CAP- Known left breast malignancy with enlarged left level 1 and asymmetrically prominent left level 2 and borderline level 3 axillary lymph nodes which are suspicious for axillary disease.  No additional evidence of metastatic disease in the chest.  1. Small 0.4 cm low-attenuation region in the right lobe of the liver may have been present on the prior examinations but was not as well visualized. This is too small to characterize. Continued attention on follow-up is recommended.  Fatty  infiltration of the liver.  Persistent heterogeneity of the lower uterine segment of the uterus and fluid in the endometrial canal although the fluid has decreased. Stable septate uterus  -8/12/2024- Follow-up Dr. Gentile- A/P: IDC left breast.  Met w/u, referral onc, genetics. RTC 2 weeks.  -8/16/2024-bone scan-no evidence of osteoblastic metastatic disease.  -8/16/2024- BRCA 1/2 analyses with cancer next-summary: Negative-no clinically significant variants detected (pathogenic mutations, variants of unknown significance, gross deletions/duplications: Not detected)  -8/20/2024-PET scan-abnormal PET/CT, biopsy-proven left breast malignancy and demonstrates FDG avidity with maximum SUV of 7.9.  Also potential abnormal activity at 6:00 in the left breast with a maximal SUV of 3.9.  MRI of the breast with and without contrast will be helpful to exclude multifocal or multicentric disease in the left breast.  No abnormal contralateral right breast or axillary activity identified.  3 hypermetabolic level 1 lymph nodes in the left axilla compatible with kiersten metastasis and solitary borderline level 2 axillary lymph node on the left.  -8/22/2024- Today is seen for management considerations-  -8/26/24- 2D echo-  Left ventricular systolic function is normal. Left ventricular ejection fraction appears to be 56 - 60%.    Normal right ventricular cavity size and systolic function noted.    No significant valvular abnormalities identified on this study.     -8/28/2024-CBC normal.  CMP normal.  -9/15/2024- CT abdomen/pelvis-There is a 3.2 x 3.0 x 2.6 cm focus of rounded masslike enhancement in the lower uterine segment centered along the endometrial canal (series 2-image 70, series 4-image 41). The uterine cavity is distended and fluid-filled above this at the fundus. Primary imaging concern would be endometrial carcinoma obstructing the canal although it seems there was a recent D&C with benign tissue sampling. Endometritis is  also a consideration and cannot be excluded on CT.  No free fluid in the pelvis, pelvic abscess or pelvic adenopathy.        Previous interventions:  -9/20/2024-   Total Laparoscopic Hysterectomy with bilateral salpingoophorectomy, da Kenzie assisted -Final diagnosis:  Uterus with bilateral tubes and ovaries, hysterectomy with bilateral salpingo-oophorectomies: Unremarkable cervix, negative for squamous dysplasia.  Atrophic endometrium, negative for hyperplasia and atypia.  Benign intramural leiomyoma.  Adenomyosis.  Unremarkable right ovary.  Left ovary with cystic follicles and hemorrhagic corpora lutea.     -Neoadjuvant DCPT (carboplatin+docetaxel+pertuzumab+trastuzumab)- C1, 10/15/24; C2, 11/6/24; C3, 12/4/24 (carbo+docetaxel dose reduced 50% since C3 due to excess nausea/vomiting); C4, 1/2/25 (dose reduced 20% pertuzumab/trastuzumab from C4)..        LABS    Lab Results - Last 18 Months   Lab Units 01/30/25  0740 01/23/25  0736 01/16/25  0756 01/09/25  0714 01/02/25  0740 12/04/24  0723 11/18/24  0716 11/12/24  1239 10/29/24  0745 10/21/24  0917   HEMOGLOBIN g/dL 12.4 12.6 12.8 13.3 11.8* 11.9* 12.7 13.3   < > 14.9   HEMATOCRIT % 39.5 40.0 40.7 41.6 38.1 37.5 39.7 41.3   < > 43.9   MCV fL 91.6 93.0 91.9 91.0 92.3 92.4 90.8 91.2   < > 86.9   WBC 10*3/mm3 7.34 7.73 12.65* 32.17* 7.64 12.08* 20.04* 13.52*   < > 4.29   RDW % 14.4 15.2 15.4 14.8 15.0 15.2 14.9 14.2   < > 12.8   MPV fL 10.8 10.9 11.7 11.3 11.1 10.8 11.2 11.5   < > 13.2*   PLATELETS 10*3/mm3 172 250 189 243 223 264 190 217   < > 132*   IMM GRAN % % 0.1 0.3 0.5  --  0.1 0.6* 3.0*  --    < >  --    NEUTROS ABS 10*3/mm3 4.58 5.43 10.02* 24.77* 5.22 10.60* 15.19* 9.33*   < > 1.63*   LYMPHS ABS 10*3/mm3 1.69 1.57 2.02  --  1.53 1.05 3.22*  --    < >  --    MONOS ABS 10*3/mm3 0.54 0.61 0.44  --  0.52 0.34 0.85  --    < >  --    EOS ABS 10*3/mm3 0.47* 0.03 0.05 0.00 0.32 0.00 0.01 0.00   < > 0.39   BASOS ABS 10*3/mm3 0.05 0.07 0.06 0.32* 0.04 0.02 0.16 0.00    < >  --    IMMATURE GRANS (ABS) 10*3/mm3 0.01 0.02 0.06*  --  0.01 0.07* 0.61*  --    < >  --    NRBC /100 WBC 0.0 0.0 0.0  --  0.0 0.0 0.0  --    < >  --    NEUTROPHIL % %  --   --   --  61.0  --   --   --  50.0  --  27.0*   MONOCYTES % %  --   --   --  5.0  --   --   --  5.0  --  11.0   BASOPHIL % %  --   --   --  1.0  --   --   --  0.0  --   --    ATYP LYMPH % %  --   --   --  4.0  --   --   --   --   --  2.0    < > = values in this interval not displayed.       Lab Results - Last 18 Months   Lab Units 01/30/25  0740 01/23/25  0736 01/16/25  0756 01/09/25  0714 01/02/25  0740 12/04/24  0723   GLUCOSE mg/dL 121* 137* 156* 135* 132* 205*   SODIUM mmol/L 143 140 141 141 143 140   POTASSIUM mmol/L 3.4* 3.9 3.6 3.8 3.2* 3.7   CO2 mmol/L 31.0* 29.0 26.0 32.0* 32.0* 28.0   CHLORIDE mmol/L 102 101 102 99 102 99   ANION GAP mmol/L 10.0 10.0 13.0 10.0 9.0 13.0   CREATININE mg/dL 0.79 0.77 0.83 0.81 0.72 0.67   BUN mg/dL 10 14 11 14 12 11   BUN / CREAT RATIO  12.7 18.2 13.3 17.3 16.7 16.4   CALCIUM mg/dL 9.7 10.0 9.5 10.0 9.9 9.4   ALK PHOS U/L 109 116 179* 289* 89 105   TOTAL PROTEIN g/dL 7.0 6.9 6.7 7.0 6.3 6.9   ALT (SGPT) U/L 14 16 21 13 13 15   AST (SGOT) U/L 16 15 19 14 15 14   BILIRUBIN mg/dL 0.7 1.0 0.5 0.6 0.8 0.7   ALBUMIN g/dL 4.3 4.2 4.3 4.3 4.1 4.3   GLOBULIN gm/dL 2.7 2.7 2.4 2.7 2.2 2.6         Lab Results - Last 18 Months   Lab Units 10/15/24  0744   TSH uIU/mL 0.506         PAST MEDICAL HISTORY:  ALLERGIES:  Allergies   Allergen Reactions    Percocet [Oxycodone-Acetaminophen] Hives     CURRENT MEDICATIONS:  Outpatient Encounter Medications as of 2/5/2025   Medication Sig Dispense Refill    [START ON 3/22/2025] amphetamine-dextroamphetamine (Adderall) 10 MG tablet Take 1 tablet by mouth Daily for 30 days. 30 tablet 0    [START ON 2/21/2025] amphetamine-dextroamphetamine (Adderall) 10 MG tablet Take 1 tablet by mouth Daily for 30 days. 30 tablet 0    bisoprolol-hydrochlorothiazide (ZIAC) 5-6.25 MG per tablet  Take 1 tablet by mouth Daily. 30 tablet 2    dexAMETHasone (DECADRON) 4 MG tablet Take 2 tablets 2 times daily the day before chemo and 2 tablets 2 times daily the day after chemo 48 tablet 0    DULoxetine (CYMBALTA) 20 MG capsule Take 1 capsule by mouth Daily. 30 capsule 2    ibuprofen (ADVIL,MOTRIN) 800 MG tablet Take 1 tablet by mouth Every 6 (Six) Hours As Needed for Mild Pain. 90 tablet 2    levothyroxine (SYNTHROID, LEVOTHROID) 112 MCG tablet Take 1 tablet by mouth Daily. 30 tablet 2    lidocaine-prilocaine (EMLA) 2.5-2.5 % cream Apply to port site 30 minutes before it is accessed and cover with occlusive dressing. 30 g 1    [START ON 2/21/2025] lisdexamfetamine (Vyvanse) 60 MG capsule Take 1 capsule by mouth Every Morning for 30 days 30 capsule 0    [START ON 3/22/2025] lisdexamfetamine (Vyvanse) 60 MG capsule Take 1 capsule by mouth Every Morning for 30 days 30 capsule 0    ondansetron ODT (ZOFRAN-ODT) 8 MG disintegrating tablet Place 1 tablet on the tongue Every 8 (Eight) Hours As Needed for Nausea or Vomiting. 30 tablet 1    pantoprazole (PROTONIX) 40 MG EC tablet Take 1 tablet by mouth Daily. 30 tablet 2    pertuzumab (Perjeta) 420 MG/14ML solution injection Infuse 11.33 mL into a venous catheter As Directed. Will infuse intravenously in the outpatient infusion center every 3 weeks. 14 mL 2    potassium chloride (KLOR-CON M20) 20 MEQ CR tablet Take 1 tablet by mouth 3 (Three) Times a Day. 9 tablet 0    trastuzumab-dkst (Ogivri) 420 MG chemo injection Infuse 360 mg into a venous catheter As Directed. Will infuse intravenously in the outpatient infusion center every 3 weeks. 2 each 2     Facility-Administered Encounter Medications as of 2/5/2025   Medication Dose Route Frequency Provider Last Rate Last Admin    lidocaine (XYLOCAINE) 1 % injection 30 mL  30 mL Subcutaneous Once Taylor Salinas MD        lidocaine 1% - EPINEPHrine 1:[id number] (XYLOCAINE W/EPI) 1 %-1:486230 injection 30 mL  30 mL  Taylor Mitchell MD         ADULT ILLNESSES:  Patient Active Problem List   Diagnosis Code    Tobacco abuse counseling Z71.6    Hypothyroidism E03.9    Attention deficit hyperactivity disorder F90.9    Essential hypertension I10    Irritable bowel syndrome K58.9    Mixed anxiety and depressive disorder F41.8    Mood disorder F39    Overweight (BMI 25.0-29.9) E66.3    Gastroesophageal reflux disease without esophagitis K21.9    Dysmenorrhea N94.6    Hematometra N85.7    Ductal carcinoma of left breast C50.912    Invasive ductal carcinoma of breast, left C50.912    Pelvic pain in female R10.2    Nausea R11.0    Low magnesium level R79.0    Abnormal mammogram R92.8     SURGERIES:  Past Surgical History:   Procedure Laterality Date    CARPAL TUNNEL RELEASE Right     COLONOSCOPY  05/01/2003    Normal exam    D & C HYSTEROSCOPY MYOSURE N/A 07/31/2024    Procedure: DILATATION AND CURETTAGE HYSTEROSCOPY WITH POSSIBLE TISSUE RESECTION;  Surgeon: Stvean Espinal MD;  Location: [facility] OR;  Service: Obstetrics/Gynecology;  Laterality: N/A;    DIAGNOSTIC LAPAROSCOPY  2000 [place]; normal findings; [facility]    ENDOMETRIAL ABLATION  2023    ENDOSCOPY      LAPAROSCOPIC CHOLECYSTECTOMY  2011    Dr Duncan; Jehovah's witness    LYMPH NODE BIOPSY  8/7/2024    Cancer Positive    SALPINGECTOMY Bilateral 10/16/2020    Procedure: SALPINGECTOMY LAPAROSCOPIC for sterilization;  Surgeon: Stevan Espinal MD;  Location:  [facility] OR;  Service: Obstetrics/Gynecology;  Laterality: Bilateral;    TOTAL LAPAROSCOPIC HYSTERECTOMY SALPINGO OOPHORECTOMY N/A 09/20/2024    Procedure: TOTAL LAPAROSCOPIC HYSTERECTOMY BILATERAL SALPINGOOPHORECTOMY WITH DAVINCI ROBOT;  Surgeon: Stevan Espinal MD;  Location:  [facility] OR;  Service: Robotics - DaVinci;  Laterality: N/A;    TUBAL ABDOMINAL LIGATION      cut not tied    US GUIDED LYMPH NODE BIOPSY  08/07/2024    VENOUS ACCESS DEVICE (PORT) INSERTION N/A 08/30/2024    Procedure: Single Lumen  Port-a-cath insertion with flouroscopy;  Surgeon: Shahla Gentile MD;  Location: [facility] OR;  Service: General;  Laterality: N/A;    WISDOM TOOTH EXTRACTION       HEALTH MAINTENANCE ITEMS:  Health Maintenance Due   Topic Date Due    TDAP/TD VACCINES (1 - Tdap) Never done    COLORECTAL CANCER SCREENING  2013    HEPATITIS C SCREENING  Never done    ANNUAL PHYSICAL  Never done    COVID-19 Vaccine ( season) 2024       <no information>  Last Completed Colonoscopy       This patient has no relevant Health Maintenance data.          Immunization History   Administered Date(s) Administered    COVID-19 (MODERNA) 1st,2nd,3rd Dose Monovalent 10/29/2021, 2021, 2021, 2021, 2022    DT 2004    Influenza, Unspecified 2021     Last Completed Mammogram            MAMMOGRAM (Yearly) Next due on 2024  MAMMO DIAGNOSTIC BILATERAL W CAD    2022  MAMMO SCREENING BILATERAL W CAD                      FAMILY HISTORY:  Family History   Problem Relation Age of Onset    Diabetes Mother     Asthma Mother     Depression Mother     Hyperlipidemia Mother     Kidney disease Mother     Thyroid disease Mother     Diabetes Father     Arthritis Father         RA    Cancer Maternal Grandmother         vulva, HPV+    Thyroid disease Maternal Grandmother     Cancer Maternal Grandfather     Lung cancer Paternal Grandfather 60 - 69    Cancer Paternal Grandfather     Breast cancer Maternal Great-Grandmother     Stomach cancer Maternal Great-Grandmother     Asthma Son         Ecezma and asthma    Colon cancer Neg Hx     Colon polyps Neg Hx      SOCIAL HISTORY:  Social History     Socioeconomic History    Marital status:    Tobacco Use    Smoking status: Former     Current packs/day: 0.00     Average packs/day: 0.5 packs/day for 20.0 years (10.0 ttl pk-yrs)     Types: Cigarettes     Start date: 2004     Quit date: 2024     Years since quittin.5      "Passive exposure: Past    Smokeless tobacco: Never    Tobacco comments:     Stop ciggarettes 7.22.24 using vape now to stop completely   Vaping Use    Vaping status: Former    Start date: 2/17/2024    Quit date: 10/17/2024   Substance and Sexual Activity    Alcohol use: Not Currently     Comment: Socially    Drug use: Yes     Types: Marijuana    Sexual activity: Yes     Partners: Male     Birth control/protection: None, Bilateral salpingectomy        REVIEW OF SYSTEMS:  Review of Systems   Constitutional:  Positive for fatigue (from RA + fibromyalgia). Negative for activity change, appetite change and unexpected weight loss (None since the last visit).        Manages her ADLs to include chores, errands and driving.  Is up and about, \"all the time when I recover from chemo.\"  Still works full time in customer service at [employer]     Chemo tolerance:  Since 50% dose reduction of carbo+docetaxel has noted that the, \"prickly rash\" on her forearms has improved but, \"still there\".  Again with fatigue, nausea, achiness, runny nose starting days 2-3, but no more blurry vision on day 4, again \"all better in a week (previously 2 weeks)\", loose stools (modulated by 1 dose of Imodium), no overt diarrhea, no mouth sores, no neuropathy.  Now states that it takes about 5 to 7 days (previously 10 to 12 days), \"before I feel back to normal\" this time feels that she can go the distance of 6 cycles (3 more cycles)     On 10/26/24- previously discussed with Sherie Oliver, PharmD.  Due to protracted nausea/vomiting \"dry heaves\" after 1st cycle -refractory to usual antiemetics:  - We added into the treatment plan:   - Zyprexa 5 mg on Day 1   - Decadron 20 mg (   - Benadryl 50 mg was added as well   - 30% dose reduction of docetaxel and carboplatin      Eyes: Negative.    Respiratory: Negative.  Negative for cough and shortness of breath.         Smoker age 15- 1/2-1 ppd.  Has stopped smoking.  She is no longer vaping. " "  Cardiovascular: Negative.    Gastrointestinal: Negative.    Endocrine:         A0    Menarche age 13    Menopause age 42   Genitourinary: Negative.  Negative for vaginal bleeding (Had a CHRISTIANO/BSO per Dr. Espinal).   Musculoskeletal:  Positive for arthralgias (Chronic- from RA + fibromyalgia-symptoms are better while she is on chemo), back pain (Chronic) and neck pain (Chronic).   Skin:  Negative for rash.   Allergic/Immunologic: Negative.    Neurological: Negative.    Hematological: Negative.    Psychiatric/Behavioral: Negative.         /76   Pulse 84   Temp 97.2 °F (36.2 °C)   Ht 168.9 cm (66.5\")   Wt 72.7 kg (160 lb 3.2 oz)   LMP  (LMP Unknown)   SpO2 100%   BMI 25.47 kg/m²  Body surface area is 1.83 meters squared.  Pain Score    25 1534   PainSc: 0-No pain       Physical Exam  Vitals and nursing note reviewed. Exam conducted with a chaperone present.   Constitutional:       Comments: Cooperative, well-developed, modestly Middle-aged female.  Ambulatory.  ECOG 0.      Has lost 5 lb (had gained 10 lb at her prior visit) since the last visit   HENT:      Head: Normocephalic and atraumatic.   Eyes:      General: No scleral icterus.     Extraocular Movements: Extraocular movements intact.      Pupils: Pupils are equal, round, and reactive to light.   Cardiovascular:      Rate and Rhythm: Normal rate.   Pulmonary:      Effort: Pulmonary effort is normal.      Comments: Port right upper chest is well seated  Chest:   Breasts:     Right: Normal.      Left: Mass present.          Comments: Chaperoned exam: Mildred Mckenzie MA    Right breast with no masses, no skin changes.  No nipple discharge today.    Left breast with previously palpable masses at 3:00 and 6:00 positions are again (barely) palpable.  Previously palpable left axillary nodes are not evident today.  No nipple discharge today.  No skin changes.  Abdominal:      Palpations: Abdomen is soft.      Tenderness: There is no abdominal " tenderness.      Comments: Laparoscopic incisions are healing very well.   Musculoskeletal:         General: Normal range of motion.      Cervical back: Normal range of motion.   Lymphadenopathy:      Cervical: No cervical adenopathy.      Upper Body:      Right upper body: No supraclavicular or axillary adenopathy.      Left upper body: Axillary adenopathy (Clinically improved) present. No supraclavicular adenopathy.   Skin:     General: Skin is warm.   Neurological:      General: No focal deficit present.      Mental Status: She is alert and oriented to person, place, and time.   Psychiatric:         Mood and Affect: Mood normal.         Behavior: Behavior normal.         Thought Content: Thought content normal.       Assessment:  1.   Invasive carcinoma of no special type (ductal) of the left breast  --Tumor stage: at least TNM/AJCC stage IIIA (cT2, cN2, M0,G3) ER 69%(+), [place] 85% (+), HER-2 positive (3+).  --Original tumor burden:    ---7/25/24- MRI breast bilateral-1. Findings suggestive for multicentric left breast malignancy with diffuse ductal carcinoma in situ extending from the dominant 2.7 cm mass within the lateral left breast at 3:00 extending to and likely involving the left nipple. A second 8 mm mass within the central left breast tissue with a third mass in the anterior depth of the inferior left breast at 6:00. Two 8 mm homogeneous persistently enhancing hyperintense T2 masses at the upper outer right breast, mid depth at the 10:00 in the 11-12 o'clock positions, with overall benign signal characteristics for which background enhancement versus papilloma fibroadenoma considered. MRI directed/second look ultrasound recommended given the presence of the contralateral left breast findings. BI-RADS CATEGORY 5: Highly suggestive of malignancy. Appropriate action should be taken. Findings strongly favoring multicentric left breast malignancy. MRI directed/second look bilateral breast ultrasound recommended.  Ultrasound-guided biopsy of the left breast mass at 3:00 and of a second left breast mass (6:00 if visible on ultrasound versus mass central to the nipple) with sampling of the largest left axillary node may be performed if clinically desired. Sampling of a finding within the upper outer right breast should be considered if solid nodule identified on ultrasound.  -8/7/24- US biopsy:  Final diagnosis-  1.  Mass, left breast at 6:00, 1 cm from nipple, core needle biopsies: High-grade ductal carcinoma in situ, comedo type. 2.  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 Histologic grade ([place] histologic score). Glandular (acinar)/tubular differentiation: Score 2. Nuclear pleomorphism: Score 3. Mitotic rate: Score 3. Overall grade: Grade 3. Maximum tumor diameter is at least 1.5 cm. 3.  Left axillary lymph node, core needle biopsy: Metastatic adenocarcinoma of breast. -ER 69%+; WI 85%+; HER2 3+ (positive); Ki67 54%+  -8/12/24- CT CAP- Known left breast malignancy with enlarged left level 1 and asymmetrically prominent left level 2 and borderline level 3 axillary lymph nodes which are suspicious for axillary disease.  No additional evidence of metastatic disease in the chest.  1. Small 0.4 cm low-attenuation region in the right lobe of the liver may have been present on the prior examinations but was not as well visualized. This is too small to characterize. Continued attention on follow-up is recommended.  Fatty infiltration of the liver.  Persistent heterogeneity of the lower uterine segment of the uterus and fluid in the endometrial canal although the fluid has decreased. Stable septate uterus  -8/16/24-bone scan-no evidence of osteoblastic metastatic disease.  -8/16/24- 2D echo    Left ventricular systolic function is normal. Left ventricular ejection fraction appears to be 56 - 60%.    Normal right ventricular cavity size and  systolic function noted.    No significant valvular abnormalities identified on this study.     -8/20/24-PET scan-abnormal PET/CT, biopsy-proven left breast malignancy and demonstrates FDG avidity with maximum SUV of 7.9.  Also potential abnormal activity at 6:00 in the left breast with a maximal SUV of 3.9.  MRI of the breast with and without contrast will be helpful to exclude multifocal or multicentric disease in the left breast.  No abnormal contralateral right breast or axillary activity identified.  3 hypermetabolic level 1 lymph nodes in the left axilla compatible with kiersten metastasis and solitary borderline level 2 axillary lymph node on the left.  - 11/25/24-2D echo- LVEF 61-65%  - 12/5/24- MRI breasts- 1. Complete radiographic response to neoadjuvant chemotherapy of the multicentric tumor within the left breast. Resolution of the previous nonspecific enhancement within the upper outer quadrant right breast. No abnormal enhancement seen within the left or right breast on today's study. Interval decrease in the pathologic left axillary lymphadenopathy, axillary lymph nodes with normal morphology on today's study. No internal mammary lymphadenopathy.  - Neoadjuvant DCPT in progress    2.   Nausea, fatigue, myalgias, blurry vision (for 1 day) and flulike symptoms following chemo.  Feels like steps that were taken to include dose reduction and augmenting her antiemetic regimen have helped.  States symptoms now last less than 1 week (previously lasted the better part of 2 weeks).  We further discussed the option of more dose reduction (we will dose reduce to 50%.  Chemotherapy) in order to maximize her ability to complete as close to 6 cycles of systemic neoadjuvant therapy as possible.  MRI of the breasts after cycle 3 on 12/5 (above) showed CR.  CT of the head was planned but not yet done (resolution of blurry vision).    3.   Uterine cystic mass  - 7/12/24 - Abnormal CT a/p- Increased size of the low  density/complex cystic mass in the uterine cavity since the previous study. This may represent abnormal endometrial thickening, complex endometrial fluid/debris accumulation in the endometrial canal due to out duct obstruction by a fibroid in the lower uterine segment as detailed above. There is a septate morphology of the uterus. Further follow-up with sonography may be obtained. Normal appendix. The gallbladder is surgically absent. The nonacute findings of the remaining abdomen similar to the previous study.  Stable heterogeneous solid mass to the right of midline at the mid to lower uterine segment with probable secondary dilated endometrial cavity at the uterine fundus filled with fluid and a septate uterus.  Differential diagnosis would include benign etiologies such as submucosal leiomyoma and large endometrial polyp but also neoplasm, possibly endometrial carcinoma, or carcinosarcoma of the uterus.  No anabel pelvic soft tissue nodule or lymphadenopathy identified. Small left external iliac chain lymph node is unchanged. Small right ovarian cyst. Nabothian cysts in the cervix and septate uterus. MRI pelvis without and with contrast may be helpful for further evaluation. GYN consultation is recommended.  -7/31/24-  Endocervical Currettage, Endometrial Currettage, and hysteroscopic resected uterine synechia.  Final diagnosis:  1.Endometrium, curettage: A. Blood and fragments of smooth muscle. B. No histologic evidence of malignancy. Comment: Endometrial mucosal tissue is not identified. 2. Endocervix, curettage: A. Fragments of benign endocervical tissue, benign squamous mucosa and benign lower uterine segment endometrium. B. Blood and mucus. C. No dysplasia identified. 3. Endometrium, hysteroscopic resected tissue: A. Fragments of smooth muscle. B. No histologic evidence of malignancy. Comment: Endometrial mucosal tissue is not identified.  --9/15/2024- CT abdomen/pelvis-There is a 3.2 x 3.0 x 2.6 cm focus of  "rounded masslike enhancement in the lower uterine segment centered along the endometrial canal (series 2-image 70, series 4-image 41). The uterine cavity is distended and fluid-filled above this at the fundus. Primary imaging concern would be endometrial carcinoma obstructing the canal although it seems there was a recent D&C with benign tissue sampling. Endometritis is also a consideration and cannot be excluded on CT.  No free fluid in the pelvis, pelvic abscess or pelvic adenopathy.  --9/20/2024-   Total Laparoscopic Hysterectomy with bilateral salpingoophorectomy, da Kenzie assisted -Final diagnosis:  Uterus with bilateral tubes and ovaries, hysterectomy with bilateral salpingo-oophorectomies: Unremarkable cervix, negative for squamous dysplasia. Atrophic endometrium, negative for hyperplasia and atypia. Benign intramural leiomyoma. Adenomyosis. Unremarkable right ovary. Left ovary with cystic follicles and hemorrhagic corpora lutea.    4.   Nicotine dependence-stopped in favor of vaping  5.   Mixed depression/anxiety  6.   Hypothyroidism  7.   Obesity  8.   RA and fibromyalgia. Plaquenil on hold while on chemo.  Followed by Dr. Ackerman.  Symptoms have been quiescent while on chemo.       Plan:  1.   Labs, 1/30/25  gluc 121, K+3.4 (Kdur called in) alk phos 109 (prior peak: 177) otherwise normal CMP, normal CBC  2.   Chemo tolerance (see below).  \"Prickly forearms but no more rash.\"  Nausea/vomiting/fatigue/blurry vision/malaise much improved with dose reduction of TC and augmented anti-emetics  3.   Again review breast MRI, 12/5/24 (above).  CR to neoadjuvant chemo!  4.   Reviewed 2D echo, 11/24/24 - EF 61-65%  5.   Reviewed BRCA 1/2 analyses with cancer next, 8/16/2034 -summary: Negative-no clinically significant variants detected  6.   Review NCCN guidelines version invasive breast cancer.  Principles of preoperative systemic therapy: Candidate for preoperative systemic therapy--A: Patients with inoperable " breast cancer: IBC; bulky or matted cN2 axillary nodes; cN3 kiersten disease; cT4 tumors.  B. In selected patients with operable breast cancer--HER-2 positive disease and TNBC, if >/=cT2 OR >/=cN1; Large primary tumor relative to breast size in a patient who desires breast conservation; cN+ disease likely to become cN0 with preoperative systemic therapy; C.  Preoperative systemic therapy can be considered for cT1,N0, HER-2 positive disease and TNBC.     7.   Docetaxel, carboplatin: Toxicities of chemotherapy noted to include, but not limited to: Myelosuppression (neutropenia, anemia, thrombocytopenia), alopecia, neuropathy, arthralgia, myalgia, nausea, vomiting, hypersensitivity reactions, mucositis, diarrhea, infection, abnormal liver enzymes, asthenia, fever, hypertension, bleeding, edema, opportunistic infections, anaphylaxis, cardiac conduction disturbance/arrhythmias, syncope, thromboembolism, myocardial infarction, severe injection site reactions, pulmonary toxicity, GI obstruction/perforation, paralytic ileus, ischemic colitis, pancreatitis, hepatotoxicity, severe skin reactions). Questions answered to the patient's apparent satisfaction. She agrees to press on with therapy.   8.   Discussed the potential toxicities of pertuzumab and trastuzumab, to include but not limited to: Cardiac failure/LV dysfunction, cardiomyopathy, CHF, arrhythmia, hypersensitivity reaction, anaphylaxis, angioedema, ARDS, interstitial pneumonitis, pulmonary infiltrates, pulmonary fibrosis, pleural effusion, pulmonary edema, hypoxia, myelosuppression (anemia, neutropenia, thrombocytopenia), febrile neutropenia, tumor lysis syndrome, glomerulonephritis, hypokalemia, peripheral neuropathy, hand-foot syndrome, alopecia, nausea, diarrhea, ALT/AST increase, lymphopenia, asthenia, fatigue, stomatitis, myalgia, arthralgia, constipation, vomiting, dysgeusia, headache, decreased appetite, insomnia, peripheral neuropathy, rash, decreased albumin,  xeroderma, mucositis, cough, dyspepsia, fever, dizziness, increased potassium, decreased sodium, epistaxis, hot flashes, weight loss, URI, paresthesia, back pain, dyspnea, UTI, hypokalemia, hand-foot syndrome.  Questions answered to the patient's apparent satisfaction.  She agrees to press on with therapy.    9.  Schedule neoadjuvant treatment (plan: q 21 days x 6 cycles- dose reduced docetaxel and carboplatin to 50% of original dose beginning cycle 3) C5, 2/6/25; C6, 2/27/25. Dose reduce 20% pertuzumab+trastuzumab beginning C4     Docetaxel 36 mg/m2 per administration guidelines q 21 days, C1-C6  Carboplatin AUC 3 per administration guidelines q 21 days, C1-C6  Perjeta 340 mg, C2 -C6    Trastuzumab 5 mg/kg, C2- C6   Observe patient for 1 hour after initial dose pertuzumab and for 30 minutes after all subsequent doses for signs of infusion reactions.  Hold pertuzumab and trastuzumab if LVEF drops to<50% with a 10% or greater absolute decrease below pretreatment baseline.     10.  Premeds:   Zyprexa 5 mg IV  Aloxi 0.25 mg IV   Emend 150 mg IV   Decadron 20 mg IV   Pepcid 20 mg IV   Benadryl 50 mg IV  Decadron 8 mg twice daily day before and 8 mg twice daily day after chemo  Ativan 1 mg IV        11.  Neulasta 6 mg sc on day 2 chemo  12.  CMP, magnesium level, and CBC with differential weekly on day of chemotherapy. Procrit 40,000 units subcutaneously if hemoglobin less than 10 and hematocrit less than 30. Zarxio/Neupogen 480 mcg daily x3 if ANC less than 1.0.      13.  Rx:  Zofran 8 mg po tid prn # 30                Compazine 10 mg p.o. 3 times daily as needed dispense 30                Decadron 8 mg twice daily day before and 8 mg twice daily day after chemo                K-Dur 20 p.o. 3 times daily x 3 days                Gabapentin 100 mg p.o. at bedtime dispense 30                OTC B complex MVI p.o. once daily     14.  Anticipate adjuvant (postop) treatment (plan: q 21 days x 17 cycles - to complete 1 year of  "therapy) C7..pending     Trastuzumab 8 mg/kg loading dose C7D1; then 6 mg/kg, C8- C17      15.  Schedule CT head with and without contrast next week- Cancel.  Patient adamantly declines/refuses  16.  Schedule 2D echo after cycle 6 of perjeta/trastuzumab   17.  Refer to  Dr. Gentile for surgery after C6-patient has questions about reconstruction.  She is trying to coordinate the effort with her breast reconstruction surgeon in [place] (our local plastic surgeon is not taking her insurance)  18.  Return to the office in 6 weeks with pre-office CBC with differential, CMP.    19.  Importance of Smoking Cessation discussed with patient and informed patient additional information will be on today's [facility].  [place] [facility]'s Flyer - Plan to Be Tobacco Free handout provided to patient         I spent ~55 minutes caring for Uyen, \"Leana\" on this date of service. This time includes time spent by me in the following activities: preparing for the visit, reviewing tests, performing a medically appropriate examination and/or evaluation, counseling and educating the patient/family/caregiver, ordering medications, tests, or procedures and documenting information in the medical record.       "

## 2025-01-30 NOTE — TELEPHONE ENCOUNTER
----- Message from Shyam Weiss sent at 1/30/2025  9:02 AM CST -----  Potassium 3.4-please call in K-Dur 20 p.o. 3 times daily x 3 days  ----- Message -----  From: Lab, Background User  Sent: 1/30/2025   7:50 AM CST  To: Shyam Weiss MD

## 2025-02-05 ENCOUNTER — OFFICE VISIT (OUTPATIENT)
Dept: SURGERY | Facility: CLINIC | Age: 45
End: 2025-02-05
Payer: COMMERCIAL

## 2025-02-05 ENCOUNTER — OFFICE VISIT (OUTPATIENT)
Dept: ONCOLOGY | Facility: CLINIC | Age: 45
End: 2025-02-05
Payer: COMMERCIAL

## 2025-02-05 VITALS
WEIGHT: 160 LBS | HEART RATE: 70 BPM | HEIGHT: 67 IN | SYSTOLIC BLOOD PRESSURE: 120 MMHG | DIASTOLIC BLOOD PRESSURE: 80 MMHG | BODY MASS INDEX: 25.11 KG/M2 | OXYGEN SATURATION: 98 %

## 2025-02-05 VITALS
TEMPERATURE: 97.2 F | HEIGHT: 66 IN | OXYGEN SATURATION: 100 % | SYSTOLIC BLOOD PRESSURE: 128 MMHG | BODY MASS INDEX: 25.75 KG/M2 | HEART RATE: 84 BPM | WEIGHT: 160.2 LBS | DIASTOLIC BLOOD PRESSURE: 76 MMHG

## 2025-02-05 DIAGNOSIS — C50.912 INVASIVE DUCTAL CARCINOMA OF BREAST, LEFT: Primary | ICD-10-CM

## 2025-02-05 NOTE — PROGRESS NOTES
Office Established Patient Note:     Referring Provider: No ref. provider found    Chief Complaint   Patient presents with    Mass       Subjective     History of Present Illness  The patient presents for evaluation of a new breast mass.    The patient was originally diagnosed with a left breast cancer by Dr. Gentile in August 2024.  Most recent MRI in December 2024 showed no evidence of disease within the left breast.  She reports a palpable mass in her breast, which has recently become erythematous and ecchymotic. She is uncertain if this represents a new lesion or an inflammation of an existing one. It has improved since she initially noticed it. Her initial treatment plan, as outlined by Dr. Weiss, included six cycles of chemotherapy, radiation therapy, and 17 rounds of trastuzumab. She is considering the possibility of a double mastectomy and is curious about the necessity of radiation therapy in such a scenario. She also has a tumor in her axilla. She has undergone four cycles of chemotherapy and is scheduled for her fifth cycle tomorrow, with the sixth cycle planned for 03/06/2025. She has successfully quit smoking. She has an upcoming appointment with Dr. Weiss today and another with Dr. Gentile at the end of the month. She is also inquiring about the feasibility of immediate implant placement following a mastectomy.    SOCIAL HISTORY  The patient has quit smoking.       Review of Systems    Review of Systems - General ROS: negative  ENT ROS: negative  Respiratory ROS: no cough, shortness of breath, or wheezing  Cardiovascular ROS: no chest pain or dyspnea on exertion  Gastrointestinal ROS: no abdominal pain, change in bowel habits, or black or bloody stools  Genito-Urinary ROS: no dysuria, trouble voiding, or hematuria  Dermatological ROS: negative   Breast ROS: positive for - new or changing breast lumps, known left breast cancer  Hematological and Lymphatic ROS: negative  Musculoskeletal ROS:  negative   Neurological ROS: no TIA or stroke symptoms    Psychological ROS: negative  Endocrine ROS: negative    History  Past Medical History:   Diagnosis Date    Acid reflux     ADHD (attention deficit hyperactivity disorder)     Allergic     Anxiety     Breast cancer     Bronchitis     Chronic fatigue     Disease of thyroid gland     Fibromyalgia     Fibromyalgia, primary 2024    And chronic fatigue    Gallbladder abscess     Heart murmur     Hypertension     Hypothyroidism     Irritable bowel syndrome     Low back pain     Mitral valve prolapse     PONV (postoperative nausea and vomiting)     RA (rheumatoid arthritis)    ,   Past Surgical History:   Procedure Laterality Date    CARPAL TUNNEL RELEASE Right     COLONOSCOPY  05/01/2003    Normal exam    D & C HYSTEROSCOPY MYOSURE N/A 07/31/2024    Procedure: DILATATION AND CURETTAGE HYSTEROSCOPY WITH POSSIBLE TISSUE RESECTION;  Surgeon: Stevan Espinal MD;  Location: [facility] OR;  Service: Obstetrics/Gynecology;  Laterality: N/A;    DIAGNOSTIC LAPAROSCOPY  2000 [place]; normal findings; [facility]    ENDOMETRIAL ABLATION  2023    ENDOSCOPY      LAPAROSCOPIC CHOLECYSTECTOMY  2011    Dr Duncan; [facility]    LYMPH NODE BIOPSY  8/7/2024    Cancer Positive    SALPINGECTOMY Bilateral 10/16/2020    Procedure: SALPINGECTOMY LAPAROSCOPIC for sterilization;  Surgeon: Stevan Espinal MD;  Location: [facility] OR;  Service: Obstetrics/Gynecology;  Laterality: Bilateral;    TOTAL LAPAROSCOPIC HYSTERECTOMY SALPINGO OOPHORECTOMY N/A 09/20/2024    Procedure: TOTAL LAPAROSCOPIC HYSTERECTOMY BILATERAL SALPINGOOPHORECTOMY WITH DAVINCI ROBOT;  Surgeon: Stevan Espinal MD;  Location: [facility] OR;  Service: Robotics - DaVinci;  Laterality: N/A;    TUBAL ABDOMINAL LIGATION      cut not tied    US GUIDED LYMPH NODE BIOPSY  08/07/2024    VENOUS ACCESS DEVICE (PORT) INSERTION N/A 08/30/2024    Procedure: Single Lumen Port-a-cath insertion with flouroscopy;  Surgeon: Shalha Gentile MD;   Location: [facility] OR;  Service: General;  Laterality: N/A;    WISDOM TOOTH EXTRACTION     ,   Family History   Problem Relation Age of Onset    Diabetes Mother     Asthma Mother     Depression Mother     Hyperlipidemia Mother     Kidney disease Mother     Thyroid disease Mother     Diabetes Father     Arthritis Father         RA    Cancer Maternal Grandmother         vulva, HPV+    Thyroid disease Maternal Grandmother     Cancer Maternal Grandfather     Lung cancer Paternal Grandfather 60 - 69    Cancer Paternal Grandfather     Breast cancer Maternal Great-Grandmother     Stomach cancer Maternal Great-Grandmother     Asthma Son         Ecezma and asthma    Colon cancer Neg Hx     Colon polyps Neg Hx    ,   Social History     Tobacco Use    Smoking status: Former     Current packs/day: 0.00     Average packs/day: 0.5 packs/day for 20.0 years (10.0 ttl pk-yrs)     Types: Cigarettes     Start date: 2004     Quit date: 2024     Years since quittin.5     Passive exposure: Past    Smokeless tobacco: Never    Tobacco comments:     Stop ciggarettes 24 using vape now to stop completely   Vaping Use    Vaping status: Former    Start date: 2024    Quit date: 10/17/2024   Substance Use Topics    Alcohol use: Not Currently     Comment: Socially    Drug use: Yes     Types: Marijuana   , (Not in a hospital admission)   and Allergies:  Percocet [oxycodone-acetaminophen]    Current Outpatient Medications:     [START ON 3/22/2025] amphetamine-dextroamphetamine (Adderall) 10 MG tablet, Take 1 tablet by mouth Daily for 30 days., Disp: 30 tablet, Rfl: 0    [START ON 2025] amphetamine-dextroamphetamine (Adderall) 10 MG tablet, Take 1 tablet by mouth Daily for 30 days., Disp: 30 tablet, Rfl: 0    bisoprolol-hydrochlorothiazide (ZIAC) 5-6.25 MG per tablet, Take 1 tablet by mouth Daily., Disp: 30 tablet, Rfl: 2    dexAMETHasone (DECADRON) 4 MG tablet, Take 2 tablets 2 times daily the day before chemo and 2  tablets 2 times daily the day after chemo, Disp: 48 tablet, Rfl: 0    DULoxetine (CYMBALTA) 20 MG capsule, Take 1 capsule by mouth Daily., Disp: 30 capsule, Rfl: 2    ibuprofen (ADVIL,MOTRIN) 800 MG tablet, Take 1 tablet by mouth Every 6 (Six) Hours As Needed for Mild Pain., Disp: 90 tablet, Rfl: 2    levothyroxine (SYNTHROID, LEVOTHROID) 112 MCG tablet, Take 1 tablet by mouth Daily., Disp: 30 tablet, Rfl: 2    lidocaine-prilocaine (EMLA) 2.5-2.5 % cream, Apply to port site 30 minutes before it is accessed and cover with occlusive dressing., Disp: 30 g, Rfl: 1    [START ON 2/21/2025] lisdexamfetamine (Vyvanse) 60 MG capsule, Take 1 capsule by mouth Every Morning for 30 days, Disp: 30 capsule, Rfl: 0    [START ON 3/22/2025] lisdexamfetamine (Vyvanse) 60 MG capsule, Take 1 capsule by mouth Every Morning for 30 days, Disp: 30 capsule, Rfl: 0    ondansetron ODT (ZOFRAN-ODT) 8 MG disintegrating tablet, Place 1 tablet on the tongue Every 8 (Eight) Hours As Needed for Nausea or Vomiting., Disp: 30 tablet, Rfl: 1    pantoprazole (PROTONIX) 40 MG EC tablet, Take 1 tablet by mouth Daily., Disp: 30 tablet, Rfl: 2    pertuzumab (Perjeta) 420 MG/14ML solution injection, Infuse 11.33 mL into a venous catheter As Directed. Will infuse intravenously in the outpatient infusion center every 3 weeks., Disp: 14 mL, Rfl: 2    potassium chloride (KLOR-CON M20) 20 MEQ CR tablet, Take 1 tablet by mouth 3 (Three) Times a Day., Disp: 9 tablet, Rfl: 0    trastuzumab-dkst (Ogivri) 420 MG chemo injection, Infuse 360 mg into a venous catheter As Directed. Will infuse intravenously in the outpatient infusion center every 3 weeks., Disp: 2 each, Rfl: 2    Current Facility-Administered Medications:     lidocaine (XYLOCAINE) 1 % injection 30 mL, 30 mL, Subcutaneous, Once, Taylor Salinas MD    lidocaine 1% - EPINEPHrine 1:618507 (XYLOCAINE W/EPI) 1 %-1:705687 injection 30 mL, 30 mL, Infiltration, Once, Taylor Salinas MD    Objective  "    Vital Signs   /80   Pulse 70   Ht 168.9 cm (66.5\")   Wt 72.6 kg (160 lb)   LMP  (LMP Unknown)   SpO2 98%   BMI 25.44 kg/m²      Physical Exam:  General appearance - alert, well appearing, and in no distress  Mental status - normal mood, behavior, speech, dress, motor activity, and thought processes  Eyes - sclera anicteric  Neck - supple, no significant adenopathy  Chest - no tachypnea, retractions or cyanosis  Heart - normal rate and regular rhythm  Breasts - abnormal mass palpable in left breast approximately 4 o'clock position, approximately 2 cm in size; right breast without any palpable masses  Neurological - alert, oriented, normal speech, no focal findings or movement disorder noted  Skin - normal coloration and turgor, no rashes, no suspicious skin lesions noted    Physical Exam         Results Review:  Result Review :            MRI Breast Bilateral Diagnostic W WO Contrast (12/05/2024 09:32)   FINDINGS: Multiplane imaging and the bilateral breast tissue performed  pre and post IV contrast. Images were processed with [employer] software.     Breast tissue heterogeneously dense with minimal background enhancement.     There has been a complete radiographic response to the neoadjuvant  chemotherapy within the left breast.  The multicentric abnormal  enhancement present on the previous study is no longer present.  Nonspecific enhancement within the upper outer right breast has also  resolved. There is no abnormal enhancement identified within the left or  the right breast on today's exam. Biopsy clips are identified within the  posterior depth of the lateral left breast at 3:00 and within the  inferior anterior left breast at 6:00 at the site of the previous  biopsies. Interval decrease in the metastatic left axillary lymph node.  Axillary lymph nodes demonstrate normal morphology on today's exam. No  internal mammary lymphadenopathy identified.     IMPRESSION:  1. Complete radiographic response " to neoadjuvant chemotherapy of the  multicentric tumor within the left breast. Resolution of the previous  nonspecific enhancement within the upper outer quadrant right breast. No  abnormal enhancement seen within the left or right breast on today's  study. Interval decrease in the pathologic left axillary  lymphadenopathy, axillary lymph nodes with normal morphology on today's  study. No internal mammary lymphadenopathy.  2. BI-RADS CATEGORY 6: Known biopsy-proven malignancy of the left breast  and axilla.  Results         Assessment & Plan     Diagnoses and all orders for this visit:    1. Invasive ductal carcinoma of breast, left (Primary)         Assessment & Plan  1. New breast mass.  The patient reports a new breast mass that has become red and bruised. She has a known history of breast cancer, and the mass could potentially be another cancerous spot. She is currently undergoing chemotherapy and has completed four rounds, with the fifth scheduled for tomorrow and the sixth on 03/06/2025. She has an appointment with Dr. Weiss today to discuss the necessity of radiation therapy. She is advised to consult with Dr. Weiss regarding the need for radiation therapy. If radiation is not required, she plans to proceed with surgery in [place]. If radiation is required, she will coordinate with Dr. Gentile for surgery. She is advised to keep us informed about her decision and the outcome of her consultation with Dr. Weiss. She is also advised to consult with a general surgeon before completing her chemotherapy. A breast examination will be conducted today. If she decides to continue her treatment with us, imaging studies can be ordered. If she opts for treatment elsewhere, she should ensure that her new healthcare provider orders the necessary imaging studies. She is advised to maintain her appointment with Dr. Gentile at the end of the month, which can be rescheduled post-chemotherapy if necessary.        Follow up:       Return for Next scheduled follow up.        Deedee Briones PA-C  02/05/25  16:55 CST    Patient or patient representative verbalized consent for the use of Ambient Listening during the visit with  Deedee Briones PA-C for chart documentation. 2/5/2025  10:50 CST

## 2025-02-06 ENCOUNTER — INFUSION (OUTPATIENT)
Dept: ONCOLOGY | Facility: HOSPITAL | Age: 45
End: 2025-02-06
Payer: COMMERCIAL

## 2025-02-06 ENCOUNTER — LAB (OUTPATIENT)
Dept: LAB | Facility: HOSPITAL | Age: 45
End: 2025-02-06
Payer: COMMERCIAL

## 2025-02-06 ENCOUNTER — TELEPHONE (OUTPATIENT)
Dept: ONCOLOGY | Facility: CLINIC | Age: 45
End: 2025-02-06
Payer: COMMERCIAL

## 2025-02-06 VITALS
TEMPERATURE: 97.4 F | OXYGEN SATURATION: 98 % | BODY MASS INDEX: 25.08 KG/M2 | HEIGHT: 67 IN | DIASTOLIC BLOOD PRESSURE: 61 MMHG | RESPIRATION RATE: 16 BRPM | HEART RATE: 74 BPM | SYSTOLIC BLOOD PRESSURE: 112 MMHG | WEIGHT: 159.8 LBS

## 2025-02-06 DIAGNOSIS — C50.912 INVASIVE DUCTAL CARCINOMA OF BREAST, LEFT: ICD-10-CM

## 2025-02-06 DIAGNOSIS — C50.912 DUCTAL CARCINOMA OF LEFT BREAST: Primary | ICD-10-CM

## 2025-02-06 DIAGNOSIS — C50.912 INVASIVE DUCTAL CARCINOMA OF BREAST, LEFT: Primary | ICD-10-CM

## 2025-02-06 LAB
ALBUMIN SERPL-MCNC: 4.4 G/DL (ref 3.5–5.2)
ALBUMIN/GLOB SERPL: 1.5 G/DL
ALP SERPL-CCNC: 116 U/L (ref 39–117)
ALT SERPL W P-5'-P-CCNC: 16 U/L (ref 1–33)
ANION GAP SERPL CALCULATED.3IONS-SCNC: 11 MMOL/L (ref 5–15)
AST SERPL-CCNC: 18 U/L (ref 1–32)
BASOPHILS # BLD AUTO: 0.01 10*3/MM3 (ref 0–0.2)
BASOPHILS NFR BLD AUTO: 0.2 % (ref 0–1.5)
BILIRUB SERPL-MCNC: 0.7 MG/DL (ref 0–1.2)
BUN SERPL-MCNC: 15 MG/DL (ref 6–20)
BUN/CREAT SERPL: 21.1 (ref 7–25)
CALCIUM SPEC-SCNC: 9.7 MG/DL (ref 8.6–10.5)
CHLORIDE SERPL-SCNC: 98 MMOL/L (ref 98–107)
CO2 SERPL-SCNC: 28 MMOL/L (ref 22–29)
CREAT SERPL-MCNC: 0.71 MG/DL (ref 0.57–1)
DEPRECATED RDW RBC AUTO: 46.5 FL (ref 37–54)
EGFRCR SERPLBLD CKD-EPI 2021: 107 ML/MIN/1.73
EOSINOPHIL # BLD AUTO: 0 10*3/MM3 (ref 0–0.4)
EOSINOPHIL NFR BLD AUTO: 0 % (ref 0.3–6.2)
ERYTHROCYTE [DISTWIDTH] IN BLOOD BY AUTOMATED COUNT: 14.1 % (ref 12.3–15.4)
GLOBULIN UR ELPH-MCNC: 3 GM/DL
GLUCOSE SERPL-MCNC: 215 MG/DL (ref 65–99)
HCT VFR BLD AUTO: 39.9 % (ref 34–46.6)
HGB BLD-MCNC: 13 G/DL (ref 12–15.9)
IMM GRANULOCYTES # BLD AUTO: 0.02 10*3/MM3 (ref 0–0.05)
IMM GRANULOCYTES NFR BLD AUTO: 0.5 % (ref 0–0.5)
LYMPHOCYTES # BLD AUTO: 0.55 10*3/MM3 (ref 0.7–3.1)
LYMPHOCYTES NFR BLD AUTO: 13.1 % (ref 19.6–45.3)
MAGNESIUM SERPL-MCNC: 1.9 MG/DL (ref 1.6–2.6)
MCH RBC QN AUTO: 29.3 PG (ref 26.6–33)
MCHC RBC AUTO-ENTMCNC: 32.6 G/DL (ref 31.5–35.7)
MCV RBC AUTO: 89.9 FL (ref 79–97)
MONOCYTES # BLD AUTO: 0.03 10*3/MM3 (ref 0.1–0.9)
MONOCYTES NFR BLD AUTO: 0.7 % (ref 5–12)
NEUTROPHILS NFR BLD AUTO: 3.58 10*3/MM3 (ref 1.7–7)
NEUTROPHILS NFR BLD AUTO: 85.5 % (ref 42.7–76)
NRBC BLD AUTO-RTO: 0 /100 WBC (ref 0–0.2)
PLATELET # BLD AUTO: 250 10*3/MM3 (ref 140–450)
PMV BLD AUTO: 10.7 FL (ref 6–12)
POTASSIUM SERPL-SCNC: 4.2 MMOL/L (ref 3.5–5.2)
PROT SERPL-MCNC: 7.4 G/DL (ref 6–8.5)
RBC # BLD AUTO: 4.44 10*6/MM3 (ref 3.77–5.28)
SODIUM SERPL-SCNC: 137 MMOL/L (ref 136–145)
WBC NRBC COR # BLD AUTO: 4.19 10*3/MM3 (ref 3.4–10.8)

## 2025-02-06 PROCEDURE — 36415 COLL VENOUS BLD VENIPUNCTURE: CPT

## 2025-02-06 PROCEDURE — 25810000003 SODIUM CHLORIDE 0.9 % SOLUTION: Performed by: INTERNAL MEDICINE

## 2025-02-06 PROCEDURE — 25010000002 TRASTUZUMAB-DKST 420 MG RECONSTITUTED SOLUTION 1 EACH VIAL: Performed by: INTERNAL MEDICINE

## 2025-02-06 PROCEDURE — 25010000002 DIPHENHYDRAMINE PER 50 MG: Performed by: INTERNAL MEDICINE

## 2025-02-06 PROCEDURE — 80053 COMPREHEN METABOLIC PANEL: CPT

## 2025-02-06 PROCEDURE — 83735 ASSAY OF MAGNESIUM: CPT

## 2025-02-06 PROCEDURE — 25010000002 DEXAMETHASONE PER 1 MG: Performed by: INTERNAL MEDICINE

## 2025-02-06 PROCEDURE — 25010000002 CARBOPLATIN PER 50 MG: Performed by: INTERNAL MEDICINE

## 2025-02-06 PROCEDURE — 25010000002 DOCETAXEL 160 MG/16ML SOLUTION 16 ML VIAL: Performed by: INTERNAL MEDICINE

## 2025-02-06 PROCEDURE — 96375 TX/PRO/DX INJ NEW DRUG ADDON: CPT

## 2025-02-06 PROCEDURE — 25010000002 HEPARIN LOCK FLUSH PER 10 UNITS: Performed by: INTERNAL MEDICINE

## 2025-02-06 PROCEDURE — 25010000002 PALONOSETRON PER 25 MCG: Performed by: INTERNAL MEDICINE

## 2025-02-06 PROCEDURE — 96367 TX/PROPH/DG ADDL SEQ IV INF: CPT

## 2025-02-06 PROCEDURE — 96417 CHEMO IV INFUS EACH ADDL SEQ: CPT

## 2025-02-06 PROCEDURE — 96413 CHEMO IV INFUSION 1 HR: CPT

## 2025-02-06 PROCEDURE — 25810000003 SODIUM CHLORIDE 0.9 % SOLUTION 250 ML FLEX CONT: Performed by: INTERNAL MEDICINE

## 2025-02-06 PROCEDURE — 25010000002 PERTUZUMAB 420 MG/14ML SOLUTION 420 MG VIAL: Performed by: INTERNAL MEDICINE

## 2025-02-06 PROCEDURE — 63710000001 APREPITANT PER 5 MG: Performed by: INTERNAL MEDICINE

## 2025-02-06 PROCEDURE — 85025 COMPLETE CBC W/AUTO DIFF WBC: CPT

## 2025-02-06 RX ORDER — DIPHENHYDRAMINE HYDROCHLORIDE 50 MG/ML
50 INJECTION INTRAMUSCULAR; INTRAVENOUS ONCE
Status: COMPLETED | OUTPATIENT
Start: 2025-02-06 | End: 2025-02-06

## 2025-02-06 RX ORDER — OLANZAPINE 5 MG/1
5 TABLET ORAL ONCE
Status: COMPLETED | OUTPATIENT
Start: 2025-02-06 | End: 2025-02-06

## 2025-02-06 RX ORDER — PALONOSETRON 0.05 MG/ML
0.25 INJECTION, SOLUTION INTRAVENOUS ONCE
Status: COMPLETED | OUTPATIENT
Start: 2025-02-06 | End: 2025-02-06

## 2025-02-06 RX ORDER — HEPARIN SODIUM (PORCINE) LOCK FLUSH IV SOLN 100 UNIT/ML 100 UNIT/ML
500 SOLUTION INTRAVENOUS AS NEEDED
OUTPATIENT
Start: 2025-02-06

## 2025-02-06 RX ORDER — HEPARIN SODIUM (PORCINE) LOCK FLUSH IV SOLN 100 UNIT/ML 100 UNIT/ML
500 SOLUTION INTRAVENOUS AS NEEDED
Status: DISCONTINUED | OUTPATIENT
Start: 2025-02-06 | End: 2025-02-06 | Stop reason: HOSPADM

## 2025-02-06 RX ORDER — FAMOTIDINE 10 MG/ML
20 INJECTION, SOLUTION INTRAVENOUS AS NEEDED
Status: DISCONTINUED | OUTPATIENT
Start: 2025-02-06 | End: 2025-02-06 | Stop reason: HOSPADM

## 2025-02-06 RX ORDER — APREPITANT 40 MG/1
80 CAPSULE ORAL DAILY
Status: DISCONTINUED | OUTPATIENT
Start: 2025-02-06 | End: 2025-02-06 | Stop reason: HOSPADM

## 2025-02-06 RX ORDER — SODIUM CHLORIDE 0.9 % (FLUSH) 0.9 %
10 SYRINGE (ML) INJECTION AS NEEDED
Status: DISCONTINUED | OUTPATIENT
Start: 2025-02-06 | End: 2025-02-06 | Stop reason: HOSPADM

## 2025-02-06 RX ORDER — LORAZEPAM 2 MG/ML
1 INJECTION INTRAMUSCULAR EVERY 4 HOURS PRN
Status: DISCONTINUED | OUTPATIENT
Start: 2025-02-06 | End: 2025-02-06 | Stop reason: HOSPADM

## 2025-02-06 RX ORDER — GABAPENTIN 100 MG/1
100 CAPSULE ORAL NIGHTLY
Qty: 30 CAPSULE | Refills: 1 | Status: SHIPPED | OUTPATIENT
Start: 2025-02-06

## 2025-02-06 RX ORDER — FAMOTIDINE 10 MG/ML
20 INJECTION, SOLUTION INTRAVENOUS ONCE
Status: COMPLETED | OUTPATIENT
Start: 2025-02-06 | End: 2025-02-06

## 2025-02-06 RX ORDER — SODIUM CHLORIDE 9 MG/ML
20 INJECTION, SOLUTION INTRAVENOUS ONCE
Status: COMPLETED | OUTPATIENT
Start: 2025-02-06 | End: 2025-02-06

## 2025-02-06 RX ORDER — HYDROCORTISONE SODIUM SUCCINATE 100 MG/2ML
100 INJECTION INTRAMUSCULAR; INTRAVENOUS AS NEEDED
Status: DISCONTINUED | OUTPATIENT
Start: 2025-02-06 | End: 2025-02-06 | Stop reason: HOSPADM

## 2025-02-06 RX ORDER — SODIUM CHLORIDE 0.9 % (FLUSH) 0.9 %
10 SYRINGE (ML) INJECTION AS NEEDED
OUTPATIENT
Start: 2025-02-06

## 2025-02-06 RX ORDER — DIPHENHYDRAMINE HYDROCHLORIDE 50 MG/ML
50 INJECTION INTRAMUSCULAR; INTRAVENOUS AS NEEDED
Status: DISCONTINUED | OUTPATIENT
Start: 2025-02-06 | End: 2025-02-06 | Stop reason: HOSPADM

## 2025-02-06 RX ADMIN — FAMOTIDINE 20 MG: 10 INJECTION INTRAVENOUS at 08:59

## 2025-02-06 RX ADMIN — PERTUZUMAB 340 MG: 30 INJECTION, SOLUTION, CONCENTRATE INTRAVENOUS at 09:55

## 2025-02-06 RX ADMIN — PALONOSETRON HYDROCHLORIDE 0.25 MG: 0.25 INJECTION, SOLUTION INTRAVENOUS at 08:56

## 2025-02-06 RX ADMIN — HEPARIN 500 UNITS: 100 SYRINGE at 13:12

## 2025-02-06 RX ADMIN — DIPHENHYDRAMINE HYDROCHLORIDE 50 MG: 50 INJECTION, SOLUTION INTRAMUSCULAR; INTRAVENOUS at 09:01

## 2025-02-06 RX ADMIN — Medication 10 ML: at 13:12

## 2025-02-06 RX ADMIN — CARBOPLATIN 350 MG: 10 INJECTION, SOLUTION INTRAVENOUS at 12:15

## 2025-02-06 RX ADMIN — APREPITANT 80 MG: 40 CAPSULE ORAL at 08:55

## 2025-02-06 RX ADMIN — DEXAMETHASONE SODIUM PHOSPHATE 20 MG: 10 INJECTION INTRAMUSCULAR; INTRAVENOUS at 09:05

## 2025-02-06 RX ADMIN — OLANZAPINE 5 MG: 5 TABLET, FILM COATED ORAL at 08:55

## 2025-02-06 RX ADMIN — TRASTUZUMAB-DKST 360 MG: KIT at 10:34

## 2025-02-06 RX ADMIN — DOCETAXEL 65 MG: 10 INJECTION, SOLUTION INTRAVENOUS at 11:07

## 2025-02-06 RX ADMIN — SODIUM CHLORIDE 20 ML/HR: 9 INJECTION, SOLUTION INTRAVENOUS at 08:54

## 2025-02-06 NOTE — PROGRESS NOTES
Message from Dr. Weiss/Danika, RN:  No hydroxychloroquine while on chemo-- yes to treatment    Also on Uyen José, [birth year], her Echo isn't scheduled until 02/20/2025, okay to proceed with today's treatment??    Message from Dr. Weiss/Danika, RN: No Echo until after 6th cycle which is scheduled 02/27/2025, her echo scheduled for 02/20/2027, will need to be rescheduled after 02/27/2025.

## 2025-02-06 NOTE — PROGRESS NOTES
From DEVIKA Garnica:  Per Dr. MCINTYRE We can try gabapentin 100 mg at bedtime and B complex multivitamins p.o. 1 daily OTC for arm itching on Uyen José if she wants to do that let me know and we can send in a script.    Message to Devika Garnica, patient is agreeable to try Gabapentin and B complex, please send script to [facility] in [place].

## 2025-02-06 NOTE — PROGRESS NOTES
Message to Hem/Onc: Uyen José, [birth year], here for treatment, states she is still having itching to arms that causes her to scratch and then these areas start feeling like they have pins and needles in them; she said she has discussed with Dr. Weiss before (this also happened prior to chemo, but is worse now.)   She is also wanting to know when she can restart Hydroxychloroquine for her RA because she is starting to hurt some in her lower back and right hip.  Also blood glucose is 215 today, but she is taking steroids at home pre and post treatment.  Okay to proceed with treatment?

## 2025-02-06 NOTE — TELEPHONE ENCOUNTER
We can try gabapentin 100 mg at bedtime and B complex multivitamins p.o. 1 daily OTC  per Dr. Weiss.

## 2025-02-06 NOTE — TELEPHONE ENCOUNTER
Spoke with patient regarding Dr Bates 3rd request for CT of head. Patient reports that she does not want to have the CT performed, that she had mentioned to Dr Weiss in the past that she experienced dizziness, blurred vision, and some pain behind her eyes post treatments.   Patient had informed Dr Weiss that she had seen her Opthalmologist in Nov 2024 and during examination he did detect a area on the back of her eye that he currently follows and this is the reason that she felt Dr Weiss seems insistent ordering the CT Head,   Patient informed me that she DOES NOT WANT to have the CT Scan performed at this time.    no

## 2025-02-07 ENCOUNTER — INFUSION (OUTPATIENT)
Dept: ONCOLOGY | Facility: HOSPITAL | Age: 45
End: 2025-02-07
Payer: COMMERCIAL

## 2025-02-07 VITALS
TEMPERATURE: 96.9 F | DIASTOLIC BLOOD PRESSURE: 69 MMHG | HEART RATE: 65 BPM | OXYGEN SATURATION: 100 % | RESPIRATION RATE: 16 BRPM | SYSTOLIC BLOOD PRESSURE: 132 MMHG | BODY MASS INDEX: 25.76 KG/M2 | WEIGHT: 162 LBS

## 2025-02-07 DIAGNOSIS — C50.912 INVASIVE DUCTAL CARCINOMA OF BREAST, LEFT: ICD-10-CM

## 2025-02-07 DIAGNOSIS — C50.912 DUCTAL CARCINOMA OF LEFT BREAST: Primary | ICD-10-CM

## 2025-02-07 PROCEDURE — 96372 THER/PROPH/DIAG INJ SC/IM: CPT

## 2025-02-07 PROCEDURE — 25010000002 PEGFILGRASTIM 6 MG/0.6ML SOLUTION PREFILLED SYRINGE: Performed by: INTERNAL MEDICINE

## 2025-02-07 RX ADMIN — PEGFILGRASTIM 6 MG: 6 INJECTION SUBCUTANEOUS at 14:37

## 2025-02-13 ENCOUNTER — TELEPHONE (OUTPATIENT)
Dept: ONCOLOGY | Facility: CLINIC | Age: 45
End: 2025-02-13
Payer: COMMERCIAL

## 2025-02-13 ENCOUNTER — LAB (OUTPATIENT)
Dept: LAB | Facility: HOSPITAL | Age: 45
End: 2025-02-13
Payer: COMMERCIAL

## 2025-02-13 DIAGNOSIS — C50.912 INVASIVE DUCTAL CARCINOMA OF BREAST, LEFT: ICD-10-CM

## 2025-02-13 DIAGNOSIS — C50.912 DUCTAL CARCINOMA OF LEFT BREAST: Primary | ICD-10-CM

## 2025-02-13 LAB
ALBUMIN SERPL-MCNC: 4.1 G/DL (ref 3.5–5.2)
ALBUMIN/GLOB SERPL: 1.5 G/DL
ALP SERPL-CCNC: 233 U/L (ref 39–117)
ALT SERPL W P-5'-P-CCNC: 14 U/L (ref 1–33)
ANION GAP SERPL CALCULATED.3IONS-SCNC: 11 MMOL/L (ref 5–15)
AST SERPL-CCNC: 14 U/L (ref 1–32)
BASOPHILS # BLD MANUAL: 0.24 10*3/MM3 (ref 0–0.2)
BASOPHILS NFR BLD MANUAL: 1 % (ref 0–1.5)
BILIRUB SERPL-MCNC: 0.3 MG/DL (ref 0–1.2)
BUN SERPL-MCNC: 12 MG/DL (ref 6–20)
BUN/CREAT SERPL: 13.5 (ref 7–25)
CALCIUM SPEC-SCNC: 9.5 MG/DL (ref 8.6–10.5)
CHLORIDE SERPL-SCNC: 99 MMOL/L (ref 98–107)
CO2 SERPL-SCNC: 30 MMOL/L (ref 22–29)
CREAT SERPL-MCNC: 0.89 MG/DL (ref 0.57–1)
DEPRECATED RDW RBC AUTO: 48.1 FL (ref 37–54)
EGFRCR SERPLBLD CKD-EPI 2021: 81.6 ML/MIN/1.73
EOSINOPHIL # BLD MANUAL: 0 10*3/MM3 (ref 0–0.4)
EOSINOPHIL NFR BLD MANUAL: 0 % (ref 0.3–6.2)
ERYTHROCYTE [DISTWIDTH] IN BLOOD BY AUTOMATED COUNT: 14.3 % (ref 12.3–15.4)
GLOBULIN UR ELPH-MCNC: 2.8 GM/DL
GLUCOSE SERPL-MCNC: 121 MG/DL (ref 65–99)
HCT VFR BLD AUTO: 42.3 % (ref 34–46.6)
HGB BLD-MCNC: 13.5 G/DL (ref 12–15.9)
LYMPHOCYTES # BLD MANUAL: 5.11 10*3/MM3 (ref 0.7–3.1)
LYMPHOCYTES NFR BLD MANUAL: 2 % (ref 5–12)
MAGNESIUM SERPL-MCNC: 1.6 MG/DL (ref 1.6–2.6)
MCH RBC QN AUTO: 29 PG (ref 26.6–33)
MCHC RBC AUTO-ENTMCNC: 31.9 G/DL (ref 31.5–35.7)
MCV RBC AUTO: 91 FL (ref 79–97)
MONOCYTES # BLD: 0.47 10*3/MM3 (ref 0.1–0.9)
NEUTROPHILS # BLD AUTO: 17.72 10*3/MM3 (ref 1.7–7)
NEUTROPHILS NFR BLD MANUAL: 62.4 % (ref 42.7–76)
NEUTS BAND NFR BLD MANUAL: 12.9 % (ref 0–5)
PLAT MORPH BLD: NORMAL
PLATELET # BLD AUTO: 294 10*3/MM3 (ref 140–450)
PMV BLD AUTO: 11.4 FL (ref 6–12)
POTASSIUM SERPL-SCNC: 4.1 MMOL/L (ref 3.5–5.2)
PROT SERPL-MCNC: 6.9 G/DL (ref 6–8.5)
RBC # BLD AUTO: 4.65 10*6/MM3 (ref 3.77–5.28)
RBC MORPH BLD: NORMAL
SODIUM SERPL-SCNC: 140 MMOL/L (ref 136–145)
TOXIC GRANULATION: ABNORMAL
VARIANT LYMPHS NFR BLD MANUAL: 15.8 % (ref 19.6–45.3)
VARIANT LYMPHS NFR BLD MANUAL: 5.9 % (ref 0–5)
WBC NRBC COR # BLD AUTO: 23.55 10*3/MM3 (ref 3.4–10.8)

## 2025-02-13 PROCEDURE — 80053 COMPREHEN METABOLIC PANEL: CPT

## 2025-02-13 PROCEDURE — 85007 BL SMEAR W/DIFF WBC COUNT: CPT

## 2025-02-13 PROCEDURE — 85025 COMPLETE CBC W/AUTO DIFF WBC: CPT

## 2025-02-13 PROCEDURE — 83735 ASSAY OF MAGNESIUM: CPT

## 2025-02-13 PROCEDURE — 36415 COLL VENOUS BLD VENIPUNCTURE: CPT

## 2025-02-13 NOTE — TELEPHONE ENCOUNTER
Contacted patient Uyen José, she was informed labs have been reviewed and due to increase Alk Phos reading of  233, Dr Weiss recommends ordering a Liver US.   Patient v/u and was informed once the US is sophy she will be called with time and date. Pt v/u of information.

## 2025-02-13 NOTE — TELEPHONE ENCOUNTER
----- Message from Shyam Weiss sent at 2/13/2025 11:12 AM CST -----  Aphos 233- pls schedule liver US and repeat CMP 1 week  ----- Message -----  From: Lab, Background User  Sent: 2/13/2025   8:37 AM CST  To: Shyam Weiss MD

## 2025-02-20 ENCOUNTER — APPOINTMENT (OUTPATIENT)
Dept: CARDIOLOGY | Facility: HOSPITAL | Age: 45
End: 2025-02-20
Payer: COMMERCIAL

## 2025-02-20 ENCOUNTER — LAB (OUTPATIENT)
Dept: LAB | Facility: HOSPITAL | Age: 45
End: 2025-02-20
Payer: COMMERCIAL

## 2025-02-20 DIAGNOSIS — C50.912 INVASIVE DUCTAL CARCINOMA OF BREAST, LEFT: ICD-10-CM

## 2025-02-20 DIAGNOSIS — C50.912 DUCTAL CARCINOMA OF LEFT BREAST: Primary | ICD-10-CM

## 2025-02-20 LAB
ALBUMIN SERPL-MCNC: 4.2 G/DL (ref 3.5–5.2)
ALBUMIN/GLOB SERPL: 1.4 G/DL
ALP SERPL-CCNC: 163 U/L (ref 39–117)
ALT SERPL W P-5'-P-CCNC: 17 U/L (ref 1–33)
ANION GAP SERPL CALCULATED.3IONS-SCNC: 13 MMOL/L (ref 5–15)
AST SERPL-CCNC: 14 U/L (ref 1–32)
BASOPHILS # BLD AUTO: 0.06 10*3/MM3 (ref 0–0.2)
BASOPHILS NFR BLD AUTO: 0.4 % (ref 0–1.5)
BILIRUB SERPL-MCNC: 0.4 MG/DL (ref 0–1.2)
BUN SERPL-MCNC: 16 MG/DL (ref 6–20)
BUN/CREAT SERPL: 19 (ref 7–25)
CALCIUM SPEC-SCNC: 9.4 MG/DL (ref 8.6–10.5)
CHLORIDE SERPL-SCNC: 100 MMOL/L (ref 98–107)
CO2 SERPL-SCNC: 30 MMOL/L (ref 22–29)
CREAT SERPL-MCNC: 0.84 MG/DL (ref 0.57–1)
DEPRECATED RDW RBC AUTO: 47.8 FL (ref 37–54)
EGFRCR SERPLBLD CKD-EPI 2021: 87.5 ML/MIN/1.73
EOSINOPHIL # BLD AUTO: 0.04 10*3/MM3 (ref 0–0.4)
EOSINOPHIL NFR BLD AUTO: 0.3 % (ref 0.3–6.2)
ERYTHROCYTE [DISTWIDTH] IN BLOOD BY AUTOMATED COUNT: 14.3 % (ref 12.3–15.4)
GLOBULIN UR ELPH-MCNC: 2.9 GM/DL
GLUCOSE SERPL-MCNC: 116 MG/DL (ref 65–99)
HCT VFR BLD AUTO: 40.8 % (ref 34–46.6)
HGB BLD-MCNC: 13.2 G/DL (ref 12–15.9)
IMM GRANULOCYTES # BLD AUTO: 0.08 10*3/MM3 (ref 0–0.05)
IMM GRANULOCYTES NFR BLD AUTO: 0.5 % (ref 0–0.5)
LYMPHOCYTES # BLD AUTO: 3.51 10*3/MM3 (ref 0.7–3.1)
LYMPHOCYTES NFR BLD AUTO: 22.8 % (ref 19.6–45.3)
MAGNESIUM SERPL-MCNC: 1.5 MG/DL (ref 1.6–2.6)
MCH RBC QN AUTO: 29.4 PG (ref 26.6–33)
MCHC RBC AUTO-ENTMCNC: 32.4 G/DL (ref 31.5–35.7)
MCV RBC AUTO: 90.9 FL (ref 79–97)
MONOCYTES # BLD AUTO: 0.75 10*3/MM3 (ref 0.1–0.9)
MONOCYTES NFR BLD AUTO: 4.9 % (ref 5–12)
NEUTROPHILS NFR BLD AUTO: 10.94 10*3/MM3 (ref 1.7–7)
NEUTROPHILS NFR BLD AUTO: 71.1 % (ref 42.7–76)
NRBC BLD AUTO-RTO: 0 /100 WBC (ref 0–0.2)
PLATELET # BLD AUTO: 222 10*3/MM3 (ref 140–450)
PMV BLD AUTO: 11.3 FL (ref 6–12)
POTASSIUM SERPL-SCNC: 3.7 MMOL/L (ref 3.5–5.2)
PROT SERPL-MCNC: 7.1 G/DL (ref 6–8.5)
RBC # BLD AUTO: 4.49 10*6/MM3 (ref 3.77–5.28)
SODIUM SERPL-SCNC: 143 MMOL/L (ref 136–145)
WBC NRBC COR # BLD AUTO: 15.38 10*3/MM3 (ref 3.4–10.8)

## 2025-02-20 PROCEDURE — 36415 COLL VENOUS BLD VENIPUNCTURE: CPT

## 2025-02-20 PROCEDURE — 83735 ASSAY OF MAGNESIUM: CPT

## 2025-02-20 PROCEDURE — 85025 COMPLETE CBC W/AUTO DIFF WBC: CPT

## 2025-02-20 PROCEDURE — 80053 COMPREHEN METABOLIC PANEL: CPT

## 2025-02-20 RX ORDER — FAMOTIDINE 10 MG/ML
20 INJECTION, SOLUTION INTRAVENOUS ONCE
Start: 2025-02-27

## 2025-02-20 RX ORDER — SODIUM CHLORIDE 9 MG/ML
20 INJECTION, SOLUTION INTRAVENOUS ONCE
OUTPATIENT
Start: 2025-02-27

## 2025-02-20 RX ORDER — DIPHENHYDRAMINE HYDROCHLORIDE 50 MG/ML
50 INJECTION INTRAMUSCULAR; INTRAVENOUS ONCE
Start: 2025-02-27 | End: 2025-02-27

## 2025-02-20 RX ORDER — FAMOTIDINE 10 MG/ML
20 INJECTION, SOLUTION INTRAVENOUS AS NEEDED
OUTPATIENT
Start: 2025-02-27

## 2025-02-20 RX ORDER — LORAZEPAM 2 MG/ML
1 INJECTION INTRAMUSCULAR EVERY 4 HOURS PRN
Start: 2025-02-27 | End: 2025-03-04

## 2025-02-20 RX ORDER — OLANZAPINE 5 MG/1
5 TABLET ORAL ONCE
Start: 2025-02-27 | End: 2025-02-27

## 2025-02-20 RX ORDER — PALONOSETRON 0.05 MG/ML
0.25 INJECTION, SOLUTION INTRAVENOUS ONCE
OUTPATIENT
Start: 2025-02-27

## 2025-02-20 RX ORDER — DIPHENHYDRAMINE HYDROCHLORIDE 50 MG/ML
50 INJECTION INTRAMUSCULAR; INTRAVENOUS AS NEEDED
OUTPATIENT
Start: 2025-02-27

## 2025-02-20 RX ORDER — APREPITANT 80 MG/1
80 CAPSULE ORAL DAILY
Start: 2025-02-27

## 2025-02-20 RX ORDER — HYDROCORTISONE SODIUM SUCCINATE 100 MG/2ML
100 INJECTION INTRAMUSCULAR; INTRAVENOUS AS NEEDED
OUTPATIENT
Start: 2025-02-27

## 2025-02-21 ENCOUNTER — TELEPHONE (OUTPATIENT)
Dept: ONCOLOGY | Facility: CLINIC | Age: 45
End: 2025-02-21
Payer: COMMERCIAL

## 2025-02-21 ENCOUNTER — INFUSION (OUTPATIENT)
Dept: ONCOLOGY | Facility: HOSPITAL | Age: 45
End: 2025-02-21
Payer: COMMERCIAL

## 2025-02-21 VITALS
RESPIRATION RATE: 18 BRPM | HEIGHT: 66 IN | WEIGHT: 160 LBS | OXYGEN SATURATION: 100 % | HEART RATE: 62 BPM | DIASTOLIC BLOOD PRESSURE: 70 MMHG | BODY MASS INDEX: 25.71 KG/M2 | TEMPERATURE: 96.8 F | SYSTOLIC BLOOD PRESSURE: 120 MMHG

## 2025-02-21 DIAGNOSIS — R79.0 LOW MAGNESIUM LEVEL: Primary | ICD-10-CM

## 2025-02-21 DIAGNOSIS — C50.912 DUCTAL CARCINOMA OF LEFT BREAST: ICD-10-CM

## 2025-02-21 DIAGNOSIS — F90.2 ATTENTION DEFICIT HYPERACTIVITY DISORDER (ADHD), COMBINED TYPE: ICD-10-CM

## 2025-02-21 DIAGNOSIS — C50.912 INVASIVE DUCTAL CARCINOMA OF BREAST, LEFT: Primary | ICD-10-CM

## 2025-02-21 DIAGNOSIS — C50.912 INVASIVE DUCTAL CARCINOMA OF BREAST, LEFT: ICD-10-CM

## 2025-02-21 PROCEDURE — 96366 THER/PROPH/DIAG IV INF ADDON: CPT

## 2025-02-21 PROCEDURE — 25010000002 HEPARIN LOCK FLUSH PER 10 UNITS: Performed by: INTERNAL MEDICINE

## 2025-02-21 PROCEDURE — 25810000003 SODIUM CHLORIDE 0.9 % SOLUTION: Performed by: INTERNAL MEDICINE

## 2025-02-21 PROCEDURE — 25010000002 MAGNESIUM SULFATE 2 GM/50ML SOLUTION: Performed by: INTERNAL MEDICINE

## 2025-02-21 PROCEDURE — 96365 THER/PROPH/DIAG IV INF INIT: CPT

## 2025-02-21 RX ORDER — SODIUM CHLORIDE 0.9 % (FLUSH) 0.9 %
10 SYRINGE (ML) INJECTION AS NEEDED
OUTPATIENT
Start: 2025-02-21

## 2025-02-21 RX ORDER — MAGNESIUM SULFATE HEPTAHYDRATE 40 MG/ML
2 INJECTION, SOLUTION INTRAVENOUS ONCE
Status: CANCELLED | OUTPATIENT
Start: 2025-02-21

## 2025-02-21 RX ORDER — HEPARIN SODIUM (PORCINE) LOCK FLUSH IV SOLN 100 UNIT/ML 100 UNIT/ML
500 SOLUTION INTRAVENOUS AS NEEDED
OUTPATIENT
Start: 2025-02-21

## 2025-02-21 RX ORDER — SODIUM CHLORIDE 9 MG/ML
20 INJECTION, SOLUTION INTRAVENOUS ONCE
Status: CANCELLED | OUTPATIENT
Start: 2025-02-21

## 2025-02-21 RX ORDER — HEPARIN SODIUM (PORCINE) LOCK FLUSH IV SOLN 100 UNIT/ML 100 UNIT/ML
500 SOLUTION INTRAVENOUS AS NEEDED
Status: DISCONTINUED | OUTPATIENT
Start: 2025-02-21 | End: 2025-02-21 | Stop reason: HOSPADM

## 2025-02-21 RX ORDER — LISDEXAMFETAMINE DIMESYLATE 60 MG/1
60 CAPSULE ORAL EVERY MORNING
Qty: 30 CAPSULE | Refills: 0 | Status: SHIPPED | OUTPATIENT
Start: 2025-02-21 | End: 2025-03-23

## 2025-02-21 RX ORDER — SODIUM CHLORIDE 9 MG/ML
20 INJECTION, SOLUTION INTRAVENOUS ONCE
Status: COMPLETED | OUTPATIENT
Start: 2025-02-21 | End: 2025-02-21

## 2025-02-21 RX ORDER — SODIUM CHLORIDE 0.9 % (FLUSH) 0.9 %
10 SYRINGE (ML) INJECTION AS NEEDED
Status: DISCONTINUED | OUTPATIENT
Start: 2025-02-21 | End: 2025-02-21 | Stop reason: HOSPADM

## 2025-02-21 RX ORDER — MAGNESIUM SULFATE HEPTAHYDRATE 40 MG/ML
2 INJECTION, SOLUTION INTRAVENOUS ONCE
Status: COMPLETED | OUTPATIENT
Start: 2025-02-21 | End: 2025-02-21

## 2025-02-21 RX ADMIN — Medication 10 ML: at 11:49

## 2025-02-21 RX ADMIN — HEPARIN 500 UNITS: 100 SYRINGE at 11:49

## 2025-02-21 RX ADMIN — MAGNESIUM SULFATE HEPTAHYDRATE 2 G: 40 INJECTION, SOLUTION INTRAVENOUS at 09:57

## 2025-02-21 RX ADMIN — SODIUM CHLORIDE 20 ML/HR: 9 INJECTION, SOLUTION INTRAVENOUS at 09:57

## 2025-02-21 NOTE — PROGRESS NOTES
Msg to hem/onc: [birth year] Uyen José is here for her mag infusion.  She is still having significant feelings of pins/needles in both her arms which is causing her to itch, resulting in some mild bruising.  Doesn't report any numbness.  She was started on and has been taking gabapentin 100mg daily before bed. (Note about this on 2/6/25).  Will get her mag going, anything else to add?    Per Dr. MCINTYRE can increase gabapentin to 300mg before bed.  Pt informed and agreeable.

## 2025-02-21 NOTE — TELEPHONE ENCOUNTER
Rx Refill Note  Requested Prescriptions     Pending Prescriptions Disp Refills    lisdexamfetamine (Vyvanse) 60 MG capsule 30 capsule 0     Sig: Take 1 capsule by mouth Every Morning for 30 days      Last office visit with prescribing clinician: 1/28/25   Last telemedicine visit with prescribing clinician: Visit date not found   Next office visit with prescribing clinician: 4/22/25    CSA up to date 8/5/24  Last UDS was 5/9/24    Radha Michelle MA  02/21/25, 13:06 CST

## 2025-02-21 NOTE — TELEPHONE ENCOUNTER
----- Message from Shyam Weiss sent at 2/21/2025  8:56 AM CST -----  Yes thank you  ----- Message -----  From: Mildred Carlisle MA  Sent: 2/21/2025   8:31 AM CST  To: Shyam Weiss MD    She didn't get this yesterday with her tx. Would it be ok to send in oral if she is not able to come in.  ----- Message -----  From: Shyam Weiss MD  Sent: 2/20/2025   3:55 PM CST  To: Mgw Onc [place] Clinical Pool    2 g IV magnesium  ----- Message -----  From: Lab, Background User  Sent: 2/20/2025  12:23 PM CST  To: Shyam Weiss MD

## 2025-02-21 NOTE — TELEPHONE ENCOUNTER
Called and spoke with Uyen to let her know that Dr. Weiss will increase the gabapentin to 300 mg daily at bed time.

## 2025-02-25 RX ORDER — GABAPENTIN 300 MG/1
300 CAPSULE ORAL
Qty: 30 CAPSULE | Refills: 0 | Status: SHIPPED | OUTPATIENT
Start: 2025-02-25

## 2025-02-27 ENCOUNTER — HOSPITAL ENCOUNTER (OUTPATIENT)
Dept: ULTRASOUND IMAGING | Facility: HOSPITAL | Age: 45
Discharge: HOME | End: 2025-02-27
Admitting: INTERNAL MEDICINE
Payer: COMMERCIAL

## 2025-02-27 DIAGNOSIS — C50.912 DUCTAL CARCINOMA OF LEFT BREAST: ICD-10-CM

## 2025-02-27 DIAGNOSIS — N63.20 MASS OF LEFT BREAST, UNSPECIFIED QUADRANT: Primary | ICD-10-CM

## 2025-02-27 PROCEDURE — 76705 ECHO EXAM OF ABDOMEN: CPT

## 2025-03-04 ENCOUNTER — HOSPITAL ENCOUNTER (OUTPATIENT)
Dept: MAMMOGRAPHY | Facility: HOSPITAL | Age: 45
Discharge: HOME | End: 2025-03-04
Payer: COMMERCIAL

## 2025-03-04 ENCOUNTER — HOSPITAL ENCOUNTER (OUTPATIENT)
Dept: ULTRASOUND IMAGING | Facility: HOSPITAL | Age: 45
Discharge: HOME | End: 2025-03-04
Payer: COMMERCIAL

## 2025-03-04 DIAGNOSIS — N63.20 MASS OF LEFT BREAST, UNSPECIFIED QUADRANT: ICD-10-CM

## 2025-03-04 PROCEDURE — 77065 DX MAMMO INCL CAD UNI: CPT

## 2025-03-04 PROCEDURE — G0279 TOMOSYNTHESIS, MAMMO: HCPCS

## 2025-03-04 PROCEDURE — 76642 ULTRASOUND BREAST LIMITED: CPT

## 2025-03-05 ENCOUNTER — TELEPHONE (OUTPATIENT)
Dept: ONCOLOGY | Facility: CLINIC | Age: 45
End: 2025-03-05
Payer: COMMERCIAL

## 2025-03-05 NOTE — TELEPHONE ENCOUNTER
Today we were notified that the patient, Ms José's Taxotere was not prior-authorized and she is due to receive treatment tomorrow, 3/6/25. I called to notify Ms José and I asked if she would like the appointment moved to either Friday or Monday, so that we have time to receive the prior authorization. She said no, that she would like to go ahead with her final treatment, but without receiving the Taxol (taxotere). I told her that we could not recommend this method of her refusing Taxol (a part of her treatment), but she was certain that she wanted to finish her treatment tomorrow without the Taxol. She reports that this regimen of chemo has not shrunk her tumor and it has grown. She is anticipating surgery to remove the tumor in [facility]. I notified Eileen with Infusion, Sherie with Pharmacy and Kalee in scheduling of the patient's decision. She is due to receive partial chemo treatment (without Taxol) tomorrow 3/6/25 at 730 am. I instructed her to call our office if she has any questions or concerns. She verbalized understanding. No  c/o were voiced at this time.

## 2025-03-05 NOTE — TELEPHONE ENCOUNTER
Called and spoke with Leana. She has seen the surgeon in [facility], Sabrina Franco MD. Dr. Franco has seen the results and they plan is for mastectomy 4 weeks after chemo finishes.

## 2025-03-05 NOTE — TELEPHONE ENCOUNTER
----- Message from Shyam Weiss sent at 3/5/2025  3:19 AM CST -----  Reappoint to dr gianni MALAVE  ----- Message -----  From: Interface, Rad Results [address] In  Sent: 3/4/2025   9:37 AM CST  To: Shyam Weiss MD

## 2025-03-05 NOTE — TELEPHONE ENCOUNTER
Mammogram 03/04/25  IMPRESSION:  Mammographically stable appearance of extensive  biopsy-proven DCIS within the left breast, including multicentric  disease. However, there is increased size of an irregular hypoechoic  mass at 6:00 in the left breast, 1 cm from the nipple which is worrisome  for disease progression. Definitive surgery is recommended. BI-RADS  Category 6, known malignancy. Appropriate clinical action should be  taken.     The patient will receive a letter with the results of this exam,  including breast density notification.

## 2025-03-06 ENCOUNTER — INFUSION (OUTPATIENT)
Dept: ONCOLOGY | Facility: HOSPITAL | Age: 45
End: 2025-03-06
Payer: COMMERCIAL

## 2025-03-06 ENCOUNTER — LAB (OUTPATIENT)
Dept: LAB | Facility: HOSPITAL | Age: 45
End: 2025-03-06
Payer: COMMERCIAL

## 2025-03-06 VITALS
HEIGHT: 66 IN | RESPIRATION RATE: 18 BRPM | HEART RATE: 64 BPM | DIASTOLIC BLOOD PRESSURE: 56 MMHG | SYSTOLIC BLOOD PRESSURE: 113 MMHG | WEIGHT: 159 LBS | TEMPERATURE: 96.7 F | BODY MASS INDEX: 25.55 KG/M2 | OXYGEN SATURATION: 99 %

## 2025-03-06 DIAGNOSIS — C50.912 DUCTAL CARCINOMA OF LEFT BREAST: Primary | ICD-10-CM

## 2025-03-06 DIAGNOSIS — E87.6 LOW SERUM POTASSIUM LEVEL: Primary | ICD-10-CM

## 2025-03-06 DIAGNOSIS — C50.912 INVASIVE DUCTAL CARCINOMA OF BREAST, LEFT: ICD-10-CM

## 2025-03-06 DIAGNOSIS — C50.912 DUCTAL CARCINOMA OF LEFT BREAST: ICD-10-CM

## 2025-03-06 LAB
ALBUMIN SERPL-MCNC: 4 G/DL (ref 3.5–5.2)
ALBUMIN/GLOB SERPL: 1.4 G/DL
ALP SERPL-CCNC: 102 U/L (ref 39–117)
ALT SERPL W P-5'-P-CCNC: 16 U/L (ref 1–33)
ANION GAP SERPL CALCULATED.3IONS-SCNC: 12 MMOL/L (ref 5–15)
AST SERPL-CCNC: 15 U/L (ref 1–32)
BASOPHILS # BLD AUTO: 0.04 10*3/MM3 (ref 0–0.2)
BASOPHILS NFR BLD AUTO: 0.4 % (ref 0–1.5)
BILIRUB SERPL-MCNC: 0.5 MG/DL (ref 0–1.2)
BUN SERPL-MCNC: 18 MG/DL (ref 6–20)
BUN/CREAT SERPL: 21.7 (ref 7–25)
CALCIUM SPEC-SCNC: 9.4 MG/DL (ref 8.6–10.5)
CHLORIDE SERPL-SCNC: 101 MMOL/L (ref 98–107)
CO2 SERPL-SCNC: 29 MMOL/L (ref 22–29)
CREAT SERPL-MCNC: 0.83 MG/DL (ref 0.57–1)
DEPRECATED RDW RBC AUTO: 47.7 FL (ref 37–54)
EGFRCR SERPLBLD CKD-EPI 2021: 88.7 ML/MIN/1.73
EOSINOPHIL # BLD AUTO: 0.12 10*3/MM3 (ref 0–0.4)
EOSINOPHIL NFR BLD AUTO: 1.2 % (ref 0.3–6.2)
ERYTHROCYTE [DISTWIDTH] IN BLOOD BY AUTOMATED COUNT: 14.5 % (ref 12.3–15.4)
GLOBULIN UR ELPH-MCNC: 2.8 GM/DL
GLUCOSE SERPL-MCNC: 131 MG/DL (ref 65–99)
HCT VFR BLD AUTO: 36.5 % (ref 34–46.6)
HGB BLD-MCNC: 11.9 G/DL (ref 12–15.9)
IMM GRANULOCYTES # BLD AUTO: 0.03 10*3/MM3 (ref 0–0.05)
IMM GRANULOCYTES NFR BLD AUTO: 0.3 % (ref 0–0.5)
LYMPHOCYTES # BLD AUTO: 3.41 10*3/MM3 (ref 0.7–3.1)
LYMPHOCYTES NFR BLD AUTO: 33.7 % (ref 19.6–45.3)
MAGNESIUM SERPL-MCNC: 1.6 MG/DL (ref 1.6–2.6)
MCH RBC QN AUTO: 29.2 PG (ref 26.6–33)
MCHC RBC AUTO-ENTMCNC: 32.6 G/DL (ref 31.5–35.7)
MCV RBC AUTO: 89.5 FL (ref 79–97)
MONOCYTES # BLD AUTO: 0.76 10*3/MM3 (ref 0.1–0.9)
MONOCYTES NFR BLD AUTO: 7.5 % (ref 5–12)
NEUTROPHILS NFR BLD AUTO: 5.75 10*3/MM3 (ref 1.7–7)
NEUTROPHILS NFR BLD AUTO: 56.9 % (ref 42.7–76)
NRBC BLD AUTO-RTO: 0 /100 WBC (ref 0–0.2)
PLATELET # BLD AUTO: 189 10*3/MM3 (ref 140–450)
PMV BLD AUTO: 11.2 FL (ref 6–12)
POTASSIUM SERPL-SCNC: 3.3 MMOL/L (ref 3.5–5.2)
PROT SERPL-MCNC: 6.8 G/DL (ref 6–8.5)
RBC # BLD AUTO: 4.08 10*6/MM3 (ref 3.77–5.28)
SODIUM SERPL-SCNC: 142 MMOL/L (ref 136–145)
WBC NRBC COR # BLD AUTO: 10.11 10*3/MM3 (ref 3.4–10.8)

## 2025-03-06 PROCEDURE — 25010000002 DIPHENHYDRAMINE PER 50 MG: Performed by: INTERNAL MEDICINE

## 2025-03-06 PROCEDURE — 25010000002 HEPARIN LOCK FLUSH PER 10 UNITS: Performed by: INTERNAL MEDICINE

## 2025-03-06 PROCEDURE — 96413 CHEMO IV INFUSION 1 HR: CPT

## 2025-03-06 PROCEDURE — 25810000003 SODIUM CHLORIDE 0.9 % SOLUTION 250 ML FLEX CONT: Performed by: INTERNAL MEDICINE

## 2025-03-06 PROCEDURE — 63710000001 APREPITANT PER 5 MG: Performed by: INTERNAL MEDICINE

## 2025-03-06 PROCEDURE — 25010000002 TRASTUZUMAB-DKST 420 MG RECONSTITUTED SOLUTION 1 EACH VIAL: Performed by: INTERNAL MEDICINE

## 2025-03-06 PROCEDURE — 83735 ASSAY OF MAGNESIUM: CPT

## 2025-03-06 PROCEDURE — 96375 TX/PRO/DX INJ NEW DRUG ADDON: CPT

## 2025-03-06 PROCEDURE — 96417 CHEMO IV INFUS EACH ADDL SEQ: CPT

## 2025-03-06 PROCEDURE — 25810000003 SODIUM CHLORIDE 0.9 % SOLUTION: Performed by: INTERNAL MEDICINE

## 2025-03-06 PROCEDURE — 25010000002 PERTUZUMAB 420 MG/14ML SOLUTION 420 MG VIAL: Performed by: INTERNAL MEDICINE

## 2025-03-06 PROCEDURE — 25010000002 DOCETAXEL 160 MG/16ML SOLUTION 16 ML VIAL: Performed by: INTERNAL MEDICINE

## 2025-03-06 PROCEDURE — 25010000002 DEXAMETHASONE SODIUM PHOSPHATE 100 MG/10ML SOLUTION: Performed by: INTERNAL MEDICINE

## 2025-03-06 PROCEDURE — 96367 TX/PROPH/DG ADDL SEQ IV INF: CPT

## 2025-03-06 PROCEDURE — 36415 COLL VENOUS BLD VENIPUNCTURE: CPT

## 2025-03-06 PROCEDURE — 25010000002 PALONOSETRON PER 25 MCG: Performed by: INTERNAL MEDICINE

## 2025-03-06 PROCEDURE — 85025 COMPLETE CBC W/AUTO DIFF WBC: CPT

## 2025-03-06 PROCEDURE — 80053 COMPREHEN METABOLIC PANEL: CPT

## 2025-03-06 PROCEDURE — 25010000002 CARBOPLATIN PER 50 MG: Performed by: INTERNAL MEDICINE

## 2025-03-06 RX ORDER — SODIUM CHLORIDE 0.9 % (FLUSH) 0.9 %
10 SYRINGE (ML) INJECTION AS NEEDED
Status: DISCONTINUED | OUTPATIENT
Start: 2025-03-06 | End: 2025-03-06 | Stop reason: HOSPADM

## 2025-03-06 RX ORDER — LORAZEPAM 2 MG/ML
1 INJECTION INTRAMUSCULAR EVERY 4 HOURS PRN
Status: DISCONTINUED | OUTPATIENT
Start: 2025-03-06 | End: 2025-03-06 | Stop reason: HOSPADM

## 2025-03-06 RX ORDER — HYDROCORTISONE SODIUM SUCCINATE 100 MG/2ML
100 INJECTION INTRAMUSCULAR; INTRAVENOUS AS NEEDED
Status: DISCONTINUED | OUTPATIENT
Start: 2025-03-06 | End: 2025-03-06 | Stop reason: HOSPADM

## 2025-03-06 RX ORDER — FAMOTIDINE 10 MG/ML
20 INJECTION, SOLUTION INTRAVENOUS ONCE
Status: COMPLETED | OUTPATIENT
Start: 2025-03-06 | End: 2025-03-06

## 2025-03-06 RX ORDER — HEPARIN SODIUM (PORCINE) LOCK FLUSH IV SOLN 100 UNIT/ML 100 UNIT/ML
500 SOLUTION INTRAVENOUS AS NEEDED
Status: DISCONTINUED | OUTPATIENT
Start: 2025-03-06 | End: 2025-03-06 | Stop reason: HOSPADM

## 2025-03-06 RX ORDER — SODIUM CHLORIDE 9 MG/ML
20 INJECTION, SOLUTION INTRAVENOUS ONCE
Status: COMPLETED | OUTPATIENT
Start: 2025-03-06 | End: 2025-03-06

## 2025-03-06 RX ORDER — DIPHENHYDRAMINE HYDROCHLORIDE 50 MG/ML
50 INJECTION INTRAMUSCULAR; INTRAVENOUS ONCE
Status: COMPLETED | OUTPATIENT
Start: 2025-03-06 | End: 2025-03-06

## 2025-03-06 RX ORDER — DIPHENHYDRAMINE HYDROCHLORIDE 50 MG/ML
50 INJECTION INTRAMUSCULAR; INTRAVENOUS AS NEEDED
Status: DISCONTINUED | OUTPATIENT
Start: 2025-03-06 | End: 2025-03-06 | Stop reason: HOSPADM

## 2025-03-06 RX ORDER — SODIUM CHLORIDE 0.9 % (FLUSH) 0.9 %
10 SYRINGE (ML) INJECTION AS NEEDED
OUTPATIENT
Start: 2025-03-06

## 2025-03-06 RX ORDER — FAMOTIDINE 10 MG/ML
20 INJECTION, SOLUTION INTRAVENOUS AS NEEDED
Status: DISCONTINUED | OUTPATIENT
Start: 2025-03-06 | End: 2025-03-06 | Stop reason: HOSPADM

## 2025-03-06 RX ORDER — APREPITANT 40 MG/1
80 CAPSULE ORAL ONCE
Status: COMPLETED | OUTPATIENT
Start: 2025-03-06 | End: 2025-03-06

## 2025-03-06 RX ORDER — PALONOSETRON 0.05 MG/ML
0.25 INJECTION, SOLUTION INTRAVENOUS ONCE
Status: COMPLETED | OUTPATIENT
Start: 2025-03-06 | End: 2025-03-06

## 2025-03-06 RX ORDER — OLANZAPINE 5 MG/1
5 TABLET ORAL ONCE
Status: COMPLETED | OUTPATIENT
Start: 2025-03-06 | End: 2025-03-06

## 2025-03-06 RX ORDER — POTASSIUM CHLORIDE 1500 MG/1
20 TABLET, EXTENDED RELEASE ORAL 2 TIMES DAILY
Qty: 8 TABLET | Refills: 0 | Status: SHIPPED | OUTPATIENT
Start: 2025-03-06

## 2025-03-06 RX ORDER — HEPARIN SODIUM (PORCINE) LOCK FLUSH IV SOLN 100 UNIT/ML 100 UNIT/ML
500 SOLUTION INTRAVENOUS AS NEEDED
OUTPATIENT
Start: 2025-03-06

## 2025-03-06 RX ADMIN — DOCETAXEL ANHYDROUS 65 MG: 10 INJECTION, SOLUTION INTRAVENOUS at 11:02

## 2025-03-06 RX ADMIN — OLANZAPINE 5 MG: 5 TABLET, FILM COATED ORAL at 08:41

## 2025-03-06 RX ADMIN — Medication 10 ML: at 15:00

## 2025-03-06 RX ADMIN — DEXAMETHASONE SODIUM PHOSPHATE 20 MG: 10 INJECTION, SOLUTION INTRAMUSCULAR; INTRAVENOUS at 08:50

## 2025-03-06 RX ADMIN — HEPARIN 500 UNITS: 100 SYRINGE at 15:01

## 2025-03-06 RX ADMIN — PALONOSETRON HYDROCHLORIDE 0.25 MG: 0.25 INJECTION, SOLUTION INTRAVENOUS at 08:42

## 2025-03-06 RX ADMIN — PERTUZUMAB 340 MG: 30 INJECTION, SOLUTION, CONCENTRATE INTRAVENOUS at 09:09

## 2025-03-06 RX ADMIN — FAMOTIDINE 20 MG: 10 INJECTION INTRAVENOUS at 08:43

## 2025-03-06 RX ADMIN — DIPHENHYDRAMINE HYDROCHLORIDE 50 MG: 50 INJECTION, SOLUTION INTRAMUSCULAR; INTRAVENOUS at 08:46

## 2025-03-06 RX ADMIN — TRASTUZUMAB-DKST 360 MG: KIT at 09:51

## 2025-03-06 RX ADMIN — SODIUM CHLORIDE 20 ML/HR: 9 INJECTION, SOLUTION INTRAVENOUS at 08:40

## 2025-03-06 RX ADMIN — CARBOPLATIN 350 MG: 600 INJECTION, SOLUTION INTRAVENOUS at 12:11

## 2025-03-06 RX ADMIN — APREPITANT 80 MG: 40 CAPSULE ORAL at 08:41

## 2025-03-07 ENCOUNTER — TELEPHONE (OUTPATIENT)
Dept: ONCOLOGY | Facility: CLINIC | Age: 45
End: 2025-03-07
Payer: COMMERCIAL

## 2025-03-07 ENCOUNTER — INFUSION (OUTPATIENT)
Dept: ONCOLOGY | Facility: HOSPITAL | Age: 45
End: 2025-03-07
Payer: COMMERCIAL

## 2025-03-07 VITALS
HEART RATE: 69 BPM | RESPIRATION RATE: 18 BRPM | SYSTOLIC BLOOD PRESSURE: 127 MMHG | DIASTOLIC BLOOD PRESSURE: 76 MMHG | TEMPERATURE: 98.3 F | OXYGEN SATURATION: 99 %

## 2025-03-07 DIAGNOSIS — C50.912 DUCTAL CARCINOMA OF LEFT BREAST: Primary | ICD-10-CM

## 2025-03-07 DIAGNOSIS — C50.912 INVASIVE DUCTAL CARCINOMA OF BREAST, LEFT: ICD-10-CM

## 2025-03-07 PROCEDURE — 25010000002 PEGFILGRASTIM 6 MG/0.6ML SOLUTION PREFILLED SYRINGE: Performed by: INTERNAL MEDICINE

## 2025-03-07 PROCEDURE — 96372 THER/PROPH/DIAG INJ SC/IM: CPT

## 2025-03-07 RX ADMIN — PEGFILGRASTIM 6 MG: 6 INJECTION SUBCUTANEOUS at 14:16

## 2025-03-07 NOTE — TELEPHONE ENCOUNTER
----- Message from Shyam Weiss sent at 3/7/2025  2:24 AM CST -----  Kdur 20 po tid x 3 d  ----- Message -----  From: Lab, Background User  Sent: 3/6/2025   7:35 AM CST  To: Shyam Weiss MD

## 2025-03-08 NOTE — PROGRESS NOTES
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  On 2024 underwent total laparoscopic hysterectomy with bilateral salpingoophorectomy.  She has completed neoadjuvant carboplatin+docetaxel+pertuzumab+trastuzumab- C1, 10/15/24; C2, 24; C3, 24 (carbo+docetaxel dose reduced 50% since C3 due to excess nausea/vomiting); C4, 25 (dose reduced 20% pertuzumab/trastuzumab from C4); C5, 25; C6, 3/6/25 (patient declined C6D1 docetaxel).  She is here alone (accompanied by her spouse, Lam).    I have reviewed the HPI and verified with the patient the accuracy of it. No changes to interval history since the information was documented. Shyam Weiss MD 25      Diagnostic abnormalities:  - Tobacco use disorder, hypothyroidism, depression/anxiety, obesity, prior bilateral salpingectomy, , newly diagnosed IDC left breast  - 24- CT a/p-  Increased size of the low density/complex cystic mass in the uterine cavity since the previous study. This may represent abnormal endometrial thickening, complex endometrial fluid/debris accumulation in the endometrial canal due to out duct obstruction by a fibroid in the lower uterine segment as detailed above. There is a septate morphology of the uterus. Further follow-up with sonography may be obtained. Normal appendix. The gallbladder is surgically absent. The nonacute findings of the remaining abdomen similar to the previous study.  Stable heterogeneous solid mass to the right " of midline at the mid to  lower uterine segment with probable secondary dilated endometrial cavity at the uterine fundus filled with fluid and a septate uterus.  Differential diagnosis would include benign etiologies such as submucosal leiomyoma and large endometrial polyp but also neoplasm, possibly endometrial carcinoma, or carcinosarcoma of the uterus.  No anabel pelvic soft tissue nodule or lymphadenopathy identified. Small left external iliac chain lymph node is unchanged. Small right ovarian cyst. Nabothian cysts in the cervix and septate uterus. MRI pelvis without and with contrast may be helpful for further evaluation. GYN consultation is recommended.   -7/25/24- MRI breast bilateral-1. Findings suggestive for multicentric left breast malignancy with diffuse ductal carcinoma in situ extending from the dominant 2.7 cm mass within the lateral left breast at 3:00 extending to and likely involving the left nipple. A second 8 mm mass within the central left breast tissue with a third mass in the anterior depth of the inferior left breast at 6:00. Two 8 mm homogeneous persistently enhancing hyperintense T2 masses at the upper outer right breast, mid depth at the 10:00 in the 11-12 o'clock positions, with overall benign signal characteristics for which background enhancement versus papilloma fibroadenoma considered. MRI directed/second look ultrasound recommended given the presence of the contralateral left breast findings. BI-RADS CATEGORY 5: Highly suggestive of malignancy. Appropriate action should be taken. Findings strongly favoring multicentric left breast malignancy. MRI directed/second look bilateral breast ultrasound recommended. Ultrasound-guided biopsy of the left breast mass at 3:00 and of a second left breast mass (6:00 if visible on ultrasound versus mass central to the nipple) with sampling of the largest left axillary node may be performed if clinically desired. Sampling of a finding within the upper  outer right breast should be considered if solid nodule identified on ultrasound.  -7/29/24- CMP normal (GFR 78.9), UA/cultures negative, HCG negative, WBC 12.65, ANC 8.43 otherwise normal CBC  -7/31/24-  Endocervical Currettage, Endometrial Currettage, and hysteroscopic resected uterine synechia.  Final diagnosis:  1.Endometrium, curettage: A. Blood and fragments of smooth muscle. B. No histologic evidence of malignancy. Comment: Endometrial mucosal tissue is not identified. 2. Endocervix, curettage: A. Fragments of benign endocervical tissue, benign squamous mucosa and benign lower uterine segment endometrium. B. Blood and mucus. C. No dysplasia identified. 3. Endometrium, hysteroscopic resected tissue: A. Fragments of smooth muscle. B. No histologic evidence of malignancy. Comment: Endometrial mucosal tissue is not identified.  -8/7/24- US biopsy:  Final diagnosis-  1.  Mass, left breast at 6:00, 1 cm from nipple, core needle biopsies: High-grade ductal carcinoma in situ, comedo type. 2.  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 Histologic grade (Corwin histologic score). Glandular (acinar)/tubular differentiation: Score 2. Nuclear pleomorphism: Score 3. Mitotic rate: Score 3. Overall grade: Grade 3. Maximum tumor diameter is at least 1.5 cm. 3.  Left axillary lymph node, core needle biopsy: Metastatic adenocarcinoma of breast. -ER 69%+; UT 85%+; HER2 3+ (positive); Ki67 54%+  -8/12/24- CT CAP- Known left breast malignancy with enlarged left level 1 and asymmetrically prominent left level 2 and borderline level 3 axillary lymph nodes which are suspicious for axillary disease.  No additional evidence of metastatic disease in the chest.  1. Small 0.4 cm low-attenuation region in the right lobe of the liver may have been present on the prior examinations but was not as well visualized. This is too small to characterize. Continued  attention on follow-up is recommended.  Fatty infiltration of the liver.  Persistent heterogeneity of the lower uterine segment of the uterus and fluid in the endometrial canal although the fluid has decreased. Stable septate uterus  -8/12/2024- Follow-up Dr. Gentile- A/P: IDC left breast.  Met w/u, referral onc, genetics. RTC 2 weeks.  -8/16/2024-bone scan-no evidence of osteoblastic metastatic disease.  -8/16/2024- BRCA 1/2 analyses with cancer next-summary: Negative-no clinically significant variants detected (pathogenic mutations, variants of unknown significance, gross deletions/duplications: Not detected)  -8/20/2024-PET scan-abnormal PET/CT, biopsy-proven left breast malignancy and demonstrates FDG avidity with maximum SUV of 7.9.  Also potential abnormal activity at 6:00 in the left breast with a maximal SUV of 3.9.  MRI of the breast with and without contrast will be helpful to exclude multifocal or multicentric disease in the left breast.  No abnormal contralateral right breast or axillary activity identified.  3 hypermetabolic level 1 lymph nodes in the left axilla compatible with kiersten metastasis and solitary borderline level 2 axillary lymph node on the left.  -8/22/2024- Today is seen for management considerations-  -8/26/24- 2D echo-  Left ventricular systolic function is normal. Left ventricular ejection fraction appears to be 56 - 60%.    Normal right ventricular cavity size and systolic function noted.    No significant valvular abnormalities identified on this study.     -8/28/2024-CBC normal.  CMP normal.  -9/15/2024- CT abdomen/pelvis-There is a 3.2 x 3.0 x 2.6 cm focus of rounded masslike enhancement in the lower uterine segment centered along the endometrial canal (series 2-image 70, series 4-image 41). The uterine cavity is distended and fluid-filled above this at the fundus. Primary imaging concern would be endometrial carcinoma obstructing the canal although it seems there was a recent D&C  with benign tissue sampling. Endometritis is also a consideration and cannot be excluded on CT.  No free fluid in the pelvis, pelvic abscess or pelvic adenopathy.    Previous interventions:  -9/20/2024-   Total Laparoscopic Hysterectomy with bilateral salpingoophorectomy, da Kenzie assisted -Final diagnosis:  Uterus with bilateral tubes and ovaries, hysterectomy with bilateral salpingo-oophorectomies: Unremarkable cervix, negative for squamous dysplasia.  Atrophic endometrium, negative for hyperplasia and atypia.  Benign intramural leiomyoma.  Adenomyosis.  Unremarkable right ovary.  Left ovary with cystic follicles and hemorrhagic corpora lutea.     -Neoadjuvant DCPT (carboplatin+docetaxel+pertuzumab+trastuzumab)- C1, 10/15/24; C2, 11/6/24; C3, 12/4/24 (carbo+docetaxel dose reduced 50% since C3 due to excess nausea/vomiting); C4, 1/2/25 (dose reduced 20% pertuzumab/trastuzumab from C4); C5, 2/6/25; C6, 3/6/25 (patient declined C6D1 docetaxel).        LABS    Lab Results - Last 18 Months   Lab Units 03/19/25  0718 03/06/25  0723 02/20/25  1208 02/13/25  0816 02/06/25  0727 01/30/25  0740 01/23/25  0736 01/16/25  0756 01/09/25  0714 11/18/24  0716 11/12/24  1239 10/29/24  0745 10/21/24  0917   HEMOGLOBIN g/dL 12.7 11.9* 13.2 13.5 13.0 12.4 12.6   < > 13.3   < > 13.3   < > 14.9   HEMATOCRIT % 38.5 36.5 40.8 42.3 39.9 39.5 40.0   < > 41.6   < > 41.3   < > 43.9   MCV fL 88.3 89.5 90.9 91.0 89.9 91.6 93.0   < > 91.0   < > 91.2   < > 86.9   WBC 10*3/mm3 11.48* 10.11 15.38* 23.55* 4.19 7.34 7.73   < > 32.17*   < > 13.52*   < > 4.29   RDW % 14.7 14.5 14.3 14.3 14.1 14.4 15.2   < > 14.8   < > 14.2   < > 12.8   MPV fL 11.4 11.2 11.3 11.4 10.7 10.8 10.9   < > 11.3   < > 11.5   < > 13.2*   PLATELETS 10*3/mm3 220 189 222 294 250 172 250   < > 243   < > 217   < > 132*   IMM GRAN % % 0.4 0.3 0.5  --  0.5 0.1 0.3   < >  --    < >  --    < >  --    NEUTROS ABS 10*3/mm3 8.45* 5.75 10.94* 17.72* 3.58 4.58 5.43   < > 24.77*   < > 9.33*    < > 1.63*   LYMPHS ABS 10*3/mm3 2.33 3.41* 3.51*  --  0.55* 1.69 1.57   < >  --    < >  --    < >  --    MONOS ABS 10*3/mm3 0.59 0.76 0.75  --  0.03* 0.54 0.61   < >  --    < >  --    < >  --    EOS ABS 10*3/mm3 0.03 0.12 0.04 0.00 0.00 0.47* 0.03   < > 0.00   < > 0.00   < > 0.39   BASOS ABS 10*3/mm3 0.03 0.04 0.06 0.24* 0.01 0.05 0.07   < > 0.32*   < > 0.00   < >  --    IMMATURE GRANS (ABS) 10*3/mm3 0.05 0.03 0.08*  --  0.02 0.01 0.02   < >  --    < >  --    < >  --    NRBC /100 WBC 0.0 0.0 0.0  --  0.0 0.0 0.0   < >  --    < >  --    < >  --    NEUTROPHIL % %  --   --   --  62.4  --   --   --   --  61.0  --  50.0  --  27.0*   MONOCYTES % %  --   --   --  2.0*  --   --   --   --  5.0  --  5.0  --  11.0   BASOPHIL % %  --   --   --  1.0  --   --   --   --  1.0  --  0.0  --   --    ATYP LYMPH % %  --   --   --  5.9*  --   --   --   --  4.0  --   --   --  2.0    < > = values in this interval not displayed.       Lab Results - Last 18 Months   Lab Units 03/19/25  0718 03/06/25  0723 02/20/25  1208 02/13/25  0816 02/06/25  0727 01/30/25  0740   GLUCOSE mg/dL 161* 131* 116* 121* 215* 121*   SODIUM mmol/L 143 142 143 140 137 143   POTASSIUM mmol/L 3.4* 3.3* 3.7 4.1 4.2 3.4*   CO2 mmol/L 31.0* 29.0 30.0* 30.0* 28.0 31.0*   CHLORIDE mmol/L 98 101 100 99 98 102   ANION GAP mmol/L 14.0 12.0 13.0 11.0 11.0 10.0   CREATININE mg/dL 0.91 0.83 0.84 0.89 0.71 0.79   BUN mg/dL 11 18 16 12 15 10   BUN / CREAT RATIO  12.1 21.7 19.0 13.5 21.1 12.7   CALCIUM mg/dL 9.8 9.4 9.4 9.5 9.7 9.7   ALK PHOS U/L 174* 102 163* 233* 116 109   TOTAL PROTEIN g/dL 7.1 6.8 7.1 6.9 7.4 7.0   ALT (SGPT) U/L 22 16 17 14 16 14   AST (SGOT) U/L 21 15 14 14 18 16   BILIRUBIN mg/dL 0.7 0.5 0.4 0.3 0.7 0.7   ALBUMIN g/dL 4.3 4.0 4.2 4.1 4.4 4.3   GLOBULIN gm/dL 2.8 2.8 2.9 2.8 3.0 2.7         Lab Results - Last 18 Months   Lab Units 10/15/24  0744   TSH uIU/mL 0.506         PAST MEDICAL HISTORY:  ALLERGIES:  Allergies   Allergen Reactions    Percocet  [Oxycodone-Acetaminophen] Hives    Adhesive Tape Itching     ADHESIVE ON BANDAIDS     CURRENT MEDICATIONS:  Outpatient Encounter Medications as of 3/19/2025   Medication Sig Dispense Refill    [START ON 3/22/2025] amphetamine-dextroamphetamine (Adderall) 10 MG tablet Take 1 tablet by mouth Daily for 30 days. 30 tablet 0    amphetamine-dextroamphetamine (Adderall) 10 MG tablet Take 1 tablet by mouth Daily for 30 days. 30 tablet 0    bisoprolol-hydrochlorothiazide (ZIAC) 5-6.25 MG per tablet Take 1 tablet by mouth Daily. 30 tablet 2    dexAMETHasone (DECADRON) 4 MG tablet Take 2 tablets 2 times daily the day before chemo and 2 tablets 2 times daily the day after chemo 48 tablet 0    DULoxetine (CYMBALTA) 20 MG capsule Take 1 capsule by mouth Daily. 30 capsule 2    gabapentin (NEURONTIN) 100 MG capsule Take 1 capsule by mouth Every Night. 30 capsule 1    gabapentin (NEURONTIN) 300 MG capsule Take 1 capsule by mouth every night at bedtime. 30 capsule 0    ibuprofen (ADVIL,MOTRIN) 800 MG tablet Take 1 tablet by mouth Every 6 (Six) Hours As Needed for Mild Pain. 90 tablet 2    levothyroxine (SYNTHROID, LEVOTHROID) 112 MCG tablet Take 1 tablet by mouth Daily. 30 tablet 2    lidocaine-prilocaine (EMLA) 2.5-2.5 % cream Apply to port site 30 minutes before it is accessed and cover with occlusive dressing. 30 g 1    [START ON 3/22/2025] lisdexamfetamine (Vyvanse) 60 MG capsule Take 1 capsule by mouth Every Morning for 30 days 30 capsule 0    lisdexamfetamine (Vyvanse) 60 MG capsule Take 1 capsule by mouth Every Morning for 30 days 30 capsule 0    methylPREDNISolone (MEDROL) 4 MG dose pack Take as directed on package instructions. 21 tablet 0    ondansetron ODT (ZOFRAN-ODT) 8 MG disintegrating tablet Place 1 tablet on the tongue Every 8 (Eight) Hours As Needed for Nausea or Vomiting. 30 tablet 1    pantoprazole (PROTONIX) 40 MG EC tablet Take 1 tablet by mouth once daily. 30 tablet 2    pertuzumab (Perjeta) 420 MG/14ML solution  injection Infuse 11.33 mL into a venous catheter As Directed. Will infuse intravenously in the outpatient infusion center every 3 weeks. 14 mL 2    potassium chloride (KLOR-CON M20) 20 MEQ CR tablet Take 1 tablet by mouth 3 (Three) Times a Day. 9 tablet 0    trastuzumab-dkst (Ogivri) 420 MG chemo injection Infuse 360 mg into a venous catheter As Directed. Will infuse intravenously in the outpatient infusion center every 3 weeks. 2 each 2    potassium chloride (KLOR-CON M20) 20 MEQ CR tablet Take 1 tablet by mouth 2 (Two) Times a Day. 8 tablet 0    [DISCONTINUED] pantoprazole (PROTONIX) 40 MG EC tablet Take 1 tablet by mouth Daily. 30 tablet 2     Facility-Administered Encounter Medications as of 3/19/2025   Medication Dose Route Frequency Provider Last Rate Last Admin    lidocaine (XYLOCAINE) 1 % injection 30 mL  30 mL Subcutaneous Once Taylor Salinas MD        lidocaine 1% - EPINEPHrine 1:[id number] (XYLOCAINE W/EPI) 1 %-1:138305 injection 30 mL  30 mL Infiltration Once Taylor Salinas MD        [COMPLETED] MAGNESIUM SULFATE 2 GM/50ML IV SOLN  2 g Intravenous Once Shyam Weiss MD   Stopped at 03/19/25 1242    [COMPLETED] sodium chloride 0.9 % infusion  20 mL/hr Intravenous Once Shyam Weiss MD   Stopped at 03/19/25 1246    [DISCONTINUED] heparin injection 500 Units  500 Units Intravenous PRN Shyam Weiss MD   500 Units at 03/19/25 1251    [DISCONTINUED] sodium chloride 0.9 % flush 10 mL  10 mL Intravenous PRN Shyam Weiss MD   10 mL at 03/19/25 1251     ADULT ILLNESSES:  Patient Active Problem List   Diagnosis Code    Tobacco abuse counseling Z71.6    Hypothyroidism E03.9    Attention deficit hyperactivity disorder F90.9    Essential hypertension I10    Irritable bowel syndrome K58.9    Mixed anxiety and depressive disorder F41.8    Mood disorder F39    Overweight (BMI 25.0-29.9) E66.3    Gastroesophageal reflux disease without esophagitis K21.9    Dysmenorrhea N94.6     Hematometra N85.7    Ductal carcinoma of left breast C50.912    Invasive ductal carcinoma of breast, left C50.912    Pelvic pain in female R10.2    Nausea R11.0    Low magnesium level R79.0    Abnormal mammogram R92.8     SURGERIES:  Past Surgical History:   Procedure Laterality Date    CARPAL TUNNEL RELEASE Right     COLONOSCOPY  05/01/2003    Normal exam    D & C HYSTEROSCOPY MYOSURE N/A 07/31/2024    Procedure: DILATATION AND CURETTAGE HYSTEROSCOPY WITH POSSIBLE TISSUE RESECTION;  Surgeon: Stevan Espinal MD;  Location:  [facility] OR;  Service: Obstetrics/Gynecology;  Laterality: N/A;    DIAGNOSTIC LAPAROSCOPY  2000    [facility]; normal findings; [facility]    ENDOMETRIAL ABLATION  2023    ENDOSCOPY      LAPAROSCOPIC CHOLECYSTECTOMY  2011    Dr Duncan; [facility], [place]    LYMPH NODE BIOPSY  8/7/2024    Cancer Positive    SALPINGECTOMY Bilateral 10/16/2020    Procedure: SALPINGECTOMY LAPAROSCOPIC for sterilization;  Surgeon: Stevan Espinal MD;  Location:  [facility] OR;  Service: Obstetrics/Gynecology;  Laterality: Bilateral;    TOTAL LAPAROSCOPIC HYSTERECTOMY SALPINGO OOPHORECTOMY N/A 09/20/2024    Procedure: TOTAL LAPAROSCOPIC HYSTERECTOMY BILATERAL SALPINGOOPHORECTOMY WITH DAVINCI ROBOT;  Surgeon: Stevan Espinal MD;  Location:  [facility] OR;  Service: Robotics - DaVinci;  Laterality: N/A;    TUBAL ABDOMINAL LIGATION      cut not tied    US GUIDED LYMPH NODE BIOPSY  08/07/2024    VENOUS ACCESS DEVICE (PORT) INSERTION N/A 08/30/2024    Procedure: Single Lumen Port-a-cath insertion with flouroscopy;  Surgeon: Shahla Gentile MD;  Location:  [facility] OR;  Service: General;  Laterality: N/A;    WISDOM TOOTH EXTRACTION       HEALTH MAINTENANCE ITEMS:  Health Maintenance Due   Topic Date Due    TDAP/TD VACCINES (1 - Tdap) Never done    HEPATITIS C SCREENING  Never done    ANNUAL PHYSICAL  Never done    COVID-19 Vaccine (6 - 2024-25 season) 09/01/2024    COLORECTAL CANCER SCREENING  12/10/2024       <no information>  Last Completed  Colonoscopy    This patient has no relevant Health Maintenance data.       Immunization History   Administered Date(s) Administered    COVID-19 (MODERNA) 1st,2nd,3rd Dose Monovalent 10/29/2021, 2021, 2021, 2021, 2022    DT 2004    Influenza, Unspecified 2021     Last Completed Mammogram            Upcoming       MAMMOGRAM (Yearly) Next due on 3/4/2026      2025  Mammo Diagnostic Digital Tomosynthesis Left With CAD    2024  MAMMO DIAGNOSTIC BILATERAL W CAD    2022  MAMMO SCREENING BILATERAL W CAD                              FAMILY HISTORY:  Family History   Problem Relation Age of Onset    Diabetes Mother     Asthma Mother     Depression Mother     Hyperlipidemia Mother     Kidney disease Mother     Thyroid disease Mother     Diabetes Father     Arthritis Father         RA    Asthma Son         Ecezma and asthma    Cancer Maternal Grandmother         vulva, HPV+    Thyroid disease Maternal Grandmother     Cancer Maternal Grandfather     Lung cancer Paternal Grandfather 60 - 69    Cancer Paternal Grandfather     Stomach cancer Maternal Great-Grandmother     Colon cancer Neg Hx     Colon polyps Neg Hx      SOCIAL HISTORY:  Social History     Socioeconomic History    Marital status:    Tobacco Use    Smoking status: Former     Current packs/day: 0.00     Average packs/day: 0.5 packs/day for 20.0 years (10.0 ttl pk-yrs)     Types: Cigarettes     Start date: 2004     Quit date: 2024     Years since quittin.6     Passive exposure: Past    Smokeless tobacco: Never    Tobacco comments:     Stop ciggarettes 7.24 using vape now to stop completely   Vaping Use    Vaping status: Former    Start date: 2024    Quit date: 10/17/2024   Substance and Sexual Activity    Alcohol use: Not Currently     Comment: Socially    Drug use: Yes     Types: Marijuana    Sexual activity: Yes     Partners: Male     Birth control/protection: None, Bilateral  "salpingectomy        REVIEW OF SYSTEMS:  Review of Systems   Constitutional:  Positive for fatigue (from RA + fibromyalgia). Negative for activity change, appetite change and unexpected weight loss (None since the last visit).        Manages her ADLs to include chores, errands and driving.  Is up and about, \"all the time after I recover from chemo.\"  Still works full time in customer service at [employer]     Chemo tolerance:  Since 50% dose reduction of carbo+docetaxel has noted that the, \"prickly rash\" on her forearms has improved and is since resolved.  Again with fatigue, nausea, achiness, runny nose starting days 2-3, but no more blurry vision on day 4, again \"all better in a week \", occasional loose stools (modulated by 1 dose of Imodium), no overt diarrhea, no mouth sores, no neuropathy.      On 10/26/24- previously discussed with Sherie Oliver, PharmD.  Due to protracted nausea/vomiting \"dry heaves\" after 1st cycle -refractory to usual antiemetics:  - We added into the treatment plan:   - Zyprexa 5 mg on Day 1   - Decadron 20 mg (   - Benadryl 50 mg was added as well   - 30% dose reduction of docetaxel and carboplatin      Eyes: Negative.    Respiratory: Negative.  Negative for cough and shortness of breath.         Smoker age 15- 1/2-1 ppd.  Has stopped smoking.  She is no longer vaping.   Cardiovascular: Negative.    Gastrointestinal: Negative.    Endocrine:         A0    Menarche age 13    Menopause age 42   Genitourinary: Negative.  Negative for vaginal bleeding (Had a CHRISTIANO/BSO per Dr. Espinal).   Musculoskeletal:  Positive for arthralgias (Chronic- from RA + fibromyalgia-symptoms are better while she is on chemo), back pain (Chronic) and neck pain (Chronic).   Skin:  Negative for rash.   Allergic/Immunologic: Negative.    Neurological: Negative.    Hematological: Negative.    Psychiatric/Behavioral: Negative.       /64   Pulse 68   Temp 96.2 °F (35.7 °C) (Temporal)   Resp 18   Ht " "167.6 cm (66\")   Wt 70.8 kg (156 lb)   LMP  (LMP Unknown)   SpO2 98%   BMI 25.18 kg/m²  Body surface area is 1.8 meters squared.  Pain Score    03/19/25 1513   PainSc: 0-No pain       Physical Exam  Vitals and nursing note reviewed. Exam conducted with a chaperone present.   Constitutional:       Comments: Cooperative, well-developed, modestly kept middle-aged female.  Ambulatory.  ECOG 0.      Has lost 4 lb (in addition to 5 lb at her prior visit - had gained 10 lb at her visit before that) since the last visit   HENT:      Head: Normocephalic and atraumatic.   Eyes:      General: No scleral icterus.     Extraocular Movements: Extraocular movements intact.      Pupils: Pupils are equal, round, and reactive to light.   Cardiovascular:      Rate and Rhythm: Normal rate.   Pulmonary:      Effort: Pulmonary effort is normal.      Comments: Port right upper chest is well seated  Chest:   Breasts:     Right: Normal.      Left: Mass present.          Comments: Chaperoned exam: Mildred Carlisle MA    Right breast with no masses, no skin changes.  No nipple discharge today.    Left breast with previously palpable masses at 3:00 and 6:00 positions are again (barely) palpable.  Previously palpable left axillary nodes are not evident today.  No nipple discharge today.  No skin changes.  Abdominal:      Palpations: Abdomen is soft.      Tenderness: There is no abdominal tenderness.      Comments: Laparoscopic incisions are healing very well.   Musculoskeletal:         General: Normal range of motion.      Cervical back: Normal range of motion.   Lymphadenopathy:      Cervical: No cervical adenopathy.      Upper Body:      Right upper body: No supraclavicular or axillary adenopathy.      Left upper body: No supraclavicular or axillary (Clinically no longer evident) adenopathy.   Skin:     General: Skin is warm.   Neurological:      General: No focal deficit present.      Mental Status: She is alert and oriented to person, " place, and time.   Psychiatric:         Mood and Affect: Mood normal.         Behavior: Behavior normal.         Thought Content: Thought content normal.       Assessment:  1.   Invasive carcinoma of no special type (ductal) of the left breast  --Tumor stage: at least TNM/AJCC stage IIIA (cT2, cN2, M0,G3) ER 69%(+), DE 85% (+), HER-2 positive (3+).  --Original tumor burden:    ---7/25/24- MRI breast bilateral-1. Findings suggestive for multicentric left breast malignancy with diffuse ductal carcinoma in situ extending from the dominant 2.7 cm mass within the lateral left breast at 3:00 extending to and likely involving the left nipple. A second 8 mm mass within the central left breast tissue with a third mass in the anterior depth of the inferior left breast at 6:00. Two 8 mm homogeneous persistently enhancing hyperintense T2 masses at the upper outer right breast, mid depth at the 10:00 in the 11-12 o'clock positions, with overall benign signal characteristics for which background enhancement versus papilloma fibroadenoma considered. MRI directed/second look ultrasound recommended given the presence of the contralateral left breast findings. BI-RADS CATEGORY 5: Highly suggestive of malignancy. Appropriate action should be taken. Findings strongly favoring multicentric left breast malignancy. MRI directed/second look bilateral breast ultrasound recommended. Ultrasound-guided biopsy of the left breast mass at 3:00 and of a second left breast mass (6:00 if visible on ultrasound versus mass central to the nipple) with sampling of the largest left axillary node may be performed if clinically desired. Sampling of a finding within the upper outer right breast should be considered if solid nodule identified on ultrasound.  -8/7/24- US biopsy:  Final diagnosis-  1.  Mass, left breast at 6:00, 1 cm from nipple, core needle biopsies: High-grade ductal carcinoma in situ, comedo type. 2.  Mass, left breast at 2-3 o'clock, 6 cm  from nipple, needle biopsies: Invasive carcinoma not otherwise specified (ductal). Histologic grade (Corwin histologic score). Glandular (acinar)/tubular differentiation: Score 2. Nuclear pleomorphism: Score 3. Mitotic rate: Score 3. Overall grade: Grade 3. Maximum tumor diameter is at least 1.5 cm. 3.  Left axillary lymph node, core needle biopsy: Metastatic adenocarcinoma of breast. -ER 69%+; CO 85%+; HER2 3+ (positive); Ki67 54%+  -8/12/24- CT CAP- Known left breast malignancy with enlarged left level 1 and asymmetrically prominent left level 2 and borderline level 3 axillary lymph nodes which are suspicious for axillary disease.  No additional evidence of metastatic disease in the chest.  1. Small 0.4 cm low-attenuation region in the right lobe of the liver may have been present on the prior examinations but was not as well visualized. This is too small to characterize. Continued attention on follow-up is recommended.  Fatty infiltration of the liver.  Persistent heterogeneity of the lower uterine segment of the uterus and fluid in the endometrial canal although the fluid has decreased. Stable septate uterus  -8/16/24-bone scan-no evidence of osteoblastic metastatic disease.  -8/16/24- 2D echo    Left ventricular systolic function is normal. Left ventricular ejection fraction appears to be 56 - 60%.    Normal right ventricular cavity size and systolic function noted.    No significant valvular abnormalities identified on this study.     -8/20/24-PET scan-abnormal PET/CT, biopsy-proven left breast malignancy and demonstrates FDG avidity with maximum SUV of 7.9.  Also potential abnormal activity at 6:00 in the left breast with a maximal SUV of 3.9.  MRI of the breast with and without contrast will be helpful to exclude multifocal or multicentric disease in the left breast.  No abnormal contralateral right breast or axillary activity identified.  3 hypermetabolic level 1 lymph nodes in the left axilla  compatible with kiersten metastasis and solitary borderline level 2 axillary lymph node on the left.  - 11/25/24-2D echo- LVEF 61-65%  - 12/5/24- MRI breasts- 1. Complete radiographic response to neoadjuvant chemotherapy of the multicentric tumor within the left breast. Resolution of the previous nonspecific enhancement within the upper outer quadrant right breast. No abnormal enhancement seen within the left or right breast on today's study. Interval decrease in the pathologic left axillary lymphadenopathy, axillary lymph nodes with normal morphology on today's study. No internal mammary lymphadenopathy.  - 3/4/25- Diagnostic mammogram/left breast US-Mammographically stable appearance of extensive biopsy-proven DCIS within the left breast, including multicentric disease. However, there is increased size of an irregular hypoechoic mass at 6:00 in the left breast, 1 cm from the nipple which is worrisome for disease progression. Definitive surgery is recommended. BI-RADS Category 6, known malignancy. Appropriate clinical action should be  taken.  - Neoadjuvant DCPT x 6- C6, 3/6/25 (patient declined C6D1 docetaxel).      2.   Nausea, fatigue, myalgias, blurry vision (for 1 day) and flulike symptoms following chemo.  Resolved.  Feels like steps that were taken to include dose reduction and augmenting her antiemetic regimen have helped.  States symptoms now last less than 1 week (previously lasted the better part of 2 weeks).  MRI of the breasts after cycle 3 on 12/5 (above) showed CR.  CT of the head was planned but patient declined (resolution of blurry vision).    3.   Uterine cystic mass  - 7/12/24 - Abnormal CT a/p- Increased size of the low density/complex cystic mass in the uterine cavity since the previous study. This may represent abnormal endometrial thickening, complex endometrial fluid/debris accumulation in the endometrial canal due to out duct obstruction by a fibroid in the lower uterine segment as detailed  above. There is a septate morphology of the uterus. Further follow-up with sonography may be obtained. Normal appendix. The gallbladder is surgically absent. The nonacute findings of the remaining abdomen similar to the previous study.  Stable heterogeneous solid mass to the right of midline at the mid to lower uterine segment with probable secondary dilated endometrial cavity at the uterine fundus filled with fluid and a septate uterus.  Differential diagnosis would include benign etiologies such as submucosal leiomyoma and large endometrial polyp but also neoplasm, possibly endometrial carcinoma, or carcinosarcoma of the uterus.  No anabel pelvic soft tissue nodule or lymphadenopathy identified. Small left external iliac chain lymph node is unchanged. Small right ovarian cyst. Nabothian cysts in the cervix and septate uterus. MRI pelvis without and with contrast may be helpful for further evaluation. GYN consultation is recommended.  -7/31/24-  Endocervical Currettage, Endometrial Currettage, and hysteroscopic resected uterine synechia.  Final diagnosis:  1.Endometrium, curettage: A. Blood and fragments of smooth muscle. B. No histologic evidence of malignancy. Comment: Endometrial mucosal tissue is not identified. 2. Endocervix, curettage: A. Fragments of benign endocervical tissue, benign squamous mucosa and benign lower uterine segment endometrium. B. Blood and mucus. C. No dysplasia identified. 3. Endometrium, hysteroscopic resected tissue: A. Fragments of smooth muscle. B. No histologic evidence of malignancy. Comment: Endometrial mucosal tissue is not identified.  --9/15/2024- CT abdomen/pelvis-There is a 3.2 x 3.0 x 2.6 cm focus of rounded masslike enhancement in the lower uterine segment centered along the endometrial canal (series 2-image 70, series 4-image 41). The uterine cavity is distended and fluid-filled above this at the fundus. Primary imaging concern would be endometrial carcinoma obstructing the  "canal although it seems there was a recent D&C with benign tissue sampling. Endometritis is also a consideration and cannot be excluded on CT.  No free fluid in the pelvis, pelvic abscess or pelvic adenopathy.  --9/20/2024-   Total Laparoscopic Hysterectomy with bilateral salpingoophorectomy, da Kenzie assisted -Final diagnosis:  Uterus with bilateral tubes and ovaries, hysterectomy with bilateral salpingo-oophorectomies: Unremarkable cervix, negative for squamous dysplasia. Atrophic endometrium, negative for hyperplasia and atypia. Benign intramural leiomyoma. Adenomyosis. Unremarkable right ovary. Left ovary with cystic follicles and hemorrhagic corpora lutea.    4.   Nicotine dependence-stopped in favor of vaping  5.   Mixed depression/anxiety  6.   Hypothyroidism  7.   Obesity  8.   RA and fibromyalgia. Plaquenil on hold while on chemo.  Followed by Dr. Ackerman.  Symptoms have been quiescent while on chemo.  9.   Anemia. Chemo associated  -Normal today.  Hgb 12.7, 3/19/25 (prior: Hgb 12.4-13.5)  10.  Intermittently elevated alk phos.  Chemo?  -174, 3/19/2025 (prior peak: 233)       Plan:  1.   Labs, 1/30/25 - 3/6/25- 3/19/25  gluc 161, K+3.4 (Kdur called in) alk phos 174 (prior peak: 233) otherwise normal CMP, magnesium 1.3 (2 g IV magnesium ordered) WBC 11.48 otherwise normal CBC  2.   Chemo tolerance.  \"Prickly forearms but no more rash.\"  Nausea/vomiting/fatigue/blurry vision/malaise.  Resolved  3.   Patient informed of mammogram and breast US.  Has been seen by breast surgeon - Dr. Sabrina Franco in [facility] who has seen the reports.  Their plan was for mastectomy 4 weeks after chemo.  Patient now states that they are refusing to do her surgery.  \"I think it is my insurance\".  Want to be referred to [facility] now.  4.   Refer to breast surgical oncology at [facility].  Patient wants to be sure that his plastic surgeon will be available for reconstruction.  5.   Review diagnostic mammogram and US left " breast, 3/4/25 (above). Mammographically stable appearance of extensive biopsy-proven DCIS within the left breast, including multicentric disease. However, there is increased size of an irregular hypoechoic mass at 6:00 in the left breast, 1 cm from the nipple which is worrisome for disease progression. Definitive surgery is recommended.  6.   Again review breast MRI, 12/5/24 (above).  CR to neoadjuvant chemo!  7.   Schedule 2D echo this week if not yet done  8.   Reviewed BRCA 1/2 analyses with cancer next, 8/16/2034 -summary: Negative-no clinically significant variants detected  9.   Review NCCN guidelines version invasive breast cancer.  Principles of preoperative systemic therapy: Candidate for preoperative systemic therapy--A: Patients with inoperable breast cancer: IBC; bulky or matted cN2 axillary nodes; cN3 kiersten disease; cT4 tumors.  B. In selected patients with operable breast cancer--HER-2 positive disease and TNBC, if >/=cT2 OR >/=cN1; Large primary tumor relative to breast size in a patient who desires breast conservation; cN+ disease likely to become cN0 with preoperative systemic therapy; C.  Preoperative systemic therapy can be considered for cT1,N0, HER-2 positive disease and TNBC.     10.   Docetaxel, carboplatin: Toxicities of chemotherapy noted to include, but not limited to: Myelosuppression (neutropenia, anemia, thrombocytopenia), alopecia, neuropathy, arthralgia, myalgia, nausea, vomiting, hypersensitivity reactions, mucositis, diarrhea, infection, abnormal liver enzymes, asthenia, fever, hypertension, bleeding, edema, opportunistic infections, anaphylaxis, cardiac conduction disturbance/arrhythmias, syncope, thromboembolism, myocardial infarction, severe injection site reactions, pulmonary toxicity, GI obstruction/perforation, paralytic ileus, ischemic colitis, pancreatitis, hepatotoxicity, severe skin reactions). Questions answered to the patient's apparent satisfaction. She agrees to press  on with therapy.   11.   Discussed the potential toxicities of pertuzumab and trastuzumab, to include but not limited to: Cardiac failure/LV dysfunction, cardiomyopathy, CHF, arrhythmia, hypersensitivity reaction, anaphylaxis, angioedema, ARDS, interstitial pneumonitis, pulmonary infiltrates, pulmonary fibrosis, pleural effusion, pulmonary edema, hypoxia, myelosuppression (anemia, neutropenia, thrombocytopenia), febrile neutropenia, tumor lysis syndrome, glomerulonephritis, hypokalemia, peripheral neuropathy, hand-foot syndrome, alopecia, nausea, diarrhea, ALT/AST increase, lymphopenia, asthenia, fatigue, stomatitis, myalgia, arthralgia, constipation, vomiting, dysgeusia, headache, decreased appetite, insomnia, peripheral neuropathy, rash, decreased albumin, xeroderma, mucositis, cough, dyspepsia, fever, dizziness, increased potassium, decreased sodium, epistaxis, hot flashes, weight loss, URI, paresthesia, back pain, dyspnea, UTI, hypokalemia, hand-foot syndrome.  Questions answered to the patient's apparent satisfaction.  She agrees to press on with therapy.    12.  Anticipate adjuvant (postop) treatment (plan: q 21 days x 17 cycles - to complete 1 year of therapy) C7..pending breast surgery     Trastuzumab 8 mg/kg loading dose C7D1; then 6 mg/kg, C8- C17   Hold pertuzumab and trastuzumab if LVEF drops to<50% with a 10% or greater absolute decrease below pretreatment baseline.     13.  Premeds:   Zyprexa 5 mg IV  Decadron 20 mg IV   Pepcid 20 mg IV   Benadryl 50 mg IV        14.  CMP, magnesium level, and CBC with differential on day of trastuzumab. Procrit 40,000 units subcutaneously if hemoglobin less than 10 and hematocrit less than 30. Zarxio/Neupogen 480 mcg daily x3 if ANC less than 1.0.      15.  Rx:  Zofran 8 mg po tid prn # 30                Compazine 10 mg p.o. 3 times daily as needed dispense 30                Gabapentin 300 mg p.o. at bedtime dispense 30                OTC B complex MVI p.o. once  "daily     15.  Return to the office in 8 weeks with pre-office CBC with differential, CMP.    16.  Importance of Smoking Cessation discussed with patient and informed patient additional information will be on today's [facility]'s Flyer - Plan to Be Tobacco Free handout provided to patient         I spent ~92 minutes caring for Uyen, \"Leana\" on this date of service. This time includes time spent by me in the following activities: preparing for the visit, reviewing tests, performing a medically appropriate examination and/or evaluation, counseling and educating the patient/family/caregiver, ordering medications, tests, or procedures and documenting information in the medical record.       "

## 2025-03-10 ENCOUNTER — TELEPHONE (OUTPATIENT)
Dept: ONCOLOGY | Facility: CLINIC | Age: 45
End: 2025-03-10
Payer: COMMERCIAL

## 2025-03-10 RX ORDER — METHYLPREDNISOLONE 4 MG/1
TABLET ORAL
Qty: 21 TABLET | Refills: 0 | Status: SHIPPED | OUTPATIENT
Start: 2025-03-10

## 2025-03-18 DIAGNOSIS — K21.9 GASTROESOPHAGEAL REFLUX DISEASE WITHOUT ESOPHAGITIS: ICD-10-CM

## 2025-03-19 ENCOUNTER — RESULTS FOLLOW-UP (OUTPATIENT)
Dept: LAB | Facility: HOSPITAL | Age: 45
End: 2025-03-19
Payer: COMMERCIAL

## 2025-03-19 ENCOUNTER — INFUSION (OUTPATIENT)
Dept: ONCOLOGY | Facility: HOSPITAL | Age: 45
End: 2025-03-19
Payer: COMMERCIAL

## 2025-03-19 ENCOUNTER — OFFICE VISIT (OUTPATIENT)
Dept: ONCOLOGY | Facility: CLINIC | Age: 45
End: 2025-03-19
Payer: COMMERCIAL

## 2025-03-19 ENCOUNTER — LAB (OUTPATIENT)
Dept: LAB | Facility: HOSPITAL | Age: 45
End: 2025-03-19
Payer: COMMERCIAL

## 2025-03-19 VITALS
DIASTOLIC BLOOD PRESSURE: 63 MMHG | SYSTOLIC BLOOD PRESSURE: 117 MMHG | HEART RATE: 73 BPM | BODY MASS INDEX: 25.35 KG/M2 | RESPIRATION RATE: 18 BRPM | OXYGEN SATURATION: 100 % | WEIGHT: 157 LBS | TEMPERATURE: 96.7 F

## 2025-03-19 VITALS
WEIGHT: 156 LBS | OXYGEN SATURATION: 98 % | DIASTOLIC BLOOD PRESSURE: 64 MMHG | BODY MASS INDEX: 25.07 KG/M2 | RESPIRATION RATE: 18 BRPM | SYSTOLIC BLOOD PRESSURE: 102 MMHG | HEIGHT: 66 IN | TEMPERATURE: 96.2 F | HEART RATE: 68 BPM

## 2025-03-19 DIAGNOSIS — C50.912 INVASIVE DUCTAL CARCINOMA OF BREAST, LEFT: Primary | ICD-10-CM

## 2025-03-19 DIAGNOSIS — C50.912 INVASIVE DUCTAL CARCINOMA OF BREAST, LEFT: ICD-10-CM

## 2025-03-19 DIAGNOSIS — R79.0 LOW MAGNESIUM LEVEL: Primary | ICD-10-CM

## 2025-03-19 DIAGNOSIS — C50.912 DUCTAL CARCINOMA OF LEFT BREAST: ICD-10-CM

## 2025-03-19 DIAGNOSIS — E87.6 LOW SERUM POTASSIUM LEVEL: ICD-10-CM

## 2025-03-19 LAB
ALBUMIN SERPL-MCNC: 4.3 G/DL (ref 3.5–5.2)
ALBUMIN/GLOB SERPL: 1.5 G/DL
ALP SERPL-CCNC: 174 U/L (ref 39–117)
ALT SERPL W P-5'-P-CCNC: 22 U/L (ref 1–33)
ANION GAP SERPL CALCULATED.3IONS-SCNC: 14 MMOL/L (ref 5–15)
AST SERPL-CCNC: 21 U/L (ref 1–32)
BASOPHILS # BLD AUTO: 0.03 10*3/MM3 (ref 0–0.2)
BASOPHILS NFR BLD AUTO: 0.3 % (ref 0–1.5)
BILIRUB SERPL-MCNC: 0.7 MG/DL (ref 0–1.2)
BUN SERPL-MCNC: 11 MG/DL (ref 6–20)
BUN/CREAT SERPL: 12.1 (ref 7–25)
CALCIUM SPEC-SCNC: 9.8 MG/DL (ref 8.6–10.5)
CHLORIDE SERPL-SCNC: 98 MMOL/L (ref 98–107)
CO2 SERPL-SCNC: 31 MMOL/L (ref 22–29)
CREAT SERPL-MCNC: 0.91 MG/DL (ref 0.57–1)
DEPRECATED RDW RBC AUTO: 47.6 FL (ref 37–54)
EGFRCR SERPLBLD CKD-EPI 2021: 79.4 ML/MIN/1.73
EOSINOPHIL # BLD AUTO: 0.03 10*3/MM3 (ref 0–0.4)
EOSINOPHIL NFR BLD AUTO: 0.3 % (ref 0.3–6.2)
ERYTHROCYTE [DISTWIDTH] IN BLOOD BY AUTOMATED COUNT: 14.7 % (ref 12.3–15.4)
GLOBULIN UR ELPH-MCNC: 2.8 GM/DL
GLUCOSE SERPL-MCNC: 161 MG/DL (ref 65–99)
HCT VFR BLD AUTO: 38.5 % (ref 34–46.6)
HGB BLD-MCNC: 12.7 G/DL (ref 12–15.9)
IMM GRANULOCYTES # BLD AUTO: 0.05 10*3/MM3 (ref 0–0.05)
IMM GRANULOCYTES NFR BLD AUTO: 0.4 % (ref 0–0.5)
LYMPHOCYTES # BLD AUTO: 2.33 10*3/MM3 (ref 0.7–3.1)
LYMPHOCYTES NFR BLD AUTO: 20.3 % (ref 19.6–45.3)
MAGNESIUM SERPL-MCNC: 1.3 MG/DL (ref 1.6–2.6)
MCH RBC QN AUTO: 29.1 PG (ref 26.6–33)
MCHC RBC AUTO-ENTMCNC: 33 G/DL (ref 31.5–35.7)
MCV RBC AUTO: 88.3 FL (ref 79–97)
MONOCYTES # BLD AUTO: 0.59 10*3/MM3 (ref 0.1–0.9)
MONOCYTES NFR BLD AUTO: 5.1 % (ref 5–12)
NEUTROPHILS NFR BLD AUTO: 73.6 % (ref 42.7–76)
NEUTROPHILS NFR BLD AUTO: 8.45 10*3/MM3 (ref 1.7–7)
NRBC BLD AUTO-RTO: 0 /100 WBC (ref 0–0.2)
PLATELET # BLD AUTO: 220 10*3/MM3 (ref 140–450)
PMV BLD AUTO: 11.4 FL (ref 6–12)
POTASSIUM SERPL-SCNC: 3.4 MMOL/L (ref 3.5–5.2)
PROT SERPL-MCNC: 7.1 G/DL (ref 6–8.5)
RBC # BLD AUTO: 4.36 10*6/MM3 (ref 3.77–5.28)
SODIUM SERPL-SCNC: 143 MMOL/L (ref 136–145)
WBC NRBC COR # BLD AUTO: 11.48 10*3/MM3 (ref 3.4–10.8)

## 2025-03-19 PROCEDURE — 25810000003 SODIUM CHLORIDE 0.9 % SOLUTION: Performed by: INTERNAL MEDICINE

## 2025-03-19 PROCEDURE — 25010000002 HEPARIN LOCK FLUSH PER 10 UNITS: Performed by: INTERNAL MEDICINE

## 2025-03-19 PROCEDURE — 36415 COLL VENOUS BLD VENIPUNCTURE: CPT

## 2025-03-19 PROCEDURE — 25010000002 MAGNESIUM SULFATE 2 GM/50ML SOLUTION: Performed by: INTERNAL MEDICINE

## 2025-03-19 PROCEDURE — 96365 THER/PROPH/DIAG IV INF INIT: CPT

## 2025-03-19 PROCEDURE — 96366 THER/PROPH/DIAG IV INF ADDON: CPT

## 2025-03-19 PROCEDURE — 85025 COMPLETE CBC W/AUTO DIFF WBC: CPT

## 2025-03-19 PROCEDURE — 80053 COMPREHEN METABOLIC PANEL: CPT

## 2025-03-19 PROCEDURE — 83735 ASSAY OF MAGNESIUM: CPT

## 2025-03-19 RX ORDER — HEPARIN SODIUM (PORCINE) LOCK FLUSH IV SOLN 100 UNIT/ML 100 UNIT/ML
500 SOLUTION INTRAVENOUS AS NEEDED
Status: DISCONTINUED | OUTPATIENT
Start: 2025-03-19 | End: 2025-03-19 | Stop reason: HOSPADM

## 2025-03-19 RX ORDER — SODIUM CHLORIDE 0.9 % (FLUSH) 0.9 %
10 SYRINGE (ML) INJECTION AS NEEDED
Status: DISCONTINUED | OUTPATIENT
Start: 2025-03-19 | End: 2025-03-19 | Stop reason: HOSPADM

## 2025-03-19 RX ORDER — SODIUM CHLORIDE 0.9 % (FLUSH) 0.9 %
10 SYRINGE (ML) INJECTION AS NEEDED
OUTPATIENT
Start: 2025-03-19

## 2025-03-19 RX ORDER — MAGNESIUM SULFATE HEPTAHYDRATE 40 MG/ML
2 INJECTION, SOLUTION INTRAVENOUS ONCE
Status: COMPLETED | OUTPATIENT
Start: 2025-03-19 | End: 2025-03-19

## 2025-03-19 RX ORDER — MAGNESIUM OXIDE 400 MG/1
400 TABLET ORAL 2 TIMES DAILY
Qty: 6 TABLET | Refills: 0 | Status: SHIPPED | OUTPATIENT
Start: 2025-03-19 | End: 2025-03-22

## 2025-03-19 RX ORDER — POTASSIUM CHLORIDE 1500 MG/1
20 TABLET, EXTENDED RELEASE ORAL 3 TIMES DAILY
Qty: 9 TABLET | Refills: 0 | Status: SHIPPED | OUTPATIENT
Start: 2025-03-19

## 2025-03-19 RX ORDER — HEPARIN SODIUM (PORCINE) LOCK FLUSH IV SOLN 100 UNIT/ML 100 UNIT/ML
500 SOLUTION INTRAVENOUS AS NEEDED
OUTPATIENT
Start: 2025-03-19

## 2025-03-19 RX ORDER — SODIUM CHLORIDE 9 MG/ML
20 INJECTION, SOLUTION INTRAVENOUS ONCE
Status: COMPLETED | OUTPATIENT
Start: 2025-03-19 | End: 2025-03-19

## 2025-03-19 RX ORDER — PANTOPRAZOLE SODIUM 40 MG/1
40 TABLET, DELAYED RELEASE ORAL DAILY
Qty: 30 TABLET | Refills: 2 | Status: SHIPPED | OUTPATIENT
Start: 2025-03-19

## 2025-03-19 RX ORDER — MAGNESIUM SULFATE HEPTAHYDRATE 40 MG/ML
2 INJECTION, SOLUTION INTRAVENOUS ONCE
Status: CANCELLED | OUTPATIENT
Start: 2025-03-19

## 2025-03-19 RX ORDER — SODIUM CHLORIDE 9 MG/ML
20 INJECTION, SOLUTION INTRAVENOUS ONCE
Status: CANCELLED | OUTPATIENT
Start: 2025-03-19

## 2025-03-19 RX ADMIN — MAGNESIUM SULFATE HEPTAHYDRATE 2 G: 2 INJECTION, SOLUTION INTRAVENOUS at 10:48

## 2025-03-19 RX ADMIN — Medication 500 UNITS: at 12:51

## 2025-03-19 RX ADMIN — SODIUM CHLORIDE 20 ML/HR: 9 INJECTION, SOLUTION INTRAVENOUS at 10:48

## 2025-03-19 RX ADMIN — Medication 10 ML: at 12:51

## 2025-03-19 NOTE — TELEPHONE ENCOUNTER
----- Message from Shyam Weiss sent at 3/19/2025  8:40 AM CDT -----  Alk phos 174, magnesium 1.3  -Please schedule 2 g IV magnesium  -Repeat CMP and magnesium 1 week  ----- Message -----  From: Lab, Background User  Sent: 3/19/2025   7:47 AM CDT  To: Shyam Weiss MD

## 2025-03-19 NOTE — TELEPHONE ENCOUNTER
Patient coming in today for Magnesium at 10 am. Sees Dr MCINTYRE at 3 today. Will repeat cmp and mag in 1 week sent to [facility] to schedule.    Patient notified.

## 2025-03-20 ENCOUNTER — TELEPHONE (OUTPATIENT)
Dept: ONCOLOGY | Facility: CLINIC | Age: 45
End: 2025-03-20
Payer: COMMERCIAL

## 2025-03-20 DIAGNOSIS — Z79.899 ENCOUNTER FOR MONITORING CARDIOTOXIC DRUG THERAPY: Primary | ICD-10-CM

## 2025-03-20 DIAGNOSIS — Z51.81 ENCOUNTER FOR MONITORING CARDIOTOXIC DRUG THERAPY: Primary | ICD-10-CM

## 2025-03-20 NOTE — TELEPHONE ENCOUNTER
"Patient was scheduled for 2d Echo on 03/11/25 however it was canceled with the comment \"Patient (pt said she did not want to have this test done and was not interested in rescheduling.) \"  Will load new order to be scheduled.   "

## 2025-03-20 NOTE — TELEPHONE ENCOUNTER
----- Message from Shyam Weiss sent at 3/19/2025  4:26 PM CDT -----  Regarding: Echo  Needs 2D echo please

## 2025-03-21 DIAGNOSIS — C50.912 INVASIVE DUCTAL CARCINOMA OF BREAST, LEFT: Primary | ICD-10-CM

## 2025-03-21 RX ORDER — PREGABALIN 50 MG/1
50 CAPSULE ORAL NIGHTLY
Qty: 30 CAPSULE | Refills: 2 | Status: SHIPPED | OUTPATIENT
Start: 2025-03-21

## 2025-03-21 RX ORDER — DULOXETIN HYDROCHLORIDE 30 MG/1
30 CAPSULE, DELAYED RELEASE ORAL DAILY
Qty: 30 CAPSULE | Refills: 2 | Status: SHIPPED | OUTPATIENT
Start: 2025-03-21

## 2025-03-21 NOTE — TELEPHONE ENCOUNTER
Dose adjustment of Cymbalta and new prescription for Lyrica. Patient to d/c gabapentin.     From patient message 03/20/25: -Mildred pls ask her to stop gabapentin and try increasing Cymbalta to 30 mg daily  -pls call in Lyrica 50 mg hs # 30  -Thank you both!

## 2025-03-24 ENCOUNTER — TELEPHONE (OUTPATIENT)
Dept: ONCOLOGY | Facility: CLINIC | Age: 45
End: 2025-03-24
Payer: COMMERCIAL

## 2025-03-24 NOTE — TELEPHONE ENCOUNTER
Notified patient that her Lyrica had been approved and can be picked up at her pharmacy.  She verbalized understanding.

## 2025-03-26 ENCOUNTER — LAB (OUTPATIENT)
Dept: LAB | Facility: HOSPITAL | Age: 45
End: 2025-03-26
Payer: COMMERCIAL

## 2025-03-26 DIAGNOSIS — C50.912 INVASIVE DUCTAL CARCINOMA OF BREAST, LEFT: ICD-10-CM

## 2025-03-26 LAB
ALBUMIN SERPL-MCNC: 4 G/DL (ref 3.5–5.2)
ALBUMIN/GLOB SERPL: 1.4 G/DL
ALP SERPL-CCNC: 134 U/L (ref 39–117)
ALT SERPL W P-5'-P-CCNC: 23 U/L (ref 1–33)
ANION GAP SERPL CALCULATED.3IONS-SCNC: 10 MMOL/L (ref 5–15)
AST SERPL-CCNC: 20 U/L (ref 1–32)
BASOPHILS # BLD AUTO: 0.06 10*3/MM3 (ref 0–0.2)
BASOPHILS NFR BLD AUTO: 0.7 % (ref 0–1.5)
BILIRUB SERPL-MCNC: 0.8 MG/DL (ref 0–1.2)
BUN SERPL-MCNC: 12 MG/DL (ref 6–20)
BUN/CREAT SERPL: 13.3 (ref 7–25)
CALCIUM SPEC-SCNC: 9.6 MG/DL (ref 8.6–10.5)
CHLORIDE SERPL-SCNC: 102 MMOL/L (ref 98–107)
CO2 SERPL-SCNC: 29 MMOL/L (ref 22–29)
CREAT SERPL-MCNC: 0.9 MG/DL (ref 0.57–1)
DEPRECATED RDW RBC AUTO: 49.8 FL (ref 37–54)
EGFRCR SERPLBLD CKD-EPI 2021: 80.5 ML/MIN/1.73
EOSINOPHIL # BLD AUTO: 0.04 10*3/MM3 (ref 0–0.4)
EOSINOPHIL NFR BLD AUTO: 0.5 % (ref 0.3–6.2)
ERYTHROCYTE [DISTWIDTH] IN BLOOD BY AUTOMATED COUNT: 15 % (ref 12.3–15.4)
GLOBULIN UR ELPH-MCNC: 2.8 GM/DL
GLUCOSE SERPL-MCNC: 165 MG/DL (ref 65–99)
HCT VFR BLD AUTO: 40.1 % (ref 34–46.6)
HGB BLD-MCNC: 12.7 G/DL (ref 12–15.9)
IMM GRANULOCYTES # BLD AUTO: 0.03 10*3/MM3 (ref 0–0.05)
IMM GRANULOCYTES NFR BLD AUTO: 0.3 % (ref 0–0.5)
LYMPHOCYTES # BLD AUTO: 2.07 10*3/MM3 (ref 0.7–3.1)
LYMPHOCYTES NFR BLD AUTO: 23.5 % (ref 19.6–45.3)
MAGNESIUM SERPL-MCNC: 1.7 MG/DL (ref 1.6–2.6)
MCH RBC QN AUTO: 28.9 PG (ref 26.6–33)
MCHC RBC AUTO-ENTMCNC: 31.7 G/DL (ref 31.5–35.7)
MCV RBC AUTO: 91.1 FL (ref 79–97)
MONOCYTES # BLD AUTO: 0.8 10*3/MM3 (ref 0.1–0.9)
MONOCYTES NFR BLD AUTO: 9.1 % (ref 5–12)
NEUTROPHILS NFR BLD AUTO: 5.81 10*3/MM3 (ref 1.7–7)
NEUTROPHILS NFR BLD AUTO: 65.9 % (ref 42.7–76)
NRBC BLD AUTO-RTO: 0 /100 WBC (ref 0–0.2)
PLATELET # BLD AUTO: 254 10*3/MM3 (ref 140–450)
PMV BLD AUTO: 10.8 FL (ref 6–12)
POTASSIUM SERPL-SCNC: 4.2 MMOL/L (ref 3.5–5.2)
PROT SERPL-MCNC: 6.8 G/DL (ref 6–8.5)
RBC # BLD AUTO: 4.4 10*6/MM3 (ref 3.77–5.28)
SODIUM SERPL-SCNC: 141 MMOL/L (ref 136–145)
WBC NRBC COR # BLD AUTO: 8.81 10*3/MM3 (ref 3.4–10.8)

## 2025-03-26 PROCEDURE — 85025 COMPLETE CBC W/AUTO DIFF WBC: CPT

## 2025-03-26 PROCEDURE — 36415 COLL VENOUS BLD VENIPUNCTURE: CPT

## 2025-03-26 PROCEDURE — 80053 COMPREHEN METABOLIC PANEL: CPT

## 2025-03-26 PROCEDURE — 83735 ASSAY OF MAGNESIUM: CPT

## 2025-03-27 ENCOUNTER — TELEPHONE (OUTPATIENT)
Dept: ONCOLOGY | Facility: CLINIC | Age: 45
End: 2025-03-27
Payer: COMMERCIAL

## 2025-03-27 NOTE — TELEPHONE ENCOUNTER
I TALKED WITH DR ULLOA AND HE DOES SAY YOU NEED A SURGICAL ONCOLOGIST AS WELL, I WILL CALL THIS MORNING ANG GET THAT SET UP FOR  YOU ALSO. SORRY ABOUT THE CONFUSION!

## 2025-04-02 ENCOUNTER — LAB (OUTPATIENT)
Dept: LAB | Facility: HOSPITAL | Age: 45
End: 2025-04-02
Payer: COMMERCIAL

## 2025-04-02 DIAGNOSIS — C50.912 INVASIVE DUCTAL CARCINOMA OF BREAST, LEFT: ICD-10-CM

## 2025-04-02 LAB
ALBUMIN SERPL-MCNC: 4 G/DL (ref 3.5–5.2)
ALBUMIN/GLOB SERPL: 1.3 G/DL
ALP SERPL-CCNC: 130 U/L (ref 39–117)
ALT SERPL W P-5'-P-CCNC: 18 U/L (ref 1–33)
ANION GAP SERPL CALCULATED.3IONS-SCNC: 9 MMOL/L (ref 5–15)
AST SERPL-CCNC: 17 U/L (ref 1–32)
BASOPHILS # BLD AUTO: 0.06 10*3/MM3 (ref 0–0.2)
BASOPHILS NFR BLD AUTO: 0.7 % (ref 0–1.5)
BILIRUB SERPL-MCNC: 0.6 MG/DL (ref 0–1.2)
BUN SERPL-MCNC: 15 MG/DL (ref 6–20)
BUN/CREAT SERPL: 17.9 (ref 7–25)
CALCIUM SPEC-SCNC: 9.5 MG/DL (ref 8.6–10.5)
CHLORIDE SERPL-SCNC: 101 MMOL/L (ref 98–107)
CO2 SERPL-SCNC: 30 MMOL/L (ref 22–29)
CREAT SERPL-MCNC: 0.84 MG/DL (ref 0.57–1)
DEPRECATED RDW RBC AUTO: 48.4 FL (ref 37–54)
EGFRCR SERPLBLD CKD-EPI 2021: 87.5 ML/MIN/1.73
EOSINOPHIL # BLD AUTO: 0.23 10*3/MM3 (ref 0–0.4)
EOSINOPHIL NFR BLD AUTO: 2.6 % (ref 0.3–6.2)
ERYTHROCYTE [DISTWIDTH] IN BLOOD BY AUTOMATED COUNT: 14.8 % (ref 12.3–15.4)
GLOBULIN UR ELPH-MCNC: 3 GM/DL
GLUCOSE SERPL-MCNC: 138 MG/DL (ref 65–99)
HCT VFR BLD AUTO: 39.3 % (ref 34–46.6)
HGB BLD-MCNC: 12.9 G/DL (ref 12–15.9)
IMM GRANULOCYTES # BLD AUTO: 0.02 10*3/MM3 (ref 0–0.05)
IMM GRANULOCYTES NFR BLD AUTO: 0.2 % (ref 0–0.5)
LYMPHOCYTES # BLD AUTO: 2.21 10*3/MM3 (ref 0.7–3.1)
LYMPHOCYTES NFR BLD AUTO: 25 % (ref 19.6–45.3)
MAGNESIUM SERPL-MCNC: 1.8 MG/DL (ref 1.6–2.6)
MCH RBC QN AUTO: 29.1 PG (ref 26.6–33)
MCHC RBC AUTO-ENTMCNC: 32.8 G/DL (ref 31.5–35.7)
MCV RBC AUTO: 88.7 FL (ref 79–97)
MONOCYTES # BLD AUTO: 0.81 10*3/MM3 (ref 0.1–0.9)
MONOCYTES NFR BLD AUTO: 9.2 % (ref 5–12)
NEUTROPHILS NFR BLD AUTO: 5.52 10*3/MM3 (ref 1.7–7)
NEUTROPHILS NFR BLD AUTO: 62.3 % (ref 42.7–76)
NRBC BLD AUTO-RTO: 0 /100 WBC (ref 0–0.2)
PLATELET # BLD AUTO: 223 10*3/MM3 (ref 140–450)
PMV BLD AUTO: 11 FL (ref 6–12)
POTASSIUM SERPL-SCNC: 4.3 MMOL/L (ref 3.5–5.2)
PROT SERPL-MCNC: 7 G/DL (ref 6–8.5)
RBC # BLD AUTO: 4.43 10*6/MM3 (ref 3.77–5.28)
SODIUM SERPL-SCNC: 140 MMOL/L (ref 136–145)
WBC NRBC COR # BLD AUTO: 8.85 10*3/MM3 (ref 3.4–10.8)

## 2025-04-02 PROCEDURE — 85025 COMPLETE CBC W/AUTO DIFF WBC: CPT

## 2025-04-02 PROCEDURE — 36415 COLL VENOUS BLD VENIPUNCTURE: CPT

## 2025-04-02 PROCEDURE — 83735 ASSAY OF MAGNESIUM: CPT

## 2025-04-02 PROCEDURE — 80053 COMPREHEN METABOLIC PANEL: CPT

## 2025-04-04 ENCOUNTER — TELEPHONE (OUTPATIENT)
Dept: SURGERY | Facility: CLINIC | Age: 45
End: 2025-04-04

## 2025-04-04 ENCOUNTER — TELEPHONE (OUTPATIENT)
Dept: ONCOLOGY | Facility: CLINIC | Age: 45
End: 2025-04-04
Payer: COMMERCIAL

## 2025-04-04 NOTE — TELEPHONE ENCOUNTER
The patient, ingris, called and states that she has an appointment Tuesday, April 8 with a nurse practitioner who will be able to get things moving along for her surgery.

## 2025-04-04 NOTE — TELEPHONE ENCOUNTER
Called [facility] and notified them of the urgency for her to be seen. The called patient and she will be seen on Tuesday, April 8 for consult.

## 2025-04-04 NOTE — TELEPHONE ENCOUNTER
Caller: NOAH    Relationship: Provider    Best call back number: [phone]     What form or medical record are you requesting: PATHOLOGY REPORT FROM 2024    Who is requesting this form or medical record from you: DR GARZA Gro[place]and    How would you like to receive the form or medical records (pick-up, mail, fax): FAX  If fax, what is the fax number: [phone]    Timeframe paperwork needed: MONDAY MORNING, 4/7/25    Additional notes:

## 2025-04-22 ENCOUNTER — TELEPHONE (OUTPATIENT)
Dept: ONCOLOGY | Facility: CLINIC | Age: 45
End: 2025-04-22
Payer: COMMERCIAL

## 2025-04-22 DIAGNOSIS — G62.9 NEUROPATHY: Primary | ICD-10-CM

## 2025-04-25 ENCOUNTER — OFFICE VISIT (OUTPATIENT)
Dept: FAMILY MEDICINE CLINIC | Facility: CLINIC | Age: 45
End: 2025-04-25
Payer: COMMERCIAL

## 2025-04-25 VITALS
WEIGHT: 161.4 LBS | DIASTOLIC BLOOD PRESSURE: 76 MMHG | HEIGHT: 66 IN | SYSTOLIC BLOOD PRESSURE: 108 MMHG | HEART RATE: 69 BPM | BODY MASS INDEX: 25.94 KG/M2

## 2025-04-25 DIAGNOSIS — E03.9 HYPOTHYROIDISM, UNSPECIFIED TYPE: ICD-10-CM

## 2025-04-25 DIAGNOSIS — F90.2 ATTENTION DEFICIT HYPERACTIVITY DISORDER (ADHD), COMBINED TYPE: ICD-10-CM

## 2025-04-25 DIAGNOSIS — C50.912 DUCTAL CARCINOMA OF LEFT BREAST: Primary | ICD-10-CM

## 2025-04-25 DIAGNOSIS — K21.9 GASTROESOPHAGEAL REFLUX DISEASE WITHOUT ESOPHAGITIS: ICD-10-CM

## 2025-04-25 DIAGNOSIS — F90.2 ATTENTION DEFICIT HYPERACTIVITY DISORDER (ADHD), COMBINED TYPE: Primary | ICD-10-CM

## 2025-04-25 PROCEDURE — 99214 OFFICE O/P EST MOD 30 MIN: CPT | Performed by: NURSE PRACTITIONER

## 2025-04-25 RX ORDER — DULOXETIN HYDROCHLORIDE 30 MG/1
30 CAPSULE, DELAYED RELEASE ORAL DAILY
Qty: 90 CAPSULE | Refills: 1 | Status: SHIPPED | OUTPATIENT
Start: 2025-04-25

## 2025-04-25 RX ORDER — PANTOPRAZOLE SODIUM 40 MG/1
40 TABLET, DELAYED RELEASE ORAL DAILY
Qty: 90 TABLET | Refills: 1 | Status: SHIPPED | OUTPATIENT
Start: 2025-04-25

## 2025-04-25 RX ORDER — LEVOTHYROXINE SODIUM 112 UG/1
112 TABLET ORAL DAILY
Qty: 90 TABLET | Refills: 1 | Status: SHIPPED | OUTPATIENT
Start: 2025-04-25

## 2025-04-25 NOTE — PROGRESS NOTES
"Chief Complaint  ADHD    Subjective        Uyen José presents to [facility]  History of Present Illness  Med check-Pt states everything has been good      Objective   Vital Signs:  /76   Pulse 69   Ht 167.6 cm (65.98\")   Wt 73.2 kg (161 lb 6.4 oz)   BMI 26.06 kg/m²   Estimated body mass index is 26.06 kg/m² as calculated from the following:    Height as of this encounter: 167.6 cm (65.98\").    Weight as of this encounter: 73.2 kg (161 lb 6.4 oz).            Physical Exam  Vitals and nursing note reviewed.   Constitutional:       Appearance: Normal appearance. She is well-developed.   HENT:      Head: Normocephalic and atraumatic.      Right Ear: Tympanic membrane, ear canal and external ear normal.      Left Ear: Tympanic membrane, ear canal and external ear normal.      Nose: Nose normal. No septal deviation, nasal tenderness or congestion.      Mouth/Throat:      Lips: Pink. No lesions.      Mouth: Mucous membranes are moist. No oral lesions.      Dentition: Normal dentition.      Pharynx: Oropharynx is clear. No pharyngeal swelling, oropharyngeal exudate or posterior oropharyngeal erythema.   Eyes:      General: Lids are normal. Vision grossly intact. No scleral icterus.        Right eye: No discharge.         Left eye: No discharge.      Extraocular Movements: Extraocular movements intact.      Conjunctiva/sclera: Conjunctivae normal.      Right eye: Right conjunctiva is not injected.      Left eye: Left conjunctiva is not injected.      Pupils: Pupils are equal, round, and reactive to light.   Neck:      Thyroid: No thyroid mass.      Trachea: Trachea normal.   Cardiovascular:      Rate and Rhythm: Normal rate and regular rhythm.      Heart sounds: Normal heart sounds. No murmur heard.     No gallop.   Pulmonary:      Effort: Pulmonary effort is normal.      Breath sounds: Normal breath sounds and air entry. No wheezing, rhonchi or rales.   Musculoskeletal:    "      General: No tenderness or deformity. Normal range of motion.      Cervical back: Full passive range of motion without pain, normal range of motion and neck supple.      Thoracic back: Normal.      Right lower leg: No edema.      Left lower leg: No edema.   Skin:     General: Skin is warm and dry.      Coloration: Skin is not jaundiced.      Findings: No rash.   Neurological:      Mental Status: She is alert and oriented to person, place, and time.      Sensory: Sensation is intact.      Motor: Motor function is intact.      Coordination: Coordination is intact.      Gait: Gait is intact.      Deep Tendon Reflexes: Reflexes are normal and symmetric.   Psychiatric:         Mood and Affect: Mood and affect normal.         Behavior: Behavior normal.         Judgment: Judgment normal.        Result Review :                Assessment and Plan   Diagnoses and all orders for this visit:    1. Ductal carcinoma of left breast (Primary)  Comments:  following with [facility] for reconstructive therapy    2. Attention deficit hyperactivity disorder (ADHD), combined type  Comments:  Stable. cont vyvanse 60 and adderall 10    3. Hypothyroidism, unspecified type  Comments:  Last TSH normal  cont synthroid 112mcg  Orders:  -     levothyroxine (SYNTHROID, LEVOTHROID) 112 MCG tablet; Take 1 tablet by mouth Daily.  Dispense: 90 tablet; Refill: 1    4. Gastroesophageal reflux disease without esophagitis  -     pantoprazole (PROTONIX) 40 MG EC tablet; Take 1 tablet by mouth Daily.  Dispense: 90 tablet; Refill: 1    Other orders  -     DULoxetine (Cymbalta) 30 MG capsule; Take 1 capsule by mouth Daily.  Dispense: 90 capsule; Refill: 1      ADHD Follow up:    PDMP reviewed and is clean. ADRS (Adult ADHD Assessment Form) reviewed in detail, scanned in chart, and has been reviewed at time of appointment.  All questions, including medication and side effects, were discussed in detail at time of patient's visit. Patient will continue same  medication which was discussed at today's visit. Patient is to return in 3 month(s).    Last Urine Toxicity  More data exists         Latest Ref Rng & Units 5/9/2024 7/12/2023   LAST URINE TOXICITY RESULTS   Amphetamine, Urine Qual Negative Positive  Positive    Barbiturates Screen, Urine Negative Negative  Negative    Benzodiazepine Screen, Urine Negative Negative  Negative    Buprenorphine, Screen, Urine Negative Negative  Negative    Cocaine Screen, Urine Negative Negative  Negative    Fentanyl, Urine Negative Negative  Negative    Methadone Screen , Urine Negative Negative  Negative    Methamphetamine, Ur Negative Negative  Negative         Urine Drug Screen Results: UDS RESULTS: Current results appropriate    CSA completed on: 8/5/24        Follow Up   Return in about 3 months (around 7/25/2025) for ADHD follow up.  Patient was given instructions and counseling regarding her condition or for health maintenance advice. Please see specific information pulled into the AVS if appropriate.

## 2025-04-26 DIAGNOSIS — F90.2 ATTENTION DEFICIT HYPERACTIVITY DISORDER (ADHD), COMBINED TYPE: ICD-10-CM

## 2025-04-28 RX ORDER — DEXTROAMPHETAMINE SACCHARATE, AMPHETAMINE ASPARTATE, DEXTROAMPHETAMINE SULFATE AND AMPHETAMINE SULFATE 2.5; 2.5; 2.5; 2.5 MG/1; MG/1; MG/1; MG/1
10 TABLET ORAL DAILY
Qty: 30 TABLET | Refills: 0 | Status: SHIPPED | OUTPATIENT
Start: 2025-04-28 | End: 2025-05-28

## 2025-04-28 RX ORDER — DEXTROAMPHETAMINE SACCHARATE, AMPHETAMINE ASPARTATE, DEXTROAMPHETAMINE SULFATE AND AMPHETAMINE SULFATE 2.5; 2.5; 2.5; 2.5 MG/1; MG/1; MG/1; MG/1
10 TABLET ORAL DAILY
Qty: 30 TABLET | Refills: 0 | Status: SHIPPED | OUTPATIENT
Start: 2025-05-23 | End: 2025-06-22

## 2025-04-28 RX ORDER — LISDEXAMFETAMINE DIMESYLATE 60 MG/1
60 CAPSULE ORAL EVERY MORNING
Qty: 30 CAPSULE | Refills: 0 | Status: SHIPPED | OUTPATIENT
Start: 2025-04-28 | End: 2025-05-28

## 2025-04-28 RX ORDER — LISDEXAMFETAMINE DIMESYLATE 60 MG/1
60 CAPSULE ORAL EVERY MORNING
Qty: 30 CAPSULE | Refills: 0 | Status: SHIPPED | OUTPATIENT
Start: 2025-05-23 | End: 2025-06-22

## 2025-04-29 RX ORDER — LISDEXAMFETAMINE DIMESYLATE 60 MG/1
60 CAPSULE ORAL EVERY MORNING
Qty: 30 CAPSULE | Refills: 0 | OUTPATIENT
Start: 2025-04-29

## 2025-04-29 RX ORDER — DEXTROAMPHETAMINE SACCHARATE, AMPHETAMINE ASPARTATE, DEXTROAMPHETAMINE SULFATE AND AMPHETAMINE SULFATE 2.5; 2.5; 2.5; 2.5 MG/1; MG/1; MG/1; MG/1
1 TABLET ORAL DAILY
Qty: 30 TABLET | Refills: 0 | OUTPATIENT
Start: 2025-04-29

## 2025-05-04 NOTE — PROGRESS NOTES
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  On 2024 underwent total laparoscopic hysterectomy with bilateral salpingoophorectomy.  She has completed neoadjuvant carboplatin+docetaxel+pertuzumab+trastuzumab- C1, 10/15/24; C2, 24; C3, 24 (carbo+docetaxel dose reduced 50% since C3 due to excess nausea/vomiting); C4, 25 (dose reduced 20% pertuzumab/trastuzumab from C4); C5, 25; C6, 3/6/25 (patient declined C6D1 docetaxel).  Underwent bilateral skin sparing mastectomies with L SLNB with Dr. Beckwith at [facility], 25. Path pending.  She is here alone (accompanied by her spouse, Lam).     I have reviewed the HPI and verified with the patient the accuracy of it. No changes to interval history since the information was documented. Shyam Weiss MD 25      Diagnostic abnormalities:  - Tobacco use disorder, hypothyroidism, depression/anxiety, obesity, prior bilateral salpingectomy, , newly diagnosed IDC left breast  - 24- CT a/p-  Increased size of the low density/complex cystic mass in the uterine cavity since the previous study. This may represent abnormal endometrial thickening, complex endometrial fluid/debris accumulation in the endometrial canal due to out duct obstruction by a fibroid in the lower uterine segment as detailed above. There is a septate morphology of the uterus. Further follow-up with sonography may be obtained. Normal appendix. The gallbladder is surgically absent. The nonacute " findings of the remaining abdomen similar to the previous study.  Stable heterogeneous solid mass to the right of midline at the mid to  lower uterine segment with probable secondary dilated endometrial cavity at the uterine fundus filled with fluid and a septate uterus.  Differential diagnosis would include benign etiologies such as submucosal leiomyoma and large endometrial polyp but also neoplasm, possibly endometrial carcinoma, or carcinosarcoma of the uterus.  No anabel pelvic soft tissue nodule or lymphadenopathy identified. Small left external iliac chain lymph node is unchanged. Small right ovarian cyst. Nabothian cysts in the cervix and septate uterus. MRI pelvis without and with contrast may be helpful for further evaluation. GYN consultation is recommended.   -7/25/24- MRI breast bilateral-1. Findings suggestive for multicentric left breast malignancy with diffuse ductal carcinoma in situ extending from the dominant 2.7 cm mass within the lateral left breast at 3:00 extending to and likely involving the left nipple. A second 8 mm mass within the central left breast tissue with a third mass in the anterior depth of the inferior left breast at 6:00. Two 8 mm homogeneous persistently enhancing hyperintense T2 masses at the upper outer right breast, mid depth at the 10:00 in the 11-12 o'clock positions, with overall benign signal characteristics for which background enhancement versus papilloma fibroadenoma considered. MRI directed/second look ultrasound recommended given the presence of the contralateral left breast findings. BI-RADS CATEGORY 5: Highly suggestive of malignancy. Appropriate action should be taken. Findings strongly favoring multicentric left breast malignancy. MRI directed/second look bilateral breast ultrasound recommended. Ultrasound-guided biopsy of the left breast mass at 3:00 and of a second left breast mass (6:00 if visible on ultrasound versus mass central to the nipple) with sampling  of the largest left axillary node may be performed if clinically desired. Sampling of a finding within the upper outer right breast should be considered if solid nodule identified on ultrasound.  -7/29/24- CMP normal (GFR 78.9), UA/cultures negative, HCG negative, WBC 12.65, ANC 8.43 otherwise normal CBC  -7/31/24-  Endocervical Currettage, Endometrial Currettage, and hysteroscopic resected uterine synechia.  Final diagnosis:  1.Endometrium, curettage: A. Blood and fragments of smooth muscle. B. No histologic evidence of malignancy. Comment: Endometrial mucosal tissue is not identified. 2. Endocervix, curettage: A. Fragments of benign endocervical tissue, benign squamous mucosa and benign lower uterine segment endometrium. B. Blood and mucus. C. No dysplasia identified. 3. Endometrium, hysteroscopic resected tissue: A. Fragments of smooth muscle. B. No histologic evidence of malignancy. Comment: Endometrial mucosal tissue is not identified.  -8/7/24- US biopsy:  Final diagnosis-  1.  Mass, left breast at 6:00, 1 cm from nipple, core needle biopsies: High-grade ductal carcinoma in situ, comedo type. 2.  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 Histologic grade (Corwin histologic score). Glandular (acinar)/tubular differentiation: Score 2. Nuclear pleomorphism: Score 3. Mitotic rate: Score 3. Overall grade: Grade 3. Maximum tumor diameter is at least 1.5 cm. 3.  Left axillary lymph node, core needle biopsy: Metastatic adenocarcinoma of breast. -ER 69%+; NH 85%+; HER2 3+ (positive); Ki67 54%+  -8/12/24- CT CAP- Known left breast malignancy with enlarged left level 1 and asymmetrically prominent left level 2 and borderline level 3 axillary lymph nodes which are suspicious for axillary disease.  No additional evidence of metastatic disease in the chest.  1. Small 0.4 cm low-attenuation region in the right lobe of the liver may have been  present on the prior examinations but was not as well visualized. This is too small to characterize. Continued attention on follow-up is recommended.  Fatty infiltration of the liver.  Persistent heterogeneity of the lower uterine segment of the uterus and fluid in the endometrial canal although the fluid has decreased. Stable septate uterus  -8/12/2024- Follow-up Dr. Gentile- A/P: IDC left breast.  Met w/u, referral onc, genetics. RTC 2 weeks.  -8/16/2024-bone scan-no evidence of osteoblastic metastatic disease.  -8/16/2024- BRCA 1/2 analyses with cancer next-summary: Negative-no clinically significant variants detected (pathogenic mutations, variants of unknown significance, gross deletions/duplications: Not detected)  -8/20/2024-PET scan-abnormal PET/CT, biopsy-proven left breast malignancy and demonstrates FDG avidity with maximum SUV of 7.9.  Also potential abnormal activity at 6:00 in the left breast with a maximal SUV of 3.9.  MRI of the breast with and without contrast will be helpful to exclude multifocal or multicentric disease in the left breast.  No abnormal contralateral right breast or axillary activity identified.  3 hypermetabolic level 1 lymph nodes in the left axilla compatible with kiersten metastasis and solitary borderline level 2 axillary lymph node on the left.  -8/22/2024- Today is seen for management considerations-  -8/26/24- 2D echo-  Left ventricular systolic function is normal. Left ventricular ejection fraction appears to be 56 - 60%.    Normal right ventricular cavity size and systolic function noted.    No significant valvular abnormalities identified on this study.     -8/28/2024-CBC normal.  CMP normal.  -9/15/2024- CT abdomen/pelvis-There is a 3.2 x 3.0 x 2.6 cm focus of rounded masslike enhancement in the lower uterine segment centered along the endometrial canal (series 2-image 70, series 4-image 41). The uterine cavity is distended and fluid-filled above this at the fundus.  Primary imaging concern would be endometrial carcinoma obstructing the canal although it seems there was a recent D&C with benign tissue sampling. Endometritis is also a consideration and cannot be excluded on CT.  No free fluid in the pelvis, pelvic abscess or pelvic adenopathy.    Previous interventions:  -9/20/2024-   Total Laparoscopic Hysterectomy with bilateral salpingoophorectomy, da Kenzie assisted -Final diagnosis:  Uterus with bilateral tubes and ovaries, hysterectomy with bilateral salpingo-oophorectomies: Unremarkable cervix, negative for squamous dysplasia.  Atrophic endometrium, negative for hyperplasia and atypia.  Benign intramural leiomyoma.  Adenomyosis.  Unremarkable right ovary.  Left ovary with cystic follicles and hemorrhagic corpora lutea.     -Neoadjuvant DCPT (carboplatin+docetaxel+pertuzumab+trastuzumab)- C1, 10/15/24; C2, 11/6/24; C3, 12/4/24 (carbo+docetaxel dose reduced 50% since C3 due to excess nausea/vomiting); C4, 1/2/25 (dose reduced 20% pertuzumab/trastuzumab from C4); C5, 2/6/25; C6, 3/6/25 (patient declined C6D1 docetaxel).    - 4/28/25-  Bilateral skin sparing mastectomies with L SLNB with Dr. Beckwith. Path pending    LABS    Lab Results - Last 18 Months   Lab Units 04/02/25  0807 03/26/25  0748 03/19/25  0718 03/06/25  0723 02/20/25  1208 02/13/25  0816 02/06/25  0727 01/16/25  0756 01/09/25  0714 11/18/24  0716 11/12/24  1239 10/29/24  0745 10/21/24  0917   HEMOGLOBIN g/dL 12.9 12.7 12.7 11.9* 13.2 13.5 13.0   < > 13.3   < > 13.3   < > 14.9   HEMATOCRIT % 39.3 40.1 38.5 36.5 40.8 42.3 39.9   < > 41.6   < > 41.3   < > 43.9   MCV fL 88.7 91.1 88.3 89.5 90.9 91.0 89.9   < > 91.0   < > 91.2   < > 86.9   WBC 10*3/mm3 8.85 8.81 11.48* 10.11 15.38* 23.55* 4.19   < > 32.17*   < > 13.52*   < > 4.29   RDW % 14.8 15.0 14.7 14.5 14.3 14.3 14.1   < > 14.8   < > 14.2   < > 12.8   MPV fL 11.0 10.8 11.4 11.2 11.3 11.4 10.7   < > 11.3   < > 11.5   < > 13.2*   PLATELETS 10*3/mm3 223 254 220 189  222 294 250   < > 243   < > 217   < > 132*   IMM GRAN % % 0.2 0.3 0.4 0.3 0.5  --  0.5   < >  --    < >  --    < >  --    NEUTROS ABS 10*3/mm3 5.52 5.81 8.45* 5.75 10.94* 17.72* 3.58   < > 24.77*   < > 9.33*   < > 1.63*   LYMPHS ABS 10*3/mm3 2.21 2.07 2.33 3.41* 3.51*  --  0.55*   < >  --    < >  --    < >  --    MONOS ABS 10*3/mm3 0.81 0.80 0.59 0.76 0.75  --  0.03*   < >  --    < >  --    < >  --    EOS ABS 10*3/mm3 0.23 0.04 0.03 0.12 0.04 0.00 0.00   < > 0.00   < > 0.00   < > 0.39   BASOS ABS 10*3/mm3 0.06 0.06 0.03 0.04 0.06 0.24* 0.01   < > 0.32*   < > 0.00   < >  --    IMMATURE GRANS (ABS) 10*3/mm3 0.02 0.03 0.05 0.03 0.08*  --  0.02   < >  --    < >  --    < >  --    NRBC /100 WBC 0.0 0.0 0.0 0.0 0.0  --  0.0   < >  --    < >  --    < >  --    NEUTROPHIL % %  --   --   --   --   --  62.4  --   --  61.0  --  50.0  --  27.0*   MONOCYTES % %  --   --   --   --   --  2.0*  --   --  5.0  --  5.0  --  11.0   BASOPHIL % %  --   --   --   --   --  1.0  --   --  1.0  --  0.0  --   --    ATYP LYMPH % %  --   --   --   --   --  5.9*  --   --  4.0  --   --   --  2.0    < > = values in this interval not displayed.       Lab Results - Last 18 Months   Lab Units 04/02/25  0807 03/26/25  0748 03/19/25  0718 03/06/25  0723 02/20/25  1208 02/13/25  0816   GLUCOSE mg/dL 138* 165* 161* 131* 116* 121*   SODIUM mmol/L 140 141 143 142 143 140   POTASSIUM mmol/L 4.3 4.2 3.4* 3.3* 3.7 4.1   CO2 mmol/L 30.0* 29.0 31.0* 29.0 30.0* 30.0*   CHLORIDE mmol/L 101 102 98 101 100 99   ANION GAP mmol/L 9.0 10.0 14.0 12.0 13.0 11.0   CREATININE mg/dL 0.84 0.90 0.91 0.83 0.84 0.89   BUN mg/dL 15 12 11 18 16 12   BUN / CREAT RATIO  17.9 13.3 12.1 21.7 19.0 13.5   CALCIUM mg/dL 9.5 9.6 9.8 9.4 9.4 9.5   ALK PHOS U/L 130* 134* 174* 102 163* 233*   TOTAL PROTEIN g/dL 7.0 6.8 7.1 6.8 7.1 6.9   ALT (SGPT) U/L 18 23 22 16 17 14   AST (SGOT) U/L 17 20 21 15 14 14   BILIRUBIN mg/dL 0.6 0.8 0.7 0.5 0.4 0.3   ALBUMIN g/dL 4.0 4.0 4.3 4.0 4.2 4.1    GLOBULIN gm/dL 3.0 2.8 2.8 2.8 2.9 2.8         Lab Results - Last 18 Months   Lab Units 10/15/24  0744   TSH uIU/mL 0.506         PAST MEDICAL HISTORY:  ALLERGIES:  Allergies   Allergen Reactions    Percocet [Oxycodone-Acetaminophen] Hives    Adhesive Tape Itching     ADHESIVE ON BANDAIDS     CURRENT MEDICATIONS:  Outpatient Encounter Medications as of 5/12/2025   Medication Sig Dispense Refill    amphetamine-dextroamphetamine (Adderall) 10 MG tablet Take 1 tablet by mouth Daily for 30 days. 30 tablet 0    [START ON 5/23/2025] amphetamine-dextroamphetamine (Adderall) 10 MG tablet Take 1 tablet by mouth Daily for 30 days. 30 tablet 0    bisoprolol-hydrochlorothiazide (ZIAC) 5-6.25 MG per tablet Take 1 tablet by mouth Daily. 30 tablet 2    dexAMETHasone (DECADRON) 4 MG tablet Take 2 tablets 2 times daily the day before chemo and 2 tablets 2 times daily the day after chemo 48 tablet 0    DULoxetine (Cymbalta) 30 MG capsule Take 1 capsule by mouth Daily. 90 capsule 1    gabapentin (NEURONTIN) 100 MG capsule Take 1 capsule by mouth.      ibuprofen (ADVIL,MOTRIN) 800 MG tablet Take 1 tablet by mouth Every 6 (Six) Hours As Needed for Mild Pain. 90 tablet 2    levothyroxine (SYNTHROID, LEVOTHROID) 112 MCG tablet Take 1 tablet by mouth Daily. 90 tablet 1    lidocaine-prilocaine (EMLA) 2.5-2.5 % cream Apply to port site 30 minutes before it is accessed and cover with occlusive dressing. 30 g 1    lisdexamfetamine (Vyvanse) 60 MG capsule Take 1 capsule by mouth Every Morning for 30 days 30 capsule 0    [START ON 5/23/2025] lisdexamfetamine (Vyvanse) 60 MG capsule Take 1 capsule by mouth Every Morning for 30 days 30 capsule 0    pantoprazole (PROTONIX) 40 MG EC tablet Take 1 tablet by mouth Daily. 90 tablet 1    ondansetron ODT (ZOFRAN-ODT) 8 MG disintegrating tablet Place 1 tablet on the tongue Every 8 (Eight) Hours As Needed for Nausea or Vomiting. (Patient not taking: Reported on 5/12/2025) 30 tablet 1    pertuzumab  (Perjeta) 420 MG/14ML solution injection Infuse 11.33 mL into a venous catheter As Directed. Will infuse intravenously in the outpatient infusion center every 3 weeks. (Patient not taking: Reported on 5/12/2025) 14 mL 2    trastuzumab-dkst (Ogivri) 420 MG chemo injection Infuse 360 mg into a venous catheter As Directed. Will infuse intravenously in the outpatient infusion center every 3 weeks. (Patient not taking: Reported on 5/12/2025) 2 each 2     Facility-Administered Encounter Medications as of 5/12/2025   Medication Dose Route Frequency Provider Last Rate Last Admin    lidocaine (XYLOCAINE) 1 % injection 30 mL  30 mL Subcutaneous Once Taylor Salinas MD        lidocaine 1% - EPINEPHrine 1:124519 (XYLOCAINE W/EPI) 1 %-1:507201 injection 30 mL  30 mL Infiltration Once Taylor Salinas MD         ADULT ILLNESSES:  Patient Active Problem List   Diagnosis Code    Tobacco abuse counseling Z71.6    Hypothyroidism E03.9    Attention deficit hyperactivity disorder F90.9    Essential hypertension I10    Irritable bowel syndrome K58.9    Mixed anxiety and depressive disorder F41.8    Mood disorder F39    Overweight (BMI 25.0-29.9) E66.3    Gastroesophageal reflux disease without esophagitis K21.9    Dysmenorrhea N94.6    Hematometra N85.7    Ductal carcinoma of left breast C50.912    Invasive ductal carcinoma of breast, left C50.912    Pelvic pain in female R10.2    Nausea R11.0    Low magnesium level R79.0    Abnormal mammogram R92.8     SURGERIES:  Past Surgical History:   Procedure Laterality Date    CARPAL TUNNEL RELEASE Right     COLONOSCOPY  05/01/2003    Normal exam    D & C HYSTEROSCOPY MYOSURE N/A 07/31/2024    Procedure: DILATATION AND CURETTAGE HYSTEROSCOPY WITH POSSIBLE TISSUE RESECTION;  Surgeon: Stevan Espinal MD;  Location: [facility] OR;  Service: Obstetrics/Gynecology;  Laterality: N/A;    DIAGNOSTIC LAPAROSCOPY  2000 [place]; normal findings; [facility]    DILATATION AND CURETTAGE       ENDOMETRIAL ABLATION  2023    ENDOSCOPY      HYSTEROSCOPY      LAPAROSCOPIC CHOLECYSTECTOMY  2011    Dr Duncan; [facility]    LYMPH NODE BIOPSY  8/7/2024    Cancer Positive    SALPINGECTOMY Bilateral 10/16/2020    Procedure: SALPINGECTOMY LAPAROSCOPIC for sterilization;  Surgeon: Stevan Espinal MD;  Location: [facility] OR;  Service: Obstetrics/Gynecology;  Laterality: Bilateral;    TOTAL LAPAROSCOPIC HYSTERECTOMY SALPINGO OOPHORECTOMY N/A 09/20/2024    Procedure: TOTAL LAPAROSCOPIC HYSTERECTOMY BILATERAL SALPINGOOPHORECTOMY WITH DAVINCI ROBOT;  Surgeon: Stevan Espinal MD;  Location:  [facility] OR;  Service: Robotics - DaVinci;  Laterality: N/A;    TUBAL ABDOMINAL LIGATION      cut not tied    US GUIDED LYMPH NODE BIOPSY  08/07/2024    VAGINAL HYSTERECTOMY  Oct 2021    VENOUS ACCESS DEVICE (PORT) INSERTION N/A 08/30/2024    Procedure: Single Lumen Port-a-cath insertion with flouroscopy;  Surgeon: Shahla Gentile MD;  Location:  [facility] OR;  Service: General;  Laterality: N/A;    WISDOM TOOTH EXTRACTION       HEALTH MAINTENANCE ITEMS:  Health Maintenance Due   Topic Date Due    TDAP/TD VACCINES (1 - Tdap) Never done    HEPATITIS C SCREENING  Never done    ANNUAL PHYSICAL  Never done    COVID-19 Vaccine (6 - 2024-25 season) 09/01/2024    COLORECTAL CANCER SCREENING  12/10/2024       <no information>  Last Completed Colonoscopy    This patient has no relevant Health Maintenance data.       Immunization History   Administered Date(s) Administered    COVID-19 (MODERNA) 1st,2nd,3rd Dose Monovalent 10/29/2021, 11/04/2021, 11/26/2021, 11/26/2021, 12/06/2022    DT 07/07/2004    Influenza, Unspecified 11/30/2021     Last Completed Mammogram            Upcoming       MAMMOGRAM (Yearly) Next due on 3/4/2026      03/04/2025  Mammo Diagnostic Digital Tomosynthesis Left With CAD    06/26/2024  MAMMO DIAGNOSTIC BILATERAL W CAD    04/19/2022  MAMMO SCREENING BILATERAL W CAD                              FAMILY HISTORY:  Family History  "  Problem Relation Age of Onset    Diabetes Mother     Asthma Mother     Depression Mother     Hyperlipidemia Mother     Kidney disease Mother     Thyroid disease Mother     COPD Mother     Diabetes Father     Arthritis Father         RA    Asthma Son         Ecezma and asthma    Cancer Maternal Grandmother         vulva, HPV+    Thyroid disease Maternal Grandmother     Cancer Maternal Grandfather     Lung cancer Paternal Grandfather 60 - 69    Cancer Paternal Grandfather     Stomach cancer Maternal Great-Grandmother     Colon cancer Neg Hx     Colon polyps Neg Hx      SOCIAL HISTORY:  Social History     Socioeconomic History    Marital status:    Tobacco Use    Smoking status: Former     Current packs/day: 0.00     Average packs/day: 0.5 packs/day for 20.0 years (10.0 ttl pk-yrs)     Types: Cigarettes     Start date: 2004     Quit date: 2024     Years since quittin.8     Passive exposure: Past    Smokeless tobacco: Never    Tobacco comments:     Stop ciggarettes 24 using vape now to stop completely   Vaping Use    Vaping status: Former    Start date: 2024    Quit date: 10/17/2024   Substance and Sexual Activity    Alcohol use: Not Currently     Comment: Socially    Drug use: Yes     Types: Marijuana    Sexual activity: Yes     Partners: Male     Birth control/protection: None, Bilateral salpingectomy        REVIEW OF SYSTEMS:  Review of Systems   Constitutional:  Positive for fatigue (from RA + fibromyalgia). Negative for activity change, appetite change and unexpected weight loss (None since the last visit).        Manages her ADLs to include chores, errands and driving.  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  No lingering fatigue, nausea, achiness, " "runny nose.  No more blurry vision, no overt diarrhea, no mouth sores, no neuropathy.          Eyes: Negative.    Respiratory: Negative.  Negative for cough and shortness of breath.         Smoker age 15- 1/2-1 ppd.  Has stopped smoking.  She is no longer vaping.   Cardiovascular: Negative.    Gastrointestinal: Negative.    Endocrine:         A0    Menarche age 13    Menopause age 42   Genitourinary: Negative.  Negative for vaginal bleeding (Had a CHRISTIANO/BSO per Dr. Espinal).   Musculoskeletal:  Positive for arthralgias (Chronic- from RA + fibromyalgia-symptoms or better while she was on chemo), back pain (Chronic) and neck pain (Chronic).   Skin:  Negative for rash.   Allergic/Immunologic: Negative.    Neurological: Negative.    Hematological: Negative.    Psychiatric/Behavioral: Negative.       /70   Pulse 77   Temp 96.2 °F (35.7 °C) (Temporal)   Resp 18   Ht 167.6 cm (66\")   Wt 73.4 kg (161 lb 14.4 oz)   LMP  (LMP Unknown)   SpO2 98%   BMI 26.13 kg/m²  Body surface area is 1.83 meters squared.  Pain Score    25 1456   PainSc: 0-No pain       Physical Exam  Vitals and nursing note reviewed. Exam conducted with a chaperone present.   Constitutional:       Comments: Cooperative, well-developed, modestly kept middle-aged female.  Ambulatory.  ECOG 0.      Has regained 5 lb (had lost 4 lb at her prior visit) since the last visit   HENT:      Head: Normocephalic and atraumatic.   Eyes:      General: No scleral icterus.     Extraocular Movements: Extraocular movements intact.      Pupils: Pupils are equal, round, and reactive to light.   Cardiovascular:      Rate and Rhythm: Normal rate.   Pulmonary:      Effort: Pulmonary effort is normal.      Comments: Port right upper chest is well seated  Chest:   Breasts:     Right: Normal.      Left: Mass present.          Comments: Right breast skin sparing mastectomy appears to be healing well.  Drains still in place laterally.     Left breast skin sparing " mastectomy appears to be healing well.  Drain still in place laterally.  Previously palpable left axillary nodes are not evident today.     No associated supraclavicular nor contralateral (right) axillary adenopathy)  Abdominal:      Palpations: Abdomen is soft.      Tenderness: There is no abdominal tenderness.      Comments: Laparoscopic incisions are healing very well.   Musculoskeletal:         General: Normal range of motion.      Cervical back: Normal range of motion.   Lymphadenopathy:      Cervical: No cervical adenopathy.      Upper Body:      Right upper body: No supraclavicular or axillary adenopathy.      Left upper body: No supraclavicular or axillary (Clinically no longer evident) adenopathy.   Skin:     General: Skin is warm.   Neurological:      General: No focal deficit present.      Mental Status: She is alert and oriented to person, place, and time.   Psychiatric:         Mood and Affect: Mood normal.         Behavior: Behavior normal.         Thought Content: Thought content normal.       Assessment:  1.   Invasive carcinoma of no special type (ductal) of the left breast  --Tumor stage: at least TNM/AJCC stage IIIA (cT2, cN2, M0,G3) ER 69%(+), CA 85% (+), HER-2 positive (3+).  --Original tumor burden:    ---7/25/24- MRI breast bilateral-1. Findings suggestive for multicentric left breast malignancy with diffuse ductal carcinoma in situ extending from the dominant 2.7 cm mass within the lateral left breast at 3:00 extending to and likely involving the left nipple. A second 8 mm mass within the central left breast tissue with a third mass in the anterior depth of the inferior left breast at 6:00. Two 8 mm homogeneous persistently enhancing hyperintense T2 masses at the upper outer right breast, mid depth at the 10:00 in the 11-12 o'clock positions, with overall benign signal characteristics for which background enhancement versus papilloma fibroadenoma considered. MRI directed/second look  ultrasound recommended given the presence of the contralateral left breast findings. BI-RADS CATEGORY 5: Highly suggestive of malignancy. Appropriate action should be taken. Findings strongly favoring multicentric left breast malignancy. MRI directed/second look bilateral breast ultrasound recommended. Ultrasound-guided biopsy of the left breast mass at 3:00 and of a second left breast mass (6:00 if visible on ultrasound versus mass central to the nipple) with sampling of the largest left axillary node may be performed if clinically desired. Sampling of a finding within the upper outer right breast should be considered if solid nodule identified on ultrasound.  -8/7/24- US biopsy:  Final diagnosis-  1.  Mass, left breast at 6:00, 1 cm from nipple, core needle biopsies: High-grade ductal carcinoma in situ, comedo type. 2.  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 Histologic grade (Corwin histologic score). Glandular (acinar)/tubular differentiation: Score 2. Nuclear pleomorphism: Score 3. Mitotic rate: Score 3. Overall grade: Grade 3. Maximum tumor diameter is at least 1.5 cm. 3.  Left axillary lymph node, core needle biopsy: Metastatic adenocarcinoma of breast. -ER 69%+; OH 85%+; HER2 3+ (positive); Ki67 54%+  -8/12/24- CT CAP- Known left breast malignancy with enlarged left level 1 and asymmetrically prominent left level 2 and borderline level 3 axillary lymph nodes which are suspicious for axillary disease.  No additional evidence of metastatic disease in the chest.  1. Small 0.4 cm low-attenuation region in the right lobe of the liver may have been present on the prior examinations but was not as well visualized. This is too small to characterize. Continued attention on follow-up is recommended.  Fatty infiltration of the liver.  Persistent heterogeneity of the lower uterine segment of the uterus and fluid in the endometrial  canal although the fluid has decreased. Stable septate uterus  -8/16/24-bone scan-no evidence of osteoblastic metastatic disease.  -8/16/24- 2D echo    Left ventricular systolic function is normal. Left ventricular ejection fraction appears to be 56 - 60%.    Normal right ventricular cavity size and systolic function noted.    No significant valvular abnormalities identified on this study.     -8/20/24-PET scan-abnormal PET/CT, biopsy-proven left breast malignancy and demonstrates FDG avidity with maximum SUV of 7.9.  Also potential abnormal activity at 6:00 in the left breast with a maximal SUV of 3.9.  MRI of the breast with and without contrast will be helpful to exclude multifocal or multicentric disease in the left breast.  No abnormal contralateral right breast or axillary activity identified.  3 hypermetabolic level 1 lymph nodes in the left axilla compatible with kiersten metastasis and solitary borderline level 2 axillary lymph node on the left.  - 11/25/24-2D echo- LVEF 61-65%  - 12/5/24- MRI breasts- 1. Complete radiographic response to neoadjuvant chemotherapy of the multicentric tumor within the left breast. Resolution of the previous nonspecific enhancement within the upper outer quadrant right breast. No abnormal enhancement seen within the left or right breast on today's study. Interval decrease in the pathologic left axillary lymphadenopathy, axillary lymph nodes with normal morphology on today's study. No internal mammary lymphadenopathy.  - 3/4/25- Diagnostic mammogram/left breast US-Mammographically stable appearance of extensive biopsy-proven DCIS within the left breast, including multicentric disease. However, there is increased size of an irregular hypoechoic mass at 6:00 in the left breast, 1 cm from the nipple which is worrisome for disease progression. Definitive surgery is recommended. BI-RADS Category 6, known malignancy. Appropriate clinical action should be  taken.  - Neoadjuvant DCPT x 6-  C6, 3/6/25 (patient declined C6D1 docetaxel).  - 4/28/25-   bilateral skin sparing mastectomies with L SLNB with Dr. Beckwith. Path pending    2.   Neuropathy.  Subjectively much improved on gabapentin 100 mg 3 times daily    3.   Nausea, fatigue, myalgias, blurry vision (for 1 day) and flulike symptoms following chemo.  Resolved.  Feels like steps that were taken to include dose reduction and augmenting her antiemetic regimen have helped.  States symptoms now last less than 1 week (previously lasted the better part of 2 weeks).  MRI of the breasts after cycle 3 on 12/5 (above) showed CR.  CT of the head was planned but patient declined (resolution of blurry vision).    4.   Uterine cystic mass  - 7/12/24 - Abnormal CT a/p- Increased size of the low density/complex cystic mass in the uterine cavity since the previous study. This may represent abnormal endometrial thickening, complex endometrial fluid/debris accumulation in the endometrial canal due to out duct obstruction by a fibroid in the lower uterine segment as detailed above. There is a septate morphology of the uterus. Further follow-up with sonography may be obtained. Normal appendix. The gallbladder is surgically absent. The nonacute findings of the remaining abdomen similar to the previous study.  Stable heterogeneous solid mass to the right of midline at the mid to lower uterine segment with probable secondary dilated endometrial cavity at the uterine fundus filled with fluid and a septate uterus.  Differential diagnosis would include benign etiologies such as submucosal leiomyoma and large endometrial polyp but also neoplasm, possibly endometrial carcinoma, or carcinosarcoma of the uterus.  No anabel pelvic soft tissue nodule or lymphadenopathy identified. Small left external iliac chain lymph node is unchanged. Small right ovarian cyst. Nabothian cysts in the cervix and septate uterus. MRI pelvis without and with contrast may be helpful for further  evaluation. GYN consultation is recommended.  -7/31/24-  Endocervical Currettage, Endometrial Currettage, and hysteroscopic resected uterine synechia.  Final diagnosis:  1.Endometrium, curettage: A. Blood and fragments of smooth muscle. B. No histologic evidence of malignancy. Comment: Endometrial mucosal tissue is not identified. 2. Endocervix, curettage: A. Fragments of benign endocervical tissue, benign squamous mucosa and benign lower uterine segment endometrium. B. Blood and mucus. C. No dysplasia identified. 3. Endometrium, hysteroscopic resected tissue: A. Fragments of smooth muscle. B. No histologic evidence of malignancy. Comment: Endometrial mucosal tissue is not identified.  --9/15/2024- CT abdomen/pelvis-There is a 3.2 x 3.0 x 2.6 cm focus of rounded masslike enhancement in the lower uterine segment centered along the endometrial canal (series 2-image 70, series 4-image 41). The uterine cavity is distended and fluid-filled above this at the fundus. Primary imaging concern would be endometrial carcinoma obstructing the canal although it seems there was a recent D&C with benign tissue sampling. Endometritis is also a consideration and cannot be excluded on CT.  No free fluid in the pelvis, pelvic abscess or pelvic adenopathy.  --9/20/2024-   Total Laparoscopic Hysterectomy with bilateral salpingoophorectomy, da Kenzie assisted -Final diagnosis:  Uterus with bilateral tubes and ovaries, hysterectomy with bilateral salpingo-oophorectomies: Unremarkable cervix, negative for squamous dysplasia. Atrophic endometrium, negative for hyperplasia and atypia. Benign intramural leiomyoma. Adenomyosis. Unremarkable right ovary. Left ovary with cystic follicles and hemorrhagic corpora lutea.    5.   Nicotine dependence-stopped in favor of vaping  6.   Mixed depression/anxiety  7.   Hypothyroidism  8.   Obesity  9.   RA and fibromyalgia. Plaquenil on hold while on chemo.  Followed by Dr. Ackerman.  Symptoms quiescent  while on chemo.  10.   Anemia. Chemo associated  -Resolved. Hgb 12.9, 4/2/25 (prior: Hgb 12.4-13.5)  11.  Intermittently elevated alk phos.  Chemo?  -130, 4/2/2025 (prior peak: 233)       Plan:  1.   Labs, 1/30/25 - 3/6/25- 3/19/25-4/2/25  gluc 138, alk phos 130 (prior peak: 233) otherwise normal CMP, WBC 8.8 otherwise normal CBC  2.   Chemo tolerance.  Residual neuropathy subjectively improved on gabapentin.  Prior nausea/vomiting/fatigue/blurry vision/malaise.  Resolved  3.   Note interval bilateral mastectomies with L SLNB with Dr. Beckwith, 4/28/25. Path pending  4.   Keep appointment for 2D echo - due 5/21/25  5.   Follow-up [facility], 5/15/25  6.   Reviewed BRCA 1/2 analyses with cancer next, 8/16/2034 -summary: Negative-no clinically significant variants detected  7.   Review NCCN guidelines version invasive breast cancer.  Principles of preoperative systemic therapy: Candidate for preoperative systemic therapy--A: Patients with inoperable breast cancer: IBC; bulky or matted cN2 axillary nodes; cN3 kiersten disease; cT4 tumors.  B. In selected patients with operable breast cancer--HER-2 positive disease and TNBC, if >/=cT2 OR >/=cN1; Large primary tumor relative to breast size in a patient who desires breast conservation; cN+ disease likely to become cN0 with preoperative systemic therapy; C.  Preoperative systemic therapy can be considered for cT1,N0, HER-2 positive disease and TNBC.     8.   Docetaxel, carboplatin: Toxicities of chemotherapy noted to include, but not limited to: Myelosuppression (neutropenia, anemia, thrombocytopenia), alopecia, neuropathy, arthralgia, myalgia, nausea, vomiting, hypersensitivity reactions, mucositis, diarrhea, infection, abnormal liver enzymes, asthenia, fever, hypertension, bleeding, edema, opportunistic infections, anaphylaxis, cardiac conduction disturbance/arrhythmias, syncope, thromboembolism, myocardial infarction, severe injection site reactions, pulmonary toxicity, GI  obstruction/perforation, paralytic ileus, ischemic colitis, pancreatitis, hepatotoxicity, severe skin reactions). Questions answered to the patient's apparent satisfaction. She has completed therapy.   9.   Discussed the potential toxicities of pertuzumab and trastuzumab, to include but not limited to: Cardiac failure/LV dysfunction, cardiomyopathy, CHF, arrhythmia, hypersensitivity reaction, anaphylaxis, angioedema, ARDS, interstitial pneumonitis, pulmonary infiltrates, pulmonary fibrosis, pleural effusion, pulmonary edema, hypoxia, myelosuppression (anemia, neutropenia, thrombocytopenia), febrile neutropenia, tumor lysis syndrome, glomerulonephritis, hypokalemia, peripheral neuropathy, hand-foot syndrome, alopecia, nausea, diarrhea, ALT/AST increase, lymphopenia, asthenia, fatigue, stomatitis, myalgia, arthralgia, constipation, vomiting, dysgeusia, headache, decreased appetite, insomnia, peripheral neuropathy, rash, decreased albumin, xeroderma, mucositis, cough, dyspepsia, fever, dizziness, increased potassium, decreased sodium, epistaxis, hot flashes, weight loss, URI, paresthesia, back pain, dyspnea, UTI, hypokalemia, hand-foot syndrome.  Questions answered to the patient's apparent satisfaction.  She agrees to press on with therapy.  10.  Discussed the potential toxicities of aromatase inhibitors (including osteoporosis, osteopenia, bone fractures, arthralgias, hot flashes, edema, etc.).  She agrees to trial therapy    11.  Anticipate adjuvant (postop) treatment (plan: q 21 days x 17 cycles - to complete 1 year of therapy) C7..pending breast surgery     Trastuzumab 8 mg/kg loading dose C7D1; then 6 mg/kg, C8- C17   Hold pertuzumab and trastuzumab if LVEF drops to<50% with a 10% or greater absolute decrease below pretreatment baseline.     --.  Premeds:   Zyprexa 5 mg IV  Decadron 20 mg IV   Pepcid 20 mg IV   Benadryl 50 mg IV        12.  CMP, magnesium level, and CBC with differential on day of trastuzumab.  "Procrit 40,000 units subcutaneously if hemoglobin less than 10 and hematocrit less than 30. Zarxio/Neupogen 480 mcg daily x3 if ANC less than 1.0.      13.  Rx: Anastrozole 1 mg p.o. daily dispense 30 x 11 refills               Os-Barrie 600/400 p.o. twice daily dispense 60 x 11 refills               gabapentin 100 mg p.o. tid # 90                OTC B complex MVI p.o. once daily     14.  Return to the office in 4 weeks with pre-office CBC with differential, CMP -further recommendations pending.    16.  Importance of Smoking Cessation discussed with patient and informed patient additional information will be on today's [facility]'s Flyer - Plan to Be Tobacco Free handout provided to patient         I spent ~48 minutes caring for Uyen \"Leana\" on this date of service. This time includes time spent by me in the following activities: preparing for the visit, reviewing tests, performing a medically appropriate examination and/or evaluation, counseling and educating the patient/family/caregiver, ordering medications, tests, or procedures and documenting information in the medical record.       "

## 2025-05-12 ENCOUNTER — OFFICE VISIT (OUTPATIENT)
Dept: ONCOLOGY | Facility: CLINIC | Age: 45
End: 2025-05-12
Payer: COMMERCIAL

## 2025-05-12 ENCOUNTER — TELEPHONE (OUTPATIENT)
Dept: ONCOLOGY | Facility: CLINIC | Age: 45
End: 2025-05-12

## 2025-05-12 VITALS
BODY MASS INDEX: 26.02 KG/M2 | HEART RATE: 77 BPM | SYSTOLIC BLOOD PRESSURE: 118 MMHG | WEIGHT: 161.9 LBS | RESPIRATION RATE: 18 BRPM | TEMPERATURE: 96.2 F | HEIGHT: 66 IN | OXYGEN SATURATION: 98 % | DIASTOLIC BLOOD PRESSURE: 70 MMHG

## 2025-05-12 DIAGNOSIS — C50.912 DUCTAL CARCINOMA OF LEFT BREAST: Primary | ICD-10-CM

## 2025-05-12 DIAGNOSIS — G62.9 NEUROPATHY: Primary | ICD-10-CM

## 2025-05-12 RX ORDER — ANASTROZOLE 1 MG/1
1 TABLET ORAL DAILY
Qty: 30 TABLET | Refills: 11 | Status: SHIPPED | OUTPATIENT
Start: 2025-05-12

## 2025-05-12 RX ORDER — GABAPENTIN 100 MG/1
100 CAPSULE ORAL
COMMUNITY
Start: 2025-05-08 | End: 2025-05-12

## 2025-05-12 RX ORDER — GABAPENTIN 100 MG/1
100 CAPSULE ORAL 3 TIMES DAILY
Qty: 90 CAPSULE | Refills: 0 | Status: SHIPPED | OUTPATIENT
Start: 2025-05-12

## 2025-05-12 NOTE — TELEPHONE ENCOUNTER
----- Message from Shyam Weiss sent at 5/12/2025  3:30 PM CDT -----   Anastrozole 1 mg p.o. daily dispense 30 x 11 refills             Os-Barrie 600/400 p.o. twice daily dispense 60 x 11 refills             gabapentin 100 mg p.o. tid # 90

## 2025-05-21 ENCOUNTER — HOSPITAL ENCOUNTER (OUTPATIENT)
Dept: CARDIOLOGY | Facility: HOSPITAL | Age: 45
Discharge: HOME | End: 2025-05-21
Admitting: INTERNAL MEDICINE
Payer: COMMERCIAL

## 2025-05-21 VITALS
HEIGHT: 66 IN | DIASTOLIC BLOOD PRESSURE: 70 MMHG | WEIGHT: 161 LBS | BODY MASS INDEX: 25.88 KG/M2 | SYSTOLIC BLOOD PRESSURE: 118 MMHG

## 2025-05-21 DIAGNOSIS — Z79.899 ENCOUNTER FOR MONITORING CARDIOTOXIC DRUG THERAPY: ICD-10-CM

## 2025-05-21 DIAGNOSIS — Z51.81 ENCOUNTER FOR MONITORING CARDIOTOXIC DRUG THERAPY: ICD-10-CM

## 2025-05-21 PROCEDURE — 93306 TTE W/DOPPLER COMPLETE: CPT

## 2025-05-21 PROCEDURE — 93356 MYOCRD STRAIN IMG SPCKL TRCK: CPT

## 2025-05-22 LAB
AORTIC ARCH: 2.8 CM
AORTIC DIMENSIONLESS INDEX: 0.78 (DI)
ASCENDING AORTA: 3.2 CM
AV MEAN PRESS GRAD SYS DOP V1V2: 3.6 MMHG
AV VMAX SYS DOP: 123.4 CM/SEC
BH CV ECHO LEFT VENTRICLE GLOBAL LONGITUDINAL STRAIN: -18.4 %
BH CV ECHO MEAS - 2D AUTO EF SIEMENS: 56.4 %
BH CV ECHO MEAS - AO MAX PG: 6.1 MMHG
BH CV ECHO MEAS - AO ROOT DIAM: 3.5 CM
BH CV ECHO MEAS - AO V2 VTI: 30.6 CM
BH CV ECHO MEAS - AVA(I,D): 2.2 CM2
BH CV ECHO MEAS - EDV(CUBED): 104.6 ML
BH CV ECHO MEAS - EDV(MOD-SP2): 85 ML
BH CV ECHO MEAS - EDV(MOD-SP4): 91.5 ML
BH CV ECHO MEAS - EF(MOD-SP2): 60.2 %
BH CV ECHO MEAS - EF(MOD-SP4): 57 %
BH CV ECHO MEAS - ESV(CUBED): 34.8 ML
BH CV ECHO MEAS - ESV(MOD-SP2): 33.8 ML
BH CV ECHO MEAS - ESV(MOD-SP4): 39.4 ML
BH CV ECHO MEAS - FS: 30.7 %
BH CV ECHO MEAS - IVS/LVPW: 0.79 CM
BH CV ECHO MEAS - IVSD: 0.65 CM
BH CV ECHO MEAS - LA DIMENSION: 3.9 CM
BH CV ECHO MEAS - LAT PEAK E' VEL: 13.4 CM/SEC
BH CV ECHO MEAS - LV DIASTOLIC VOL/BSA (35-75): 50.2 CM2
BH CV ECHO MEAS - LV MASS(C)D: 111.5 GRAMS
BH CV ECHO MEAS - LV MAX PG: 4.3 MMHG
BH CV ECHO MEAS - LV MEAN PG: 2.18 MMHG
BH CV ECHO MEAS - LV SYSTOLIC VOL/BSA (12-30): 21.6 CM2
BH CV ECHO MEAS - LV V1 MAX: 104.1 CM/SEC
BH CV ECHO MEAS - LV V1 VTI: 23.9 CM
BH CV ECHO MEAS - LVIDD: 4.7 CM
BH CV ECHO MEAS - LVIDS: 3.3 CM
BH CV ECHO MEAS - LVOT AREA: 2.8 CM2
BH CV ECHO MEAS - LVOT DIAM: 1.89 CM
BH CV ECHO MEAS - LVPWD: 0.83 CM
BH CV ECHO MEAS - MED PEAK E' VEL: 13.5 CM/SEC
BH CV ECHO MEAS - MV A MAX VEL: 90.9 CM/SEC
BH CV ECHO MEAS - MV DEC SLOPE: 794.1 CM/SEC2
BH CV ECHO MEAS - MV DEC TIME: 0.13 SEC
BH CV ECHO MEAS - MV E MAX VEL: 99.8 CM/SEC
BH CV ECHO MEAS - MV E/A: 1.1
BH CV ECHO MEAS - MV MAX PG: 3.4 MMHG
BH CV ECHO MEAS - MV MEAN PG: 1.82 MMHG
BH CV ECHO MEAS - MV V2 VTI: 34.3 CM
BH CV ECHO MEAS - MVA(VTI): 1.96 CM2
BH CV ECHO MEAS - PA V2 MAX: 62.3 CM/SEC
BH CV ECHO MEAS - PULM A REVS DUR: 0.11 SEC
BH CV ECHO MEAS - PULM A REVS VEL: 27.2 CM/SEC
BH CV ECHO MEAS - PULM DIAS VEL: 50.8 CM/SEC
BH CV ECHO MEAS - PULM S/D: 0.84
BH CV ECHO MEAS - PULM SYS VEL: 42.5 CM/SEC
BH CV ECHO MEAS - RAP SYSTOLE: 3 MMHG
BH CV ECHO MEAS - RV MAX PG: 1.44 MMHG
BH CV ECHO MEAS - RV V1 MAX: 60 CM/SEC
BH CV ECHO MEAS - RV V1 VTI: 13.8 CM
BH CV ECHO MEAS - RVDD: 2.7 CM
BH CV ECHO MEAS - RVSP: 35.1 MMHG
BH CV ECHO MEAS - SV(LVOT): 67.3 ML
BH CV ECHO MEAS - SV(MOD-SP2): 51.2 ML
BH CV ECHO MEAS - SV(MOD-SP4): 52.1 ML
BH CV ECHO MEAS - SVI(LVOT): 36.9 ML/M2
BH CV ECHO MEAS - SVI(MOD-SP2): 28 ML/M2
BH CV ECHO MEAS - SVI(MOD-SP4): 28.6 ML/M2
BH CV ECHO MEAS - TAPSE (>1.6): 2.7 CM
BH CV ECHO MEAS - TR MAX PG: 32.1 MMHG
BH CV ECHO MEAS - TR MAX VEL: 283.2 CM/SEC
BH CV ECHO MEASUREMENTS AVERAGE E/E' RATIO: 7.42
BH CV XLRA - RV BASE: 3.2 CM
BH CV XLRA - RV LENGTH: 6 CM
BH CV XLRA - RV MID: 3 CM
BH CV XLRA - TDI S': 13 CM/SEC
LEFT ATRIUM VOLUME INDEX: 28 ML/M2
LEFT ATRIUM VOLUME: 48 ML
LV EF BIPLANE MOD: 59 %

## 2025-05-30 ENCOUNTER — OFFICE VISIT (OUTPATIENT)
Dept: NEUROLOGY | Facility: CLINIC | Age: 45
End: 2025-05-30
Payer: COMMERCIAL

## 2025-05-30 VITALS
WEIGHT: 161 LBS | HEART RATE: 67 BPM | OXYGEN SATURATION: 98 % | SYSTOLIC BLOOD PRESSURE: 122 MMHG | BODY MASS INDEX: 25.88 KG/M2 | HEIGHT: 66 IN | DIASTOLIC BLOOD PRESSURE: 68 MMHG

## 2025-05-30 DIAGNOSIS — I10 ESSENTIAL HYPERTENSION: ICD-10-CM

## 2025-05-30 DIAGNOSIS — G56.03 BILATERAL CARPAL TUNNEL SYNDROME: ICD-10-CM

## 2025-05-30 DIAGNOSIS — G62.9 POLYNEUROPATHY: Primary | ICD-10-CM

## 2025-05-30 RX ORDER — HYDROXYCHLOROQUINE SULFATE 200 MG/1
200 TABLET, FILM COATED ORAL 2 TIMES DAILY
COMMUNITY

## 2025-05-30 RX ORDER — GABAPENTIN 300 MG/1
300 CAPSULE ORAL 3 TIMES DAILY
Qty: 30 CAPSULE | Refills: 1 | Status: SHIPPED | OUTPATIENT
Start: 2025-05-30

## 2025-05-30 RX ORDER — BISOPROLOL FUMARATE AND HYDROCHLOROTHIAZIDE 5; 6.25 MG/1; MG/1
1 TABLET ORAL DAILY
Qty: 30 TABLET | Refills: 1 | Status: SHIPPED | OUTPATIENT
Start: 2025-05-30

## 2025-05-30 NOTE — PROGRESS NOTES
"  Neurology Consult Note    [facility]  [address], [address]  [place] [address]  Phone: [phone]  Fax: [phone]    Referring Provider:   Shyam Weiss MD  Primary Care Provider:  Josi Ivory APRN    Reason for Consult:  Neuropathy  Subjective      Chief Complaint   Patient presents with    Establish Care     N / P neuropathy  Some what managed   Pt says she has been good but not good   Hands are numb and she is having 2 hot flashes a day         History of Present Illness  44-year-old female referred to our clinic by Dr. Shyam Weiss for the evaluation of neuropathy.  Patient was referred at the end of April.  At that time her last visit with oncology was on 3/19/2025.  Review of that note shows patient has invasive carcinoma of left breast.  She was treated with chemotherapy and mastectomy.    She most recently saw oncology on 5/12/2025.  This note shows neuropathy.  She is concurrently managed on gabapentin 100 mg 3 times daily.    Chemotherapy/immunosuppressive agents utilized:  Carboplatin (significant adverse reaction listed for peripheral neuropathy reported up-to-date)  Docetaxel  Pertuzumab  Trastuzumab      Today patient presents by herself.  Reports to me symptoms of \"itching, burning, pins-and-needles and being poked from inside\" since approximately January 2025.  Started after her fourth chemo treatment.  She notes began as being very itchy in her arms.  She would scratch her arms and have red bumps and bruises that developed.  She also noticed her left hand, the 3rd and 4th finger became numb and has now transitioned all of her fingers being numb on the left hand.  She denies any motor weakness.  She does note some orthostatic dizziness.  At night she can notice \"jolts\" in her legs.  She has been started on gabapentin through oncology and has noted improvement in her symptoms.  Her father has neuropathy however is also diabetic.  She does have prior " history of carpal tunnel syndrome on the right with surgical repair.    Of note she does follow with Dr. Chalino Ackerman of rheumatology for rheumatoid arthritis.  Patient Active Problem List   Diagnosis    Tobacco abuse counseling    Hypothyroidism    Attention deficit hyperactivity disorder    Essential hypertension    Irritable bowel syndrome    Mixed anxiety and depressive disorder    Mood disorder    Overweight (BMI 25.0-29.9)    Gastroesophageal reflux disease without esophagitis    Dysmenorrhea    Hematometra    Ductal carcinoma of left breast    Invasive ductal carcinoma of breast, left    Pelvic pain in female    Nausea    Low magnesium level    Abnormal mammogram        Past Medical History:   Diagnosis Date    Abnormal Pap smear of cervix 2022    Acid reflux     ADHD (attention deficit hyperactivity disorder)     Allergic     Anxiety     Breast cancer     Bronchitis     Cholelithiasis     Chronic fatigue     Disease of thyroid gland     Fibroid Junaid    Fibromyalgia     Fibromyalgia, primary     And chronic fatigue    Gallbladder abscess     Heart murmur     HPV (human papilloma virus) infection 2022    Hypertension     Hypothyroidism     Irritable bowel syndrome     Low back pain     Migraine Junaid    Mitral valve prolapse     PMS (premenstrual syndrome) Junaid    Polycystic ovary syndrome Junaid    PONV (postoperative nausea and vomiting)     RA (rheumatoid arthritis)         Social History     Socioeconomic History    Marital status:    Tobacco Use    Smoking status: Former     Current packs/day: 0.00     Average packs/day: 0.5 packs/day for 20.0 years (10.0 ttl pk-yrs)     Types: Cigarettes     Start date: 2004     Quit date: 2024     Years since quittin.8     Passive exposure: Past    Smokeless tobacco: Never    Tobacco comments:     Stop cigmary 24 using vape now to stop completely   Vaping Use    Vaping status: Former    Start date: 2024    Quit date:  10/17/2024   Substance and Sexual Activity    Alcohol use: Not Currently     Comment: Socially    Drug use: Yes     Types: Marijuana    Sexual activity: Yes     Partners: Male     Birth control/protection: None, Bilateral salpingectomy         Allergies   Allergen Reactions    Percocet [Oxycodone-Acetaminophen] Hives    Adhesive Tape Itching     ADHESIVE ON BANDAIDS          Current Outpatient Medications:     amphetamine-dextroamphetamine (Adderall) 10 MG tablet, Take 1 tablet by mouth Daily for 30 days., Disp: 30 tablet, Rfl: 0    anastrozole (ARIMIDEX) 1 MG tablet, Take 1 tablet by mouth Daily., Disp: 30 tablet, Rfl: 11    bisoprolol-hydrochlorothiazide (ZIAC) 5-6.25 MG per tablet, Take 1 tablet by mouth Daily., Disp: 30 tablet, Rfl: 2    Calcium Carbonate-Vitamin D 600-10 MG-MCG per tablet, Take 1 tablet by mouth 2 (Two) Times a Day., Disp: 60 tablet, Rfl: 11    DULoxetine (Cymbalta) 30 MG capsule, Take 1 capsule by mouth Daily., Disp: 90 capsule, Rfl: 1    hydroxychloroquine (PLAQUENIL) 200 MG tablet, Take 1 tablet by mouth 2 (Two) Times a Day., Disp: , Rfl:     ibuprofen (ADVIL,MOTRIN) 800 MG tablet, Take 1 tablet by mouth Every 6 (Six) Hours As Needed for Mild Pain., Disp: 90 tablet, Rfl: 2    levothyroxine (SYNTHROID, LEVOTHROID) 112 MCG tablet, Take 1 tablet by mouth Daily., Disp: 90 tablet, Rfl: 1    lisdexamfetamine (Vyvanse) 60 MG capsule, Take 1 capsule by mouth Every Morning for 30 days, Disp: 30 capsule, Rfl: 0    pantoprazole (PROTONIX) 40 MG EC tablet, Take 1 tablet by mouth Daily., Disp: 90 tablet, Rfl: 1    dexAMETHasone (DECADRON) 4 MG tablet, Take 2 tablets 2 times daily the day before chemo and 2 tablets 2 times daily the day after chemo (Patient not taking: Reported on 5/30/2025), Disp: 48 tablet, Rfl: 0    gabapentin (NEURONTIN) 300 MG capsule, Take 1 capsule by mouth 3 (Three) Times a Day., Disp: 30 capsule, Rfl: 1    lidocaine-prilocaine (EMLA) 2.5-2.5 % cream, Apply to port site 30 minutes  "before it is accessed and cover with occlusive dressing. (Patient not taking: Reported on 5/30/2025), Disp: 30 g, Rfl: 1    lisdexamfetamine (Vyvanse) 60 MG capsule, Take 1 capsule by mouth Every Morning for 30 days, Disp: 30 capsule, Rfl: 0    ondansetron ODT (ZOFRAN-ODT) 8 MG disintegrating tablet, Place 1 tablet on the tongue Every 8 (Eight) Hours As Needed for Nausea or Vomiting. (Patient not taking: Reported on 5/30/2025), Disp: 30 tablet, Rfl: 1    pertuzumab (Perjeta) 420 MG/14ML solution injection, Infuse 11.33 mL into a venous catheter As Directed. Will infuse intravenously in the outpatient infusion center every 3 weeks. (Patient not taking: Reported on 4/25/2025), Disp: 14 mL, Rfl: 2    trastuzumab-dkst (Ogivri) 420 MG chemo injection, Infuse 360 mg into a venous catheter As Directed. Will infuse intravenously in the outpatient infusion center every 3 weeks. (Patient not taking: Reported on 4/25/2025), Disp: 2 each, Rfl: 2    Current Facility-Administered Medications:     lidocaine (XYLOCAINE) 1 % injection 30 mL, 30 mL, Subcutaneous, Once, Taylor Salinas MD    lidocaine 1% - EPINEPHrine [id number] (XYLOCAINE W/EPI) 1 %-1:000573 injection 30 mL, 30 mL, Infiltration, Once, Taylor Salinas MD       Objective   Vital Signs:   /68   Pulse 67   Ht 167.6 cm (66\")   Wt 73 kg (161 lb)   SpO2 98%   BMI 25.99 kg/m²       Physical Exam  Vitals and nursing note reviewed.   Constitutional:       Appearance: Normal appearance.   HENT:      Head: Normocephalic.   Eyes:      General: Lids are normal.      Extraocular Movements: Extraocular movements intact.      Pupils: Pupils are equal, round, and reactive to light.   Pulmonary:      Effort: Pulmonary effort is normal. No respiratory distress.   Skin:     General: Skin is warm and dry.   Neurological:      Mental Status: She is alert.      Motor: Motor strength is normal.     Deep Tendon Reflexes: Reflexes are normal and symmetric. "   Psychiatric:         Speech: Speech normal.        Neurological Exam  Mental Status  Alert. Oriented to person, place, time and situation. Speech is normal. Language is fluent with no aphasia.    Cranial Nerves  CN II: Visual fields full to confrontation.  CN III, IV, VI: Extraocular movements intact bilaterally. Normal lids and orbits bilaterally. Pupils equal round and reactive to light bilaterally.  CN V: Facial sensation is normal.  CN VII: Full and symmetric facial movement.  CN IX, X: Palate elevates symmetrically. Normal gag reflex.  CN XI: Shoulder shrug strength is normal.  CN XII: Tongue midline without atrophy or fasciculations.    Motor  Normal muscle bulk throughout. No fasciculations present. Normal muscle tone. No abnormal involuntary movements. Strength is 5/5 throughout all four extremities.    Sensory  Light touch is normal in upper and lower extremities. Pinprick abnormality: Decreased pinprick left arm. Vibration is normal in upper and lower extremities. Proprioception is normal in upper and lower extremities.   Positive Tinel's test right wrist.  Positive Phalen test bilaterally.  Positive ulnar tap test bilateral elbow..    Reflexes  Deep tendon reflexes are 2+ and symmetric in all four extremities.    Right pathological reflexes: Ankle clonus absent.  Left pathological reflexes: Ankle clonus absent.    Gait  Casual gait is normal including stance, stride, and arm swing.      Result Review :   The following data was reviewed by: BLAS Coppola on 05/30/2025:  CMP          3/19/2025    07:18 3/26/2025    07:48 4/2/2025    08:07   CMP   Glucose 161  165  138    BUN 11  12  15    Creatinine 0.91  0.90  0.84    EGFR 79.4  80.5  87.5    Sodium 143  141  140    Potassium 3.4  4.2  4.3    Chloride 98  102  101    Calcium 9.8  9.6  9.5    Total Protein 7.1  6.8  7.0    Albumin 4.3  4.0  4.0    Globulin 2.8  2.8  3.0    Total Bilirubin 0.7  0.8  0.6    Alkaline Phosphatase 174  134  130    AST  (SGOT) 21  20  17    ALT (SGPT) 22  23  18    Albumin/Globulin Ratio 1.5  1.4  1.3    BUN/Creatinine Ratio 12.1  13.3  17.9    Anion Gap 14.0  10.0  9.0      CBC w/diff          3/19/2025    07:18 3/26/2025    07:48 4/2/2025    08:07   CBC w/Diff   WBC 11.48  8.81  8.85    RBC 4.36  4.40  4.43    Hemoglobin 12.7  12.7  12.9    Hematocrit 38.5  40.1  39.3    MCV 88.3  91.1  88.7    MCH 29.1  28.9  29.1    MCHC 33.0  31.7  32.8    RDW 14.7  15.0  14.8    Platelets 220  254  223    Neutrophil Rel % 73.6  65.9  62.3    Immature Granulocyte Rel % 0.4  0.3  0.2    Lymphocyte Rel % 20.3  23.5  25.0    Monocyte Rel % 5.1  9.1  9.2    Eosinophil Rel % 0.3  0.5  2.6    Basophil Rel % 0.3  0.7  0.7        TSH          10/15/2024    07:44   TSH   TSH 0.506      NM PET/CT Skull Base to Mid Thigh (08/20/2024 09:10)  NM Bone Scan Whole Body (08/16/2024 09:50)    Progress Notes by Shyam Weiss MD (05/12/2025 15:15)  Progress Notes by Shyam Weiss MD (03/19/2025 15:30)    Progress Notes by Josi Ivory APRN (04/25/2025 15:00)                   Impression:  Uyen José is a 44 y.o. female who presents for evaluation of neuropathy.  She does have symptoms of a sensory neuropathy.  This was a gradual onset.  Etiology likely chemotherapy-induced.  However would recommend proceeding with a neuropathy workup to evaluate for any nutritional deficiencies, diabetes as well as a protein electrophoresis with immunofixation.  She also has symptoms of a carpal tunnel syndrome.  She is not wearing cock-up splints at night.  Will provide patient with orders for bilateral cock-up splints to wear only at night.  Addition regards to her gabapentin, we will plan to increase this to 300 mg 3 times daily to alleviate some symptoms.  She does the 100 mg 3 times daily does not make her drowsy.    Diagnoses and all orders for this visit:    1. Polyneuropathy (Primary)  -     Methylmalonic Acid, Serum; Future  -     Vitamin  B12; Future  -     Protein Elec + Interp, Serum; Future  -     Immunofixation electrophoresis; Future  -     MIKE; Future  -     Hemoglobin A1c; Future  -     gabapentin (NEURONTIN) 300 MG capsule; Take 1 capsule by mouth 3 (Three) Times a Day.  Dispense: 30 capsule; Refill: 1  -      Wrist Hand Orthosis, Wrist Extension Control Cock-up    2. Bilateral carpal tunnel syndrome  -      Wrist Hand Orthosis, Wrist Extension Control Cock-up        Plan:  Increase gabapentin to 300 mg tablet, 1 tablet 3 times daily  Serum studies   Defer EMG/NCS due to only being a sensory neuropathy and potential for chemotherapy-induced.  If symptoms worsen we will of course reconsider this.  Bilateral cock-up splints to wear at night  Contact our office with any worsening symptoms  Follow-up with PCP as scheduled  Follow-up with oncology as scheduled  Follow-up in clinic 3 months or sooner if needed    The patient and I have discussed the plan of care and she is in full agreement at this time.     Follow Up   Return in about 3 months (around 8/30/2025) for Neuropathy.            BLAS Coppola  05/30/25  08:38 CDT

## 2025-05-30 NOTE — LETTER
"May 30, 2025     Shyam Weiss MD  [address]  [address]  [facility] [address]    Patient: Uyen José   YOB: 1980   Date of Visit: 5/30/2025       Dear Shyam Weiss MD,    Thank you for referring Uyen José to me for evaluation. Below is a copy of my consult note.    If you have questions, please do not hesitate to call me. I look forward to following Uyen along with you.         Sincerely,        BLAS Coppola        CC: No Recipients      Neurology Consult Note    [facility]  [address], [address]  [place] [address]  Phone: [phone]  Fax: [phone]    Referring Provider:   Shyam Weiss MD  Primary Care Provider:  Josi Ivory APRN    Reason for Consult:  Neuropathy  Subjective     Chief Complaint   Patient presents with   • Establish Care     N / P neuropathy  Some what managed   Pt says she has been good but not good   Hands are numb and she is having 2 hot flashes a day         History of Present Illness  44-year-old female referred to our clinic by Dr. Shyam Weiss for the evaluation of neuropathy.  Patient was referred at the end of April.  At that time her last visit with oncology was on 3/19/2025.  Review of that note shows patient has invasive carcinoma of left breast.  She was treated with chemotherapy and mastectomy.    She most recently saw oncology on 5/12/2025.  This note shows neuropathy.  She is concurrently managed on gabapentin 100 mg 3 times daily.    Chemotherapy/immunosuppressive agents utilized:  Carboplatin (significant adverse reaction listed for peripheral neuropathy reported up-to-date)  Docetaxel  Pertuzumab  Trastuzumab      Today patient presents by herself.  Reports to me symptoms of \"itching, burning, pins-and-needles and being poked from inside\" since approximately January 2025.  Started after her fourth chemo treatment.  She notes began as being very itchy in her arms.  She would " "scratch her arms and have red bumps and bruises that developed.  She also noticed her left hand, the 3rd and 4th finger became numb and has now transitioned all of her fingers being numb on the left hand.  She denies any motor weakness.  She does note some orthostatic dizziness.  At night she can notice \"jolts\" in her legs.  She has been started on gabapentin through oncology and has noted improvement in her symptoms.  Her father has neuropathy however is also diabetic.  She does have prior history of carpal tunnel syndrome on the right with surgical repair.    Of note she does follow with Dr. Chalino Ackerman of rheumatology for rheumatoid arthritis.  Patient Active Problem List   Diagnosis   • Tobacco abuse counseling   • Hypothyroidism   • Attention deficit hyperactivity disorder   • Essential hypertension   • Irritable bowel syndrome   • Mixed anxiety and depressive disorder   • Mood disorder   • Overweight (BMI 25.0-29.9)   • Gastroesophageal reflux disease without esophagitis   • Dysmenorrhea   • Hematometra   • Ductal carcinoma of left breast   • Invasive ductal carcinoma of breast, left   • Pelvic pain in female   • Nausea   • Low magnesium level   • Abnormal mammogram        Past Medical History:   Diagnosis Date   • Abnormal Pap smear of cervix April 2022   • Acid reflux    • ADHD (attention deficit hyperactivity disorder)    • Allergic    • Anxiety    • Breast cancer    • Bronchitis    • Cholelithiasis    • Chronic fatigue    • Disease of thyroid gland    • Fibroid Junaid   • Fibromyalgia    • Fibromyalgia, primary 2024    And chronic fatigue   • Gallbladder abscess    • Heart murmur    • HPV (human papilloma virus) infection April 2022   • Hypertension    • Hypothyroidism    • Irritable bowel syndrome    • Low back pain    • Migraine Junaid   • Mitral valve prolapse    • PMS (premenstrual syndrome) Junaid   • Polycystic ovary syndrome Junaid   • PONV (postoperative nausea and vomiting)    • RA (rheumatoid " arthritis)         Social History     Socioeconomic History   • Marital status:    Tobacco Use   • Smoking status: Former     Current packs/day: 0.00     Average packs/day: 0.5 packs/day for 20.0 years (10.0 ttl pk-yrs)     Types: Cigarettes     Start date: 2004     Quit date: 2024     Years since quittin.8     Passive exposure: Past   • Smokeless tobacco: Never   • Tobacco comments:     Stop ciggarettes 24 using vape now to stop completely   Vaping Use   • Vaping status: Former   • Start date: 2024   • Quit date: 10/17/2024   Substance and Sexual Activity   • Alcohol use: Not Currently     Comment: Socially   • Drug use: Yes     Types: Marijuana   • Sexual activity: Yes     Partners: Male     Birth control/protection: None, Bilateral salpingectomy         Allergies   Allergen Reactions   • Percocet [Oxycodone-Acetaminophen] Hives   • Adhesive Tape Itching     ADHESIVE ON BANDAIDS          Current Outpatient Medications:   •  amphetamine-dextroamphetamine (Adderall) 10 MG tablet, Take 1 tablet by mouth Daily for 30 days., Disp: 30 tablet, Rfl: 0  •  anastrozole (ARIMIDEX) 1 MG tablet, Take 1 tablet by mouth Daily., Disp: 30 tablet, Rfl: 11  •  bisoprolol-hydrochlorothiazide (ZIAC) 5-6.25 MG per tablet, Take 1 tablet by mouth Daily., Disp: 30 tablet, Rfl: 2  •  Calcium Carbonate-Vitamin D 600-10 MG-MCG per tablet, Take 1 tablet by mouth 2 (Two) Times a Day., Disp: 60 tablet, Rfl: 11  •  DULoxetine (Cymbalta) 30 MG capsule, Take 1 capsule by mouth Daily., Disp: 90 capsule, Rfl: 1  •  hydroxychloroquine (PLAQUENIL) 200 MG tablet, Take 1 tablet by mouth 2 (Two) Times a Day., Disp: , Rfl:   •  ibuprofen (ADVIL,MOTRIN) 800 MG tablet, Take 1 tablet by mouth Every 6 (Six) Hours As Needed for Mild Pain., Disp: 90 tablet, Rfl: 2  •  levothyroxine (SYNTHROID, LEVOTHROID) 112 MCG tablet, Take 1 tablet by mouth Daily., Disp: 90 tablet, Rfl: 1  •  lisdexamfetamine (Vyvanse) 60 MG capsule, Take 1  "capsule by mouth Every Morning for 30 days, Disp: 30 capsule, Rfl: 0  •  pantoprazole (PROTONIX) 40 MG EC tablet, Take 1 tablet by mouth Daily., Disp: 90 tablet, Rfl: 1  •  dexAMETHasone (DECADRON) 4 MG tablet, Take 2 tablets 2 times daily the day before chemo and 2 tablets 2 times daily the day after chemo (Patient not taking: Reported on 5/30/2025), Disp: 48 tablet, Rfl: 0  •  gabapentin (NEURONTIN) 300 MG capsule, Take 1 capsule by mouth 3 (Three) Times a Day., Disp: 30 capsule, Rfl: 1  •  lidocaine-prilocaine (EMLA) 2.5-2.5 % cream, Apply to port site 30 minutes before it is accessed and cover with occlusive dressing. (Patient not taking: Reported on 5/30/2025), Disp: 30 g, Rfl: 1  •  lisdexamfetamine (Vyvanse) 60 MG capsule, Take 1 capsule by mouth Every Morning for 30 days, Disp: 30 capsule, Rfl: 0  •  ondansetron ODT (ZOFRAN-ODT) 8 MG disintegrating tablet, Place 1 tablet on the tongue Every 8 (Eight) Hours As Needed for Nausea or Vomiting. (Patient not taking: Reported on 5/30/2025), Disp: 30 tablet, Rfl: 1  •  pertuzumab (Perjeta) 420 MG/14ML solution injection, Infuse 11.33 mL into a venous catheter As Directed. Will infuse intravenously in the outpatient infusion center every 3 weeks. (Patient not taking: Reported on 4/25/2025), Disp: 14 mL, Rfl: 2  •  trastuzumab-dkst (Ogivri) 420 MG chemo injection, Infuse 360 mg into a venous catheter As Directed. Will infuse intravenously in the outpatient infusion center every 3 weeks. (Patient not taking: Reported on 4/25/2025), Disp: 2 each, Rfl: 2    Current Facility-Administered Medications:   •  lidocaine (XYLOCAINE) 1 % injection 30 mL, 30 mL, Subcutaneous, Once, Taylor Salinas MD  •  lidocaine 1% - EPINEPHrine 1:322676 (XYLOCAINE W/EPI) 1 %-1:300515 injection 30 mL, 30 mL, Infiltration, Once, Taylor Salinas MD       Objective  Vital Signs:   /68   Pulse 67   Ht 167.6 cm (66\")   Wt 73 kg (161 lb)   SpO2 98%   BMI 25.99 kg/m²   "     Physical Exam  Vitals and nursing note reviewed.   Constitutional:       Appearance: Normal appearance.   HENT:      Head: Normocephalic.   Eyes:      General: Lids are normal.      Extraocular Movements: Extraocular movements intact.      Pupils: Pupils are equal, round, and reactive to light.   Pulmonary:      Effort: Pulmonary effort is normal. No respiratory distress.   Skin:     General: Skin is warm and dry.   Neurological:      Mental Status: She is alert.      Motor: Motor strength is normal.     Deep Tendon Reflexes: Reflexes are normal and symmetric.   Psychiatric:         Speech: Speech normal.        Neurological Exam  Mental Status  Alert. Oriented to person, place, time and situation. Speech is normal. Language is fluent with no aphasia.    Cranial Nerves  CN II: Visual fields full to confrontation.  CN III, IV, VI: Extraocular movements intact bilaterally. Normal lids and orbits bilaterally. Pupils equal round and reactive to light bilaterally.  CN V: Facial sensation is normal.  CN VII: Full and symmetric facial movement.  CN IX, X: Palate elevates symmetrically. Normal gag reflex.  CN XI: Shoulder shrug strength is normal.  CN XII: Tongue midline without atrophy or fasciculations.    Motor  Normal muscle bulk throughout. No fasciculations present. Normal muscle tone. No abnormal involuntary movements. Strength is 5/5 throughout all four extremities.    Sensory  Light touch is normal in upper and lower extremities. Pinprick abnormality: Decreased pinprick left arm. Vibration is normal in upper and lower extremities. Proprioception is normal in upper and lower extremities.   Positive Tinel's test right wrist.  Positive Phalen test bilaterally.  Positive ulnar tap test bilateral elbow..    Reflexes  Deep tendon reflexes are 2+ and symmetric in all four extremities.    Right pathological reflexes: Ankle clonus absent.  Left pathological reflexes: Ankle clonus absent.    Gait  Casual gait is normal  including stance, stride, and arm swing.      Result Review:   The following data was reviewed by: BLAS Coppola on 05/30/2025:  CMP          3/19/2025    07:18 3/26/2025    07:48 4/2/2025    08:07   CMP   Glucose 161  165  138    BUN 11  12  15    Creatinine 0.91  0.90  0.84    EGFR 79.4  80.5  87.5    Sodium 143  141  140    Potassium 3.4  4.2  4.3    Chloride 98  102  101    Calcium 9.8  9.6  9.5    Total Protein 7.1  6.8  7.0    Albumin 4.3  4.0  4.0    Globulin 2.8  2.8  3.0    Total Bilirubin 0.7  0.8  0.6    Alkaline Phosphatase 174  134  130    AST (SGOT) 21  20  17    ALT (SGPT) 22  23  18    Albumin/Globulin Ratio 1.5  1.4  1.3    BUN/Creatinine Ratio 12.1  13.3  17.9    Anion Gap 14.0  10.0  9.0      CBC w/diff          3/19/2025    07:18 3/26/2025    07:48 4/2/2025    08:07   CBC w/Diff   WBC 11.48  8.81  8.85    RBC 4.36  4.40  4.43    Hemoglobin 12.7  12.7  12.9    Hematocrit 38.5  40.1  39.3    MCV 88.3  91.1  88.7    MCH 29.1  28.9  29.1    MCHC 33.0  31.7  32.8    RDW 14.7  15.0  14.8    Platelets 220  254  223    Neutrophil Rel % 73.6  65.9  62.3    Immature Granulocyte Rel % 0.4  0.3  0.2    Lymphocyte Rel % 20.3  23.5  25.0    Monocyte Rel % 5.1  9.1  9.2    Eosinophil Rel % 0.3  0.5  2.6    Basophil Rel % 0.3  0.7  0.7        TSH          10/15/2024    07:44   TSH   TSH 0.506      NM PET/CT Skull Base to Mid Thigh (08/20/2024 09:10)  NM Bone Scan Whole Body (08/16/2024 09:50)    Progress Notes by Shyam Weiss MD (05/12/2025 15:15)  Progress Notes by Shyam Weiss MD (03/19/2025 15:30)    Progress Notes by Josi Ivory APRN (04/25/2025 15:00)                   Impression:  Uyen José is a 44 y.o. female who presents for evaluation of neuropathy.  She does have symptoms of a sensory neuropathy.  This was a gradual onset.  Etiology likely chemotherapy-induced.  However would recommend proceeding with a neuropathy workup to evaluate for any nutritional  deficiencies, diabetes as well as a protein electrophoresis with immunofixation.  She also has symptoms of a carpal tunnel syndrome.  She is not wearing cock-up splints at night.  Will provide patient with orders for bilateral cock-up splints to wear only at night.  Addition regards to her gabapentin, we will plan to increase this to 300 mg 3 times daily to alleviate some symptoms.  She does the 100 mg 3 times daily does not make her drowsy.    Diagnoses and all orders for this visit:    1. Polyneuropathy (Primary)  -     Methylmalonic Acid, Serum; Future  -     Vitamin B12; Future  -     Protein Elec + Interp, Serum; Future  -     Immunofixation electrophoresis; Future  -     MIKE; Future  -     Hemoglobin A1c; Future  -     gabapentin (NEURONTIN) 300 MG capsule; Take 1 capsule by mouth 3 (Three) Times a Day.  Dispense: 30 capsule; Refill: 1  -      Wrist Hand Orthosis, Wrist Extension Control Cock-up    2. Bilateral carpal tunnel syndrome  -      Wrist Hand Orthosis, Wrist Extension Control Cock-up        Plan:  Increase gabapentin to 300 mg tablet, 1 tablet 3 times daily  Serum studies   Defer EMG/NCS due to only being a sensory neuropathy and potential for chemotherapy-induced.  If symptoms worsen we will of course reconsider this.  Bilateral cock-up splints to wear at night  Contact our office with any worsening symptoms  Follow-up with PCP as scheduled  Follow-up with oncology as scheduled  Follow-up in clinic 3 months or sooner if needed    The patient and I have discussed the plan of care and she is in full agreement at this time.     Follow Up   Return in about 3 months (around 8/30/2025) for Neuropathy.            BLAS Coppola  05/30/25  08:38 CDT

## 2025-05-30 NOTE — TELEPHONE ENCOUNTER
Rx Refill Note  Requested Prescriptions     Pending Prescriptions Disp Refills    bisoprolol-hydrochlorothiazide (ZIAC) 5-6.25 MG per tablet [Pharmacy Med Name: Bisoprolol Fumarate/Hydrochlorothiazide 5mg-6.25mg Tablet] 30 tablet 1     Sig: Take 1 tablet by mouth once daily.      Last office visit with prescribing clinician: 04/25/2025  Last telemedicine visit with prescribing clinic/bernardo: Visit date not found   Next office visit with prescribing clinician: 07/25/2025    Kristina Lala MA  05/30/25, 13:32 CDT

## 2025-06-03 NOTE — PROGRESS NOTES
"[facility]W ONC 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  On 2024 underwent total laparoscopic hysterectomy with bilateral salpingoophorectomy.  She has completed neoadjuvant carboplatin+docetaxel+pertuzumab+trastuzumab- C1, 10/15/24; C2, 24; C3, 24 (carbo+docetaxel dose reduced 50% since C3 due to excess nausea/vomiting); C4, 25 (dose reduced 20% pertuzumab/trastuzumab from C4); C5, 25; C6, 3/6/25 (patient declined C6D1 docetaxel).  Underwent bilateral skin sparing mastectomies with L SLNB with Dr. Beckwith at [facility], 25. Path:  pTispN0(sn), ER+/MD+/HER2+ (Comment:  complete pathologic response to neoadjuvant chemotherapy (pCR).  She was started on adjuvant anastrozole 1 mg p.o. daily beginning 25.  she is here alone (accompanied by her spouse, Lam).      I have reviewed the HPI and verified with the patient the accuracy of it. No changes to interval history since the information was documented. Shyam Weiss MD 25      Diagnostic abnormalities:  - Tobacco use disorder, hypothyroidism, depression/anxiety, obesity, prior bilateral salpingectomy, , newly diagnosed IDC left breast  - 24- CT a/p-  Increased size of the low density/complex cystic mass in the uterine cavity since the previous study. This may represent abnormal endometrial thickening, complex endometrial fluid/debris accumulation in the endometrial canal due to out duct obstruction by a fibroid in the lower uterine segment as detailed " above. There is a septate morphology of the uterus. Further follow-up with sonography may be obtained. Normal appendix. The gallbladder is surgically absent. The nonacute findings of the remaining abdomen similar to the previous study.  Stable heterogeneous solid mass to the right of midline at the mid to  lower uterine segment with probable secondary dilated endometrial cavity at the uterine fundus filled with fluid and a septate uterus.  Differential diagnosis would include benign etiologies such as submucosal leiomyoma and large endometrial polyp but also neoplasm, possibly endometrial carcinoma, or carcinosarcoma of the uterus.  No anabel pelvic soft tissue nodule or lymphadenopathy identified. Small left external iliac chain lymph node is unchanged. Small right ovarian cyst. Nabothian cysts in the cervix and septate uterus. MRI pelvis without and with contrast may be helpful for further evaluation. GYN consultation is recommended.   -7/25/24- MRI breast bilateral-1. Findings suggestive for multicentric left breast malignancy with diffuse ductal carcinoma in situ extending from the dominant 2.7 cm mass within the lateral left breast at 3:00 extending to and likely involving the left nipple. A second 8 mm mass within the central left breast tissue with a third mass in the anterior depth of the inferior left breast at 6:00. Two 8 mm homogeneous persistently enhancing hyperintense T2 masses at the upper outer right breast, mid depth at the 10:00 in the 11-12 o'clock positions, with overall benign signal characteristics for which background enhancement versus papilloma fibroadenoma considered. MRI directed/second look ultrasound recommended given the presence of the contralateral left breast findings. BI-RADS CATEGORY 5: Highly suggestive of malignancy. Appropriate action should be taken. Findings strongly favoring multicentric left breast malignancy. MRI directed/second look bilateral breast ultrasound recommended.  Ultrasound-guided biopsy of the left breast mass at 3:00 and of a second left breast mass (6:00 if visible on ultrasound versus mass central to the nipple) with sampling of the largest left axillary node may be performed if clinically desired. Sampling of a finding within the upper outer right breast should be considered if solid nodule identified on ultrasound.  -7/29/24- CMP normal (GFR 78.9), UA/cultures negative, HCG negative, WBC 12.65, ANC 8.43 otherwise normal CBC  -7/31/24-  Endocervical Currettage, Endometrial Currettage, and hysteroscopic resected uterine synechia.  Final diagnosis:  1.Endometrium, curettage: A. Blood and fragments of smooth muscle. B. No histologic evidence of malignancy. Comment: Endometrial mucosal tissue is not identified. 2. Endocervix, curettage: A. Fragments of benign endocervical tissue, benign squamous mucosa and benign lower uterine segment endometrium. B. Blood and mucus. C. No dysplasia identified. 3. Endometrium, hysteroscopic resected tissue: A. Fragments of smooth muscle. B. No histologic evidence of malignancy. Comment: Endometrial mucosal tissue is not identified.  -8/7/24- US biopsy:  Final diagnosis-  1.  Mass, left breast at 6:00, 1 cm from nipple, core needle biopsies: High-grade ductal carcinoma in situ, comedo type. 2.  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 Histologic grade (Corwin histologic score). Glandular (acinar)/tubular differentiation: Score 2. Nuclear pleomorphism: Score 3. Mitotic rate: Score 3. Overall grade: Grade 3. Maximum tumor diameter is at least 1.5 cm. 3.  Left axillary lymph node, core needle biopsy: Metastatic adenocarcinoma of breast. -ER 69%+; [place] 85%+; HER2 3+ (positive); Ki67 54%+  -8/12/24- CT CAP- Known left breast malignancy with enlarged left level 1 and asymmetrically prominent left level 2 and borderline level 3 axillary lymph nodes which are  suspicious for axillary disease.  No additional evidence of metastatic disease in the chest.  1. Small 0.4 cm low-attenuation region in the right lobe of the liver may have been present on the prior examinations but was not as well visualized. This is too small to characterize. Continued attention on follow-up is recommended.  Fatty infiltration of the liver.  Persistent heterogeneity of the lower uterine segment of the uterus and fluid in the endometrial canal although the fluid has decreased. Stable septate uterus  -8/12/2024- Follow-up Dr. Gentile- A/P: IDC left breast.  Met w/u, referral onc, genetics. RTC 2 weeks.  -8/16/2024-bone scan-no evidence of osteoblastic metastatic disease.  -8/16/2024- BRCA 1/2 analyses with cancer next-summary: Negative-no clinically significant variants detected (pathogenic mutations, variants of unknown significance, gross deletions/duplications: Not detected)  -8/20/2024-PET scan-abnormal PET/CT, biopsy-proven left breast malignancy and demonstrates FDG avidity with maximum SUV of 7.9.  Also potential abnormal activity at 6:00 in the left breast with a maximal SUV of 3.9.  MRI of the breast with and without contrast will be helpful to exclude multifocal or multicentric disease in the left breast.  No abnormal contralateral right breast or axillary activity identified.  3 hypermetabolic level 1 lymph nodes in the left axilla compatible with kiersten metastasis and solitary borderline level 2 axillary lymph node on the left.  -8/22/2024- Today is seen for management considerations-  -8/26/24- 2D echo-  Left ventricular systolic function is normal. Left ventricular ejection fraction appears to be 56 - 60%.    Normal right ventricular cavity size and systolic function noted.    No significant valvular abnormalities identified on this study.     -8/28/2024-CBC normal.  CMP normal.  -9/15/2024- CT abdomen/pelvis-There is a 3.2 x 3.0 x 2.6 cm focus of rounded masslike enhancement in the  lower uterine segment centered along the endometrial canal (series 2-image 70, series 4-image 41). The uterine cavity is distended and fluid-filled above this at the fundus. Primary imaging concern would be endometrial carcinoma obstructing the canal although it seems there was a recent D&C with benign tissue sampling. Endometritis is also a consideration and cannot be excluded on CT.  No free fluid in the pelvis, pelvic abscess or pelvic adenopathy.    Previous interventions:  -9/20/2024-   Total Laparoscopic Hysterectomy with bilateral salpingoophorectomy, da Kenzie assisted -Final diagnosis:  Uterus with bilateral tubes and ovaries, hysterectomy with bilateral salpingo-oophorectomies: Unremarkable cervix, negative for squamous dysplasia.  Atrophic endometrium, negative for hyperplasia and atypia.  Benign intramural leiomyoma.  Adenomyosis.  Unremarkable right ovary.  Left ovary with cystic follicles and hemorrhagic corpora lutea.     -Neoadjuvant DCPT (carboplatin+docetaxel+pertuzumab+trastuzumab)- C1, 10/15/24; C2, 11/6/24; C3, 12/4/24 (carbo+docetaxel dose reduced 50% since C3 due to excess nausea/vomiting); C4, 1/2/25 (dose reduced 20% pertuzumab/trastuzumab from C4); C5, 2/6/25; C6, 3/6/25 (patient declined C6D1 docetaxel).    - 4/28/25-  Bilateral skin sparing mastectomies with L SLNB with Dr. Beckwith.   Diagnosis   1. LYMPH NODE, LEFT, AXILLA, SENTINEL #1, EXCISION:   - TWO LYMPH NODES; NEGATIVE FOR CARCINOMA (0/2). TREATMENT-RELATED CHANGES PRESENT IN BOTH THE LYMPH NODES; ONE OF THEM CONTAINS CLIP AND BIOPSY SITE CHANGES ([id number]); SEE COMMENTS #1.       2. LYMPH NODE, LEFT, AXILLA, SENTINEL #2, EXCISION:   - ONE LYMPH NODE; NEGATIVE FOR CARCINOMA (0/1). NO TREATMENT RELATED CHANGES, SEE COMMENTS #1.       3. BREAST, LEFT, MASTECTOMY:   - FIBROTIC TUMOR BED WITH RESIDUAL DUCTAL CARCINOMA IN SITU, HIGH GRADE, SOLID, AND CRIBRIFORM PATTERNS, MEASURING 3.5 MM IN GREATEST CONTIGUOUS EXTENT; ASSOCIATED WITH  CLIP AND BIOPSY SITE CHANGES ([id number], CORRESPONDS TO 2-3:00, 6 CMFN). NO RESIDUAL INVASIVE CARCINOMA OR LYMPHATIC INVASION IS IDENTIFIED. DCIS IS PRESENT, 4 MM FROM THE CLOSEST ANTERIOR MARGIN IN THE UPPER OUTER QUADRANT. THE SECOND FIBROTIC TUMOR BED (CORRESPONDS TO 6:00, 1 CMFN) SHOWS CALCIFICATIONS ASSOCIATED WITH MULTIPLE SCLEROSED DUCTS. NO RESIDUAL DUCTAL OR INVASIVE CARCINOMA. BACKGROUND BREAST TISSUE WITH FIBROADENOMATOID CHANGES. BENIGN NIPPLE, SEE COMMENT #2 AND SYNOPTIC REPORT.     4. LYMPH NODE, LEFT, AXILLA, SENTINEL #3, EXCISION:   - ONE LYMPH NODE; NEGATIVE FOR CARCINOMA (0/1). NO TREATMENT RELATED CHANGES, SEE COMMENTS #1.       5. BREAST, RIGHT, TOTAL MASTECTOMY:   - BENIGN MAMMARY PARENCHYMA, NEGATIVE FOR ATYPIA AND MALIGNANCY.     Comments   #1. The immunohistochemical stains for pancytokeratin AE1/3 performed on blocks 1A, 1B, 2A and 4A are negative, supporting the above diagnosis.     #2.This patient received neoadjuvant (presurgical) chemotherapy. Two fibrotic tumor beds measuring 21 mm x 12 mm x 9 mm and 22 mm x 16 mm x 13 mm grossly, each containing a prior biopsy clip, were identified in the upper outer quadrant and central (6:00, 1 CMFN) quadrants, respectively. Each tumor bed was entirely submitted for histologic evaluation. The tumor bed was entirely submitted for histologic evaluation.  Residual ductal carcinoma in situ was identified in the upper outer quadrant; however, no residual invasive carcinoma is present. In combination with absence of tumor in the sentinel/axillary lymph node(s), this corresponds to a complete pathologic response to neoadjuvant chemotherapy (pCR).     Pathologic Data Reporting Following Neoadjuvant Therapy:     Received therapy   Chemotherapy: yes   Targeted therapy: yes     RCB Score   RBC score1: 0    RCB class1: RCB-0     The following data are used to calculate the residual cancer burden (RCB) score:   Size of tumor bed (largest cross section containing  invasive carcinoma including intervening stroma between foci):   Largest dimension (d1): n/a   Second dimension (d2): n/a   Overall cellularity of residual carcinoma in tumor bed (invasive and in situ): 5%   Percentage of carcinoma that is DCIS: 100%   Number of positive lymph nodes: 0   Size of largest lymph node metastasis (including intervening fibrosis): N/A     Synoptic Diagnosis   3)  LEFT TOTAL MASTECTOMY:     SPECIMEN   Procedure: Total mastectomy   Specimen Laterality:  Left   TUMOR   Tumor Site:  Upper outer quadrant    Central    Clock position      2 o'clock      3 o'clock      6 o'clock   Histologic Type:  No residual invasive carcinoma   Tumor Size:  No residual invasive carcinoma   Ductal Carcinoma In Situ (DCIS):  Present      Only DCIS is present after presurgical (neoadjuvant) therapy    Size (Extent) of DCIS:    Estimated size (extent) of DCIS is at least (Millimeters): 3.5 mm    Architectural Patterns:    Cribriform      Solid    Nuclear Grade:    Grade III (high)    Necrosis:    Not identified   Lobular Carcinoma In Situ (LCIS):  Not identified   Lymphatic and / or Vascular Invasion:  Not identified   Dermal Lymphatic and / or Vascular Invasion:  Not identified   Microcalcifications:  Present in non-neoplastic tissue   Treatment Effect in the Breast:  No residual invasive carcinoma is present in the breast after presurgical therapy   Treatment Effect in the Lymph Nodes:  Probable or definite response to presurgical therapy in metastatic carcinoma   MARGINS   Margin Status for DCIS:  All margins negative for DCIS    Distance from DCIS to Closest Margin:     4 mm   REGIONAL LYMPH NODES   Regional Lymph Node Status:    All regional lymph nodes negative for tumor    Total Number of Lymph Nodes Examined (sentinel and non-sentinel):     4    Number of [place] Nodes Examined:     4   pTNM CLASSIFICATION (AJCC 8th Edition)   Modified Classification:  y   pT Category:  pTis (DCIS)   pN Category:  pN0    N Suffix:  (sn)   Breast Biomarker Testing Performed on Previous Biopsy:    Estrogen Receptor (ER) Status:    Positive (greater than 10% of cells demonstrate nuclear positivity)      Percentage of Cells with Nuclear Positivity:      61-70%    Progesterone Receptor (PgR) Status:    Positive      Percentage of Cells with Nuclear Positivity:      61-70%    HER2 (by immunohistochemistry):    Positive (Score 3+)      Percentage of Cells with Uniform Intense Complete Membrane Stainin %    Ki-67 Percentage of Positive Nuclei: 60 %    Testing Performed on Case Number: [id number]   Block Selection: There is insufficient tumor material for ancillary testing     -Adjuvant anastrozole 1 mg p.o. daily beginning 2025 (    LABS    Lab Results - Last 18 Months   Lab Units 25  0807 25  0748 25  0718 25  0723 25  1208 25  0816 25  0727 25  0756 25  0714 24  0716 24  1239 10/29/24  0745 10/21/24  0917   HEMOGLOBIN g/dL 12.9 12.7 12.7 11.9* 13.2 13.5 13.0   < > 13.3   < > 13.3   < > 14.9   HEMATOCRIT % 39.3 40.1 38.5 36.5 40.8 42.3 39.9   < > 41.6   < > 41.3   < > 43.9   MCV fL 88.7 91.1 88.3 89.5 90.9 91.0 89.9   < > 91.0   < > 91.2   < > 86.9   WBC 10*3/mm3 8.85 8.81 11.48* 10.11 15.38* 23.55* 4.19   < > 32.17*   < > 13.52*   < > 4.29   RDW % 14.8 15.0 14.7 14.5 14.3 14.3 14.1   < > 14.8   < > 14.2   < > 12.8   MPV fL 11.0 10.8 11.4 11.2 11.3 11.4 10.7   < > 11.3   < > 11.5   < > 13.2*   PLATELETS 10*3/mm3 223 254 220 189 222 294 250   < > 243   < > 217   < > 132*   IMM GRAN % % 0.2 0.3 0.4 0.3 0.5  --  0.5   < >  --    < >  --    < >  --    NEUTROS ABS 10*3/mm3 5.52 5.81 8.45* 5.75 10.94* 17.72* 3.58   < > 24.77*   < > 9.33*   < > 1.63*   LYMPHS ABS 10*3/mm3 2.21 2.07 2.33 3.41* 3.51*  --  0.55*   < >  --    < >  --    < >  --    MONOS ABS 10*3/mm3 0.81 0.80 0.59 0.76 0.75  --  0.03*   < >  --    < >  --    < >  --    EOS ABS 10*3/mm3 0.23 0.04 0.03  0.12 0.04 0.00 0.00   < > 0.00   < > 0.00   < > 0.39   BASOS ABS 10*3/mm3 0.06 0.06 0.03 0.04 0.06 0.24* 0.01   < > 0.32*   < > 0.00   < >  --    IMMATURE GRANS (ABS) 10*3/mm3 0.02 0.03 0.05 0.03 0.08*  --  0.02   < >  --    < >  --    < >  --    NRBC /100 WBC 0.0 0.0 0.0 0.0 0.0  --  0.0   < >  --    < >  --    < >  --    NEUTROPHIL % %  --   --   --   --   --  62.4  --   --  61.0  --  50.0  --  27.0*   MONOCYTES % %  --   --   --   --   --  2.0*  --   --  5.0  --  5.0  --  11.0   BASOPHIL % %  --   --   --   --   --  1.0  --   --  1.0  --  0.0  --   --    ATYP LYMPH % %  --   --   --   --   --  5.9*  --   --  4.0  --   --   --  2.0    < > = values in this interval not displayed.       Lab Results - Last 18 Months   Lab Units 04/02/25  0807 03/26/25  0748 03/19/25  0718 03/06/25  0723 02/20/25  1208 02/13/25  0816   GLUCOSE mg/dL 138* 165* 161* 131* 116* 121*   SODIUM mmol/L 140 141 143 142 143 140   POTASSIUM mmol/L 4.3 4.2 3.4* 3.3* 3.7 4.1   CO2 mmol/L 30.0* 29.0 31.0* 29.0 30.0* 30.0*   CHLORIDE mmol/L 101 102 98 101 100 99   ANION GAP mmol/L 9.0 10.0 14.0 12.0 13.0 11.0   CREATININE mg/dL 0.84 0.90 0.91 0.83 0.84 0.89   BUN mg/dL 15 12 11 18 16 12   BUN / CREAT RATIO  17.9 13.3 12.1 21.7 19.0 13.5   CALCIUM mg/dL 9.5 9.6 9.8 9.4 9.4 9.5   ALK PHOS U/L 130* 134* 174* 102 163* 233*   TOTAL PROTEIN g/dL 7.0 6.8 7.1 6.8 7.1 6.9   ALT (SGPT) U/L 18 23 22 16 17 14   AST (SGOT) U/L 17 20 21 15 14 14   BILIRUBIN mg/dL 0.6 0.8 0.7 0.5 0.4 0.3   ALBUMIN g/dL 4.0 4.0 4.3 4.0 4.2 4.1   GLOBULIN gm/dL 3.0 2.8 2.8 2.8 2.9 2.8         Lab Results - Last 18 Months   Lab Units 10/15/24  0744   TSH uIU/mL 0.506         PAST MEDICAL HISTORY:  ALLERGIES:  Allergies   Allergen Reactions    Percocet [Oxycodone-Acetaminophen] Hives    Adhesive Tape Itching     ADHESIVE ON BANDAIDS     CURRENT MEDICATIONS:  Outpatient Encounter Medications as of 6/9/2025   Medication Sig Dispense Refill    amphetamine-dextroamphetamine (Adderall) 10  MG tablet Take 1 tablet by mouth Daily for 30 days. 30 tablet 0    anastrozole (ARIMIDEX) 1 MG tablet Take 1 tablet by mouth Daily. 30 tablet 11    bisoprolol-hydrochlorothiazide (ZIAC) 5-6.25 MG per tablet Take 1 tablet by mouth once daily. 30 tablet 1    Calcium Carbonate-Vitamin D 600-10 MG-MCG per tablet Take 1 tablet by mouth 2 (Two) Times a Day. 60 tablet 11    DULoxetine (Cymbalta) 30 MG capsule Take 1 capsule by mouth Daily. 90 capsule 1    gabapentin (NEURONTIN) 300 MG capsule Take 1 capsule by mouth 3 (Three) Times a Day. 30 capsule 1    hydroxychloroquine (PLAQUENIL) 200 MG tablet Take 1 tablet by mouth 2 (Two) Times a Day.      ibuprofen (ADVIL,MOTRIN) 800 MG tablet Take 1 tablet by mouth Every 6 (Six) Hours As Needed for Mild Pain. 90 tablet 2    levothyroxine (SYNTHROID, LEVOTHROID) 112 MCG tablet Take 1 tablet by mouth Daily. 90 tablet 1    lidocaine-prilocaine (EMLA) 2.5-2.5 % cream Apply to port site 30 minutes before it is accessed and cover with occlusive dressing. 30 g 1    lisdexamfetamine (Vyvanse) 60 MG capsule Take 1 capsule by mouth Every Morning for 30 days 30 capsule 0    ondansetron ODT (ZOFRAN-ODT) 8 MG disintegrating tablet Place 1 tablet on the tongue Every 8 (Eight) Hours As Needed for Nausea or Vomiting. 30 tablet 1    pantoprazole (PROTONIX) 40 MG EC tablet Take 1 tablet by mouth Daily. 90 tablet 1    pertuzumab (Perjeta) 420 MG/14ML solution injection Infuse 11.33 mL into a venous catheter As Directed. Will infuse intravenously in the outpatient infusion center every 3 weeks. 14 mL 2    trastuzumab-dkst (Ogivri) 420 MG chemo injection Infuse 360 mg into a venous catheter As Directed. Will infuse intravenously in the outpatient infusion center every 3 weeks. 2 each 2    dexAMETHasone (DECADRON) 4 MG tablet Take 2 tablets 2 times daily the day before chemo and 2 tablets 2 times daily the day after chemo (Patient not taking: Reported on 6/9/2025) 48 tablet 0    lisdexamfetamine  (Vyvanse) 60 MG capsule Take 1 capsule by mouth Every Morning for 30 days 30 capsule 0     Facility-Administered Encounter Medications as of 6/9/2025   Medication Dose Route Frequency Provider Last Rate Last Admin    lidocaine (XYLOCAINE) 1 % injection 30 mL  30 mL Subcutaneous Once Taylor Salinas MD        lidocaine 1% - EPINEPHrine 1:[id number] (XYLOCAINE W/EPI) 1 %-1:644961 injection 30 mL  30 mL Infiltration Once Taylor Salinas MD         ADULT ILLNESSES:  Patient Active Problem List   Diagnosis Code    Tobacco abuse counseling Z71.6    Hypothyroidism E03.9    Attention deficit hyperactivity disorder F90.9    Essential hypertension I10    Irritable bowel syndrome K58.9    Mixed anxiety and depressive disorder F41.8    Mood disorder F39    Overweight (BMI 25.0-29.9) E66.3    Gastroesophageal reflux disease without esophagitis K21.9    Dysmenorrhea N94.6    Hematometra N85.7    Ductal carcinoma of left breast C50.912    Invasive ductal carcinoma of breast, left C50.912    Pelvic pain in female R10.2    Nausea R11.0    Low magnesium level R79.0    Abnormal mammogram R92.8     SURGERIES:  Past Surgical History:   Procedure Laterality Date    CARPAL TUNNEL RELEASE Right     COLONOSCOPY  05/01/2003    Normal exam    D & C HYSTEROSCOPY MYOSURE N/A 07/31/2024    Procedure: DILATATION AND CURETTAGE HYSTEROSCOPY WITH POSSIBLE TISSUE RESECTION;  Surgeon: Setvan Espinal MD;  Location: [facility] OR;  Service: Obstetrics/Gynecology;  Laterality: N/A;    DIAGNOSTIC LAPAROSCOPY  [address] [place]; normal findings; [facility]    DILATATION AND CURETTAGE      ENDOMETRIAL ABLATION  2023    ENDOSCOPY      HYSTEROSCOPY      LAPAROSCOPIC CHOLECYSTECTOMY  2011    Dr Duncan; [facility]    LYMPH NODE BIOPSY  8/7/2024    Cancer Positive    SALPINGECTOMY Bilateral 10/16/2020    Procedure: SALPINGECTOMY LAPAROSCOPIC for sterilization;  Surgeon: Stevan Espinal MD;  Location: [facility] OR;  Service: Obstetrics/Gynecology;  Laterality:  Bilateral;    TOTAL LAPAROSCOPIC HYSTERECTOMY SALPINGO OOPHORECTOMY N/A 09/20/2024    Procedure: TOTAL LAPAROSCOPIC HYSTERECTOMY BILATERAL SALPINGOOPHORECTOMY WITH DAVINCI ROBOT;  Surgeon: Stevan Espinal MD;  Location:  [facility] OR;  Service: Robotics - DaVinci;  Laterality: N/A;    TUBAL ABDOMINAL LIGATION      cut not tied    US GUIDED LYMPH NODE BIOPSY  08/07/2024    VAGINAL HYSTERECTOMY  Oct 2021    VENOUS ACCESS DEVICE (PORT) INSERTION N/A 08/30/2024    Procedure: Single Lumen Port-a-cath insertion with flouroscopy;  Surgeon: Shahla Gentile MD;  Location:  [facility] OR;  Service: General;  Laterality: N/A;    WISDOM TOOTH EXTRACTION       HEALTH MAINTENANCE ITEMS:  Health Maintenance Due   Topic Date Due    TDAP/TD VACCINES (1 - Tdap) Never done    HEPATITIS C SCREENING  Never done    ANNUAL PHYSICAL  Never done    COVID-19 Vaccine (6 - 2024-25 season) 09/01/2024    COLORECTAL CANCER SCREENING  12/10/2024       <no information>  Last Completed Colonoscopy    This patient has no relevant Health Maintenance data.       Immunization History   Administered Date(s) Administered    COVID-19 (MODERNA) 1st,2nd,3rd Dose Monovalent 10/29/2021, 11/04/2021, 11/26/2021, 11/26/2021, 12/06/2022    DT 07/07/2004    Influenza, Unspecified 11/30/2021     Last Completed Mammogram            Completed or No Longer Recommended       MAMMOGRAM  Discontinued        Frequency changed to Never automatically (Topic No Longer Applies)    03/04/2025  Mammo Diagnostic Digital Tomosynthesis Left With CAD    06/26/2024  MAMMO DIAGNOSTIC BILATERAL W CAD    04/19/2022  MAMMO SCREENING BILATERAL W CAD                              FAMILY HISTORY:  Family History   Problem Relation Age of Onset    Diabetes Mother     Asthma Mother     Depression Mother     Hyperlipidemia Mother     Kidney disease Mother     Thyroid disease Mother     COPD Mother     Diabetes Father     Arthritis Father         RA    Asthma Son         Ecezma and asthma     "Cancer Maternal Grandmother         vulva, HPV+    Thyroid disease Maternal Grandmother     Cancer Maternal Grandfather     Lung cancer Paternal Grandfather 60 - 69    Cancer Paternal Grandfather     Stomach cancer Maternal Great-Grandmother     Colon cancer Neg Hx     Colon polyps Neg Hx      SOCIAL HISTORY:  Social History     Socioeconomic History    Marital status:    Tobacco Use    Smoking status: Former     Current packs/day: 0.00     Average packs/day: 0.5 packs/day for 20.0 years (10.0 ttl pk-yrs)     Types: Cigarettes     Start date: 2004     Quit date: 2024     Years since quittin.8     Passive exposure: Past    Smokeless tobacco: Never    Tobacco comments:     Stop ciggarettes 24 using vape now to stop completely   Vaping Use    Vaping status: Former    Start date: 2024    Quit date: 10/17/2024   Substance and Sexual Activity    Alcohol use: Not Currently     Comment: Socially    Drug use: Yes     Types: Marijuana    Sexual activity: Yes     Partners: Male     Birth control/protection: None, Bilateral salpingectomy        REVIEW OF SYSTEMS:  Review of Systems   Constitutional:  Positive for fatigue (from RA + fibromyalgia). Negative for activity change, appetite change and unexpected weight loss (None since the last visit).        Manages her ADLs to include chores, errands and driving.  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  No hot flashes.  No increased fatigue.  No swelling.  No new arthralgias.  Taking daily.        Eyes: Negative.    Respiratory: Negative.  Negative for cough and shortness of breath.         Smoker age 15- 1/2-1 ppd.  Has stopped smoking.  She is no longer vaping.   Cardiovascular: Negative.    Gastrointestinal: Negative.    Endocrine:         A0    Menarche age 13    Menopause age 42   Genitourinary: Negative.  Negative for vaginal " "bleeding (Had a CHRISTIANO/BSO per Dr. Espinal).   Musculoskeletal:  Positive for arthralgias (Chronic- from RA + fibromyalgia-symptoms or better while she was on chemo), back pain (Chronic) and neck pain (Chronic).   Skin:  Negative for rash.   Allergic/Immunologic: Negative.    Neurological: Negative.    Hematological: Negative.    Psychiatric/Behavioral: Negative.         /78   Pulse 79   Temp 96.9 °F (36.1 °C) (Temporal)   Resp 18   Ht 167.6 cm (66\")   Wt 74.1 kg (163 lb 4.8 oz)   LMP  (LMP Unknown)   SpO2 98%   BMI 26.36 kg/m²  Body surface area is 1.84 meters squared.  Pain Score    06/09/25 1519   PainSc: 0-No pain       Physical Exam  Vitals and nursing note reviewed. Exam conducted with a chaperone present.   Constitutional:       Comments: Cooperative, well-developed, modestly kept middle-aged female.  Ambulatory.  ECOG 0.      Has gained 2 lb (in addition to 5 lb at her prior visit) since the last visit   HENT:      Head: Normocephalic and atraumatic.   Eyes:      General: No scleral icterus.     Extraocular Movements: Extraocular movements intact.      Pupils: Pupils are equal, round, and reactive to light.   Cardiovascular:      Rate and Rhythm: Normal rate.   Pulmonary:      Effort: Pulmonary effort is normal.      Comments: Port right upper chest is well seated  Chest:   Breasts:     Right: Normal.      Left: Mass present.          Comments: Right breast skin sparing mastectomy is healed.  Underlying expander noted.     Left breast skin sparing mastectomy has healed.  Underlying expander noted.  Previous axillary nodes are not evident today.     No associated supraclavicular nor contralateral (right) axillary adenopathy)  Abdominal:      Palpations: Abdomen is soft.      Tenderness: There is no abdominal tenderness.      Comments: Laparoscopic incisions are healing very well.   Musculoskeletal:         General: Normal range of motion.      Cervical back: Normal range of motion.   Lymphadenopathy: "      Cervical: No cervical adenopathy.      Upper Body:      Right upper body: No supraclavicular or axillary adenopathy.      Left upper body: No supraclavicular or axillary (Clinically no longer evident) adenopathy.   Skin:     General: Skin is warm.   Neurological:      General: No focal deficit present.      Mental Status: She is alert and oriented to person, place, and time.   Psychiatric:         Mood and Affect: Mood normal.         Behavior: Behavior normal.         Thought Content: Thought content normal.       Assessment:  1.   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  --Tumor stage post-neoadjuvant therapy:  ypTispN0(sn), ER+/ND+/HER2+ (Comment:  complete pathologic response to neoadjuvant chemotherapy (pCR).   --Original tumor burden:    ---7/25/24- MRI breast bilateral-1. Findings suggestive for multicentric left breast malignancy with diffuse ductal carcinoma in situ extending from the dominant 2.7 cm mass within the lateral left breast at 3:00 extending to and likely involving the left nipple. A second 8 mm mass within the central left breast tissue with a third mass in the anterior depth of the inferior left breast at 6:00. Two 8 mm homogeneous persistently enhancing hyperintense T2 masses at the upper outer right breast, mid depth at the 10:00 in the 11-12 o'clock positions, with overall benign signal characteristics for which background enhancement versus papilloma fibroadenoma considered. MRI directed/second look ultrasound recommended given the presence of the contralateral left breast findings. BI-RADS CATEGORY 5: Highly suggestive of malignancy. Appropriate action should be taken. Findings strongly favoring multicentric left breast malignancy. MRI directed/second look bilateral breast ultrasound recommended. Ultrasound-guided biopsy of the left breast mass at 3:00  and of a second left breast mass (6:00 if visible on ultrasound versus mass central to the nipple) with sampling of the largest left axillary node may be performed if clinically desired. Sampling of a finding within the upper outer right breast should be considered if solid nodule identified on ultrasound.  -8/7/24- US biopsy:  Final diagnosis-  1.  Mass, left breast at 6:00, 1 cm from nipple, core needle biopsies: High-grade ductal carcinoma in situ, comedo type. 2.  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 Histologic grade (Corwin histologic score). Glandular (acinar)/tubular differentiation: Score 2. Nuclear pleomorphism: Score 3. Mitotic rate: Score 3. Overall grade: Grade 3. Maximum tumor diameter is at least 1.5 cm. 3.  Left axillary lymph node, core needle biopsy: Metastatic adenocarcinoma of breast. -ER 69%+; HI 85%+; HER2 3+ (positive); Ki67 54%+  -8/12/24- CT CAP- Known left breast malignancy with enlarged left level 1 and asymmetrically prominent left level 2 and borderline level 3 axillary lymph nodes which are suspicious for axillary disease.  No additional evidence of metastatic disease in the chest.  1. Small 0.4 cm low-attenuation region in the right lobe of the liver may have been present on the prior examinations but was not as well visualized. This is too small to characterize. Continued attention on follow-up is recommended.  Fatty infiltration of the liver.  Persistent heterogeneity of the lower uterine segment of the uterus and fluid in the endometrial canal although the fluid has decreased. Stable septate uterus  -8/16/24-bone scan-no evidence of osteoblastic metastatic disease.  -8/16/24- 2D echo    Left ventricular systolic function is normal. Left ventricular ejection fraction appears to be 56 - 60%.    Normal right ventricular cavity size and systolic function noted.    No significant valvular  abnormalities identified on this study.     -8/20/24-PET scan-abnormal PET/CT, biopsy-proven left breast malignancy and demonstrates FDG avidity with maximum SUV of 7.9.  Also potential abnormal activity at 6:00 in the left breast with a maximal SUV of 3.9.  MRI of the breast with and without contrast will be helpful to exclude multifocal or multicentric disease in the left breast.  No abnormal contralateral right breast or axillary activity identified.  3 hypermetabolic level 1 lymph nodes in the left axilla compatible with kiersten metastasis and solitary borderline level 2 axillary lymph node on the left.  - 11/25/24-2D echo- LVEF 61-65%  - 12/5/24- MRI breasts- 1. Complete radiographic response to neoadjuvant chemotherapy of the multicentric tumor within the left breast. Resolution of the previous nonspecific enhancement within the upper outer quadrant right breast. No abnormal enhancement seen within the left or right breast on today's study. Interval decrease in the pathologic left axillary lymphadenopathy, axillary lymph nodes with normal morphology on today's study. No internal mammary lymphadenopathy.  - 3/4/25- Diagnostic mammogram/left breast US-Mammographically stable appearance of extensive biopsy-proven DCIS within the left breast, including multicentric disease. However, there is increased size of an irregular hypoechoic mass at 6:00 in the left breast, 1 cm from the nipple which is worrisome for disease progression. Definitive surgery is recommended. BI-RADS Category 6, known malignancy. Appropriate clinical action should be  taken.  - Neoadjuvant DCPT x 6- C6, 3/6/25 (patient declined C6D1 docetaxel).  - 4/28/25-   bilateral skin sparing mastectomies with L SLNB with Dr. Beckwith. Path (above):  pTispN0(sn), ER+/TX+/HER2+ (Comment:  complete pathologic response to neoadjuvant chemotherapy (pCR).   - Adjuvant anastrozole 1 mg p.o. daily beginning 5/12/2025-present    2.   Neuropathy.  Subjectively much  improved on gabapentin 300 mg 3 times daily+ B complex multivitamin p.o. daily    3.   Nausea, fatigue, myalgias, blurry vision (for 1 day) and flulike symptoms following chemo.  Resolved.  Feels like steps that were taken to include dose reduction and augmenting her antiemetic regimen have helped.  States symptoms now last less than 1 week (previously lasted the better part of 2 weeks).  MRI of the breasts after cycle 3 on 12/5 (above) showed CR.  CT of the head was planned but patient declined (resolution of blurry vision).    4.   Uterine cystic mass  - 7/12/24 - Abnormal CT a/p- Increased size of the low density/complex cystic mass in the uterine cavity since the previous study. This may represent abnormal endometrial thickening, complex endometrial fluid/debris accumulation in the endometrial canal due to out duct obstruction by a fibroid in the lower uterine segment as detailed above. There is a septate morphology of the uterus. Further follow-up with sonography may be obtained. Normal appendix. The gallbladder is surgically absent. The nonacute findings of the remaining abdomen similar to the previous study.  Stable heterogeneous solid mass to the right of midline at the mid to lower uterine segment with probable secondary dilated endometrial cavity at the uterine fundus filled with fluid and a septate uterus.  Differential diagnosis would include benign etiologies such as submucosal leiomyoma and large endometrial polyp but also neoplasm, possibly endometrial carcinoma, or carcinosarcoma of the uterus.  No anabel pelvic soft tissue nodule or lymphadenopathy identified. Small left external iliac chain lymph node is unchanged. Small right ovarian cyst. Nabothian cysts in the cervix and septate uterus. MRI pelvis without and with contrast may be helpful for further evaluation. GYN consultation is recommended.  -7/31/24-  Endocervical Currettage, Endometrial Currettage, and hysteroscopic resected uterine  synechia.  Final diagnosis:  1.Endometrium, curettage: A. Blood and fragments of smooth muscle. B. No histologic evidence of malignancy. Comment: Endometrial mucosal tissue is not identified. 2. Endocervix, curettage: A. Fragments of benign endocervical tissue, benign squamous mucosa and benign lower uterine segment endometrium. B. Blood and mucus. C. No dysplasia identified. 3. Endometrium, hysteroscopic resected tissue: A. Fragments of smooth muscle. B. No histologic evidence of malignancy. Comment: Endometrial mucosal tissue is not identified.  --9/15/2024- CT abdomen/pelvis-There is a 3.2 x 3.0 x 2.6 cm focus of rounded masslike enhancement in the lower uterine segment centered along the endometrial canal (series 2-image 70, series 4-image 41). The uterine cavity is distended and fluid-filled above this at the fundus. Primary imaging concern would be endometrial carcinoma obstructing the canal although it seems there was a recent D&C with benign tissue sampling. Endometritis is also a consideration and cannot be excluded on CT.  No free fluid in the pelvis, pelvic abscess or pelvic adenopathy.  --9/20/2024-   Total Laparoscopic Hysterectomy with bilateral salpingoophorectomy, da Kenzie assisted -Final diagnosis:  Uterus with bilateral tubes and ovaries, hysterectomy with bilateral salpingo-oophorectomies: Unremarkable cervix, negative for squamous dysplasia. Atrophic endometrium, negative for hyperplasia and atypia. Benign intramural leiomyoma. Adenomyosis. Unremarkable right ovary. Left ovary with cystic follicles and hemorrhagic corpora lutea.    5.   Nicotine dependence-stopped in favor of vaping  6.   Mixed depression/anxiety  7.   Hypothyroidism  8.   Obesity  9.   RA and fibromyalgia. Plaquenil on hold while on chemo.  Followed by Dr. Ackerman.  Symptoms quiescent while on chemo.  10.   Anemia. Chemo associated  -Resolved. Hgb 12.9, 4/2/25 (prior: Hgb 12.4-13.5)  11.  Intermittently elevated alk phos.   Chemo?  -130, 4/2/2025 (prior peak: 233)       Plan:  1.   Labs, 6/9/25- all p; 4/2/25-gluc 138, alk phos 130 (prior peak: 233) otherwise normal CMP, WBC 8.8 otherwise normal CBC    2.   Anastrozole tolerance.  Going well.  3.   Refer to radiation oncology Re:: Assess for adjuvant radiation   4.   Previously note interval bilateral mastectomies with L SLNB with Dr. Beckwith, 4/28/25. Path (above):  ypTispN0(sn), ER+/AZ+/HER2+ (Comment:  complete pathologic response to neoadjuvant chemotherapy (pCR).   5.   Review 2D echo, 5/21/25:  LVEF 56-60%  6.   Follow-up [facility], 5/15/25 by BLAS Forrest. A/P: post op visit after after bilateral mastectomy w/ SLNB (0/2) 4/28/2025 per Dr. Lo with implant reconstruction per Dr. Joseph. She is doing well postoperatively. Encouraged to continue wearing supportive bra. She will also be seen by plastics today for follow-up. She will return to clinic in 6 months for follow-up with Dr. Beckwith   7.   Previously reviewed BRCA 1/2 analyses with cancer next, 8/16/2024 -summary: Negative-no clinically significant variants detected  8.   Review NCCN guidelines version invasive breast cancer.  Principles of preoperative systemic therapy: Candidate for preoperative systemic therapy--A: Patients with inoperable breast cancer: IBC; bulky or matted cN2 axillary nodes; cN3 kiersten disease; cT4 tumors.  B. In selected patients with operable breast cancer--HER-2 positive disease and TNBC, if >/=cT2 OR >/=cN1; Large primary tumor relative to breast size in a patient who desires breast conservation; cN+ disease likely to become cN0 with preoperative systemic therapy; C.  Preoperative systemic therapy can be considered for cT1,N0, HER-2 positive disease and TNBC.     9.   Discussed the potential toxicities of pertuzumab and trastuzumab, to include but not limited to: Cardiac failure/LV dysfunction, cardiomyopathy, CHF, arrhythmia, hypersensitivity reaction, anaphylaxis, angioedema, ARDS,  interstitial pneumonitis, pulmonary infiltrates, pulmonary fibrosis, pleural effusion, pulmonary edema, hypoxia, myelosuppression (anemia, neutropenia, thrombocytopenia), febrile neutropenia, tumor lysis syndrome, glomerulonephritis, hypokalemia, peripheral neuropathy, hand-foot syndrome, alopecia, nausea, diarrhea, ALT/AST increase, lymphopenia, asthenia, fatigue, stomatitis, myalgia, arthralgia, constipation, vomiting, dysgeusia, headache, decreased appetite, insomnia, peripheral neuropathy, rash, decreased albumin, xeroderma, mucositis, cough, dyspepsia, fever, dizziness, increased potassium, decreased sodium, epistaxis, hot flashes, weight loss, URI, paresthesia, back pain, dyspnea, UTI, hypokalemia, hand-foot syndrome.  Questions answered to the patient's apparent satisfaction.  She agrees to press on with therapy.  10.  Discussed the potential toxicities of aromatase inhibitors (including osteoporosis, osteopenia, bone fractures, arthralgias, hot flashes, edema, etc.).  She agrees to press on with therapy    11.  Schedule adjuvant (postop) treatment (plan: q 21 days x 17 cycles - to complete 1 year of therapy) C7, 6/18/25; C8, 7/9/25; C9, 7/30/25; C10, etc     Trastuzumab 6 mg/kg, C7- C17   Hold trastuzumab if LVEF drops to<50% with a 10% or greater absolute decrease below pretreatment baseline.     --.  Premeds:   Zyprexa 5 mg IV  Decadron 20 mg IV   Pepcid 20 mg IV   Benadryl 50 mg IV        12.  CMP, magnesium level, and CBC with differential on day of trastuzumab. Procrit 40,000 units subcutaneously if hemoglobin less than 10 and hematocrit less than 30. Zarxio/Neupogen 480 mcg daily x3 if ANC less than 1.0.      13.  Rx: Anastrozole 1 mg p.o. daily dispense 30 x 11 refills               Os-Barrie 600/400 p.o. twice daily dispense 60 x 11 refills               gabapentin 300 mg p.o. tid -per neurology                OTC B complex MVI p.o. once daily OTC     14.  Return to the office in 8 weeks with pre-office  "CBC with differential, CMP   14.  Importance of Smoking Cessation discussed with patient and informed patient additional information will be on today's [facility]'s Flyer - Plan to Be Tobacco Free handout provided to patient         I spent ~65 minutes caring for Uyen, \"Leana\" on this date of service. This time includes time spent by me in the following activities: preparing for the visit, reviewing tests, performing a medically appropriate examination and/or evaluation, counseling and educating the patient/family/caregiver, ordering medications, tests, or procedures and documenting information in the medical record.       "

## 2025-06-09 ENCOUNTER — LAB (OUTPATIENT)
Dept: LAB | Facility: HOSPITAL | Age: 45
End: 2025-06-09
Payer: COMMERCIAL

## 2025-06-09 ENCOUNTER — OFFICE VISIT (OUTPATIENT)
Dept: ONCOLOGY | Facility: CLINIC | Age: 45
End: 2025-06-09
Payer: COMMERCIAL

## 2025-06-09 VITALS
HEART RATE: 79 BPM | RESPIRATION RATE: 18 BRPM | TEMPERATURE: 96.9 F | WEIGHT: 163.3 LBS | BODY MASS INDEX: 26.24 KG/M2 | HEIGHT: 66 IN | OXYGEN SATURATION: 98 % | SYSTOLIC BLOOD PRESSURE: 132 MMHG | DIASTOLIC BLOOD PRESSURE: 78 MMHG

## 2025-06-09 DIAGNOSIS — G62.9 POLYNEUROPATHY: ICD-10-CM

## 2025-06-09 DIAGNOSIS — C50.912 DUCTAL CARCINOMA OF LEFT BREAST: ICD-10-CM

## 2025-06-09 DIAGNOSIS — C50.912 INVASIVE DUCTAL CARCINOMA OF BREAST, LEFT: ICD-10-CM

## 2025-06-09 DIAGNOSIS — C50.912 INVASIVE DUCTAL CARCINOMA OF BREAST, LEFT: Primary | ICD-10-CM

## 2025-06-09 LAB
ALBUMIN SERPL-MCNC: 4.2 G/DL (ref 3.5–5.2)
ALBUMIN/GLOB SERPL: 1.7 G/DL
ALP SERPL-CCNC: 137 U/L (ref 39–117)
ALT SERPL W P-5'-P-CCNC: 12 U/L (ref 1–33)
ANION GAP SERPL CALCULATED.3IONS-SCNC: 10 MMOL/L (ref 5–15)
ANISOCYTOSIS BLD QL: ABNORMAL
AST SERPL-CCNC: 19 U/L (ref 1–32)
BASOPHILS # BLD MANUAL: 0 10*3/MM3 (ref 0–0.2)
BASOPHILS NFR BLD MANUAL: 0 % (ref 0–1.5)
BILIRUB SERPL-MCNC: 0.4 MG/DL (ref 0–1.2)
BUN SERPL-MCNC: 14.4 MG/DL (ref 6–20)
BUN/CREAT SERPL: 18 (ref 7–25)
CALCIUM SPEC-SCNC: 9.3 MG/DL (ref 8.6–10.5)
CHLORIDE SERPL-SCNC: 101 MMOL/L (ref 98–107)
CO2 SERPL-SCNC: 28 MMOL/L (ref 22–29)
CREAT SERPL-MCNC: 0.8 MG/DL (ref 0.57–1)
DEPRECATED RDW RBC AUTO: 41.4 FL (ref 37–54)
EGFRCR SERPLBLD CKD-EPI 2021: 92.7 ML/MIN/1.73
EOSINOPHIL # BLD MANUAL: 0.64 10*3/MM3 (ref 0–0.4)
EOSINOPHIL NFR BLD MANUAL: 8.1 % (ref 0.3–6.2)
ERYTHROCYTE [DISTWIDTH] IN BLOOD BY AUTOMATED COUNT: 13.2 % (ref 12.3–15.4)
GLOBULIN UR ELPH-MCNC: 2.5 GM/DL
GLUCOSE SERPL-MCNC: 111 MG/DL (ref 65–99)
HBA1C MFR BLD: 5.5 % (ref 4.8–5.6)
HCT VFR BLD AUTO: 35.9 % (ref 34–46.6)
HGB BLD-MCNC: 11.6 G/DL (ref 12–15.9)
LYMPHOCYTES # BLD MANUAL: 1.9 10*3/MM3 (ref 0.7–3.1)
LYMPHOCYTES NFR BLD MANUAL: 5.1 % (ref 5–12)
MAGNESIUM SERPL-MCNC: 1.7 MG/DL (ref 1.6–2.6)
MCH RBC QN AUTO: 27.9 PG (ref 26.6–33)
MCHC RBC AUTO-ENTMCNC: 32.3 G/DL (ref 31.5–35.7)
MCV RBC AUTO: 86.3 FL (ref 79–97)
MICROCYTES BLD QL: ABNORMAL
MONOCYTES # BLD: 0.4 10*3/MM3 (ref 0.1–0.9)
NEUTROPHILS # BLD AUTO: 4.91 10*3/MM3 (ref 1.7–7)
NEUTROPHILS NFR BLD MANUAL: 62.6 % (ref 42.7–76)
PLAT MORPH BLD: NORMAL
PLATELET # BLD AUTO: 205 10*3/MM3 (ref 140–450)
PMV BLD AUTO: 11.7 FL (ref 6–12)
POLYCHROMASIA BLD QL SMEAR: ABNORMAL
POTASSIUM SERPL-SCNC: 3.5 MMOL/L (ref 3.5–5.2)
PROT SERPL-MCNC: 6.7 G/DL (ref 6–8.5)
RBC # BLD AUTO: 4.16 10*6/MM3 (ref 3.77–5.28)
SODIUM SERPL-SCNC: 139 MMOL/L (ref 136–145)
VARIANT LYMPHS NFR BLD MANUAL: 1 % (ref 0–5)
VARIANT LYMPHS NFR BLD MANUAL: 23.2 % (ref 19.6–45.3)
WBC MORPH BLD: NORMAL
WBC NRBC COR # BLD AUTO: 7.85 10*3/MM3 (ref 3.4–10.8)

## 2025-06-09 PROCEDURE — 85025 COMPLETE CBC W/AUTO DIFF WBC: CPT

## 2025-06-09 PROCEDURE — 82784 ASSAY IGA/IGD/IGG/IGM EACH: CPT

## 2025-06-09 PROCEDURE — 36415 COLL VENOUS BLD VENIPUNCTURE: CPT

## 2025-06-09 PROCEDURE — 82607 VITAMIN B-12: CPT

## 2025-06-09 PROCEDURE — 85007 BL SMEAR W/DIFF WBC COUNT: CPT

## 2025-06-09 PROCEDURE — 83036 HEMOGLOBIN GLYCOSYLATED A1C: CPT

## 2025-06-09 PROCEDURE — 86334 IMMUNOFIX E-PHORESIS SERUM: CPT

## 2025-06-09 PROCEDURE — 83735 ASSAY OF MAGNESIUM: CPT

## 2025-06-09 PROCEDURE — 99215 OFFICE O/P EST HI 40 MIN: CPT | Performed by: INTERNAL MEDICINE

## 2025-06-09 PROCEDURE — 86038 ANTINUCLEAR ANTIBODIES: CPT

## 2025-06-09 PROCEDURE — 83921 ORGANIC ACID SINGLE QUANT: CPT

## 2025-06-09 PROCEDURE — 80053 COMPREHEN METABOLIC PANEL: CPT

## 2025-06-09 PROCEDURE — 84165 PROTEIN E-PHORESIS SERUM: CPT

## 2025-06-10 ENCOUNTER — RESULTS FOLLOW-UP (OUTPATIENT)
Dept: NEUROLOGY | Facility: CLINIC | Age: 45
End: 2025-06-10
Payer: COMMERCIAL

## 2025-06-10 LAB
ANA SER QL: NEGATIVE
VIT B12 BLD-MCNC: 541 PG/ML (ref 211–946)

## 2025-06-10 NOTE — PROGRESS NOTES
Please let patient know A1C and B12 appear normal. Awaiting further labs. Continue current plan of care.

## 2025-06-10 NOTE — LETTER
Uyen José  [address]  [place] [address]    Michaela 10, 2025     Dear Ms. José:    Below are the results from your recent visit:    Resulted Orders   Vitamin B12   Result Value Ref Range    Vitamin B-12 541 211 - 946 pg/mL   Hemoglobin A1c   Result Value Ref Range    Hemoglobin A1C 5.50 4.80 - 5.60 %       The test results shows A1C and B12 appear normal. We are awaiting further labs results. Please continue current plan of care.   If you have any questions or concerns, please don't hesitate to call.         Sincerely,        Darshan Leroy, APRN

## 2025-06-11 DIAGNOSIS — C50.912 INVASIVE DUCTAL CARCINOMA OF BREAST, LEFT: ICD-10-CM

## 2025-06-11 DIAGNOSIS — C50.912 DUCTAL CARCINOMA OF LEFT BREAST: Primary | ICD-10-CM

## 2025-06-11 LAB
ALBUMIN SERPL ELPH-MCNC: 3.6 G/DL (ref 2.9–4.4)
ALBUMIN/GLOB SERPL: 1.3 {RATIO} (ref 0.7–1.7)
ALPHA1 GLOB SERPL ELPH-MCNC: 0.2 G/DL (ref 0–0.4)
ALPHA2 GLOB SERPL ELPH-MCNC: 0.8 G/DL (ref 0.4–1)
B-GLOBULIN SERPL ELPH-MCNC: 1 G/DL (ref 0.7–1.3)
GAMMA GLOB SERPL ELPH-MCNC: 0.9 G/DL (ref 0.4–1.8)
GLOBULIN SER-MCNC: 2.9 G/DL (ref 2.2–3.9)
IGA SERPL-MCNC: 248 MG/DL (ref 87–352)
IGG SERPL-MCNC: 842 MG/DL (ref 586–1602)
IGM SERPL-MCNC: 171 MG/DL (ref 26–217)
INTERPRETATION SERPL IEP-IMP: NORMAL
LABORATORY COMMENT REPORT: NORMAL
M PROTEIN SERPL ELPH-MCNC: NORMAL G/DL
PROT SERPL-MCNC: 6.5 G/DL (ref 6–8.5)

## 2025-06-11 RX ORDER — SODIUM CHLORIDE 9 MG/ML
20 INJECTION, SOLUTION INTRAVENOUS ONCE
OUTPATIENT
Start: 2025-06-16

## 2025-06-11 RX ORDER — OLANZAPINE 5 MG/1
5 TABLET, FILM COATED ORAL ONCE
Start: 2025-06-16 | End: 2025-06-16

## 2025-06-11 RX ORDER — FAMOTIDINE 10 MG/ML
20 INJECTION, SOLUTION INTRAVENOUS ONCE
Start: 2025-06-16 | End: 2025-06-16

## 2025-06-11 RX ORDER — DIPHENHYDRAMINE HYDROCHLORIDE 50 MG/ML
50 INJECTION, SOLUTION INTRAMUSCULAR; INTRAVENOUS ONCE
Start: 2025-06-16 | End: 2025-06-16

## 2025-06-11 RX ORDER — HYDROCORTISONE SODIUM SUCCINATE 100 MG/2ML
100 INJECTION INTRAMUSCULAR; INTRAVENOUS AS NEEDED
OUTPATIENT
Start: 2025-06-16

## 2025-06-11 RX ORDER — DIPHENHYDRAMINE HYDROCHLORIDE 50 MG/ML
50 INJECTION, SOLUTION INTRAMUSCULAR; INTRAVENOUS AS NEEDED
OUTPATIENT
Start: 2025-06-16

## 2025-06-11 RX ORDER — FAMOTIDINE 10 MG/ML
20 INJECTION, SOLUTION INTRAVENOUS AS NEEDED
OUTPATIENT
Start: 2025-06-16

## 2025-06-12 LAB — METHYLMALONATE SERPL-SCNC: 265 NMOL/L (ref 0–378)

## 2025-06-13 ENCOUNTER — TELEPHONE (OUTPATIENT)
Age: 45
End: 2025-06-13
Payer: COMMERCIAL

## 2025-06-13 DIAGNOSIS — R20.2 PARESTHESIAS: Primary | ICD-10-CM

## 2025-06-13 NOTE — TELEPHONE ENCOUNTER
I called this patient to see if she wanted to reschedule her consult which she had cancelled.  She said she doesn't want to do radiation.  She said she has done chemotherapy and had surgery and wants to finish immunotherapy and doesn't want to put her body through radiation therapy at this time.

## 2025-06-18 ENCOUNTER — LAB (OUTPATIENT)
Dept: LAB | Facility: HOSPITAL | Age: 45
End: 2025-06-18
Payer: COMMERCIAL

## 2025-06-18 ENCOUNTER — INFUSION (OUTPATIENT)
Dept: ONCOLOGY | Facility: HOSPITAL | Age: 45
End: 2025-06-18
Payer: COMMERCIAL

## 2025-06-18 VITALS
DIASTOLIC BLOOD PRESSURE: 65 MMHG | HEIGHT: 66 IN | TEMPERATURE: 97.7 F | HEART RATE: 71 BPM | WEIGHT: 160.4 LBS | RESPIRATION RATE: 16 BRPM | BODY MASS INDEX: 25.78 KG/M2 | SYSTOLIC BLOOD PRESSURE: 102 MMHG | OXYGEN SATURATION: 92 %

## 2025-06-18 DIAGNOSIS — C50.912 INVASIVE DUCTAL CARCINOMA OF BREAST, LEFT: ICD-10-CM

## 2025-06-18 DIAGNOSIS — C50.912 DUCTAL CARCINOMA OF LEFT BREAST: Primary | ICD-10-CM

## 2025-06-18 DIAGNOSIS — C50.912 DUCTAL CARCINOMA OF LEFT BREAST: ICD-10-CM

## 2025-06-18 LAB
ALBUMIN SERPL-MCNC: 4.3 G/DL (ref 3.5–5.2)
ALBUMIN/GLOB SERPL: 1.7 G/DL
ALP SERPL-CCNC: 112 U/L (ref 39–117)
ALT SERPL W P-5'-P-CCNC: 12 U/L (ref 1–33)
ANION GAP SERPL CALCULATED.3IONS-SCNC: 11 MMOL/L (ref 5–15)
ANISOCYTOSIS BLD QL: ABNORMAL
AST SERPL-CCNC: 20 U/L (ref 1–32)
BASOPHILS # BLD MANUAL: 0 10*3/MM3 (ref 0–0.2)
BASOPHILS NFR BLD MANUAL: 0 % (ref 0–1.5)
BILIRUB SERPL-MCNC: 0.6 MG/DL (ref 0–1.2)
BUN SERPL-MCNC: 13.6 MG/DL (ref 6–20)
BUN/CREAT SERPL: 14 (ref 7–25)
CALCIUM SPEC-SCNC: 9.9 MG/DL (ref 8.6–10.5)
CHLORIDE SERPL-SCNC: 102 MMOL/L (ref 98–107)
CO2 SERPL-SCNC: 29 MMOL/L (ref 22–29)
CREAT SERPL-MCNC: 0.97 MG/DL (ref 0.57–1)
DEPRECATED RDW RBC AUTO: 41.2 FL (ref 37–54)
EGFRCR SERPLBLD CKD-EPI 2021: 73.6 ML/MIN/1.73
EOSINOPHIL # BLD MANUAL: 0.49 10*3/MM3 (ref 0–0.4)
EOSINOPHIL NFR BLD MANUAL: 8 % (ref 0.3–6.2)
ERYTHROCYTE [DISTWIDTH] IN BLOOD BY AUTOMATED COUNT: 13.2 % (ref 12.3–15.4)
GLOBULIN UR ELPH-MCNC: 2.6 GM/DL
GLUCOSE SERPL-MCNC: 118 MG/DL (ref 65–99)
HCT VFR BLD AUTO: 39.3 % (ref 34–46.6)
HGB BLD-MCNC: 12.6 G/DL (ref 12–15.9)
HYPOCHROMIA BLD QL: ABNORMAL
LYMPHOCYTES # BLD MANUAL: 1.89 10*3/MM3 (ref 0.7–3.1)
LYMPHOCYTES NFR BLD MANUAL: 7 % (ref 5–12)
MAGNESIUM SERPL-MCNC: 1.9 MG/DL (ref 1.6–2.6)
MCH RBC QN AUTO: 27.6 PG (ref 26.6–33)
MCHC RBC AUTO-ENTMCNC: 32.1 G/DL (ref 31.5–35.7)
MCV RBC AUTO: 86.2 FL (ref 79–97)
MONOCYTES # BLD: 0.43 10*3/MM3 (ref 0.1–0.9)
NEUTROPHILS # BLD AUTO: 3.29 10*3/MM3 (ref 1.7–7)
NEUTROPHILS NFR BLD MANUAL: 53 % (ref 42.7–76)
NEUTS BAND NFR BLD MANUAL: 1 % (ref 0–5)
PLAT MORPH BLD: NORMAL
PLATELET # BLD AUTO: 229 10*3/MM3 (ref 140–450)
PMV BLD AUTO: 12.1 FL (ref 6–12)
POLYCHROMASIA BLD QL SMEAR: ABNORMAL
POTASSIUM SERPL-SCNC: 4.4 MMOL/L (ref 3.5–5.2)
PROT SERPL-MCNC: 6.9 G/DL (ref 6–8.5)
RBC # BLD AUTO: 4.56 10*6/MM3 (ref 3.77–5.28)
SODIUM SERPL-SCNC: 142 MMOL/L (ref 136–145)
VARIANT LYMPHS NFR BLD MANUAL: 31 % (ref 19.6–45.3)
WBC MORPH BLD: NORMAL
WBC NRBC COR # BLD AUTO: 6.09 10*3/MM3 (ref 3.4–10.8)

## 2025-06-18 PROCEDURE — 36415 COLL VENOUS BLD VENIPUNCTURE: CPT

## 2025-06-18 PROCEDURE — 96367 TX/PROPH/DG ADDL SEQ IV INF: CPT

## 2025-06-18 PROCEDURE — 25010000002 DEXAMETHASONE SODIUM PHOSPHATE 100 MG/10ML SOLUTION: Performed by: INTERNAL MEDICINE

## 2025-06-18 PROCEDURE — 25810000003 SODIUM CHLORIDE 0.9 % SOLUTION: Performed by: INTERNAL MEDICINE

## 2025-06-18 PROCEDURE — 25010000002 TRASTUZUMAB-DKST 150 MG RECONSTITUTED SOLUTION 1 EACH VIAL: Performed by: INTERNAL MEDICINE

## 2025-06-18 PROCEDURE — 25810000003 SODIUM CHLORIDE 0.9 % SOLUTION 250 ML FLEX CONT: Performed by: INTERNAL MEDICINE

## 2025-06-18 PROCEDURE — 96413 CHEMO IV INFUSION 1 HR: CPT

## 2025-06-18 PROCEDURE — 25010000002 DIPHENHYDRAMINE PER 50 MG: Performed by: INTERNAL MEDICINE

## 2025-06-18 PROCEDURE — 25010000002 FAMOTIDINE 10 MG/ML SOLUTION: Performed by: INTERNAL MEDICINE

## 2025-06-18 PROCEDURE — 85025 COMPLETE CBC W/AUTO DIFF WBC: CPT

## 2025-06-18 PROCEDURE — 96375 TX/PRO/DX INJ NEW DRUG ADDON: CPT

## 2025-06-18 PROCEDURE — 83735 ASSAY OF MAGNESIUM: CPT

## 2025-06-18 PROCEDURE — 80053 COMPREHEN METABOLIC PANEL: CPT

## 2025-06-18 PROCEDURE — 25010000002 TRASTUZUMAB-DKST 420 MG RECONSTITUTED SOLUTION 1 EACH VIAL: Performed by: INTERNAL MEDICINE

## 2025-06-18 PROCEDURE — 85007 BL SMEAR W/DIFF WBC COUNT: CPT

## 2025-06-18 PROCEDURE — 25010000002 HEPARIN LOCK FLUSH PER 10 UNITS: Performed by: INTERNAL MEDICINE

## 2025-06-18 RX ORDER — FAMOTIDINE 10 MG/ML
20 INJECTION, SOLUTION INTRAVENOUS AS NEEDED
Status: DISCONTINUED | OUTPATIENT
Start: 2025-06-18 | End: 2025-06-18 | Stop reason: HOSPADM

## 2025-06-18 RX ORDER — OLANZAPINE 5 MG/1
5 TABLET, FILM COATED ORAL ONCE
Status: COMPLETED | OUTPATIENT
Start: 2025-06-18 | End: 2025-06-18

## 2025-06-18 RX ORDER — HEPARIN SODIUM (PORCINE) LOCK FLUSH IV SOLN 100 UNIT/ML 100 UNIT/ML
500 SOLUTION INTRAVENOUS AS NEEDED
Status: DISCONTINUED | OUTPATIENT
Start: 2025-06-18 | End: 2025-06-18 | Stop reason: HOSPADM

## 2025-06-18 RX ORDER — FAMOTIDINE 10 MG/ML
20 INJECTION, SOLUTION INTRAVENOUS ONCE
Status: COMPLETED | OUTPATIENT
Start: 2025-06-18 | End: 2025-06-18

## 2025-06-18 RX ORDER — SODIUM CHLORIDE 0.9 % (FLUSH) 0.9 %
10 SYRINGE (ML) INJECTION AS NEEDED
OUTPATIENT
Start: 2025-06-18

## 2025-06-18 RX ORDER — SODIUM CHLORIDE 0.9 % (FLUSH) 0.9 %
10 SYRINGE (ML) INJECTION AS NEEDED
Status: DISCONTINUED | OUTPATIENT
Start: 2025-06-18 | End: 2025-06-18 | Stop reason: HOSPADM

## 2025-06-18 RX ORDER — DIPHENHYDRAMINE HYDROCHLORIDE 50 MG/ML
50 INJECTION, SOLUTION INTRAMUSCULAR; INTRAVENOUS AS NEEDED
Status: DISCONTINUED | OUTPATIENT
Start: 2025-06-18 | End: 2025-06-18 | Stop reason: HOSPADM

## 2025-06-18 RX ORDER — DIPHENHYDRAMINE HYDROCHLORIDE 50 MG/ML
50 INJECTION, SOLUTION INTRAMUSCULAR; INTRAVENOUS ONCE
Status: COMPLETED | OUTPATIENT
Start: 2025-06-18 | End: 2025-06-18

## 2025-06-18 RX ORDER — SODIUM CHLORIDE 9 MG/ML
20 INJECTION, SOLUTION INTRAVENOUS ONCE
Status: COMPLETED | OUTPATIENT
Start: 2025-06-18 | End: 2025-06-18

## 2025-06-18 RX ORDER — HYDROCORTISONE SODIUM SUCCINATE 100 MG/2ML
100 INJECTION INTRAMUSCULAR; INTRAVENOUS AS NEEDED
Status: DISCONTINUED | OUTPATIENT
Start: 2025-06-18 | End: 2025-06-18 | Stop reason: HOSPADM

## 2025-06-18 RX ORDER — HEPARIN SODIUM (PORCINE) LOCK FLUSH IV SOLN 100 UNIT/ML 100 UNIT/ML
500 SOLUTION INTRAVENOUS AS NEEDED
OUTPATIENT
Start: 2025-06-18

## 2025-06-18 RX ADMIN — TRASTUZUMAB-DKST 440 MG: KIT at 08:58

## 2025-06-18 RX ADMIN — HEPARIN 500 UNITS: 100 SYRINGE at 10:50

## 2025-06-18 RX ADMIN — Medication 10 ML: at 10:50

## 2025-06-18 RX ADMIN — DIPHENHYDRAMINE HYDROCHLORIDE 50 MG: 50 INJECTION, SOLUTION INTRAMUSCULAR; INTRAVENOUS at 08:30

## 2025-06-18 RX ADMIN — OLANZAPINE 5 MG: 5 TABLET, FILM COATED ORAL at 08:30

## 2025-06-18 RX ADMIN — DEXAMETHASONE SODIUM PHOSPHATE 20 MG: 10 INJECTION, SOLUTION INTRAMUSCULAR; INTRAVENOUS at 08:33

## 2025-06-18 RX ADMIN — FAMOTIDINE 20 MG: 10 INJECTION INTRAVENOUS at 08:32

## 2025-06-18 RX ADMIN — SODIUM CHLORIDE 20 ML/HR: 9 INJECTION, SOLUTION INTRAVENOUS at 08:25

## 2025-06-19 DIAGNOSIS — R20.2 PARESTHESIAS: Primary | ICD-10-CM

## 2025-06-20 ENCOUNTER — TELEPHONE (OUTPATIENT)
Dept: OBGYN CLINIC | Age: 45
End: 2025-06-20

## 2025-06-27 DIAGNOSIS — G62.9 NEUROPATHY: ICD-10-CM

## 2025-06-27 DIAGNOSIS — G62.9 POLYNEUROPATHY: ICD-10-CM

## 2025-06-30 RX ORDER — GABAPENTIN 100 MG/1
100 CAPSULE ORAL 3 TIMES DAILY
Qty: 90 CAPSULE | Refills: 0 | Status: SHIPPED | OUTPATIENT
Start: 2025-06-30

## 2025-06-30 RX ORDER — GABAPENTIN 300 MG/1
300 CAPSULE ORAL 3 TIMES DAILY
Qty: 30 CAPSULE | Refills: 1 | OUTPATIENT
Start: 2025-06-30

## 2025-07-02 DIAGNOSIS — C50.912 DUCTAL CARCINOMA OF LEFT BREAST: Primary | ICD-10-CM

## 2025-07-02 DIAGNOSIS — C50.912 INVASIVE DUCTAL CARCINOMA OF BREAST, LEFT: ICD-10-CM

## 2025-07-02 RX ORDER — DIPHENHYDRAMINE HYDROCHLORIDE 50 MG/ML
50 INJECTION, SOLUTION INTRAMUSCULAR; INTRAVENOUS ONCE
Start: 2025-07-09 | End: 2025-07-09

## 2025-07-02 RX ORDER — FAMOTIDINE 10 MG/ML
20 INJECTION, SOLUTION INTRAVENOUS ONCE
Start: 2025-07-09 | End: 2025-07-09

## 2025-07-02 RX ORDER — FAMOTIDINE 10 MG/ML
20 INJECTION, SOLUTION INTRAVENOUS AS NEEDED
OUTPATIENT
Start: 2025-07-09

## 2025-07-02 RX ORDER — HYDROCORTISONE SODIUM SUCCINATE 100 MG/2ML
100 INJECTION INTRAMUSCULAR; INTRAVENOUS AS NEEDED
OUTPATIENT
Start: 2025-07-09

## 2025-07-02 RX ORDER — SODIUM CHLORIDE 9 MG/ML
20 INJECTION, SOLUTION INTRAVENOUS ONCE
OUTPATIENT
Start: 2025-07-09

## 2025-07-02 RX ORDER — DIPHENHYDRAMINE HYDROCHLORIDE 50 MG/ML
50 INJECTION, SOLUTION INTRAMUSCULAR; INTRAVENOUS AS NEEDED
OUTPATIENT
Start: 2025-07-09

## 2025-07-02 RX ORDER — OLANZAPINE 5 MG/1
5 TABLET, FILM COATED ORAL ONCE
Start: 2025-07-09 | End: 2025-07-09

## 2025-07-07 DIAGNOSIS — F90.2 ATTENTION DEFICIT HYPERACTIVITY DISORDER (ADHD), COMBINED TYPE: ICD-10-CM

## 2025-07-08 RX ORDER — LISDEXAMFETAMINE DIMESYLATE 60 MG/1
60 CAPSULE ORAL EVERY MORNING
Qty: 30 CAPSULE | Refills: 0 | Status: SHIPPED | OUTPATIENT
Start: 2025-07-08

## 2025-07-08 RX ORDER — DEXTROAMPHETAMINE SACCHARATE, AMPHETAMINE ASPARTATE, DEXTROAMPHETAMINE SULFATE AND AMPHETAMINE SULFATE 2.5; 2.5; 2.5; 2.5 MG/1; MG/1; MG/1; MG/1
1 TABLET ORAL DAILY
Qty: 30 TABLET | Refills: 0 | Status: SHIPPED | OUTPATIENT
Start: 2025-07-08

## 2025-07-08 NOTE — TELEPHONE ENCOUNTER
Pt last seen 4/25/25. Pt was ok'd for 3 months.  Refill: 3rd  Date of last script: 5/23/25  UDS 5/9/24  F/u apt: 7/17/25  Routing to Dr. Chavez to review

## 2025-07-09 ENCOUNTER — LAB (OUTPATIENT)
Dept: LAB | Facility: HOSPITAL | Age: 45
End: 2025-07-09
Payer: COMMERCIAL

## 2025-07-09 ENCOUNTER — INFUSION (OUTPATIENT)
Dept: ONCOLOGY | Facility: HOSPITAL | Age: 45
End: 2025-07-09
Payer: COMMERCIAL

## 2025-07-09 VITALS
OXYGEN SATURATION: 100 % | TEMPERATURE: 97.3 F | DIASTOLIC BLOOD PRESSURE: 60 MMHG | BODY MASS INDEX: 25.79 KG/M2 | SYSTOLIC BLOOD PRESSURE: 107 MMHG | HEART RATE: 62 BPM | HEIGHT: 66 IN | RESPIRATION RATE: 18 BRPM | WEIGHT: 160.5 LBS

## 2025-07-09 DIAGNOSIS — C50.912 DUCTAL CARCINOMA OF LEFT BREAST: Primary | ICD-10-CM

## 2025-07-09 DIAGNOSIS — C50.912 INVASIVE DUCTAL CARCINOMA OF BREAST, LEFT: ICD-10-CM

## 2025-07-09 DIAGNOSIS — C50.912 DUCTAL CARCINOMA OF LEFT BREAST: ICD-10-CM

## 2025-07-09 LAB
ALBUMIN SERPL-MCNC: 4.3 G/DL (ref 3.5–5.2)
ALBUMIN/GLOB SERPL: 1.7 G/DL
ALP SERPL-CCNC: 114 U/L (ref 39–117)
ALT SERPL W P-5'-P-CCNC: 11 U/L (ref 1–33)
ANION GAP SERPL CALCULATED.3IONS-SCNC: 12 MMOL/L (ref 5–15)
ANISOCYTOSIS BLD QL: ABNORMAL
AST SERPL-CCNC: 19 U/L (ref 1–32)
BASOPHILS # BLD MANUAL: 0.07 10*3/MM3 (ref 0–0.2)
BASOPHILS NFR BLD MANUAL: 1 % (ref 0–1.5)
BILIRUB SERPL-MCNC: 0.8 MG/DL (ref 0–1.2)
BUN SERPL-MCNC: 14.3 MG/DL (ref 6–20)
BUN/CREAT SERPL: 13.4 (ref 7–25)
CALCIUM SPEC-SCNC: 9.8 MG/DL (ref 8.6–10.5)
CHLORIDE SERPL-SCNC: 102 MMOL/L (ref 98–107)
CO2 SERPL-SCNC: 28 MMOL/L (ref 22–29)
CREAT SERPL-MCNC: 1.07 MG/DL (ref 0.57–1)
DEPRECATED RDW RBC AUTO: 41.2 FL (ref 37–54)
EGFRCR SERPLBLD CKD-EPI 2021: 65.4 ML/MIN/1.73
EOSINOPHIL # BLD MANUAL: 0.62 10*3/MM3 (ref 0–0.4)
EOSINOPHIL NFR BLD MANUAL: 9.1 % (ref 0.3–6.2)
ERYTHROCYTE [DISTWIDTH] IN BLOOD BY AUTOMATED COUNT: 13.4 % (ref 12.3–15.4)
GLOBULIN UR ELPH-MCNC: 2.6 GM/DL
GLUCOSE SERPL-MCNC: 101 MG/DL (ref 65–99)
HCT VFR BLD AUTO: 36 % (ref 34–46.6)
HGB BLD-MCNC: 11.4 G/DL (ref 12–15.9)
LYMPHOCYTES # BLD MANUAL: 2.07 10*3/MM3 (ref 0.7–3.1)
LYMPHOCYTES NFR BLD MANUAL: 8.1 % (ref 5–12)
MAGNESIUM SERPL-MCNC: 1.9 MG/DL (ref 1.6–2.6)
MCH RBC QN AUTO: 26.5 PG (ref 26.6–33)
MCHC RBC AUTO-ENTMCNC: 31.7 G/DL (ref 31.5–35.7)
MCV RBC AUTO: 83.7 FL (ref 79–97)
MONOCYTES # BLD: 0.55 10*3/MM3 (ref 0.1–0.9)
NEUTROPHILS # BLD AUTO: 3.52 10*3/MM3 (ref 1.7–7)
NEUTROPHILS NFR BLD MANUAL: 50.5 % (ref 42.7–76)
NEUTS BAND NFR BLD MANUAL: 1 % (ref 0–5)
PLAT MORPH BLD: NORMAL
PLATELET # BLD AUTO: 208 10*3/MM3 (ref 140–450)
PMV BLD AUTO: 11.6 FL (ref 6–12)
POLYCHROMASIA BLD QL SMEAR: ABNORMAL
POTASSIUM SERPL-SCNC: 4.6 MMOL/L (ref 3.5–5.2)
PROT SERPL-MCNC: 6.9 G/DL (ref 6–8.5)
RBC # BLD AUTO: 4.3 10*6/MM3 (ref 3.77–5.28)
SODIUM SERPL-SCNC: 142 MMOL/L (ref 136–145)
VARIANT LYMPHS NFR BLD MANUAL: 2 % (ref 0–5)
VARIANT LYMPHS NFR BLD MANUAL: 28.3 % (ref 19.6–45.3)
WBC MORPH BLD: NORMAL
WBC NRBC COR # BLD AUTO: 6.84 10*3/MM3 (ref 3.4–10.8)

## 2025-07-09 PROCEDURE — 25010000002 FAMOTIDINE 10 MG/ML SOLUTION: Performed by: INTERNAL MEDICINE

## 2025-07-09 PROCEDURE — 85007 BL SMEAR W/DIFF WBC COUNT: CPT

## 2025-07-09 PROCEDURE — 36415 COLL VENOUS BLD VENIPUNCTURE: CPT

## 2025-07-09 PROCEDURE — 25010000002 HEPARIN LOCK FLUSH PER 10 UNITS: Performed by: INTERNAL MEDICINE

## 2025-07-09 PROCEDURE — 25010000002 DIPHENHYDRAMINE PER 50 MG: Performed by: INTERNAL MEDICINE

## 2025-07-09 PROCEDURE — 96375 TX/PRO/DX INJ NEW DRUG ADDON: CPT

## 2025-07-09 PROCEDURE — 25810000003 SODIUM CHLORIDE 0.9 % SOLUTION 250 ML FLEX CONT: Performed by: INTERNAL MEDICINE

## 2025-07-09 PROCEDURE — 85025 COMPLETE CBC W/AUTO DIFF WBC: CPT

## 2025-07-09 PROCEDURE — 83735 ASSAY OF MAGNESIUM: CPT

## 2025-07-09 PROCEDURE — 25010000002 DEXAMETHASONE SODIUM PHOSPHATE 100 MG/10ML SOLUTION: Performed by: INTERNAL MEDICINE

## 2025-07-09 PROCEDURE — 80053 COMPREHEN METABOLIC PANEL: CPT

## 2025-07-09 PROCEDURE — 96367 TX/PROPH/DG ADDL SEQ IV INF: CPT

## 2025-07-09 PROCEDURE — 25810000003 SODIUM CHLORIDE 0.9 % SOLUTION: Performed by: INTERNAL MEDICINE

## 2025-07-09 PROCEDURE — 96413 CHEMO IV INFUSION 1 HR: CPT

## 2025-07-09 PROCEDURE — 25010000002 TRASTUZUMAB-DKST 150 MG RECONSTITUTED SOLUTION 1 EACH VIAL: Performed by: INTERNAL MEDICINE

## 2025-07-09 RX ORDER — FAMOTIDINE 10 MG/ML
20 INJECTION, SOLUTION INTRAVENOUS AS NEEDED
Status: DISCONTINUED | OUTPATIENT
Start: 2025-07-09 | End: 2025-07-09 | Stop reason: HOSPADM

## 2025-07-09 RX ORDER — OLANZAPINE 5 MG/1
5 TABLET, FILM COATED ORAL ONCE
Status: COMPLETED | OUTPATIENT
Start: 2025-07-09 | End: 2025-07-09

## 2025-07-09 RX ORDER — SODIUM CHLORIDE 0.9 % (FLUSH) 0.9 %
10 SYRINGE (ML) INJECTION AS NEEDED
OUTPATIENT
Start: 2025-07-09

## 2025-07-09 RX ORDER — DIPHENHYDRAMINE HYDROCHLORIDE 50 MG/ML
50 INJECTION, SOLUTION INTRAMUSCULAR; INTRAVENOUS ONCE
Status: COMPLETED | OUTPATIENT
Start: 2025-07-09 | End: 2025-07-09

## 2025-07-09 RX ORDER — HEPARIN SODIUM (PORCINE) LOCK FLUSH IV SOLN 100 UNIT/ML 100 UNIT/ML
500 SOLUTION INTRAVENOUS AS NEEDED
OUTPATIENT
Start: 2025-07-09

## 2025-07-09 RX ORDER — SODIUM CHLORIDE 0.9 % (FLUSH) 0.9 %
10 SYRINGE (ML) INJECTION AS NEEDED
Status: DISCONTINUED | OUTPATIENT
Start: 2025-07-09 | End: 2025-07-09 | Stop reason: HOSPADM

## 2025-07-09 RX ORDER — FAMOTIDINE 10 MG/ML
20 INJECTION, SOLUTION INTRAVENOUS ONCE
Status: COMPLETED | OUTPATIENT
Start: 2025-07-09 | End: 2025-07-09

## 2025-07-09 RX ORDER — DIPHENHYDRAMINE HYDROCHLORIDE 50 MG/ML
50 INJECTION, SOLUTION INTRAMUSCULAR; INTRAVENOUS AS NEEDED
Status: DISCONTINUED | OUTPATIENT
Start: 2025-07-09 | End: 2025-07-09 | Stop reason: HOSPADM

## 2025-07-09 RX ORDER — HYDROCORTISONE SODIUM SUCCINATE 100 MG/2ML
100 INJECTION INTRAMUSCULAR; INTRAVENOUS AS NEEDED
Status: DISCONTINUED | OUTPATIENT
Start: 2025-07-09 | End: 2025-07-09 | Stop reason: HOSPADM

## 2025-07-09 RX ORDER — SODIUM CHLORIDE 9 MG/ML
20 INJECTION, SOLUTION INTRAVENOUS ONCE
Status: COMPLETED | OUTPATIENT
Start: 2025-07-09 | End: 2025-07-09

## 2025-07-09 RX ORDER — HEPARIN SODIUM (PORCINE) LOCK FLUSH IV SOLN 100 UNIT/ML 100 UNIT/ML
500 SOLUTION INTRAVENOUS AS NEEDED
Status: DISCONTINUED | OUTPATIENT
Start: 2025-07-09 | End: 2025-07-09 | Stop reason: HOSPADM

## 2025-07-09 RX ADMIN — DEXAMETHASONE SODIUM PHOSPHATE 20 MG: 10 INJECTION, SOLUTION INTRAMUSCULAR; INTRAVENOUS at 08:53

## 2025-07-09 RX ADMIN — SODIUM CHLORIDE 20 ML/HR: 9 INJECTION, SOLUTION INTRAVENOUS at 08:55

## 2025-07-09 RX ADMIN — DIPHENHYDRAMINE HYDROCHLORIDE 50 MG: 50 INJECTION, SOLUTION INTRAMUSCULAR; INTRAVENOUS at 08:50

## 2025-07-09 RX ADMIN — TRASTUZUMAB-DKST 440 MG: 150 INJECTION, POWDER, LYOPHILIZED, FOR SOLUTION INTRAVENOUS at 09:12

## 2025-07-09 RX ADMIN — HEPARIN 500 UNITS: 100 SYRINGE at 10:14

## 2025-07-09 RX ADMIN — FAMOTIDINE 20 MG: 10 INJECTION INTRAVENOUS at 08:48

## 2025-07-09 RX ADMIN — Medication 10 ML: at 10:14

## 2025-07-09 RX ADMIN — OLANZAPINE 5 MG: 5 TABLET, FILM COATED ORAL at 08:47

## 2025-07-14 DIAGNOSIS — C50.912 DUCTAL CARCINOMA OF LEFT BREAST: Primary | ICD-10-CM

## 2025-07-14 DIAGNOSIS — C50.912 INVASIVE DUCTAL CARCINOMA OF BREAST, LEFT: ICD-10-CM

## 2025-07-14 RX ORDER — FAMOTIDINE 10 MG/ML
20 INJECTION, SOLUTION INTRAVENOUS AS NEEDED
OUTPATIENT
Start: 2025-08-06

## 2025-07-14 RX ORDER — FAMOTIDINE 10 MG/ML
20 INJECTION, SOLUTION INTRAVENOUS ONCE
Start: 2025-08-06 | End: 2025-07-30

## 2025-07-14 RX ORDER — HYDROCORTISONE SODIUM SUCCINATE 100 MG/2ML
100 INJECTION INTRAMUSCULAR; INTRAVENOUS AS NEEDED
OUTPATIENT
Start: 2025-08-06

## 2025-07-14 RX ORDER — DIPHENHYDRAMINE HYDROCHLORIDE 50 MG/ML
50 INJECTION, SOLUTION INTRAMUSCULAR; INTRAVENOUS AS NEEDED
OUTPATIENT
Start: 2025-08-06

## 2025-07-14 RX ORDER — OLANZAPINE 5 MG/1
5 TABLET, FILM COATED ORAL ONCE
Start: 2025-08-06 | End: 2025-07-30

## 2025-07-14 RX ORDER — DIPHENHYDRAMINE HYDROCHLORIDE 50 MG/ML
50 INJECTION, SOLUTION INTRAMUSCULAR; INTRAVENOUS ONCE
Start: 2025-08-06 | End: 2025-07-30

## 2025-07-14 RX ORDER — SODIUM CHLORIDE 9 MG/ML
20 INJECTION, SOLUTION INTRAVENOUS ONCE
OUTPATIENT
Start: 2025-08-06

## 2025-07-15 DIAGNOSIS — G62.9 NEUROPATHY: ICD-10-CM

## 2025-07-15 RX ORDER — GABAPENTIN 100 MG/1
100 CAPSULE ORAL 3 TIMES DAILY
Qty: 90 CAPSULE | Refills: 0 | Status: SHIPPED | OUTPATIENT
Start: 2025-07-15 | End: 2025-07-16 | Stop reason: DRUGHIGH

## 2025-07-15 NOTE — TELEPHONE ENCOUNTER
"    Caller: Uyen José \"Leana\"    Relationship: Self    Best call back number: [phone]    Requested Prescriptions:   Requested Prescriptions     Pending Prescriptions Disp Refills    gabapentin (NEURONTIN) 100 MG capsule 90 capsule 0     Sig: Take 1 capsule by mouth 3 (Three) Times a Day.      Pharmacy where request should be sent: [facility] [place], [place] - [address] [phone] [facility] [phone]      Last office visit with prescribing clinician: 6/9/2025   Last telemedicine visit with prescribing clinician: Visit date not found   Next office visit with prescribing clinician: 8/6/2025     Additional details provided by patient: PT REQUESTING NEW PRESCRIPTION FOR 300MG 3X A DAY     Does the patient have less than a 3 day supply:  [x] Yes  [] No    Would you like a call back once the refill request has been completed: [] Yes [x] No    If the office needs to give you a call back, can they leave a voicemail: [] Yes [x] No      "

## 2025-07-16 DIAGNOSIS — C50.912 INVASIVE DUCTAL CARCINOMA OF BREAST, LEFT: Primary | ICD-10-CM

## 2025-07-16 RX ORDER — GABAPENTIN 300 MG/1
300 CAPSULE ORAL 3 TIMES DAILY
Qty: 90 CAPSULE | Refills: 0 | Status: SHIPPED | OUTPATIENT
Start: 2025-07-16 | End: 2025-08-15

## 2025-07-17 ENCOUNTER — TELEPHONE (OUTPATIENT)
Dept: ONCOLOGY | Facility: CLINIC | Age: 45
End: 2025-07-17
Payer: COMMERCIAL

## 2025-07-17 ENCOUNTER — OFFICE VISIT (OUTPATIENT)
Dept: FAMILY MEDICINE CLINIC | Facility: CLINIC | Age: 45
End: 2025-07-17
Payer: COMMERCIAL

## 2025-07-17 VITALS
RESPIRATION RATE: 20 BRPM | HEART RATE: 72 BPM | BODY MASS INDEX: 26.2 KG/M2 | TEMPERATURE: 98.6 F | DIASTOLIC BLOOD PRESSURE: 73 MMHG | HEIGHT: 66 IN | SYSTOLIC BLOOD PRESSURE: 111 MMHG | OXYGEN SATURATION: 98 % | WEIGHT: 163 LBS

## 2025-07-17 DIAGNOSIS — F90.2 ATTENTION DEFICIT HYPERACTIVITY DISORDER (ADHD), COMBINED TYPE: Primary | ICD-10-CM

## 2025-07-17 DIAGNOSIS — K21.9 GASTROESOPHAGEAL REFLUX DISEASE WITHOUT ESOPHAGITIS: ICD-10-CM

## 2025-07-17 DIAGNOSIS — E03.9 HYPOTHYROIDISM, UNSPECIFIED TYPE: ICD-10-CM

## 2025-07-17 DIAGNOSIS — I10 ESSENTIAL HYPERTENSION: ICD-10-CM

## 2025-07-17 DIAGNOSIS — C50.912 INVASIVE DUCTAL CARCINOMA OF BREAST, LEFT: ICD-10-CM

## 2025-07-17 DIAGNOSIS — M05.9 RHEUMATOID ARTHRITIS WITH POSITIVE RHEUMATOID FACTOR, INVOLVING UNSPECIFIED SITE: ICD-10-CM

## 2025-07-17 PROCEDURE — 99214 OFFICE O/P EST MOD 30 MIN: CPT | Performed by: NURSE PRACTITIONER

## 2025-07-17 RX ORDER — DULOXETIN HYDROCHLORIDE 30 MG/1
30 CAPSULE, DELAYED RELEASE ORAL DAILY
Qty: 90 CAPSULE | Refills: 1 | Status: SHIPPED | OUTPATIENT
Start: 2025-07-17

## 2025-07-17 RX ORDER — IBUPROFEN 800 MG/1
800 TABLET, FILM COATED ORAL EVERY 6 HOURS PRN
Qty: 90 TABLET | Refills: 2 | Status: SHIPPED | OUTPATIENT
Start: 2025-07-17

## 2025-07-17 RX ORDER — PANTOPRAZOLE SODIUM 40 MG/1
40 TABLET, DELAYED RELEASE ORAL DAILY
Qty: 90 TABLET | Refills: 1 | Status: SHIPPED | OUTPATIENT
Start: 2025-07-17

## 2025-07-17 RX ORDER — LEVOTHYROXINE SODIUM 112 UG/1
112 TABLET ORAL DAILY
Qty: 90 TABLET | Refills: 1 | Status: SHIPPED | OUTPATIENT
Start: 2025-07-17

## 2025-07-17 RX ORDER — BISOPROLOL FUMARATE AND HYDROCHLOROTHIAZIDE 5; 6.25 MG/1; MG/1
1 TABLET ORAL DAILY
Qty: 30 TABLET | Refills: 1 | Status: SHIPPED | OUTPATIENT
Start: 2025-07-17

## 2025-07-17 NOTE — PROGRESS NOTES
"Chief Complaint  ADHD    Subjective    History of Present Illness      Patient presents to [facility] for   History of Present Illness  Patient presents to clinic for 3 month med check and refills for ADHD.        History of Present Illness      Review of Systems    I have reviewed and agree with the HPI information as above.  Josi Ivory, APRN     Objective   Vital Signs:   /73 (BP Location: Right arm, Patient Position: Sitting, Cuff Size: Adult)   Pulse 72   Temp 98.6 °F (37 °C) (Temporal)   Resp 20   Ht 167.6 cm (66\")   Wt 73.9 kg (163 lb)   SpO2 98%   BMI 26.31 kg/m²     BMI is >= 25 and <30. (Overweight) The following options were offered after discussion;: none (medical contraindication)      Physical Exam  Vitals and nursing note reviewed.   Constitutional:       Appearance: Normal appearance. She is well-developed.   HENT:      Head: Normocephalic and atraumatic.      Right Ear: Tympanic membrane, ear canal and external ear normal.      Left Ear: Tympanic membrane, ear canal and external ear normal.      Nose: Nose normal. No septal deviation, nasal tenderness or congestion.      Mouth/Throat:      Lips: Pink. No lesions.      Mouth: Mucous membranes are moist. No oral lesions.      Dentition: Normal dentition.      Pharynx: Oropharynx is clear. No pharyngeal swelling, oropharyngeal exudate or posterior oropharyngeal erythema.   Eyes:      General: Lids are normal. Vision grossly intact. No scleral icterus.        Right eye: No discharge.         Left eye: No discharge.      Extraocular Movements: Extraocular movements intact.      Conjunctiva/sclera: Conjunctivae normal.      Right eye: Right conjunctiva is not injected.      Left eye: Left conjunctiva is not injected.      Pupils: Pupils are equal, round, and reactive to light.   Neck:      Thyroid: No thyroid mass.      Trachea: Trachea normal.   Cardiovascular:      Rate and Rhythm: Normal rate and " regular rhythm.      Heart sounds: Normal heart sounds. No murmur heard.     No gallop.   Pulmonary:      Effort: Pulmonary effort is normal.      Breath sounds: Normal breath sounds and air entry. No wheezing, rhonchi or rales.   Musculoskeletal:         General: No tenderness or deformity. Normal range of motion.      Cervical back: Full passive range of motion without pain, normal range of motion and neck supple.      Thoracic back: Normal.      Right lower leg: No edema.      Left lower leg: No edema.   Skin:     General: Skin is warm and dry.      Coloration: Skin is not jaundiced.      Findings: No rash.   Neurological:      Mental Status: She is alert and oriented to person, place, and time.      Sensory: Sensation is intact.      Motor: Motor function is intact.      Coordination: Coordination is intact.      Gait: Gait is intact.      Deep Tendon Reflexes: Reflexes are normal and symmetric.   Psychiatric:         Mood and Affect: Mood and affect normal.         Behavior: Behavior normal.         Judgment: Judgment normal.                    Result Review  Data Reviewed:                   Assessment and Plan      Diagnoses and all orders for this visit:    1. Attention deficit hyperactivity disorder (ADHD), combined type (Primary)  Comments:  Vyvanse no longer working. switch to Adderall to see if it has a better effect on her  UDS today  Orders:  -     Urine Drug Screen - Urine, Clean Catch; Future    2. Essential hypertension  Comments:  Controlled. cont iac 5/6.25 mg  Orders:  -     bisoprolol-hydrochlorothiazide (ZIAC) 5-6.25 MG per tablet; Take 1 tablet by mouth Daily.  Dispense: 30 tablet; Refill: 1    3. Rheumatoid arthritis with positive rheumatoid factor, involving unspecified site  Comments:  Taking Plaquenil  sees Rheum  Orders:  -     ibuprofen (ADVIL,MOTRIN) 800 MG tablet; Take 1 tablet by mouth Every 6 (Six) Hours As Needed for Mild Pain.  Dispense: 90 tablet; Refill: 2    4. Hypothyroidism,  unspecified type  Comments:  Last TSH normal  cont synthroid 112mcg  Orders:  -     levothyroxine (SYNTHROID, LEVOTHROID) 112 MCG tablet; Take 1 tablet by mouth Daily.  Dispense: 90 tablet; Refill: 1    5. Gastroesophageal reflux disease without esophagitis  -     pantoprazole (PROTONIX) 40 MG EC tablet; Take 1 tablet by mouth Daily.  Dispense: 90 tablet; Refill: 1    6. Invasive ductal carcinoma of breast, left  Comments:  following with [facility]  just got expanders done in [place].    Other orders  -     DULoxetine (Cymbalta) 30 MG capsule; Take 1 capsule by mouth Daily.  Dispense: 90 capsule; Refill: 1      ADHD Follow up:    PDMP reviewed and is clean. ADRS (Adult ADHD Assessment Form) reviewed in detail, scanned in chart, and has been reviewed at time of appointment.  All questions, including medication and side effects, were discussed in detail at time of patient's visit. Patient will continue same medication which was discussed at today's visit. Patient is to return in 3 month(s).    Last Urine Toxicity  More data exists         Latest Ref Rng & Units 5/9/2024 7/12/2023   LAST URINE TOXICITY RESULTS   Amphetamine, Urine Qual Negative Positive  Positive    Barbiturates Screen, Urine Negative Negative  Negative    Benzodiazepine Screen, Urine Negative Negative  Negative    Buprenorphine, Screen, Urine Negative Negative  Negative    Cocaine Screen, Urine Negative Negative  Negative    Fentanyl, Urine Negative Negative  Negative    Methadone Screen , Urine Negative Negative  Negative    Methamphetamine, Ur Negative Negative  Negative         Urine Drug Screen Results: UDS RESULTS: Updated results needed    CSA completed on: 8/5/24  Assessment & Plan          Follow Up   Return in about 3 months (around 10/17/2025) for ADHD follow up.  Patient was given instructions and counseling regarding her condition or for health maintenance advice. Please see specific information pulled into the AVS if appropriate.

## 2025-07-17 NOTE — TELEPHONE ENCOUNTER
"Incoming patient message through [employer]: \"I need to get my infusion on 7.30 changed to first week in August please. I can only do them 1 time per month for my Aflac\"    I called to clarify with patient Leana. She reports that she needs her trastuzumab scheduled one per calendar month or [employer], her supplemental Cancer Insurance, will not pay her the $1700 per treatment. Currently, it is scheduled every 21 days per standard of care. I explained to Leana that I would have to talk this over with Dr. Weiss as this is ordered every 21 days to prevent cancer from returning. She states that she did this same this with her Chemotherapy treatments and feels like she should be able to do this now.    Notified Leana that I would discuss this with Dr. Weiss and I would get back with her regarding this. No further c/o were voiced at this time.  "

## 2025-07-17 NOTE — TELEPHONE ENCOUNTER
"Incoming patient message through [facility] [employer]: \"I need to get my infusion on 7.30 changed to first week in August please. I can only do them 1 time per month for my Aflac\"     I called to clarify with patient Uyen. She reports that she needs her trastuzumab scheduled one per calendar month or [employer], her supplemental Cancer Insurance, will not pay her per treatment. Currently, it is scheduled every 21 days per standard of care. I explained to Uyen that I would have to talk this over with Dr. Weiss as this is ordered every 21 days to prevent cancer from returning. She states that she did this same this with her Chemotherapy treatments and feels like she should be able to do this now. She is requesting that her treatment, trastuzumab, be changed to once per calendar month so that she may receive payment from her Cancer Insurance [employer]. As she reassures me that she will not go without this payment. I verbalized understanding notified Uyen that I would discuss this with Dr. Weiss and I would get back with her regarding this. No further c/o were voiced at this time.    After further discussion with Dr. Weiss he wants to reiterate that scheduling her treatment this way is not the standard of care and does put her at increased risk for relapse of her cancer. I voiced this information to Uyen and she replied that she does not understand what a difference one week would make. I verbalized to her that still it is not the standard of care and does increase her risk of relapse. She verbalized understanding. No further c/o were voiced at this time.  "

## 2025-07-18 RX ORDER — DEXTROAMPHETAMINE SACCHARATE, AMPHETAMINE ASPARTATE, DEXTROAMPHETAMINE SULFATE AND AMPHETAMINE SULFATE 2.5; 2.5; 2.5; 2.5 MG/1; MG/1; MG/1; MG/1
10 TABLET ORAL DAILY
Qty: 30 TABLET | Refills: 0 | Status: SHIPPED | OUTPATIENT
Start: 2025-09-06 | End: 2025-10-06

## 2025-07-18 RX ORDER — DEXTROAMPHETAMINE SACCHARATE, AMPHETAMINE ASPARTATE, DEXTROAMPHETAMINE SULFATE AND AMPHETAMINE SULFATE 2.5; 2.5; 2.5; 2.5 MG/1; MG/1; MG/1; MG/1
10 TABLET ORAL DAILY
Qty: 30 TABLET | Refills: 0 | Status: SHIPPED | OUTPATIENT
Start: 2025-08-08 | End: 2025-09-07

## 2025-07-18 RX ORDER — DEXTROAMPHETAMINE SACCHARATE, AMPHETAMINE ASPARTATE MONOHYDRATE, DEXTROAMPHETAMINE SULFATE AND AMPHETAMINE SULFATE 7.5; 7.5; 7.5; 7.5 MG/1; MG/1; MG/1; MG/1
30 CAPSULE, EXTENDED RELEASE ORAL EVERY MORNING
Qty: 30 CAPSULE | Refills: 0 | Status: SHIPPED | OUTPATIENT
Start: 2025-08-07 | End: 2025-09-06

## 2025-07-18 RX ORDER — DEXTROAMPHETAMINE SACCHARATE, AMPHETAMINE ASPARTATE MONOHYDRATE, DEXTROAMPHETAMINE SULFATE AND AMPHETAMINE SULFATE 7.5; 7.5; 7.5; 7.5 MG/1; MG/1; MG/1; MG/1
30 CAPSULE, EXTENDED RELEASE ORAL EVERY MORNING
Qty: 30 CAPSULE | Refills: 0 | Status: SHIPPED | OUTPATIENT
Start: 2025-09-06 | End: 2025-10-06

## 2025-07-29 ENCOUNTER — APPOINTMENT (OUTPATIENT)
Dept: CT IMAGING | Facility: HOSPITAL | Age: 45
End: 2025-07-29
Payer: COMMERCIAL

## 2025-07-29 ENCOUNTER — HOSPITAL ENCOUNTER (EMERGENCY)
Facility: HOSPITAL | Age: 45
Discharge: ANOTHER HOSPITAL | End: 2025-07-29
Admitting: EMERGENCY MEDICINE
Payer: COMMERCIAL

## 2025-07-29 VITALS
RESPIRATION RATE: 16 BRPM | DIASTOLIC BLOOD PRESSURE: 78 MMHG | TEMPERATURE: 99.2 F | WEIGHT: 161 LBS | OXYGEN SATURATION: 99 % | HEART RATE: 98 BPM | SYSTOLIC BLOOD PRESSURE: 116 MMHG | HEIGHT: 66 IN | BODY MASS INDEX: 25.88 KG/M2

## 2025-07-29 DIAGNOSIS — N61.0 CELLULITIS OF RIGHT BREAST: Primary | ICD-10-CM

## 2025-07-29 LAB
ALBUMIN SERPL-MCNC: 3.8 G/DL (ref 3.5–5.2)
ALBUMIN/GLOB SERPL: 1.2 G/DL
ALP SERPL-CCNC: 97 U/L (ref 39–117)
ALT SERPL W P-5'-P-CCNC: 11 U/L (ref 1–33)
ANION GAP SERPL CALCULATED.3IONS-SCNC: 14 MMOL/L (ref 5–15)
AST SERPL-CCNC: 18 U/L (ref 1–32)
BACTERIA UR QL AUTO: ABNORMAL /HPF
BASOPHILS # BLD MANUAL: 0 10*3/MM3 (ref 0–0.2)
BASOPHILS NFR BLD MANUAL: 0 % (ref 0–1.5)
BILIRUB SERPL-MCNC: 1.4 MG/DL (ref 0–1.2)
BILIRUB UR QL STRIP: NEGATIVE
BUN SERPL-MCNC: 13.2 MG/DL (ref 6–20)
BUN/CREAT SERPL: 11.8 (ref 7–25)
CALCIUM SPEC-SCNC: 9 MG/DL (ref 8.6–10.5)
CHLORIDE SERPL-SCNC: 92 MMOL/L (ref 98–107)
CLARITY UR: CLEAR
CO2 SERPL-SCNC: 24 MMOL/L (ref 22–29)
COLOR UR: YELLOW
CREAT SERPL-MCNC: 1.12 MG/DL (ref 0.57–1)
CRP SERPL-MCNC: 42.4 MG/DL (ref 0–0.5)
D-LACTATE SERPL-SCNC: 1.7 MMOL/L (ref 0.5–2)
DEPRECATED RDW RBC AUTO: 43.1 FL (ref 37–54)
EGFRCR SERPLBLD CKD-EPI 2021: 61.9 ML/MIN/1.73
ELLIPTOCYTES BLD QL SMEAR: ABNORMAL
EOSINOPHIL # BLD MANUAL: 0 10*3/MM3 (ref 0–0.4)
EOSINOPHIL NFR BLD MANUAL: 0 % (ref 0.3–6.2)
ERYTHROCYTE [DISTWIDTH] IN BLOOD BY AUTOMATED COUNT: 14.6 % (ref 12.3–15.4)
GLOBULIN UR ELPH-MCNC: 3.2 GM/DL
GLUCOSE SERPL-MCNC: 90 MG/DL (ref 65–99)
GLUCOSE UR STRIP-MCNC: NEGATIVE MG/DL
HCT VFR BLD AUTO: 34.5 % (ref 34–46.6)
HGB BLD-MCNC: 11.1 G/DL (ref 12–15.9)
HGB UR QL STRIP.AUTO: ABNORMAL
HYALINE CASTS UR QL AUTO: ABNORMAL /LPF
KETONES UR QL STRIP: NEGATIVE
LEUKOCYTE ESTERASE UR QL STRIP.AUTO: ABNORMAL
LIPASE SERPL-CCNC: 21 U/L (ref 13–60)
LYMPHOCYTES # BLD MANUAL: 0.16 10*3/MM3 (ref 0.7–3.1)
LYMPHOCYTES NFR BLD MANUAL: 3 % (ref 5–12)
MAGNESIUM SERPL-MCNC: 1.5 MG/DL (ref 1.6–2.6)
MCH RBC QN AUTO: 26.1 PG (ref 26.6–33)
MCHC RBC AUTO-ENTMCNC: 32.2 G/DL (ref 31.5–35.7)
MCV RBC AUTO: 81.2 FL (ref 79–97)
METAMYELOCYTES NFR BLD MANUAL: 1 % (ref 0–0)
MONOCYTES # BLD: 0.49 10*3/MM3 (ref 0.1–0.9)
NEUTROPHILS # BLD AUTO: 15.66 10*3/MM3 (ref 1.7–7)
NEUTROPHILS NFR BLD MANUAL: 72 % (ref 42.7–76)
NEUTS BAND NFR BLD MANUAL: 23 % (ref 0–5)
NITRITE UR QL STRIP: NEGATIVE
NRBC SPEC MANUAL: 2 /100 WBC (ref 0–0.2)
PH UR STRIP.AUTO: 7.5 [PH] (ref 5–8)
PLAT MORPH BLD: NORMAL
PLATELET # BLD AUTO: 165 10*3/MM3 (ref 140–450)
PMV BLD AUTO: 12.4 FL (ref 6–12)
POTASSIUM SERPL-SCNC: 3.3 MMOL/L (ref 3.5–5.2)
PROCALCITONIN SERPL-MCNC: 0.84 NG/ML (ref 0–0.25)
PROT SERPL-MCNC: 7 G/DL (ref 6–8.5)
PROT UR QL STRIP: NEGATIVE
RBC # BLD AUTO: 4.25 10*6/MM3 (ref 3.77–5.28)
RBC # UR STRIP: ABNORMAL /HPF
REF LAB TEST METHOD: ABNORMAL
SODIUM SERPL-SCNC: 130 MMOL/L (ref 136–145)
SP GR UR STRIP: 1.03 (ref 1–1.03)
SQUAMOUS #/AREA URNS HPF: ABNORMAL /HPF
UROBILINOGEN UR QL STRIP: ABNORMAL
VARIANT LYMPHS NFR BLD MANUAL: 1 % (ref 19.6–45.3)
WBC # UR STRIP: ABNORMAL /HPF
WBC MORPH BLD: NORMAL
WBC NRBC COR # BLD AUTO: 16.48 10*3/MM3 (ref 3.4–10.8)

## 2025-07-29 PROCEDURE — 25510000001 IOPAMIDOL 61 % SOLUTION: Performed by: STUDENT IN AN ORGANIZED HEALTH CARE EDUCATION/TRAINING PROGRAM

## 2025-07-29 PROCEDURE — 85025 COMPLETE CBC W/AUTO DIFF WBC: CPT | Performed by: STUDENT IN AN ORGANIZED HEALTH CARE EDUCATION/TRAINING PROGRAM

## 2025-07-29 PROCEDURE — 96367 TX/PROPH/DG ADDL SEQ IV INF: CPT

## 2025-07-29 PROCEDURE — 80053 COMPREHEN METABOLIC PANEL: CPT | Performed by: STUDENT IN AN ORGANIZED HEALTH CARE EDUCATION/TRAINING PROGRAM

## 2025-07-29 PROCEDURE — 83605 ASSAY OF LACTIC ACID: CPT | Performed by: STUDENT IN AN ORGANIZED HEALTH CARE EDUCATION/TRAINING PROGRAM

## 2025-07-29 PROCEDURE — 83690 ASSAY OF LIPASE: CPT | Performed by: STUDENT IN AN ORGANIZED HEALTH CARE EDUCATION/TRAINING PROGRAM

## 2025-07-29 PROCEDURE — 83735 ASSAY OF MAGNESIUM: CPT | Performed by: STUDENT IN AN ORGANIZED HEALTH CARE EDUCATION/TRAINING PROGRAM

## 2025-07-29 PROCEDURE — 84145 PROCALCITONIN (PCT): CPT | Performed by: STUDENT IN AN ORGANIZED HEALTH CARE EDUCATION/TRAINING PROGRAM

## 2025-07-29 PROCEDURE — 25010000002 ONDANSETRON PER 1 MG: Performed by: STUDENT IN AN ORGANIZED HEALTH CARE EDUCATION/TRAINING PROGRAM

## 2025-07-29 PROCEDURE — 86140 C-REACTIVE PROTEIN: CPT | Performed by: STUDENT IN AN ORGANIZED HEALTH CARE EDUCATION/TRAINING PROGRAM

## 2025-07-29 PROCEDURE — 25010000002 PROCHLORPERAZINE 10 MG/2ML SOLUTION: Performed by: STUDENT IN AN ORGANIZED HEALTH CARE EDUCATION/TRAINING PROGRAM

## 2025-07-29 PROCEDURE — 71260 CT THORAX DX C+: CPT

## 2025-07-29 PROCEDURE — 96375 TX/PRO/DX INJ NEW DRUG ADDON: CPT

## 2025-07-29 PROCEDURE — 25010000002 PIPERACILLIN SOD-TAZOBACTAM PER 1 G: Performed by: STUDENT IN AN ORGANIZED HEALTH CARE EDUCATION/TRAINING PROGRAM

## 2025-07-29 PROCEDURE — 74177 CT ABD & PELVIS W/CONTRAST: CPT

## 2025-07-29 PROCEDURE — 25810000003 SODIUM CHLORIDE 0.9 % SOLUTION: Performed by: STUDENT IN AN ORGANIZED HEALTH CARE EDUCATION/TRAINING PROGRAM

## 2025-07-29 PROCEDURE — 85007 BL SMEAR W/DIFF WBC COUNT: CPT | Performed by: STUDENT IN AN ORGANIZED HEALTH CARE EDUCATION/TRAINING PROGRAM

## 2025-07-29 PROCEDURE — 81001 URINALYSIS AUTO W/SCOPE: CPT | Performed by: STUDENT IN AN ORGANIZED HEALTH CARE EDUCATION/TRAINING PROGRAM

## 2025-07-29 PROCEDURE — 96365 THER/PROPH/DIAG IV INF INIT: CPT

## 2025-07-29 PROCEDURE — 99285 EMERGENCY DEPT VISIT HI MDM: CPT

## 2025-07-29 RX ORDER — PROCHLORPERAZINE EDISYLATE 5 MG/ML
10 INJECTION INTRAMUSCULAR; INTRAVENOUS ONCE
Status: COMPLETED | OUTPATIENT
Start: 2025-07-29 | End: 2025-07-29

## 2025-07-29 RX ORDER — IOPAMIDOL 612 MG/ML
100 INJECTION, SOLUTION INTRAVASCULAR
Status: COMPLETED | OUTPATIENT
Start: 2025-07-29 | End: 2025-07-29

## 2025-07-29 RX ORDER — SODIUM CHLORIDE 0.9 % (FLUSH) 0.9 %
10 SYRINGE (ML) INJECTION AS NEEDED
Status: DISCONTINUED | OUTPATIENT
Start: 2025-07-29 | End: 2025-07-29 | Stop reason: HOSPADM

## 2025-07-29 RX ORDER — IOPAMIDOL 612 MG/ML
100 INJECTION, SOLUTION INTRAVASCULAR
Status: DISCONTINUED | OUTPATIENT
Start: 2025-07-29 | End: 2025-07-29

## 2025-07-29 RX ORDER — VANCOMYCIN/0.9 % SOD CHLORIDE 1.5G/250ML
20 PLASTIC BAG, INJECTION (ML) INTRAVENOUS ONCE
Status: COMPLETED | OUTPATIENT
Start: 2025-07-29 | End: 2025-07-29

## 2025-07-29 RX ORDER — ONDANSETRON 2 MG/ML
4 INJECTION INTRAMUSCULAR; INTRAVENOUS ONCE
Status: COMPLETED | OUTPATIENT
Start: 2025-07-29 | End: 2025-07-29

## 2025-07-29 RX ADMIN — PIPERACILLIN AND TAZOBACTAM 3.38 G: 3; .375 INJECTION, POWDER, FOR SOLUTION INTRAVENOUS at 10:12

## 2025-07-29 RX ADMIN — SODIUM CHLORIDE 1000 ML: 9 INJECTION, SOLUTION INTRAVENOUS at 09:48

## 2025-07-29 RX ADMIN — PROCHLORPERAZINE EDISYLATE 10 MG: 5 INJECTION INTRAMUSCULAR; INTRAVENOUS at 11:59

## 2025-07-29 RX ADMIN — Medication 1500 MG: at 11:08

## 2025-07-29 RX ADMIN — ONDANSETRON 4 MG: 2 INJECTION, SOLUTION INTRAMUSCULAR; INTRAVENOUS at 10:50

## 2025-07-29 RX ADMIN — IOPAMIDOL 100 ML: 612 INJECTION, SOLUTION INTRAVENOUS at 10:33

## 2025-07-29 NOTE — ED PROVIDER NOTES
Subjective   History of Present Illness  Had bilateral mastectomy on 4/28/2025 at [facility], and is scheduled next week to see them again to discuss another procedure for reconstruction.  4 days ago, patient noted her internal sutures pushing to the surface of her skin on her right breast, and she now has developed diffuse erythema and drainage to the lateral aspect of the right breast.  The area is grossly tender to deep palpation, but nontender to light palpation.  She denies fever, chills, chest pain, shortness of breath.  She states that she has been having some nausea and dry heaving over the last 2 days, but she equates that to taking a Voltaren 2 days ago on an empty stomach.  She denies any abdominal pain, diarrhea, constipation.  She has not taken any medication for her symptoms.    Pertinent Medical history positive for IBS, breast cancer, PCOS.        Review of Systems    Past Medical History:   Diagnosis Date    Abnormal Pap smear of cervix April 2022    Acid reflux     ADHD (attention deficit hyperactivity disorder)     Allergic     Anxiety     Breast cancer     Bronchitis     Cholelithiasis     Chronic fatigue     Disease of thyroid gland     Fibroid Junaid    Fibromyalgia     Fibromyalgia, primary 2024    And chronic fatigue    Gallbladder abscess     Heart murmur     HPV (human papilloma virus) infection April 2022    Hypertension     Hypothyroidism     Irritable bowel syndrome     Low back pain     Migraine Junaid    Mitral valve prolapse     PMS (premenstrual syndrome) Junaid    Polycystic ovary syndrome Junaid    PONV (postoperative nausea and vomiting)     RA (rheumatoid arthritis)        Allergies   Allergen Reactions    Percocet [Oxycodone-Acetaminophen] Hives    Adhesive Tape Itching     ADHESIVE ON BANDAIDS       Past Surgical History:   Procedure Laterality Date    CARPAL TUNNEL RELEASE Right     COLONOSCOPY  05/01/2003    Normal exam    D & C HYSTEROSCOPY MYOSURE N/A 07/31/2024     Procedure: DILATATION AND CURETTAGE HYSTEROSCOPY WITH POSSIBLE TISSUE RESECTION;  Surgeon: Stevan Espinal MD;  Location:  [facility];  Service: Obstetrics/Gynecology;  Laterality: N/A;    DIAGNOSTIC LAPAROSCOPY  2000    [facility]; normal findings; [facility]    DILATATION AND CURETTAGE      ENDOMETRIAL ABLATION  2023    ENDOSCOPY      HYSTEROSCOPY      LAPAROSCOPIC CHOLECYSTECTOMY  2011    Dr Duncan; [facility]    LYMPH NODE BIOPSY  8/7/2024    Cancer Positive    MASTECTOMY Bilateral 04/28/2025    [place]    SALPINGECTOMY Bilateral 10/16/2020    Procedure: SALPINGECTOMY LAPAROSCOPIC for sterilization;  Surgeon: Stevan Espinal MD;  Location:  [facility];  Service: Obstetrics/Gynecology;  Laterality: Bilateral;    TOTAL LAPAROSCOPIC HYSTERECTOMY SALPINGO OOPHORECTOMY N/A 09/20/2024    Procedure: TOTAL LAPAROSCOPIC HYSTERECTOMY BILATERAL SALPINGOOPHORECTOMY WITH DAVINCI ROBOT;  Surgeon: Stevan Espinal MD;  Location:  [facility];  Service: Robotics - DaVinci;  Laterality: N/A;    TUBAL ABDOMINAL LIGATION      cut not tied    US GUIDED LYMPH NODE BIOPSY  08/07/2024    VAGINAL HYSTERECTOMY  Oct 2021    VENOUS ACCESS DEVICE (PORT) INSERTION N/A 08/30/2024    Procedure: Single Lumen Port-a-cath insertion with flouroscopy;  Surgeon: Shahla Gentile MD;  Location:  [facility];  Service: General;  Laterality: N/A;    WISDOM TOOTH EXTRACTION         Family History   Problem Relation Age of Onset    Diabetes Mother     Asthma Mother     Depression Mother     Hyperlipidemia Mother     Kidney disease Mother     Thyroid disease Mother     COPD Mother     Diabetes Father     Arthritis Father         RA    Asthma Son         Ecezma and asthma    Cancer Maternal Grandmother         vulva, HPV+    Thyroid disease Maternal Grandmother     Cancer Maternal Grandfather     Lung cancer Paternal Grandfather 60 - 69    Cancer Paternal Grandfather     Stomach cancer Maternal Great-Grandmother     Colon cancer Neg Hx     Colon polyps Neg Hx         Social History     Socioeconomic History    Marital status:    Tobacco Use    Smoking status: Former     Current packs/day: 0.00     Average packs/day: 0.5 packs/day for 20.0 years (10.0 ttl pk-yrs)     Types: Cigarettes     Start date: 2004     Quit date: 2024     Years since quittin.0     Passive exposure: Past    Smokeless tobacco: Never    Tobacco comments:     Stop ciggarettes .24 using vape now to stop completely   Vaping Use    Vaping status: Former    Start date: 2024    Quit date: 10/17/2024   Substance and Sexual Activity    Alcohol use: Not Currently     Comment: Socially    Drug use: Yes     Types: Marijuana    Sexual activity: Yes     Partners: Male     Birth control/protection: None, Bilateral salpingectomy            Objective   Physical Exam  Vitals and nursing note reviewed.   Constitutional:       General: She is not in acute distress.     Appearance: Normal appearance. She is normal weight. She is not ill-appearing, toxic-appearing or diaphoretic.   HENT:      Head: Normocephalic and atraumatic.      Mouth/Throat:      Mouth: Mucous membranes are moist.   Eyes:      Conjunctiva/sclera: Conjunctivae normal.   Cardiovascular:      Rate and Rhythm: Normal rate.   Pulmonary:      Effort: Pulmonary effort is normal.   Chest:      Chest wall: Swelling and tenderness present.      Comments: Right breast has diffuse erythema across the anterior/inferior/lateral aspects.  There are small, less than 1 cm circular areas of yellowish drainage appreciated inferiorly and laterally to the areola.  No areolar/nipple abnormalities.  Left breast shows surgical scar, but is otherwise within normal limits.  No appreciable swelling in the right breast appreciated.  Area is diffusely mildly tender to palpation.  Musculoskeletal:         General: Normal range of motion.      Cervical back: Normal range of motion and neck supple.   Skin:     General: Skin is warm and dry.       Findings: Erythema present.   Neurological:      Mental Status: She is alert. Mental status is at baseline.   Psychiatric:         Mood and Affect: Mood normal.         Behavior: Behavior normal.         Thought Content: Thought content normal.         Judgment: Judgment normal.         Procedures           ED Course                                                       Medical Decision Making  Had bilateral mastectomy on 4/28/2025 at [facility], and is scheduled next week to see them again to discuss another procedure for reconstruction.  4 days ago, patient noted her internal sutures pushing to the surface of her skin on her right breast, and she now has developed diffuse erythema and drainage to the lateral aspect of the right breast.  The area is grossly tender to deep palpation, but nontender to light palpation.  She denies fever, chills, chest pain, shortness of breath.  She states that she has been having some nausea and dry heaving over the last 2 days, but she equates that to taking a Voltaren 2 days ago on an empty stomach.  She denies any abdominal pain, diarrhea, constipation.  She has not taken any medication for her symptoms.    Pertinent Medical history positive for IBS, breast cancer, PCOS.    DDX: Cellulitis, abscess, gastritis, GERD, gastric ulcer, medication reaction.    CT Chest With Contrast Diagnostic   Final Result    1. 3 months status post bilateral mastectomy with placement of tissue    expanders.    2. Asymmetric large amount of fluid around the RIGHT tissue expander    with CT appearance of chest wall cellulitis along the inferior lateral    margin of the RIGHT tissue expander.    3. Clear lungs.         This report was signed and finalized on 7/29/2025 11:17 AM by Dr. Josias Reyes MD.          CT Abdomen Pelvis With Contrast   Final Result    1. No acute abnormality of the abdomen or pelvis.    2. Evidence of hysterectomy since the previous study. No complication.    3.  Bilateral breast implants with surrounding fluid may represent    postsurgical changes/seroma. This may be clinically correlated.                                                                          This report was signed and finalized on 7/29/2025 10:53 AM by Dr. Jose Antonio Mendoza MD.          Labs Reviewed  COMPREHENSIVE METABOLIC PANEL - Abnormal; Notable for the following components:     Creatinine                    1.12 (*)               Sodium                        130 (*)                Potassium                     3.3 (*)                Chloride                      92 (*)                 Total Bilirubin               1.4 (*)             All other components within normal limits         Narrative: GFR Categories in Chronic Kidney Disease (CKD)                                              GFR Category          GFR (mL/min/1.73)    Interpretation                  G1                    90 or greater        Normal or high (1)                  G2                    60-89                Mild decrease (1)                  G3a                   45-59                Mild to moderate decrease                  G3b                   30-44                Moderate to severe decrease                  G4                    15-29                Severe decrease                  G5                    14 or less           Kidney failure                                    (1)In the absence of evidence of kidney disease, neither GFR category G1 or G2 fulfill the criteria for CKD.                                    eGFR calculation 2021 CKD-EPI creatinine equation, which does not include race as a factor  PROCALCITONIN - Abnormal; Notable for the following components:     Procalcitonin                 0.84 (*)            All other components within normal limits         Narrative: As a Marker for Sepsis (Non-Neonates):                                    1. <0.5 ng/mL represents a low risk of severe sepsis and/or septic  "shock.                  2. >2 ng/mL represents a high risk of severe sepsis and/or septic shock.                                    As a Marker for Lower Respiratory Tract Infections that require antibiotic therapy:                                    PCT on Admission    Antibiotic Therapy       6-12 Hrs later                                    >0.5                Strongly Recommended                  >0.25 - <0.5        Recommended                   0.1 - 0.25          Discouraged              Remeasure/reassess PCT                  <0.1                Strongly Discouraged     Remeasure/reassess PCT                                    As 28 day mortality risk marker: \"Change in Procalcitonin Result\" (>80% or <=80%) if Day 0 (or Day 1) and Day 4 values are available. Refer to http://[email or web address]-pct-[email or web address]                                    Change in PCT <=80%                  A decrease of PCT levels below or equal to 80% defines a positive change in PCT test result representing a higher risk for 28-day all-cause mortality of patients diagnosed with severe sepsis for septic shock.                                    Change in PCT >80%                  A decrease of PCT levels of more than 80% defines a negative change in PCT result representing a lower risk for 28-day all-cause mortality of patients diagnosed with severe sepsis or septic shock.                     C-REACTIVE PROTEIN - Abnormal; Notable for the following components:     C-Reactive Protein            42.40 (*)            All other components within normal limits  MAGNESIUM - Abnormal; Notable for the following components:     Magnesium                     1.5 (*)             All other components within normal limits  URINALYSIS W/ MICROSCOPIC IF INDICATED (NO CULTURE) - Abnormal; Notable for the following components:     Blood, UA                     Trace (*)               Leuk Esterase, UA             Small (1+) (*)            All other " components within normal limits  CBC WITH AUTO DIFFERENTIAL - Abnormal; Notable for the following components:     WBC                           16.48 (*)               Hemoglobin                    11.1 (*)               MCH                           26.1 (*)               MPV                           12.4 (*)            All other components within normal limits         Narrative: The previously reported component NRBC is no longer being reported. Previous result was 0.0 /100 WBC (Reference Range: 0.0-0.2 /100 WBC) on 7/29/2025 at 1008 CDT.  MANUAL DIFFERENTIAL - Abnormal; Notable for the following components:     Lymphocyte %                  1.0 (*)                Monocyte %                    3.0 (*)                Eosinophil %                  0.0 (*)                Bands %                       23.0 (*)               Metamyelocyte %               1.0 (*)                Neutrophils Absolute          15.66 (*)               Lymphocytes Absolute          0.16 (*)               nRBC                          2.0 (*)             All other components within normal limits  URINALYSIS, MICROSCOPIC ONLY - Abnormal; Notable for the following components:     WBC, UA                       3-5 (*)             All other components within normal limits  LIPASE - Normal  LACTIC ACID, PLASMA - Normal  CBC AND DIFFERENTIAL    Discussed radiographic and laboratory findings with patient in the room.  At this time we will move forward with contacting Dr. Joseph's office to see if transferring the patient to [facility] would be appropriate at this time.  She is pleased with the plan, and is comfortable with being transferred if needed.  Spoke with [facility] transfer line, who placed me in contact with [facility], who accepted the patient to the emergency department at 12:01 PM.  States that Dr. Arie Gongora will be the attending physician.  At this time we will move forward with transferring the patient to [facility].   Dr. Marcano has been following the patient the entire time, and agrees with plan.  He is currently in the room discussing the plan with the patient.      Amount and/or Complexity of Data Reviewed  Labs: ordered.  Radiology: ordered.    Risk  Prescription drug management.        Final diagnoses:   Cellulitis of right breast       ED Disposition  ED Disposition       ED Disposition   Transfer to Another Facility     Condition   --    Comment   --               No follow-up provider specified.       Medication List      No changes were made to your prescriptions during this visit.            Jose Watkins PA-C  07/29/25 120

## 2025-08-06 ENCOUNTER — RESULTS FOLLOW-UP (OUTPATIENT)
Dept: NEUROLOGY | Facility: CLINIC | Age: 45
End: 2025-08-06
Payer: COMMERCIAL

## 2025-08-06 ENCOUNTER — LAB (OUTPATIENT)
Dept: LAB | Facility: HOSPITAL | Age: 45
End: 2025-08-06
Payer: COMMERCIAL

## 2025-08-06 ENCOUNTER — HOSPITAL ENCOUNTER (OUTPATIENT)
Dept: NEUROLOGY | Facility: HOSPITAL | Age: 45
Discharge: HOME | End: 2025-08-06
Payer: COMMERCIAL

## 2025-08-06 ENCOUNTER — INFUSION (OUTPATIENT)
Dept: ONCOLOGY | Facility: HOSPITAL | Age: 45
End: 2025-08-06
Payer: COMMERCIAL

## 2025-08-06 ENCOUNTER — APPOINTMENT (OUTPATIENT)
Dept: LAB | Facility: HOSPITAL | Age: 45
End: 2025-08-06
Payer: COMMERCIAL

## 2025-08-06 VITALS
HEART RATE: 68 BPM | RESPIRATION RATE: 18 BRPM | BODY MASS INDEX: 26.78 KG/M2 | HEIGHT: 66 IN | TEMPERATURE: 97.4 F | DIASTOLIC BLOOD PRESSURE: 86 MMHG | OXYGEN SATURATION: 99 % | SYSTOLIC BLOOD PRESSURE: 136 MMHG | WEIGHT: 166.6 LBS

## 2025-08-06 DIAGNOSIS — F90.2 ATTENTION DEFICIT HYPERACTIVITY DISORDER (ADHD), COMBINED TYPE: ICD-10-CM

## 2025-08-06 DIAGNOSIS — E87.6 LOW SERUM POTASSIUM LEVEL: Primary | ICD-10-CM

## 2025-08-06 DIAGNOSIS — C50.912 DUCTAL CARCINOMA OF LEFT BREAST: Primary | ICD-10-CM

## 2025-08-06 DIAGNOSIS — R20.2 PARESTHESIAS: ICD-10-CM

## 2025-08-06 DIAGNOSIS — C50.912 INVASIVE DUCTAL CARCINOMA OF BREAST, LEFT: ICD-10-CM

## 2025-08-06 DIAGNOSIS — C50.912 INVASIVE DUCTAL CARCINOMA OF BREAST, LEFT: Primary | ICD-10-CM

## 2025-08-06 DIAGNOSIS — C50.912 DUCTAL CARCINOMA OF LEFT BREAST: ICD-10-CM

## 2025-08-06 LAB
ALBUMIN SERPL-MCNC: 3.5 G/DL (ref 3.5–5.2)
ALBUMIN/GLOB SERPL: 1.5 G/DL
ALP SERPL-CCNC: 89 U/L (ref 39–117)
ALT SERPL W P-5'-P-CCNC: 22 U/L (ref 1–33)
AMPHET+METHAMPHET UR QL: POSITIVE
AMPHETAMINES UR QL: NEGATIVE
ANION GAP SERPL CALCULATED.3IONS-SCNC: 12 MMOL/L (ref 5–15)
AST SERPL-CCNC: 23 U/L (ref 1–32)
BARBITURATES UR QL SCN: NEGATIVE
BASOPHILS # BLD MANUAL: 0 10*3/MM3 (ref 0–0.2)
BASOPHILS NFR BLD MANUAL: 0 % (ref 0–1.5)
BENZODIAZ UR QL SCN: NEGATIVE
BILIRUB SERPL-MCNC: 0.2 MG/DL (ref 0–1.2)
BUN SERPL-MCNC: 8.1 MG/DL (ref 6–20)
BUN/CREAT SERPL: 7.5 (ref 7–25)
BUPRENORPHINE SERPL-MCNC: NEGATIVE NG/ML
CALCIUM SPEC-SCNC: 8.8 MG/DL (ref 8.6–10.5)
CANNABINOIDS SERPL QL: POSITIVE
CHLORIDE SERPL-SCNC: 103 MMOL/L (ref 98–107)
CO2 SERPL-SCNC: 29 MMOL/L (ref 22–29)
COCAINE UR QL: NEGATIVE
CREAT SERPL-MCNC: 1.08 MG/DL (ref 0.57–1)
DEPRECATED RDW RBC AUTO: 44.5 FL (ref 37–54)
EGFRCR SERPLBLD CKD-EPI 2021: 64.7 ML/MIN/1.73
ELLIPTOCYTES BLD QL SMEAR: ABNORMAL
EOSINOPHIL # BLD MANUAL: 0.56 10*3/MM3 (ref 0–0.4)
EOSINOPHIL NFR BLD MANUAL: 7.1 % (ref 0.3–6.2)
ERYTHROCYTE [DISTWIDTH] IN BLOOD BY AUTOMATED COUNT: 14.7 % (ref 12.3–15.4)
FENTANYL UR-MCNC: NEGATIVE NG/ML
GLOBULIN UR ELPH-MCNC: 2.4 GM/DL
GLUCOSE SERPL-MCNC: 124 MG/DL (ref 65–99)
HCT VFR BLD AUTO: 29.2 % (ref 34–46.6)
HGB BLD-MCNC: 9.1 G/DL (ref 12–15.9)
HOLD SPECIMEN: NORMAL
LYMPHOCYTES # BLD MANUAL: 1.28 10*3/MM3 (ref 0.7–3.1)
LYMPHOCYTES NFR BLD MANUAL: 1 % (ref 5–12)
MAGNESIUM SERPL-MCNC: 1.6 MG/DL (ref 1.6–2.6)
MCH RBC QN AUTO: 25.7 PG (ref 26.6–33)
MCHC RBC AUTO-ENTMCNC: 31.2 G/DL (ref 31.5–35.7)
MCV RBC AUTO: 82.5 FL (ref 79–97)
METHADONE UR QL SCN: NEGATIVE
MONOCYTES # BLD: 0.08 10*3/MM3 (ref 0.1–0.9)
NEUTROPHILS # BLD AUTO: 5.94 10*3/MM3 (ref 1.7–7)
NEUTROPHILS NFR BLD MANUAL: 75.5 % (ref 42.7–76)
NRBC SPEC MANUAL: 1 /100 WBC (ref 0–0.2)
OPIATES UR QL: NEGATIVE
OXYCODONE UR QL SCN: NEGATIVE
PCP UR QL SCN: NEGATIVE
PLAT MORPH BLD: NORMAL
PLATELET # BLD AUTO: 313 10*3/MM3 (ref 140–450)
PMV BLD AUTO: 10.3 FL (ref 6–12)
POIKILOCYTOSIS BLD QL SMEAR: ABNORMAL
POLYCHROMASIA BLD QL SMEAR: ABNORMAL
POTASSIUM SERPL-SCNC: 3.3 MMOL/L (ref 3.5–5.2)
PROT SERPL-MCNC: 5.9 G/DL (ref 6–8.5)
RBC # BLD AUTO: 3.54 10*6/MM3 (ref 3.77–5.28)
SODIUM SERPL-SCNC: 144 MMOL/L (ref 136–145)
TRICYCLICS UR QL SCN: NEGATIVE
VARIANT LYMPHS NFR BLD MANUAL: 16.3 % (ref 19.6–45.3)
WBC MORPH BLD: NORMAL
WBC NRBC COR # BLD AUTO: 7.87 10*3/MM3 (ref 3.4–10.8)

## 2025-08-06 PROCEDURE — 95885 MUSC TST DONE W/NERV TST LIM: CPT

## 2025-08-06 PROCEDURE — G0463 HOSPITAL OUTPT CLINIC VISIT: HCPCS

## 2025-08-06 PROCEDURE — 83735 ASSAY OF MAGNESIUM: CPT

## 2025-08-06 PROCEDURE — 36415 COLL VENOUS BLD VENIPUNCTURE: CPT

## 2025-08-06 PROCEDURE — 85007 BL SMEAR W/DIFF WBC COUNT: CPT

## 2025-08-06 PROCEDURE — 80307 DRUG TEST PRSMV CHEM ANLYZR: CPT

## 2025-08-06 PROCEDURE — 85025 COMPLETE CBC W/AUTO DIFF WBC: CPT

## 2025-08-06 PROCEDURE — 95913 NRV CNDJ TEST 13/> STUDIES: CPT

## 2025-08-06 PROCEDURE — 80053 COMPREHEN METABOLIC PANEL: CPT

## 2025-08-06 RX ORDER — POTASSIUM CHLORIDE 1500 MG/1
20 TABLET, EXTENDED RELEASE ORAL 3 TIMES DAILY
Qty: 9 TABLET | Refills: 0 | Status: SHIPPED | OUTPATIENT
Start: 2025-08-06 | End: 2025-08-09

## 2025-08-13 ENCOUNTER — APPOINTMENT (OUTPATIENT)
Dept: NEUROLOGY | Facility: HOSPITAL | Age: 45
End: 2025-08-13
Payer: COMMERCIAL

## 2025-08-13 ENCOUNTER — INFUSION (OUTPATIENT)
Dept: ONCOLOGY | Facility: HOSPITAL | Age: 45
End: 2025-08-13
Payer: COMMERCIAL

## 2025-08-13 ENCOUNTER — LAB (OUTPATIENT)
Dept: LAB | Facility: HOSPITAL | Age: 45
End: 2025-08-13
Payer: COMMERCIAL

## 2025-08-13 VITALS
HEART RATE: 65 BPM | DIASTOLIC BLOOD PRESSURE: 72 MMHG | RESPIRATION RATE: 18 BRPM | HEIGHT: 66 IN | WEIGHT: 159.2 LBS | BODY MASS INDEX: 25.58 KG/M2 | TEMPERATURE: 96.5 F | OXYGEN SATURATION: 98 % | SYSTOLIC BLOOD PRESSURE: 132 MMHG

## 2025-08-13 DIAGNOSIS — C50.912 INVASIVE DUCTAL CARCINOMA OF BREAST, LEFT: ICD-10-CM

## 2025-08-13 DIAGNOSIS — E86.0 DEHYDRATION: ICD-10-CM

## 2025-08-13 DIAGNOSIS — C50.912 DUCTAL CARCINOMA OF LEFT BREAST: ICD-10-CM

## 2025-08-13 DIAGNOSIS — R79.0 LOW MAGNESIUM LEVEL: ICD-10-CM

## 2025-08-13 DIAGNOSIS — C50.912 INVASIVE DUCTAL CARCINOMA OF BREAST, LEFT: Primary | ICD-10-CM

## 2025-08-13 DIAGNOSIS — C50.912 DUCTAL CARCINOMA OF LEFT BREAST: Primary | ICD-10-CM

## 2025-08-13 LAB
ALBUMIN SERPL-MCNC: 4.1 G/DL (ref 3.5–5.2)
ALBUMIN/GLOB SERPL: 1.4 G/DL
ALP SERPL-CCNC: 109 U/L (ref 39–117)
ALT SERPL W P-5'-P-CCNC: 16 U/L (ref 1–33)
ANION GAP SERPL CALCULATED.3IONS-SCNC: 12 MMOL/L (ref 5–15)
AST SERPL-CCNC: 20 U/L (ref 1–32)
BASOPHILS # BLD AUTO: 0.1 10*3/MM3 (ref 0–0.2)
BASOPHILS NFR BLD AUTO: 1.5 % (ref 0–1.5)
BILIRUB SERPL-MCNC: 0.6 MG/DL (ref 0–1.2)
BUN SERPL-MCNC: 15.2 MG/DL (ref 6–20)
BUN/CREAT SERPL: 12.8 (ref 7–25)
CALCIUM SPEC-SCNC: 9.8 MG/DL (ref 8.6–10.5)
CHLORIDE SERPL-SCNC: 101 MMOL/L (ref 98–107)
CO2 SERPL-SCNC: 28 MMOL/L (ref 22–29)
CREAT SERPL-MCNC: 1.19 MG/DL (ref 0.57–1)
DEPRECATED RDW RBC AUTO: 45.9 FL (ref 37–54)
EGFRCR SERPLBLD CKD-EPI 2021: 57.6 ML/MIN/1.73
EOSINOPHIL # BLD AUTO: 0.6 10*3/MM3 (ref 0–0.4)
EOSINOPHIL NFR BLD AUTO: 8.8 % (ref 0.3–6.2)
ERYTHROCYTE [DISTWIDTH] IN BLOOD BY AUTOMATED COUNT: 15.6 % (ref 12.3–15.4)
GLOBULIN UR ELPH-MCNC: 2.9 GM/DL
GLUCOSE SERPL-MCNC: 90 MG/DL (ref 65–99)
HCT VFR BLD AUTO: 34.6 % (ref 34–46.6)
HGB BLD-MCNC: 10.7 G/DL (ref 12–15.9)
IMM GRANULOCYTES # BLD AUTO: 0.02 10*3/MM3 (ref 0–0.05)
IMM GRANULOCYTES NFR BLD AUTO: 0.3 % (ref 0–0.5)
LYMPHOCYTES # BLD AUTO: 1.87 10*3/MM3 (ref 0.7–3.1)
LYMPHOCYTES NFR BLD AUTO: 27.3 % (ref 19.6–45.3)
MAGNESIUM SERPL-MCNC: 1.9 MG/DL (ref 1.6–2.6)
MCH RBC QN AUTO: 25.5 PG (ref 26.6–33)
MCHC RBC AUTO-ENTMCNC: 30.9 G/DL (ref 31.5–35.7)
MCV RBC AUTO: 82.4 FL (ref 79–97)
MONOCYTES # BLD AUTO: 0.49 10*3/MM3 (ref 0.1–0.9)
MONOCYTES NFR BLD AUTO: 7.2 % (ref 5–12)
NEUTROPHILS NFR BLD AUTO: 3.77 10*3/MM3 (ref 1.7–7)
NEUTROPHILS NFR BLD AUTO: 54.9 % (ref 42.7–76)
NRBC BLD AUTO-RTO: 0 /100 WBC (ref 0–0.2)
PLATELET # BLD AUTO: 484 10*3/MM3 (ref 140–450)
PMV BLD AUTO: 10.6 FL (ref 6–12)
POTASSIUM SERPL-SCNC: 4.1 MMOL/L (ref 3.5–5.2)
PROT SERPL-MCNC: 7 G/DL (ref 6–8.5)
RBC # BLD AUTO: 4.2 10*6/MM3 (ref 3.77–5.28)
SODIUM SERPL-SCNC: 141 MMOL/L (ref 136–145)
WBC NRBC COR # BLD AUTO: 6.85 10*3/MM3 (ref 3.4–10.8)

## 2025-08-13 PROCEDURE — 25010000002 HEPARIN LOCK FLUSH PER 10 UNITS: Performed by: INTERNAL MEDICINE

## 2025-08-13 PROCEDURE — 80053 COMPREHEN METABOLIC PANEL: CPT

## 2025-08-13 PROCEDURE — 25810000003 SODIUM CHLORIDE 0.9 % SOLUTION: Performed by: INTERNAL MEDICINE

## 2025-08-13 PROCEDURE — 96360 HYDRATION IV INFUSION INIT: CPT

## 2025-08-13 PROCEDURE — 36415 COLL VENOUS BLD VENIPUNCTURE: CPT

## 2025-08-13 PROCEDURE — 83735 ASSAY OF MAGNESIUM: CPT

## 2025-08-13 PROCEDURE — 85025 COMPLETE CBC W/AUTO DIFF WBC: CPT

## 2025-08-13 PROCEDURE — 96361 HYDRATE IV INFUSION ADD-ON: CPT

## 2025-08-13 RX ORDER — SODIUM CHLORIDE 0.9 % (FLUSH) 0.9 %
10 SYRINGE (ML) INJECTION AS NEEDED
Status: DISCONTINUED | OUTPATIENT
Start: 2025-08-13 | End: 2025-08-13 | Stop reason: HOSPADM

## 2025-08-13 RX ORDER — HEPARIN SODIUM (PORCINE) LOCK FLUSH IV SOLN 100 UNIT/ML 100 UNIT/ML
500 SOLUTION INTRAVENOUS AS NEEDED
Status: CANCELLED | OUTPATIENT
Start: 2025-08-13

## 2025-08-13 RX ORDER — SODIUM CHLORIDE 0.9 % (FLUSH) 0.9 %
10 SYRINGE (ML) INJECTION AS NEEDED
Status: CANCELLED | OUTPATIENT
Start: 2025-08-13

## 2025-08-13 RX ORDER — HEPARIN SODIUM (PORCINE) LOCK FLUSH IV SOLN 100 UNIT/ML 100 UNIT/ML
500 SOLUTION INTRAVENOUS AS NEEDED
Status: DISCONTINUED | OUTPATIENT
Start: 2025-08-13 | End: 2025-08-13 | Stop reason: HOSPADM

## 2025-08-13 RX ADMIN — Medication 10 ML: at 11:38

## 2025-08-13 RX ADMIN — SODIUM CHLORIDE 1000 ML: 9 INJECTION, SOLUTION INTRAVENOUS at 09:40

## 2025-08-13 RX ADMIN — HEPARIN 500 UNITS: 100 SYRINGE at 11:38

## 2025-08-14 ENCOUNTER — LAB (OUTPATIENT)
Dept: LAB | Facility: HOSPITAL | Age: 45
End: 2025-08-14
Payer: COMMERCIAL

## 2025-08-14 ENCOUNTER — INFUSION (OUTPATIENT)
Dept: ONCOLOGY | Facility: HOSPITAL | Age: 45
End: 2025-08-14
Payer: COMMERCIAL

## 2025-08-14 VITALS
HEIGHT: 66 IN | SYSTOLIC BLOOD PRESSURE: 128 MMHG | BODY MASS INDEX: 25.58 KG/M2 | WEIGHT: 159.2 LBS | TEMPERATURE: 97.3 F | DIASTOLIC BLOOD PRESSURE: 64 MMHG | RESPIRATION RATE: 18 BRPM | OXYGEN SATURATION: 100 % | HEART RATE: 66 BPM

## 2025-08-14 DIAGNOSIS — C50.912 INVASIVE DUCTAL CARCINOMA OF BREAST, LEFT: ICD-10-CM

## 2025-08-14 DIAGNOSIS — R79.0 LOW MAGNESIUM LEVEL: ICD-10-CM

## 2025-08-14 DIAGNOSIS — C50.912 DUCTAL CARCINOMA OF LEFT BREAST: ICD-10-CM

## 2025-08-14 DIAGNOSIS — E86.0 DEHYDRATION: Primary | ICD-10-CM

## 2025-08-14 LAB
ANION GAP SERPL CALCULATED.3IONS-SCNC: 9 MMOL/L (ref 5–15)
BUN SERPL-MCNC: 13.8 MG/DL (ref 6–20)
BUN/CREAT SERPL: 14.2 (ref 7–25)
CALCIUM SPEC-SCNC: 8.9 MG/DL (ref 8.6–10.5)
CHLORIDE SERPL-SCNC: 107 MMOL/L (ref 98–107)
CO2 SERPL-SCNC: 26 MMOL/L (ref 22–29)
CREAT SERPL-MCNC: 0.97 MG/DL (ref 0.57–1)
EGFRCR SERPLBLD CKD-EPI 2021: 73.6 ML/MIN/1.73
GLUCOSE SERPL-MCNC: 94 MG/DL (ref 65–99)
POTASSIUM SERPL-SCNC: 4 MMOL/L (ref 3.5–5.2)
SODIUM SERPL-SCNC: 142 MMOL/L (ref 136–145)

## 2025-08-14 PROCEDURE — 25010000002 TRASTUZUMAB-DKST 150 MG RECONSTITUTED SOLUTION 1 EACH VIAL: Performed by: INTERNAL MEDICINE

## 2025-08-14 PROCEDURE — 96375 TX/PRO/DX INJ NEW DRUG ADDON: CPT

## 2025-08-14 PROCEDURE — 25010000002 DEXAMETHASONE SODIUM PHOSPHATE 100 MG/10ML SOLUTION: Performed by: INTERNAL MEDICINE

## 2025-08-14 PROCEDURE — 80048 BASIC METABOLIC PNL TOTAL CA: CPT

## 2025-08-14 PROCEDURE — 25010000002 DIPHENHYDRAMINE PER 50 MG: Performed by: INTERNAL MEDICINE

## 2025-08-14 PROCEDURE — 36415 COLL VENOUS BLD VENIPUNCTURE: CPT

## 2025-08-14 PROCEDURE — 96361 HYDRATE IV INFUSION ADD-ON: CPT

## 2025-08-14 PROCEDURE — 25810000003 SODIUM CHLORIDE 0.9 % SOLUTION: Performed by: INTERNAL MEDICINE

## 2025-08-14 PROCEDURE — 96367 TX/PROPH/DG ADDL SEQ IV INF: CPT

## 2025-08-14 PROCEDURE — 96413 CHEMO IV INFUSION 1 HR: CPT

## 2025-08-14 PROCEDURE — 25010000002 FAMOTIDINE 10 MG/ML SOLUTION: Performed by: INTERNAL MEDICINE

## 2025-08-14 PROCEDURE — 25010000002 HEPARIN LOCK FLUSH PER 10 UNITS: Performed by: INTERNAL MEDICINE

## 2025-08-14 PROCEDURE — 25010000002 TRASTUZUMAB-DKST 420 MG RECONSTITUTED SOLUTION 1 EACH VIAL: Performed by: INTERNAL MEDICINE

## 2025-08-14 PROCEDURE — 25810000003 SODIUM CHLORIDE 0.9 % SOLUTION 250 ML FLEX CONT: Performed by: INTERNAL MEDICINE

## 2025-08-14 RX ORDER — SODIUM CHLORIDE 0.9 % (FLUSH) 0.9 %
10 SYRINGE (ML) INJECTION AS NEEDED
Status: DISCONTINUED | OUTPATIENT
Start: 2025-08-14 | End: 2025-08-14 | Stop reason: HOSPADM

## 2025-08-14 RX ORDER — SODIUM CHLORIDE 0.9 % (FLUSH) 0.9 %
10 SYRINGE (ML) INJECTION AS NEEDED
OUTPATIENT
Start: 2025-08-14

## 2025-08-14 RX ORDER — HEPARIN SODIUM (PORCINE) LOCK FLUSH IV SOLN 100 UNIT/ML 100 UNIT/ML
500 SOLUTION INTRAVENOUS AS NEEDED
OUTPATIENT
Start: 2025-08-14

## 2025-08-14 RX ORDER — FAMOTIDINE 10 MG/ML
20 INJECTION, SOLUTION INTRAVENOUS ONCE
Status: COMPLETED | OUTPATIENT
Start: 2025-08-14 | End: 2025-08-14

## 2025-08-14 RX ORDER — DIPHENHYDRAMINE HYDROCHLORIDE 50 MG/ML
50 INJECTION, SOLUTION INTRAMUSCULAR; INTRAVENOUS ONCE
Status: COMPLETED | OUTPATIENT
Start: 2025-08-14 | End: 2025-08-14

## 2025-08-14 RX ORDER — HYDROCORTISONE SODIUM SUCCINATE 100 MG/2ML
100 INJECTION INTRAMUSCULAR; INTRAVENOUS AS NEEDED
Status: DISCONTINUED | OUTPATIENT
Start: 2025-08-14 | End: 2025-08-14 | Stop reason: HOSPADM

## 2025-08-14 RX ORDER — FAMOTIDINE 10 MG/ML
20 INJECTION, SOLUTION INTRAVENOUS AS NEEDED
Status: DISCONTINUED | OUTPATIENT
Start: 2025-08-14 | End: 2025-08-14 | Stop reason: HOSPADM

## 2025-08-14 RX ORDER — OLANZAPINE 5 MG/1
5 TABLET, FILM COATED ORAL ONCE
Status: COMPLETED | OUTPATIENT
Start: 2025-08-14 | End: 2025-08-14

## 2025-08-14 RX ORDER — HEPARIN SODIUM (PORCINE) LOCK FLUSH IV SOLN 100 UNIT/ML 100 UNIT/ML
500 SOLUTION INTRAVENOUS AS NEEDED
Status: DISCONTINUED | OUTPATIENT
Start: 2025-08-14 | End: 2025-08-14 | Stop reason: HOSPADM

## 2025-08-14 RX ORDER — SODIUM CHLORIDE 9 MG/ML
20 INJECTION, SOLUTION INTRAVENOUS ONCE
Status: COMPLETED | OUTPATIENT
Start: 2025-08-14 | End: 2025-08-14

## 2025-08-14 RX ORDER — DIPHENHYDRAMINE HYDROCHLORIDE 50 MG/ML
50 INJECTION, SOLUTION INTRAMUSCULAR; INTRAVENOUS AS NEEDED
Status: DISCONTINUED | OUTPATIENT
Start: 2025-08-14 | End: 2025-08-14 | Stop reason: HOSPADM

## 2025-08-14 RX ADMIN — DIPHENHYDRAMINE HYDROCHLORIDE 25 MG: 50 INJECTION INTRAMUSCULAR; INTRAVENOUS at 11:09

## 2025-08-14 RX ADMIN — TRASTUZUMAB-DKST 440 MG: KIT at 11:35

## 2025-08-14 RX ADMIN — OLANZAPINE 5 MG: 5 TABLET, FILM COATED ORAL at 11:07

## 2025-08-14 RX ADMIN — FAMOTIDINE 20 MG: 10 INJECTION INTRAVENOUS at 11:08

## 2025-08-14 RX ADMIN — Medication 10 ML: at 12:20

## 2025-08-14 RX ADMIN — SODIUM CHLORIDE 1000 ML: 9 INJECTION, SOLUTION INTRAVENOUS at 08:00

## 2025-08-14 RX ADMIN — DEXAMETHASONE SODIUM PHOSPHATE 20 MG: 10 INJECTION, SOLUTION INTRAMUSCULAR; INTRAVENOUS at 11:16

## 2025-08-14 RX ADMIN — SODIUM CHLORIDE 20 ML/HR: 9 INJECTION, SOLUTION INTRAVENOUS at 11:07

## 2025-08-14 RX ADMIN — Medication 500 UNITS: at 12:20

## 2025-08-20 ENCOUNTER — OFFICE VISIT (OUTPATIENT)
Dept: ONCOLOGY | Facility: CLINIC | Age: 45
End: 2025-08-20
Payer: COMMERCIAL

## 2025-08-20 VITALS
SYSTOLIC BLOOD PRESSURE: 118 MMHG | BODY MASS INDEX: 25.86 KG/M2 | DIASTOLIC BLOOD PRESSURE: 64 MMHG | HEIGHT: 66 IN | HEART RATE: 69 BPM | TEMPERATURE: 97 F | RESPIRATION RATE: 18 BRPM | OXYGEN SATURATION: 98 % | WEIGHT: 160.9 LBS

## 2025-08-20 DIAGNOSIS — C50.912 INVASIVE DUCTAL CARCINOMA OF BREAST, LEFT: Primary | ICD-10-CM

## 2025-08-20 RX ORDER — GABAPENTIN 300 MG/1
300 CAPSULE ORAL 3 TIMES DAILY
Qty: 90 CAPSULE | Refills: 0 | Status: SHIPPED | OUTPATIENT
Start: 2025-08-20 | End: 2025-09-19

## 2025-08-22 ENCOUNTER — HOSPITAL ENCOUNTER (OUTPATIENT)
Dept: CARDIOLOGY | Facility: HOSPITAL | Age: 45
Discharge: HOME | End: 2025-08-22
Admitting: INTERNAL MEDICINE
Payer: COMMERCIAL

## 2025-08-22 VITALS
DIASTOLIC BLOOD PRESSURE: 64 MMHG | WEIGHT: 160 LBS | SYSTOLIC BLOOD PRESSURE: 118 MMHG | HEIGHT: 66 IN | BODY MASS INDEX: 25.71 KG/M2

## 2025-08-22 DIAGNOSIS — C50.912 INVASIVE DUCTAL CARCINOMA OF BREAST, LEFT: ICD-10-CM

## 2025-08-22 LAB
AORTIC ARCH: 2.4 CM
AORTIC DIMENSIONLESS INDEX: 0.72 (DI)
ASCENDING AORTA: 3.2 CM
AV MEAN PRESS GRAD SYS DOP V1V2: 3.6 MMHG
AV VMAX SYS DOP: 124.7 CM/SEC
BH CV ECHO LEFT VENTRICLE GLOBAL LONGITUDINAL STRAIN: -16.5 %
BH CV ECHO MEAS - 2D AUTO EF SIEMENS: 53.2 %
BH CV ECHO MEAS - AO MAX PG: 6.2 MMHG
BH CV ECHO MEAS - AO ROOT DIAM: 3.2 CM
BH CV ECHO MEAS - AO V2 VTI: 28.1 CM
BH CV ECHO MEAS - AVA(I,D): 2.49 CM2
BH CV ECHO MEAS - EDV(CUBED): 107.2 ML
BH CV ECHO MEAS - EDV(MOD-SP2): 78.2 ML
BH CV ECHO MEAS - EDV(MOD-SP4): 88 ML
BH CV ECHO MEAS - EF(MOD-SP2): 57.4 %
BH CV ECHO MEAS - EF(MOD-SP4): 55.6 %
BH CV ECHO MEAS - ESV(CUBED): 23.1 ML
BH CV ECHO MEAS - ESV(MOD-SP2): 33.3 ML
BH CV ECHO MEAS - ESV(MOD-SP4): 39.1 ML
BH CV ECHO MEAS - FS: 40 %
BH CV ECHO MEAS - IVS/LVPW: 0.78 CM
BH CV ECHO MEAS - IVSD: 0.76 CM
BH CV ECHO MEAS - LA DIMENSION: 3.3 CM
BH CV ECHO MEAS - LAT PEAK E' VEL: 16.4 CM/SEC
BH CV ECHO MEAS - LV DIASTOLIC VOL/BSA (35-75): 48.4 CM2
BH CV ECHO MEAS - LV MASS(C)D: 139.7 GRAMS
BH CV ECHO MEAS - LV MAX PG: 3.6 MMHG
BH CV ECHO MEAS - LV MEAN PG: 1.97 MMHG
BH CV ECHO MEAS - LV SYSTOLIC VOL/BSA (12-30): 21.5 CM2
BH CV ECHO MEAS - LV V1 MAX: 95.1 CM/SEC
BH CV ECHO MEAS - LV V1 VTI: 20.3 CM
BH CV ECHO MEAS - LVIDD: 4.8 CM
BH CV ECHO MEAS - LVIDS: 2.8 CM
BH CV ECHO MEAS - LVOT AREA: 3.4 CM2
BH CV ECHO MEAS - LVOT DIAM: 2.09 CM
BH CV ECHO MEAS - LVPWD: 0.98 CM
BH CV ECHO MEAS - MED PEAK E' VEL: 8.7 CM/SEC
BH CV ECHO MEAS - MV A MAX VEL: 64.9 CM/SEC
BH CV ECHO MEAS - MV DEC SLOPE: 396.7 CM/SEC2
BH CV ECHO MEAS - MV DEC TIME: 0.21 SEC
BH CV ECHO MEAS - MV E MAX VEL: 83 CM/SEC
BH CV ECHO MEAS - MV E/A: 1.28
BH CV ECHO MEAS - MV MAX PG: 2.8 MMHG
BH CV ECHO MEAS - MV MEAN PG: 1.25 MMHG
BH CV ECHO MEAS - MV V2 VTI: 30.8 CM
BH CV ECHO MEAS - MVA(VTI): 2.27 CM2
BH CV ECHO MEAS - PA V2 MAX: 90.3 CM/SEC
BH CV ECHO MEAS - PI END-D VEL: 64.9 CM/SEC
BH CV ECHO MEAS - PULM A REVS DUR: 0.13 SEC
BH CV ECHO MEAS - PULM A REVS VEL: 18.1 CM/SEC
BH CV ECHO MEAS - RAP SYSTOLE: 3 MMHG
BH CV ECHO MEAS - RV MAX PG: 2.34 MMHG
BH CV ECHO MEAS - RV V1 MAX: 76.5 CM/SEC
BH CV ECHO MEAS - RV V1 VTI: 19.5 CM
BH CV ECHO MEAS - RVDD: 2.7 CM
BH CV ECHO MEAS - RVSP: 28 MMHG
BH CV ECHO MEAS - SV(LVOT): 70 ML
BH CV ECHO MEAS - SV(MOD-SP2): 44.9 ML
BH CV ECHO MEAS - SV(MOD-SP4): 48.9 ML
BH CV ECHO MEAS - SVI(LVOT): 38.5 ML/M2
BH CV ECHO MEAS - SVI(MOD-SP2): 24.7 ML/M2
BH CV ECHO MEAS - SVI(MOD-SP4): 26.9 ML/M2
BH CV ECHO MEAS - TAPSE (>1.6): 1.85 CM
BH CV ECHO MEAS - TR MAX PG: 25 MMHG
BH CV ECHO MEAS - TR MAX VEL: 250 CM/SEC
BH CV ECHO MEASUREMENTS AVERAGE E/E' RATIO: 6.61
BH CV XLRA - RV BASE: 2.7 CM
BH CV XLRA - RV LENGTH: 6.7 CM
BH CV XLRA - RV MID: 2.03 CM
BH CV XLRA - TDI S': 13 CM/SEC
LEFT ATRIUM VOLUME INDEX: 21 ML/M2
LEFT ATRIUM VOLUME: 37 ML
LV EF BIPLANE MOD: 56 %

## 2025-08-22 PROCEDURE — 93356 MYOCRD STRAIN IMG SPCKL TRCK: CPT

## 2025-08-22 PROCEDURE — 93306 TTE W/DOPPLER COMPLETE: CPT

## 2025-08-28 DIAGNOSIS — C50.912 DUCTAL CARCINOMA OF LEFT BREAST: Primary | ICD-10-CM

## 2025-08-28 DIAGNOSIS — C50.912 INVASIVE DUCTAL CARCINOMA OF BREAST, LEFT: ICD-10-CM

## 2025-08-28 RX ORDER — HYDROCORTISONE SODIUM SUCCINATE 100 MG/2ML
100 INJECTION INTRAMUSCULAR; INTRAVENOUS AS NEEDED
OUTPATIENT
Start: 2025-09-04

## 2025-08-28 RX ORDER — OLANZAPINE 5 MG/1
5 TABLET, FILM COATED ORAL ONCE
Start: 2025-09-04 | End: 2025-09-04

## 2025-08-28 RX ORDER — SODIUM CHLORIDE 9 MG/ML
20 INJECTION, SOLUTION INTRAVENOUS ONCE
OUTPATIENT
Start: 2025-09-04

## 2025-08-28 RX ORDER — FAMOTIDINE 10 MG/ML
20 INJECTION, SOLUTION INTRAVENOUS ONCE
Start: 2025-09-04 | End: 2025-09-04

## 2025-08-28 RX ORDER — DIPHENHYDRAMINE HYDROCHLORIDE 50 MG/ML
50 INJECTION, SOLUTION INTRAMUSCULAR; INTRAVENOUS AS NEEDED
OUTPATIENT
Start: 2025-09-04

## 2025-08-28 RX ORDER — FAMOTIDINE 10 MG/ML
20 INJECTION, SOLUTION INTRAVENOUS AS NEEDED
OUTPATIENT
Start: 2025-09-04

## 2025-08-28 RX ORDER — DIPHENHYDRAMINE HYDROCHLORIDE 50 MG/ML
50 INJECTION, SOLUTION INTRAMUSCULAR; INTRAVENOUS ONCE
Start: 2025-09-04 | End: 2025-09-04

## 2025-08-30 LAB
AORTIC ARCH: 2.4 CM
AORTIC DIMENSIONLESS INDEX: 0.72 (DI)
ASCENDING AORTA: 3.2 CM
AV MEAN PRESS GRAD SYS DOP V1V2: 3.6 MMHG
AV VMAX SYS DOP: 124.7 CM/SEC
BH CV ECHO LEFT VENTRICLE GLOBAL LONGITUDINAL STRAIN: -16.5 %
BH CV ECHO MEAS - 2D AUTO EF SIEMENS: 53.2 %
BH CV ECHO MEAS - AO MAX PG: 6.2 MMHG
BH CV ECHO MEAS - AO ROOT DIAM: 3.2 CM
BH CV ECHO MEAS - AO V2 VTI: 28.1 CM
BH CV ECHO MEAS - AVA(I,D): 2.49 CM2
BH CV ECHO MEAS - EDV(CUBED): 107.2 ML
BH CV ECHO MEAS - EDV(MOD-SP2): 78.2 ML
BH CV ECHO MEAS - EDV(MOD-SP4): 88 ML
BH CV ECHO MEAS - EF(MOD-SP2): 57.4 %
BH CV ECHO MEAS - EF(MOD-SP4): 55.6 %
BH CV ECHO MEAS - ESV(CUBED): 23.1 ML
BH CV ECHO MEAS - ESV(MOD-SP2): 33.3 ML
BH CV ECHO MEAS - ESV(MOD-SP4): 39.1 ML
BH CV ECHO MEAS - FS: 40 %
BH CV ECHO MEAS - IVS/LVPW: 0.78 CM
BH CV ECHO MEAS - IVSD: 0.76 CM
BH CV ECHO MEAS - LA DIMENSION: 3.3 CM
BH CV ECHO MEAS - LAT PEAK E' VEL: 16.4 CM/SEC
BH CV ECHO MEAS - LV DIASTOLIC VOL/BSA (35-75): 48.4 CM2
BH CV ECHO MEAS - LV MASS(C)D: 139.7 GRAMS
BH CV ECHO MEAS - LV MAX PG: 3.6 MMHG
BH CV ECHO MEAS - LV MEAN PG: 1.97 MMHG
BH CV ECHO MEAS - LV SYSTOLIC VOL/BSA (12-30): 21.5 CM2
BH CV ECHO MEAS - LV V1 MAX: 95.1 CM/SEC
BH CV ECHO MEAS - LV V1 VTI: 20.3 CM
BH CV ECHO MEAS - LVIDD: 4.8 CM
BH CV ECHO MEAS - LVIDS: 2.8 CM
BH CV ECHO MEAS - LVOT AREA: 3.4 CM2
BH CV ECHO MEAS - LVOT DIAM: 2.09 CM
BH CV ECHO MEAS - LVPWD: 0.98 CM
BH CV ECHO MEAS - MED PEAK E' VEL: 8.7 CM/SEC
BH CV ECHO MEAS - MV A MAX VEL: 64.9 CM/SEC
BH CV ECHO MEAS - MV DEC SLOPE: 396.7 CM/SEC2
BH CV ECHO MEAS - MV DEC TIME: 0.21 SEC
BH CV ECHO MEAS - MV E MAX VEL: 83 CM/SEC
BH CV ECHO MEAS - MV E/A: 1.28
BH CV ECHO MEAS - MV MAX PG: 2.8 MMHG
BH CV ECHO MEAS - MV MEAN PG: 1.25 MMHG
BH CV ECHO MEAS - MV V2 VTI: 30.8 CM
BH CV ECHO MEAS - MVA(VTI): 2.27 CM2
BH CV ECHO MEAS - PA V2 MAX: 90.3 CM/SEC
BH CV ECHO MEAS - PI END-D VEL: 64.9 CM/SEC
BH CV ECHO MEAS - PULM A REVS DUR: 0.13 SEC
BH CV ECHO MEAS - PULM A REVS VEL: 18.1 CM/SEC
BH CV ECHO MEAS - RAP SYSTOLE: 3 MMHG
BH CV ECHO MEAS - RV MAX PG: 2.34 MMHG
BH CV ECHO MEAS - RV V1 MAX: 76.5 CM/SEC
BH CV ECHO MEAS - RV V1 VTI: 19.5 CM
BH CV ECHO MEAS - RVDD: 2.7 CM
BH CV ECHO MEAS - RVSP: 28 MMHG
BH CV ECHO MEAS - SV(LVOT): 70 ML
BH CV ECHO MEAS - SV(MOD-SP2): 44.9 ML
BH CV ECHO MEAS - SV(MOD-SP4): 48.9 ML
BH CV ECHO MEAS - SVI(LVOT): 38.5 ML/M2
BH CV ECHO MEAS - SVI(MOD-SP2): 24.7 ML/M2
BH CV ECHO MEAS - SVI(MOD-SP4): 26.9 ML/M2
BH CV ECHO MEAS - TAPSE (>1.6): 1.85 CM
BH CV ECHO MEAS - TR MAX PG: 25 MMHG
BH CV ECHO MEAS - TR MAX VEL: 250 CM/SEC
BH CV ECHO MEASUREMENTS AVERAGE E/E' RATIO: 6.61
BH CV XLRA - RV BASE: 2.7 CM
BH CV XLRA - RV LENGTH: 6.7 CM
BH CV XLRA - RV MID: 2.03 CM
BH CV XLRA - TDI S': 13 CM/SEC
LEFT ATRIUM VOLUME INDEX: 21 ML/M2
LEFT ATRIUM VOLUME: 37 ML
LV EF BIPLANE MOD: 56 %

## (undated) DEVICE — PACK,SET UP,NO DRAPES: Brand: MEDLINE

## (undated) DEVICE — GLV SURG SENSICARE W/ALOE PF LF 6.5 STRL

## (undated) DEVICE — ST TBG AIRSEAL FLTR TRI LUM

## (undated) DEVICE — INTENDED FOR TISSUE SEPARATION, AND OTHER PROCEDURES THAT REQUIRE A SHARP SURGICAL BLADE TO PUNCTURE OR CUT.: Brand: BARD-PARKER ® STAINLESS STEEL BLADES

## (undated) DEVICE — DRAPE,U/ SHT,SPLIT,PLAS,STERIL: Brand: MEDLINE

## (undated) DEVICE — CVR HNDL LIGHT RIGID

## (undated) DEVICE — SYR CONTRL LUERLOK 10CC

## (undated) DEVICE — ST TBG PNEUMOCLEAR EVAC SMOKE HIFLO

## (undated) DEVICE — CLTH CLENS READYCLEANSE PERI CARE PK/5

## (undated) DEVICE — AMBU AURA-I U SIZE 4, DISPOSABLE LARYNGEAL MASK: Brand: AURA-I

## (undated) DEVICE — GLV SURG SENSICARE W/ALOE PF LF 7.5 STRL

## (undated) DEVICE — BLADELESS OBTURATOR: Brand: WECK VISTA

## (undated) DEVICE — VESSEL SEALER EXTEND: Brand: ENDOWRIST

## (undated) DEVICE — SUT MNCRYL 4/0 PS2 27IN UD MCP426H

## (undated) DEVICE — GAUZE,SPONGE,4"X4",16PLY,XRAY,STRL,LF: Brand: MEDLINE

## (undated) DEVICE — PK TURNOVER RM ADV

## (undated) DEVICE — PK LAP GYN 30

## (undated) DEVICE — PAD,NON-ADHERENT,3X8,STERILE,LF,1/PK: Brand: MEDLINE

## (undated) DEVICE — ENDOPATH XCEL BLADELESS TROCARS WITH STABILITY SLEEVES: Brand: ENDOPATH XCEL

## (undated) DEVICE — APPL HEMO ENDO SURGICEL 2IN1 1P/U STRL

## (undated) DEVICE — ANTIBACTERIAL UNDYED BRAIDED (POLYGLACTIN 910), SYNTHETIC ABSORBABLE SUTURE: Brand: COATED VICRYL

## (undated) DEVICE — VAGINAL PREP TRAY: Brand: MEDLINE INDUSTRIES, INC.

## (undated) DEVICE — UTILITY MARKER W/MED LABELS: Brand: MEDLINE

## (undated) DEVICE — ARM DRAPE

## (undated) DEVICE — PATIENT RETURN ELECTRODE, SINGLE-USE, CONTACT QUALITY MONITORING, ADULT, WITH 9FT CORD, FOR PATIENTS WEIGING OVER 33LBS. (15KG): Brand: MEGADYNE

## (undated) DEVICE — DRAPE,EXTREMITY,89X128,STERILE: Brand: MEDLINE

## (undated) DEVICE — FLUID MGMT SYS FLUENT KIT 6/PK

## (undated) DEVICE — 4-PORT MANIFOLD: Brand: NEPTUNE 2

## (undated) DEVICE — GLOVE SURG SZ 75 CRM LTX FREE POLYISOPRENE POLYMER BEAD ANTI

## (undated) DEVICE — NDL HYPO PRECISIONGLIDE REG 25G 1 1/2

## (undated) DEVICE — ANTIBACTERIAL VIOLET BRAIDED (POLYGLACTIN 910), SYNTHETIC ABSORBABLE SUTURE: Brand: COATED VICRYL

## (undated) DEVICE — COLLECTION SOCK, GENERAL: Brand: DEROYAL

## (undated) DEVICE — SHEET,DRAPE,53X77,STERILE: Brand: MEDLINE

## (undated) DEVICE — TOWEL,OR,DSP,ST,BLUE,STD,4/PK,20PK/CS: Brand: MEDLINE

## (undated) DEVICE — GOWN,PREVENTION PLUS,XLN/2XL,ST,22/CS: Brand: MEDLINE

## (undated) DEVICE — SUT SILK 2/0 SUTUPAK TIES 24IN SA75H

## (undated) DEVICE — GLV SURG TRIUMPH MICRO PF LTX 8 STRL

## (undated) DEVICE — DEVICE ABLATN ENDOMET US NOVASURE V5

## (undated) DEVICE — ADHS SKIN PREMIERPRO EXOFIN TOPICAL HI/VISC .5ML

## (undated) DEVICE — SOLUTION IRRIG 3000ML 0.9% SOD CHL USP UROMATIC PLAS CONT

## (undated) DEVICE — BAPTIST TURNOVER KIT: Brand: MEDLINE INDUSTRIES, INC.

## (undated) DEVICE — PAD,PREPPING,CUFFED,24X48,7",NONSTERILE: Brand: MEDLINE

## (undated) DEVICE — GLOVE,SURG,SENSICARE,ALOE,LF,PF,6: Brand: MEDLINE

## (undated) DEVICE — GOWN, ORBIS, XLNG/XXLARGE, STRL: Brand: MEDLINE

## (undated) DEVICE — TBG CONN OUTFLOW AQUILEX/MYOSURE

## (undated) DEVICE — TROC BLADLES ANCHORPORT/OPTI LP 8X120MM 1P/U

## (undated) DEVICE — SURGICAL PROCEDURE PACK GYNECOLOGIC MIN LOURDES HOSP

## (undated) DEVICE — INTENDED TO AID IN THE PASSING OF SUTURES THROUGH BONE AND SOFT TISSUE DURING ORTHOPEDIC SURGERY: Brand: HOFFEE SUTURE RETRIEVER

## (undated) DEVICE — BIPOLAR CORD: Brand: DEROYAL

## (undated) DEVICE — SEAL

## (undated) DEVICE — GLOVE SURG SZ 75 L12IN FNGR THK94MIL TRNSLUC YEL LTX

## (undated) DEVICE — DEV TISS REMOV MYOSURE/LITE HYSTEROSCP

## (undated) DEVICE — 3M™ IOBAN™ 2 ANTIMICROBIAL INCISE DRAPE 6650EZ: Brand: IOBAN™ 2

## (undated) DEVICE — ENDOPATH XCEL WITH OPTIVIEW TECHNOLOGY UNIVERSAL TROCAR STABILITY SLEEVES: Brand: ENDOPATH XCEL OPTIVIEW

## (undated) DEVICE — KT CLN CLEANOR SCPE

## (undated) DEVICE — MASK ANES AD M SZ 5 PREM TAIL VLV INFL PRT UNSCENTED HK RNG

## (undated) DEVICE — CIRCUIT AD PLN SWVL WYE

## (undated) DEVICE — TOTAL TRAY, 16FR 10ML SIL FOLEY, URN: Brand: MEDLINE

## (undated) DEVICE — TBG CONN INFLOW AQUILEX/MYOSURE

## (undated) DEVICE — PAD MINOR UNIVERSAL: Brand: MEDLINE INDUSTRIES, INC.

## (undated) DEVICE — GAUZE,SPONGE,FLUFF,6"X6.75",STRL,10/TRAY: Brand: MEDLINE

## (undated) DEVICE — MAJOR BSIN SETUP PK

## (undated) DEVICE — TRAP FLD MINIVAC MEGADYNE 100ML

## (undated) DEVICE — HARMONIC ACE +7 LAPAROSCOPIC SHEARS ADVANCED HEMOSTASIS 5MM DIAMETER 36CM SHAFT LENGTH  FOR USE WITH GRAY HAND PIECE ONLY: Brand: HARMONIC ACE

## (undated) DEVICE — SET ENDOSCP SEAL HYSTEROSCOPE RIG OUTFLO CHN DISP MYOSURE

## (undated) DEVICE — PK POSTN TRENGUARD450 PROC

## (undated) DEVICE — SUTURE ETHLN SZ 4-0 L18IN NONABSORBABLE BLK L19MM PS-2 3/8 1667H

## (undated) DEVICE — MANIP UTER ELEVATOR/PRO W/LNG/HNDL CERV/CUP 35MM MD

## (undated) DEVICE — DAVINCI: Brand: MEDLINE INDUSTRIES, INC.

## (undated) DEVICE — CVR UNIV C/ARM

## (undated) DEVICE — ENDOPATH XCEL WITH OPTIVIEW TECHNOLOGY BLADELESS TROCARS WITH STABILITY SLEEVES: Brand: ENDOPATH XCEL OPTIVIEW

## (undated) DEVICE — DEVICE TISS REM L32CM DIA3MM S STL ULT HRD HI WR RESISTANCE

## (undated) DEVICE — SEAL HYSTERSCOPE/OUTFLOW CHANNEL MYOSURE

## (undated) DEVICE — DECANTER: Brand: UNBRANDED

## (undated) DEVICE — DRESSING,GAUZE,XEROFORM,CURAD,5"X9",ST: Brand: CURAD